# Patient Record
Sex: FEMALE | Race: WHITE | NOT HISPANIC OR LATINO | Employment: OTHER | URBAN - METROPOLITAN AREA
[De-identification: names, ages, dates, MRNs, and addresses within clinical notes are randomized per-mention and may not be internally consistent; named-entity substitution may affect disease eponyms.]

---

## 2017-03-29 ENCOUNTER — TRANSFERRED RECORDS (OUTPATIENT)
Dept: HEALTH INFORMATION MANAGEMENT | Facility: CLINIC | Age: 32
End: 2017-03-29

## 2017-03-29 LAB
HPV ABSTRACT: NORMAL
PAP-ABSTRACT: NORMAL

## 2018-02-21 ENCOUNTER — HOSPITAL ENCOUNTER (OUTPATIENT)
Facility: CLINIC | Age: 33
End: 2018-02-21
Payer: COMMERCIAL

## 2018-06-06 ENCOUNTER — TELEPHONE (OUTPATIENT)
Dept: OBGYN | Facility: OTHER | Age: 33
End: 2018-06-06

## 2018-06-10 ENCOUNTER — HOSPITAL ENCOUNTER (OUTPATIENT)
Facility: CLINIC | Age: 33
Discharge: OTHER INSTITUTION | End: 2018-06-11
Attending: FAMILY MEDICINE | Admitting: FAMILY MEDICINE
Payer: COMMERCIAL

## 2018-06-10 PROBLEM — Z36.89 ENCOUNTER FOR TRIAGE IN PREGNANT PATIENT: Status: ACTIVE | Noted: 2018-06-10

## 2018-06-10 LAB
ALBUMIN UR-MCNC: NEGATIVE MG/DL
AMORPH CRY #/AREA URNS HPF: ABNORMAL /HPF
APPEARANCE UR: ABNORMAL
BACTERIA #/AREA URNS HPF: ABNORMAL /HPF
BILIRUB UR QL STRIP: NEGATIVE
COLOR UR AUTO: YELLOW
GLUCOSE UR STRIP-MCNC: NEGATIVE MG/DL
HGB UR QL STRIP: NEGATIVE
KETONES UR STRIP-MCNC: NEGATIVE MG/DL
LEUKOCYTE ESTERASE UR QL STRIP: NEGATIVE
NITRATE UR QL: NEGATIVE
PH UR STRIP: 7 PH (ref 5–7)
RBC #/AREA URNS AUTO: 0 /HPF (ref 0–2)
SOURCE: ABNORMAL
SP GR UR STRIP: 1 (ref 1–1.03)
SQUAMOUS #/AREA URNS AUTO: 6 /HPF (ref 0–1)
UROBILINOGEN UR STRIP-MCNC: 0 MG/DL (ref 0–2)
WBC #/AREA URNS AUTO: 2 /HPF (ref 0–5)

## 2018-06-10 PROCEDURE — 96376 TX/PRO/DX INJ SAME DRUG ADON: CPT

## 2018-06-10 PROCEDURE — 87210 SMEAR WET MOUNT SALINE/INK: CPT | Performed by: FAMILY MEDICINE

## 2018-06-10 PROCEDURE — 80306 DRUG TEST PRSMV INSTRMNT: CPT | Performed by: FAMILY MEDICINE

## 2018-06-10 PROCEDURE — 81001 URINALYSIS AUTO W/SCOPE: CPT | Performed by: FAMILY MEDICINE

## 2018-06-10 PROCEDURE — 82731 ASSAY OF FETAL FIBRONECTIN: CPT | Performed by: FAMILY MEDICINE

## 2018-06-10 PROCEDURE — 59025 FETAL NON-STRESS TEST: CPT | Mod: 26 | Performed by: FAMILY MEDICINE

## 2018-06-10 RX ORDER — PRENATAL VIT/IRON FUM/FOLIC AC 27MG-0.8MG
1 TABLET ORAL DAILY
COMMUNITY
End: 2019-01-02

## 2018-06-11 ENCOUNTER — APPOINTMENT (OUTPATIENT)
Dept: ULTRASOUND IMAGING | Facility: CLINIC | Age: 33
End: 2018-06-11
Attending: FAMILY MEDICINE
Payer: COMMERCIAL

## 2018-06-11 VITALS
RESPIRATION RATE: 16 BRPM | HEIGHT: 66 IN | BODY MASS INDEX: 38.57 KG/M2 | SYSTOLIC BLOOD PRESSURE: 110 MMHG | DIASTOLIC BLOOD PRESSURE: 68 MMHG | TEMPERATURE: 97.7 F | WEIGHT: 240 LBS | HEART RATE: 95 BPM

## 2018-06-11 LAB
AMPHETAMINES UR QL: NOT DETECTED NG/ML
BARBITURATES UR QL SCN: ABNORMAL NG/ML
BENZODIAZ UR QL SCN: NOT DETECTED NG/ML
BUPRENORPHINE UR QL: NOT DETECTED NG/ML
CANNABINOIDS UR QL: NOT DETECTED NG/ML
COCAINE UR QL SCN: NOT DETECTED NG/ML
D-METHAMPHET UR QL: NOT DETECTED NG/ML
FIBRONECTIN FETAL VAG QL: NEGATIVE
METHADONE UR QL SCN: NOT DETECTED NG/ML
OPIATES UR QL SCN: NOT DETECTED NG/ML
OXYCODONE UR QL SCN: NOT DETECTED NG/ML
PCP UR QL SCN: NOT DETECTED NG/ML
PROPOXYPH UR QL: NOT DETECTED NG/ML
RUPTURE OF FETAL MEMBRANES BY ROM PLUS: NEGATIVE
SPECIMEN SOURCE: NORMAL
TRICYCLICS UR QL SCN: NOT DETECTED NG/ML
WET PREP SPEC: NORMAL

## 2018-06-11 PROCEDURE — 96376 TX/PRO/DX INJ SAME DRUG ADON: CPT

## 2018-06-11 PROCEDURE — 76819 FETAL BIOPHYS PROFIL W/O NST: CPT

## 2018-06-11 PROCEDURE — 25000128 H RX IP 250 OP 636

## 2018-06-11 PROCEDURE — 96374 THER/PROPH/DIAG INJ IV PUSH: CPT | Mod: 59

## 2018-06-11 PROCEDURE — 25000125 ZZHC RX 250

## 2018-06-11 PROCEDURE — 25000132 ZZH RX MED GY IP 250 OP 250 PS 637: Performed by: FAMILY MEDICINE

## 2018-06-11 PROCEDURE — 76815 OB US LIMITED FETUS(S): CPT

## 2018-06-11 PROCEDURE — 25000128 H RX IP 250 OP 636: Performed by: FAMILY MEDICINE

## 2018-06-11 PROCEDURE — 96360 HYDRATION IV INFUSION INIT: CPT

## 2018-06-11 PROCEDURE — G0463 HOSPITAL OUTPT CLINIC VISIT: HCPCS

## 2018-06-11 PROCEDURE — 84112 EVAL AMNIOTIC FLUID PROTEIN: CPT | Performed by: FAMILY MEDICINE

## 2018-06-11 PROCEDURE — 96361 HYDRATE IV INFUSION ADD-ON: CPT

## 2018-06-11 PROCEDURE — 59025 FETAL NON-STRESS TEST: CPT

## 2018-06-11 PROCEDURE — 96372 THER/PROPH/DIAG INJ SC/IM: CPT | Mod: XS

## 2018-06-11 RX ORDER — FENTANYL CITRATE 50 UG/ML
50 INJECTION, SOLUTION INTRAMUSCULAR; INTRAVENOUS ONCE
Status: COMPLETED | OUTPATIENT
Start: 2018-06-11 | End: 2018-06-11

## 2018-06-11 RX ORDER — NIFEDIPINE 10 MG/1
20 CAPSULE ORAL EVERY 30 MIN PRN
Status: COMPLETED | OUTPATIENT
Start: 2018-06-11 | End: 2018-06-11

## 2018-06-11 RX ORDER — FENTANYL CITRATE 50 UG/ML
INJECTION, SOLUTION INTRAMUSCULAR; INTRAVENOUS
Status: COMPLETED
Start: 2018-06-11 | End: 2018-06-11

## 2018-06-11 RX ORDER — HYDROXYZINE HYDROCHLORIDE 50 MG/1
100 TABLET, FILM COATED ORAL ONCE
Status: COMPLETED | OUTPATIENT
Start: 2018-06-11 | End: 2018-06-11

## 2018-06-11 RX ORDER — BETAMETHASONE SODIUM PHOSPHATE AND BETAMETHASONE ACETATE 3; 3 MG/ML; MG/ML
12 INJECTION, SUSPENSION INTRA-ARTICULAR; INTRALESIONAL; INTRAMUSCULAR; SOFT TISSUE ONCE
Status: CANCELLED | OUTPATIENT
Start: 2018-06-12

## 2018-06-11 RX ORDER — FENTANYL CITRATE 50 UG/ML
25 INJECTION, SOLUTION INTRAMUSCULAR; INTRAVENOUS ONCE
Status: COMPLETED | OUTPATIENT
Start: 2018-06-11 | End: 2018-06-11

## 2018-06-11 RX ORDER — ACETAMINOPHEN 325 MG/1
650 TABLET ORAL ONCE
Status: COMPLETED | OUTPATIENT
Start: 2018-06-11 | End: 2018-06-11

## 2018-06-11 RX ORDER — SODIUM CHLORIDE, SODIUM LACTATE, POTASSIUM CHLORIDE, CALCIUM CHLORIDE 600; 310; 30; 20 MG/100ML; MG/100ML; MG/100ML; MG/100ML
INJECTION, SOLUTION INTRAVENOUS CONTINUOUS
Status: DISCONTINUED | OUTPATIENT
Start: 2018-06-11 | End: 2018-06-11 | Stop reason: HOSPADM

## 2018-06-11 RX ORDER — BETAMETHASONE SODIUM PHOSPHATE AND BETAMETHASONE ACETATE 3; 3 MG/ML; MG/ML
12 INJECTION, SUSPENSION INTRA-ARTICULAR; INTRALESIONAL; INTRAMUSCULAR; SOFT TISSUE ONCE
Status: COMPLETED | OUTPATIENT
Start: 2018-06-11 | End: 2018-06-11

## 2018-06-11 RX ORDER — LIDOCAINE HYDROCHLORIDE 10 MG/ML
INJECTION, SOLUTION EPIDURAL; INFILTRATION; INTRACAUDAL; PERINEURAL
Status: COMPLETED
Start: 2018-06-11 | End: 2018-06-11

## 2018-06-11 RX ORDER — NIFEDIPINE 10 MG/1
20 CAPSULE ORAL ONCE
Status: COMPLETED | OUTPATIENT
Start: 2018-06-11 | End: 2018-06-11

## 2018-06-11 RX ADMIN — FENTANYL CITRATE 50 MCG: 50 INJECTION, SOLUTION INTRAMUSCULAR; INTRAVENOUS at 06:49

## 2018-06-11 RX ADMIN — NIFEDIPINE 20 MG: 10 CAPSULE ORAL at 01:18

## 2018-06-11 RX ADMIN — BETAMETHASONE SODIUM PHOSPHATE AND BETAMETHASONE ACETATE 12 MG: 3; 3 INJECTION, SUSPENSION INTRA-ARTICULAR; INTRALESIONAL; INTRAMUSCULAR at 02:27

## 2018-06-11 RX ADMIN — FENTANYL CITRATE 50 MCG: 50 INJECTION, SOLUTION INTRAMUSCULAR; INTRAVENOUS at 08:49

## 2018-06-11 RX ADMIN — NIFEDIPINE 20 MG: 10 CAPSULE ORAL at 01:49

## 2018-06-11 RX ADMIN — LIDOCAINE HYDROCHLORIDE 5 MG: 10 INJECTION, SOLUTION EPIDURAL; INFILTRATION; INTRACAUDAL; PERINEURAL at 06:39

## 2018-06-11 RX ADMIN — SODIUM CHLORIDE, POTASSIUM CHLORIDE, SODIUM LACTATE AND CALCIUM CHLORIDE: 600; 310; 30; 20 INJECTION, SOLUTION INTRAVENOUS at 08:34

## 2018-06-11 RX ADMIN — ACETAMINOPHEN 650 MG: 325 TABLET ORAL at 03:15

## 2018-06-11 RX ADMIN — FENTANYL CITRATE 25 MCG: 50 INJECTION, SOLUTION INTRAMUSCULAR; INTRAVENOUS at 05:10

## 2018-06-11 RX ADMIN — NIFEDIPINE 20 MG: 10 CAPSULE ORAL at 00:44

## 2018-06-11 RX ADMIN — HYDROXYZINE HYDROCHLORIDE 100 MG: 50 TABLET, FILM COATED ORAL at 02:43

## 2018-06-11 NOTE — PROGRESS NOTES
Continues with contractions, 3rd dose of Nifedipine given. Patient reports feeling like fluid leaking, ROM+ specimen sent to lab

## 2018-06-11 NOTE — PROGRESS NOTES
Dr. Hector informed of negative fetal fibronectin and negative wet prep results as well as cervical exam external os 4cm, internal os FT. Orders received for Nifedipine dosing

## 2018-06-11 NOTE — PROGRESS NOTES
"Pt's pain has decreased after IV Fentanyl was given at 0848.  Ctxs are 3\" apart.  Variables seen at times on fetal monitor. MD at bedside reviewing strip aware of tracings.  Awaiting ambulances arrival.  "

## 2018-06-11 NOTE — PROGRESS NOTES
Ambulance here to  pt, crew was given report & transport forms.  Pt stable at the time of transport, ctxs have become less intense & frequent.  Crew has no questions at this time.

## 2018-06-11 NOTE — PROGRESS NOTES
Dr. Hector reviewed case with [facility] and plan now is to continue to monirtor overnight. If contraction intensity or frequency escalate, Dr. Hector will be notified and come in to assess for transfer to higher level of care facility, otherwise Dr. Hector will see her in the morning to determine continued POC at that time.

## 2018-06-11 NOTE — PROGRESS NOTES
S: Triage Arrival  B: Breanna is a 33 y.o.  @ 33w 3d who presents with complaint of contractions since yesterday with increasing intensity since this evening.  A: EFM applied. FHT's130 with moderate variability, accels present, no decels noted on strip. Contractions 4-8 minutes  R: Will notify MD to obtain further orders.

## 2018-06-11 NOTE — PROGRESS NOTES
Ambulance ETA is 45 minutes, MD was updated on ETA.  Pt asking to drink some fluids, order was given to start LR at 125cc/hr.

## 2018-06-11 NOTE — PROGRESS NOTES
"  2018    Breanna Vidal  [id number]        OB Triage Progress Note      Ms. Vidal  is here with contractions.    She has noticed contractions for the past couple days but last evening they seemed to be getting worse.      No LMP recorded. Patient is pregnant.   Her Estimated Date of Delivery: 2018, making her 33w4d  wks.  Actually, in looking back through her chart, she had an early u/s in November at 6 weeks that put her EDC at 18 which would make her 34 1/7 weeks.      Estimated body mass index is 38.74 kg/(m^2) as calculated from the following:    Height as of this encounter: 1.676 m (5' 6\").    Weight as of this encounter: 108.9 kg (240 lb).  Her prenatal course has been complicated by a move with transition of prenatal care.  She received some care at health [facility] but recently moved to [place] and has not yet established care with her new MD.  She has not had regular prenatal care but has had screening labs all completed and states she received rhogam for Rh negative.     She is a 33 year old   Her OB history:   Obstetric History       T3      L3     SAB0   TAB0   Ectopic0   Multiple0   Live Births3       # Outcome Date GA Lbr Jian/2nd Weight Sex Delivery Anes PTL Lv   4 Current            3 Term /    M  EPI  WOODY      Complications: Postpartum hemorrhage   2 Term 10/29/11    F Vag-Spont EPI  LIVE BIRTH   1 Term 04    F Vag-Vacuum EPI  LIVE BIRTH      Complications: Postpartum hemorrhage        She has 3 full term uncomplicated deliveries.  First child lives with her father in [place], [place].  Second child was given up for adoption, open, and lives in [place].  (patient was going through drug treatment at that time).   She has her 18 month old living with her at home with her fiance, who is FOB of the 18 month old and this current pregnancy.     PMH:  Significant for drug abuse history, in  she was using methamphetamines and alcohol, put herself through drug " treatment and has been sober since then.   Also significant for MRSA facial and elbow cellulitis in 2017, treated with debridement and antibiotics.  Resolved. MRSA clearance swabs were negative in August 2017 x 2.        Past Surgical History:   Procedure Laterality Date     INCISION AND DRAINAGE  04/18/2012    chin abscess I&D     MAMMOPLASTY REDUCTION Bilateral 08/04/2017         No current outpatient prescriptions on file.       Allergies: Sulfa drugs and Zantac [ranitidine]      REVIEW OF SYSTEMS:  NEUROLOGIC:  Negative  EYES:  Negative  ENT:  Negative  GI:  Negative  BREAST:  Negative  :  Negative  GYN:  Negative  CV:  Negative  PULMONARY:  Negative  MUSCULOSKELETAL:  Negative  PSYCH:  Negative  OB:  Feeling good fetal movement but ongoing contractions. No bleeding, no leak of fluid.       Social History     Social History     Marital status: Single     Spouse name: N/A     Number of children: 3     Years of education: N/A     Occupational History     Not on file.     Social History Main Topics     Smoking status: Former Smoker     Smokeless tobacco: Former User     Quit date: 3/10/2016     Alcohol use No      Comment: history alcohol abuse 2012, s/p treatment     Drug use: No      Comment: former drug user (EtOH, cocaine, marijuana, meth), last use 2012     Sexual activity: Yes     Partners: Male     Other Topics Concern     Not on file     Social History Narrative    Currently lives in [place] with fijeimy and 18 month old son.  Has a 13 year old dtr that lives with dtrs dad in [place], [place], and a 6  Year old that was adopted out in an open adoption, lives in [place]      History reviewed. No pertinent family history.      Vitals:   FHT 130s with good variability,  With contractions every  2-7 min    Alert Awake in NAD  HEENT grossly normal  Neck: no lymphadenopathy or thryoidomegaly  Lungs CTAB  Back no spinal or CVAT  Heart RRR without murmur  ABD gravid, obese, position not felt on exam.  I cannot  palpate contractions well  Pelvic:  no fluid noted, no blood noted  Cervix is very high, feels closed but soft.  Cannot feel presenting part.    EXT:  no edema or calf tenderness  Neuro:  intact    Assessment:  IUP at 33w4d  With  contractions.   She is having significant pain, feels these go through to her back.  She can't rest/sleep.    She was treated with 3 doses of nifedipine per protocol without significant benefit.    She has been given 1 dose of betamethasone 12 mg.    She has been given 100 mg of Vistaril without benefit.    I am going to give her a dose of fentanyl as well. She does not have IV at this time, will use IM. She tends to not tolerate oral pain meds well.    Will also have u/s to evaluate fetal well being and size.    If her contractions are progressive, will transfer to [facility] for NICU care.    If not, will consider discharge home on bedrest until 36 weeks.     Flower Hector MD  Dept of Family Medicine  2018

## 2018-06-11 NOTE — PROGRESS NOTES
Breanna continues to contract through the night and is complaining that they are getting stronger and more painful.    She has not had any relief with the pain medication.  It helps very briefly but then wears off.   Her u/s was normal, shows EFW 57% assuming 34 1/7 weeks.      IMPRESSION:    1. Single live intrauterine pregnancy of 35 weeks 2 days gestation by  current ultrasound measurement. Fetal growth is 8 days more advanced  than what is expected from the reported previously established due  date.  2. Normal biophysical profile score of 8 out of 8.  3. Otherwise, unremarkable limited obstetric ultrasound.    Vitals:    06/11/18 0118 06/11/18 0149 06/11/18 0330 06/11/18 0751   BP: 126/58 105/63  110/68   Pulse: 102 106  95   Resp: 18 18  16   Temp:   97.5  F (36.4  C) 97.7  F (36.5  C)   TempSrc:   Oral Oral   Weight:       Height:          EFM:  Contractions continue every 2-5 minutes.    FHT:  150 baseline with good variability, +accels, 1 decel.   SVE repeated:    Closed but very soft, baby feels cephalic despite just having had u/s that shows breech.   High, -5 station    We discussed options of:   1 - continuing care here with close monitoring  2- sending her home for bedrest  3 - transfer to hospital with NICU for possible early delivery    She prefers to transfer to [facility] which is her planned delivery site, closer to her family.   She had Betamethasone at 2:30 am.    Will transfer by ground, offer pain meds prior to transfer.  I spoke with Dr. Reji Nguyễn who accepts patient in transfer.   Flower Hector MD

## 2018-06-11 NOTE — PROGRESS NOTES
S: MD update  A:  Dr. Hector informed of patient C/O contractions with increasing intensity,  @ 33w3d gestation, increased mucous discharge, previous deliveries all full term.  FHT's130 with moderate variability, accels present, no decels noted.  Contractions 4-8 minutes, mild lasting 60-90 seconds. UA results reviewed with MD.  R: Orders received for wet prep, Fetal Fibronectin and cervical exam

## 2018-06-11 NOTE — PROGRESS NOTES
Report was called to Mk Pozo (accepting RN) at [facility].  RN was also informed of ETA.  RN has no further questions after report given.

## 2018-06-11 NOTE — PROGRESS NOTES
Dr. Hector informed of continued contractions after 3 doses of Nifedipine as well as negative ROM+ results. Orders received for Betamethasone injection as well as tox screen due to history of drug abuse

## 2018-06-11 NOTE — PROGRESS NOTES
Dr. Hector notified of continued contractions, some that patient says are very strong and some not too bad. Contractions are every 2-9 minutes. FHT's 125, moderate variability with accels present and no decels. Dr. Hector coming in to assess patient bedside

## 2018-06-13 ENCOUNTER — HOSPITAL ENCOUNTER (OUTPATIENT)
Facility: CLINIC | Age: 33
Discharge: NURSING, REHAB OR LONG-TERM CARE | End: 2018-06-13
Attending: FAMILY MEDICINE | Admitting: FAMILY MEDICINE
Payer: COMMERCIAL

## 2018-06-13 VITALS
SYSTOLIC BLOOD PRESSURE: 113 MMHG | HEART RATE: 77 BPM | DIASTOLIC BLOOD PRESSURE: 56 MMHG | RESPIRATION RATE: 16 BRPM | TEMPERATURE: 97.8 F

## 2018-06-13 LAB
AMPHETAMINES UR QL: NOT DETECTED NG/ML
BARBITURATES UR QL SCN: NOT DETECTED NG/ML
BENZODIAZ UR QL SCN: NOT DETECTED NG/ML
BUPRENORPHINE UR QL: NOT DETECTED NG/ML
CANNABINOIDS UR QL: NOT DETECTED NG/ML
COCAINE UR QL SCN: NOT DETECTED NG/ML
D-METHAMPHET UR QL: NOT DETECTED NG/ML
METHADONE UR QL SCN: NOT DETECTED NG/ML
OPIATES UR QL SCN: NOT DETECTED NG/ML
OXYCODONE UR QL SCN: NOT DETECTED NG/ML
PCP UR QL SCN: NOT DETECTED NG/ML
PROPOXYPH UR QL: NOT DETECTED NG/ML
TRICYCLICS UR QL SCN: NOT DETECTED NG/ML

## 2018-06-13 PROCEDURE — 96374 THER/PROPH/DIAG INJ IV PUSH: CPT

## 2018-06-13 PROCEDURE — 80306 DRUG TEST PRSMV INSTRMNT: CPT | Performed by: FAMILY MEDICINE

## 2018-06-13 PROCEDURE — 25000128 H RX IP 250 OP 636: Performed by: FAMILY MEDICINE

## 2018-06-13 PROCEDURE — 99213 OFFICE O/P EST LOW 20 MIN: CPT | Performed by: FAMILY MEDICINE

## 2018-06-13 PROCEDURE — G0463 HOSPITAL OUTPT CLINIC VISIT: HCPCS

## 2018-06-13 PROCEDURE — 25000128 H RX IP 250 OP 636

## 2018-06-13 PROCEDURE — 96360 HYDRATION IV INFUSION INIT: CPT

## 2018-06-13 PROCEDURE — 25000125 ZZHC RX 250: Performed by: FAMILY MEDICINE

## 2018-06-13 PROCEDURE — 25000132 ZZH RX MED GY IP 250 OP 250 PS 637: Performed by: FAMILY MEDICINE

## 2018-06-13 PROCEDURE — 25000125 ZZHC RX 250

## 2018-06-13 PROCEDURE — 96361 HYDRATE IV INFUSION ADD-ON: CPT

## 2018-06-13 RX ORDER — LIDOCAINE HYDROCHLORIDE 10 MG/ML
INJECTION, SOLUTION EPIDURAL; INFILTRATION; INTRACAUDAL; PERINEURAL
Status: COMPLETED
Start: 2018-06-13 | End: 2018-06-13

## 2018-06-13 RX ORDER — FENTANYL CITRATE 50 UG/ML
50 INJECTION, SOLUTION INTRAMUSCULAR; INTRAVENOUS ONCE
Status: COMPLETED | OUTPATIENT
Start: 2018-06-13 | End: 2018-06-13

## 2018-06-13 RX ORDER — ONDANSETRON 4 MG/1
4 TABLET, ORALLY DISINTEGRATING ORAL ONCE
Status: COMPLETED | OUTPATIENT
Start: 2018-06-13 | End: 2018-06-13

## 2018-06-13 RX ORDER — ACETAMINOPHEN 325 MG/1
975 TABLET ORAL ONCE
Status: COMPLETED | OUTPATIENT
Start: 2018-06-13 | End: 2018-06-13

## 2018-06-13 RX ORDER — FENTANYL CITRATE 50 UG/ML
INJECTION, SOLUTION INTRAMUSCULAR; INTRAVENOUS
Status: COMPLETED
Start: 2018-06-13 | End: 2018-06-13

## 2018-06-13 RX ORDER — NIFEDIPINE 10 MG/1
20 CAPSULE ORAL EVERY 30 MIN PRN
Status: COMPLETED | OUTPATIENT
Start: 2018-06-13 | End: 2018-06-13

## 2018-06-13 RX ORDER — HYDROXYZINE HYDROCHLORIDE 50 MG/1
100 TABLET, FILM COATED ORAL ONCE
Status: COMPLETED | OUTPATIENT
Start: 2018-06-13 | End: 2018-06-13

## 2018-06-13 RX ORDER — NIFEDIPINE 10 MG/1
20 CAPSULE ORAL ONCE
Status: COMPLETED | OUTPATIENT
Start: 2018-06-13 | End: 2018-06-13

## 2018-06-13 RX ADMIN — NIFEDIPINE 20 MG: 10 CAPSULE ORAL at 20:30

## 2018-06-13 RX ADMIN — HYDROXYZINE HYDROCHLORIDE 100 MG: 50 TABLET, FILM COATED ORAL at 20:03

## 2018-06-13 RX ADMIN — ONDANSETRON 4 MG: 4 TABLET, ORALLY DISINTEGRATING ORAL at 20:05

## 2018-06-13 RX ADMIN — FENTANYL CITRATE 50 MCG: 50 INJECTION, SOLUTION INTRAMUSCULAR; INTRAVENOUS at 21:51

## 2018-06-13 RX ADMIN — NIFEDIPINE 20 MG: 10 CAPSULE ORAL at 20:01

## 2018-06-13 RX ADMIN — ACETAMINOPHEN 975 MG: 325 TABLET ORAL at 20:05

## 2018-06-13 RX ADMIN — LIDOCAINE HYDROCHLORIDE 20 MG: 10 INJECTION, SOLUTION EPIDURAL; INFILTRATION; INTRACAUDAL; PERINEURAL at 21:02

## 2018-06-13 RX ADMIN — SODIUM CHLORIDE, POTASSIUM CHLORIDE, SODIUM LACTATE AND CALCIUM CHLORIDE 1000 ML: 600; 310; 30; 20 INJECTION, SOLUTION INTRAVENOUS at 21:44

## 2018-06-13 RX ADMIN — NIFEDIPINE 20 MG: 10 CAPSULE ORAL at 21:14

## 2018-06-14 NOTE — PROGRESS NOTES
Patient D/C'd in the care of [facility] crew enroute to [facility] in stable condition at 2230. Report given to Receiving Nurse Luisa at [facility].

## 2018-06-14 NOTE — PROGRESS NOTES
"S: triage arrival  B: 33 year old  at 34w3d gestation with a hx of  contractions with no cervical change.  A:  Pt presents to the birthplace today with two concerns.  1) decreased fetal movement.  Pt states she has not felt the baby move since last night.  2) Contractions that have gotten increasingly more frequent this afternoon.  Fetal heart tones are 150 with moderate variability.  accels present.  No decels. Pt is sin every 1-2 minutes.  Pt rates the pain at its worst an \"8\".  Pt appears very anxious.  At times she is laughing about something and the next minute she is crying.  It is hard to determine by her disposition how the contractions are feeling for her.  Cervical exam was done and is unchanged from previous exam.   was called and orders placed.   R: Will carryout orders.   "

## 2018-06-14 NOTE — PROGRESS NOTES
"3rd dose of procardia given.  Pt continues to contract every 1-3 minutes with irritability.. Rates pain an \"8\" on a 0-10 pain scale. Pt is able to talk on the phone while having these contractions.      "

## 2018-06-14 NOTE — PROGRESS NOTES
"  2018    Breanna Vidal  [id number]            OB Triage      Ms. Vidal  is here with decreased fetal movement initially, but also mentions she has been having more contractions lately as well..    She has noticed decreased fetal movement and contractions    No LMP recorded. Patient is pregnant.   Her Estimated Date of Delivery: 2018  , making her 34w3d  wks.      Estimated body mass index is 38.74 kg/(m^2) as calculated from the following:    Height as of 6/10/18: 1.676 m (5' 6\").    Weight as of 6/10/18: 108.9 kg (240 lb).  Her prenatal course has been complicated by poor prenatal care.    See prenatal for labs.  Done by genital culture 17 was positive GBS, Rubella Immune, RH negative (B-), ab screen neg .    Estimated fetal weight= 6 lbs       She is a 33 year old   Her OB history:   Obstetric History       T3      L3     SAB0   TAB0   Ectopic0   Multiple0   Live Births3       # Outcome Date GA Lbr Jian/2nd Weight Sex Delivery Anes PTL Lv   4 Current            3 Term 16    M  EPI  WOODY      Complications: Postpartum hemorrhage   2 Term 10/29/11    F Vag-Spont EPI  LIVE BIRTH   1 Term 04    F Vag-Vacuum EPI  LIVE BIRTH      Complications: Postpartum hemorrhage               Past Medical History:   Diagnosis Date     Cellulitis     MRSA septic olecranon bursitis and facial cellulitis     Drug abuse     methamphetamine and alcohol, sober since treatment      MRSA cellulitis     cleared with negative nasal swabs 17 and 17, reviewed     Rh negative, antepartum           Past Surgical History:   Procedure Laterality Date     INCISION AND DRAINAGE  2012    chin abscess I&D     MAMMOPLASTY REDUCTION Bilateral 2017         No current outpatient prescriptions on file.       Allergies: Sulfa drugs and Zantac [ranitidine]      REVIEW OF SYSTEMS:  NEUROLOGIC:  Negative  EYES:  Negative  ENT:  Negative  GI:  Negative  BREAST:  " Negative  :  Negative  GYN:  Negative  CV:  Negative  PULMONARY:  Negative  MUSCULOSKELETAL:  Negative  PSYCH:  Negative        Social History     Social History     Marital status: Single     Spouse name: N/A     Number of children: 3     Years of education: N/A     Occupational History     Not on file.     Social History Main Topics     Smoking status: Former Smoker     Smokeless tobacco: Former User     Quit date: 3/10/2016     Alcohol use No      Comment: history alcohol abuse , s/p treatment     Drug use: No      Comment: former drug user (EtOH, cocaine, marijuana, meth), last use      Sexual activity: Yes     Partners: Male     Other Topics Concern     Not on file     Social History Narrative    Currently lives in [place] with fiance and 18 month old son.  Has a 13 year old dtr that lives with dtrs dad in [place], [place], and a 6  Year old that was adopted out in an open adoption, lives in [place]      No family history on file.          Vitals:     With contractions every  1-4min    Alert Awake in NAD  HEENT grossly normal  Neck: no lymphadenopathy or thryoidomegaly  Lungs CTA yoselin  Back normal  Heart RR without murmur  ABD gravid, obese on exam with palpable mild contractions  Pelvic:  no fluid noted, no blood noted  Cervix is closed cm / 70 % effaced at -4 station  EXT:  no edema or calf tenderness      Assessment:  IUP at 34w3d     contractions    Plan:  Procardia, IVF, urine drug screen. Tylenol and zofran for symptoms as needed.  Monitor -- contractins have been on and off for the last few days and are more intense at this time which warrants evalutaion. PAtient checked and started on procardia as she came in and upon 1 hour recheck has had no change. Will finish IVF and get drug screen to complete evalutaion and likely recheck and if unchanged, plan discharge home as contractions have not been causing cervical changes.    Addendum: recheck at 2 hours revealed cervical change to 1.5  cm /70/-3 posterior.  Discussed and planned transfer of patient to [facility].        Gail Lopez MD MD

## 2018-06-18 ENCOUNTER — PRENATAL OFFICE VISIT (OUTPATIENT)
Dept: OBGYN | Facility: OTHER | Age: 33
End: 2018-06-18
Payer: COMMERCIAL

## 2018-06-18 VITALS
DIASTOLIC BLOOD PRESSURE: 80 MMHG | SYSTOLIC BLOOD PRESSURE: 140 MMHG | WEIGHT: 252 LBS | BODY MASS INDEX: 40.67 KG/M2 | HEART RATE: 100 BPM

## 2018-06-18 DIAGNOSIS — R11.0 NAUSEA: ICD-10-CM

## 2018-06-18 DIAGNOSIS — Z34.93 NORMAL PREGNANCY IN THIRD TRIMESTER: Primary | ICD-10-CM

## 2018-06-18 PROCEDURE — 99207 ZZC PRENATAL VISIT: CPT | Performed by: OBSTETRICS & GYNECOLOGY

## 2018-06-18 RX ORDER — ONDANSETRON 4 MG/1
4 TABLET, FILM COATED ORAL EVERY 8 HOURS PRN
Qty: 30 TABLET | Refills: 1 | Status: SHIPPED | OUTPATIENT
Start: 2018-06-18 | End: 2018-07-12

## 2018-06-18 RX ORDER — BUTALBITAL, ACETAMINOPHEN AND CAFFEINE 300; 40; 50 MG/1; MG/1; MG/1
CAPSULE ORAL
COMMUNITY
Start: 2018-05-22 | End: 2018-07-12

## 2018-06-18 RX ORDER — ONDANSETRON 4 MG/1
TABLET, FILM COATED ORAL
COMMUNITY
Start: 2018-04-26 | End: 2018-07-12

## 2018-06-18 NOTE — MR AVS SNAPSHOT
"              After Visit Summary   6/18/2018    Breanna Vidal    MRN: [id number]           Patient Information     Date Of Birth          1985        Visit Information        Provider Department      6/18/2018 2:15 PM Camila Montgomery DO [facility]        Today's Diagnoses     Normal pregnancy in third trimester    -  1    Nausea          Care Instructions    Go to L&D          Follow-ups after your visit        Your next 10 appointments already scheduled     Jun 26, 2018 10:30 AM CDT   ESTABLISHED PRENATAL with Camila Montgomery DO   [facility] ([facility])    [address] [place] [id number] [phone]              Who to contact     If you have questions or need follow up information about today's clinic visit or your schedule please contact [facility] directly at [phone].  Normal or non-critical lab and imaging results will be communicated to you by MyChart, letter or phone within 4 business days after the clinic has received the results. If you do not hear from us within 7 days, please contact the clinic through MyChart or phone. If you have a critical or abnormal lab result, we will notify you by phone as soon as possible.  Submit refill requests through [employer] or call your pharmacy and they will forward the refill request to us. Please allow 3 business days for your refill to be completed.          Additional Information About Your Visit        MyChart Information     [employer] lets you send messages to your doctor, view your test results, renew your prescriptions, schedule appointments and more. To sign up, go to [email or web address] . Click on \"Log in\" on the left side of the screen, which will take you to the Welcome page. Then click on \"Sign up Now\" on the right side of the page.     You will be asked to enter the access code listed below, as well as some personal information. Please follow the " directions to create your username and password.     Your access code is: [email or web address]  Expires: 2018  2:20 PM     Your access code will  in 90 days. If you need help or a new code, please call your [facility] clinic or [phone].        Care EveryWhere ID     This is your Care EveryWhere ID. This could be used by other organizations to access your [facility] medical records  [id number]        Your Vitals Were     Pulse BMI (Body Mass Index)                100 40.67 kg/m2           Blood Pressure from Last 3 Encounters:   18 140/80   18 113/56   18 110/68    Weight from Last 3 Encounters:   18 252 lb (114.3 kg)   06/10/18 240 lb (108.9 kg)              Today, you had the following     No orders found for display         Today's Medication Changes          These changes are accurate as of 18  3:11 PM.  If you have any questions, ask your nurse or doctor.               These medicines have changed or have updated prescriptions.        Dose/Directions    * ZOFRAN 4 MG tablet   This may have changed:  Another medication with the same name was added. Make sure you understand how and when to take each.   Generic drug:  ondansetron   Changed by:  Camila Montgomery DO        Refills:  0       * ondansetron 4 MG tablet   Commonly known as:  ZOFRAN   This may have changed:  You were already taking a medication with the same name, and this prescription was added. Make sure you understand how and when to take each.   Used for:  Nausea   Changed by:  Camila Montgomery DO        Dose:  4 mg   Take 1 tablet (4 mg) by mouth every 8 hours as needed for nausea   Quantity:  30 tablet   Refills:  1       * Notice:  This list has 2 medication(s) that are the same as other medications prescribed for you. Read the directions carefully, and ask your doctor or other care provider to review them with you.         Where to get your medicines      These medications were sent to [facility]  [place] [address]  [address] [facility] [address]     Phone:  [phone]     ondansetron 4 MG tablet                Primary Care Provider Office Phone # Fax #    Ramiro Gaviria [phone]       OB GYN [address]   [place] [facility] [address]        Equal Access to Services     MACRINA BAINS : Hadii aad ku hadasho Soomaali, waaxda luqadaha, qaybta kaalmada adeegyada, waxay idiin hayaan adeeg kharash la'aan ah. So [facility] [phone].    ATENCIÓN: Si habla espvandana, tiene a haider disposición servicios gratuitos de asistencia lingüística. [facility] [phone].    We comply with applicable federal civil rights laws and [place] laws. We do not discriminate on the basis of race, color, national origin, age, disability, sex, sexual orientation, or gender identity.            Thank you!     Thank you for choosing [facility]  for your care. Our goal is always to provide you with excellent care. Hearing back from our patients is one way we can continue to improve our services. Please take a few minutes to complete the written survey that you may receive in the mail after your visit with us. Thank you!             Your Updated Medication List - Protect others around you: Learn how to safely use, store and throw away your medicines at www.disposemymeds.org.          This list is accurate as of 6/18/18  3:11 PM.  Always use your most recent med list.                   Brand Name Dispense Instructions for use Diagnosis    FIORICET -40 MG Caps   Generic drug:  Butalbital-APAP-Caffeine           prenatal multivitamin plus iron 27-0.8 MG Tabs per tablet      Take 1 tablet by mouth daily        * ZOFRAN 4 MG tablet   Generic drug:  ondansetron           * ondansetron 4 MG tablet    ZOFRAN    30 tablet    Take 1 tablet (4 mg) by mouth every 8 hours as needed for nausea    Nausea       * Notice:  This list has 2 medication(s) that are the same as  other medications prescribed for you. Read the directions carefully, and ask your doctor or other care provider to review them with you.

## 2018-06-18 NOTE — PROGRESS NOTES
33 year old  at 35w1d weeks presents to the clinic for a routine prenatal visit.  Patient has been seen multiple times at L&D and admitted 2 times for  labor.  She recently was discharged on .  Her cx at discharge was 1-250/-3.  She has received BMZ and terb.  She is not currently taking anything for  labor.  She has been on pelvic rest.  Patient complains of a headache which started last night and she took Tylenol which seemed to help because she was able to sleep.  Occasional vision changes.  Some nausea.  No vomiting.  No RUQ pain.    No vaginal bleeding, leakage of fluid, or contractions  LX=261/80.  She normally runs 120/70's-confirmed with her recent admission  Fundal height=38cm  XWPt=985's  CX=3-4/80/-2    I recommended patient go to L&D.  I spoke with charge, SHILOH Rodriguez.  Patient verbalizes understanding.        Camila Montgmoery

## 2018-06-19 ENCOUNTER — TELEPHONE (OUTPATIENT)
Dept: OBGYN | Facility: OTHER | Age: 33
End: 2018-06-19

## 2018-06-19 NOTE — TELEPHONE ENCOUNTER
Patient said she needs a hospital follow up on Friday with Dr Montgomery. She said Dr Montgomery is the one that sent her to the hospital and she would know what this is for.   Can you work her in?

## 2018-06-19 NOTE — TELEPHONE ENCOUNTER
Routed to provider for review and response.       Daphne Guerra,PERLA  June 19, 2018  10:41 AM

## 2018-06-19 NOTE — TELEPHONE ENCOUNTER
Patient can be worked into my schedule.  Please let her know she will be worked in and may have to wait.    Camila Montgomery

## 2018-06-21 ENCOUNTER — TELEPHONE (OUTPATIENT)
Dept: OBGYN | Facility: OTHER | Age: 33
End: 2018-06-21

## 2018-06-21 ENCOUNTER — PRENATAL OFFICE VISIT (OUTPATIENT)
Dept: OBGYN | Facility: OTHER | Age: 33
End: 2018-06-21
Payer: COMMERCIAL

## 2018-06-21 ENCOUNTER — NURSE TRIAGE (OUTPATIENT)
Dept: NURSING | Facility: CLINIC | Age: 33
End: 2018-06-21

## 2018-06-21 VITALS
SYSTOLIC BLOOD PRESSURE: 120 MMHG | DIASTOLIC BLOOD PRESSURE: 80 MMHG | WEIGHT: 250.5 LBS | HEART RATE: 80 BPM | BODY MASS INDEX: 40.43 KG/M2

## 2018-06-21 DIAGNOSIS — O14.03 MILD PRE-ECLAMPSIA IN THIRD TRIMESTER: Primary | ICD-10-CM

## 2018-06-21 DIAGNOSIS — O16.3 ELEVATED BLOOD PRESSURE COMPLICATING PREGNANCY IN THIRD TRIMESTER, ANTEPARTUM: Primary | ICD-10-CM

## 2018-06-21 PROBLEM — Z34.93 NORMAL PREGNANCY IN THIRD TRIMESTER: Status: RESOLVED | Noted: 2018-06-18 | Resolved: 2018-06-21

## 2018-06-21 PROBLEM — Z36.89 ENCOUNTER FOR TRIAGE IN PREGNANT PATIENT: Status: RESOLVED | Noted: 2018-06-10 | Resolved: 2018-06-21

## 2018-06-21 PROCEDURE — 99207 ZZC COMPLICATED OB VISIT: CPT | Performed by: OBSTETRICS & GYNECOLOGY

## 2018-06-21 NOTE — TELEPHONE ENCOUNTER
Spoke with pt.  Advised she go to [facility] L[facility] for further evaluation if she is having contractions and a headache.    Pt verbalized understanding and agreed to plan.    Lise Finch RN

## 2018-06-21 NOTE — NURSING NOTE
"Chief Complaint   Patient presents with     Contractions     Headache       Initial /80 (BP Location: Right arm, Patient Position: Chair, Cuff Size: Adult Regular)  Pulse 80  Wt 266 lb (120.7 kg)  BMI 42.93 kg/m2 Estimated body mass index is 42.93 kg/(m^2) as calculated from the following:    Height as of 6/10/18: 5' 6\" (1.676 m).    Weight as of this encounter: 266 lb (120.7 kg).  BP completed using cuff size: regular        Gail Agudelo, [facility]  2018          "

## 2018-06-21 NOTE — MR AVS SNAPSHOT
After Visit Summary   6/21/2018    Breanna Vidal    MRN: [id number]           Patient Information     Date Of Birth          1985        Visit Information        Provider Department      6/21/2018 10:00 AM Diann Stewart MD [facility]        Today's Diagnoses     Mild pre-eclampsia in third trimester    -  1       Follow-ups after your visit        Follow-up notes from your care team     Return in about 1 week (around 6/28/2018) for OB Visit.      Your next 10 appointments already scheduled     Jun 22, 2018 10:45 AM CDT   ESTABLISHED PRENATAL with Camila Montgomery DO   [facility] ([facility]    [address]  [address]  [facility] [id number] [phone]            Jun 26, 2018 10:30 AM CDT   ESTABLISHED PRENATAL with Camila Montgomery DO   [facility] [place] [place] ([facility] [place] [place]    [address] [address] [place] [id number] [phone]              Who to contact     If you have questions or need follow up information about today's clinic visit or your schedule please contact [facility] directly at [phone].  Normal or non-critical lab and imaging results will be communicated to you by MyChart, letter or phone within 4 business days after the clinic has received the results. If you do not hear from us within 7 days, please contact the clinic through MyChart or phone. If you have a critical or abnormal lab result, we will notify you by phone as soon as possible.  Submit refill requests through Joost or call your pharmacy and they will forward the refill request to us. Please allow 3 business days for your refill to be completed.          Additional Information About Your Visit        MyChart Information     Joost lets you send messages to your doctor, view your test results, renew your prescriptions, schedule appointments and more. To sign up, go to [email or web address] . Click on  "\"Log in\" on the left side of the screen, which will take you to the Welcome page. Then click on \"Sign up Now\" on the right side of the page.     You will be asked to enter the access code listed below, as well as some personal information. Please follow the directions to create your username and password.     Your access code is: [email or web address]  Expires: 2018  2:20 PM     Your access code will  in 90 days. If you need help or a new code, please call your [facility] clinic or [phone].        Care EveryWhere ID     This is your Care EveryWhere ID. This could be used by other organizations to access your [facility] medical records  [id number]        Your Vitals Were     Pulse BMI (Body Mass Index)                80 40.43 kg/m2           Blood Pressure from Last 3 Encounters:   18 120/80   18 140/80   18 113/56    Weight from Last 3 Encounters:   18 250 lb 8 oz (113.6 kg)   18 252 lb (114.3 kg)   06/10/18 240 lb (108.9 kg)              Today, you had the following     No orders found for display       Primary Care Provider Office Phone # Fax #    Ramiro Gaviria [phone]       OB GYN [address] BA [address] [place] [address]        Equal Access to Services     : Hadii aad ku hadasho Soomaali, waaxda luqadaha, qaybta kaalmada adeegyada, genevieve matt . So [facility] [phone].    ATENCIÓN: Si habla español, tiene a haider disposición servicios gratuitos de asistencia lingüística. Llame al [phone].    We comply with applicable federal civil rights laws and [place] laws. We do not discriminate on the basis of race, color, national origin, age, disability, sex, sexual orientation, or gender identity.            Thank you!     Thank you for choosing [facility] [place]  for your care. Our goal is always to provide you with excellent care. Hearing back from our patients is one way we can continue to " improve our services. Please take a few minutes to complete the written survey that you may receive in the mail after your visit with us. Thank you!             Your Updated Medication List - Protect others around you: Learn how to safely use, store and throw away your medicines at www.disposemymeds.org.          This list is accurate as of 6/21/18 11:01 AM.  Always use your most recent med list.                   Brand Name Dispense Instructions for use Diagnosis    FIORICET -40 MG Caps   Generic drug:  Butalbital-APAP-Caffeine           prenatal multivitamin plus iron 27-0.8 MG Tabs per tablet      Take 1 tablet by mouth daily        * ZOFRAN 4 MG tablet   Generic drug:  ondansetron           * ondansetron 4 MG tablet    ZOFRAN    30 tablet    Take 1 tablet (4 mg) by mouth every 8 hours as needed for nausea    Nausea       * Notice:  This list has 2 medication(s) that are the same as other medications prescribed for you. Read the directions carefully, and ask your doctor or other care provider to review them with you.

## 2018-06-21 NOTE — PROGRESS NOTES
Presents for evaluation of HA, nausea and contractions.      HA 7/10, causing nausea.  She has tried tylenol and this has not helped. She has contractions every 15 min.  Some bloody show, which is not new for her.  She was in the hospital earlier this week and diagnosed with preeclampsia without severe features.   Note she has not had help with her 2 year old boy, who is quite busy.     No LOF  ROS:   and GI otherwise negative.     Please see Prenatal Vitals and Notes Flowsheet for objective data.  Care Everywhere reviewed.  Normal LFT on .  Plts 197  Pr:Cr 0.38    A/P:  33 year old  at 35w4d       ICD-10-CM    1. Mild pre-eclampsia in third trimester O14.03        BP is normal today, but she has a headache and nausea, so I recommend she go in to L&D for further evaluation.  Recommend she have help with her small child, as she should be resting more.  She voiced understanding.       Diann Stewart MD

## 2018-06-21 NOTE — TELEPHONE ENCOUNTER
Breanna Vidal is a 33 year old female-     NURSING ASSESSMENT:  Description:  Patient calls with contractions, headache, and nausea that has been on going since last night. [facility] and treated for  labor. Medications given in the hospital stopped contractions and she was given two doses of steroids for baby's lung development. Last night contractions started again. She spoke with [facility] triage a couple hours ago and they connected her to the birthplace.  The birthplace is recommending that she come in to be evaluated. Pt is not wanting to be seen as she was just hospitalized.  She has an OB appt for tomorrow and was hoping to move this up to today with Dr. Montgomery. Provider is not in clinic today. DENIES vaginal leaking or bleeding.  Contractions are one or two an hour, but pt is also having some abdominal cramping which is new.   Onset/duration:  Ongoing since last night   Precip. factors:  35 weeks pregnant   Associated symptoms:  Headache and nausea (this is not new)  Improves/worsens symptoms: Contractions stopped and then restarted.   Pain scale (0-10)   4/10  LMP/preg/breast feedin weeks pregnant   Last exam/Treatment:  2018  Allergies:   Allergies   Allergen Reactions     Sulfa Drugs Unknown     Zantac [Ranitidine] Rash     NURSING PLAN: Nursing advice to patient go to the birthplace for evaluation of  labor     RECOMMENDED DISPOSITION:  To Birthplace, another person to drive - Pt is not wanting to be seen in the hospital again. She was just there twice and recently discharged.   Will comply with recommendation: Pt is wanting to wait things out at home. States that if contractions become more steady and or water breaks she will go in.   If further questions/concerns or if symptoms do not improve, worsen or new symptoms develop, call your PCP or [facility] as soon as possible.      Guideline used: 3rd trimester,  labor   Telephone Triage for  Obstetrics and Gynecology, Zuly Carroll and Acacia Santana, RN, BSN

## 2018-06-21 NOTE — TELEPHONE ENCOUNTER
Reason for call:  Patient reporting a symptom    Symptom or request: contractions/headaches/nausea    Duration (how long have symptoms been present): last night     Have you been treated for this before? Yes    Additional comments: pt states is 35 weeks pregnant and been hospitalized twice now for pre term labor. Pt states last night started with contractions and has headaches and nausea    Phone Number patient can be reached at:  Home number on file [phone] (home)    Best Time:  ANY    Can we leave a detailed message on this number:  YES    Call taken on 6/21/2018 at 7:22 AM by Laura Back

## 2018-06-21 NOTE — TELEPHONE ENCOUNTER
RN please triage.  This patient was added on to my schedule at 10 am.      This patient was last seen in the office a few days ago and found to be 3-4 cm dilated with elevated BP.  Hospital note reviewed.  She was diagnosed with preeclampsia with mild features and delivery at 37 weeks was recommended.      She should go back to L&D for further evaluation if she has a headache and contractions.

## 2018-06-21 NOTE — TELEPHONE ENCOUNTER
"Return call from patient. Patient stated she did talk to another RN but \"I absolutely refuse to go to L & D now.\" Patient stated she is sin \"but every 15 mins\" since about 0500. Reported pain as 4-5/10 \"not bad at all.\" Patient reported h/a \"is killing me.\" Reported pain as 7/10 and \"it is nauseating.\" Patient stated she wants to be seen in the clinic \"first and then if they send me to labor & delivery then I will go.\" Explained will update Dr. Stewart and if Dr. Stewart has other suggestions, staff will call back. Placed patient back on Dr. Stewart's schedule at 1000 today.  Called and updated Gail DAVE CMA and she will update Dr. Stewart.   Will route to Dr. Stewart . Debbie Wilcox RN, BAN      "

## 2018-06-21 NOTE — TELEPHONE ENCOUNTER
Left message asking pt to call back to clinic about her appt today. Left clinic call back phone number and RN hours. Debbie Wilcox RN, BAN

## 2018-06-21 NOTE — TELEPHONE ENCOUNTER
Contractions stopped with medications in the past.  Now she's having them again this morning. At 35 weeks. Given steroid shot to help infant's lungs. Lower period cramps and into back, hurts a ton.is the Labor and Delivery in [place]. I connected Breanna with them for a 2nd level triage. Patient was wondering if she should just go in, if she should wait for tomorrow's appointment. I advised she talk with the OB staffs for 2nd level triage but that she should be seen.  Mariah Mace RN-[facility]

## 2018-06-22 ENCOUNTER — TELEPHONE (OUTPATIENT)
Dept: OBGYN | Facility: OTHER | Age: 33
End: 2018-06-22

## 2018-06-22 NOTE — TELEPHONE ENCOUNTER
[facility] from [place] called and requesting 24 hours urine order faxed over to them because patient is dropping off urine sample there. There were no note from Dr. Montgomery, but there was an urine order placed yesterday for pt.     I faxed over the order to [facility] in [facility] [phone].     Kulwant Castillo MA

## 2018-06-24 ENCOUNTER — HEALTH MAINTENANCE LETTER (OUTPATIENT)
Age: 33
End: 2018-06-24

## 2018-06-25 ENCOUNTER — TELEPHONE (OUTPATIENT)
Dept: OBGYN | Facility: OTHER | Age: 33
End: 2018-06-25

## 2018-06-25 NOTE — TELEPHONE ENCOUNTER
I called and spoke to patient.  24 hour urine result of 0.29 given to patient.  Patient is scheduled to see me tomorrow in the clinic.  All questions answered and patient verbalizes understanding.    Camila Montgomery

## 2018-06-25 NOTE — TELEPHONE ENCOUNTER
Reason for call:  Dr. Montgomery ordered a 24 hour urine test and she would like to get the results.  SHe would like to know if what could be causing this is more protein in her urine.  Please call to discuss.

## 2018-06-26 ENCOUNTER — PRENATAL OFFICE VISIT (OUTPATIENT)
Dept: OBGYN | Facility: CLINIC | Age: 33
End: 2018-06-26
Payer: COMMERCIAL

## 2018-06-26 DIAGNOSIS — O13.3 PREGNANCY-INDUCED HYPERTENSION IN THIRD TRIMESTER: ICD-10-CM

## 2018-06-26 DIAGNOSIS — Z36.85 ENCOUNTER FOR ANTENATAL SCREENING FOR STREPTOCOCCUS B: Primary | ICD-10-CM

## 2018-06-26 DIAGNOSIS — O99.713 PRURITUS OF PREGNANCY IN THIRD TRIMESTER: ICD-10-CM

## 2018-06-26 DIAGNOSIS — L29.9 PRURITUS OF PREGNANCY IN THIRD TRIMESTER: ICD-10-CM

## 2018-06-26 PROBLEM — O14.03 MILD PRE-ECLAMPSIA IN THIRD TRIMESTER: Status: RESOLVED | Noted: 2018-06-21 | Resolved: 2018-06-26

## 2018-06-26 LAB
ALBUMIN SERPL-MCNC: 2.7 G/DL (ref 3.4–5)
ALP SERPL-CCNC: 69 U/L (ref 40–150)
ALT SERPL W P-5'-P-CCNC: 13 U/L (ref 0–50)
ANION GAP SERPL CALCULATED.3IONS-SCNC: 10 MMOL/L (ref 3–14)
AST SERPL W P-5'-P-CCNC: 8 U/L (ref 0–45)
BILIRUB SERPL-MCNC: 0.4 MG/DL (ref 0.2–1.3)
BUN SERPL-MCNC: 8 MG/DL (ref 7–30)
CALCIUM SERPL-MCNC: 9 MG/DL (ref 8.5–10.1)
CHLORIDE SERPL-SCNC: 106 MMOL/L (ref 94–109)
CO2 SERPL-SCNC: 21 MMOL/L (ref 20–32)
CREAT SERPL-MCNC: 0.45 MG/DL (ref 0.52–1.04)
ERYTHROCYTE [DISTWIDTH] IN BLOOD BY AUTOMATED COUNT: 14.2 % (ref 10–15)
GFR SERPL CREATININE-BSD FRML MDRD: >90 ML/MIN/1.7M2
GLUCOSE SERPL-MCNC: 91 MG/DL (ref 70–99)
HCT VFR BLD AUTO: 39.2 % (ref 35–47)
HGB BLD-MCNC: 13.1 G/DL (ref 11.7–15.7)
MCH RBC QN AUTO: 29.6 PG (ref 26.5–33)
MCHC RBC AUTO-ENTMCNC: 33.4 G/DL (ref 31.5–36.5)
MCV RBC AUTO: 89 FL (ref 78–100)
PLATELET # BLD AUTO: 183 10E9/L (ref 150–450)
POTASSIUM SERPL-SCNC: 4.4 MMOL/L (ref 3.4–5.3)
PROT SERPL-MCNC: 6.8 G/DL (ref 6.8–8.8)
RBC # BLD AUTO: 4.42 10E12/L (ref 3.8–5.2)
SODIUM SERPL-SCNC: 137 MMOL/L (ref 133–144)
WBC # BLD AUTO: 10.1 10E9/L (ref 4–11)

## 2018-06-26 PROCEDURE — 99207 ZZC PRENATAL VISIT: CPT | Performed by: OBSTETRICS & GYNECOLOGY

## 2018-06-26 PROCEDURE — 82239 BILE ACIDS TOTAL: CPT | Mod: 90 | Performed by: OBSTETRICS & GYNECOLOGY

## 2018-06-26 PROCEDURE — 36415 COLL VENOUS BLD VENIPUNCTURE: CPT | Performed by: OBSTETRICS & GYNECOLOGY

## 2018-06-26 PROCEDURE — 85027 COMPLETE CBC AUTOMATED: CPT | Performed by: OBSTETRICS & GYNECOLOGY

## 2018-06-26 PROCEDURE — 80053 COMPREHEN METABOLIC PANEL: CPT | Performed by: OBSTETRICS & GYNECOLOGY

## 2018-06-26 PROCEDURE — 99000 SPECIMEN HANDLING OFFICE-LAB: CPT | Performed by: OBSTETRICS & GYNECOLOGY

## 2018-06-26 NOTE — PROGRESS NOTES
33 year old  at 36w2d weeks presents to the clinic for a routine prenatal visit.  GHTN=24 hour showed 0.29 however she does have [place] [facility] [place] on   Patient complains of daily headaches still.  No vomiting.  Some emesis.  Will check labs today.  Pre-eclampsia precautions discussed with patient   Patient complains of itching on her abdomen.  Will check labs today.  Some excoriations noted.  Minimal straie  No vaginal bleeding, leakage of fluid, or contractions  Fundal height=40cm  EUQr=024's  CX=2/80/-3  [place] [facility] [place]   Labor precautions discussed  We discussed induction at 37 weeks due to GHTN      Discussed with Breanna the risks, benefits and alternatives to induction.  We discussed cervical ripening for those patients with a burks score of less than 6 (for multiparous) and 8 (for nulliparous).  Discussed the concerns related to uterine hyperstimulation and adverse fetal effects that can occur with a ripening agent or pitocin. Discussed amniotomy and the rationale for it with induction.  I discussed the expected timeline for her based on her presentation, but she understands that this is just an approximate.  She is given the opportunity to ask questions and have them answered.  She does wish to proceed    Induction scheduled for .      Camila Montgomery

## 2018-06-26 NOTE — MR AVS SNAPSHOT
After Visit Summary   2018    Breanna Vidal    MRN: [id number]           Patient Information     Date Of Birth          1985        Visit Information        Provider Department      2018 10:30 AM Camila Montgomery,  [facility]        Today's Diagnoses     Encounter for  screening for Streptococcus B    -  1    Pregnancy-induced hypertension in third trimester        Pruritus of pregnancy in third trimester          Care Instructions    What to watch out for are: regular contractions every 5 min, vaginal bleeding, decreased fetal movement, or leakage of fluid.  Please call the office or go to L&D if you develop any of these signs and symptoms.      I will see you for your follow up appointment.  Please feel free to call if you have any questions or concerns.      Thanks,  Camila Montgomery, DO            Follow-ups after your visit        Follow-up notes from your care team     Return in about 1 week (around 7/3/2018).      Who to contact     If you have questions or need follow up information about today's clinic visit or your schedule please contact [facility] directly at [phone].  Normal or non-critical lab and imaging results will be communicated to you by Wowboardhart, letter or phone within 4 business days after the clinic has received the results. If you do not hear from us within 7 days, please contact the clinic through Wowboardhart or phone. If you have a critical or abnormal lab result, we will notify you by phone as soon as possible.  Submit refill requests through Astonish Results or call your pharmacy and they will forward the refill request to us. Please allow 3 business days for your refill to be completed.          Additional Information About Your Visit        MyChart Information     Astonish Results gives you secure access to your electronic health record. If you see a primary care provider, you can also send messages to your care team and make  appointments. If you have questions, please call your primary care clinic.  If you do not have a primary care provider, please call [phone] and they will assist you.        Care EveryWhere ID     This is your Care EveryWhere ID. This could be used by other organizations to access your [facility] medical records  [id number]        Your Vitals Were     Pulse Pulse Oximetry BMI (Body Mass Index)             91 96% 41 kg/m2          Blood Pressure from Last 3 Encounters:   06/26/18 117/69   06/21/18 120/80   06/18/18 140/80    Weight from Last 3 Encounters:   06/26/18 254 lb (115.2 kg)   06/21/18 250 lb 8 oz (113.6 kg)   06/18/18 252 lb (114.3 kg)              We Performed the Following     Bile acids total     CBC with platelets     Comprehensive metabolic panel        Primary Care Provider Office Phone # Fax #    Ramiro Ayaz Gaviria [phone]       OB GYN [address] [place] [address]        Equal Access to Services     MACRINA BAINS : Hadii aad ku hadasho Soomaali, waaxda luqadaha, qaybta kaalmada adeegyada, waxay idiin hayaan rosa kharash vernell . So [facility] [phone].    ATENCIÓN: Si habla español, tiene a haider disposición servicios gratuitos de asistencia lingüística. Llame al [phone].    We comply with applicable federal civil rights laws and [place] laws. We do not discriminate on the basis of race, color, national origin, age, disability, sex, sexual orientation, or gender identity.            Thank you!     Thank you for choosing [facility]  for your care. Our goal is always to provide you with excellent care. Hearing back from our patients is one way we can continue to improve our services. Please take a few minutes to complete the written survey that you may receive in the mail after your visit with us. Thank you!             Your Updated Medication List - Protect others around you: Learn how to safely use, store and throw away your  medicines at www.disposemymeds.org.          This list is accurate as of 6/26/18 12:46 PM.  Always use your most recent med list.                   Brand Name Dispense Instructions for use Diagnosis    FIORICET -40 MG Caps   Generic drug:  Butalbital-APAP-Caffeine           prenatal multivitamin plus iron 27-0.8 MG Tabs per tablet      Take 1 tablet by mouth daily        * ZOFRAN 4 MG tablet   Generic drug:  ondansetron           * ondansetron 4 MG tablet    ZOFRAN    30 tablet    Take 1 tablet (4 mg) by mouth every 8 hours as needed for nausea    Nausea       * Notice:  This list has 2 medication(s) that are the same as other medications prescribed for you. Read the directions carefully, and ask your doctor or other care provider to review them with you.

## 2018-06-26 NOTE — PATIENT INSTRUCTIONS
What to watch out for are: regular contractions every 5 min, vaginal bleeding, decreased fetal movement, or leakage of fluid.  Please call the office or go to L&D if you develop any of these signs and symptoms.      I will see you for your follow up appointment.  Please feel free to call if you have any questions or concerns.      Thanks,  Camila Montgomery, DO

## 2018-06-27 LAB — BILE AC SERPL-SCNC: 2 UMOL/L (ref 0–10)

## 2018-06-29 ENCOUNTER — TELEPHONE (OUTPATIENT)
Dept: OBGYN | Facility: OTHER | Age: 33
End: 2018-06-29

## 2018-06-29 NOTE — TELEPHONE ENCOUNTER
Reason for call:  Patient reporting a symptom    Symptom or request: symptoms    Duration (how long have symptoms been present): ?    Have you been treated for this before? Yes    Additional comments: patient states she now has a rash on her stomach and she wants to know what she can put on it?    Phone Number patient can be reached at:  Home number on file [phone] (home) or Cell number on file:    Telephone Information:   Mobile [phone]       Best Time:  anytime    Can we leave a detailed message on this number:  YES    Call taken on 6/29/2018 at 9:52 AM by Sherrill Santana

## 2018-06-29 NOTE — TELEPHONE ENCOUNTER
Breanna Vidal is a 33 year old female who calls with rash on stomach, during pregnancy.    NURSING ASSESSMENT:  Description:  Stomach is itching. Tiny little bumps, some are itched open. Using dove soaps, stated has tried multiple lotions. Denies fevers, swelling, severe pain.   Onset/duration:  Since 02/26/2018  Precip. factors:  Unknown   Associated symptoms:  Rash during pregnancy   Improves/worsens symptoms: new   Pain scale (0-10)   No pain, just itchy   LMP/preg/breast feeding:  No LMP recorded. Patient is pregnant.  GA: 36w5d  PORTIA: Estimated Date of Delivery: Jul 22, 2018  Last exam/Treatment:  06/26/2018  Allergies:   Allergies   Allergen Reactions     Codeine      Other reaction(s): Nausea     Sulfa Drugs Unknown     Zantac [Ranitidine] Rash     NURSING PLAN: Nursing advice to patient to continue home care measeures    RECOMMENDED DISPOSITION:  Home care advice - rash  Will comply with recommendation: Yes  If further questions/concerns or if symptoms do not improve, worsen or new symptoms develop, call your PCP or [facility] as soon as possible.    NOTES:  Disposition was determined by the first positive assessment question, therefore all previous assessment questions were negative    Guideline used:  Telephone Triage Protocols for Nurses, Fifth Edition, Adriana Doyle, adult  Nursing Judgment    Dyana Rivera, RN, BSN

## 2018-07-01 VITALS
SYSTOLIC BLOOD PRESSURE: 117 MMHG | HEART RATE: 91 BPM | BODY MASS INDEX: 41 KG/M2 | DIASTOLIC BLOOD PRESSURE: 69 MMHG | OXYGEN SATURATION: 96 % | WEIGHT: 254 LBS

## 2018-07-06 ENCOUNTER — TELEPHONE (OUTPATIENT)
Dept: FAMILY MEDICINE | Facility: OTHER | Age: 33
End: 2018-07-06

## 2018-07-06 NOTE — TELEPHONE ENCOUNTER
Reason for Call:  Other call back    Detailed comments: Patient calling due to question about tubes being tied. Please call [phone] this number is only good for today.    Phone Number Patient can be reached at: Cell number on file:    Telephone Information:   Mobile [phone]       Best Time: any    Can we leave a detailed message on this number? YES    Call taken on 7/6/2018 at 1:58 PM by Dior Lund

## 2018-07-10 NOTE — TELEPHONE ENCOUNTER
Noted patient had  on 18. No future appts.    Left message asking pt to call back about her message from last week. Left clinic call back phone number and RN hours. Debbie Wilcox RN, BAN

## 2018-07-11 ENCOUNTER — TELEPHONE (OUTPATIENT)
Dept: FAMILY MEDICINE | Facility: OTHER | Age: 33
End: 2018-07-11

## 2018-07-11 NOTE — TELEPHONE ENCOUNTER
LM for the patient to return call to the clinic to discuss the below. Will await to hear from patient. Dyana Rivera RN, BSN

## 2018-07-11 NOTE — TELEPHONE ENCOUNTER
Breanna Vidal is a 33 year old female who calls with rash during pregnancy.    NURSING ASSESSMENT:  Description:  Has a painful rash on her sides and stomach. Rash is red, raised and itchy. Feels like stabbing pain. Loosing sleep due to rash. Delivered 07/01/2018. Denies fevers, drainage.   Onset/duration:  2.5 weeks   Precip. factors:  Post-partum  Associated symptoms:  Red, raised and itchy  Improves/worsens symptoms:  Worsening   Pain scale (0-10)   varies  LMP/preg/breast feeding:  Delivered 07/01/2018  Last exam/Treatment:  06/26/2018  Allergies:   Allergies   Allergen Reactions     Codeine      Other reaction(s): Nausea     Sulfa Drugs Unknown     Zantac [Ranitidine] Rash     NURSING PLAN: [facility]    RECOMMENDED DISPOSITION:  See in 24 hours   Will comply with recommendation: Yes  If further questions/concerns or if symptoms do not improve, worsen or new symptoms develop, call your PCP or [facility] as soon as possible.    NOTES:  Disposition was determined by the first positive assessment question, therefore all previous assessment questions were negative    Guideline used:  Telephone Triage Protocols for Nurses, Fifth Edition, Adriana Doyle, adult  Nursing Judgement    Dyana Rivera RN, BSN

## 2018-07-11 NOTE — TELEPHONE ENCOUNTER
Reason for Call:  Same Day Appointment, Requested Provider:      PCP: Ramiro Gaviria    Reason for visit: rash    Duration of symptoms: 3 weeks    Have you been treated for this in the past? No    Additional comments: I did offer patient a appt for tomorrow and she declined. She wants to come in today.     Can we leave a detailed message on this number? NO    Phone number patient can be reached at:     Best Time:     Call taken on 7/11/2018 at 3:20 PM by Lise Carrasco

## 2018-07-12 ENCOUNTER — OFFICE VISIT (OUTPATIENT)
Dept: OBGYN | Facility: OTHER | Age: 33
End: 2018-07-12
Payer: COMMERCIAL

## 2018-07-12 VITALS
BODY MASS INDEX: 38.75 KG/M2 | WEIGHT: 240.1 LBS | DIASTOLIC BLOOD PRESSURE: 68 MMHG | HEART RATE: 64 BPM | SYSTOLIC BLOOD PRESSURE: 114 MMHG | TEMPERATURE: 97.3 F | RESPIRATION RATE: 16 BRPM

## 2018-07-12 DIAGNOSIS — L29.9 ITCHING: Primary | ICD-10-CM

## 2018-07-12 DIAGNOSIS — L90.6 STRETCH MARKS: ICD-10-CM

## 2018-07-12 DIAGNOSIS — R21 RASH: ICD-10-CM

## 2018-07-12 PROCEDURE — 99207 ZZC POST PARTUM EXAM: CPT | Performed by: OBSTETRICS & GYNECOLOGY

## 2018-07-12 RX ORDER — HYDROXYZINE HYDROCHLORIDE 25 MG/1
25-50 TABLET, FILM COATED ORAL EVERY 6 HOURS PRN
Qty: 60 TABLET | Refills: 0 | Status: SHIPPED | OUTPATIENT
Start: 2018-07-12 | End: 2018-08-20

## 2018-07-12 RX ORDER — MOMETASONE FUROATE 1 MG/G
CREAM TOPICAL
Qty: 45 G | Refills: 0 | Status: SHIPPED | OUTPATIENT
Start: 2018-07-12 | End: 2018-08-20

## 2018-07-12 ASSESSMENT — PAIN SCALES - GENERAL: PAINLEVEL: SEVERE PAIN (7)

## 2018-07-12 NOTE — MR AVS SNAPSHOT
After Visit Summary   7/12/2018    Breanna Vidal    MRN: [id number]           Patient Information     Date Of Birth          1985        Visit Information        Provider Department      7/12/2018 11:30 AM Jessie Johnson MD [facility]        Today's Diagnoses     Itching    -  1    Rash        Stretch marks           Follow-ups after your visit        Follow-up notes from your care team     Return if symptoms worsen or fail to improve.      Who to contact     If you have questions or need follow up information about today's clinic visit or your schedule please contact [facility] directly at [phone].  Normal or non-critical lab and imaging results will be communicated to you by MyChart, letter or phone within 4 business days after the clinic has received the results. If you do not hear from us within 7 days, please contact the clinic through Yattoshart or phone. If you have a critical or abnormal lab result, we will notify you by phone as soon as possible.  Submit refill requests through [email or web address] or call your pharmacy and they will forward the refill request to us. Please allow 3 business days for your refill to be completed.          Additional Information About Your Visit        MyChart Information     [email or web address] gives you secure access to your electronic health record. If you see a primary care provider, you can also send messages to your care team and make appointments. If you have questions, please call your primary care clinic.  If you do not have a primary care provider, please call [phone] and they will assist you.        Care EveryWhere ID     This is your Care EveryWhere ID. This could be used by other organizations to access your [facility] medical records  [id number]        Your Vitals Were     Pulse Temperature Respirations BMI (Body Mass Index)          64 97.3  F (36.3  C) (Temporal) 16 38.75 kg/m2         Blood Pressure from Last 3 Encounters:    07/12/18 114/68   06/26/18 117/69   06/21/18 120/80    Weight from Last 3 Encounters:   07/12/18 240 lb 1.6 oz (108.9 kg)   06/26/18 254 lb (115.2 kg)   06/21/18 250 lb 8 oz (113.6 kg)              Today, you had the following     No orders found for display         Today's Medication Changes          These changes are accurate as of 7/12/18 12:16 PM.  If you have any questions, ask your nurse or doctor.               Start taking these medicines.        Dose/Directions    hydrOXYzine 25 MG tablet   Commonly known as:  ATARAX   Used for:  Rash, Itching   Started by:  Jessie Johnson MD        Dose:  25-50 mg   Take 1-2 tablets (25-50 mg) by mouth every 6 hours as needed for itching   Quantity:  60 tablet   Refills:  0       mometasone 0.1 % cream   Commonly known as:  ELOCON   Used for:  Rash   Started by:  Jessie Johnson MD        Apply sparingly to affected area twice daily as needed.  Do not apply to face.   Quantity:  45 g   Refills:  0            Where to get your medicines      These medications were sent to [facility] - [address]   [address] [place] [address]     Phone:  [phone]     hydrOXYzine 25 MG tablet    mometasone 0.1 % cream                Primary Care Provider Office Phone # Fax #    Ramiro Ayaz Gaviria [phone]       OB GYN [address] [place] [address]        Equal Access to Services     MACRINA BAINS AH: Hadii arely newtono Somanny, waaxda luqadaha, qaybta kaalmada rosayastefany, genevieve levine. So [facility] [phone].    ATENCIÓN: Si habla español, tiene a haider disposición servicios gratuitos de asistencia lingüística. Llame al [phone].    We comply with applicable federal civil rights laws and [place] laws. We do not discriminate on the basis of race, color, national origin, age, disability, sex, sexual orientation, or gender identity.            Thank you!      Thank you for choosing [facility]  for your care. Our goal is always to provide you with excellent care. Hearing back from our patients is one way we can continue to improve our services. Please take a few minutes to complete the written survey that you may receive in the mail after your visit with us. Thank you!             Your Updated Medication List - Protect others around you: Learn how to safely use, store and throw away your medicines at www.disposemymeds.org.          This list is accurate as of 7/12/18 12:16 PM.  Always use your most recent med list.                   Brand Name Dispense Instructions for use Diagnosis    hydrOXYzine 25 MG tablet    ATARAX    60 tablet    Take 1-2 tablets (25-50 mg) by mouth every 6 hours as needed for itching    Rash, Itching       mometasone 0.1 % cream    ELOCON    45 g    Apply sparingly to affected area twice daily as needed.  Do not apply to face.    Rash       prenatal multivitamin plus iron 27-0.8 MG Tabs per tablet      Take 1 tablet by mouth daily

## 2018-07-12 NOTE — PROGRESS NOTES
SUBJECTIVE:                                                   Breanna Vidal is a 33 year old female who presents to clinic today for the following health issue(s):  Rash and itching since delivery    HPI:  Patient is a alonso 33 y.o.  who is 12 days s/d  who had itching on her abdomen starting just prior to delivery and which is continuing. It is located on her abdomen and back only.  She states she is scratching so hard she is breaking the skin.  No other complaints.  She states she is not breastfeeding.    No LMP recorded..   Patient is not sexually active, .  Using periodic abstinence for contraception.    reports that she has quit smoking. She quit smokeless tobacco use about 2 years ago.    STD testing offered?  No, not applicable      Problem list and histories reviewed & adjusted, as indicated.  Additional history: as documented.    Patient Active Problem List   Diagnosis     Pregnancy-induced hypertension in third trimester     Past Surgical History:   Procedure Laterality Date     INCISION AND DRAINAGE  2012    chin abscess I&D     MAMMOPLASTY REDUCTION Bilateral 2017      Social History   Substance Use Topics     Smoking status: Former Smoker     Smokeless tobacco: Former User     Quit date: 3/10/2016     Alcohol use No      Comment: history alcohol abuse 2012, s/p treatment           Current Outpatient Prescriptions   Medication Sig     hydrOXYzine (ATARAX) 25 MG tablet Take 1-2 tablets (25-50 mg) by mouth every 6 hours as needed for itching     mometasone (ELOCON) 0.1 % cream Apply sparingly to affected area twice daily as needed.  Do not apply to face.     Prenatal Vit-Fe Fumarate-FA (PRENATAL MULTIVITAMIN PLUS IRON) 27-0.8 MG TABS per tablet Take 1 tablet by mouth daily     No current facility-administered medications for this visit.      Allergies   Allergen Reactions     Codeine      Other reaction(s): Nausea     Sulfa Drugs Unknown     Zantac [Ranitidine] Rash        ROS:  12 point review of systems negative other than symptoms noted below.    OBJECTIVE:     /68 (BP Location: Right arm, Patient Position: Chair, Cuff Size: Adult Large)  Pulse 64  Temp 97.3  F (36.3  C) (Temporal)  Resp 16  Wt 240 lb 1.6 oz (108.9 kg)  BMI 38.75 kg/m2  Body mass index is 38.75 kg/(m^2).    Exam:  Well appearing female in NAD  Skin:General Inspection:  Area of pruritis is localized to abdomen and lower back and is specifically further located only to her extensive stretch marks at these locations. Excoriations present. No evidence of cellulitis or erythema. No rash/lestions present on non-stretch robert areas    In-Clinic Test Results:  No results found for this or any previous visit (from the past 24 hour(s)).    ASSESSMENT/PLAN:                                                        ICD-10-CM    1. Itching L29.9 hydrOXYzine (ATARAX) 25 MG tablet   2. Rash R21 mometasone (ELOCON) 0.1 % cream     hydrOXYzine (ATARAX) 25 MG tablet   3. Stretch marks L90.6      Since patient is not breast feeding, will give rx for atarax which she had to use previously.  Also rx for elocon cream to be applied directly to the areas or pruritis.  rtc if no improvement and prn.     Jessie Johnson MD  [facility]

## 2018-07-13 NOTE — TELEPHONE ENCOUNTER
"Per discharge notes, \"She was hoping to have a PPTL but did not sign the MA consent form prior to delivery. Therefore, she would like to discuss her options at her 6-week postpartum appt.\"  LM for the patient to return call to the clinic to discuss the below. Will await to hear from patient. Dyana Rivera RN, BSN       "

## 2018-08-06 NOTE — PROGRESS NOTES
SUBJECTIVE:    Patient is in for a consultation for a sterilization procedure.  She was referred to me by Dr Stewart   This is a 33 year old old female in today for a consultation for sterilization procedure.  She has 2 children.    She and her partner do not want to have any more children and she has decided to have the sterilization procedure.      Past Surgical History:   Procedure Laterality Date     INCISION AND DRAINAGE  04/18/2012    chin abscess I&D     MAMMOPLASTY REDUCTION Bilateral 08/04/2017       Past Medical History:   Diagnosis Date     Cellulitis 2012    MRSA septic olecranon bursitis and facial cellulitis     Drug abuse 2011    methamphetamine and alcohol, sober since treatment 2011     MRSA cellulitis 2012    cleared with negative nasal swabs 8/16/17 and 8/28/17, reviewed     Rh negative, antepartum        Allergies   Allergen Reactions     Codeine      Other reaction(s): Nausea     Sulfa Drugs Unknown     Zantac [Ranitidine] Rash         Current Outpatient Prescriptions:      Prenatal Vit-Fe Fumarate-FA (PRENATAL MULTIVITAMIN PLUS IRON) 27-0.8 MG TABS per tablet, Take 1 tablet by mouth daily, Disp: , Rfl:      hydrOXYzine (ATARAX) 25 MG tablet, Take 1-2 tablets (25-50 mg) by mouth every 6 hours as needed for itching (Patient not taking: Reported on 8/9/2018), Disp: 60 tablet, Rfl: 0     mometasone (ELOCON) 0.1 % cream, Apply sparingly to affected area twice daily as needed.  Do not apply to face. (Patient not taking: Reported on 8/9/2018), Disp: 45 g, Rfl: 0    Social History     Social History     Marital status: Single     Spouse name: N/A     Number of children: 4     Years of education: N/A     Occupational History     Not on file.     Social History Main Topics     Smoking status: Former Smoker     Smokeless tobacco: Former User     Quit date: 3/10/2016     Alcohol use No      Comment: history alcohol abuse 2012, s/p treatment     Drug use: No      Comment: former drug user (EtOH, cocaine,  marijuana, meth), last use 2012     Sexual activity: Yes     Partners: Male     Other Topics Concern     Not on file     Social History Narrative    Currently lives in [place] with chris and 18 month old son.  Has a 13 year old dtr that lives with dtrs dad in [place], [place], and a 6  Year old that was adopted out in an open adoption, lives in [place]       History reviewed. No pertinent family history.    I reviewed with the patient her desire for sterility and the other non permanent options available to her.  Patient is currently using abstinence.   She is certain that she wants to have a sterilization procedure.    I went through the risks and benefits of a tubal including bleeding, wound infection, damage or injury to bowel, bladder or other internal organs.    She is also aware of the potential for side effects from anesthesia.  We talked about blood loss being less than 5-10 cc and the risk of infection being very minimal.  Postoperatively she is going to have some discomfort.  I did warn her about the potential for diaphragmatic irritation with referred pain to her left chest and shoulder region.    I did show the patient where the incisions would be, by both pictures and by showing her on herself.    OBJECTIVE:     /72  Pulse 74  Temp 98.6  F (37  C) (Temporal)  Resp 16  Wt 233 lb (105.7 kg)  BMI 37.61 kg/m2        Assessment:  Undersired fertility    Plan:  Will schedule the patient for a Laparoscopic Bilateral Salpingectomy  on 8/27/18.  She is aware that she will need a preop PE 1 wk prior to the procedure with her primary provider.    If she has any further questions, she will contact me.  She did sign the consent forms today.  I answered all of her questions to her satisfaction.         Chai Cui MD

## 2018-08-09 ENCOUNTER — OFFICE VISIT (OUTPATIENT)
Dept: FAMILY MEDICINE | Facility: OTHER | Age: 33
End: 2018-08-09
Payer: COMMERCIAL

## 2018-08-09 VITALS
BODY MASS INDEX: 37.61 KG/M2 | HEART RATE: 74 BPM | RESPIRATION RATE: 16 BRPM | DIASTOLIC BLOOD PRESSURE: 72 MMHG | WEIGHT: 233 LBS | SYSTOLIC BLOOD PRESSURE: 116 MMHG | TEMPERATURE: 98.6 F

## 2018-08-09 DIAGNOSIS — Z30.09 ENCOUNTER FOR OTHER GENERAL COUNSELING OR ADVICE ON CONTRACEPTION: Primary | ICD-10-CM

## 2018-08-09 PROBLEM — O13.3 PREGNANCY-INDUCED HYPERTENSION IN THIRD TRIMESTER: Status: RESOLVED | Noted: 2018-06-26 | Resolved: 2018-08-09

## 2018-08-09 PROCEDURE — 99214 OFFICE O/P EST MOD 30 MIN: CPT | Performed by: FAMILY MEDICINE

## 2018-08-09 ASSESSMENT — PAIN SCALES - GENERAL: PAINLEVEL: NO PAIN (0)

## 2018-08-09 NOTE — MR AVS SNAPSHOT
After Visit Summary   8/9/2018    Breanna Vidal    MRN: [id number]           Patient Information     Date Of Birth          1985        Visit Information        Provider Department      8/9/2018 1:00 PM Chai Cui MD [facility]        Today's Diagnoses     Encounter for other general counseling or advice on contraception    -  1       Follow-ups after your visit        Your next 10 appointments already scheduled     Aug 20, 2018  2:40 PM CDT   Pre-Op physical with MADELINE Miramontes CNP   [facility]    [address] [facility] [address] [phone]              Who to contact     If you have questions or need follow up information about today's clinic visit or your schedule please contact [facility] directly at [phone].  Normal or non-critical lab and imaging results will be communicated to you by MyChart, letter or phone within 4 business days after the clinic has received the results. If you do not hear from us within 7 days, please contact the clinic through ProofPilothart or phone. If you have a critical or abnormal lab result, we will notify you by phone as soon as possible.  Submit refill requests through Zuki or call your pharmacy and they will forward the refill request to us. Please allow 3 business days for your refill to be completed.          Additional Information About Your Visit        MyChart Information     Zuki gives you secure access to your electronic health record. If you see a primary care provider, you can also send messages to your care team and make appointments. If you have questions, please call your primary care clinic.  If you do not have a primary care provider, please call [phone] and they will assist you.        Care EveryWhere ID     This is your Care EveryWhere ID. This could be used by other organizations to access your [facility]  records  [id number]        Your Vitals Were     Pulse Temperature Respirations BMI (Body Mass Index)          74 98.6  F (37  C) (Temporal) 16 37.61 kg/m2         Blood Pressure from Last 3 Encounters:   08/09/18 116/72   07/12/18 114/68   06/26/18 117/69    Weight from Last 3 Encounters:   08/09/18 233 lb (105.7 kg)   07/12/18 240 lb 1.6 oz (108.9 kg)   06/26/18 254 lb (115.2 kg)              We Performed the Following     Mimi-Operative Worksheet GYN        Primary Care Provider Office Phone # Fax #    Ramiro Gaviria [phone]       OB GYN [address] [place] [address]        Equal Access to Services     TEOFILO BAINS : Hadii arely cruz hadasho Soomaali, waaxda luqadaha, qaybta kaalmada adeegyada, genevieve matt . So [facility] [phone].    ATENCIÓN: Si habla español, tiene a haider disposición servicios gratuitos de asistencia lingüística. Llame al [phone].    We comply with applicable federal civil rights laws and [place] laws. We do not discriminate on the basis of race, color, national origin, age, disability, sex, sexual orientation, or gender identity.            Thank you!     Thank you for choosing [facility]  for your care. Our goal is always to provide you with excellent care. Hearing back from our patients is one way we can continue to improve our services. Please take a few minutes to complete the written survey that you may receive in the mail after your visit with us. Thank you!             Your Updated Medication List - Protect others around you: Learn how to safely use, store and throw away your medicines at www.disposemymeds.org.          This list is accurate as of 8/9/18  1:38 PM.  Always use your most recent med list.                   Brand Name Dispense Instructions for use Diagnosis    hydrOXYzine 25 MG tablet    ATARAX    60 tablet    Take 1-2 tablets (25-50 mg) by mouth every 6 hours as needed for  itching    Rash, Itching       mometasone 0.1 % cream    ELOCON    45 g    Apply sparingly to affected area twice daily as needed.  Do not apply to face.    Rash       prenatal multivitamin plus iron 27-0.8 MG Tabs per tablet      Take 1 tablet by mouth daily

## 2018-08-14 NOTE — PATIENT INSTRUCTIONS
Before Your Surgery      Call your surgeon if there is any change in your health. This includes signs of a cold or flu (such as a sore throat, runny nose, cough, rash or fever).    Do not smoke, drink alcohol or take over the counter medicine (unless your surgeon or primary care doctor tells you to) for the 24 hours before and after surgery.    If you take prescribed drugs: Follow your doctor s orders about which medicines to take and which to stop until after surgery.    Eating and drinking prior to surgery: follow the instructions from your surgeon    Take a shower or bath the night before surgery. Use the soap your surgeon gave you to gently clean your skin. If you do not have soap from your surgeon, use your regular soap. Do not shave or scrub the surgery site.  Wear clean pajamas and have clean sheets on your bed.     Please hold all Nsaids 7-10 days prior to surgery. You will need a urine pregnancy test day of surgery     Thank you

## 2018-08-14 NOTE — PROGRESS NOTES
[facility]  [address] [facility] [address]  [phone]  Dept: [phone]    PRE-OP EVALUATION:  Today's date: 2018    Breanna Vidal (: 1985) presents for pre-operative evaluation assessment as requested by Dr. Cui.  She requires evaluation and anesthesia risk assessment prior to undergoing surgery/procedure for treatment of desired sterility .    Fax number for surgical facility: [facility]  Primary Physician: Ramiro Gaviria  Type of Anesthesia Anticipated: to be determined    Patient has a Health Care Directive or Living Will:  NO    Preop Questions 2018   Who is doing your surgery? Chai Cui    What are you having done? LAPAROSCOPI SALPINGECTOMY   Date of Surgery/Procedure: 2018   Facility or Hospital where procedure/surgery will be performed: [facility]   1.  Do you have a history of Heart attack, stroke, stent, coronary bypass surgery, or other heart surgery? No   2.  Do you ever have any pain or discomfort in your chest? No   3.  Do you have a history of  Heart Failure? No   4.   Are you troubled by shortness of breath when:  walking on a level surface, or up a slight hill, or at night? No   5.  Do you currently have a cold, bronchitis or other respiratory infection? No   6.  Do you have a cough, shortness of breath, or wheezing? No   7.  Do you sometimes get pains in the calves of your legs when you walk? No   8. Do you or anyone in your family have previous history of blood clots? No   9.  Do you or does anyone in your family have a serious bleeding problem such as prolonged bleeding following surgeries or cuts? No   10. Have you ever had problems with anemia or been told to take iron pills? No   11. Have you had any abnormal blood loss such as black, tarry or bloody stools, or abnormal vaginal bleeding? No   12. Have you ever had a blood transfusion? No   13. Have you or any of your relatives ever  had problems with anesthesia? No   14. Do you have sleep apnea, excessive snoring or daytime drowsiness? No   15. Do you have any prosthetic heart valves? No   16. Do you have prosthetic joints? No   17. Is there any chance that you may be pregnant? No         HPI:     HPI related to upcoming procedure: desire sterilization.       DEPRESSION - Patient has a history of Depression of moderate severity after birth of 1st child she is no longer requiring medication for control with recent symptoms being stable.   Anxiety history also stable.                                                                                                                                                                                     .      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, abuse (H) 10/14/2009     Priority: Medium     Overview:   LW Modifier:  EtOH,crack cocaine,marij- [id number]/7/09  ; Polysubstance Dependence        Past Medical History:   Diagnosis Date     Cellulitis 2012    MRSA septic olecranon bursitis and facial cellulitis     Drug abuse 2011    methamphetamine and alcohol, sober since treatment 2011     MRSA cellulitis 2012    cleared with negative nasal swabs 8/16/17 and 8/28/17, reviewed     Rh negative, antepartum      Past Surgical History:   Procedure Laterality Date     INCISION AND DRAINAGE  04/18/2012    chin abscess I&D      MAMMOPLASTY REDUCTION Bilateral 08/04/2017     Current Outpatient Prescriptions   Medication Sig Dispense Refill     Prenatal Vit-Fe Fumarate-FA (PRENATAL MULTIVITAMIN PLUS IRON) 27-0.8 MG TABS per tablet Take 1 tablet by mouth daily       OTC products: no recent use of OTC ASA, NSAIDS or Steroids and herbals and vitamins - prenatal vitamin    Allergies   Allergen Reactions     Codeine      Other reaction(s): Nausea     Sulfa Drugs Unknown     Zantac [Ranitidine] Rash      Latex Allergy: NO    Social History   Substance Use Topics     Smoking status: Former Smoker     Smokeless tobacco: Former User     Quit date: 3/10/2016     Alcohol use No      Comment: history alcohol abuse 2012, s/p treatment     History   Drug Use No     Comment: former drug user (EtOH, cocaine, marijuana, meth), last use 2012       REVIEW OF SYSTEMS:   Constitutional, neuro, ENT, endocrine, pulmonary, cardiac, gastrointestinal, genitourinary, musculoskeletal, integument and psychiatric systems are negative, except as otherwise noted.    EXAM:   /78  Pulse 88  Temp 97.8  F (36.6  C) (Oral)  Resp 16  Wt 233 lb (105.7 kg)  BMI 37.61 kg/m2    GENERAL APPEARANCE: healthy, alert and no distress     EYES: EOMI, PERRL     HENT: ear canals and TM's normal and nose and mouth without ulcers or lesions     NECK: no adenopathy, no asymmetry, masses, or scars and thyroid normal to palpation     RESP: lungs clear to auscultation - no rales, rhonchi or wheezes     CV: regular rates and rhythm, normal S1 S2, no S3 or S4 and no murmur, click or rub     ABDOMEN:  soft, nontender, no HSM or masses and bowel sounds normal     MS: extremities normal- no gross deformities noted, no evidence of inflammation in joints, FROM in all extremities.     SKIN: no suspicious lesions or rashes     NEURO: Normal strength and tone, sensory exam grossly normal, mentation intact and speech normal     PSYCH: mentation appears normal. and affect normal/bright      LYMPHATICS: No cervical adenopathy    DIAGNOSTICS:   Will have preop labs completed day prior to surgery; type and screen and urine HCG    Recent Labs   Lab Test  06/26/18   1105  06/26/18   1104   HGB   --   13.1   PLT   --   183   NA  137   --    POTASSIUM  4.4   --    CR  0.45*   --         IMPRESSION:   Reason for surgery/procedure: desire for sterilization procedure    The proposed surgical procedure is considered LOW risk.    REVISED CARDIAC RISK INDEX  The patient has the following serious cardiovascular risks for perioperative complications such as (MI, PE, VFib and 3  AV Block):  No serious cardiac risks  INTERPRETATION: 0 risks: Class I (very low risk - 0.4% complication rate)    The patient has the following additional risks for perioperative complications:  No identified additional risks      ICD-10-CM    1. Preop general physical exam Z01.818        RECOMMENDATIONS:         --Patient is to take all scheduled medications on the day of surgery EXCEPT for modifications listed below. Hold all Nsaids, ASA, vitamins    APPROVAL GIVEN to proceed with proposed procedure, without further diagnostic evaluation       Signed Electronically by: MADELINE Santacruz CNP    Copy of this evaluation report is provided to requesting physician.    [facility] Preop Guidelines    Revised Cardiac Risk Index

## 2018-08-20 ENCOUNTER — OFFICE VISIT (OUTPATIENT)
Dept: FAMILY MEDICINE | Facility: OTHER | Age: 33
End: 2018-08-20
Payer: COMMERCIAL

## 2018-08-20 ENCOUNTER — TELEPHONE (OUTPATIENT)
Dept: FAMILY MEDICINE | Facility: CLINIC | Age: 33
End: 2018-08-20

## 2018-08-20 VITALS
RESPIRATION RATE: 16 BRPM | SYSTOLIC BLOOD PRESSURE: 124 MMHG | WEIGHT: 233 LBS | TEMPERATURE: 97.8 F | BODY MASS INDEX: 37.61 KG/M2 | DIASTOLIC BLOOD PRESSURE: 78 MMHG | HEART RATE: 88 BPM

## 2018-08-20 DIAGNOSIS — Z01.818 PREOP GENERAL PHYSICAL EXAM: Primary | ICD-10-CM

## 2018-08-20 DIAGNOSIS — Z01.812 PRE-PROCEDURE LAB EXAM: Primary | ICD-10-CM

## 2018-08-20 PROBLEM — N39.46 MIXED INCONTINENCE: Status: ACTIVE | Noted: 2017-03-03

## 2018-08-20 PROBLEM — F10.21 HISTORY OF ALCOHOL DEPENDENCE (H): Status: RESOLVED | Noted: 2017-04-17 | Resolved: 2018-08-20

## 2018-08-20 PROBLEM — F10.21 HISTORY OF ALCOHOL DEPENDENCE (H): Status: ACTIVE | Noted: 2017-04-17

## 2018-08-20 PROBLEM — F10.11 ALCOHOL ABUSE, IN REMISSION: Status: ACTIVE | Noted: 2018-08-20

## 2018-08-20 PROBLEM — F41.9 ANXIETY: Status: RESOLVED | Noted: 2017-04-17 | Resolved: 2018-08-20

## 2018-08-20 PROBLEM — F15.11 METHAMPHETAMINE ABUSE IN REMISSION (H): Status: ACTIVE | Noted: 2018-08-20

## 2018-08-20 PROBLEM — F41.9 ANXIETY: Status: ACTIVE | Noted: 2017-04-17

## 2018-08-20 PROCEDURE — 99214 OFFICE O/P EST MOD 30 MIN: CPT | Performed by: NURSE PRACTITIONER

## 2018-08-20 NOTE — TELEPHONE ENCOUNTER
Type of surgery: laparoscopic bilateral salpingectomy  Location of surgery: [facility]  Date and time of surgery: 8/27/18@11:30am  Surgeon: Dr. Cui, with Dr. Dickerson Assisting  Pre-Op Appt Date: 8/20/18  Post-Op Appt Date: 8/30/18   Packet sent out: I will Mychart the patient instructions  Pre-cert/Authorization completed:  Not Applicable  Date: 8/20/18  Gilmer Salazar MA

## 2018-08-20 NOTE — MR AVS SNAPSHOT
After Visit Summary   8/20/2018    Breanna Vidal    MRN: [id number]           Patient Information     Date Of Birth          1985        Visit Information        Provider Department      8/20/2018 2:40 PM Nida Mckeon APRN CNP [facility]        Today's Diagnoses     Preop general physical exam    -  1      Care Instructions      Before Your Surgery      Call your surgeon if there is any change in your health. This includes signs of a cold or flu (such as a sore throat, runny nose, cough, rash or fever).    Do not smoke, drink alcohol or take over the counter medicine (unless your surgeon or primary care doctor tells you to) for the 24 hours before and after surgery.    If you take prescribed drugs: Follow your doctor s orders about which medicines to take and which to stop until after surgery.    Eating and drinking prior to surgery: follow the instructions from your surgeon    Take a shower or bath the night before surgery. Use the soap your surgeon gave you to gently clean your skin. If you do not have soap from your surgeon, use your regular soap. Do not shave or scrub the surgery site.  Wear clean pajamas and have clean sheets on your bed.     Please hold all Nsaids 7-10 days prior to surgery. You will need a urine pregnancy test day of surgery     Thank you                Follow-ups after your visit        Your next 10 appointments already scheduled     Aug 26, 2018 11:15 AM CDT   LAB with NL LAB [facility] ([facility])    [address] [place] [id number] [phone]           Please do not eat 10-12 hours before your appointment if you are coming in fasting for labs on lipids, cholesterol, or glucose (sugar). This does not apply to pregnant women. Water, hot tea and black coffee (with nothing added) are okay. Do not drink other fluids, diet soda or chew gum.            Aug 27, 2018   Procedure with Chai FRENCH  MD Basilia   [facility] ([facility])    [address]  [place] [id number] [phone]           From [address]: Exit at [address] on south side of [place]. Turn right on [address]. Turn left at stoplight on [address]. [facility] will be in view two blocks ahead            Aug 30, 2018  9:00 AM CDT   [facility]   [facility]    [address] [place] [id number] [phone]              Future tests that were ordered for you today     Open Future Orders        Priority Expected Expires Ordered    HCG quantitative pregnancy Routine 8/26/2018 8/20/2019 8/20/2018            Who to contact     If you have questions or need follow up information about today's clinic visit or your schedule please contact [facility] directly at [phone].  Normal or non-critical lab and imaging results will be communicated to you by [employer], letter or phone within 4 business days after the clinic has received the results. If you do not hear from us within 7 days, please contact the clinic through [employer] or phone. If you have a critical or abnormal lab result, we will notify you by phone as soon as possible.  Submit refill requests through [employer] or call your pharmacy and they will forward the refill request to us. Please allow 3 business days for your refill to be completed.          Additional Information About Your Visit        [employer] Information     [employer] gives you secure access to your electronic health record. If you see a primary care provider, you can also send messages to your care team and make appointments. If you have questions, please call your primary care clinic.  If you do not have a primary care provider, please call [phone] and they will assist you.        Care EveryWhere ID     This is your Care EveryWhere ID. This could be used by other  organizations to access your [place] medical records  [id number]        Your Vitals Were     Pulse Temperature Respirations BMI (Body Mass Index)          88 97.8  F (36.6  C) (Oral) 16 37.61 kg/m2         Blood Pressure from Last 3 Encounters:   08/20/18 124/78   08/09/18 116/72   07/12/18 114/68    Weight from Last 3 Encounters:   08/20/18 233 lb (105.7 kg)   08/09/18 233 lb (105.7 kg)   07/12/18 240 lb 1.6 oz (108.9 kg)              Today, you had the following     No orders found for display       Primary Care Provider Office Phone # Fax #    Ramiro Gaviria [phone]       OB GYN [address] [place] [address] [place] [id number]        Equal Access to Services     [facility]: Hadii arely cruz hadasho Somanny, waaxda luqadaha, qaybta kaalmada adeegyada, genevieve matt . So [facility] [phone].    ATENCIÓN: Si habla español, tiene a haider disposición servicios gratuitos de asistencia lingüística. [place] Jose al [phone].    We comply with applicable federal civil rights laws and [place] laws. We do not discriminate on the basis of race, color, national origin, age, disability, sex, sexual orientation, or gender identity.            Thank you!     Thank you for choosing [facility]  for your care. Our goal is always to provide you with excellent care. Hearing back from our patients is one way we can continue to improve our services. Please take a few minutes to complete the written survey that you may receive in the mail after your visit with us. Thank you!             Your Updated Medication List - Protect others around you: Learn how to safely use, store and throw away your medicines at www.disposemymeds.org.          This list is accurate as of 8/20/18  3:20 PM.  Always use your most recent med list.                   Brand Name Dispense Instructions for use Diagnosis    prenatal multivitamin plus iron 27-0.8 MG Tabs per tablet      Take 1  tablet by mouth daily

## 2018-08-24 NOTE — H&P (VIEW-ONLY)
[facility]  [address] [address] [place] [address]  [phone]  Dept: [phone]    PRE-OP EVALUATION:  Today's date: 2018    Breanna Vidal (: 1985) presents for pre-operative evaluation assessment as requested by Dr. Cui.  She requires evaluation and anesthesia risk assessment prior to undergoing surgery/procedure for treatment of desired sterility .    Fax number for surgical facility: [facility]  Primary Physician: Ramiro Gaviria  Type of Anesthesia Anticipated: to be determined    Patient has a Health Care Directive or Living Will:  NO    Preop Questions 2018   Who is doing your surgery? Chai Cui    What are you having done? LAPAROSCOPI SALPINGECTOMY   Date of Surgery/Procedure: 2018   Facility or Hospital where procedure/surgery will be performed: [facility]   1.  Do you have a history of Heart attack, stroke, stent, coronary bypass surgery, or other heart surgery? No   2.  Do you ever have any pain or discomfort in your chest? No   3.  Do you have a history of  Heart Failure? No   4.   Are you troubled by shortness of breath when:  walking on a level surface, or up a slight hill, or at night? No   5.  Do you currently have a cold, bronchitis or other respiratory infection? No   6.  Do you have a cough, shortness of breath, or wheezing? No   7.  Do you sometimes get pains in the calves of your legs when you walk? No   8. Do you or anyone in your family have previous history of blood clots? No   9.  Do you or does anyone in your family have a serious bleeding problem such as prolonged bleeding following surgeries or cuts? No   10. Have you ever had problems with anemia or been told to take iron pills? No   11. Have you had any abnormal blood loss such as black, tarry or bloody stools, or abnormal vaginal bleeding? No   12. Have you ever had a blood transfusion? No   13. Have you or any of your relatives ever  had problems with anesthesia? No   14. Do you have sleep apnea, excessive snoring or daytime drowsiness? No   15. Do you have any prosthetic heart valves? No   16. Do you have prosthetic joints? No   17. Is there any chance that you may be pregnant? No         HPI:     HPI related to upcoming procedure: desire sterilization.       DEPRESSION - Patient has a history of Depression of moderate severity after birth of 1st child she is no longer requiring medication for control with recent symptoms being stable.   Anxiety history also stable.                                                                                                                                                                                     .      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, abuse (H) 10/14/2009     Priority: Medium     Overview:   LW Modifier:  EtOH,crack cocaine,marij- [id number]/7/09  ; Polysubstance Dependence        Past Medical History:   Diagnosis Date     Cellulitis 2012    MRSA septic olecranon bursitis and facial cellulitis     Drug abuse 2011    methamphetamine and alcohol, sober since treatment 2011     MRSA cellulitis 2012    cleared with negative nasal swabs 8/16/17 and 8/28/17, reviewed     Rh negative, antepartum      Past Surgical History:   Procedure Laterality Date     INCISION AND DRAINAGE  04/18/2012    chin abscess I&D      MAMMOPLASTY REDUCTION Bilateral 08/04/2017     Current Outpatient Prescriptions   Medication Sig Dispense Refill     Prenatal Vit-Fe Fumarate-FA (PRENATAL MULTIVITAMIN PLUS IRON) 27-0.8 MG TABS per tablet Take 1 tablet by mouth daily       OTC products: no recent use of OTC ASA, NSAIDS or Steroids and herbals and vitamins - prenatal vitamin    Allergies   Allergen Reactions     Codeine      Other reaction(s): Nausea     Sulfa Drugs Unknown     Zantac [Ranitidine] Rash      Latex Allergy: NO    Social History   Substance Use Topics     Smoking status: Former Smoker     Smokeless tobacco: Former User     Quit date: 3/10/2016     Alcohol use No      Comment: history alcohol abuse 2012, s/p treatment     History   Drug Use No     Comment: former drug user (EtOH, cocaine, marijuana, meth), last use 2012       REVIEW OF SYSTEMS:   Constitutional, neuro, ENT, endocrine, pulmonary, cardiac, gastrointestinal, genitourinary, musculoskeletal, integument and psychiatric systems are negative, except as otherwise noted.    EXAM:   /78  Pulse 88  Temp 97.8  F (36.6  C) (Oral)  Resp 16  Wt 233 lb (105.7 kg)  BMI 37.61 kg/m2    GENERAL APPEARANCE: healthy, alert and no distress     EYES: EOMI, PERRL     HENT: ear canals and TM's normal and nose and mouth without ulcers or lesions     NECK: no adenopathy, no asymmetry, masses, or scars and thyroid normal to palpation     RESP: lungs clear to auscultation - no rales, rhonchi or wheezes     CV: regular rates and rhythm, normal S1 S2, no S3 or S4 and no murmur, click or rub     ABDOMEN:  soft, nontender, no HSM or masses and bowel sounds normal     MS: extremities normal- no gross deformities noted, no evidence of inflammation in joints, FROM in all extremities.     SKIN: no suspicious lesions or rashes     NEURO: Normal strength and tone, sensory exam grossly normal, mentation intact and speech normal     PSYCH: mentation appears normal. and affect normal/bright      LYMPHATICS: No cervical adenopathy    DIAGNOSTICS:   Will have preop labs completed day prior to surgery; type and screen and urine HCG    Recent Labs   Lab Test  06/26/18   1105  06/26/18   1104   HGB   --   13.1   PLT   --   183   NA  137   --    POTASSIUM  4.4   --    CR  0.45*   --         IMPRESSION:   Reason for surgery/procedure: desire for sterilization procedure    The proposed surgical procedure is considered LOW risk.    REVISED CARDIAC RISK INDEX  The patient has the following serious cardiovascular risks for perioperative complications such as (MI, PE, VFib and 3  AV Block):  No serious cardiac risks  INTERPRETATION: 0 risks: Class I (very low risk - 0.4% complication rate)    The patient has the following additional risks for perioperative complications:  No identified additional risks      ICD-10-CM    1. Preop general physical exam Z01.818        RECOMMENDATIONS:         --Patient is to take all scheduled medications on the day of surgery EXCEPT for modifications listed below. Hold all Nsaids, ASA, vitamins    APPROVAL GIVEN to proceed with proposed procedure, without further diagnostic evaluation       Signed Electronically by: MADELINE Santacruz CNP    Copy of this evaluation report is provided to requesting physician.    [facility] Preop Guidelines    Revised Cardiac Risk Index

## 2018-08-26 ENCOUNTER — ANESTHESIA EVENT (OUTPATIENT)
Dept: SURGERY | Facility: CLINIC | Age: 33
End: 2018-08-26
Payer: COMMERCIAL

## 2018-08-26 DIAGNOSIS — Z01.812 PRE-PROCEDURE LAB EXAM: ICD-10-CM

## 2018-08-26 LAB — B-HCG SERPL-ACNC: <1 IU/L (ref 0–5)

## 2018-08-26 PROCEDURE — 84702 CHORIONIC GONADOTROPIN TEST: CPT | Performed by: FAMILY MEDICINE

## 2018-08-26 PROCEDURE — 36415 COLL VENOUS BLD VENIPUNCTURE: CPT | Performed by: FAMILY MEDICINE

## 2018-08-26 ASSESSMENT — LIFESTYLE VARIABLES: TOBACCO_USE: 1

## 2018-08-27 ENCOUNTER — ANESTHESIA (OUTPATIENT)
Dept: SURGERY | Facility: CLINIC | Age: 33
End: 2018-08-27
Payer: COMMERCIAL

## 2018-08-27 ENCOUNTER — SURGERY (OUTPATIENT)
Age: 33
End: 2018-08-27
Payer: COMMERCIAL

## 2018-08-27 ENCOUNTER — HOSPITAL ENCOUNTER (OUTPATIENT)
Facility: CLINIC | Age: 33
Discharge: HOME | End: 2018-08-27
Attending: FAMILY MEDICINE | Admitting: FAMILY MEDICINE
Payer: COMMERCIAL

## 2018-08-27 VITALS
TEMPERATURE: 98.7 F | OXYGEN SATURATION: 95 % | DIASTOLIC BLOOD PRESSURE: 82 MMHG | RESPIRATION RATE: 19 BRPM | SYSTOLIC BLOOD PRESSURE: 111 MMHG

## 2018-08-27 DIAGNOSIS — Z90.79 STATUS POST BILATERAL SALPINGECTOMY: Primary | ICD-10-CM

## 2018-08-27 DIAGNOSIS — R11.0 NAUSEA: Primary | ICD-10-CM

## 2018-08-27 PROCEDURE — 25000125 ZZHC RX 250: Performed by: NURSE ANESTHETIST, CERTIFIED REGISTERED

## 2018-08-27 PROCEDURE — C9399 UNCLASSIFIED DRUGS OR BIOLOG: HCPCS | Performed by: NURSE ANESTHETIST, CERTIFIED REGISTERED

## 2018-08-27 PROCEDURE — 25000132 ZZH RX MED GY IP 250 OP 250 PS 637: Performed by: FAMILY MEDICINE

## 2018-08-27 PROCEDURE — 36000056 ZZH SURGERY LEVEL 3 1ST 30 MIN: Performed by: FAMILY MEDICINE

## 2018-08-27 PROCEDURE — 25000128 H RX IP 250 OP 636: Performed by: NURSE ANESTHETIST, CERTIFIED REGISTERED

## 2018-08-27 PROCEDURE — 25000566 ZZH SEVOFLURANE, EA 15 MIN: Performed by: FAMILY MEDICINE

## 2018-08-27 PROCEDURE — 58661 LAPAROSCOPY REMOVE ADNEXA: CPT | Performed by: FAMILY MEDICINE

## 2018-08-27 PROCEDURE — 88302 TISSUE EXAM BY PATHOLOGIST: CPT | Performed by: FAMILY MEDICINE

## 2018-08-27 PROCEDURE — 71000027 ZZH RECOVERY PHASE 2 EACH 15 MINS: Performed by: FAMILY MEDICINE

## 2018-08-27 PROCEDURE — 40000306 ZZH STATISTIC PRE PROC ASSESS II: Performed by: FAMILY MEDICINE

## 2018-08-27 PROCEDURE — 88302 TISSUE EXAM BY PATHOLOGIST: CPT | Mod: 26 | Performed by: FAMILY MEDICINE

## 2018-08-27 PROCEDURE — 36000058 ZZH SURGERY LEVEL 3 EA 15 ADDTL MIN: Performed by: FAMILY MEDICINE

## 2018-08-27 PROCEDURE — 25000125 ZZHC RX 250: Performed by: FAMILY MEDICINE

## 2018-08-27 PROCEDURE — 58661 LAPAROSCOPY REMOVE ADNEXA: CPT | Mod: 80 | Performed by: OBSTETRICS & GYNECOLOGY

## 2018-08-27 PROCEDURE — 71000014 ZZH RECOVERY PHASE 1 LEVEL 2 FIRST HR: Performed by: FAMILY MEDICINE

## 2018-08-27 PROCEDURE — 37000008 ZZH ANESTHESIA TECHNICAL FEE, 1ST 30 MIN: Performed by: FAMILY MEDICINE

## 2018-08-27 PROCEDURE — 37000009 ZZH ANESTHESIA TECHNICAL FEE, EACH ADDTL 15 MIN: Performed by: FAMILY MEDICINE

## 2018-08-27 PROCEDURE — 27210794 ZZH OR GENERAL SUPPLY STERILE: Performed by: FAMILY MEDICINE

## 2018-08-27 RX ORDER — ONDANSETRON 2 MG/ML
INJECTION INTRAMUSCULAR; INTRAVENOUS PRN
Status: DISCONTINUED | OUTPATIENT
Start: 2018-08-27 | End: 2018-08-27

## 2018-08-27 RX ORDER — MEPERIDINE HYDROCHLORIDE 25 MG/ML
12.5 INJECTION INTRAMUSCULAR; INTRAVENOUS; SUBCUTANEOUS
Status: DISCONTINUED | OUTPATIENT
Start: 2018-08-27 | End: 2018-08-27 | Stop reason: HOSPADM

## 2018-08-27 RX ORDER — HYDROCODONE BITARTRATE AND ACETAMINOPHEN 5; 325 MG/1; MG/1
1 TABLET ORAL EVERY 6 HOURS PRN
Qty: 20 TABLET | Refills: 0 | Status: SHIPPED | OUTPATIENT
Start: 2018-08-27 | End: 2018-09-04

## 2018-08-27 RX ORDER — BUPIVACAINE HYDROCHLORIDE AND EPINEPHRINE 5; 5 MG/ML; UG/ML
INJECTION, SOLUTION PERINEURAL PRN
Status: DISCONTINUED | OUTPATIENT
Start: 2018-08-27 | End: 2018-08-27 | Stop reason: HOSPADM

## 2018-08-27 RX ORDER — FENTANYL CITRATE 50 UG/ML
25-50 INJECTION, SOLUTION INTRAMUSCULAR; INTRAVENOUS
Status: DISCONTINUED | OUTPATIENT
Start: 2018-08-27 | End: 2018-08-27 | Stop reason: HOSPADM

## 2018-08-27 RX ORDER — LIDOCAINE HYDROCHLORIDE 20 MG/ML
INJECTION, SOLUTION INFILTRATION; PERINEURAL PRN
Status: DISCONTINUED | OUTPATIENT
Start: 2018-08-27 | End: 2018-08-27

## 2018-08-27 RX ORDER — LIDOCAINE 40 MG/G
CREAM TOPICAL
Status: DISCONTINUED | OUTPATIENT
Start: 2018-08-27 | End: 2018-08-27 | Stop reason: HOSPADM

## 2018-08-27 RX ORDER — SODIUM CHLORIDE, SODIUM LACTATE, POTASSIUM CHLORIDE, CALCIUM CHLORIDE 600; 310; 30; 20 MG/100ML; MG/100ML; MG/100ML; MG/100ML
INJECTION, SOLUTION INTRAVENOUS CONTINUOUS
Status: DISCONTINUED | OUTPATIENT
Start: 2018-08-27 | End: 2018-08-27 | Stop reason: HOSPADM

## 2018-08-27 RX ORDER — NALOXONE HYDROCHLORIDE 0.4 MG/ML
.1-.4 INJECTION, SOLUTION INTRAMUSCULAR; INTRAVENOUS; SUBCUTANEOUS
Status: DISCONTINUED | OUTPATIENT
Start: 2018-08-27 | End: 2018-08-27 | Stop reason: HOSPADM

## 2018-08-27 RX ORDER — DEXAMETHASONE SODIUM PHOSPHATE 10 MG/ML
INJECTION, SOLUTION INTRAMUSCULAR; INTRAVENOUS PRN
Status: DISCONTINUED | OUTPATIENT
Start: 2018-08-27 | End: 2018-08-27

## 2018-08-27 RX ORDER — KETOROLAC TROMETHAMINE 30 MG/ML
INJECTION, SOLUTION INTRAMUSCULAR; INTRAVENOUS PRN
Status: DISCONTINUED | OUTPATIENT
Start: 2018-08-27 | End: 2018-08-27

## 2018-08-27 RX ORDER — FENTANYL CITRATE 50 UG/ML
INJECTION, SOLUTION INTRAMUSCULAR; INTRAVENOUS PRN
Status: DISCONTINUED | OUTPATIENT
Start: 2018-08-27 | End: 2018-08-27

## 2018-08-27 RX ORDER — HYDROCODONE BITARTRATE AND ACETAMINOPHEN 5; 325 MG/1; MG/1
1 TABLET ORAL EVERY 6 HOURS PRN
Status: DISCONTINUED | OUTPATIENT
Start: 2018-08-27 | End: 2018-08-27 | Stop reason: HOSPADM

## 2018-08-27 RX ORDER — ONDANSETRON 2 MG/ML
4 INJECTION INTRAMUSCULAR; INTRAVENOUS EVERY 30 MIN PRN
Status: COMPLETED | OUTPATIENT
Start: 2018-08-27 | End: 2018-08-27

## 2018-08-27 RX ORDER — DIMENHYDRINATE 50 MG/ML
12.5 INJECTION, SOLUTION INTRAMUSCULAR; INTRAVENOUS EVERY 6 HOURS PRN
Status: DISCONTINUED | OUTPATIENT
Start: 2018-08-27 | End: 2018-08-27 | Stop reason: HOSPADM

## 2018-08-27 RX ORDER — HYDROMORPHONE HYDROCHLORIDE 1 MG/ML
.3-.5 INJECTION, SOLUTION INTRAMUSCULAR; INTRAVENOUS; SUBCUTANEOUS EVERY 10 MIN PRN
Status: DISCONTINUED | OUTPATIENT
Start: 2018-08-27 | End: 2018-08-27 | Stop reason: HOSPADM

## 2018-08-27 RX ORDER — ONDANSETRON 4 MG/1
4 TABLET, ORALLY DISINTEGRATING ORAL EVERY 30 MIN PRN
Status: COMPLETED | OUTPATIENT
Start: 2018-08-27 | End: 2018-08-27

## 2018-08-27 RX ORDER — PROPOFOL 10 MG/ML
INJECTION, EMULSION INTRAVENOUS PRN
Status: DISCONTINUED | OUTPATIENT
Start: 2018-08-27 | End: 2018-08-27

## 2018-08-27 RX ADMIN — Medication 8 ML: at 13:21

## 2018-08-27 RX ADMIN — FENTANYL CITRATE 50 MCG: 50 INJECTION INTRAMUSCULAR; INTRAVENOUS at 14:01

## 2018-08-27 RX ADMIN — ONDANSETRON 4 MG: 4 TABLET, ORALLY DISINTEGRATING ORAL at 15:26

## 2018-08-27 RX ADMIN — DEXAMETHASONE SODIUM PHOSPHATE 10 MG: 10 INJECTION, SOLUTION INTRAMUSCULAR; INTRAVENOUS at 12:55

## 2018-08-27 RX ADMIN — SODIUM CHLORIDE, POTASSIUM CHLORIDE, SODIUM LACTATE AND CALCIUM CHLORIDE: 600; 310; 30; 20 INJECTION, SOLUTION INTRAVENOUS at 13:00

## 2018-08-27 RX ADMIN — FENTANYL CITRATE 50 MCG: 50 INJECTION INTRAMUSCULAR; INTRAVENOUS at 13:52

## 2018-08-27 RX ADMIN — SODIUM CHLORIDE, POTASSIUM CHLORIDE, SODIUM LACTATE AND CALCIUM CHLORIDE: 600; 310; 30; 20 INJECTION, SOLUTION INTRAVENOUS at 11:12

## 2018-08-27 RX ADMIN — MIDAZOLAM 1 MG: 1 INJECTION INTRAMUSCULAR; INTRAVENOUS at 12:43

## 2018-08-27 RX ADMIN — Medication 100 MG: at 12:45

## 2018-08-27 RX ADMIN — DIMENHYDRINATE 12.5 MG: 50 INJECTION, SOLUTION INTRAMUSCULAR; INTRAVENOUS at 16:12

## 2018-08-27 RX ADMIN — SUGAMMADEX 180 MG: 100 INJECTION, SOLUTION INTRAVENOUS at 13:15

## 2018-08-27 RX ADMIN — HYDROMORPHONE HYDROCHLORIDE 0.5 MG: 1 INJECTION, SOLUTION INTRAMUSCULAR; INTRAVENOUS; SUBCUTANEOUS at 13:35

## 2018-08-27 RX ADMIN — FENTANYL CITRATE 50 MCG: 50 INJECTION, SOLUTION INTRAMUSCULAR; INTRAVENOUS at 12:45

## 2018-08-27 RX ADMIN — MIDAZOLAM 1 MG: 1 INJECTION INTRAMUSCULAR; INTRAVENOUS at 12:41

## 2018-08-27 RX ADMIN — LIDOCAINE HYDROCHLORIDE 1 ML: 10 INJECTION, SOLUTION EPIDURAL; INFILTRATION; INTRACAUDAL; PERINEURAL at 11:12

## 2018-08-27 RX ADMIN — FENTANYL CITRATE 50 MCG: 50 INJECTION, SOLUTION INTRAMUSCULAR; INTRAVENOUS at 12:43

## 2018-08-27 RX ADMIN — ROCURONIUM BROMIDE 30 MG: 10 INJECTION INTRAVENOUS at 12:55

## 2018-08-27 RX ADMIN — LIDOCAINE HYDROCHLORIDE 40 MG: 20 INJECTION, SOLUTION INFILTRATION; PERINEURAL at 12:45

## 2018-08-27 RX ADMIN — KETOROLAC TROMETHAMINE 30 MG: 30 INJECTION, SOLUTION INTRAMUSCULAR at 13:32

## 2018-08-27 RX ADMIN — ONDANSETRON 4 MG: 2 INJECTION INTRAMUSCULAR; INTRAVENOUS at 14:21

## 2018-08-27 RX ADMIN — HYDROMORPHONE HYDROCHLORIDE 0.5 MG: 1 INJECTION, SOLUTION INTRAMUSCULAR; INTRAVENOUS; SUBCUTANEOUS at 13:32

## 2018-08-27 RX ADMIN — ONDANSETRON 4 MG: 2 INJECTION INTRAMUSCULAR; INTRAVENOUS at 12:56

## 2018-08-27 RX ADMIN — PROPOFOL 200 MG: 10 INJECTION, EMULSION INTRAVENOUS at 12:45

## 2018-08-27 NOTE — IP AVS SNAPSHOT
MRN:[id number]                      After Visit Summary   8/27/2018    Breanna Vidal    MRN: [id number]           Thank you!     Thank you for choosing [facility] for your care. Our goal is always to provide you with excellent care. Hearing back from our patients is one way we can continue to improve our services. Please take a few minutes to complete the written survey that you may receive in the mail after you visit with us. Thank you!        Patient Information     Date Of Birth          1985        About your hospital stay     You were admitted on:  August 27, 2018 You last received care in the:  [facility]    You were discharged on:  August 27, 2018       Who to Call     For medical emergencies, please call 911.  For non-urgent questions about your medical care, please call your primary care provider or clinic, [phone]  For questions related to your surgery, please call your surgery clinic        Attending Provider     Provider Specialty    Chia Cui MD Family Practice       Primary Care Provider Office Phone # Fax #    Ramiro Ayaz Gaviria [phone]      Your next 10 appointments already scheduled     Aug 30, 2018  9:00 AM CDT   [facility]ui [facility]   [facility]    [address] [place] [id number] [phone]              Further instructions from your care team              Special Precautions for 24 Hours After Surgery      It is not unusual to feel light-headed or faint up to 24 hours after surgery or while taking pain medication.  If you have these symptoms, sit for a few minutes before standing and have someone assist you whn you get up to walk or use the restroom    You should rest and relax for the next 24 hours and you must make arrangements to have someone stay with you for at least 24 hours after your discharge.  Avoid hazardous and strenuous  activities.    DO NOT DRIVE any vehicle or operate mechanical equipment for 24 hours following the end of your surgery.  Even though you feel normal, your reactions may be affected by the medication you have received.    Do not drink alcoholic beverages for 24 hours following your surgery    Drink clear liquids (apple juice, broth, 7-Up, etc) and progress to your regular diet as you feel able    Call your physician for any questions of a medical nature    Do not make important decisions for 24 hours    Allergies   Allergen Reactions     Nkda [No Known Drug Allergies]        ACTIVITY:    Resume normal daily activities tomorrow    May return to work/school tomorrow    May drive a car tomorrow  INCISIONAL CARE:    Report any severe pain, bleeding, or swelling to your physician.  Keep all dressings dry    May remove dressing in 3 days.  Redress the incisions with band-aids    Stitches will NOT be removed     May bathe in 1 week.  May shower anytime.    Keep wound clean and dry    Be alert for signs of possible infection:  redness, swelling, heat, red streaks, elevated temperature  Contact your doctor if any of the above occur.    Dr. Cui  [phone]   Dr. Dickerson:  [phone]        Dr. Stone  [phone]  After Hours Phone:  [phone]  POST-OPERATIVE APPOINTMENT:  Call [phone] to schedule your             post-op appointment in 1-2 weeks    Over the counter medication suggested:    Other medication instructions:    Take home prescriptions:     Ibuprofen 600mg 1 tablet every 6 hours as necessary for milder pain.  Vicodin  as 1-2 tabs every 4-6 hours as needed for pain     ADDITIONAL DISCHARGE INSTRUCTIONS:        Printed instruction sheet given to the patient  Specific to Patient:      GYN SAME DAY SURGERY DISCHARGE INSTRUCTIONS        Additional Information     If you use hormonal birth control (such as the pill, patch, ring or implants): You'll need a second form of birth control for 7 days  (condoms, a diaphragm or contraceptive foam). While in the hospital, you received a medicine called Bridion. Your normal birth control will not work as well for a week after taking this medicine.          Pending Results     Date and Time Order Name Status Description    8/27/2018 1309 Surgical pathology exam In process             Statement of Approval     Ordered          08/27/18 0322  I have reviewed and agree with all the recommendations and orders detailed in this document.  EFFECTIVE NOW     Approved and electronically signed by:  Chai Cui MD             Admission Information     Date & Time Provider Department Dept. Phone    8/27/2018 Chai Cui MD [facility] [phone]      Your Vitals Were     Blood Pressure Temperature Respirations Pulse Oximetry          116/81 98.7  F (37.1  C) (Oral) 19 93%        MyChart Information     [employer] gives you secure access to your electronic health record. If you see a primary care provider, you can also send messages to your care team and make appointments. If you have questions, please call your primary care clinic.  If you do not have a primary care provider, please call [phone] and they will assist you.        Care EveryWhere ID     This is your Care EveryWhere ID. This could be used by other organizations to access your [facility] medical records  [id number]        Equal Access to Services     TEOFILO BAINS : Ester Jay, wakarolinada lupaigeadaha, qaybta kaalmada adejennifer, genevieve levine. So [facility] [place] [phone].    ATENCIÓN: Si habla español, tiene a haider disposición servicios gratuitos de asistencia lingüística. Llame al [phone].    We comply with applicable federal civil rights laws and [place] laws. We do not discriminate on the basis of race, color, national origin, age, disability, sex, sexual orientation, or gender identity.               Review of your medicines       START taking        Dose / Directions    HYDROcodone-acetaminophen 5-325 MG per tablet   Commonly known as:  NORCO   Used for:  Status post bilateral salpingectomy        Dose:  1 tablet   Take 1 tablet by mouth every 6 hours as needed for severe pain   Quantity:  20 tablet   Refills:  0         CONTINUE these medicines which have NOT CHANGED        Dose / Directions    IBUPROFEN PO   Indication:  Mild to Moderate Pain        Dose:  600 mg   Take 600 mg by mouth as needed for moderate pain   Refills:  0       prenatal multivitamin plus iron 27-0.8 MG Tabs per tablet   Indication:  Pregnancy        Dose:  1 tablet   Take 1 tablet by mouth daily   Refills:  0         STOP taking     TYLENOL PO                Where to get your medicines      Some of these will need a paper prescription and others can be bought over the counter. Ask your nurse if you have questions.     Bring a paper prescription for each of these medications     HYDROcodone-acetaminophen 5-325 MG per tablet                Protect others around you: Learn how to safely use, store and throw away your medicines at www.disposemymeds.org.        Information about OPIOIDS     PRESCRIPTION OPIOIDS: WHAT YOU NEED TO KNOW   We gave you an opioid (narcotic) pain medicine. It is important to manage your pain, but opioids are not always the best choice. You should first try all the other options your care team gave you. Take this medicine for as short a time (and as few doses) as possible.    Some activities can increase your pain, such as bandage changes or therapy sessions. It may help to take your pain medicine 30 to 60 minutes before these activities. Reduce your stress by getting enough sleep, working on hobbies you enjoy and practicing relaxation or meditation. Talk to your care team about ways to manage your pain beyond prescription opioids.    These medicines have risks:    DO NOT drive when on new or higher doses of pain medicine. These medicines can  affect your alertness and reaction times, and you could be arrested for driving under the influence (DUI). If you need to use opioids long-term, talk to your care team about driving.    DO NOT operate heavy machinery    DO NOT do any other dangerous activities while taking these medicines.    DO NOT drink any alcohol while taking these medicines.     If the opioid prescribed includes acetaminophen, DO NOT take with any other medicines that contain acetaminophen. Read all labels carefully. Look for the word  acetaminophen  or  Tylenol.  Ask your pharmacist if you have questions or are unsure.    You can get addicted to pain medicines, especially if you have a history of addiction (chemical, alcohol or substance dependence). Talk to your care team about ways to reduce this risk.    All opioids tend to cause constipation. Drink plenty of water and eat foods that have a lot of fiber, such as fruits, vegetables, prune juice, apple juice and high-fiber cereal. Take a laxative (Miralax, milk of magnesia, Colace, Senna) if you don t move your bowels at least every other day. Other side effects include upset stomach, sleepiness, dizziness, throwing up, tolerance (needing more of the medicine to have the same effect), physical dependence and slowed breathing.    Store your pills in a secure place, locked if possible. We will not replace any lost or stolen medicine. If you don t finish your medicine, please throw away (dispose) as directed by your pharmacist. The [place] [employer]ution [employer] has more information about safe disposal: https://www.pca.[facility].[place].us/living-green/managing-unwanted-medications             Medication List: This is a list of all your medications and when to take them. Check marks below indicate your daily home schedule. Keep this list as a reference.      Medications           Morning Afternoon Evening Bedtime As Needed    HYDROcodone-acetaminophen 5-325 MG per tablet   Commonly known as:  NORCO    Take 1 tablet by mouth every 6 hours as needed for severe pain                                IBUPROFEN PO   Take 600 mg by mouth as needed for moderate pain                                prenatal multivitamin plus iron 27-0.8 MG Tabs per tablet   Take 1 tablet by mouth daily

## 2018-08-27 NOTE — TELEPHONE ENCOUNTER
Luisa from upstairs called and was wondering if pt can have zofran prescribed to her to take home.    Per RF ok to do this.

## 2018-08-27 NOTE — OP NOTE
Procedure Date: 08/27/2018      PREOPERATIVE DIAGNOSIS:  Undesired fertility.      POSTOPERATIVE DIAGNOSIS:  Undesired fertility.      PROCEDURE:  Bilateral salpingectomy.      ANESTHESIA:  General.      PROCEDURE:  The patient was taken to the operating room and given a general anesthetic.  The patient was then prepped and draped in the usual sterile fashion.  An infraumbilical skin incision was made with an 11 blade scalpel and the Veress needle was introduced into the perineal space.  Its position was confirmed with normal saline.  A pneumoperitoneum was then created with CO2 gas.  The scope trocar was placed without difficulty.  The laparoscope was placed and the anatomy was identified.  The accessory trocar was placed in the patient's left lower abdomen under direct visualization.  The position was confirmed.  The second  trocar was placed in the right lower abdomen under direct visualization.  The patient's left fallopian tube was grasped with a grasper and the tube was transected with the ligature device in the usual fashion.  Approximately 2/3 of the fallopian tube was then retrieved through the trocar sheath and sent to pathology.  The procedure was repeated on the patient's right.  There was a small hydrosalpinx present.  Once the tube was transected free, the hydrosalpinx was stabbed with a laparoscopic needle and drained.  The distal 2/3 of the fallopian tube was then removed through the trocar sheath in the usual fashion.  There was no evidence of bleeding at either operative site.  The CO2 gas was expressed through the trocar sheaths prior to their removal.  The skin was reapproximated with 5-0 undyed Vicryl subcuticular suture x3.  Sterile dressings were applied.  Sponge, needle and instrument count were correct at the end of the case.  Estimated blood loss was minimal.  There were no complications.  The patient tolerated the procedure well and left the operating room in good condition.    Tuan was asked to assess secondary to his special skill set with laparoscopic instruments and salpingectomy, which allowed us to significantly shorten the operative time which has been shown to decrease postoperative infection rates and morbidity.         NARESH GLASS MD             D: 2018   T: 2018   MT: LIZZETTE      Name:     LAURA CHEEMA   MRN:      [id number]        Account:        [id number]   :      1985           Procedure Date: 2018      Document: [id number]

## 2018-08-27 NOTE — ANESTHESIA PREPROCEDURE EVALUATION
Anesthesia Evaluation     . Pt has had prior anesthetic. Type: General and Regional           ROS/MED HX    ENT/Pulmonary:     (+)tobacco use, Past use , . .    Neurologic:  - neg neurologic ROS     Cardiovascular:  - neg cardiovascular ROS   (+) ----. : . . . :. . No previous cardiac testing       METS/Exercise Tolerance:     Hematologic:  - neg hematologic  ROS       Musculoskeletal:  - neg musculoskeletal ROS       GI/Hepatic:  - neg GI/hepatic ROS       Renal/Genitourinary: Comment: Mixed incontinence         Endo:  - neg endo ROS       Psychiatric: Comment: History of depression and anxiety, post partum, currently controlled.         Infectious Disease:  - neg infectious disease ROS       Malignancy:      - no malignancy   Other: Comment: History of ETOH abuse in remission  History of methamphetamine abuse in remission   (+) No chance of pregnancy C-spine cleared: N/A, no H/O Chronic Pain,                   Physical Exam  Normal systems: cardiovascular, pulmonary and dental    Airway   Mallampati: II  TM distance: >3 FB  Neck ROM: full    Dental     Cardiovascular   Rhythm and rate: regular and normal      Pulmonary    breath sounds clear to auscultation                    Anesthesia Plan      History & Physical Review  History and physical reviewed and following examination; no interval change.    ASA Status:  2 .    NPO Status:  > 8 hours    Plan for General and ETT with Intravenous and Propofol induction. Maintenance will be Balanced.    PONV prophylaxis:  Ondansetron (or other 5HT-3) and Dexamethasone or Solumedrol       Postoperative Care  Postoperative pain management:  IV analgesics and Oral pain medications.      Consents  Anesthetic plan, risks, benefits and alternatives discussed with:  Patient..                          .

## 2018-08-27 NOTE — DISCHARGE INSTRUCTIONS
Special Precautions for 24 Hours After Surgery      It is not unusual to feel light-headed or faint up to 24 hours after surgery or while taking pain medication.  If you have these symptoms, sit for a few minutes before standing and have someone assist you whn you get up to walk or use the restroom    You should rest and relax for the next 24 hours and you must make arrangements to have someone stay with you for at least 24 hours after your discharge.  Avoid hazardous and strenuous activities.    DO NOT DRIVE any vehicle or operate mechanical equipment for 24 hours following the end of your surgery.  Even though you feel normal, your reactions may be affected by the medication you have received.    Do not drink alcoholic beverages for 24 hours following your surgery    Drink clear liquids (apple juice, broth, 7-Up, etc) and progress to your regular diet as you feel able    Call your physician for any questions of a medical nature    Do not make important decisions for 24 hours    Allergies   Allergen Reactions     Nkda [No Known Drug Allergies]        ACTIVITY:    Resume normal daily activities tomorrow    May return to work/school tomorrow    May drive a car tomorrow  INCISIONAL CARE:    Report any severe pain, bleeding, or swelling to your physician.  Keep all dressings dry    May remove dressing in 3 days.  Redress the incisions with band-aids    Stitches will NOT be removed     May bathe in 1 week.  May shower anytime.    Keep wound clean and dry    Be alert for signs of possible infection:  redness, swelling, heat, red streaks, elevated temperature  Contact your doctor if any of the above occur.    Dr. Cui  [phone]   Dr. Dickerson:  [phone]        Dr. Stone  [phone]  After Hours Phone:  [phone]  POST-OPERATIVE APPOINTMENT:  Call [phone] to schedule your             post-op appointment in 1-2 weeks    Over the counter medication suggested:    Other medication instructions:     Take home prescriptions:     Ibuprofen 600mg 1 tablet every 6 hours as necessary for milder pain.  Vicodin  as 1-2 tabs every 4-6 hours as needed for pain     ADDITIONAL DISCHARGE INSTRUCTIONS:        Printed instruction sheet given to the patient  Specific to Patient:      GYN SAME DAY SURGERY DISCHARGE INSTRUCTIONS

## 2018-08-27 NOTE — ANESTHESIA POSTPROCEDURE EVALUATION
Patient: Breanna Vidal    Procedure(s):  Laparoscopic Bilateral Salpingectomy - Wound Class: II-Clean Contaminated    Diagnosis:undesired fertility  Diagnosis Additional Information: No value filed.    Anesthesia Type:  General, ETT    Note:  Anesthesia Post Evaluation    Patient location during evaluation: Phase 2  Patient participation: Able to fully participate in evaluation  Level of consciousness: awake and alert  Pain management: adequate  Cardiovascular status: blood pressure returned to baseline  Respiratory status: spontaneous ventilation and room air  Hydration status: acceptable  PONV: none     Anesthetic complications: None    Comments: Patient was pleased with her anesthetic today.  She is currently resting and her pain in now being managed well. No anesthesia concerns.         Last vitals:  Vitals:    08/27/18 1400 08/27/18 1405 08/27/18 1410   BP: 118/78 124/74 116/81   Resp: 23 19 19   Temp:      SpO2: 96% 98% 93%         Electronically Signed By: MADELINE Block CRNA  August 27, 2018  2:46 PM

## 2018-08-27 NOTE — IP AVS SNAPSHOT
[facility]    [address] [place] [address]    Phone:  [phone]                                       After Visit Summary   8/27/2018    Breanna Vidal    MRN: [id number]           After Visit Summary Signature Page     I have received my discharge instructions, and my questions have been answered. I have discussed any challenges I see with this plan with the nurse or doctor.    ..........................................................................................................................................  Patient/Patient Representative Signature      ..........................................................................................................................................  Patient Representative Print Name and Relationship to Patient    ..................................................               ................................................  Date                                            Time    ..........................................................................................................................................  Reviewed by Signature/Title    ...................................................              ..............................................  Date                                                            Time          22EPIC Rev 08/18

## 2018-08-28 RX ORDER — ONDANSETRON 4 MG/1
4 TABLET, FILM COATED ORAL EVERY 6 HOURS PRN
Qty: 18 TABLET | Refills: 1 | Status: SHIPPED | OUTPATIENT
Start: 2018-08-28 | End: 2018-09-04

## 2018-08-28 NOTE — OR NURSING
"[facility]  Discharge Call Back  Breanna Vidal  1985  MRN: [id number]  Home: [phone] (home)   PCP: Ramiro Gaviria    We are calling to see how you're doing since your surgery/procedure with us?   Comments: worried about some spotting that she noticed when she went to the bathroom after surgery. Pt. Reassured this is normal but should not develop into any active bleeding, rather should taper off to no bleeding. Also wondering about bandages and what and when they could be removed. Pt. Told to leave tape strips on but outer brown bandage could be removed with shower today. Reinforced no bath x 1 week  Clinical Questions  1. Have you had time to look at your discharge instructions? Do you have any questions in regards to the instructions?   Comment: yes, see above  2. Do you feel your pain is being controlled with the regimen the surgeon sent you home on? (ie: prescription medications, over the counter pain medications, ice packs)   Comments: yes, taking mostly ibuprofen but did take a vicodin this am as feeling a little more sore than tolerable. This has helped. Pt. Told to call if pain increases and is not managed with prescribed meds and rest  3. Have you noticed any drainage on your dressing? Do you know what to do if you have bleeding as a result of your procedure?   Comments: no drainage  4. Have you had any nausea/vomiting? Do you know how to treat this?   Comment: none  5. Have you had any signs/symptoms of infection? (ie: fever, swelling, heat, drainage or redness) Do you know what to do if you have?   Comment: none, did review symptoms of infection with pt.  6. Do you have a follow up appointment made with your surgeon? Do you have a number to contact them at if you need it?   Comment: yes,  Recognition  Is there anyone from your care team that you would like to recognize?   Comments: everyone was very nice,\" I especially appreciated that you (shoshana) took the time to answer " "all of my questions, I had a lot of them\"  Improvement  Our goal is to be the best. Do you have any suggestions for things that we could improve on?   Comments: everything was good  You may be randomly selected to fill out a [facility] survey. We would appreciate you taking the time to fill this out. It is important to us if you would answer all of the questions on the survey.            "

## 2018-08-28 NOTE — OR NURSING
"[facility]  Discharge Call Back  Breanna Vidal  1985  MRN: [id number]  Home: [phone] (home)   PCP: Ramiro Gaviria    We are calling to see how you're doing since your surgery/procedure with us?   Comments: worried about some spotting that she noticed when she went to the bathroom after surgery. Pt. Reassured this is normal but should not develop into any active bleeding, rather should taper off to no bleeding. Also wondering about bandages and what and when they could be removed. Pt. Told to leave tape strips on but outer brown bandage could be removed with shower today. Reinforced no bath x 1 week  Clinical Questions  1. Have you had time to look at your discharge instructions? Do you have any questions in regards to the instructions?   Comment: yes, see above  2. Do you feel your pain is being controlled with the regimen the surgeon sent you home on? (ie: prescription medications, over the counter pain medications, ice packs)   Comments: yes, taking mostly ibuprofen but did take a vicodin this am as feeling a little more sore than tolerable. This has helped. Pt. Told to call if pain increases and is not managed with prescribed meds and rest  3. Have you noticed any drainage on your dressing? Do you know what to do if you have bleeding as a result of your procedure?   Comments: no drainage  4. Have you had any nausea/vomiting? Do you know how to treat this?   Comment: none  5. Have you had any signs/symptoms of infection? (ie: fever, swelling, heat, drainage or redness) Do you know what to do if you have?   Comment: none, did review symptoms of infection with pt.  6. Do you have a follow up appointment made with your surgeon? Do you have a number to contact them at if you need it?   Comment: yes,  Recognition  Is there anyone from your care team that you would like to recognize?   Comments: everyone was very nice,\" I especially appreciated that you (shoshana) took the time to answer " "all of my questions, I had a lot of them\"  Improvement  Our goal is to be the best. Do you have any suggestions for things that we could improve on?   Comments: everything was good  You may be randomly selected to fill out a [facility] survey. We would appreciate you taking the time to fill this out. It is important to us if you would answer all of the questions on the survey.            "

## 2018-08-29 LAB — COPATH REPORT: NORMAL

## 2018-08-30 NOTE — PROGRESS NOTES
[facility]  [address] [place] [id number]  [phone]  Dept: [phone]    PRE-OP EVALUATION:  Today's date: 2018    Breanna Vidal (: 1985) presents for pre-operative evaluation assessment as requested by Dr. Nichole Yen.  She requires evaluation and anesthesia risk assessment prior to undergoing surgery/procedure for treatment of breast scar revision .    Fax number for surgical facility: [phone]   Primary Physician: Karen Campos  Type of Anesthesia Anticipated: General    Patient has a Health Care Directive or Living Will:  NO    Preop Questions 2018   Who is doing your surgery? Nichole Yne MD   What are you having done? Revision of previous breast reduction   Date of Surgery/Procedure: 10\2\2018   Facility or Hospital where procedure/surgery will be performed: [facility]   1.  Do you have a history of Heart attack, stroke, stent, coronary bypass surgery, or other heart surgery? No   2.  Do you ever have any pain or discomfort in your chest? No   3.  Do you have a history of  Heart Failure? No   4.   Are you troubled by shortness of breath when:  walking on a level surface, or up a slight hill, or at night? No   5.  Do you currently have a cold, bronchitis or other respiratory infection? No   6.  Do you have a cough, shortness of breath, or wheezing? No   7.  Do you sometimes get pains in the calves of your legs when you walk? No   8. Do you or anyone in your family have previous history of blood clots? No   9.  Do you or does anyone in your family have a serious bleeding problem such as prolonged bleeding following surgeries or cuts? No   10. Have you ever had problems with anemia or been told to take iron pills? No   11. Have you had any abnormal blood loss such as black, tarry or bloody stools, or abnormal vaginal bleeding? No   12. Have you ever had a blood transfusion? No   13. Have you or any of your relatives  ever had problems with anesthesia? No   14. Do you have sleep apnea, excessive snoring or daytime drowsiness? No   15. Do you have any prosthetic heart valves? No   16. Do you have prosthetic joints? No   17. Is there any chance that you may be pregnant? No     Answers for HPI/ROS submitted by the patient on 9/4/2018   PHQ-2 Score: 0      HPI:     HPI related to upcoming procedure: patient had breast reduction surgery and needs revision to correct nipple alignment and concerns with scar.      See problem list for active medical problems.  Problems all longstanding and stable, except as noted/documented.  See ROS for pertinent symptoms related to these conditions.                                                                                                                                                          .    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, abuse (H) 10/14/2009     Priority: Medium     Overview:   LW Modifier:  EtOH,crack cocaine,marij- xwkujug32/7/09  ; Polysubstance Dependence        Past Medical History:   Diagnosis Date     Cellulitis 2012    MRSA septic olecranon bursitis and facial cellulitis     Drug abuse 2011    methamphetamine and alcohol, sober since treatment 2011     MRSA cellulitis 2012    cleared with negative nasal swabs 8/16/17 and 8/28/17, reviewed     Rh negative, antepartum      Past Surgical History:   Procedure Laterality Date     INCISION AND DRAINAGE  04/18/2012    chin abscess I&D     LAPAROSCOPIC SALPINGECTOMY Bilateral 8/27/2018    Procedure: LAPAROSCOPIC  "SALPINGECTOMY;  Laparoscopic Bilateral Salpingectomy;  Surgeon: Chai Cui MD;  Location: [facility] OR     MAMMOPLASTY REDUCTION Bilateral 08/04/2017     Current Outpatient Prescriptions   Medication Sig Dispense Refill     IBUPROFEN PO Take 600 mg by mouth as needed for moderate pain       Prenatal Vit-Fe Fumarate-FA (PRENATAL MULTIVITAMIN PLUS IRON) 27-0.8 MG TABS per tablet Take 1 tablet by mouth daily       OTC products: NSAIDS - none for 2 days    Allergies   Allergen Reactions     Codeine      Other reaction(s): Nausea     Sulfa Drugs Unknown     Zantac [Ranitidine] Rash      Latex Allergy: NO    Social History   Substance Use Topics     Smoking status: Former Smoker     Smokeless tobacco: Former User     Quit date: 3/10/2016     Alcohol use No      Comment: history alcohol abuse 2012, s/p treatment     History   Drug Use No     Comment: former drug user (EtOH, cocaine, marijuana, meth), last use 2012       REVIEW OF SYSTEMS:   CONSTITUTIONAL: NEGATIVE for fever, chills, change in weight  INTEGUMENTARY/SKIN: NEGATIVE for worrisome rashes, moles or lesions  EYES: NEGATIVE for vision changes or irritation  ENT/MOUTH: NEGATIVE for ear, mouth and throat problems  RESP: NEGATIVE for significant cough or SOB  CV: NEGATIVE for chest pain, palpitations or peripheral edema  GI: NEGATIVE for nausea, abdominal pain, heartburn, or change in bowel habits  : NEGATIVE for frequency, dysuria, or hematuria  MUSCULOSKELETAL: NEGATIVE for significant arthralgias or myalgia  NEURO: NEGATIVE for weakness, dizziness or paresthesias  ENDOCRINE: NEGATIVE for temperature intolerance, skin/hair changes  HEME: NEGATIVE for bleeding problems  PSYCHIATRIC: NEGATIVE for changes in mood or affect    EXAM:   /74  Pulse 72  Temp 97  F (36.1  C) (Temporal)  Resp 16  Ht 5' 6\" (1.676 m)  Wt 229 lb 4.8 oz (104 kg)  BMI 37.01 kg/m2    GENERAL APPEARANCE: healthy, alert and no distress     EYES: EOMI, PERRL     HENT: ear canals and " TM's normal and nose and mouth without ulcers or lesions     NECK: no adenopathy, no asymmetry, masses, or scars and thyroid normal to palpation     RESP: lungs clear to auscultation - no rales, rhonchi or wheezes     CV: regular rates and rhythm, normal S1 S2, no S3 or S4 and no murmur, click or rub     ABDOMEN: healing surgical scars near umbilicus and left lower quadrant, soft, nontender, no HSM or masses and bowel sounds normal     MS: extremities normal- no gross deformities noted, no evidence of inflammation in joints, FROM in all extremities.     SKIN: no suspicious lesions or rashes     NEURO: Normal strength and tone, sensory exam grossly normal, mentation intact and speech normal     PSYCH: mentation appears normal. and affect normal/bright     LYMPHATICS: No cervical adenopathy    DIAGNOSTICS:   EKG: Not indicated due to non-vascular surgery and low risk of event (age <65 and without cardiac risk factors)    Recent Labs   Lab Test  06/26/18   1105  06/26/18   1104   HGB   --   13.1   PLT   --   183   NA  137   --    POTASSIUM  4.4   --    CR  0.45*   --         IMPRESSION:   Reason for surgery/procedure: breast reduction revision of prior surgery  Diagnosis/reason for consult: preop    The proposed surgical procedure is considered INTERMEDIATE risk.    REVISED CARDIAC RISK INDEX  The patient has the following serious cardiovascular risks for perioperative complications such as (MI, PE, VFib and 3  AV Block):  No serious cardiac risks  INTERPRETATION: 0 risks: Class I (very low risk - 0.4% complication rate)    The patient has the following additional risks for perioperative complications:  No identified additional risks      ICD-10-CM    1. Preop general physical exam Z01.818    2. Scar of breast revision L90.5    3. H/O bilateral breast reduction surgery Z98.890        RECOMMENDATIONS:     --Consult hospital rounder / IM to assist post-op medical management    --Patient is on no chronic  medications    APPROVAL GIVEN to proceed with proposed procedure, without further diagnostic evaluation       Signed Electronically by: MADELINE Hernandez CNP    Copy of this evaluation report is provided to requesting physician.    [facility] Preop Guidelines    Revised Cardiac Risk Index

## 2018-09-04 ENCOUNTER — OFFICE VISIT (OUTPATIENT)
Dept: FAMILY MEDICINE | Facility: OTHER | Age: 33
End: 2018-09-04
Payer: COMMERCIAL

## 2018-09-04 ENCOUNTER — TELEPHONE (OUTPATIENT)
Dept: FAMILY MEDICINE | Facility: OTHER | Age: 33
End: 2018-09-04

## 2018-09-04 VITALS
BODY MASS INDEX: 36.85 KG/M2 | RESPIRATION RATE: 16 BRPM | HEIGHT: 66 IN | SYSTOLIC BLOOD PRESSURE: 116 MMHG | TEMPERATURE: 97 F | DIASTOLIC BLOOD PRESSURE: 74 MMHG | HEART RATE: 72 BPM | WEIGHT: 229.3 LBS

## 2018-09-04 DIAGNOSIS — Z98.890 H/O BILATERAL BREAST REDUCTION SURGERY: ICD-10-CM

## 2018-09-04 DIAGNOSIS — L90.5 SCAR OF BREAST: ICD-10-CM

## 2018-09-04 DIAGNOSIS — Z01.818 PREOP GENERAL PHYSICAL EXAM: Primary | ICD-10-CM

## 2018-09-04 PROCEDURE — 99214 OFFICE O/P EST MOD 30 MIN: CPT | Performed by: STUDENT IN AN ORGANIZED HEALTH CARE EDUCATION/TRAINING PROGRAM

## 2018-09-04 NOTE — MR AVS SNAPSHOT
After Visit Summary   9/4/2018    Breanna Vidal    MRN: [id number]           Patient Information     Date Of Birth          1985        Visit Information        Provider Department      9/4/2018 12:00 PM Karen Campos APRN CNP [facility]        Today's Diagnoses     Preop general physical exam    -  1    Scar of breast revision        H/O bilateral breast reduction surgery          Care Instructions      Before Your Surgery      Call your surgeon if there is any change in your health. This includes signs of a cold or flu (such as a sore throat, runny nose, cough, rash or fever).    Do not smoke, drink alcohol or take over the counter medicine (unless your surgeon or primary care doctor tells you to) for the 24 hours before and after surgery.    If you take prescribed drugs: Follow your doctor s orders about which medicines to take and which to stop until after surgery.    Eating and drinking prior to surgery: follow the instructions from your surgeon    Take a shower or bath the night before surgery. Use the soap your surgeon gave you to gently clean your skin. If you do not have soap from your surgeon, use your regular soap. Do not shave or scrub the surgery site.  Wear clean pajamas and have clean sheets on your bed.           Follow-ups after your visit        Who to contact     If you have questions or need follow up information about today's clinic visit or your schedule please contact [facility] directly at [phone].  Normal or non-critical lab and imaging results will be communicated to you by MyChart, letter or phone within 4 business days after the clinic has received the results. If you do not hear from us within 7 days, please contact the clinic through MyChart or phone. If you have a critical or abnormal lab result, we will notify you by phone as soon as possible.  Submit refill requests through [employer] or call your pharmacy and they will  "forward the refill request to us. Please allow 3 business days for your refill to be completed.          Additional Information About Your Visit        Im[employer]ihart Information     [employer] gives you secure access to your electronic health record. If you see a primary care provider, you can also send messages to your care team and make appointments. If you have questions, please call your primary care clinic.  If you do not have a primary care provider, please call [phone] and they will assist you.        Care EveryWhere ID     This is your Care EveryWhere ID. This could be used by other organizations to access your [facility] medical records  [id number]        Your Vitals Were     Pulse Temperature Respirations Height BMI (Body Mass Index)       72 97  F (36.1  C) (Temporal) 16 5' 6\" (1.676 m) 37.01 kg/m2        Blood Pressure from Last 3 Encounters:   09/04/18 116/74   08/27/18 111/82   08/20/18 124/78    Weight from Last 3 Encounters:   09/04/18 229 lb 4.8 oz (104 kg)   08/20/18 233 lb (105.7 kg)   08/09/18 233 lb (105.7 kg)              Today, you had the following     No orders found for display       Primary Care Provider Office Phone # Fax #    Karen MADELINE Yousif [facility] [phone]       [address] [address] [facility] [address]        Equal Access to Services     TEOFILO BAINS : Hadii aad ku hadasho Soomaali, waaxda luqadaha, qaybta kaalmada adeegyada, genevieve matt . So [facility] [phone].    ATENCIÓN: Si habla español, tiene a haider disposición servicios gratuitos de asistencia lingüística. Juan Jose al [phone].    We comply with applicable federal civil rights laws and [place] laws. We do not discriminate on the basis of race, color, national origin, age, disability, sex, sexual orientation, or gender identity.            Thank you!     Thank you for choosing [facility]  for your care. Our goal is always to provide you with excellent care. Hearing " back from our patients is one way we can continue to improve our services. Please take a few minutes to complete the written survey that you may receive in the mail after your visit with us. Thank you!             Your Updated Medication List - Protect others around you: Learn how to safely use, store and throw away your medicines at www.disposemymeds.org.          This list is accurate as of 9/4/18 12:38 PM.  Always use your most recent med list.                   Brand Name Dispense Instructions for use Diagnosis    IBUPROFEN PO      Take 600 mg by mouth as needed for moderate pain        prenatal multivitamin plus iron 27-0.8 MG Tabs per tablet      Take 1 tablet by mouth daily

## 2018-09-12 ENCOUNTER — TELEPHONE (OUTPATIENT)
Dept: FAMILY MEDICINE | Facility: OTHER | Age: 33
End: 2018-09-12

## 2018-09-12 NOTE — TELEPHONE ENCOUNTER
Please abstract the following data from this visit with this patient into the appropriate field in Epic:    Pap smear done on this date: 03/24/2017 (approximately), by this group: [facility], results were in care everywhere.     Courtney Lemus      Summary:    Patient is due/failing the following:   PAP    Action needed:   Patient needs office visit for PAP.    Type of outreach:    none per care everywhere UTD    Questions for provider review:    None                                                                                                                                    Courtney Lemus       Chart routed to Care Team .        Panel Management Review      Patient has the following on her problem list: NoneNone      Composite cancer screening  Chart review shows that this patient is due/due soon for the following Pap Smear

## 2018-10-14 ENCOUNTER — HOSPITAL ENCOUNTER (EMERGENCY)
Facility: CLINIC | Age: 33
Discharge: HOME | End: 2018-10-15
Attending: NURSE PRACTITIONER | Admitting: NURSE PRACTITIONER
Payer: COMMERCIAL

## 2018-10-14 ENCOUNTER — APPOINTMENT (OUTPATIENT)
Dept: CT IMAGING | Facility: CLINIC | Age: 33
End: 2018-10-14
Attending: NURSE PRACTITIONER
Payer: COMMERCIAL

## 2018-10-14 DIAGNOSIS — N61.1 LEFT BREAST ABSCESS: ICD-10-CM

## 2018-10-14 DIAGNOSIS — Z98.890 POSTOPERATIVE STATE: ICD-10-CM

## 2018-10-14 LAB
ALBUMIN SERPL-MCNC: 3.5 G/DL (ref 3.4–5)
ALP SERPL-CCNC: 146 U/L (ref 40–150)
ALT SERPL W P-5'-P-CCNC: 64 U/L (ref 0–50)
ANION GAP SERPL CALCULATED.3IONS-SCNC: 8 MMOL/L (ref 3–14)
AST SERPL W P-5'-P-CCNC: 29 U/L (ref 0–45)
BASOPHILS # BLD AUTO: 0 10E9/L (ref 0–0.2)
BASOPHILS NFR BLD AUTO: 0.3 %
BILIRUB SERPL-MCNC: 0.4 MG/DL (ref 0.2–1.3)
BUN SERPL-MCNC: 9 MG/DL (ref 7–30)
CALCIUM SERPL-MCNC: 8.7 MG/DL (ref 8.5–10.1)
CHLORIDE SERPL-SCNC: 107 MMOL/L (ref 94–109)
CO2 SERPL-SCNC: 25 MMOL/L (ref 20–32)
CREAT SERPL-MCNC: 0.5 MG/DL (ref 0.52–1.04)
CRP SERPL-MCNC: 57.5 MG/L (ref 0–8)
DIFFERENTIAL METHOD BLD: ABNORMAL
EOSINOPHIL NFR BLD AUTO: 0.9 %
ERYTHROCYTE [DISTWIDTH] IN BLOOD BY AUTOMATED COUNT: 13.2 % (ref 10–15)
GFR SERPL CREATININE-BSD FRML MDRD: >90 ML/MIN/1.7M2
GLUCOSE SERPL-MCNC: 116 MG/DL (ref 70–99)
HCT VFR BLD AUTO: 34.4 % (ref 35–47)
HGB BLD-MCNC: 11.6 G/DL (ref 11.7–15.7)
IMM GRANULOCYTES # BLD: 0 10E9/L (ref 0–0.4)
IMM GRANULOCYTES NFR BLD: 0.4 %
LACTATE BLD-SCNC: 1.2 MMOL/L (ref 0.7–2)
LYMPHOCYTES # BLD AUTO: 2 10E9/L (ref 0.8–5.3)
LYMPHOCYTES NFR BLD AUTO: 21 %
MCH RBC QN AUTO: 29.4 PG (ref 26.5–33)
MCHC RBC AUTO-ENTMCNC: 33.7 G/DL (ref 31.5–36.5)
MCV RBC AUTO: 87 FL (ref 78–100)
MONOCYTES # BLD AUTO: 0.4 10E9/L (ref 0–1.3)
MONOCYTES NFR BLD AUTO: 4.4 %
NEUTROPHILS # BLD AUTO: 6.8 10E9/L (ref 1.6–8.3)
NEUTROPHILS NFR BLD AUTO: 73 %
NRBC # BLD AUTO: 0 10*3/UL
NRBC BLD AUTO-RTO: 0 /100
PLATELET # BLD AUTO: 339 10E9/L (ref 150–450)
POTASSIUM SERPL-SCNC: 3.7 MMOL/L (ref 3.4–5.3)
PROT SERPL-MCNC: 7.6 G/DL (ref 6.8–8.8)
RBC # BLD AUTO: 3.95 10E12/L (ref 3.8–5.2)
SODIUM SERPL-SCNC: 140 MMOL/L (ref 133–144)
WBC # BLD AUTO: 9.4 10E9/L (ref 4–11)

## 2018-10-14 PROCEDURE — 76942 ECHO GUIDE FOR BIOPSY: CPT | Mod: XU | Performed by: NURSE PRACTITIONER

## 2018-10-14 PROCEDURE — 86140 C-REACTIVE PROTEIN: CPT | Performed by: NURSE PRACTITIONER

## 2018-10-14 PROCEDURE — 83605 ASSAY OF LACTIC ACID: CPT | Performed by: NURSE PRACTITIONER

## 2018-10-14 PROCEDURE — 87040 BLOOD CULTURE FOR BACTERIA: CPT | Mod: 91 | Performed by: NURSE PRACTITIONER

## 2018-10-14 PROCEDURE — 25000128 H RX IP 250 OP 636: Performed by: NURSE PRACTITIONER

## 2018-10-14 PROCEDURE — 99285 EMERGENCY DEPT VISIT HI MDM: CPT | Mod: 25 | Performed by: NURSE PRACTITIONER

## 2018-10-14 PROCEDURE — 96376 TX/PRO/DX INJ SAME DRUG ADON: CPT | Mod: 91 | Performed by: NURSE PRACTITIONER

## 2018-10-14 PROCEDURE — 96361 HYDRATE IV INFUSION ADD-ON: CPT | Mod: 91 | Performed by: NURSE PRACTITIONER

## 2018-10-14 PROCEDURE — 10160 PNXR ASPIR ABSC HMTMA BULLA: CPT | Performed by: NURSE PRACTITIONER

## 2018-10-14 PROCEDURE — 85025 COMPLETE CBC W/AUTO DIFF WBC: CPT | Performed by: NURSE PRACTITIONER

## 2018-10-14 PROCEDURE — 36415 COLL VENOUS BLD VENIPUNCTURE: CPT | Performed by: NURSE PRACTITIONER

## 2018-10-14 PROCEDURE — 80053 COMPREHEN METABOLIC PANEL: CPT | Performed by: NURSE PRACTITIONER

## 2018-10-14 PROCEDURE — 25000125 ZZHC RX 250: Performed by: NURSE PRACTITIONER

## 2018-10-14 PROCEDURE — 71260 CT THORAX DX C+: CPT

## 2018-10-14 PROCEDURE — 96375 TX/PRO/DX INJ NEW DRUG ADDON: CPT | Mod: 59 | Performed by: NURSE PRACTITIONER

## 2018-10-14 PROCEDURE — 10160 PNXR ASPIR ABSC HMTMA BULLA: CPT | Mod: Z6 | Performed by: NURSE PRACTITIONER

## 2018-10-14 RX ORDER — HYDROMORPHONE HYDROCHLORIDE 1 MG/ML
0.5 INJECTION, SOLUTION INTRAMUSCULAR; INTRAVENOUS; SUBCUTANEOUS
Status: COMPLETED | OUTPATIENT
Start: 2018-10-14 | End: 2018-10-15

## 2018-10-14 RX ORDER — IOPAMIDOL 755 MG/ML
500 INJECTION, SOLUTION INTRAVASCULAR ONCE
Status: COMPLETED | OUTPATIENT
Start: 2018-10-14 | End: 2018-10-14

## 2018-10-14 RX ORDER — LORAZEPAM 2 MG/ML
1 INJECTION INTRAMUSCULAR ONCE
Status: COMPLETED | OUTPATIENT
Start: 2018-10-14 | End: 2018-10-14

## 2018-10-14 RX ORDER — ONDANSETRON 2 MG/ML
4 INJECTION INTRAMUSCULAR; INTRAVENOUS EVERY 30 MIN PRN
Status: DISCONTINUED | OUTPATIENT
Start: 2018-10-14 | End: 2018-10-15 | Stop reason: HOSPADM

## 2018-10-14 RX ADMIN — ONDANSETRON HYDROCHLORIDE 4 MG: 2 SOLUTION INTRAMUSCULAR; INTRAVENOUS at 21:07

## 2018-10-14 RX ADMIN — Medication 0.5 MG: at 21:09

## 2018-10-14 RX ADMIN — SODIUM CHLORIDE 61 ML: 9 INJECTION, SOLUTION INTRAVENOUS at 22:12

## 2018-10-14 RX ADMIN — LORAZEPAM 1 MG: 2 INJECTION INTRAMUSCULAR; INTRAVENOUS at 23:35

## 2018-10-14 RX ADMIN — IOPAMIDOL 80 ML: 755 INJECTION, SOLUTION INTRAVENOUS at 22:12

## 2018-10-14 RX ADMIN — SODIUM CHLORIDE 1000 ML: 9 INJECTION, SOLUTION INTRAVENOUS at 21:01

## 2018-10-14 RX ADMIN — Medication 0.5 MG: at 22:33

## 2018-10-14 NOTE — ED AVS SNAPSHOT
[facility] Emergency Department    [address] [place] [address]    Phone:  [phone]    Fax:  [phone]                                       Braenna Vidal   MRN: [id number]    Department:  [facility] Emergency Department   Date of Visit:  10/14/2018           After Visit Summary Signature Page     I have received my discharge instructions, and my questions have been answered. I have discussed any challenges I see with this plan with the nurse or doctor.    ..........................................................................................................................................  Patient/Patient Representative Signature      ..........................................................................................................................................  Patient Representative Print Name and Relationship to Patient    ..................................................               ................................................  Date                                   Time    ..........................................................................................................................................  Reviewed by Signature/Title    ...................................................              ..............................................  Date                                               Time          22EPIC Rev 08/18

## 2018-10-14 NOTE — ED AVS SNAPSHOT
[facility] Emergency Department    [address] [place] [address]    Phone:  [phone]    Fax:  [phone]                                       Breanna Vidal   MRN: [id number]    Department:  [facility] Emergency Department   Date of Visit:  10/14/2018           Patient Information     Date Of Birth          1985        Your diagnoses for this visit were:     Postoperative state     Left breast abscess More consistent with seroma       You were seen by Kristi Garcia, MADELINE CNP.      Follow-up Information     Follow up with Nichole Yen MD.    Specialty:  Plastic Surgery    Contact information:     PLASTIC SURGERY  [address] [place] [address] [address]  [place] [id number] [phone]          Discharge Instructions       Call Dr[facility] at 8 AM in the morning.     Continue with Clindamycin as prescribed unless Dr. Yen recommend's otherwise.    It was very nice meeting you.  I wish you the best of luck with your recovery.    Hang in there!!    24 Hour Appointment Hotline       To make an appointment at any [facility] clinic, call [phone] ([phone]). If you don't have a family doctor or clinic, we will help you find one. [facility] clinics are conveniently located to serve the needs of you and your family.             Review of your medicines      Our records show that you are taking the medicines listed below. If these are incorrect, please call your family doctor or clinic.        Dose / Directions Last dose taken    CLINDAMYCIN HCL PO   Dose:  300 mg        Take 300 mg by mouth 3 times daily   Refills:  0        IBUPROFEN PO   Dose:  600 mg   Indication:  Mild to Moderate Pain        Take 600 mg by mouth as needed for moderate pain   Refills:  0        prenatal multivitamin plus iron 27-0.8 MG Tabs per tablet   Dose:  1 tablet   Indication:  Pregnancy        Take 1 tablet by mouth daily   Refills:  0                Procedures and tests performed during your visit      Procedure/Test Number of Times Performed    Blood culture 2    CBC with platelets differential 1    CRP inflammation 1    CT CHEST W CONTRAST 1    Comprehensive metabolic panel 1    Gram stain 1    Incision and drainage 1    Lactic acid whole blood 1    Peripheral IV catheter 1    Wound Culture Aerobic Bacterial 1      Orders Needing Specimen Collection     None      Pending Results     Date and Time Order Name Status Description    10/14/2018 2330 Wound Culture Aerobic Bacterial In process     10/14/2018 2103 Blood culture In process     10/14/2018 2050 Blood culture In process             Pending Culture Results     Date and Time Order Name Status Description    10/14/2018 2330 Wound Culture Aerobic Bacterial In process     10/14/2018 2103 Blood culture In process     10/14/2018 2050 Blood culture In process             Pending Results Instructions     If you had any lab results that were not finalized at the time of your Discharge, you can call the ED Lab Result RN at [phone]. You will be contacted by this team for any positive Lab results or changes in treatment. The nurses are available 7 days a week from 10A to 6:30P.  You can leave a message 24 hours per day and they will return your call.        Thank you for choosing [facility]       Thank you for choosing [facility] for your care. Our goal is always to provide you with excellent care. Hearing back from our patients is one way we can continue to improve our services. Please take a few minutes to complete the written survey that you may receive in the mail after you visit with us. Thank you!        [employer] Information     [employer] gives you secure access to your electronic health record. If you see a primary care provider, you can also send messages to your care team and make appointments. If you have questions, please call your primary care clinic.  If you do not have a primary care provider, please call [phone] and they will assist you.        Care  EveryWhere ID     This is your Care EveryWhere ID. This could be used by other organizations to access your [facility] medical records  [id number]        Equal Access to Services     TEOFILO BAINS : Ester Jay, geraldo mae, saeed chavarria, genevieve levine. So [facility] [phone].    ATENCIÓN: Si habla español, tiene a haider disposición servicios gratuitos de asistencia lingüística. Llame al [phone].    We comply with applicable federal civil rights laws and [place] laws. We do not discriminate on the basis of race, color, national origin, age, disability, sex, sexual orientation, or gender identity.            After Visit Summary       This is your record. Keep this with you and show to your community pharmacist(s) and doctor(s) at your next visit.

## 2018-10-15 VITALS
RESPIRATION RATE: 18 BRPM | OXYGEN SATURATION: 96 % | DIASTOLIC BLOOD PRESSURE: 84 MMHG | WEIGHT: 240 LBS | BODY MASS INDEX: 38.74 KG/M2 | SYSTOLIC BLOOD PRESSURE: 121 MMHG | TEMPERATURE: 97.7 F

## 2018-10-15 LAB
GRAM STN SPEC: NORMAL
SPECIMEN SOURCE: NORMAL

## 2018-10-15 PROCEDURE — 87077 CULTURE AEROBIC IDENTIFY: CPT | Performed by: NURSE PRACTITIONER

## 2018-10-15 PROCEDURE — 25000132 ZZH RX MED GY IP 250 OP 250 PS 637: Performed by: FAMILY MEDICINE

## 2018-10-15 PROCEDURE — 87186 SC STD MICRODIL/AGAR DIL: CPT | Performed by: NURSE PRACTITIONER

## 2018-10-15 PROCEDURE — 25000128 H RX IP 250 OP 636: Performed by: NURSE PRACTITIONER

## 2018-10-15 PROCEDURE — 25000125 ZZHC RX 250: Performed by: NURSE PRACTITIONER

## 2018-10-15 PROCEDURE — 87205 SMEAR GRAM STAIN: CPT | Performed by: NURSE PRACTITIONER

## 2018-10-15 PROCEDURE — 87070 CULTURE OTHR SPECIMN AEROBIC: CPT | Performed by: NURSE PRACTITIONER

## 2018-10-15 PROCEDURE — 25000132 ZZH RX MED GY IP 250 OP 250 PS 637: Performed by: NURSE PRACTITIONER

## 2018-10-15 PROCEDURE — 96376 TX/PRO/DX INJ SAME DRUG ADON: CPT | Performed by: NURSE PRACTITIONER

## 2018-10-15 PROCEDURE — 96365 THER/PROPH/DIAG IV INF INIT: CPT | Performed by: NURSE PRACTITIONER

## 2018-10-15 RX ORDER — CLINDAMYCIN PHOSPHATE 150 MG/ML
600 INJECTION, SOLUTION INTRAVENOUS ONCE
Status: DISCONTINUED | OUTPATIENT
Start: 2018-10-15 | End: 2018-10-15

## 2018-10-15 RX ORDER — HYDROMORPHONE HYDROCHLORIDE 1 MG/ML
0.5 INJECTION, SOLUTION INTRAMUSCULAR; INTRAVENOUS; SUBCUTANEOUS
Status: COMPLETED | OUTPATIENT
Start: 2018-10-15 | End: 2018-10-15

## 2018-10-15 RX ORDER — CLINDAMYCIN PHOSPHATE 600 MG/50ML
600 INJECTION, SOLUTION INTRAVENOUS ONCE
Status: COMPLETED | OUTPATIENT
Start: 2018-10-15 | End: 2018-10-15

## 2018-10-15 RX ORDER — OXYCODONE HYDROCHLORIDE 5 MG/1
10 TABLET ORAL ONCE
Status: COMPLETED | OUTPATIENT
Start: 2018-10-15 | End: 2018-10-15

## 2018-10-15 RX ORDER — SODIUM CHLORIDE 9 MG/ML
INJECTION, SOLUTION INTRAVENOUS CONTINUOUS
Status: DISCONTINUED | OUTPATIENT
Start: 2018-10-15 | End: 2018-10-15 | Stop reason: HOSPADM

## 2018-10-15 RX ORDER — ACETAMINOPHEN 325 MG/1
975 TABLET ORAL ONCE
Status: COMPLETED | OUTPATIENT
Start: 2018-10-15 | End: 2018-10-15

## 2018-10-15 RX ADMIN — ACETAMINOPHEN 975 MG: 325 TABLET ORAL at 00:36

## 2018-10-15 RX ADMIN — OXYCODONE HYDROCHLORIDE 10 MG: 5 TABLET ORAL at 05:37

## 2018-10-15 RX ADMIN — HYDROMORPHONE HYDROCHLORIDE 0.5 MG: 1 INJECTION, SOLUTION INTRAMUSCULAR; INTRAVENOUS; SUBCUTANEOUS at 02:48

## 2018-10-15 RX ADMIN — Medication 0.5 MG: at 00:18

## 2018-10-15 RX ADMIN — ONDANSETRON HYDROCHLORIDE 4 MG: 2 SOLUTION INTRAMUSCULAR; INTRAVENOUS at 01:20

## 2018-10-15 RX ADMIN — CLINDAMYCIN PHOSPHATE 600 MG: 12 INJECTION, SOLUTION INTRAVENOUS at 00:37

## 2018-10-15 NOTE — ED NOTES
Pt called out requesting additional pain medication.  Administered last dose of ordered pain medication.  Pt stated that her  will not be here until [id number].  Provider had spoken with pt and arranged for pt to stay until the morning.  Pt remains on pulse oxymetry.  No additional questions or concerns at this time.

## 2018-10-15 NOTE — ED PROVIDER NOTES
History     Chief Complaint   Patient presents with     Wound Check     The history is provided by the patient.     Breanna Vidal is a 33 year old female who presents to the emergency department with complaints of left breast pain. The patient recently had a mammoplasty with revision done at [facility] on 10/3/18. She developed an infection in her left breast a week ago and called her surgeon who then put her on Clindamycin. She has been on that for 3 days, but feels like the infection is getting worse. The patient reports that her left breast is 3x the size of her right. It is hard and hot to touch. There was some drainage out of her right breast, but that has subsided. She generally does not feel well. She does not have a fever in the ED, but felt like she did at home. The patient was originally taking Codeine after her surgery, but it made her very nauseous so she stopped taking it. She tried taking 600 mg of Ibuprofen at home today. She denies any shortness of breath. She has had a bit of a cough today, but thinks it's because of the dry whether. She does not have any change in bowel or bladder. She denies any blood in her stool. The patient notes that last year she had fluid build up in her right breast and needed to have it drained. She is worried that this is going to happen again. The patient has a few small incisions on her abdomen from lipo. She also had her tubes removed.      PROCEDURES PERFORMED:  1. Right breast crescent lift with intermediate-layered closure measuring 5 cm.  2. Bilateral breast wedge excision with advancement of internal breast tissue with complex layered-closure measuring 17 cm on the right and 18 cm on the left.  3. Fat grafting from the abdomen, flanks and axilla to the bilateral breast for severe contour abnormalities after breast reduction complicated by wound healing issues and seroma.    INDICATIONS: The patient is a 33-year-old female who had a bilateral reduction mammoplasty  performed in [place], [place]. She was severely unhappy since the time of that surgery because her breast has had a very awkward shape and she had a lot of wound healing problems requiring hospitalization and seroma, and infections. She has since had enough adequate time to heal and she continues to have no upper pole breast tissue at all, very high uneven nipples, large amount of excess skin hanging inferiorly as well. This is not an acceptable aesthetic result nor functionally can she wear a bra due to this. She does almost appear to have had a partial mastectomy. I went over options for her. I recommended scar revisions including removal of a lot of the excess skin in her inframammary fold as a Mccoy pattern incision had not previously been done. This was done as a vertical reduction. Her right nipple is also a lot lower than the left side by approximately 1.25 cm so we would just lift a few, doing crescent excision of skin. Her upper poles are extremely deficient and there are large empty spaces likely due to the previous seromas in the inferior poles of her breast. Therefore, I recommended fat grafting to target these areas to fill them up. She did not want to be larger per se so she was not a good candidate for a breast implant. She is aware of the risks and benefits of the surgery including bleeding, infection, asymmetry, the need for further procedures, possible need for a Adrian-Luna drain and wound healing issues, and she agrees to proceed.    DESCRIPTION OF OPERATION: She was marked and consented in preop. IV antibiotics were given prior to incision and SCDs were used for DVT prophylaxis.  She was brought to the operating room and placed supine on the table. After the induction of general anesthesia, her chest and abdomen were prepped and draped sterilely and a timeout was done. A total of 1 L of a standard tumescent solution was infused into her armpits and to her abdomen and flanks and permitted to  act well. The breast wedge excision and the right crescent lift were done. With the crescent lift, a crescent had been pre-marked and this was incised with a #15 blade and skin layer only was removed. This was closed with deep dermal 3-0 Monocryl and 4-0 running subcuticular Monocryl to raise it to the same level as the left nipple.    Next, bilateral breast wedge excisions were performed using a #10 blade to cut through the skin. Bovie cautery was used to remove a full-thickness layer of skin and subcutaneous tissue and it was noted that there were large but empty seroma capsules in the inferior poles of her breast because there was basically no tissue here after her reduction. The seroma capsules were scored extensively to promote tissue growth and a #15 round drain was placed into each side, and sutured to the skin with a 2-0 silk suture. There was the hope that this would obliterate the dead space and prevent seroma reformation. The skin was not sent for Pathology.    Next, the horizontal portions of the incisions were closed with a deep and superficial layer of a running 2-0 PDS STRATAFIX being careful to advance the soft tissue layer from the superior flap and may meet this up with the inferior soft tissue layer to obliterate as much dead space as possible and prevent seroma reformation. Skin glue and DERMABOND PRINEO were placed on top of this.    A REVOLVE device was used along with 4-mm cannula to take a total of 600 cc of lipoaspirate. The REVOLVE device fat was then rinsed with 1 L of warm Lactated Ringer's and loaded into 10-mL syringes yielding a total of 340 mL of fat. A 170-mL of fat was injected into each breast via small stab incision in the medial breast. This was targeted to the upper poles and the inferior portion under the vertical portion of the incision which was highly deficient in tissue. The holes were closed with an interrupted buried 4-0 Monocryl suture. Tegaderm was placed on top of  this. Outer dressings consisted of a loose ACE wrap over a trauma pad as well as an abdominal binder. She tolerated the procedure well. She was awakened from general anesthesia and brought to PACU in stable condition.       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, abuse (H) 10/14/2009     Priority: Medium     Overview:   LW Modifier:  EtOH,crack cocaine,marij- [id number]/7/09  ; Polysubstance Dependence          Past Medical History:    Past Medical History:   Diagnosis Date     Cellulitis 2012     Drug abuse (H) 2011     MRSA cellulitis 2012     Rh negative, antepartum        Past Surgical History:    Past Surgical History:   Procedure Laterality Date     INCISION AND DRAINAGE  04/18/2012    chin abscess I&D     LAPAROSCOPIC SALPINGECTOMY Bilateral 8/27/2018    Procedure: LAPAROSCOPIC SALPINGECTOMY;  Laparoscopic Bilateral Salpingectomy;  Surgeon: Chai Cui MD;  Location: [facility] OR     MAMMOPLASTY REDUCTION Bilateral 08/04/2017       Family History:    No family history on file.    Social History:  Marital Status:  Single [1]  Social History   Substance Use Topics     Smoking status: Former Smoker     Smokeless tobacco: Former User     Quit date: 3/10/2016     Alcohol use No      Comment: history alcohol abuse 2012, s/p treatment        Medications:      CLINDAMYCIN HCL PO   IBUPROFEN PO   Prenatal Vit-Fe Fumarate-FA (PRENATAL MULTIVITAMIN PLUS IRON) 27-0.8 MG TABS per tablet         Review of Systems   All other systems reviewed and are negative.      Physical Exam   BP: (!) 144/105  Heart Rate: 103  Temp:  97.7  F (36.5  C)  Resp: 20  Weight: 108.9 kg (240 lb)  SpO2: 100 %      Physical Exam   Constitutional: She is oriented to person, place, and time. She appears well-developed and well-nourished.   Appears ill. She is flushed and hot.   HENT:   Head: Normocephalic and atraumatic.   Right Ear: External ear normal.   Left Ear: External ear normal.   Nose: Nose normal.   Eyes: No scleral icterus.   Neck: Normal range of motion. Neck supple.   Cardiovascular: Normal rate, normal heart sounds and intact distal pulses.  Exam reveals no gallop and no friction rub.    No murmur heard.  Pulmonary/Chest: Effort normal and breath sounds normal. No respiratory distress. She has no wheezes. She has no rales. She exhibits no tenderness.   Abdominal: Soft. She exhibits no distension and no mass. There is no tenderness. There is no rebound and no guarding.   Musculoskeletal:   Central erythematous and warmth to right breast. Right breast is 3x the size of the left. She has a 6 cm area of induration. Left axillary lymphadenopathy. No drainage noted.    Neurological: She is alert and oriented to person, place, and time. She has normal reflexes.   Skin:   She has significant bruising to abdomen. The incisions on her abdomen are healing.    Psychiatric: She has a normal mood and affect. Her behavior is normal. Judgment and thought content normal.   Nursing note and vitals reviewed.      ED Course     ED Course     Incision + drainage  Date/Time: 10/15/2018 12:11 AM  Performed by: KWAKU BENZ  Authorized by: KWAKU BENZ     Consent:     Consent obtained:  Verbal    Consent given by:  Patient    Risks discussed:  Bleeding, incomplete drainage and infection    Alternatives discussed:  Delayed treatment  Location:     Type:  Fluid collection    Location: left breast.  Pre-procedure details:     Skin preparation:  Betadine  Anesthesia (see MAR for exact dosages):     Anesthesia method:  Local infiltration    Local  anesthetic:  Lidocaine 1% w/o epi  Procedure type:     Complexity:  Simple  Procedure details:     Needle aspiration: yes      Needle size:  18 G    Drainage:  Serous    Drainage amount:  Moderate  Post-procedure details:     Patient tolerance of procedure:  Tolerated well, no immediate complications  Comments:      Needle aspiration done with US guidance, 45 ml's of serous fluid pulled from left breast, sample sent for culture and gram stain.  Patient tolerated very well.           Results for orders placed or performed during the hospital encounter of 10/14/18 (from the past 24 hour(s))   CBC with platelets differential   Result Value Ref Range    WBC 9.4 4.0 - 11.0 10e9/L    RBC Count 3.95 3.8 - 5.2 10e12/L    Hemoglobin 11.6 (L) 11.7 - 15.7 g/dL    Hematocrit 34.4 (L) 35.0 - 47.0 %    MCV 87 78 - 100 fl    MCH 29.4 26.5 - 33.0 pg    MCHC 33.7 31.5 - 36.5 g/dL    RDW 13.2 10.0 - 15.0 %    Platelet Count 339 150 - 450 10e9/L    Diff Method Automated Method     % Neutrophils 73.0 %    % Lymphocytes 21.0 %    % Monocytes 4.4 %    % Eosinophils 0.9 %    % Basophils 0.3 %    % Immature Granulocytes 0.4 %    Nucleated RBCs 0 0 /100    Absolute Neutrophil 6.8 1.6 - 8.3 10e9/L    Absolute Lymphocytes 2.0 0.8 - 5.3 10e9/L    Absolute Monocytes 0.4 0.0 - 1.3 10e9/L    Absolute Basophils 0.0 0.0 - 0.2 10e9/L    Abs Immature Granulocytes 0.0 0 - 0.4 10e9/L    Absolute Nucleated RBC 0.0    Comprehensive metabolic panel   Result Value Ref Range    Sodium 140 133 - 144 mmol/L    Potassium 3.7 3.4 - 5.3 mmol/L    Chloride 107 94 - 109 mmol/L    Carbon Dioxide 25 20 - 32 mmol/L    Anion Gap 8 3 - 14 mmol/L    Glucose 116 (H) 70 - 99 mg/dL    Urea Nitrogen 9 7 - 30 mg/dL    Creatinine 0.50 (L) 0.52 - 1.04 mg/dL    GFR Estimate >90 >60 mL/min/1.7m2    GFR Estimate If Black >90 >60 mL/min/1.7m2    Calcium 8.7 8.5 - 10.1 mg/dL    Bilirubin Total 0.4 0.2 - 1.3 mg/dL    Albumin 3.5 3.4 - 5.0 g/dL    Protein Total 7.6 6.8 - 8.8 g/dL     Alkaline Phosphatase 146 40 - 150 U/L    ALT 64 (H) 0 - 50 U/L    AST 29 0 - 45 U/L   Lactic acid whole blood   Result Value Ref Range    Lactic Acid 1.2 0.7 - 2.0 mmol/L   CRP inflammation   Result Value Ref Range    CRP Inflammation 57.5 (H) 0.0 - 8.0 mg/L   CT CHEST W CONTRAST    Narrative    CT CHEST WITH CONTRAST  10/14/2018 10:23 PM    HISTORY:  Recent breast revision surgery with left breast pain,  swelling, redness and heat, question abscess.      TECHNIQUE: Scans obtained from the apices through the diaphragm with  IV contrast. Isovue 370 80mL injected. Radiation dose for this scan  was reduced using automated exposure control, adjustment of the mA  and/or kV according to patient size, or iterative reconstruction  technique.    COMPARISON:  None.    FINDINGS:  Vascular: No acute thoracic aortic abnormality. No central pulmonary  embolism. This exam does not assess for middle and small branch vessel  pulmonary emboli.     Lungs: No acute airspace disease. Likely small focal atelectasis at  the lingula.    Lymph nodes: No enlarged mediastinal lymph node. Minimally prominent  left axillary lymph node that is 1.4 cm series 2 image 12.    Additional findings: There is a large fluid collection containing  internal gas and some peripheral enhancement at the deep left breast  abutting the chest wall. It is 10.6 x 3.7 x 6.8 cm series 2 image 31.  Upper abdomen images are negative for any acute abnormality.      Impression    IMPRESSION: Prominent fluid collection within the deep left breast  abutting the chest wall and containing bubbles of internal gas  worrisome for abscess. Maximal size is 10.6 cm. Borderline prominent  left axillary lymph node.     SHRUTHI GRAYSON MD       Medications   ondansetron (ZOFRAN) injection 4 mg (4 mg Intravenous Given 10/14/18 2107)   lidocaine 1 % 5 mL (not administered)   sodium chloride 0.9% infusion (not administered)   acetaminophen (TYLENOL) tablet 975 mg (not administered)    clindamycin (CLEOCIN) infusion 600 mg (not administered)   0.9% sodium chloride BOLUS (0 mLs Intravenous Stopped 10/14/18 2200)   HYDROmorphone (PF) (DILAUDID) injection 0.5 mg (0.5 mg Intravenous Given 10/15/18 0018)   iopamidol (ISOVUE-370) solution 500 mL (80 mLs Intravenous Given 10/14/18 2212)   sodium chloride 0.9 % bag 500mL for CT scan flush use (61 mLs Intravenous Given 10/14/18 2212)   sodium chloride (PF) 0.9% PF flush 3 mL (3 mLs Intravenous Given 10/14/18 2212)   LORazepam (ATIVAN) injection 1 mg (1 mg Intravenous Given 10/14/18 2335)       Assessments & Plan (with Medical Decision Making)  Breanna is a 33-year-old female, history of recent breast reduction revision surgery at [facility].  Please refer to HPI and focused exam as well as detailed surgical note as noted and read above.  Patient's exam on arrival is concerning for sepsis as she really does not feel well, reports subjective fever and chills as well as nausea.  Patient's vital signs on arrival reveal mild tachycardia cardia, patient is afebrile here although did take Tylenol and ibuprofen prior to arrival.  Patient's breast exam is consistent with cellulitis to medial aspect of breast, inferior incision seems to be healing well as well as around area left.  There is no evidence of drainage.  Patient does have left axillary lymphadenopathy.  Peripheral IV was established, patient was given a liter of normal saline, Zofran for nausea and Dilaudid for pain with improvement in her symptoms although she still does not feel very well.  Blood work today is reassuring with a white count of 9.4, hemoglobin is stable at 11.6 with a platelet count of 339.  CMP returns with a mildly elevated ALT of 64 and is otherwise within normal limits.  Lactic acid is normal at 1.2 with a CRP of 57.5.  CT scan was obtained of chest with contrast to rule out breast abscess and does show a prominent fluid collection within the deep left breast  abutting the chest wall and containing bubbles of internal gas worrisome for abscess with maximal size at 10.6 cm with borderline prominent left axillary lymph node consistent with exam.  I discussed patient's case with her plastic surgeon, Dr. Yen, we discussed reassuring blood test results as well as CT scan findings, she has requested a needle aspiration and wound culture.  For now with patient's MRSA history we will leave her on clindamycin.  Patient likely will not benefit from a change in antibiotics at this time as her abscess is likely walled off and just needs to be drained.  She will plan on seeing patient at her clinic tomorrow for more permanent drainage intervention as needed.  I discussed plan of care with patient she was agreeable, we discussed I&D procedure with ultrasound guidance including risks/benefits, patient did give verbal consent.  Please refer to procedure note as noted above.  Patient was prepped very well with Betadine, she was anesthetized with 1% lidocaine and needle aspiration was performed with ultrasound guidance with approximately 45 mL's of serous fluid out of left breast.  Sample of fluid was sent for culture and Gram stain which is pending.  Fluid is more consistent with seroma formation vs. Abscess.   Patient was given a dose of Ativan prior to procedure secondary to her anxiety and did very well with the procedure.  Patient had no complications with clear lung sounds postprocedure and vast improvement in her symptoms of pain and pressure in the left breast.  Patient did ask if she could stay here overnight as her  is at home with the kids and she still is not feeling like she is ready to be discharged home.  I feel this is appropriate.  At this time we will keep patient in the emergency department, continue her IV fluids, I will give her clindamycin IV at this time as she has not yet had her 10:00 PM dose.  We can continue to control patient's pain as needed and will  plan on discharging her when her  is available to pick her up later this morning.  At this time I do not anticipate any need for admissionIn light of the fact I would like her to get to her plastic surgeon's clinic later today.  Patient at this time, 0030, is tolerating oral fluids.  I am going to hand her care off to Dr. Martins for the remainder of tonight in anticipation to discharge home later this morning.  Patient is in stable condition.     I have reviewed the nursing notes.    I have reviewed the findings, diagnosis, plan and need for follow up with the patient.    New Prescriptions    No medications on file       Final diagnoses:   Postoperative state   Left breast abscess - More consistent with seroma     This document serves as a record of services personally performed by Kristi Garcia AP*. It was created on their behalf by Mariya Donovan, a trained medical scribe. The creation of this record is based on the provider's personal observations and the statements of the patient. This document has been checked and approved by the attending provider.  Note: Chart documentation done in part with Dragon Voice Recognition software. Although reviewed after completion, some word and grammatical errors may remain.    10/14/2018   [facility] EMERGENCY DEPARTMENT     Kristi Garcia, MADELINE CNP  10/15/18 0044

## 2018-10-15 NOTE — ED TRIAGE NOTES
Pt had left breast surgery on Oct 2, about a week ago started having increased redness and pain. Called her surgeon and was started on an antibiotic but states symptoms continue to get worse.

## 2018-10-15 NOTE — ED NOTES
IV removed.  Reviewed discharge instructions with pt.  No additional questions or concerns. Pt's  on the way to  pt.  Pt waiting in the lobby until he drives up as he can not come in to the hospital because of young children in the car.  Pt requested pain medication for home prior to discharge, spoke with provider.  None to go home with pt, pt given two Oxycodone prior to discharge and instructed to follow up with her surgeon today for additional pain medication if needed.

## 2018-10-15 NOTE — DISCHARGE INSTRUCTIONS
Call Dr[facility] at 8 AM in the morning.     Continue with Clindamycin as prescribed unless Dr. Yen recommend's otherwise.    It was very nice meeting you.  I wish you the best of luck with your recovery.    Hang in there!!

## 2018-10-17 LAB
BACTERIA SPEC CULT: ABNORMAL
BACTERIA SPEC CULT: ABNORMAL
SPECIMEN SOURCE: ABNORMAL

## 2018-10-18 ENCOUNTER — TELEPHONE (OUTPATIENT)
Dept: EMERGENCY MEDICINE | Facility: CLINIC | Age: 33
End: 2018-10-18

## 2018-10-18 NOTE — TELEPHONE ENCOUNTER
[facility] Emergency Department Lab result notification     Patient/parent Name  Breanna MATAMOROS Assessment (Patient s current Symptoms), include time called.  [Insert Left message here if message left]  3:43pm The area is getting more red and painful to touch. She has a call in to the surgeon, Dr Yen, who she wants to have her continue to treat.      RN Recommendations/Instructions per [facility] ED lab result protocol  I faxed the final wound culture to Dr Yen at [phone] per patient request.      Please Contact your PCP clinic or return to the Emergency department if your:    Symptoms return.    Symptoms do not improve after 3 days on antibiotic.    Symptoms do not resolve after completing antibiotic.    Symptoms worsen or other concerning symptom's.    PCP follow-up Questions asked: YES       Nina Saha RN  [facility] RN  Lung Nodule and ED Lab Result F/u RN  Epic pool (ED late result f/u RN): [id number]  # [phone]

## 2018-10-18 NOTE — TELEPHONE ENCOUNTER
[facility] Emergency Department Lab result notification:    Reason for call  Lab result  Lab Result  Final Wound culture (L breast) report on 10/18/18  Emergency Dept discharge antibiotic prescribed: None;  Continue Clindamycin  #1. Bacteria, Light growth Pseudomonas aeruginosa, which is [NOT TESTED] to antibiotic   Incision and Drainage performed in [place] ED [Yes / No]: Yes  Patient to be notified of result, symptoms assessed and advised per [place] ED lab result protocol.  ED visit Date: 10/14/18  Symptoms reported at ED visit Assessments & Plan (with Medical Decision Making)  Breanna is a 33-year-old female, history of recent breast reduction revision surgery at [facility].  Please refer to HPI and focused exam as well as detailed surgical note as noted and read above.  Patient's exam on arrival is concerning for sepsis as she really does not feel well, reports subjective fever and chills as well as nausea.  Patient's vital signs on arrival reveal mild tachycardia cardia, patient is afebrile here although did take Tylenol and ibuprofen prior to arrival.  Patient's breast exam is consistent with cellulitis to medial aspect of breast, inferior incision seems to be healing well as well as around area left.  There is no evidence of drainage.  Patient does have left axillary lymphadenopathy.  Peripheral IV was established, patient was given a liter of normal saline, Zofran for nausea and Dilaudid for pain with improvement in her symptoms although she still does not feel very well.  Blood work today is reassuring with a white count of 9.4, hemoglobin is stable at 11.6 with a platelet count of 339.  CMP returns with a mildly elevated ALT of 64 and is otherwise within normal limits.  Lactic acid is normal at 1.2 with a CRP of 57.5.  CT scan was obtained of chest with contrast to rule out breast abscess and does show a prominent fluid collection within the deep left breast abutting the chest  wall and containing bubbles of internal gas worrisome for abscess with maximal size at 10.6 cm with borderline prominent left axillary lymph node consistent with exam.  I discussed patient's case with her plastic surgeon, Dr. Yen, we discussed reassuring blood test results as well as CT scan findings, she has requested a needle aspiration and wound culture.  For now with patient's MRSA history we will leave her on clindamycin.  Patient likely will not benefit from a change in antibiotics at this time as her abscess is likely walled off and just needs to be drained.  She will plan on seeing patient at her clinic tomorrow for more permanent drainage intervention as needed.  I discussed plan of care with patient she was agreeable, we discussed I&D procedure with ultrasound guidance including risks/benefits, patient did give verbal consent.  Please refer to procedure note as noted above.  Patient was prepped very well with Betadine, she was anesthetized with 1% lidocaine and needle aspiration was performed with ultrasound guidance with approximately 45 mL's of serous fluid out of left breast.  Sample of fluid was sent for culture and Gram stain which is pending.  Fluid is more consistent with seroma formation vs. Abscess.   Patient was given a dose of Ativan prior to procedure secondary to her anxiety and did very well with the procedure.  Patient had no complications with clear lung sounds postprocedure and vast improvement in her symptoms of pain and pressure in the left breast.  Patient did ask if she could stay here overnight as her  is at home with the kids and she still is not feeling like she is ready to be discharged home.  I feel this is appropriate.  At this time we will keep patient in the emergency department, continue her IV fluids, I will give her clindamycin IV at this time as she has not yet had her 10:00 PM dose.  We can continue to control patient's pain as needed and will plan on discharging  her when her  is available to pick her up later this morning.  At this time I do not anticipate any need for admissionIn light of the fact I would like her to get to her plastic surgeon's clinic later today.  Patient at this time, 0030, is tolerating oral fluids.  I am going to hand her care off to Dr. Martins for the remainder of tonight in anticipation to discharge home later this morning.  Patient is in stable condition.       Final diagnoses:   Postoperative state   Left breast abscess - More consistent with seroma      Miscellaneous information She saw her surgeon Dr Yen for procedure to wound area, 10/16/18.     Current symptoms  1:02 pm Message left to call us back at [phone], between 10 am and 6 pm, seven days a week. May leave a message 24/7, if no one available.     Nina Saha RN  [facility] RN  Lung Nodule and ED Lab Result F/u RN  Epic pool (ED late result f/u RN): P [id number]  # [phone]    Copy of Lab result   Specimen Description Left Breast     Culture Micro Light growth  Pseudomonas aeruginosa  (A)     Culture Micro Plus  Light growth  Normal skin bobby      Resulting Agency [facility]       Culture & Susceptibility          PSEUDOMONAS AERUGINOSA        Antibiotic Interpretation Sensitivity Unit Method Status       AMIKACIN Sensitive <=2 ug/mL SIMON Final       CEFEPIME Sensitive <=1 ug/mL SIMON Final       CEFTAZIDIME Sensitive <=1 ug/mL SIMON Final       CIPROFLOXACIN Sensitive <=0.25 ug/mL SIMON Final       GENTAMICIN Sensitive <=1 ug/mL SIMON Final       LEVOFLOXACIN Sensitive 0.25 ug/mL SIMON Final       MEROPENEM Sensitive <=0.25 ug/mL SIMON Final       Piperacillin/Tazo Sensitive <=4 ug/mL SIMON Final       TOBRAMYCIN Sensitive <=1 ug/mL SIMON Final

## 2018-10-19 ENCOUNTER — HOSPITAL ENCOUNTER (EMERGENCY)
Facility: CLINIC | Age: 33
Discharge: HOME | End: 2018-10-19
Attending: FAMILY MEDICINE | Admitting: FAMILY MEDICINE
Payer: COMMERCIAL

## 2018-10-19 VITALS
RESPIRATION RATE: 18 BRPM | BODY MASS INDEX: 36.32 KG/M2 | OXYGEN SATURATION: 94 % | HEART RATE: 71 BPM | DIASTOLIC BLOOD PRESSURE: 100 MMHG | WEIGHT: 225 LBS | SYSTOLIC BLOOD PRESSURE: 139 MMHG | TEMPERATURE: 98.4 F

## 2018-10-19 DIAGNOSIS — T81.49XA POSTOPERATIVE WOUND INFECTION: ICD-10-CM

## 2018-10-19 LAB
ANION GAP SERPL CALCULATED.3IONS-SCNC: 9 MMOL/L (ref 3–14)
BASOPHILS # BLD AUTO: 0 10E9/L (ref 0–0.2)
BASOPHILS NFR BLD AUTO: 0.3 %
BUN SERPL-MCNC: 8 MG/DL (ref 7–30)
CALCIUM SERPL-MCNC: 9.1 MG/DL (ref 8.5–10.1)
CHLORIDE SERPL-SCNC: 105 MMOL/L (ref 94–109)
CO2 SERPL-SCNC: 23 MMOL/L (ref 20–32)
CREAT SERPL-MCNC: 0.5 MG/DL (ref 0.52–1.04)
DIFFERENTIAL METHOD BLD: ABNORMAL
EOSINOPHIL NFR BLD AUTO: 0.9 %
ERYTHROCYTE [DISTWIDTH] IN BLOOD BY AUTOMATED COUNT: 14.2 % (ref 10–15)
GFR SERPL CREATININE-BSD FRML MDRD: >90 ML/MIN/1.7M2
GLUCOSE SERPL-MCNC: 91 MG/DL (ref 70–99)
HCT VFR BLD AUTO: 56.3 % (ref 35–47)
HGB BLD-MCNC: 19.5 G/DL (ref 11.7–15.7)
IMM GRANULOCYTES # BLD: 0 10E9/L (ref 0–0.4)
IMM GRANULOCYTES NFR BLD: 0.3 %
LYMPHOCYTES # BLD AUTO: 0.6 10E9/L (ref 0.8–5.3)
LYMPHOCYTES NFR BLD AUTO: 20.1 %
MCH RBC QN AUTO: 29.2 PG (ref 26.5–33)
MCHC RBC AUTO-ENTMCNC: 34.6 G/DL (ref 31.5–36.5)
MCV RBC AUTO: 84 FL (ref 78–100)
MONOCYTES # BLD AUTO: 0.1 10E9/L (ref 0–1.3)
MONOCYTES NFR BLD AUTO: 3.5 %
NEUTROPHILS # BLD AUTO: 2.4 10E9/L (ref 1.6–8.3)
NEUTROPHILS NFR BLD AUTO: 74.9 %
NRBC # BLD AUTO: 0 10*3/UL
NRBC BLD AUTO-RTO: 0 /100
PLATELET # BLD AUTO: 127 10E9/L (ref 150–450)
POTASSIUM SERPL-SCNC: 4.6 MMOL/L (ref 3.4–5.3)
RBC # BLD AUTO: 6.68 10E12/L (ref 3.8–5.2)
SODIUM SERPL-SCNC: 137 MMOL/L (ref 133–144)
WBC # BLD AUTO: 3.2 10E9/L (ref 4–11)

## 2018-10-19 PROCEDURE — 96375 TX/PRO/DX INJ NEW DRUG ADDON: CPT | Performed by: FAMILY MEDICINE

## 2018-10-19 PROCEDURE — 96361 HYDRATE IV INFUSION ADD-ON: CPT | Performed by: FAMILY MEDICINE

## 2018-10-19 PROCEDURE — 85025 COMPLETE CBC W/AUTO DIFF WBC: CPT | Performed by: FAMILY MEDICINE

## 2018-10-19 PROCEDURE — 99285 EMERGENCY DEPT VISIT HI MDM: CPT | Mod: 25 | Performed by: FAMILY MEDICINE

## 2018-10-19 PROCEDURE — 96365 THER/PROPH/DIAG IV INF INIT: CPT | Performed by: FAMILY MEDICINE

## 2018-10-19 PROCEDURE — 25000128 H RX IP 250 OP 636: Performed by: FAMILY MEDICINE

## 2018-10-19 PROCEDURE — 96376 TX/PRO/DX INJ SAME DRUG ADON: CPT | Performed by: FAMILY MEDICINE

## 2018-10-19 PROCEDURE — 99285 EMERGENCY DEPT VISIT HI MDM: CPT | Mod: Z6 | Performed by: FAMILY MEDICINE

## 2018-10-19 PROCEDURE — 87040 BLOOD CULTURE FOR BACTERIA: CPT | Performed by: FAMILY MEDICINE

## 2018-10-19 PROCEDURE — 80048 BASIC METABOLIC PNL TOTAL CA: CPT | Performed by: FAMILY MEDICINE

## 2018-10-19 RX ORDER — HYDROMORPHONE HYDROCHLORIDE 1 MG/ML
0.5 INJECTION, SOLUTION INTRAMUSCULAR; INTRAVENOUS; SUBCUTANEOUS
Status: DISCONTINUED | OUTPATIENT
Start: 2018-10-19 | End: 2018-10-19 | Stop reason: HOSPADM

## 2018-10-19 RX ORDER — OXYCODONE HYDROCHLORIDE 5 MG/1
TABLET ORAL
COMMUNITY
End: 2019-01-02

## 2018-10-19 RX ORDER — CIPROFLOXACIN 2 MG/ML
400 INJECTION, SOLUTION INTRAVENOUS EVERY 12 HOURS
Status: DISCONTINUED | OUTPATIENT
Start: 2018-10-19 | End: 2018-10-19 | Stop reason: HOSPADM

## 2018-10-19 RX ORDER — HYDROMORPHONE HYDROCHLORIDE 1 MG/ML
0.5 INJECTION, SOLUTION INTRAMUSCULAR; INTRAVENOUS; SUBCUTANEOUS
Status: COMPLETED | OUTPATIENT
Start: 2018-10-19 | End: 2018-10-19

## 2018-10-19 RX ORDER — SODIUM CHLORIDE 9 MG/ML
1000 INJECTION, SOLUTION INTRAVENOUS CONTINUOUS
Status: DISCONTINUED | OUTPATIENT
Start: 2018-10-19 | End: 2018-10-19 | Stop reason: HOSPADM

## 2018-10-19 RX ORDER — ONDANSETRON 2 MG/ML
4 INJECTION INTRAMUSCULAR; INTRAVENOUS EVERY 30 MIN PRN
Status: DISCONTINUED | OUTPATIENT
Start: 2018-10-19 | End: 2018-10-19 | Stop reason: HOSPADM

## 2018-10-19 RX ADMIN — ONDANSETRON HYDROCHLORIDE 4 MG: 2 INJECTION, SOLUTION INTRAMUSCULAR; INTRAVENOUS at 10:58

## 2018-10-19 RX ADMIN — HYDROMORPHONE HYDROCHLORIDE 0.5 MG: 1 INJECTION, SOLUTION INTRAMUSCULAR; INTRAVENOUS; SUBCUTANEOUS at 10:58

## 2018-10-19 RX ADMIN — CIPROFLOXACIN 400 MG: 2 INJECTION, SOLUTION INTRAVENOUS at 12:22

## 2018-10-19 RX ADMIN — HYDROMORPHONE HYDROCHLORIDE 0.5 MG: 1 INJECTION, SOLUTION INTRAMUSCULAR; INTRAVENOUS; SUBCUTANEOUS at 11:24

## 2018-10-19 RX ADMIN — SODIUM CHLORIDE 1000 ML: 9 INJECTION, SOLUTION INTRAVENOUS at 11:22

## 2018-10-19 RX ADMIN — HYDROMORPHONE HYDROCHLORIDE 0.5 MG: 1 INJECTION, SOLUTION INTRAMUSCULAR; INTRAVENOUS; SUBCUTANEOUS at 11:50

## 2018-10-19 RX ADMIN — HYDROMORPHONE HYDROCHLORIDE 0.5 MG: 1 INJECTION, SOLUTION INTRAMUSCULAR; INTRAVENOUS; SUBCUTANEOUS at 13:16

## 2018-10-19 ASSESSMENT — ENCOUNTER SYMPTOMS
CHILLS: 1
FEVER: 1
FATIGUE: 1

## 2018-10-19 NOTE — ED AVS SNAPSHOT
[facility] Emergency Department    [address] [place] [address]    Phone:  [phone]    Fax:  [phone]                                       Breanna Vidal   MRN: [id number]    Department:  [facility] Emergency Department   Date of Visit:  10/19/2018           After Visit Summary Signature Page     I have received my discharge instructions, and my questions have been answered. I have discussed any challenges I see with this plan with the nurse or doctor.    ..........................................................................................................................................  Patient/Patient Representative Signature      ..........................................................................................................................................  Patient Representative Print Name and Relationship to Patient    ..................................................               ................................................  Date                                   Time    ..........................................................................................................................................  Reviewed by Signature/Title    ...................................................              ..............................................  Date                                               Time          22EPIC Rev 08/18

## 2018-10-19 NOTE — ED AVS SNAPSHOT
[facility] Emergency Department    [address] [place] [address]    Phone:  [phone]    Fax:  [phone]                                       Breanna Vidal   MRN: [id number]    Department:  [facility] Emergency Department   Date of Visit:  10/19/2018           Patient Information     Date Of Birth          1985        Your diagnoses for this visit were:     Postoperative wound infection        You were seen by Ellis, Marcelo DAVENPORT MD.      Follow-up Information     Follow up with Nichole Yen MD In 3 days.    Specialty:  Plastic Surgery    Contact information:     PLASTIC SURGERY  [address] [place] [id number] [phone]          Follow up with [facility] Emergency Department.    Specialty:  EMERGENCY MEDICINE    Why:  If symptoms worsen    Contact information:    [address] [place] [address] [phone]    Additional information:    From [address]: Exit at [address] on [place] side of [place]. Turn right on [address]. Turn left at stoplight on [address]. [facility] will be in view two blocks ahead      Discharge References/Attachments     POST OP WOUND CHECK, INFECTION (ENGLISH)      24 Hour Appointment Hotline       To make an appointment at any [facility] clinic, call [phone] ([phone]). If you don't have a family doctor or clinic, we will help you find one. [facility] clinics are conveniently located to serve the needs of you and your family.             Review of your medicines      Our records show that you are taking the medicines listed below. If these are incorrect, please call your family doctor or clinic.        Dose / Directions Last dose taken    CLINDAMYCIN HCL PO   Dose:  300 mg        Take 300 mg by mouth 3 times daily   Refills:  0        IBUPROFEN PO   Dose:  600 mg   Indication:  Mild to Moderate Pain        Take 600 mg by mouth as needed for moderate pain   Refills:  0         oxyCODONE IR 5 MG tablet   Commonly known as:  ROXICODONE        Refills:  0        prenatal multivitamin plus iron 27-0.8 MG Tabs per tablet   Dose:  1 tablet   Indication:  Pregnancy        Take 1 tablet by mouth daily   Refills:  0                Information about OPIOIDS     PRESCRIPTION OPIOIDS: WHAT YOU NEED TO KNOW   We gave you an opioid (narcotic) pain medicine. It is important to manage your pain, but opioids are not always the best choice. You should first try all the other options your care team gave you. Take this medicine for as short a time (and as few doses) as possible.    Some activities can increase your pain, such as bandage changes or therapy sessions. It may help to take your pain medicine 30 to 60 minutes before these activities. Reduce your stress by getting enough sleep, working on hobbies you enjoy and practicing relaxation or meditation. Talk to your care team about ways to manage your pain beyond prescription opioids.    These medicines have risks:    DO NOT drive when on new or higher doses of pain medicine. These medicines can affect your alertness and reaction times, and you could be arrested for driving under the influence (DUI). If you need to use opioids long-term, talk to your care team about driving.    DO NOT operate heavy machinery    DO NOT do any other dangerous activities while taking these medicines.    DO NOT drink any alcohol while taking these medicines.     If the opioid prescribed includes acetaminophen, DO NOT take with any other medicines that contain acetaminophen. Read all labels carefully. Look for the word  acetaminophen  or  Tylenol.  Ask your pharmacist if you have questions or are unsure.    You can get addicted to pain medicines, especially if you have a history of addiction (chemical, alcohol or substance dependence). Talk to your care team about ways to reduce this risk.    All opioids tend to cause constipation. Drink plenty of water and eat foods that have  a lot of fiber, such as fruits, vegetables, prune juice, apple juice and high-fiber cereal. Take a laxative (Miralax, milk of magnesia, Colace, Senna) if you don t move your bowels at least every other day. Other side effects include upset stomach, sleepiness, dizziness, throwing up, tolerance (needing more of the medicine to have the same effect), physical dependence and slowed breathing.    Store your pills in a secure place, locked if possible. We will not replace any lost or stolen medicine. If you don t finish your medicine, please throw away (dispose) as directed by your pharmacist. The [place] [employer] has more information about safe disposal: [email or web address]://[email or web address].[email or web address]-medications        Procedures and tests performed during your visit     Basic metabolic panel    Blood culture ONE site    CBC with platelets differential    Peripheral IV catheter      Orders Needing Specimen Collection     None      Pending Results     Date and Time Order Name Status Description    10/19/2018 1037 Blood culture ONE site In process             Pending Culture Results     Date and Time Order Name Status Description    10/19/2018 1037 Blood culture ONE site In process             Pending Results Instructions     If you had any lab results that were not finalized at the time of your Discharge, you can call the ED Lab Result RN at [phone]. You will be contacted by this team for any positive Lab results or changes in treatment. The nurses are available 7 days a week from 10A to 6:30P.  You can leave a message 24 hours per day and they will return your call.        Thank you for choosing [facility]       Thank you for choosing [facility] for your care. Our goal is always to provide you with excellent care. Hearing back from our patients is one way we can continue to improve our services. Please take a few minutes to complete the written survey that you may receive in the mail after  you visit with us. Thank you!        [employer] Information     [facility] gives you secure access to your electronic health record. If you see a primary care provider, you can also send messages to your care team and make appointments. If you have questions, please call your primary care clinic.  If you do not have a primary care provider, please call [phone] and they will assist you.        Care EveryWhere ID     This is your Care EveryWhere ID. This could be used by other organizations to access your [facility] medical records  [id number]        Equal Access to Services     TEOFILO BAINS : Ester Jay, wakarel mae, saeed marshallallucila chavarria, genevieve levine. So [facility] [phone].    ATENCIÓN: Si habla español, tiene a haider disposición servicios gratuitos de asistencia lingüística. Llame al [phone].    We comply with applicable federal civil rights laws and [place] laws. We do not discriminate on the basis of race, color, national origin, age, disability, sex, sexual orientation, or gender identity.            After Visit Summary       This is your record. Keep this with you and show to your community pharmacist(s) and doctor(s) at your next visit.

## 2018-10-19 NOTE — ED PROVIDER NOTES
History     Chief Complaint   Patient presents with     Post-Op infection     HPI  Breanna Vidal is a 33 year old female who presents to emergency department with left breast redness and swelling pain and green drainage from her Adrian-Luna drain. The patient had had previous breast reduction surgery and several notochordal. The patient had a revision surgery done by Nichole Yen MD at [facility] on 10/3/18. The patient had signs of infection on 10/10 and was started on clindamycin. The patient was seen in the emergency department here in 10/14. She had blood work IV fluids and drainage of a large 10 x 4 x 7 cm abscess. Approximately 40 5L Mills were drained at that time. Culture has since grown out Pseudomonas aeruginosa. The patient was seen by her surgeon on 10/16 and further incision and drainage were done and a Adrian-Luna drain was left in. The patient has been on clindamycin throughout the course of this. Patient notes increased redness swelling and pain now to her left breast.    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, abuse (H) 10/14/2009     Priority: Medium     Overview:   LW Modifier:  EtOH,crack cocaine,marij- [id number]/7/09  ; Polysubstance Dependence          Past Medical History:    Past Medical History:   Diagnosis Date     Cellulitis 2012     Drug abuse (H) 2011     MRSA cellulitis 2012     Rh negative, antepartum        Past Surgical History:    Past Surgical History:   Procedure Laterality Date     INCISION AND DRAINAGE  04/18/2012    chin abscess I&D     LAPAROSCOPIC  SALPINGECTOMY Bilateral 8/27/2018    Procedure: LAPAROSCOPIC SALPINGECTOMY;  Laparoscopic Bilateral Salpingectomy;  Surgeon: Chai Cui MD;  Location: [facility]     MAMMOPLASTY REDUCTION Bilateral 08/04/2017       Family History:    No family history on file.    Social History:  Marital Status:  Single [1]  Social History   Substance Use Topics     Smoking status: Former Smoker     Smokeless tobacco: Former User     Quit date: 3/10/2016     Alcohol use No      Comment: history alcohol abuse 2012, s/p treatment        Medications:      CLINDAMYCIN HCL PO   IBUPROFEN PO   oxyCODONE IR (ROXICODONE) 5 MG tablet   Prenatal Vit-Fe Fumarate-FA (PRENATAL MULTIVITAMIN PLUS IRON) 27-0.8 MG TABS per tablet         Review of Systems   Constitutional: Positive for chills, fatigue and fever.   All other systems reviewed and are negative.      Physical Exam   BP: (!) 118/91  Pulse: 71  Temp: 98.4  F (36.9  C)  Resp: 18  Weight: 102.1 kg (225 lb)  SpO2: 96 %      Physical Exam   Constitutional: She is oriented to person, place, and time.   Alert tearful 33-year-old who appears to be in moderate to severe pain.BP (!) 118/91  Pulse 71  Temp 98.4  F (36.9  C) (Oral)  Resp 18  Wt 102.1 kg (225 lb)  LMP 10/01/2018  SpO2 92%  BMI 36.32 kg/m2     HENT:   Head: Normocephalic and atraumatic.   Right Ear: External ear normal.   Left Ear: External ear normal.   Mouth/Throat: Oropharynx is clear and moist.   Eyes: Conjunctivae are normal.   Neck: Normal range of motion. Neck supple.   Cardiovascular: Normal rate, regular rhythm and normal heart sounds.    Pulmonary/Chest: Effort normal and breath sounds normal.       Left breast is somewhat larger than the right. There is a Adrian-Luna drain which is draining blood-tinged serous fluid. Patient also states she has seen green drainage in this but I see none at this time. There is approximately 2 cm of surrounding erythema at the Adrian-Luna site.   Abdominal: Soft. Bowel sounds are  normal.   Neurological: She is alert and oriented to person, place, and time.   Skin: Skin is warm and dry. There is erythema.   Psychiatric: She has a normal mood and affect. Her behavior is normal.   Nursing note and vitals reviewed.      ED Course     Results for orders placed or performed during the hospital encounter of 10/19/18 (from the past 48 hour(s))   CBC with platelets differential   Result Value Ref Range    WBC 3.2 (L) 4.0 - 11.0 10e9/L    RBC Count 6.68 (H) 3.8 - 5.2 10e12/L    Hemoglobin 19.5 (H) 11.7 - 15.7 g/dL    Hematocrit 56.3 (H) 35.0 - 47.0 %    MCV 84 78 - 100 fl    MCH 29.2 26.5 - 33.0 pg    MCHC 34.6 31.5 - 36.5 g/dL    RDW 14.2 10.0 - 15.0 %    Platelet Count 127 (L) 150 - 450 10e9/L    Diff Method Automated Method     % Neutrophils 74.9 %    % Lymphocytes 20.1 %    % Monocytes 3.5 %    % Eosinophils 0.9 %    % Basophils 0.3 %    % Immature Granulocytes 0.3 %    Nucleated RBCs 0 0 /100    Absolute Neutrophil 2.4 1.6 - 8.3 10e9/L    Absolute Lymphocytes 0.6 (L) 0.8 - 5.3 10e9/L    Absolute Monocytes 0.1 0.0 - 1.3 10e9/L    Absolute Basophils 0.0 0.0 - 0.2 10e9/L    Abs Immature Granulocytes 0.0 0 - 0.4 10e9/L    Absolute Nucleated RBC 0.0    Basic metabolic panel   Result Value Ref Range    Sodium 137 133 - 144 mmol/L    Potassium 4.6 3.4 - 5.3 mmol/L    Chloride 105 94 - 109 mmol/L    Carbon Dioxide 23 20 - 32 mmol/L    Anion Gap 9 3 - 14 mmol/L    Glucose 91 70 - 99 mg/dL    Urea Nitrogen 8 7 - 30 mg/dL    Creatinine 0.50 (L) 0.52 - 1.04 mg/dL    GFR Estimate >90 >60 mL/min/1.7m2    GFR Estimate If Black >90 >60 mL/min/1.7m2    Calcium 9.1 8.5 - 10.1 mg/dL       ED Course     Results for orders placed or performed during the hospital encounter of 10/19/18 (from the past 48 hour(s))   CBC with platelets differential   Result Value Ref Range    WBC 3.2 (L) 4.0 - 11.0 10e9/L    RBC Count 6.68 (H) 3.8 - 5.2 10e12/L    Hemoglobin 19.5 (H) 11.7 - 15.7 g/dL    Hematocrit 56.3 (H) 35.0 - 47.0 %     MCV 84 78 - 100 fl    MCH 29.2 26.5 - 33.0 pg    MCHC 34.6 31.5 - 36.5 g/dL    RDW 14.2 10.0 - 15.0 %    Platelet Count 127 (L) 150 - 450 10e9/L    Diff Method Automated Method     % Neutrophils 74.9 %    % Lymphocytes 20.1 %    % Monocytes 3.5 %    % Eosinophils 0.9 %    % Basophils 0.3 %    % Immature Granulocytes 0.3 %    Nucleated RBCs 0 0 /100    Absolute Neutrophil 2.4 1.6 - 8.3 10e9/L    Absolute Lymphocytes 0.6 (L) 0.8 - 5.3 10e9/L    Absolute Monocytes 0.1 0.0 - 1.3 10e9/L    Absolute Basophils 0.0 0.0 - 0.2 10e9/L    Abs Immature Granulocytes 0.0 0 - 0.4 10e9/L    Absolute Nucleated RBC 0.0    Basic metabolic panel   Result Value Ref Range    Sodium 137 133 - 144 mmol/L    Potassium 4.6 3.4 - 5.3 mmol/L    Chloride 105 94 - 109 mmol/L    Carbon Dioxide 23 20 - 32 mmol/L    Anion Gap 9 3 - 14 mmol/L    Glucose 91 70 - 99 mg/dL    Urea Nitrogen 8 7 - 30 mg/dL    Creatinine 0.50 (L) 0.52 - 1.04 mg/dL    GFR Estimate >90 >60 mL/min/1.7m2    GFR Estimate If Black >90 >60 mL/min/1.7m2    Calcium 9.1 8.5 - 10.1 mg/dL         Procedures               Critical Care time:  none               Results for orders placed or performed during the hospital encounter of 10/19/18 (from the past 24 hour(s))   CBC with platelets differential   Result Value Ref Range    WBC 3.2 (L) 4.0 - 11.0 10e9/L    RBC Count 6.68 (H) 3.8 - 5.2 10e12/L    Hemoglobin 19.5 (H) 11.7 - 15.7 g/dL    Hematocrit 56.3 (H) 35.0 - 47.0 %    MCV 84 78 - 100 fl    MCH 29.2 26.5 - 33.0 pg    MCHC 34.6 31.5 - 36.5 g/dL    RDW 14.2 10.0 - 15.0 %    Platelet Count 127 (L) 150 - 450 10e9/L    Diff Method Automated Method     % Neutrophils 74.9 %    % Lymphocytes 20.1 %    % Monocytes 3.5 %    % Eosinophils 0.9 %    % Basophils 0.3 %    % Immature Granulocytes 0.3 %    Nucleated RBCs 0 0 /100    Absolute Neutrophil 2.4 1.6 - 8.3 10e9/L    Absolute Lymphocytes 0.6 (L) 0.8 - 5.3 10e9/L    Absolute Monocytes 0.1 0.0 - 1.3 10e9/L    Absolute Basophils 0.0 0.0  - 0.2 10e9/L    Abs Immature Granulocytes 0.0 0 - 0.4 10e9/L    Absolute Nucleated RBC 0.0    Basic metabolic panel   Result Value Ref Range    Sodium 137 133 - 144 mmol/L    Potassium 4.6 3.4 - 5.3 mmol/L    Chloride 105 94 - 109 mmol/L    Carbon Dioxide 23 20 - 32 mmol/L    Anion Gap 9 3 - 14 mmol/L    Glucose 91 70 - 99 mg/dL    Urea Nitrogen 8 7 - 30 mg/dL    Creatinine 0.50 (L) 0.52 - 1.04 mg/dL    GFR Estimate >90 >60 mL/min/1.7m2    GFR Estimate If Black >90 >60 mL/min/1.7m2    Calcium 9.1 8.5 - 10.1 mg/dL       Medications   0.9% sodium chloride BOLUS (1,000 mLs Intravenous New Bag 10/19/18 1122)     Followed by   sodium chloride 0.9% infusion (not administered)   ondansetron (ZOFRAN) injection 4 mg (4 mg Intravenous Given 10/19/18 1058)   0.9% sodium chloride BOLUS (not administered)   ciprofloxacin (CIPRO) infusion 400 mg (0 mg Intravenous Stopped 10/19/18 1339)   HYDROmorphone (PF) (DILAUDID) injection 0.5 mg (0.5 mg Intravenous Given 10/19/18 1316)   HYDROmorphone (PF) (DILAUDID) injection 0.5 mg (0.5 mg Intravenous Given 10/19/18 1150)   12:21 PM--discussed the case with the patient's surgeon Dr. Nichole Yen. She was aware that the patient had grown out Pseudomonas and had called in a new prescription for the patient for Cipro 500 b.i.d. The patient had not picked it up yet. She feels this will be adequate coverage and that the drain should help clear up this abscess infection. She will follow up with the patient next week. I discussed this with the patient and she confirms that she has not started the Cipro and actually stopped the clindamycin yesterday.  1:53 PM--patient is feeling much better and requests discharge home. I reviewed the treatment here and the planned ongoing outpatient treatment of her infection. She verbalizes understanding and is discharged in good condition.    Assessments & Plan (with Medical Decision Making)   MDM-- 33-year-old female who had a breast reduction revision surgery  on 10/3/18) [facility] by Dr. Nichole Yen. An infection was noted on 10/10 and she was started on clindamycin. She was seen here 10/14 and a large 10 x 4 x 7 cm abscess was drained. The next day the incision and drainage was done and a Adrian-Luna drain left in on 10/16. Cultures have grown out pseudomonas aeruginosa sensitive to everything. The surgeon recommends an IV dose of Cipro here and continuing oral Cipro and follow up with her in the clinic. The patient is not toxic, afebrile, normal white count. She was dehydrated so I gave her some IV fluids and some parenteral pain control. Patient is discharged in improved condition. I had a long discussion with her in regards to infections-cellulitis and abscesses. She is aware that further incision and drainage may be necessary. She was advised to return to the ED this weekend if she becomes ill with a high fever nausea vomiting. Otherwise she will be following up next week in the clinic with her surgeon.      I have reviewed the nursing notes.    I have reviewed the findings, diagnosis, plan and need for follow up with the patient.          New Prescriptions    No medications on file       Final diagnoses:   Postoperative wound infection       10/19/2018   [facility] EMERGENCY DEPARTMENT     Ellis, Marcelo DAVENPORT MD  10/19/18 9951

## 2018-10-20 LAB
BACTERIA SPEC CULT: NORMAL
BACTERIA SPEC CULT: NORMAL
Lab: NORMAL
Lab: NORMAL
SPECIMEN SOURCE: NORMAL
SPECIMEN SOURCE: NORMAL

## 2018-10-25 LAB
BACTERIA SPEC CULT: NORMAL
Lab: NORMAL
SPECIMEN SOURCE: NORMAL

## 2018-11-15 ENCOUNTER — TELEPHONE (OUTPATIENT)
Dept: FAMILY MEDICINE | Facility: OTHER | Age: 33
End: 2018-11-15

## 2018-11-15 NOTE — TELEPHONE ENCOUNTER
Huddled with providers. After reviewing triage notes with them they stated patient needs to go to the emergency department.   Called patient and informed her that the providers advised that she need to be seen in the ED. Patient agreed and will head there right now.     Blas Nicole,

## 2018-11-15 NOTE — TELEPHONE ENCOUNTER
Breanna Vidal is a 33 year old female who calls with passing large blood clots and heavy vaginal bleeding.    NURSING ASSESSMENT:  Description:  Having palm sized blood clots on her period that are painful. Started -. Wearing pads, changing them often, and going threw more than 1 pad per hour. Tubes removed 10/14/2018.  Onset/duration:  - started   Precip. factors:  Tubes removed 10/14/2018  Associated symptoms:  Heavy vaginal bleeding, passing palm size clots, painful   Improves/worsens symptoms:  NEW   Pain scale (0-10)   Unable to rate   LMP/preg/breast feedin2018-2018  Last exam/Treatment:  10/19/2018  Allergies:   Allergies   Allergen Reactions     Codeine Nausea     Other reaction(s): Nausea     Sulfa Drugs Unknown     Zantac [Ranitidine] Rash     NURSING PLAN: Nursing advice to patient to go to ED for heavy vaginal bleeding with large palm size clots    RECOMMENDED DISPOSITION:  To ED, another person to drive   Will comply with recommendation: Yes  If further questions/concerns or if symptoms do not improve, worsen or new symptoms develop, call your PCP or [facility] as soon as possible.    NOTES:  Disposition was determined by the first positive assessment question, therefore all previous assessment questions were negative  Guideline used:  Telephone Triage Protocols for Nurses, Fifth Edition, Adriana Melgar  Vaginal bleeding  Nursing Judgment     Dyana Rivera RN, BSN

## 2018-11-15 NOTE — TELEPHONE ENCOUNTER
Patient calling back.  She would like an appointment for today.  She states she does not want to bring her kids to the ER with her.  She states she will see any provider in [place] today.  Please advise.   Thank you

## 2018-11-30 ENCOUNTER — ALLIED HEALTH/NURSE VISIT (OUTPATIENT)
Dept: FAMILY MEDICINE | Facility: OTHER | Age: 33
End: 2018-11-30
Payer: COMMERCIAL

## 2018-11-30 DIAGNOSIS — Z23 NEED FOR PROPHYLACTIC VACCINATION AND INOCULATION AGAINST INFLUENZA: Primary | ICD-10-CM

## 2018-11-30 PROCEDURE — 90686 IIV4 VACC NO PRSV 0.5 ML IM: CPT

## 2018-11-30 PROCEDURE — 99207 ZZC NO CHARGE NURSE ONLY: CPT

## 2018-11-30 PROCEDURE — 90471 IMMUNIZATION ADMIN: CPT

## 2018-11-30 NOTE — NURSING NOTE
Injectable Influenza Immunization Documentation      1.  Is the person to be vaccinated sick today?  No    2. Does the person to be vaccinated have an allergy to eggs or to a component of the vaccine?   No      3. Has the person to be vaccinated today ever had a serious reaction to influenza vaccine in the past?  No      4. Has the person to be vaccinated ever had Guillain-[place] syndrome?  No    Prior to injection verified patient identity using patient's name and date of birth.    Patient instructed to wait 20 minutes and report any reactions such as shortness of breath, swelling, itching to medical staff.     Form completed by Yuniel Guo MA

## 2018-11-30 NOTE — MR AVS SNAPSHOT
After Visit Summary   11/30/2018    Breanna Vidal    MRN: [id number]           Patient Information     Date Of Birth          1985        Visit Information        Provider Department      11/30/2018 3:30 PM NL FLU SHOT [facility]        Today's Diagnoses     Need for prophylactic vaccination and inoculation against influenza    -  1       Follow-ups after your visit        Who to contact     If you have questions or need follow up information about today's clinic visit or your schedule please contact [facility] directly at [phone].  Normal or non-critical lab and imaging results will be communicated to you by Umthunzihart, letter or phone within 4 business days after the clinic has received the results. If you do not hear from us within 7 days, please contact the clinic through Enevatet or phone. If you have a critical or abnormal lab result, we will notify you by phone as soon as possible.  Submit refill requests through Purplu or call your pharmacy and they will forward the refill request to us. Please allow 3 business days for your refill to be completed.          Additional Information About Your Visit        MyChart Information     Purplu gives you secure access to your electronic health record. If you see a primary care provider, you can also send messages to your care team and make appointments. If you have questions, please call your primary care clinic.  If you do not have a primary care provider, please call [phone] and they will assist you.        Care EveryWhere ID     This is your Care EveryWhere ID. This could be used by other organizations to access your [place] medical records  [id number]         Blood Pressure from Last 3 Encounters:   10/19/18 (!) 139/100   10/15/18 121/84   09/04/18 116/74    Weight from Last 3 Encounters:   10/19/18 225 lb (102.1 kg)   10/14/18 240 lb (108.9 kg)   09/04/18 229 lb 4.8 oz (104 kg)              We  Performed the Following     FLU VACCINE, SPLIT VIRUS, IM (QUADRIVALENT) [46971]- >3 YRS     Vaccine Administration, Initial [31511]        Primary Care Provider Office Phone # Fax #    Karen Farrelladelfo APRADELFO [phone] [address] [place] [address]        Equal Access to Services     : Hadii aad ku hadasho Soomaali, waaxda luqadaha, qaybta kaalmada adeegyada, waxay idiin hayaan adeeg kharash lasuly . So [facility] [phone].    ATENCIÓN: Si habla español, tiene a haider disposición servicios gratuitos de asistencia lingüística. Chingame al [phone].    We comply with applicable federal civil rights laws and [place] laws. We do not discriminate on the basis of race, color, national origin, age, disability, sex, sexual orientation, or gender identity.            Thank you!     Thank you for choosing [facility]  for your care. Our goal is always to provide you with excellent care. Hearing back from our patients is one way we can continue to improve our services. Please take a few minutes to complete the written survey that you may receive in the mail after your visit with us. Thank you!             Your Updated Medication List - Protect others around you: Learn how to safely use, store and throw away your medicines at www.disposemymeds.org.          This list is accurate as of 11/30/18  5:00 PM.  Always use your most recent med list.                   Brand Name Dispense Instructions for use Diagnosis    CLINDAMYCIN HCL PO      Take 300 mg by mouth 3 times daily        IBUPROFEN PO      Take 600 mg by mouth as needed for moderate pain        oxyCODONE 5 MG tablet    ROXICODONE          prenatal multivitamin w/iron 27-0.8 MG tablet      Take 1 tablet by mouth daily

## 2018-12-28 NOTE — PATIENT INSTRUCTIONS
Appetite suppressant - Start topiramate 25 mg twice daily for 2 weeks then increase to 50 mg twice daily for 2 weeks.      See me back in one month for follow up on topiramate.    ENA Florian    Before Your Surgery      Call your surgeon if there is any change in your health. This includes signs of a cold or flu (such as a sore throat, runny nose, cough, rash or fever).    Do not smoke, drink alcohol or take over the counter medicine (unless your surgeon or primary care doctor tells you to) for the 24 hours before and after surgery.    If you take prescribed drugs: Follow your doctor s orders about which medicines to take and which to stop until after surgery.    Eating and drinking prior to surgery: follow the instructions from your surgeon    Take a shower or bath the night before surgery. Use the soap your surgeon gave you to gently clean your skin. If you do not have soap from your surgeon, use your regular soap. Do not shave or scrub the surgery site.  Wear clean pajamas and have clean sheets on your bed.

## 2018-12-28 NOTE — PROGRESS NOTES
[facility]  [address] [facility] [address] [phone]  Dept: [phone]    PRE-OP EVALUATION:  Today's date: 2019    Breanna Vidal (: 1985) presents for pre-operative evaluation assessment as requested by Dr. Yen.  She requires evaluation and anesthesia risk assessment prior to undergoing surgery/procedure for treatment of breast reconstruction.    Fax number for surgical facility: [phone]  Primary Physician: Karen aCmpos  Type of Anesthesia Anticipated: General    Patient has a Health Care Directive or Living Will:  NO    Preop Questions 2019   Who is doing your surgery? Nichole Yen   What are you having done? breast reconstruction   Date of Surgery/Procedure:    Facility or Hospital where procedure/surgery will be performed: [facility] [address] [place] [address]   1.  Do you have a history of Heart attack, stroke, stent, coronary bypass surgery, or other heart surgery? No   2.  Do you ever have any pain or discomfort in your chest? No   3.  Do you have a history of  Heart Failure? No   4.   Are you troubled by shortness of breath when:  walking on a level surface, or up a slight hill, or at night? No   5.  Do you currently have a cold, bronchitis or other respiratory infection? No   6.  Do you have a cough, shortness of breath, or wheezing? No   7.  Do you sometimes get pains in the calves of your legs when you walk? No   8. Do you or anyone in your family have previous history of blood clots? No   9.  Do you or does anyone in your family have a serious bleeding problem such as prolonged bleeding following surgeries or cuts? No   10. Have you ever had problems with anemia or been told to take iron pills? No   11. Have you had any abnormal blood loss such as black, tarry or bloody stools, or abnormal vaginal bleeding? No   12. Have you ever had a blood transfusion? No   13. Have you or any of your  relatives ever had problems with anesthesia? No   14. Do you have sleep apnea, excessive snoring or daytime drowsiness? No   15. Do you have any prosthetic heart valves? No   16. Do you have prosthetic joints? No   17. Is there any chance that you may be pregnant? No         HPI:     HPI related to upcoming procedure: previous breast reduction that resulted in malalignment of breasts and asymmetry. Surgery to reconstruct breasts.    In addition to preop she would like to discuss her weight. She has been eating healthier, tracking her calorie intake and exercising for the past 3 weeks. She has lost 2 lbs since her last visit in October. Her goal weight is 170 lbs. She is hoping to lose 1 lb per week. She is wondering about medication for appetite suppression. She bought some biotin as she read this helps with fat metabolism. She is wondering if she should continue this. She does have a history of methamphetamine abuse that is in remission.    She would also like to get an evaluation for ADHD. She has had difficulty focusing since she was a child and was never evaluated. She does not have hyperactivity.     See problem list for active medical problems.  Problems all longstanding and stable, except as noted/documented.  See ROS for pertinent symptoms related to these conditions.                                                                                                                                                          .    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, abuse (H) 10/14/2009     Priority:  Medium     Overview:   LW Modifier:  EtOH,crack cocaine,marij- qyreaaj24/7/09  ; Polysubstance Dependence        Past Medical History:   Diagnosis Date     Cellulitis 2012    MRSA septic olecranon bursitis and facial cellulitis     Drug abuse (H) 2011    methamphetamine and alcohol, sober since treatment 2011     MRSA cellulitis 2012    cleared with negative nasal swabs 8/16/17 and 8/28/17, reviewed     Rh negative, antepartum      Past Surgical History:   Procedure Laterality Date     INCISION AND DRAINAGE  04/18/2012    chin abscess I&D     LAPAROSCOPIC SALPINGECTOMY Bilateral 8/27/2018    Procedure: LAPAROSCOPIC SALPINGECTOMY;  Laparoscopic Bilateral Salpingectomy;  Surgeon: Chai Cui MD;  Location: [facility] OR     MAMMOPLASTY REDUCTION Bilateral 08/04/2017     Current Outpatient Medications   Medication Sig Dispense Refill     multivitamin w/minerals (MULTI-VITAMIN) tablet Take 1 tablet by mouth daily       topiramate (TOPAMAX) 25 MG tablet Take 1 tablet (25 mg) by mouth 2 times daily for 14 days, THEN 2 tablets (50 mg) 2 times daily for 14 days. 84 tablet 0     OTC products: None, except as noted above    Allergies   Allergen Reactions     Codeine Nausea     Other reaction(s): Nausea     Sulfa Drugs Unknown     Zantac [Ranitidine] Rash      Latex Allergy: NO    Social History     Tobacco Use     Smoking status: Former Smoker     Smokeless tobacco: Former User     Quit date: 3/10/2016   Substance Use Topics     Alcohol use: No     Comment: history alcohol abuse 2012, s/p treatment     History   Drug Use No     Comment: former drug user (EtOH, cocaine, marijuana, meth), last use 2012       REVIEW OF SYSTEMS:   CONSTITUTIONAL: NEGATIVE for fever, chills, change in weight  INTEGUMENTARY/SKIN: NEGATIVE for worrisome rashes, moles or lesions  EYES: NEGATIVE for vision changes or irritation  ENT/MOUTH: NEGATIVE for ear, mouth and throat problems  RESP: NEGATIVE for significant cough or SOB  BREAST: POSITIVE for  "asymmetry NEGATIVE for masses, tenderness or discharge  CV: NEGATIVE for chest pain, palpitations or peripheral edema  GI: NEGATIVE for nausea, abdominal pain, heartburn, or change in bowel habits  : NEGATIVE for frequency, dysuria, or hematuria  MUSCULOSKELETAL: NEGATIVE for significant arthralgias or myalgia  NEURO: NEGATIVE for weakness, dizziness or paresthesias  ENDOCRINE: NEGATIVE for temperature intolerance, skin/hair changes  HEME: NEGATIVE for bleeding problems  PSYCHIATRIC: POSITIVE for difficulty with focusing on tasks NEGATIVE for changes in mood or affect    EXAM:   /60   Pulse 84   Temp 96.9  F (36.1  C) (Temporal)   Resp 16   Ht 1.67 m (5' 5.75\")   Wt 101.2 kg (223 lb 1.6 oz)   LMP 12/14/2018 (Approximate)   SpO2 97%   BMI 36.29 kg/m      GENERAL APPEARANCE: healthy, alert and no distress     EYES: EOMI, PERRL     HENT: ear canals and TM's normal and nose and mouth without ulcers or lesions     NECK: no adenopathy, no asymmetry, masses, or scars and thyroid normal to palpation     RESP: lungs clear to auscultation - no rales, rhonchi or wheezes     CV: regular rates and rhythm, normal S1 S2, no S3 or S4 and no murmur, click or rub     ABDOMEN:  soft, nontender, no HSM or masses and bowel sounds normal     MS: extremities normal- no gross deformities noted, no evidence of inflammation in joints, FROM in all extremities.     SKIN: no suspicious lesions or rashes     NEURO: Normal strength and tone, sensory exam grossly normal, mentation intact and speech normal     PSYCH: mentation appears normal. and affect normal/bright     LYMPHATICS: No cervical adenopathy    DIAGNOSTICS:     EKG: Not indicated due to non-vascular surgery and low risk of event (age <65 and without cardiac risk factors)  Labs Resulted Today:   Results for orders placed or performed in visit on 01/02/19   CBC with platelets   Result Value Ref Range    WBC 9.5 4.0 - 11.0 10e9/L    RBC Count 4.74 3.8 - 5.2 10e12/L    " Hemoglobin 13.8 11.7 - 15.7 g/dL    Hematocrit 40.7 35.0 - 47.0 %    MCV 86 78 - 100 fl    MCH 29.1 26.5 - 33.0 pg    MCHC 33.9 31.5 - 36.5 g/dL    RDW 13.9 10.0 - 15.0 %    Platelet Count 251 150 - 450 10e9/L       Recent Labs   Lab Test 10/19/18  1059 10/14/18  2102   HGB 19.5* 11.6*   * 339    140   POTASSIUM 4.6 3.7   CR 0.50* 0.50*        IMPRESSION:   Reason for surgery/procedure: breast reconstruction  Diagnosis/reason for consult: preop    The proposed surgical procedure is considered LOW risk.    REVISED CARDIAC RISK INDEX  The patient has the following serious cardiovascular risks for perioperative complications such as (MI, PE, VFib and 3  AV Block):  No serious cardiac risks  INTERPRETATION: 0 risks: Class I (very low risk - 0.4% complication rate)    The patient has the following additional risks for perioperative complications:  No identified additional risks      ICD-10-CM    1. Preop general physical exam Z01.818    2. Breast asymmetry N64.89    3. Migraine without aura and without status migrainosus, not intractable G43.009 topiramate (TOPAMAX) 25 MG tablet   4. Class 2 obesity due to excess calories without serious comorbidity with body mass index (BMI) of 36.0 to 36.9 in adult E66.09 topiramate (TOPAMAX) 25 MG tablet    Z68.36    5. Attention disturbance R41.840 NEUROPSYCHOLOGY REFERRAL   6. Elevated hemoglobin (H) D58.2 CBC with platelets       RECOMMENDATIONS:     --Consult hospital rounder / IM to assist post-op medical management    --Patient is to hold all scheduled medications on the day of surgery.    APPROVAL GIVEN to proceed with proposed procedure, without further diagnostic evaluation       Signed Electronically by: MADELINE Hernandez CNP    Copy of this evaluation report is provided to requesting physician.    [facility] Preop Guidelines    Revised Cardiac Risk Index

## 2019-01-02 ENCOUNTER — OFFICE VISIT (OUTPATIENT)
Dept: FAMILY MEDICINE | Facility: OTHER | Age: 34
End: 2019-01-02
Payer: COMMERCIAL

## 2019-01-02 ENCOUNTER — TELEPHONE (OUTPATIENT)
Dept: FAMILY MEDICINE | Facility: OTHER | Age: 34
End: 2019-01-02

## 2019-01-02 VITALS
SYSTOLIC BLOOD PRESSURE: 112 MMHG | RESPIRATION RATE: 16 BRPM | HEART RATE: 84 BPM | WEIGHT: 223.1 LBS | BODY MASS INDEX: 35.86 KG/M2 | DIASTOLIC BLOOD PRESSURE: 60 MMHG | OXYGEN SATURATION: 97 % | HEIGHT: 66 IN | TEMPERATURE: 96.9 F

## 2019-01-02 DIAGNOSIS — E66.812 CLASS 2 OBESITY DUE TO EXCESS CALORIES WITHOUT SERIOUS COMORBIDITY WITH BODY MASS INDEX (BMI) OF 36.0 TO 36.9 IN ADULT: ICD-10-CM

## 2019-01-02 DIAGNOSIS — G43.009 MIGRAINE WITHOUT AURA AND WITHOUT STATUS MIGRAINOSUS, NOT INTRACTABLE: ICD-10-CM

## 2019-01-02 DIAGNOSIS — Z01.818 PREOP GENERAL PHYSICAL EXAM: Primary | ICD-10-CM

## 2019-01-02 DIAGNOSIS — N64.89 BREAST ASYMMETRY: ICD-10-CM

## 2019-01-02 DIAGNOSIS — E66.09 CLASS 2 OBESITY DUE TO EXCESS CALORIES WITHOUT SERIOUS COMORBIDITY WITH BODY MASS INDEX (BMI) OF 36.0 TO 36.9 IN ADULT: ICD-10-CM

## 2019-01-02 DIAGNOSIS — D58.2 ELEVATED HEMOGLOBIN (H): ICD-10-CM

## 2019-01-02 DIAGNOSIS — R41.840 ATTENTION DISTURBANCE: ICD-10-CM

## 2019-01-02 LAB
ERYTHROCYTE [DISTWIDTH] IN BLOOD BY AUTOMATED COUNT: 13.9 % (ref 10–15)
HCT VFR BLD AUTO: 40.7 % (ref 35–47)
HGB BLD-MCNC: 13.8 G/DL (ref 11.7–15.7)
MCH RBC QN AUTO: 29.1 PG (ref 26.5–33)
MCHC RBC AUTO-ENTMCNC: 33.9 G/DL (ref 31.5–36.5)
MCV RBC AUTO: 86 FL (ref 78–100)
PLATELET # BLD AUTO: 251 10E9/L (ref 150–450)
RBC # BLD AUTO: 4.74 10E12/L (ref 3.8–5.2)
WBC # BLD AUTO: 9.5 10E9/L (ref 4–11)

## 2019-01-02 PROCEDURE — 36415 COLL VENOUS BLD VENIPUNCTURE: CPT | Performed by: STUDENT IN AN ORGANIZED HEALTH CARE EDUCATION/TRAINING PROGRAM

## 2019-01-02 PROCEDURE — 99214 OFFICE O/P EST MOD 30 MIN: CPT | Performed by: STUDENT IN AN ORGANIZED HEALTH CARE EDUCATION/TRAINING PROGRAM

## 2019-01-02 PROCEDURE — 85027 COMPLETE CBC AUTOMATED: CPT | Performed by: STUDENT IN AN ORGANIZED HEALTH CARE EDUCATION/TRAINING PROGRAM

## 2019-01-02 RX ORDER — TOPIRAMATE 25 MG/1
TABLET, FILM COATED ORAL
Qty: 84 TABLET | Refills: 0 | Status: SHIPPED | OUTPATIENT
Start: 2019-01-02 | End: 2019-02-20

## 2019-01-02 RX ORDER — MULTIPLE VITAMINS W/ MINERALS TAB 9MG-400MCG
1 TAB ORAL DAILY
COMMUNITY
End: 2020-01-28

## 2019-01-02 ASSESSMENT — MIFFLIN-ST. JEOR: SCORE: 1729.72

## 2019-01-03 ENCOUNTER — TELEPHONE (OUTPATIENT)
Dept: FAMILY MEDICINE | Facility: OTHER | Age: 34
End: 2019-01-03

## 2019-01-03 NOTE — TELEPHONE ENCOUNTER
Reason for Call:  Other Medication Question     Detailed comments: Patient calling wondering if she can take her Topiramate 25 MG up until her surgery or if she needs to stop taking it a specific amount of time prior to surgery. Please advise, surgery is on 01/25/2019    Phone Number Patient can be reached at: Cell number on file:    Telephone Information:   Mobile [phone]       Best Time: Anytime     Can we leave a detailed message on this number? YES    Call taken on 1/3/2019 at 12:21 PM by Marilyn Fisher

## 2019-01-04 NOTE — TELEPHONE ENCOUNTER
Please call. She can take topiramate up until surgery and then hold it the morning of the surgery.  JONATHAN FlorianC

## 2019-01-07 ENCOUNTER — OFFICE VISIT (OUTPATIENT)
Dept: FAMILY MEDICINE | Facility: OTHER | Age: 34
End: 2019-01-07
Payer: COMMERCIAL

## 2019-01-07 VITALS
RESPIRATION RATE: 16 BRPM | BODY MASS INDEX: 35.62 KG/M2 | SYSTOLIC BLOOD PRESSURE: 100 MMHG | DIASTOLIC BLOOD PRESSURE: 76 MMHG | HEART RATE: 100 BPM | WEIGHT: 219 LBS | TEMPERATURE: 97.4 F

## 2019-01-07 DIAGNOSIS — G43.009 MIGRAINE WITHOUT AURA AND WITHOUT STATUS MIGRAINOSUS, NOT INTRACTABLE: Primary | ICD-10-CM

## 2019-01-07 PROCEDURE — 99214 OFFICE O/P EST MOD 30 MIN: CPT | Mod: 25 | Performed by: PHYSICIAN ASSISTANT

## 2019-01-07 PROCEDURE — 96372 THER/PROPH/DIAG INJ SC/IM: CPT | Performed by: PHYSICIAN ASSISTANT

## 2019-01-07 RX ORDER — KETOROLAC TROMETHAMINE 30 MG/ML
60 INJECTION, SOLUTION INTRAMUSCULAR; INTRAVENOUS ONCE
Status: CANCELLED | OUTPATIENT
Start: 2019-01-07 | End: 2019-01-07

## 2019-01-07 RX ORDER — KETOROLAC TROMETHAMINE 30 MG/ML
30 INJECTION, SOLUTION INTRAMUSCULAR; INTRAVENOUS ONCE
Status: COMPLETED | OUTPATIENT
Start: 2019-01-07 | End: 2019-01-07

## 2019-01-07 RX ORDER — KETOROLAC TROMETHAMINE 30 MG/ML
30 INJECTION, SOLUTION INTRAMUSCULAR; INTRAVENOUS ONCE
Status: CANCELLED | OUTPATIENT
Start: 2019-01-07 | End: 2019-01-07

## 2019-01-07 RX ORDER — PROPRANOLOL HYDROCHLORIDE 40 MG/1
40 TABLET ORAL 2 TIMES DAILY
Qty: 60 TABLET | Refills: 0 | Status: SHIPPED | OUTPATIENT
Start: 2019-01-07 | End: 2019-02-20

## 2019-01-07 RX ORDER — KETOROLAC TROMETHAMINE 30 MG/ML
30 INJECTION, SOLUTION INTRAMUSCULAR; INTRAVENOUS EVERY 6 HOURS PRN
Qty: 1 ML | Refills: 0 | Status: SHIPPED | OUTPATIENT
Start: 2019-01-07 | End: 2019-01-07 | Stop reason: ALTCHOICE

## 2019-01-07 RX ADMIN — KETOROLAC TROMETHAMINE 30 MG: 30 INJECTION, SOLUTION INTRAMUSCULAR; INTRAVENOUS at 18:24

## 2019-01-07 ASSESSMENT — PAIN SCALES - GENERAL: PAINLEVEL: WORST PAIN (10)

## 2019-01-07 NOTE — PROGRESS NOTES
SUBJECTIVE:   Breanna Vidal is a 33 year old female who presents to clinic today for the following health issues:      HPI  Migraine Follow-Up    Headaches symptoms:  Worsened light and sound    Frequency: Daily     Duration of headaches: 4 hours today    Able to do normal daily activities/work with migraines: No - too much pain    Rescue/Relief medication:ibuprofen (Advil, Motrin)              Effectiveness: minor relief    Preventative medication: Topamax    Neurologic complications: No new stroke-like symptoms, loss of vision or speech, numbness or weakness    In the past 4 weeks, how often have you gone to Urgent Care or the emergency room because of your headaches?  0    Problem list and histories reviewed & adjusted, as indicated.  Additional history: We discussed medication approach and would appear that she has been started on Topamax.  This is not helped her headaches.  She is struggling to consider taking this out of her regimen since she thinks that it should help her lose weight.  At this point in time I do not think that her Topamax is causing her migraine headaches.  Theoretically it should be helping them.  Advised that she continue to take her recommended daily dose and add propranolol into the mix as directed.  We will be giving her some Toradol to see if we can help her rest this migraine headache.  We note that there is been a history of lactation but she is no longer breast-feeding.  She is bottlefeeding formula only.    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)     Past Surgical History:   Procedure Laterality Date     INCISION AND DRAINAGE  04/18/2012    chin abscess I&D     LAPAROSCOPIC SALPINGECTOMY Bilateral 8/27/2018    Procedure: LAPAROSCOPIC SALPINGECTOMY;   Laparoscopic Bilateral Salpingectomy;  Surgeon: Chai Cui MD;  Location: [facility] OR     MAMMOPLASTY REDUCTION Bilateral 08/04/2017       Social History     Tobacco Use     Smoking status: Former Smoker     Smokeless tobacco: Former User     Quit date: 3/10/2016   Substance Use Topics     Alcohol use: No     Comment: history alcohol abuse 2012, s/p treatment     Family History   Problem Relation Age of Onset     Cancer Father      Pancreatic Cancer Father      Lung Cancer Father          Current Outpatient Medications   Medication Sig Dispense Refill     ketorolac (TORADOL) 30 MG/ML injection Inject 1 mL (30 mg) into the vein every 6 hours as needed for moderate pain 1 mL 0     multivitamin w/minerals (MULTI-VITAMIN) tablet Take 1 tablet by mouth daily       propranolol (INDERAL) 40 MG tablet Take 1 tablet (40 mg) by mouth 2 times daily 60 tablet 0     topiramate (TOPAMAX) 25 MG tablet Take 1 tablet (25 mg) by mouth 2 times daily for 14 days, THEN 2 tablets (50 mg) 2 times daily for 14 days. 84 tablet 0     Allergies   Allergen Reactions     Codeine Nausea     Other reaction(s): Nausea     Sulfa Drugs Unknown     Zantac [Ranitidine] Rash     BP Readings from Last 3 Encounters:   01/07/19 100/76   01/02/19 112/60   10/19/18 (!) 139/100    Wt Readings from Last 3 Encounters:   01/07/19 99.3 kg (219 lb)   01/02/19 101.2 kg (223 lb 1.6 oz)   10/19/18 102.1 kg (225 lb)                    ROS:  Constitutional, HEENT, cardiovascular, pulmonary, GI, , musculoskeletal, neuro, skin, endocrine and psych systems are negative, except as otherwise noted.    OBJECTIVE:     /76 (Cuff Size: Adult Large)   Pulse 100   Temp 97.4  F (36.3  C) (Temporal)   Resp 16   Wt 99.3 kg (219 lb)   LMP 12/14/2018 (Approximate)   BMI 35.62 kg/m    Body mass index is 35.62 kg/m .  GENERAL: healthy, alert and no distress  RESP: lungs clear to auscultation - no rales, rhonchi or wheezes  CV: regular rate and rhythm, normal S1 S2, no S3  or S4, no murmur, click or rub, no peripheral edema and peripheral pulses strong  MS: no gross musculoskeletal defects noted, no edema  NEURO: Normal strength and tone, sensory exam grossly normal and mentation intact  PSYCH: concentration poor, tangential, affect flat, fatigued and has obvious light sensitivity today on exam.  She will not allowing eye exam.    Diagnostic Test Results:  No results found for this or any previous visit (from the past 24 hour(s)).    ASSESSMENT/PLAN:     1. Migraine without aura and without status migrainosus, not intractable  Treat as directed have her follow-up with neurology and her primary care provider to discuss Topamax.  Consider imaging if this does not improve shortly.  - propranolol (INDERAL) 40 MG tablet; Take 1 tablet (40 mg) by mouth 2 times daily  Dispense: 60 tablet; Refill: 0  - NEUROLOGY ADULT REFERRAL  - ketorolac (TORADOL) 30 MG/ML injection; Inject 1 mL (30 mg) into the vein every 6 hours as needed for moderate pain  Dispense: 1 mL; Refill: 0    Bruno Santos PA-C  [facility]

## 2019-01-08 NOTE — PATIENT INSTRUCTIONS
Migraine Headache  What is a migraine headache?   A migraine headache is a specific kind of headache that can last for hours to days. It can cause intense pain as well as other symptoms. For example, you may feel sick to your stomach or have changes in your vision.   How does it occur?   The exact cause of migraines is not clear. Most experts think migraine attacks begin with abnormal activity in the brain. They may be related to a problem with the blood flow in your brain, or they may happen with changes in brain chemicals. Migraine headaches often are triggered by specific things. Common migraine triggers include:   stress   tiredness   changes in the weather   certain foods, such as wine, cheese, or chocolate   MSG or food preservatives, such as nitrates   red wine   some medicines   bright lights.   Migraines tend to run in families. They affect women 3 times more often than men. They often happen right before or during a woman's menstrual period. Or they may happen when a woman is taking birth control pills or hormone replacement pills.   What are the symptoms?   Before a migraine starts, there is often a warning period when you don't feel well. Some people have vision changes before their head starts hurting. They lose part of their vision or see bright spots or zigzag patterns in front of their eyes. These warning symptoms are called migraine aura. The vision changes of the aura usually go away as the headache begins.   Migraine symptoms may include:   throbbing or pounding headache   pain that gets worse with physical activity   extreme sensitivity to light, smells, and sounds   nausea   vomiting   The pain is usually more severe on one side of the head, but it can affect the whole head.   Sometimes a migraine can cause symptoms such as numbness or even weakness. However, these can also be symptoms of a stroke. If you have these other symptoms along with problems with your vision, do not  assume a migraine is the cause. Call your healthcare provider right away.   How is it diagnosed?   Your healthcare provider will ask about your symptoms and medical history and examine you. There are no lab tests or X-rays for diagnosing migraine headaches.   A careful history of your headaches is very helpful. Your healthcare provider may ask you to keep a headache diary in which you record the following:   date and time of each attack   how long the headache lasted   type of pain (for example, dull, sharp, throbbing, or a feeling of pressure)   location of pain   any symptoms before the headache began   foods and drinks you had before the headache began (This should include checking the ingredients in the product ingredient list of packaged foods you have eaten. Saving the labels of the foods or drinks might be a good way to record this information.)   use of cigarettes, caffeine, alcohol, or carbonated drinks before the headache began   time you went to bed and time you got up before the headache began   if you are a woman, the dates of your menstrual periods and use of birth control pills or other female hormones.   Depending on your headache symptoms and physical exam, your provider may recommend tests to check for other, more serious causes of your symptoms. For example, you may have a brain scan or magnetic resonance imaging (MRI).   How is it treated?   You may be able to stop mild migraine headaches by taking nonprescription pain-relief medicine when you start to have symptoms. Aspirin, acetaminophen, caffeine, ibuprofen, and naproxen have all been shown to be effective. You may find that any one of these medicines alone will treat your headache. Even just a caffeinated drink may help. However, some studies have shown combinations to be more effective and to work faster. Excedrin Migraine is an example of such a combination. It includes acetaminophen, aspirin, and caffeine. Other combination drugs, such as  Midrin, are available for mild to moderate headaches with a prescription from your healthcare provider.   Check with your healthcare provider before you give any medicine that contains aspirin or salicylates to a child or teen. This includes medicines like baby aspirin, some cold medicines, and Pepto Bismol. Children and teens who take aspirin are at risk for a serious illness called Reye's syndrome. Ibuprofen and naproxen are nonsteroidal anti-inflammatory medicines (NSAIDs). NSAIDs may cause stomach bleeding and other problems. These risks increase with age. Read the label and take as directed. Unless recommended by your healthcare provider, do not take NSAIDs for more than 10 days for any reason.   Other medicines your healthcare provider may prescribe to help keep headaches from getting severe once they start are:   A group of drugs called triptans, which are available as tablets (including some that may be taken without water), a shot, and a nasal spray. Examples of triptans are naratriptan, rizatriptan, sumatriptan, and zolmitriptan.   Ergot medicines such as dihydroergotamine (DHE) or ergotamine. These medicines are available in various forms, including pills you swallow or put under your tongue, nasal spray, rectal suppositories and shots.   It's best to take these medicines as soon as possible after a headache begins. This means you need to recognize the warning symptoms.   If you have frequent migraines (3 or more a month), you may need to take other medicine every day to prevent severe and frequent headaches. Examples of drugs your provider may prescribe for this purpose are:   antiseizure medicines (divalproex sodium/valproate, gabapentin, or topiramate)   antidepressants (tricyclics, such as amitriptyline, nortriptyline, or doxepin)   some beta blockers (such as atenolol, metoprolol, nadolol, nebivolol, propranolol, or timolol)   some calcium channel blockers (such as verapamil).   Women who have  migraines triggered by their menstrual cycle may take preventive medicines for a few days around their period. Medicines that may be recommended are NSAIDs, triptans, and ergots. If these medicines do not work, hormone (estrogen) therapy may be helpful. Hormone therapy may also be helpful for women who have migraines during or after menopause. However, there is an increased risk of stroke for women with migraines who use birth control products (contraceptives) that contain estrogen.   You may not know if a medicine works for you until you have tried it for several weeks.   If you are planning to get pregnant, be sure to talk to your healthcare provider about whether the medicines you have been prescribed are safe during pregnancy. If they are not known to be safe, you will need a different treatment plan while you are pregnant and breastfeeding.   How long will the effects last?   The headache may last from a few hours to a few days. You may tend to get migraines for the rest of your life. However, many people find that they have migraines less often as they get older.   How can I take care of myself?   When you have a migraine:   As soon as possible after the symptoms start, take the medicine recommended or prescribed by your healthcare provider.   If you can, rest in a quiet room until the symptoms are gone. The pain may go away with sleep.   Put a cool, moist washcloth on the painful side of your head. You might also try a heating pad set on the lowest setting.   Don't drive a car while you have the headache.   You can make your migraines easier to take care of. Learn to become aware of your early warning signs of headache. You will need to pay close attention to your body to be aware of these signs. When the warning signs appear, try going to a quieter place and doing relaxation exercises. This early care can make a big difference in how easily you can get over the migraine.   If your symptoms don't get better  "when you take medicine, make another appointment with your healthcare provider. It may take several visits to find the best way to control your headaches. Also, if you are having headaches more often, make a follow-up appointment with your provider to see if you need more testing or preventive medicine.   Call your healthcare provider right away if:   Changes in your vision do not go away.   You have symptoms that are not usually part of your migraines, such as:   trouble talking or slurred speech   arm or leg weakness   You have other symptoms such as:   fever   stiff neck   repeated vomiting for several hours   numbness or tingling in your face, arms, or legs   You are pregnant and your headache is particularly bad or it seems different from your usual migraines, particularly in the last half of pregnancy. This is especially important if you have problems with your vision such as flashing lights, difficulty focusing or blurriness, any nausea or vomiting, or weakness in any part of your body. These may be signs of a developing pregnancy problem that needs immediate attention.   How can I help prevent migraine headaches?   Prevention is an important part of treatment. To help prevent migraine headaches:   You may need to take medicine prescribed by your healthcare provider.   You may need to avoid certain foods or activities suggested by your headache diary as possible triggers of headaches. Common food triggers are:   citrus fruit   chocolate   aged cheese and other preserved or aged foods containing tyramine, including leftovers held for more than 1 or 2 days at refrigerator temperature   sodium nitrate (found, for example, in food coloring, preservatives, processed meats and fish, hot dogs, and luncheon meats)   monosodium glutamate \"MSG\" (found, for example, in Chinese food, pepperoni, and many processed foods)   red wine and beer   the artificial sweetener aspartame   Ask your provider about avoiding medicines " "that may trigger headaches.   If you are taking birth control pills or other female hormones, ask your provider if you should stop taking them.   If you smoke, stop. If someone else in your household smokes, ask them to smoke outside. Cigarette smoke can make your symptoms worse.   Eat healthy meals at about the same time each day. Don't skip meals, especially breakfast.   Get regular rest and exercise.   Try to reduce stress. Relaxation exercises and biofeedback may help you manage stress.   Limit alcohol to no more than 1 drink a day for women and two drinks a day for men.   For more information, call or write:   [employer] ([employer])   Phone: 632-003-[employer] ([phone])   Web site: http://www.achenet.orgPRIVATE \"TYPE=PICT;ALT=    "

## 2019-01-08 NOTE — NURSING NOTE
Prior to injection, verified patient identity using patient's name and date of birth.  Due to injection administration, patient instructed to remain in clinic for 15 minutes  afterwards, and to report any adverse reaction to me immediately.    Toradol    Drug Amount Wasted:  None.  Vial/Syringe: Single dose vial  Expiration Date:  05/20  Patient States she is not breastfeeding.   Amada Aguilar CMA ([facility])    The following medication was given:     MEDICATION: Ketorolac Tromethamine 60MG/2ML (30 mg/mL) (Toradol)  ROUTE: IM  SITE: Ventrogluteal - Left  DOSE: 30mg  LOT #: [id number]  :  [employer]  EXPIRATION DATE:  05/20  NDC#: 25574-301-68  Amada Aguilar CMA ([facility])

## 2019-01-17 ENCOUNTER — HOSPITAL ENCOUNTER (EMERGENCY)
Facility: CLINIC | Age: 34
Discharge: HOME | End: 2019-01-17
Attending: PHYSICIAN ASSISTANT | Admitting: PHYSICIAN ASSISTANT
Payer: COMMERCIAL

## 2019-01-17 VITALS
RESPIRATION RATE: 16 BRPM | SYSTOLIC BLOOD PRESSURE: 129 MMHG | WEIGHT: 218 LBS | DIASTOLIC BLOOD PRESSURE: 81 MMHG | OXYGEN SATURATION: 100 % | TEMPERATURE: 97.9 F | BODY MASS INDEX: 35.46 KG/M2

## 2019-01-17 DIAGNOSIS — H57.12 ACUTE LEFT EYE PAIN: ICD-10-CM

## 2019-01-17 PROCEDURE — 25000125 ZZHC RX 250: Performed by: PHYSICIAN ASSISTANT

## 2019-01-17 PROCEDURE — 99283 EMERGENCY DEPT VISIT LOW MDM: CPT | Performed by: PHYSICIAN ASSISTANT

## 2019-01-17 PROCEDURE — 99284 EMERGENCY DEPT VISIT MOD MDM: CPT | Mod: Z6 | Performed by: PHYSICIAN ASSISTANT

## 2019-01-17 RX ORDER — TETRACAINE HYDROCHLORIDE 5 MG/ML
2 SOLUTION OPHTHALMIC EVERY 5 MIN PRN
Status: DISCONTINUED | OUTPATIENT
Start: 2019-01-17 | End: 2019-01-17 | Stop reason: HOSPADM

## 2019-01-17 RX ORDER — ERYTHROMYCIN 5 MG/G
0.5 OINTMENT OPHTHALMIC 4 TIMES DAILY
Qty: 1 TUBE | Refills: 0 | Status: SHIPPED | OUTPATIENT
Start: 2019-01-17 | End: 2019-02-08

## 2019-01-17 RX ORDER — ONDANSETRON 4 MG/1
4 TABLET, ORALLY DISINTEGRATING ORAL EVERY 8 HOURS PRN
Qty: 10 TABLET | Refills: 0 | Status: SHIPPED | OUTPATIENT
Start: 2019-01-17 | End: 2019-04-15

## 2019-01-17 RX ORDER — HYDROCODONE BITARTRATE AND ACETAMINOPHEN 5; 325 MG/1; MG/1
1 TABLET ORAL EVERY 6 HOURS PRN
Qty: 4 TABLET | Refills: 0 | Status: SHIPPED | OUTPATIENT
Start: 2019-01-17 | End: 2019-02-08

## 2019-01-17 RX ADMIN — TETRACAINE HYDROCHLORIDE 2 DROP: 5 SOLUTION OPHTHALMIC at 18:57

## 2019-01-17 NOTE — ED AVS SNAPSHOT
[facility] Emergency Department  [address] [place] [address]  Phone:  [phone]  Fax:  [phone]                                    Breanna Vidal   MRN: [id number]    Department:  [facility] Emergency Department   Date of Visit:  1/17/2019           After Visit Summary Signature Page    I have received my discharge instructions, and my questions have been answered. I have discussed any challenges I see with this plan with the nurse or doctor.    ..........................................................................................................................................  Patient/Patient Representative Signature      ..........................................................................................................................................  Patient Representative Print Name and Relationship to Patient    ..................................................               ................................................  Date                                   Time    ..........................................................................................................................................  Reviewed by Signature/Title    ...................................................              ..............................................  Date                                               Time          22EPIC Rev 08/18

## 2019-01-18 NOTE — DISCHARGE INSTRUCTIONS
It was a pleasure working with you today!  I hope your condition improves rapidly!     Please continue to take ibuprofen 600 mg every 6 hours as needed for irritation and discomfort.  If your discomfort is keeping away, it is okay to use the hydrocodone for breakthrough pain.  Use the erythromycin ointment as directed to provide lubrication and soothing effect on the eye.    A cool compress on the eye can help as well.    Please call to see an eye doctor tomorrow.  You can see if [facility] has any openings.  They do come to the [facility] a couple times per week.  They are usually really good about working in [facility].

## 2019-01-18 NOTE — ED PROVIDER NOTES
History     Chief Complaint   Patient presents with     Eye Pain     HPI  Breanna Vidal is a 33 year old female who presents for evaluation of left eye irritation since yesterday afternoon.  She feels like there is something in her eye, but denies any time that she potentially would have gotten anything in her eye.  She has not been working under anything.  No metal exposure.  No dust exposure.  She does not wear contact lenses or glasses.  Denies any visual acuity changes.  She states that it is burning sensation that is worse when she opens and closes her eye.  Had trouble sleeping last night due to the discomfort.  Has taken ibuprofen.  Denies any skin rashes around the eye or on the face.  No prior eye issues.  Denies any fevers or chills.  Has clear lacrimation, but no purulent drainage.  No URI symptoms.  Denies any other trauma to the eye.1         Allergies:  Allergies   Allergen Reactions     Codeine Nausea     Other reaction(s): Nausea     Sulfa Drugs Unknown     Zantac [Ranitidine] Rash       Problem List:    Patient Active Problem List    Diagnosis Date Noted     Alcohol abuse, in remission 08/20/2018     Priority: Medium     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) 08/20/2018     Priority: Medium     Mixed incontinence 03/03/2017     Priority: Medium     Vaginal delivery 10/31/2011     Priority: Medium     Rh negative status during pregnancy 09/23/2011     Priority: Medium     Maternal drug dependence, antepartum (H) 09/23/2011     Priority: Medium     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, abuse (H) 10/14/2009     Priority: Medium     Overview:   LW Modifier:  EtOH,crack cocaine,marij- [id number]/7/09  ; Polysubstance Dependence          Past Medical History:    Past Medical History:   Diagnosis Date     Cellulitis 2012     Drug abuse (H) 2011     MRSA cellulitis 2012     Rh negative, antepartum        Past Surgical History:    Past Surgical History:   Procedure Laterality Date      INCISION AND DRAINAGE  04/18/2012    chin abscess I&D     LAPAROSCOPIC SALPINGECTOMY Bilateral 8/27/2018    Procedure: LAPAROSCOPIC SALPINGECTOMY;  Laparoscopic Bilateral Salpingectomy;  Surgeon: Chai Cui MD;  Location: [facility] OR     MAMMOPLASTY REDUCTION Bilateral 08/04/2017       Family History:    Family History   Problem Relation Age of Onset     Cancer Father      Pancreatic Cancer Father      Lung Cancer Father        Social History:  Marital Status:  Single [1]  Social History     Tobacco Use     Smoking status: Former Smoker     Smokeless tobacco: Former User     Quit date: 3/10/2016   Substance Use Topics     Alcohol use: No     Comment: history alcohol abuse 2012, s/p treatment     Drug use: No     Comment: former drug user (EtOH, cocaine, marijuana, meth), last use 2012        Medications:      erythromycin (ROMYCIN) 5 MG/GM ophthalmic ointment   HYDROcodone-acetaminophen (NORCO) 5-325 MG tablet   ondansetron (ZOFRAN ODT) 4 MG ODT tab   multivitamin w/minerals (MULTI-VITAMIN) tablet   propranolol (INDERAL) 40 MG tablet   topiramate (TOPAMAX) 25 MG tablet         Review of Systems   All other systems reviewed and are negative.      Physical Exam   BP: 129/81  Heart Rate: 67  Temp: 97.9  F (36.6  C)  Resp: 16  Weight: 98.9 kg (218 lb)  SpO2: 100 %      Physical Exam   Constitutional: She is oriented to person, place, and time. No distress.   HENT:   Head: Normocephalic and atraumatic.   Right Ear: External ear normal.   Left Ear: External ear normal.   Nose: Nose normal.   Mouth/Throat: Oropharynx is clear and moist. No oropharyngeal exudate.   I normal to inspection with magnified exam.  No foreign body.  Tetracaine was applied, and she does receive relief from this.  Eyelids were swept, and this did cause significant discomfort, but no foreign body identified.  I did rinse the eyes with normal saline as well.  Fluorosceine dye applied, but no evidence for corneal abrasion.  Funduscopic exam benign.   Posterior chamber normal.  Retina and optic discs normal.  Normal blood vessels.  Slit lamp exam completely negative as well looking at the eyelids and eye.  Hugo-Pen readings of 24-28 with 95% certainty.  Patient does not have any tunnel vision on gross exam.   Eyes: Conjunctivae and EOM are normal. Pupils are equal, round, and reactive to light. Right eye exhibits no discharge. Left eye exhibits no discharge. No scleral icterus.   Neck: Normal range of motion. Neck supple. No thyromegaly present.   Cardiovascular: Normal rate, regular rhythm and normal heart sounds.   No murmur heard.  Pulmonary/Chest: Effort normal and breath sounds normal. No respiratory distress. She has no wheezes. She has no rales. She exhibits no tenderness.   Abdominal: Soft. Bowel sounds are normal. She exhibits no distension and no mass. There is no tenderness. There is no rebound and no guarding.   Musculoskeletal: Normal range of motion. She exhibits no edema, tenderness or deformity.   Lymphadenopathy:     She has no cervical adenopathy.   Neurological: She is alert and oriented to person, place, and time. No cranial nerve deficit.   Skin: Skin is warm and dry. Capillary refill takes less than 2 seconds. No rash noted. She is not diaphoretic. No erythema.   Psychiatric: She has a normal mood and affect. Her behavior is normal. Thought content normal.   Nursing note and vitals reviewed.      ED Course        Procedures               Critical Care time:  none               No results found for this or any previous visit (from the past 24 hour(s)).    Medications   tetracaine (PONTOCAINE) 0.5 % ophthalmic solution 2 drop (2 drops Left Eye Given 1/17/19 0422)       Assessments & Plan (with Medical Decision Making)  Acute left eye pain     33 year old female presents for evaluation of medial eye discomfort, foreign body sensation, and clear lacrimation since yesterday without a visual change.  No eye trauma.  No URI symptoms or surrounding  skin rashes.  No facial pain.  On exam blood pressure 129/81, pulse 67, temperature 97.9.  Visual acuity normal.  She did receive relief with tetracaine drops here in the ED, but no abnormality was identified unfortunately.  We performed funduscopic exam, gross inspection of the eye, saline rinse of the eye, fluorosceine staining, Hugo-Pen testing, and slit lamp examination.  I could not find an etiology for her pain.  I discussed her case with Dr. Parish and Dr. Beasley as well.  We decided that she was safe to return home is no emergent cause for eye pain was identified.  She will need optometry/ophthalmology follow-up tomorrow.  Referral placed.  It was too late to schedule appointment this evening.  We will send her home with erythromycin ointment for a lubricant.  Cool compresses discussed.  Ibuprofen 600 mg every 6 hours.  In case she is having trouble sleeping due to the discomfort, I did give her hydrocodone, # 4 tablets.  Do not drive will taking this medication.  Patient agreed to see an ophthalmologist/optometrist tomorrow for further evaluation.  Indications to return to the ED prior to then discussed with her.  She was in agreement with this plan and was suitable for discharge.  She was in improved condition with treatment with tetracaine drops, which would suggest more of a superficial cause for her discomfort.     I have reviewed the nursing notes.    I have reviewed the findings, diagnosis, plan and need for follow up with the patient.          Medication List      Started    erythromycin 5 MG/GM ophthalmic ointment  Commonly known as:  ROMYCIN  0.5 inches, Left Eye, 4 TIMES DAILY     HYDROcodone-acetaminophen 5-325 MG tablet  Commonly known as:  NORCO  1 tablet, Oral, EVERY 6 HOURS PRN     ondansetron 4 MG ODT tab  Commonly known as:  ZOFRAN ODT  4 mg, Oral, EVERY 8 HOURS PRN            Final diagnoses:   Acute left eye pain       1/17/2019   Lionel Fung PA-C   [facility] EMERGENCY  DEPARTMENT     Lionel Fung PA-C  01/17/19 9608

## 2019-02-08 ENCOUNTER — HOSPITAL ENCOUNTER (EMERGENCY)
Facility: CLINIC | Age: 34
Discharge: HOME | End: 2019-02-08
Attending: EMERGENCY MEDICINE | Admitting: EMERGENCY MEDICINE
Payer: COMMERCIAL

## 2019-02-08 ENCOUNTER — APPOINTMENT (OUTPATIENT)
Dept: GENERAL RADIOLOGY | Facility: CLINIC | Age: 34
End: 2019-02-08
Attending: EMERGENCY MEDICINE
Payer: COMMERCIAL

## 2019-02-08 VITALS
DIASTOLIC BLOOD PRESSURE: 101 MMHG | OXYGEN SATURATION: 100 % | HEART RATE: 99 BPM | SYSTOLIC BLOOD PRESSURE: 127 MMHG | RESPIRATION RATE: 16 BRPM | TEMPERATURE: 97.5 F

## 2019-02-08 DIAGNOSIS — S93.602A FOOT SPRAIN, LEFT, INITIAL ENCOUNTER: ICD-10-CM

## 2019-02-08 PROCEDURE — 73630 X-RAY EXAM OF FOOT: CPT | Mod: TC,LT

## 2019-02-08 PROCEDURE — 99284 EMERGENCY DEPT VISIT MOD MDM: CPT | Performed by: EMERGENCY MEDICINE

## 2019-02-08 PROCEDURE — 73610 X-RAY EXAM OF ANKLE: CPT | Mod: TC,LT

## 2019-02-08 PROCEDURE — 99283 EMERGENCY DEPT VISIT LOW MDM: CPT | Mod: Z6 | Performed by: EMERGENCY MEDICINE

## 2019-02-09 NOTE — ED PROVIDER NOTES
History     Chief Complaint   Patient presents with     Ankle Pain     HPI  Breanna Vidal is a 33 year old female who presents with left heel pain.  She reports rolling forward on her foot yesterday and experiencing pain in the heel area.  She has been unable to bear weight on this.  Pain is sharp and intense with any weightbearing attempt.    Allergies:  Allergies   Allergen Reactions     Codeine Nausea     Other reaction(s): Nausea     Sulfa Drugs Unknown     Zantac [Ranitidine] Rash       Problem List:    Patient Active Problem List    Diagnosis Date Noted     Alcohol abuse, in remission 08/20/2018     Priority: Medium     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) 08/20/2018     Priority: Medium     Mixed incontinence 03/03/2017     Priority: Medium     Vaginal delivery 10/31/2011     Priority: Medium     Rh negative status during pregnancy 09/23/2011     Priority: Medium     Maternal drug dependence, antepartum (H) 09/23/2011     Priority: Medium     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, abuse (H) 10/14/2009     Priority: Medium     Overview:   LW Modifier:  EtOH,crack cocaine,marij- ntexspf79/7/09  ; Polysubstance Dependence          Past Medical History:    Past Medical History:   Diagnosis Date     Cellulitis 2012     Drug abuse (H) 2011     MRSA cellulitis 2012     Rh negative, antepartum        Past Surgical History:    Past Surgical History:   Procedure Laterality Date     INCISION AND DRAINAGE  04/18/2012    chin abscess I&D     LAPAROSCOPIC SALPINGECTOMY Bilateral 8/27/2018    Procedure: LAPAROSCOPIC SALPINGECTOMY;  Laparoscopic Bilateral Salpingectomy;  Surgeon: Chai Cui MD;  Location: [facility]     MAMMOPLASTY REDUCTION Bilateral 08/04/2017       Family History:    Family History   Problem Relation Age of Onset     Cancer Father      Pancreatic Cancer Father      Lung Cancer Father        Social History:  Marital Status:  Single [1]  Social History     Tobacco Use     Smoking  status: Former Smoker     Smokeless tobacco: Former User     Quit date: 3/10/2016   Substance Use Topics     Alcohol use: No     Comment: history alcohol abuse 2012, s/p treatment     Drug use: No     Comment: former drug user (EtOH, cocaine, marijuana, meth), last use 2012        Medications:      multivitamin w/minerals (MULTI-VITAMIN) tablet   ondansetron (ZOFRAN ODT) 4 MG ODT tab   propranolol (INDERAL) 40 MG tablet   topiramate (TOPAMAX) 25 MG tablet         Review of Systems   All other systems reviewed and are negative.      Physical Exam   BP: (!) 127/101  Pulse: 99  Temp: 97.5  F (36.4  C)  Resp: 16  SpO2: 100 %      Physical Exam   Constitutional: She appears well-developed and well-nourished. No distress.   HENT:   Head: Normocephalic and atraumatic.   Mouth/Throat: Oropharynx is clear and moist.   Eyes: Conjunctivae are normal. Pupils are equal, round, and reactive to light. Right eye exhibits no discharge. Left eye exhibits no discharge. No scleral icterus.   Neck: Normal range of motion. Neck supple.   Cardiovascular: Normal rate, regular rhythm, normal heart sounds and intact distal pulses.   No murmur heard.  Pulmonary/Chest: Effort normal. No stridor. No respiratory distress. She has no wheezes. She has no rales.   Abdominal: Soft. There is no tenderness.   Musculoskeletal: Normal range of motion. She exhibits no edema or tenderness.   Tenderness in the left calcaneus region.  There is no swelling or ecchymosis here.  No deformity.  Achilles tendon feels intact.  No bony deformity to the foot.  Distal CMS to the foot intact.   Neurological: She is alert.   Normal speech and mentation   Skin: Skin is warm and dry. No rash noted. She is not diaphoretic. No erythema. No pallor.   Psychiatric: She has a normal mood and affect. Judgment normal.   Nursing note and vitals reviewed.      ED Course        Procedures               Critical Care time:  none               Results for orders placed or performed  during the hospital encounter of 02/08/19 (from the past 24 hour(s))   XR Ankle Left 3 Views    Narrative    LEFT ANKLE THREE VIEWS   2/8/2019 7:31 PM     HISTORY:  Trauma.    COMPARISON: None.      Impression    IMPRESSION: No evidence for acute fracture or dislocation in the left  ankle. The mortise is intact.   XR Foot Left 3 Views    Narrative    LEFT FOOT THREE VIEWS  2/8/2019 8:28 PM     HISTORY: Trauma.    COMPARISON: None.      Impression    IMPRESSION: Normal.    ANDREW HUANG MD       Medications - No data to display    Assessments & Plan (with Medical Decision Making)  Foot sprain.  Crutches given.  Ice, rest and elevation as needed.  Tylenol and ibuprofen as needed for pain.  Referred to podiatry if not improving over the next week.     I have reviewed the nursing notes.    I have reviewed the findings, diagnosis, plan and need for follow up with the patient.          Medication List      ASK your doctor about these medications    erythromycin 5 MG/GM ophthalmic ointment  Commonly known as:  ROMYCIN  0.5 inches, Left Eye, 4 TIMES DAILY  Ask about: Should I take this medication?     HYDROcodone-acetaminophen 5-325 MG tablet  Commonly known as:  NORCO  1 tablet, Oral, EVERY 6 HOURS PRN  Ask about: Should I take this medication?            Final diagnoses:   Foot sprain, left, initial encounter       2/8/2019   [facility] EMERGENCY DEPARTMENT     Otis Garcia MD  02/08/19 [id number]

## 2019-02-20 ENCOUNTER — OFFICE VISIT (OUTPATIENT)
Dept: FAMILY MEDICINE | Facility: OTHER | Age: 34
End: 2019-02-20
Payer: COMMERCIAL

## 2019-02-20 ENCOUNTER — TELEPHONE (OUTPATIENT)
Dept: FAMILY MEDICINE | Facility: OTHER | Age: 34
End: 2019-02-20

## 2019-02-20 VITALS
RESPIRATION RATE: 16 BRPM | WEIGHT: 210.5 LBS | HEART RATE: 72 BPM | BODY MASS INDEX: 33.83 KG/M2 | SYSTOLIC BLOOD PRESSURE: 102 MMHG | HEIGHT: 66 IN | TEMPERATURE: 97.3 F | DIASTOLIC BLOOD PRESSURE: 56 MMHG

## 2019-02-20 DIAGNOSIS — G43.009 MIGRAINE WITHOUT AURA AND WITHOUT STATUS MIGRAINOSUS, NOT INTRACTABLE: ICD-10-CM

## 2019-02-20 DIAGNOSIS — E66.811 CLASS 1 OBESITY DUE TO EXCESS CALORIES WITHOUT SERIOUS COMORBIDITY WITH BODY MASS INDEX (BMI) OF 34.0 TO 34.9 IN ADULT: ICD-10-CM

## 2019-02-20 DIAGNOSIS — M72.2 PLANTAR FASCIITIS: Primary | ICD-10-CM

## 2019-02-20 DIAGNOSIS — L98.7 EXCESS SKIN OF ABDOMEN: ICD-10-CM

## 2019-02-20 DIAGNOSIS — E66.09 CLASS 1 OBESITY DUE TO EXCESS CALORIES WITHOUT SERIOUS COMORBIDITY WITH BODY MASS INDEX (BMI) OF 34.0 TO 34.9 IN ADULT: ICD-10-CM

## 2019-02-20 PROCEDURE — 99214 OFFICE O/P EST MOD 30 MIN: CPT | Performed by: STUDENT IN AN ORGANIZED HEALTH CARE EDUCATION/TRAINING PROGRAM

## 2019-02-20 ASSESSMENT — MIFFLIN-ST. JEOR: SCORE: 1670.7

## 2019-02-20 NOTE — PATIENT INSTRUCTIONS
1. Continue to work on weight loss. I will refer you to plastic surgery once you are at your goal weight for treatment of excess skin.     2. Plantar fasciitis of left foot. Take ibuprofen 600 mg every 8 hours for 10 days to help with inflammation. Massage bottom of foot with ice. Consider getting a night splint to wear at night during sleep for the next several weeks to month. Avoid going barefoot or wearing shoes that do not have a good arch support.    Patient Education     Plantar Fasciitis  Plantar fasciitis is a painful swelling of the plantar fascia. The plantar fascia is a thick, fibrous layer of tissue that covers the bones on the bottom of your foot. It supports the foot bones in an arched position.  Plantar fasciitis can happen gradually or suddenly. It usually affects one foot at a time. Heel pain can be sharp, like a knife sticking into the bottom of your foot. You may feel pain after exercising, long-distance jogging, stair climbing, long periods of standing, or after standing up.  Risk factors include: non-active lifestyle, arthritis, diabetes, obesity or recent weight gain, flat foot, high arch. Wearing high heels, loose shoes, or shoes with poor arch support for long periods of time adds to the risk. This problem is commonly found in runners and dancers. It also found in people who stand on hard surfaces for long periods of time.  Foot pain from this condition is usually worse in the morning. But it often improves with walking. By the end of the day there may be a dull aching. Treatment requires short-term rest and controlling swelling. It may take up to 9 months before all symptoms go away. Rarely, a steroid injection into the foot, or surgery, may be needed.  Home care    If you are overweight, lose weight to help healing.    Choose supportive shoes with good arch support and shock absorbency. Replace athletic shoes when they become worn out. Don t walk or run barefoot.    Premade or custom-fitted  shoe inserts may be helpful. Inserts made of silicone seem to be the most effective. Custom-made inserts can be provided by foot specialist, physical therapist, or orthopedist.    Premade or custom-made night splints keep the heel stretched out while you sleep. They may prevent morning pain.    Limit activities that stress the feet: jogging, prolonged standing or walking, contact sports, etc.    First thing in the morning and before sports, stretch the bottom of your feet. Gently flex your ankle so the toes move toward your knee.    Icing may help control heel pain. Apply an ice pack to the heel for 10 to 20 minutes as a preventive. Or ice your heel after a severe flare-up of symptoms. You may repeat this every 1 to 2 hours as needed.    You may use over-the-counter pain medicine to control pain, unless another medicine was prescribed. Anti-inflammatory pain medicines, such as ibuprofen or naproxen, may work better than acetaminophen. If you have chronic liver or kidney disease or ever had a stomach ulcer or gastrointestinal bleeding, talk with your healthcare provider before using these medicines.  Follow-up care  Follow up with your healthcare provider, or as advised.  Call for an appointment if pain worsens or there is no relief after a few weeks of home treatment. Shoe inserts, a night splint, or a special boot may be required.  If X-rays were taken, you will be told of any new findings that may affect your care.  When to seek medical advice  Call your healthcare provider right away if any of these occur:    Foot swelling    Redness or warmth with increasing pain   Date Last Reviewed: 5/1/2018 2000-2018 [employer]. [address] [place] [address], [place] [address]. All rights reserved. This information is not intended as a substitute for professional medical care. Always follow your healthcare professional's instructions.

## 2019-02-20 NOTE — TELEPHONE ENCOUNTER
You placed a referral to [facility] on 1/7/19.    It is unclear if the patient has scheduled yet, not finding a report showing they were seen.     Please forward to your team if further follow up is needed to see if they have made this appointment.      Thank you!   Allie/Referral Representative for [facility]

## 2019-02-20 NOTE — PROGRESS NOTES
SUBJECTIVE:   Breanna Vidal is a 33 year old female who presents to clinic today for the following health issues:      History of Present Illness     Migraines:     Headache Symptoms are:  Stable    Migraine frequency:: Haven't had one since last office visit.    Migraine Duration:: N/A.    Ability to perform ADL's::  Yes    Migraine Rescue/Relief Medications::  Ibuprofen (Advil, Motrin)    Effectiveness of rescue/relief medications::  Moderate relief (sometimes)    Migraine Preventative Medications::  Inderal (Have not taken medication)    Neurological symptoms::  None    ER or UC Visits::  None    Diet:  Other  Frequency of exercise:  2-3 days/week  Duration of exercise:  30-45 minutes  Taking medications regularly:  Not Applicable  Medication side effects:  Other  Additional concerns today:  Yes    She is no longer taking Topamax.  She was seen in January for migraines and prescribed propranolol but she did not start this medication.  Her migraines seem to have spontaneously resolved and she is not currently treating with daily medication.  She does take ibuprofen if needed at the onset of headache.    Patient would like to discuss getting tummy tuck when she loses more weight.  She has been working on weight loss through eating healthier, tracking carbohydrates on her phone, doing cardio exercise every day for almost 50 minutes.  She is lost 13 pounds in the past month and a half.  Her goal weight is 150 pounds.  She may change the goal to less than 150 pounds once she reaches it but that is her initial goal.  She is concerned about the excess skin on her abdomen that is not resolving with exercise and diet.  The skin becomes itchy, occasionally has rashes and irritation between the folds, and has a bad odor if she does not constantly wash and dry between the folds.    Left foot Pain    Onset: 2 weeks    Description:   Location: Left foot  Character: Stabbing, unable to put pressure on foot    Intensity: severe  in the morning, gets better throughout the day    Progression of Symptoms: Hard to put weight on foot in the morning, but as the day goes on, pain goes away and foot feels better.    Accompanying Signs & Symptoms:  Other symptoms: none    History:   Previous similar pain: no       Precipitating factors:   Trauma or overuse: no     Alleviating factors:  Improved by: walking    Therapies Tried and outcome: Walking, staying on feet  She does walk barefoot in her home.  She had a fall recently and was evaluated for this with x-rays and no fracture.  She was thinking that the pain is possibly due to repercussions from the fall.    Problem list and histories reviewed & adjusted, as indicated.  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)     Maternal drug dependence, antepartum (H)     Past Surgical History:   Procedure Laterality Date     INCISION AND DRAINAGE  04/18/2012    chin abscess I&D     LAPAROSCOPIC SALPINGECTOMY Bilateral 8/27/2018    Procedure: LAPAROSCOPIC SALPINGECTOMY;  Laparoscopic Bilateral Salpingectomy;  Surgeon: Chai Cui MD;  Location: [facility]     MAMMOPLASTY REDUCTION Bilateral 08/04/2017       Social History     Tobacco Use     Smoking status: Former Smoker     Smokeless tobacco: Former User     Quit date: 3/10/2016   Substance Use Topics     Alcohol use: No     Comment: history alcohol abuse 2012, s/p treatment     Family History   Problem Relation Age of Onset     Cancer Father      Pancreatic Cancer Father      Lung Cancer Father          Current Outpatient Medications   Medication Sig Dispense Refill     multivitamin w/minerals (MULTI-VITAMIN) tablet Take 1 tablet by mouth daily       ondansetron (ZOFRAN ODT) 4 MG ODT tab Take 1  "tablet (4 mg) by mouth every 8 hours as needed for nausea 10 tablet 0     Allergies   Allergen Reactions     Codeine Nausea     Other reaction(s): Nausea     Sulfa Drugs Unknown     Zantac [Ranitidine] Rash     BP Readings from Last 3 Encounters:   02/20/19 102/56   02/08/19 (!) 127/101   01/17/19 129/81    Wt Readings from Last 3 Encounters:   02/20/19 95.5 kg (210 lb 8 oz)   01/17/19 98.9 kg (218 lb)   01/07/19 99.3 kg (219 lb)                  Labs reviewed in EPIC    ROS:  Constitutional, HEENT, cardiovascular, pulmonary, gi and gu systems are negative, except as otherwise noted.    OBJECTIVE:     /56   Pulse 72   Temp 97.3  F (36.3  C) (Temporal)   Resp 16   Ht 1.667 m (5' 5.63\")   Wt 95.5 kg (210 lb 8 oz)   BMI 34.36 kg/m    Body mass index is 34.36 kg/m .  GENERAL: alert, no distress and over weight  EYES: Eyes grossly normal to inspection and conjunctivae and sclerae normal  NECK: no adenopathy, no asymmetry, masses, or scars  RESP: lungs clear to auscultation - no rales, rhonchi or wheezes  CV: regular rate and rhythm, normal S1 S2, no S3 or S4, no murmur, click or rub  ABDOMEN: soft, nontender, no hepatosplenomegaly, no masses and bowel sounds normal, excess skin and tissue that is hanging at front of abdomen  MS: tenderness to palpation along left heel and proximal arch, no gross musculoskeletal defects noted, no edema  SKIN: mild irritation between skin folds on abdomen, mild malodor to skin between folds on abdomen  NEURO: Normal strength and tone, mentation intact and speech normal  PSYCH: mentation appears normal, affect normal/bright    Diagnostic Test Results:  none     ASSESSMENT/PLAN:     1. Plantar fasciitis - left foot  Take ibuprofen 600 mg every 8 hours for 10 days to help with inflammation. Massage bottom of foot with ice. Consider getting a night splint to wear at night during sleep for the next several weeks to month. Avoid going barefoot or wearing shoes that do not have a good " arch support.    2. Class 1 obesity due to excess calories without serious comorbidity with body mass index (BMI) of 34.0 to 34.9 in adult  Continue to work on weight loss.  Patient has lost 13 pounds in the past month and a half.  Congratulated on her success and encouraged her to continue her efforts at eating healthier and exercising daily.  Goal weight for patient is 150 pounds    3. Excess skin of abdomen  She has a significant amount of excess skin on her abdomen that would require surgical excision by plastic surgery. Discussed plastic surgery referral after patient reaches goal weight of 150 pounds.      4. Migraine without aura and without status migrainosus, not intractable  Improved and currently not taking medication for migraines other than as needed ibuprofen at onset of headache.  Follow-up if symptoms worsen.    MADELINE Hernandez [facility]

## 2019-03-06 ENCOUNTER — TELEPHONE (OUTPATIENT)
Dept: FAMILY MEDICINE | Facility: OTHER | Age: 34
End: 2019-03-06

## 2019-03-06 NOTE — TELEPHONE ENCOUNTER
Reason for Call:  Other call back    Detailed comments: pt no showed her 7:40 appt, is now scheduled for 10:40 but wondering if she can head over right now and be seen. Please advise.     Phone Number Patient can be reached at: Home number on file [phone] (home)    Best Time: any     Can we leave a detailed message on this number? YES    Call taken on 3/6/2019 at 8:21 AM by Connie Ventura

## 2019-03-06 NOTE — TELEPHONE ENCOUNTER
Spoke to patient, advised patient that provider may not be able to see patient right away as her schedu  patient rescheduled for 120 and will call to reschedule if she is unable to come to her visit today.

## 2019-03-06 NOTE — TELEPHONE ENCOUNTER
Spoke to patient, advised patient that provider may not be able to see patient right away as her schedule is full this morning. Patient rescheduled for 120 and will call back to reschedule if she is unable to come to her visit today.

## 2019-04-10 ENCOUNTER — TELEPHONE (OUTPATIENT)
Dept: FAMILY MEDICINE | Facility: OTHER | Age: 34
End: 2019-04-10

## 2019-04-10 ENCOUNTER — OFFICE VISIT (OUTPATIENT)
Dept: FAMILY MEDICINE | Facility: OTHER | Age: 34
End: 2019-04-10
Payer: COMMERCIAL

## 2019-04-10 VITALS
BODY MASS INDEX: 32.81 KG/M2 | DIASTOLIC BLOOD PRESSURE: 60 MMHG | HEART RATE: 65 BPM | WEIGHT: 201 LBS | SYSTOLIC BLOOD PRESSURE: 108 MMHG | TEMPERATURE: 97.8 F | OXYGEN SATURATION: 96 % | RESPIRATION RATE: 16 BRPM

## 2019-04-10 DIAGNOSIS — Z41.1 ENCOUNTER FOR BREAST AUGMENTATION: ICD-10-CM

## 2019-04-10 DIAGNOSIS — Z01.818 PREOP GENERAL PHYSICAL EXAM: Primary | ICD-10-CM

## 2019-04-10 LAB
ANION GAP SERPL CALCULATED.3IONS-SCNC: 4 MMOL/L (ref 3–14)
BASOPHILS # BLD AUTO: 0 10E9/L (ref 0–0.2)
BASOPHILS NFR BLD AUTO: 0.4 %
BUN SERPL-MCNC: 13 MG/DL (ref 7–30)
CALCIUM SERPL-MCNC: 9.5 MG/DL (ref 8.5–10.1)
CHLORIDE SERPL-SCNC: 106 MMOL/L (ref 94–109)
CO2 SERPL-SCNC: 27 MMOL/L (ref 20–32)
CREAT SERPL-MCNC: 0.65 MG/DL (ref 0.52–1.04)
DIFFERENTIAL METHOD BLD: NORMAL
EOSINOPHIL # BLD AUTO: 0.1 10E9/L (ref 0–0.7)
EOSINOPHIL NFR BLD AUTO: 1.1 %
ERYTHROCYTE [DISTWIDTH] IN BLOOD BY AUTOMATED COUNT: 13.4 % (ref 10–15)
GFR SERPL CREATININE-BSD FRML MDRD: >90 ML/MIN/{1.73_M2}
GLUCOSE SERPL-MCNC: 91 MG/DL (ref 70–99)
HCT VFR BLD AUTO: 40 % (ref 35–47)
HGB BLD-MCNC: 13.8 G/DL (ref 11.7–15.7)
LYMPHOCYTES # BLD AUTO: 2 10E9/L (ref 0.8–5.3)
LYMPHOCYTES NFR BLD AUTO: 25 %
MCH RBC QN AUTO: 29.7 PG (ref 26.5–33)
MCHC RBC AUTO-ENTMCNC: 34.5 G/DL (ref 31.5–36.5)
MCV RBC AUTO: 86 FL (ref 78–100)
MONOCYTES # BLD AUTO: 0.4 10E9/L (ref 0–1.3)
MONOCYTES NFR BLD AUTO: 5.5 %
NEUTROPHILS # BLD AUTO: 5.4 10E9/L (ref 1.6–8.3)
NEUTROPHILS NFR BLD AUTO: 68 %
PLATELET # BLD AUTO: 273 10E9/L (ref 150–450)
POTASSIUM SERPL-SCNC: 4.1 MMOL/L (ref 3.4–5.3)
RBC # BLD AUTO: 4.65 10E12/L (ref 3.8–5.2)
SODIUM SERPL-SCNC: 137 MMOL/L (ref 133–144)
WBC # BLD AUTO: 8 10E9/L (ref 4–11)

## 2019-04-10 PROCEDURE — 85025 COMPLETE CBC W/AUTO DIFF WBC: CPT | Performed by: NURSE PRACTITIONER

## 2019-04-10 PROCEDURE — 80048 BASIC METABOLIC PNL TOTAL CA: CPT | Performed by: NURSE PRACTITIONER

## 2019-04-10 PROCEDURE — 36415 COLL VENOUS BLD VENIPUNCTURE: CPT | Performed by: NURSE PRACTITIONER

## 2019-04-10 PROCEDURE — 99214 OFFICE O/P EST MOD 30 MIN: CPT | Performed by: NURSE PRACTITIONER

## 2019-04-10 PROCEDURE — 93000 ELECTROCARDIOGRAM COMPLETE: CPT | Performed by: NURSE PRACTITIONER

## 2019-04-10 RX ORDER — PHENTERMINE HYDROCHLORIDE 30 MG/1
30 CAPSULE ORAL EVERY MORNING
COMMUNITY
End: 2019-04-24

## 2019-04-10 ASSESSMENT — PAIN SCALES - GENERAL: PAINLEVEL: NO PAIN (0)

## 2019-04-10 NOTE — PATIENT INSTRUCTIONS
Before Your Surgery      Call your surgeon if there is any change in your health. This includes signs of a cold or flu (such as a sore throat, runny nose, cough, rash or fever).    Do not smoke, drink alcohol or take over the counter medicine (unless your surgeon or primary care doctor tells you to) for the 24 hours before and after surgery.    If you take prescribed drugs: Follow your doctor s orders about which medicines to take and which to stop until after surgery.    Eating and drinking prior to surgery: follow the instructions from your surgeon    Take a shower or bath the night before surgery. Use the soap your surgeon gave you to gently clean your skin. If you do not have soap from your surgeon, use your regular soap. Do not shave or scrub the surgery site.  Wear clean pajamas and have clean sheets on your bed.     NO Asprin or NSAIDs 7 days prior to surgery    Tylenol for pain    Ask your surgeon about the phentermine.     Labs today will call you on your results.

## 2019-04-10 NOTE — PROGRESS NOTES
[address]  [facility] [address]  [phone]  Dept: [phone]    PRE-OP EVALUATION:  Today's date: 4/10/2019    Breanna Vidal (: 1985) presents for pre-operative evaluation assessment as requested by Dr. Nichole Yen.  She requires evaluation and anesthesia risk assessment prior to undergoing surgery/procedure for treatment of reconstruction of a previous breast reduction     Fax number for surgical facility: [phone]  Primary Physician: Karen Campos  Type of Anesthesia Anticipated: General    Patient has a Health Care Directive or Living Will:  NO    Preop Questions 4/10/2019   Who is doing your surgery?    What are you having done? Breast Reconstruction Surgery    Date of Surgery/Procedure: 19   Facility or Hospital where procedure/surgery will be performed: [facility]    1.  Do you have a history of Heart attack, stroke, stent, coronary bypass surgery, or other heart surgery? No   2.  Do you ever have any pain or discomfort in your chest? No   3.  Do you have a history of  Heart Failure? No   4.   Are you troubled by shortness of breath when:  walking on a level surface, or up a slight hill, or at night? No   5.  Do you currently have a cold, bronchitis or other respiratory infection? No   6.  Do you have a cough, shortness of breath, or wheezing? No   7.  Do you sometimes get pains in the calves of your legs when you walk? No   8. Do you or anyone in your family have previous history of blood clots? No   9.  Do you or does anyone in your family have a serious bleeding problem such as prolonged bleeding following surgeries or cuts? No   10. Have you ever had problems with anemia or been told to take iron pills? No   11. Have you had any abnormal blood loss such as black, tarry or bloody stools, or abnormal vaginal bleeding? No   12. Have you ever had a blood transfusion? No   13. Have you or any of your relatives  ever had problems with anesthesia? No   14. Do you have sleep apnea, excessive snoring or daytime drowsiness? No   15. Do you have any prosthetic heart valves? No   16. Do you have prosthetic joints? No   17. Is there any chance that you may be pregnant? No         HPI:     HPI related to upcoming procedure:   Third breast reduction surgery       See problem list for active medical problems.  Problems all longstanding and stable, except as noted/documented.  See ROS for pertinent symptoms related to these conditions.                                                                                                                                                          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) 08/20/2018     Priority: Medium     Mixed incontinence 03/03/2017     Priority: Medium     Vaginal delivery 10/31/2011     Priority: Medium     Rh negative status during pregnancy 09/23/2011     Priority: Medium     Maternal drug dependence, antepartum (H) 09/23/2011     Priority: Medium     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, abuse (H) 10/14/2009     Priority: Medium     Overview:   LW Modifier:  EtOH,crack cocaine,marij- [id number]/7/09  ; Polysubstance Dependence        Past Medical History:   Diagnosis Date     Cellulitis 2012    MRSA septic olecranon bursitis and facial cellulitis     Drug abuse (H) 2011    methamphetamine and alcohol, sober since treatment 2011     MRSA cellulitis 2012    cleared with negative nasal swabs 8/16/17 and 8/28/17, reviewed     Rh negative, antepartum      Past Surgical History:   Procedure Laterality Date     INCISION AND DRAINAGE  04/18/2012    chin abscess I&D     LAPAROSCOPIC SALPINGECTOMY Bilateral 8/27/2018    Procedure: LAPAROSCOPIC SALPINGECTOMY;  Laparoscopic Bilateral Salpingectomy;  Surgeon: Chai Cui,  MD;  Location: [facility]     MAMMOPLASTY REDUCTION Bilateral 08/04/2017     Current Outpatient Medications   Medication Sig Dispense Refill     multivitamin w/minerals (MULTI-VITAMIN) tablet Take 1 tablet by mouth daily       ondansetron (ZOFRAN ODT) 4 MG ODT tab Take 1 tablet (4 mg) by mouth every 8 hours as needed for nausea 10 tablet 0     phentermine (ADIPEX-P) 30 MG capsule Take 30 mg by mouth every morning       OTC products: no recent use of OTC ASA, NSAIDS or Steroids    Allergies   Allergen Reactions     Codeine Nausea     Other reaction(s): Nausea     Ranitidine Rash and Itching     recvd IV zantac 75 and reaction was immediate,  was given Benadryl to counter act reaction in 2000     Sulfa Drugs Unknown and Rash      Latex Allergy: NO    Social History     Tobacco Use     Smoking status: Former Smoker     Smokeless tobacco: Former User     Quit date: 3/10/2016   Substance Use Topics     Alcohol use: No     Comment: history alcohol abuse 2012, s/p treatment     History   Drug Use No     Comment: former drug user (EtOH, cocaine, marijuana, meth), last use 2012       REVIEW OF SYSTEMS:   CONSTITUTIONAL: NEGATIVE for fever, chills, change in weight  INTEGUMENTARY/SKIN: NEGATIVE for worrisome rashes, moles or lesions  EYES: NEGATIVE for vision changes or irritation  ENT/MOUTH: NEGATIVE for ear, mouth and throat problems  RESP: NEGATIVE for significant cough or SOB  BREAST: NEGATIVE for masses, tenderness or discharge  CV: NEGATIVE for chest pain, palpitations or peripheral edema  GI: NEGATIVE for nausea, abdominal pain, heartburn, or change in bowel habits  : NEGATIVE for frequency, dysuria, or hematuria  MUSCULOSKELETAL: NEGATIVE for significant arthralgias or myalgia  NEURO: NEGATIVE for weakness, dizziness or paresthesias  ENDOCRINE: NEGATIVE for temperature intolerance, skin/hair changes  HEME: NEGATIVE for bleeding problems  PSYCHIATRIC: NEGATIVE for changes in mood or affect    EXAM:   /60   Pulse 65    Temp 97.8  F (36.6  C)   Resp 16   Wt 91.2 kg (201 lb)   SpO2 96%   BMI 32.81 kg/m      GENERAL APPEARANCE: healthy, alert and no distress     EYES: EOMI, PERRL     HENT: ear canals and TM's normal and nose and mouth without ulcers or lesions     NECK: no adenopathy, no asymmetry, masses, or scars and thyroid normal to palpation     RESP: lungs clear to auscultation - no rales, rhonchi or wheezes     CV: regular rates and rhythm, normal S1 S2, no S3 or S4 and no murmur, click or rub     ABDOMEN:  soft, nontender, no HSM or masses and bowel sounds normal     MS: extremities normal- no gross deformities noted, no evidence of inflammation in joints, FROM in all extremities.     SKIN: two skin tags along the right inner thigh, 1 mole - along the inner thigh with atypical borders and dark brown color. Slightly raised.      NEURO: Normal strength and tone, sensory exam grossly normal, mentation intact and speech normal     PSYCH: mentation appears normal. and affect normal/bright     LYMPHATICS: No cervical adenopathy    DIAGNOSTICS:     EKG: appears normal, NSR, normal axis, normal intervals, no acute ST/T changes c/w ischemia, no LVH by voltage criteria, unchanged from previous tracings  Labs Resulted Today:   Results for orders placed or performed in visit on 04/10/19   CBC with platelets and differential   Result Value Ref Range    WBC 8.0 4.0 - 11.0 10e9/L    RBC Count 4.65 3.8 - 5.2 10e12/L    Hemoglobin 13.8 11.7 - 15.7 g/dL    Hematocrit 40.0 35.0 - 47.0 %    MCV 86 78 - 100 fl    MCH 29.7 26.5 - 33.0 pg    MCHC 34.5 31.5 - 36.5 g/dL    RDW 13.4 10.0 - 15.0 %    Platelet Count 273 150 - 450 10e9/L    % Neutrophils 68.0 %    % Lymphocytes 25.0 %    % Monocytes 5.5 %    % Eosinophils 1.1 %    % Basophils 0.4 %    Absolute Neutrophil 5.4 1.6 - 8.3 10e9/L    Absolute Lymphocytes 2.0 0.8 - 5.3 10e9/L    Absolute Monocytes 0.4 0.0 - 1.3 10e9/L    Absolute Eosinophils 0.1 0.0 - 0.7 10e9/L    Absolute Basophils 0.0  0.0 - 0.2 10e9/L    Diff Method Automated Method        IMPRESSION:   Reason for surgery/procedure: Reconstruction of previous breast surgery   Diagnosis/reason for consult: Pre-operative clearance     The proposed surgical procedure is considered INTERMEDIATE risk.    REVISED CARDIAC RISK INDEX  The patient has the following serious cardiovascular risks for perioperative complications such as (MI, PE, VFib and 3  AV Block):  No serious cardiac risks  INTERPRETATION: 0 risks: Class I (very low risk - 0.4% complication rate)    The patient has the following additional risks for perioperative complications:  No identified additional risks      ICD-10-CM    1. Preop general physical exam Z01.818 EKG 12-lead complete w/read - Clinics   2. Encounter for breast augmentation Z41.1 CBC with platelets and differential     EKG 12-lead complete w/read - Clinics     Basic metabolic panel  (Ca, Cl, CO2, Creat, Gluc, K, Na, BUN)     Basic metabolic panel  (Ca, Cl, CO2, Creat, Gluc, K, Na, BUN)     CBC with platelets and differential       RECOMMENDATIONS:     --Patient is to take all scheduled medications on the day of surgery EXCEPT for modifications listed below.    Anticoagulant or Antiplatelet Medication Use  NSAIDS: Ibuprofen (Motrin):         Stop 5-7 days prior to surgery         APPROVAL GIVEN to proceed with proposed procedure, without further diagnostic evaluation     Recommend follow up with PCP for mole removal.     Signed Electronically by: MADELINE Turner CNP    Copy of this evaluation report is provided to requesting physician.    [facility] Preop Guidelines    Revised Cardiac Risk Index

## 2019-04-15 ENCOUNTER — OFFICE VISIT (OUTPATIENT)
Dept: FAMILY MEDICINE | Facility: OTHER | Age: 34
End: 2019-04-15
Payer: COMMERCIAL

## 2019-04-15 VITALS
RESPIRATION RATE: 16 BRPM | TEMPERATURE: 97.6 F | OXYGEN SATURATION: 97 % | SYSTOLIC BLOOD PRESSURE: 118 MMHG | HEART RATE: 66 BPM | WEIGHT: 200.9 LBS | BODY MASS INDEX: 32.79 KG/M2 | DIASTOLIC BLOOD PRESSURE: 70 MMHG

## 2019-04-15 DIAGNOSIS — J32.9 BACTERIAL SINUSITIS: Primary | ICD-10-CM

## 2019-04-15 DIAGNOSIS — B96.89 BACTERIAL SINUSITIS: Primary | ICD-10-CM

## 2019-04-15 DIAGNOSIS — R07.0 THROAT PAIN: ICD-10-CM

## 2019-04-15 LAB
DEPRECATED S PYO AG THROAT QL EIA: NORMAL
SPECIMEN SOURCE: NORMAL

## 2019-04-15 PROCEDURE — 87081 CULTURE SCREEN ONLY: CPT | Performed by: NURSE PRACTITIONER

## 2019-04-15 PROCEDURE — 87880 STREP A ASSAY W/OPTIC: CPT | Performed by: NURSE PRACTITIONER

## 2019-04-15 PROCEDURE — 99213 OFFICE O/P EST LOW 20 MIN: CPT | Performed by: NURSE PRACTITIONER

## 2019-04-15 RX ORDER — AMOXICILLIN AND CLAVULANATE POTASSIUM 500; 125 MG/1; MG/1
1 TABLET, FILM COATED ORAL 2 TIMES DAILY
Qty: 20 TABLET | Refills: 0 | Status: SHIPPED | OUTPATIENT
Start: 2019-04-15 | End: 2019-05-10

## 2019-04-15 RX ORDER — EPINEPHRINE 0.3 MG/.3ML
INJECTION SUBCUTANEOUS
Refills: 2 | COMMUNITY
Start: 2018-05-07 | End: 2022-01-20

## 2019-04-15 NOTE — PATIENT INSTRUCTIONS
I have prescribed an antibiotic for you today. You will take 2 tablets daily with food for 10 days. Please take a probiotic or yogurt while on this medication as this will help protect the good bacteria in your colon. Drink plenty of fluids. Use Flonase in your sinuses as directed to help with the nasal congestion.I also recommend a nightly humidifier if you have one available.    If symptoms are not improving with treatment plan please return to clinic for further evaluation.    Thank you    Nida Mckeon CNP

## 2019-04-15 NOTE — PROGRESS NOTES
SUBJECTIVE:   Breanna Vidal is a 34 year old female who presents to clinic today for the following health issues:      HPI     Acute Illness   Acute illness concerns: congestion, sore throat   Onset: about a week     Fever: no     Chills/Sweats: no     Headache (location?): YES    Sinus Pressure:YES    Conjunctivitis:  no    Ear Pain: YES- feels like fluid    Rhinorrhea: YES    Congestion: YES    Sore Throat: YES     Cough: YES - a little productive green     Wheeze: no    Decreased Appetite: YES    Nausea: no     Vomiting: no     Diarrhea:  YES    Dysuria/Freq.: no     Fatigue/Achiness: YES- achiness    Sick/Strep Exposure: YES- daughter      Therapies Tried and outcome: tylenol , dayquil     Symptoms of headache, sinus congestion, laryngitis, upper respiratory congestion  Additional history: as documented    Reviewed and updated as needed this visit by clinical staff         Reviewed and updated as needed this visit by Provider       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)     Maternal drug dependence, antepartum (H)     Past Surgical History:   Procedure Laterality Date     INCISION AND DRAINAGE  04/18/2012    chin abscess I&D     LAPAROSCOPIC SALPINGECTOMY Bilateral 8/27/2018    Procedure: LAPAROSCOPIC SALPINGECTOMY;  Laparoscopic Bilateral Salpingectomy;  Surgeon: Chai Cui MD;  Location: [facility]     MAMMOPLASTY REDUCTION Bilateral 08/04/2017       Social History     Tobacco Use     Smoking status: Former Smoker     Smokeless tobacco: Former User     Quit date: 3/10/2016   Substance Use Topics     Alcohol use: No     Comment: history alcohol abuse 2012, s/p treatment     Family History   Problem Relation Age of Onset     Cancer Father      Pancreatic Cancer Father      Lung  Cancer Father          Current Outpatient Medications   Medication Sig Dispense Refill     amoxicillin-clavulanate (AUGMENTIN) 500-125 MG tablet Take 1 tablet by mouth 2 times daily 20 tablet 0     EPINEPHrine (EPIPEN/ADRENACLICK/OR ANY BX GENERIC EQUIV) 0.3 MG/0.3ML injection 2-pack INJECT  0.3 ML IM 1 TIME PRF ANAPHYLAXIS  2     multivitamin w/minerals (MULTI-VITAMIN) tablet Take 1 tablet by mouth daily       phentermine (ADIPEX-P) 30 MG capsule Take 30 mg by mouth every morning       Allergies   Allergen Reactions     Codeine Nausea     Other reaction(s): Nausea     Ranitidine Rash and Itching     recvd IV zantac 75 and reaction was immediate,  was given Benadryl to counter act reaction in 2000     Sulfa Drugs Unknown and Rash     BP Readings from Last 3 Encounters:   04/15/19 118/70   04/10/19 108/60   02/20/19 102/56    Wt Readings from Last 3 Encounters:   04/15/19 91.1 kg (200 lb 14.4 oz)   04/10/19 91.2 kg (201 lb)   02/20/19 95.5 kg (210 lb 8 oz)                  Labs reviewed in EPIC    ROS:  Constitutional, HEENT, cardiovascular, pulmonary, gi and gu systems are negative, except as otherwise noted.    OBJECTIVE:     /70   Pulse 66   Temp 97.6  F (36.4  C) (Temporal)   Resp 16   Wt 91.1 kg (200 lb 14.4 oz)   SpO2 97%   BMI 32.79 kg/m    Body mass index is 32.79 kg/m .  GENERAL: alert and no distress  EYES: Eyes grossly normal to inspection, PERRL and conjunctivae and sclerae normal  HENT: normal cephalic/atraumatic, ear canals and TM's normal, nose and mouth without ulcers or lesions, nasal mucosa edematous , rhinorrhea yellow and green, oropharynx clear, oral mucous membranes moist, tonsillar erythema and sinuses: maxillary, frontal tenderness on bilateral  NECK: cervical adenopathy right, no asymmetry, masses, or scars and thyroid normal to palpation  RESP: lungs clear to auscultation - no rales, rhonchi or wheezes  CV: regular rate and rhythm, normal S1 S2, no S3 or S4, no murmur, click or  rub, no peripheral edema and peripheral pulses strong  MS: no gross musculoskeletal defects noted, no edema  SKIN: no suspicious lesions or rashes  PSYCH: mentation appears normal, affect normal/bright    Diagnostic Test Results:  Results for orders placed or performed in visit on 04/15/19 (from the past 24 hour(s))   Strep, Rapid Screen   Result Value Ref Range    Specimen Description Throat     Rapid Strep A Screen       NEGATIVE: No Group A streptococcal antigen detected by immunoassay, await culture report.       ASSESSMENT/PLAN:     1. Bacterial sinusitis  Home care instructions were reviewed with the patient. The risks, benefits and treatment options of prescribed medications or other treatments have been discussed with the patient. The patient verbalized their understanding and should call or follow up if no improvement or if they develop further problems.    - amoxicillin-clavulanate (AUGMENTIN) 500-125 MG tablet; Take 1 tablet by mouth 2 times daily  Dispense: 20 tablet; Refill: 0    2. Throat pain  Pending culture.   - Strep, Rapid Screen  - Beta strep group A culture    Patient Instructions   I have prescribed an antibiotic for you today. You will take 2 tablets daily with food for 10 days. Please take a probiotic or yogurt while on this medication as this will help protect the good bacteria in your colon. Drink plenty of fluids. Use Flonase in your sinuses as directed to help with the nasal congestion.I also recommend a nightly humidifier if you have one available.    If symptoms are not improving with treatment plan please return to clinic for further evaluation.    Thank you    Nida Mckeon, MADELINE [facility]

## 2019-04-16 DIAGNOSIS — G43.009 MIGRAINE WITHOUT AURA AND WITHOUT STATUS MIGRAINOSUS, NOT INTRACTABLE: Primary | ICD-10-CM

## 2019-04-16 DIAGNOSIS — J01.00 ACUTE NON-RECURRENT MAXILLARY SINUSITIS: Primary | ICD-10-CM

## 2019-04-16 LAB
BACTERIA SPEC CULT: NORMAL
SPECIMEN SOURCE: NORMAL

## 2019-04-16 RX ORDER — FLUTICASONE PROPIONATE 50 MCG
1 SPRAY, SUSPENSION (ML) NASAL DAILY
Qty: 1 ML | Refills: 1 | Status: SHIPPED | OUTPATIENT
Start: 2019-04-16 | End: 2020-02-12

## 2019-04-16 RX ORDER — SUMATRIPTAN 50 MG/1
50 TABLET, FILM COATED ORAL
Qty: 9 TABLET | Refills: 1 | Status: SHIPPED | OUTPATIENT
Start: 2019-04-16 | End: 2019-04-24 | Stop reason: SINTOL

## 2019-04-16 NOTE — PROGRESS NOTES
Patient was just seen for sinusitis and having nasal congestion. Requested Flonase as she is not tolerating the sinus flushes well, they make her feel sick. Prescription sent to pharmacy.  Karen Campos NP-C

## 2019-04-19 ENCOUNTER — OFFICE VISIT (OUTPATIENT)
Dept: FAMILY MEDICINE | Facility: OTHER | Age: 34
End: 2019-04-19
Payer: COMMERCIAL

## 2019-04-19 ENCOUNTER — HOSPITAL ENCOUNTER (EMERGENCY)
Facility: CLINIC | Age: 34
Discharge: HOME | End: 2019-04-19
Attending: EMERGENCY MEDICINE | Admitting: EMERGENCY MEDICINE
Payer: COMMERCIAL

## 2019-04-19 VITALS
HEIGHT: 66 IN | OXYGEN SATURATION: 99 % | TEMPERATURE: 97.9 F | BODY MASS INDEX: 31.99 KG/M2 | SYSTOLIC BLOOD PRESSURE: 108 MMHG | WEIGHT: 199.06 LBS | HEART RATE: 55 BPM | DIASTOLIC BLOOD PRESSURE: 71 MMHG | RESPIRATION RATE: 15 BRPM

## 2019-04-19 VITALS — DIASTOLIC BLOOD PRESSURE: 91 MMHG | SYSTOLIC BLOOD PRESSURE: 126 MMHG | HEART RATE: 73 BPM

## 2019-04-19 DIAGNOSIS — F15.11 METHAMPHETAMINE ABUSE IN REMISSION (H): ICD-10-CM

## 2019-04-19 DIAGNOSIS — R00.2 HEART PALPITATIONS: ICD-10-CM

## 2019-04-19 DIAGNOSIS — R51.9 WORST HEADACHE OF LIFE: Primary | ICD-10-CM

## 2019-04-19 DIAGNOSIS — O13.3 PREGNANCY INDUCED HYPERTENSION, THIRD TRIMESTER: ICD-10-CM

## 2019-04-19 DIAGNOSIS — R51.9 HEADACHE DISORDER: ICD-10-CM

## 2019-04-19 PROCEDURE — 93000 ELECTROCARDIOGRAM COMPLETE: CPT | Performed by: NURSE PRACTITIONER

## 2019-04-19 PROCEDURE — 99207 ZZC OFFICE-HOSPITAL ADMIT: CPT | Performed by: NURSE PRACTITIONER

## 2019-04-19 PROCEDURE — 99284 EMERGENCY DEPT VISIT MOD MDM: CPT | Mod: 25 | Performed by: EMERGENCY MEDICINE

## 2019-04-19 PROCEDURE — 96361 HYDRATE IV INFUSION ADD-ON: CPT | Performed by: EMERGENCY MEDICINE

## 2019-04-19 PROCEDURE — 99284 EMERGENCY DEPT VISIT MOD MDM: CPT | Mod: Z6 | Performed by: EMERGENCY MEDICINE

## 2019-04-19 PROCEDURE — 25000128 H RX IP 250 OP 636: Performed by: EMERGENCY MEDICINE

## 2019-04-19 PROCEDURE — 96375 TX/PRO/DX INJ NEW DRUG ADDON: CPT | Performed by: EMERGENCY MEDICINE

## 2019-04-19 PROCEDURE — 96374 THER/PROPH/DIAG INJ IV PUSH: CPT | Performed by: EMERGENCY MEDICINE

## 2019-04-19 RX ORDER — DIPHENHYDRAMINE HYDROCHLORIDE 50 MG/ML
25 INJECTION INTRAMUSCULAR; INTRAVENOUS ONCE
Status: COMPLETED | OUTPATIENT
Start: 2019-04-19 | End: 2019-04-19

## 2019-04-19 RX ORDER — KETOROLAC TROMETHAMINE 15 MG/ML
15 INJECTION, SOLUTION INTRAMUSCULAR; INTRAVENOUS ONCE
Status: COMPLETED | OUTPATIENT
Start: 2019-04-19 | End: 2019-04-19

## 2019-04-19 RX ORDER — SODIUM CHLORIDE 9 MG/ML
1000 INJECTION, SOLUTION INTRAVENOUS CONTINUOUS
Status: DISCONTINUED | OUTPATIENT
Start: 2019-04-19 | End: 2019-04-19 | Stop reason: HOSPADM

## 2019-04-19 RX ADMIN — SODIUM CHLORIDE 1000 ML: 9 INJECTION, SOLUTION INTRAVENOUS at 12:25

## 2019-04-19 RX ADMIN — KETOROLAC TROMETHAMINE 15 MG: 15 INJECTION, SOLUTION INTRAMUSCULAR; INTRAVENOUS at 12:26

## 2019-04-19 RX ADMIN — PROCHLORPERAZINE EDISYLATE 10 MG: 5 INJECTION INTRAMUSCULAR; INTRAVENOUS at 12:28

## 2019-04-19 RX ADMIN — DIPHENHYDRAMINE HYDROCHLORIDE 25 MG: 50 INJECTION INTRAMUSCULAR; INTRAVENOUS at 12:25

## 2019-04-19 ASSESSMENT — MIFFLIN-ST. JEOR: SCORE: 1619.69

## 2019-04-19 ASSESSMENT — PAIN SCALES - GENERAL: PAINLEVEL: WORST PAIN (10)

## 2019-04-19 NOTE — ED AVS SNAPSHOT
[facility] Emergency Department  [address] [place] [address]  Phone:  [phone]  Fax:  [phone]                                    Breanna Vidal   MRN: [id number]    Department:  [facility] Emergency Department   Date of Visit:  4/19/2019           After Visit Summary Signature Page    I have received my discharge instructions, and my questions have been answered. I have discussed any challenges I see with this plan with the nurse or doctor.    ..........................................................................................................................................  Patient/Patient Representative Signature      ..........................................................................................................................................  Patient Representative Print Name and Relationship to Patient    ..................................................               ................................................  Date                                   Time    ..........................................................................................................................................  Reviewed by Signature/Title    ...................................................              ..............................................  Date                                               Time          22EPIC Rev 08/18

## 2019-04-19 NOTE — PROGRESS NOTES
"      SUBJECTIVE:   Breanna Vidal is a 34 year old female who presents to clinic today for the following   health issues:          34 year old female that walked into clinic with \"worst headache of her life\".  She was met at  by myself and Dee.  She was immediately brought back to the procedure room.  Patient was neurologically intact.  Did complain of some double vision that did not resolve.  Also complained of tingling to lips and tips of fingers.  Headache started this morning- she normally takes immetrex and this resolves her headaches- she threw up in the car on the way here.  She continued to have nausea.  Headache was 10/10 and frontal she also had pain in the back of her head and her neck was painful.  If we lifted her legs up or bent her knees this increased her headache and neck pain.  Her range of motion of her neck was limited and bringing chin to chest worsened neck and headache.  She kept her eyes closed most of the visit.  She had HTN problems during her pregnancy and was induced early due to this.  Her baby is 9 months old.      Father did come to  the baby and was able to see patient.      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)     Maternal drug dependence, antepartum (H)     Past Surgical History:   Procedure Laterality Date     INCISION AND DRAINAGE  04/18/2012    chin abscess I&D     LAPAROSCOPIC SALPINGECTOMY Bilateral 8/27/2018    Procedure: LAPAROSCOPIC SALPINGECTOMY;  Laparoscopic Bilateral Salpingectomy;  Surgeon: Chai Cui MD;  Location: [facility] OR     MAMMOPLASTY REDUCTION Bilateral 08/04/2017       Social History     Tobacco Use     Smoking status: Former Smoker     Smokeless tobacco: Former User     Quit date: 3/10/2016   Substance " Use Topics     Alcohol use: No     Comment: history alcohol abuse 2012, s/p treatment     Family History   Problem Relation Age of Onset     Cancer Father      Pancreatic Cancer Father      Lung Cancer Father          Current Outpatient Medications   Medication Sig Dispense Refill     amoxicillin-clavulanate (AUGMENTIN) 500-125 MG tablet Take 1 tablet by mouth 2 times daily 20 tablet 0     EPINEPHrine (EPIPEN/ADRENACLICK/OR ANY BX GENERIC EQUIV) 0.3 MG/0.3ML injection 2-pack INJECT  0.3 ML IM 1 TIME PRF ANAPHYLAXIS  2     fluticasone (FLONASE) 50 MCG/ACT nasal spray Spray 1 spray into both nostrils daily 1 mL 1     multivitamin w/minerals (MULTI-VITAMIN) tablet Take 1 tablet by mouth daily       phentermine (ADIPEX-P) 30 MG capsule Take 30 mg by mouth every morning       SUMAtriptan (IMITREX) 50 MG tablet Take 1 tablet (50 mg) by mouth at onset of headache for migraine May repeat dose in 2 hours if needed. Do not exceed 2 tablets in 24 hours. 9 tablet 1     Allergies   Allergen Reactions     Codeine Nausea     Other reaction(s): Nausea     Ranitidine Rash and Itching     recvd IV zantac 75 and reaction was immediate,  was given Benadryl to counter act reaction in 2000     Sulfa Drugs Unknown and Rash       ROS:  Constitutional, HEENT, cardiovascular, pulmonary, gi and gu systems are negative, except as otherwise noted.    OBJECTIVE:     BP (!) 126/91   Pulse 73   There is no height or weight on file to calculate BMI.  GENERAL: alert and tearful, anxious, worried  EYES: Eyes grossly normal to inspection, PERRL and conjunctivae and sclerae normal  HENT: ear canals and TM's normal, nose and mouth without ulcers or lesions  NECK: no adenopathy, no asymmetry, masses, or scars and thyroid normal to palpation  RESP: lungs clear to auscultation - no rales, rhonchi or wheezes  CV: regular rate and rhythm, normal S1 S2, no S3 or S4, no murmur, click or rub, no peripheral edema and peripheral pulses strong  ABDOMEN: soft,  nontender, no hepatosplenomegaly, no masses and bowel sounds normal  MS: no gross musculoskeletal defects noted, no edema  NEURO: Normal strength and tone, sensory exam grossly normal, light touch and pinprick normal, proprioception normal, mentation intact, oriented times four, speech normal, cranial nerves 2-12 intact and DTR's normal and symmetric     Diagnostic Test Results:  EKG - unremarkable    ASSESSMENT/PLAN:       1. Worst headache of life  Patient with 10/10 worst headache of life.  Is 9 months postpartum and did have HTN during her pregnancy causing her to get induced.  She is dizzy, feels her lips are not moving right- though normal on exam.  I am concerned for hemorrhagic stroke, SAH, meningitis.  911 was called immediately upon patient arrival.  EKG was normal.  Vitals stable.  Pain 10/10 with blurred vision.  Did call ED and let them know of her arrival by ambulance.  Patient's  did arrive prior to her departure and her 9 month old infant was left in the fathers care.    - EKG 12-lead complete w/read - Clinics    2. Heart palpitations  As above  - EKG 12-lead complete w/read - Clinics    3. Pregnancy induced hypertension, third trimester  As above    4. 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        Dixie Hoang, KENISHA  [facility]

## 2019-04-19 NOTE — ED PROVIDER NOTES
History     Chief Complaint   Patient presents with     Headache     HPI  Breanna Vidal is a 34 year old female who resents to the emergency department complaining of a headache.  She has a history of migraine headaches and went to the clinic this morning complaining of 10 out of 10 headache and she sent here for further evaluation by ambulance.  She says this was the worst headache of her life.  Is associated with nausea, vomiting, photophobia, anxiety, tingling sensation from the fingertips to the elbows bilaterally but no focal neurologic weaknesses.  She tried her Imitrex at home which did not help.  It seemed to rapidly progress over the course of the last couple of hours.  She came over by ambulance from the clinic in in route she was given fentanyl and Zofran causing her 10 out of 10 headache to become 2 out of 10.  Is located in the front of her head and behind her eyes.  It is throbbing in nature.    Allergies:  Allergies   Allergen Reactions     Codeine Nausea     Other reaction(s): Nausea     Ranitidine Rash and Itching     recvd IV zantac 75 and reaction was immediate,  was given Benadryl to counter act reaction in 2000     Sulfa Drugs Unknown and Rash       Problem List:    Patient Active Problem List    Diagnosis Date Noted     Alcohol abuse, in remission 08/20/2018     Priority: Medium     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) 08/20/2018     Priority: Medium     Mixed incontinence 03/03/2017     Priority: Medium     Vaginal delivery 10/31/2011     Priority: Medium     Rh negative status during pregnancy 09/23/2011     Priority: Medium     Maternal drug dependence, antepartum (H) 09/23/2011     Priority: Medium     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, abuse (H) 10/14/2009     Priority: Medium     Overview:   LW Modifier:  EtOH,crack cocaine,marij- eazvanj59/7/09  ; Polysubstance Dependence          Past Medical History:    Past Medical History:   Diagnosis Date     Cellulitis  "2012     Drug abuse (H) 2011     MRSA cellulitis 2012     Rh negative, antepartum        Past Surgical History:    Past Surgical History:   Procedure Laterality Date     INCISION AND DRAINAGE  04/18/2012    chin abscess I&D     LAPAROSCOPIC SALPINGECTOMY Bilateral 8/27/2018    Procedure: LAPAROSCOPIC SALPINGECTOMY;  Laparoscopic Bilateral Salpingectomy;  Surgeon: Chai Cui MD;  Location: [facility]     MAMMOPLASTY REDUCTION Bilateral 08/04/2017       Family History:    Family History   Problem Relation Age of Onset     Cancer Father      Pancreatic Cancer Father      Lung Cancer Father        Social History:  Marital Status:  Single [1]  Social History     Tobacco Use     Smoking status: Former Smoker     Smokeless tobacco: Former User     Quit date: 3/10/2016   Substance Use Topics     Alcohol use: No     Comment: history alcohol abuse 2012, s/p treatment     Drug use: No     Comment: former drug user (EtOH, cocaine, marijuana, meth), last use 2012        Medications:      amoxicillin-clavulanate (AUGMENTIN) 500-125 MG tablet   EPINEPHrine (EPIPEN/ADRENACLICK/OR ANY BX GENERIC EQUIV) 0.3 MG/0.3ML injection 2-pack   fluticasone (FLONASE) 50 MCG/ACT nasal spray   multivitamin w/minerals (MULTI-VITAMIN) tablet   phentermine (ADIPEX-P) 30 MG capsule   SUMAtriptan (IMITREX) 50 MG tablet         Review of Systems   All other systems reviewed and are negative.      Physical Exam   BP: 117/78  Pulse: 58  Temp: 97.9  F (36.6  C)  Resp: 15  Height: 167.6 cm (5' 6\")  Weight: 90.3 kg (199 lb 1 oz)  SpO2: 97 %      Physical Exam   Constitutional: She is oriented to person, place, and time. She appears well-developed and well-nourished. She appears distressed.   Crying   HENT:   Head: Normocephalic and atraumatic.   Nose: Nose normal.   Eyes: Pupils are equal, round, and reactive to light. EOM are normal. No scleral icterus.   Photophobic   Neck: Normal range of motion. Neck supple.   Cardiovascular: Normal rate and regular " rhythm.   Pulmonary/Chest: Effort normal and breath sounds normal.   Abdominal: Soft. There is no tenderness.   Musculoskeletal: Normal range of motion. She exhibits no edema.   Neurological: She is alert and oriented to person, place, and time.   Skin: Skin is warm and dry. No rash noted. She is not diaphoretic. No erythema. No pallor.   Psychiatric:   Tearful, anxious       ED Course        Procedures                   No results found for this or any previous visit (from the past 24 hour(s)).     Medications   0.9% sodium chloride BOLUS (1,000 mLs Intravenous New Bag 4/19/19 1225)     Followed by   sodium chloride 0.9% infusion (has no administration in time range)   prochlorperazine (COMPAZINE) injection 10 mg (10 mg Intravenous Given 4/19/19 1228)   ketorolac (TORADOL) injection 15 mg (15 mg Intravenous Given 4/19/19 1226)   diphenhydrAMINE (BENADRYL) injection 25 mg (25 mg Intravenous Given 4/19/19 1225)       Assessments & Plan (with Medical Decision Making)  Migraine headache treated with IV fluids, Toradol, Compazine, Benadryl with resolution of her symptoms.  No evidence for intracranial pathology otherwise such as a bleed given her classic migraine symptoms and resolution with treatment. The differential diagnosis, treatment options, risks and follow up discussed with a competent patient and/or competent family member who agrees with the plan.       I have reviewed the nursing notes.    I have reviewed the findings, diagnosis, plan and need for follow up with the patient.         Medication List      There are no discharge medications for this visit.         Final diagnoses:   Headache disorder       4/19/2019   [facility] EMERGENCY DEPARTMENT     Sean Tate MD  04/19/19 6204

## 2019-04-19 NOTE — ED TRIAGE NOTES
Was at [facility] with the worse headache ever.  Does have history of migraines and tried her imitrex with no relief.  Did have emesis at the clinic.  Was given 100mcg fentanyl, 4 zofran and 1mg dilaudid in route.  Pain 2/10 at this time.  Has been having a lot of stress lately.

## 2019-04-19 NOTE — DISCHARGE INSTRUCTIONS
Your headache was most likely a migraine headache.  Hopefully this helped it resolve.  Please return to the emergency department if you develop new or worsening symptoms.  Otherwise follow-up with your doctor.
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
## 2019-04-24 ENCOUNTER — OFFICE VISIT (OUTPATIENT)
Dept: FAMILY MEDICINE | Facility: OTHER | Age: 34
End: 2019-04-24
Payer: COMMERCIAL

## 2019-04-24 ENCOUNTER — OFFICE VISIT (OUTPATIENT)
Dept: NEUROLOGY | Facility: CLINIC | Age: 34
End: 2019-04-24
Attending: STUDENT IN AN ORGANIZED HEALTH CARE EDUCATION/TRAINING PROGRAM
Payer: COMMERCIAL

## 2019-04-24 VITALS
RESPIRATION RATE: 20 BRPM | WEIGHT: 199.7 LBS | SYSTOLIC BLOOD PRESSURE: 115 MMHG | BODY MASS INDEX: 32.25 KG/M2 | HEART RATE: 65 BPM | DIASTOLIC BLOOD PRESSURE: 71 MMHG | OXYGEN SATURATION: 92 %

## 2019-04-24 VITALS
SYSTOLIC BLOOD PRESSURE: 110 MMHG | HEIGHT: 66 IN | DIASTOLIC BLOOD PRESSURE: 62 MMHG | TEMPERATURE: 97 F | RESPIRATION RATE: 16 BRPM | HEART RATE: 80 BPM | BODY MASS INDEX: 31.79 KG/M2 | WEIGHT: 197.8 LBS

## 2019-04-24 DIAGNOSIS — G43.119 INTRACTABLE MIGRAINE WITH AURA WITHOUT STATUS MIGRAINOSUS: Primary | ICD-10-CM

## 2019-04-24 DIAGNOSIS — G43.109 MIGRAINE WITH AURA AND WITHOUT STATUS MIGRAINOSUS, NOT INTRACTABLE: Primary | ICD-10-CM

## 2019-04-24 PROCEDURE — 99214 OFFICE O/P EST MOD 30 MIN: CPT | Performed by: STUDENT IN AN ORGANIZED HEALTH CARE EDUCATION/TRAINING PROGRAM

## 2019-04-24 PROCEDURE — 99204 OFFICE O/P NEW MOD 45 MIN: CPT | Performed by: PSYCHIATRY & NEUROLOGY

## 2019-04-24 RX ORDER — RIZATRIPTAN BENZOATE 5 MG/1
5 TABLET, ORALLY DISINTEGRATING ORAL
Qty: 6 TABLET | Refills: 0 | Status: SHIPPED | OUTPATIENT
Start: 2019-04-24 | End: 2019-06-04

## 2019-04-24 RX ORDER — NORTRIPTYLINE HCL 10 MG
10 CAPSULE ORAL SEE ADMIN INSTRUCTIONS
Qty: 60 CAPSULE | Refills: 1 | Status: SHIPPED | OUTPATIENT
Start: 2019-04-24 | End: 2019-06-04

## 2019-04-24 ASSESSMENT — MIFFLIN-ST. JEOR: SCORE: 1613.71

## 2019-04-24 ASSESSMENT — PAIN SCALES - GENERAL: PAINLEVEL: MILD PAIN (2)

## 2019-04-24 NOTE — NURSING NOTE
Breanna Vidal's goals for this visit include:   Chief Complaint   Patient presents with     Consult     Migraine with aura and without status migrainosus, not intractable [G43.109] -        She requests these members of her care team be copied on today's visit information: Yes    PCP: Karen Campos    Referring Provider:  Karen Campos, MADELINE CNP  [address] [place] [address]    Resp 20   Wt 90.6 kg (199 lb 11.2 oz)   BMI 32.25 kg/m      Do you need any medication refills at today's visit? No    Brooks Ramesh CMA (AAMA)
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
## 2019-04-25 NOTE — PROGRESS NOTES
Visit Date:   04/24/2019      NEUROLOGY CONSULTATION      PROVIDER:  Patient of Karen Campos CNP in [facility].       HISTORY OF PRESENT ILLNESS:  This patient is a 34-year-old right-handed woman seen at the request of Karen Campos CNP for neurologic consultation with chief complaint of headache.  The patient has a complicated history.  She had headaches as a child; however, things have gotten much worse in the last year or so.  She has a near daily headache, and some of these turn into a migraine headaches.  The migraine headaches occur about once a week.  They are different from the daily headache in that they are debilitating and are associated with visual symptoms, photophobia, phonophobia and nausea.  The headaches typically are frontal and retro-orbital.  Last week she had a headache event.  She took Imitrex; this is the second time she used Imitrex.  The first time she used Imitrex she lay down and when she woke up later she did not have a headache.  However, on this more recent occasion she took Imitrex.  She became extremely nauseated.  She could not keep her eyes open.  Her hands tingled.  There were spots in her vision.  She felt miserable.  She ended up in the Emergency Department with one of the worst headaches she had ever experienced.  She was given a cocktail treatment in the Emergency Department and had benefit from that.  The cocktail included fluids, prochlorperazine, ketorolac and diphenhydramine.  She reports that even with a non-migraine headache she often has to lie down and rest for a while.  She has lost a fair amount of weight since last year, going from 250 pounds to 199.  She had been on phentermine, but not in the last 3 weeks.  She does report that there is a lot of stress in her life.  She has 3 children.  She has a 14-year-old with her who has psychological problems.  She then has a 2-year-old and an infant.  She also has a sensation of feeling lightheaded.  This is  present on a nearly daily basis and she has had feeling in the office today.  She has a vague headache today, but nothing like the migraine headache she has experienced in the past.  She takes Tylenol and/or ibuprofen on a daily basis.  She has no problem with hearing, speech, swallowing.  She has no problem with bowel or bladder control and no focal symptoms in her arms or legs such as numbness or tingling.      She has been on topiramate, which made her headaches worse.  She has never been on propranolol, nortriptyline or gabapentin.  The only triptan medication she has been on is the sumatriptan.      PAST MEDICAL HISTORY:  Significant for high blood pressure during pregnancy.  She does not have diabetes, thyroid or asthma.  She has not had pertinent surgery to the head or neck, but did suffer a neck injury with a fracture at age 15 and was in a neck brace.  She is not pregnant.  She does not drink or smoke.      SOCIAL HISTORY:  She lives with her fiance.  She is a homemaker.      FAMILY HISTORY:  Not known as the patient is adopted.      PHYSICAL EXAMINATION:   GENERAL:  The patient is cooperative and in no distress.   VITAL SIGNS:  Her blood pressure is 125/81.  Heart rate is 69.   NECK:  There are no carotid bruits.   HEART:  Auscultation of the heart shows S1 and S2.     NEUROLOGIC EXAM:  The patient is alert, oriented and lucid.  Cranial nerve testing shows full visual fields to confrontation.  Funduscopic exam shows sharp discs with venous pulsations.  Visual acuity 20/20 bilaterally.  Eye movements are complete and conjugate without nystagmus.  Pupils react to light.  Facial sensation is normal.  Face moves symmetrically.  Palate elevates in the midline.  Tongue protrudes in the midline.  Motor evaluation shows no pronator drift, normal finger tapping, finger-nose-finger and heel-knee-shin.  Strength is preserved in the arms and legs.  Muscle stretch reflexes are low amplitude and symmetric.  Toes are  downgoing.  Sensory exam shows preserved vibration and temperature.  Romberg sign is absent.  She can walk on her heels, toes and tandem.      IMAGING:  She did have an MRI scan of the brain performed in 2018 along with an MR angiogram and MR venogram of the brain.  These studies were obtained for evaluation of headache, visual loss and double vision.  The studies are completely normal.      ASSESSMENT:  Mixed headache disorder, including migraine and tension headache, with possible contribution of medication overuse.      DISCUSSION:  The patient is seen for evaluation of headache.  This sounds like migraine type headache with tension headache as well.  She is having a high number of headaches.  Her exam is normal.  I do not see any need to repeat imaging, given that it was done a year ago for similar types of symptoms.  She is in agreement on that point.  For treatment I am going to put her on preventive therapy with nortriptyline, building up to 20 mg at night.  Side effects were reviewed including heart palpitations.  If she has problems with the medicine, she knows to stop it and contact me promptly.  I am also going to try her on Maxalt ODT 5 mg to see if that can have a beneficial effect on arresting the headache that has started.  I will see her in followup in a month for reexamination.  She knows to call if she finds these interventions ineffective.         GUI SANTIAGO MD             D: 2019   T: 2019   MT: OPAL      Name:     LAURA CHEEMA   MRN:      [id number]        Account:      [id number]   :      1985           Visit Date:   2019      Document: [id number]

## 2019-05-10 ENCOUNTER — TELEPHONE (OUTPATIENT)
Dept: FAMILY MEDICINE | Facility: OTHER | Age: 34
End: 2019-05-10

## 2019-05-10 ENCOUNTER — OFFICE VISIT (OUTPATIENT)
Dept: FAMILY MEDICINE | Facility: OTHER | Age: 34
End: 2019-05-10
Payer: COMMERCIAL

## 2019-05-10 VITALS
SYSTOLIC BLOOD PRESSURE: 116 MMHG | WEIGHT: 197 LBS | DIASTOLIC BLOOD PRESSURE: 62 MMHG | RESPIRATION RATE: 16 BRPM | TEMPERATURE: 97.1 F | HEART RATE: 66 BPM | BODY MASS INDEX: 31.81 KG/M2

## 2019-05-10 DIAGNOSIS — R07.0 THROAT PAIN: ICD-10-CM

## 2019-05-10 DIAGNOSIS — J02.9 VIRAL PHARYNGITIS: Primary | ICD-10-CM

## 2019-05-10 LAB
DEPRECATED S PYO AG THROAT QL EIA: NORMAL
SPECIMEN SOURCE: NORMAL

## 2019-05-10 PROCEDURE — 87880 STREP A ASSAY W/OPTIC: CPT | Performed by: STUDENT IN AN ORGANIZED HEALTH CARE EDUCATION/TRAINING PROGRAM

## 2019-05-10 PROCEDURE — 99213 OFFICE O/P EST LOW 20 MIN: CPT | Performed by: STUDENT IN AN ORGANIZED HEALTH CARE EDUCATION/TRAINING PROGRAM

## 2019-05-10 PROCEDURE — 87081 CULTURE SCREEN ONLY: CPT | Performed by: STUDENT IN AN ORGANIZED HEALTH CARE EDUCATION/TRAINING PROGRAM

## 2019-05-10 ASSESSMENT — PAIN SCALES - GENERAL: PAINLEVEL: SEVERE PAIN (7)

## 2019-05-10 NOTE — PROGRESS NOTES
SUBJECTIVE:   Breanna Vidal is a 34 year old female who presents to clinic today for the following health issues:      HPI  Acute Illness   Acute illness concerns: throat pain and hard to swallow  Onset: 3 days     Fever: no    Chills/Sweats: no    Headache (location?): no    Sinus Pressure:no    Conjunctivitis:  no    Ear Pain: YES right only but feels like fluid is in her ear     Rhinorrhea: no    Congestion: YES    Sore Throat: YES     Cough: no    Wheeze: no    Decreased Appetite: YES    Nausea: no    Vomiting: no    Diarrhea:  no    Dysuria/Freq.: no    Fatigue/Achiness: YES- both     Sick/Strep Exposure: YES- possibly at [facility]      Therapies Tried and outcome: mucinex, tylenol and ibuprofen    Additional history: as documented    Reviewed and updated as needed this visit by clinical staff  Tobacco  Allergies  Meds         Reviewed and updated as needed this visit by Provider         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)     Maternal drug dependence, antepartum (H)     Past Surgical History:   Procedure Laterality Date     INCISION AND DRAINAGE  04/18/2012    chin abscess I&D     LAPAROSCOPIC SALPINGECTOMY Bilateral 8/27/2018    Procedure: LAPAROSCOPIC SALPINGECTOMY;  Laparoscopic Bilateral Salpingectomy;  Surgeon: Chai Cui MD;  Location: [facility] OR     MAMMOPLASTY REDUCTION Bilateral 08/04/2017       Social History     Tobacco Use     Smoking status: Former Smoker     Smokeless tobacco: Former User     Quit date: 3/10/2016   Substance Use Topics     Alcohol use: No     Comment: history alcohol abuse 2012, s/p treatment     Family History   Problem Relation Age of Onset     Cancer Father      Pancreatic Cancer Father      Lung Cancer Father          Current  Outpatient Medications   Medication Sig Dispense Refill     EPINEPHrine (EPIPEN/ADRENACLICK/OR ANY BX GENERIC EQUIV) 0.3 MG/0.3ML injection 2-pack INJECT  0.3 ML IM 1 TIME PRF ANAPHYLAXIS  2     fluticasone (FLONASE) 50 MCG/ACT nasal spray Spray 1 spray into both nostrils daily 1 mL 1     multivitamin w/minerals (MULTI-VITAMIN) tablet Take 1 tablet by mouth daily       nortriptyline (PAMELOR) 10 MG capsule Take 1 capsule (10 mg) by mouth See Admin Instructions One po q hs for one week, then two po q hs. 60 capsule 1     rizatriptan (MAXALT-MLT) 5 MG ODT Take 1 tablet (5 mg) by mouth at onset of headache for migraine Dose may be repeated in two hours if headache persists. 6 tablet 0     Allergies   Allergen Reactions     Codeine Nausea     Other reaction(s): Nausea     Imitrex [Sumatriptan]      Worsening of migraine, paresthesias     Ranitidine Rash and Itching     recvd IV zantac 75 and reaction was immediate,  was given Benadryl to counter act reaction in 2000     Sulfa Drugs Unknown and Rash     BP Readings from Last 3 Encounters:   05/10/19 116/62   04/24/19 115/71   04/24/19 110/62    Wt Readings from Last 3 Encounters:   05/10/19 89.4 kg (197 lb)   04/24/19 90.6 kg (199 lb 11.2 oz)   04/24/19 89.7 kg (197 lb 12.8 oz)                  Labs reviewed in EPIC    ROS:  Constitutional, HEENT, cardiovascular, pulmonary, gi and gu systems are negative, except as otherwise noted.    OBJECTIVE:     /62   Pulse 66   Temp 97.1  F (36.2  C) (Temporal)   Resp 16   Wt 89.4 kg (197 lb)   LMP  (LMP Unknown)   BMI 31.81 kg/m    Body mass index is 31.81 kg/m .  GENERAL: alert and no acute distress  EYES: Eyes grossly normal to inspection, PERRL and conjunctivae and sclerae normal  HENT: ear canals normal, left TM dull, right TM with clear effusion, nose and mouth without ulcers or lesions  NECK: no adenopathy, no asymmetry, masses, or scars and thyroid normal to palpation  RESP: lungs clear to auscultation - no  rales, rhonchi or wheezes  CV: regular rate and rhythm, normal S1 S2, no S3 or S4, no murmur, click or rub  SKIN: no suspicious lesions or rashes  NEURO: Normal strength and tone, mentation intact and speech normal    Diagnostic Test Results:  Results for orders placed or performed in visit on 05/10/19   Strep, Rapid Screen   Result Value Ref Range    Specimen Description Throat     Rapid Strep A Screen       NEGATIVE: No Group A streptococcal antigen detected by immunoassay, await culture report.   Beta strep group A culture   Result Value Ref Range    Specimen Description Throat     Culture Micro No beta hemolytic Streptococcus Group A isolated        ASSESSMENT/PLAN:     1. Viral pharyngitis  2. Throat pain  Strep test negative. Suspect viral pharyngitis vs postnasal drip causing throat irritation and pain. Recommend using Chloraseptic throat drops to numb the pain, ibuprofen or Tylenol, rest and time. Recommend return to clinic if symptoms persist or worsen.   - Strep, Rapid Screen  - Beta strep group A culture    MADELINE Hernandez [facility]

## 2019-05-10 NOTE — TELEPHONE ENCOUNTER
Spoke with patient and advised her of message below. Patient expressed understanding and will be in shortly.

## 2019-05-10 NOTE — TELEPHONE ENCOUNTER
Reason for Call:  Other appointment    Detailed comments: pt calling, requesting to be seen by Deanne today around 9 for a sore throat. Offered the 8 AM and afternoon times today but she has other appts at 10 and 1 and was looking for around 9 in [place]. Please advise.     Phone Number Patient can be reached at: Home number on file [phone] (home)    Best Time: any     Can we leave a detailed message on this number? YES    Call taken on 5/10/2019 at 7:31 AM by Connie Ventura

## 2019-05-12 LAB
BACTERIA SPEC CULT: NORMAL
SPECIMEN SOURCE: NORMAL

## 2019-05-24 ENCOUNTER — TELEPHONE (OUTPATIENT)
Dept: FAMILY MEDICINE | Facility: OTHER | Age: 34
End: 2019-05-24

## 2019-05-24 NOTE — TELEPHONE ENCOUNTER
Reason for Call:  Form, our goal is to have forms completed with 72 hours, however, some forms may require a visit or additional information.    Type of letter, form or note:  medical    Who is the form from?: [facility] (if other please explain)    Where did the form come from: form was faxed in    What clinic location was the form placed at?: [facility] - [phone]    Where the form was placed: box Box/Folder    What number is listed as a contact on the form?: fax [phone]       Additional comments: please complete and fax    Call taken on 5/24/2019 at 9:46 AM by Joceline Aguilar

## 2019-05-28 ENCOUNTER — TRANSFERRED RECORDS (OUTPATIENT)
Dept: HEALTH INFORMATION MANAGEMENT | Facility: CLINIC | Age: 34
End: 2019-05-28

## 2019-05-29 NOTE — PROGRESS NOTES
Subjective     Breanna Vidal is a 34 year old female who presents to clinic today for the following health issues:    HPI   Neck    Onset:   Accident  19 years ago -  Flare-up    Description:   Location: below neck in back  Character: Sharp, Dull ache, Stabbing and Burning    Intensity: moderate, severe    Progression of Symptoms: worse    Accompanying Signs & Symptoms:  Other symptoms: radiation of pain to toes, numbness, tingling and swelling  --  Left side and extends into right shoulder    History:   Previous similar pain: YES      Precipitating factors:   Trauma or overuse: YES    Alleviating factors:  Improved by: heat, ice, NSAID - Naproxen  and physical therapy    Therapies Tried and outcome: see above  Turn to the right, feels between shoulder blades    She was seen in urgent care in [place] and referred to physical therapy at the location close to their clinic.  She went to one visit with physical therapy and was told to do exercises and when she put a ball between her knees and squeeze the ball 20 times.  She was told that these were pelvic floor exercises and she was confused because she went in for neck pain.      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)     Past Surgical History:   Procedure Laterality Date     INCISION AND DRAINAGE  04/18/2012    chin abscess I&D     LAPAROSCOPIC SALPINGECTOMY Bilateral 8/27/2018    Procedure: LAPAROSCOPIC SALPINGECTOMY;  Laparoscopic Bilateral Salpingectomy;  Surgeon: Chai Cui MD;  Location: [facility]     MAMMOPLASTY REDUCTION Bilateral 08/04/2017       Social History     Tobacco Use     Smoking status: Former Smoker     Smokeless tobacco: Former User     Quit date: 3/10/2016   Substance Use Topics     Alcohol use: No     Comment: history alcohol abuse 2012, s/p treatment     Family History   Problem  Relation Age of Onset     Cancer Father      Pancreatic Cancer Father      Lung Cancer Father          Current Outpatient Medications   Medication Sig Dispense Refill     fluticasone (FLONASE) 50 MCG/ACT nasal spray Spray 1 spray into both nostrils daily 1 mL 1     multivitamin w/minerals (MULTI-VITAMIN) tablet Take 1 tablet by mouth daily       naproxen (NAPROSYN) 500 MG tablet        nortriptyline (PAMELOR) 10 MG capsule Take 1 capsule (10 mg) by mouth See Admin Instructions One po q hs for one week, then two po q hs. 60 capsule 1     rizatriptan (MAXALT-MLT) 5 MG ODT Take 1 tablet (5 mg) by mouth at onset of headache for migraine Dose may be repeated in two hours if headache persists. 6 tablet 0     EPINEPHrine (EPIPEN/ADRENACLICK/OR ANY BX GENERIC EQUIV) 0.3 MG/0.3ML injection 2-pack INJECT  0.3 ML IM 1 TIME PRF ANAPHYLAXIS  2     Allergies   Allergen Reactions     Codeine Nausea     Other reaction(s): Nausea     Imitrex [Sumatriptan]      Worsening of migraine, paresthesias     Ranitidine Rash and Itching     recvd IV zantac 75 and reaction was immediate,  was given Benadryl to counter act reaction in 2000     Sulfa Drugs Unknown and Rash     BP Readings from Last 3 Encounters:   05/31/19 102/70   05/10/19 116/62   04/24/19 115/71    Wt Readings from Last 3 Encounters:   05/31/19 89.4 kg (197 lb)   05/10/19 89.4 kg (197 lb)   04/24/19 90.6 kg (199 lb 11.2 oz)                    Reviewed and updated as needed this visit by Provider         Review of Systems   ROS COMP: Constitutional, HEENT, cardiovascular, pulmonary, gi and gu systems are negative, except as otherwise noted.      Objective    LMP  (LMP Unknown)   There is no height or weight on file to calculate BMI.  Physical Exam   Constitutional: She appears well-nourished.   HENT:   Head:       Musculoskeletal:        Right shoulder: She exhibits tenderness and pain. She exhibits normal range of motion.        Arms:  Palpable knot at the base of the right  skull that is tender to palpation, palpable tightening of muscles on medial border of right upper scapula, right lateral rotation of head to 30 degrees, passive range of motion to 45 degrees, left lateral rotation of head to 45 degrees with active range of motion   Skin: Skin is warm, dry and intact.      Diagnostic Test Results:  Labs reviewed in Epic        Assessment & Plan     1. Cervicalgia  Patient would prefer to do physical therapy closer to home in [place].  She elected to go to [place] physical therapy and referral was placed.  Recommend continuing NSAIDs, ice and heat alternating, massage of the knot in her muscles, muscle relaxer.  Follow-up if after completing physical therapy her symptoms are not improving or worsening.  - PHYSICAL THERAPY REFERRAL; Future    2. Strain of right trapezius muscle, initial encounter  - PHYSICAL THERAPY REFERRAL; Future     No follow-ups on file.    MADELINE Hernandez [facility]

## 2019-05-31 ENCOUNTER — TRANSFERRED RECORDS (OUTPATIENT)
Dept: HEALTH INFORMATION MANAGEMENT | Facility: CLINIC | Age: 34
End: 2019-05-31

## 2019-05-31 ENCOUNTER — OFFICE VISIT (OUTPATIENT)
Dept: FAMILY MEDICINE | Facility: OTHER | Age: 34
End: 2019-05-31
Payer: COMMERCIAL

## 2019-05-31 VITALS
HEART RATE: 72 BPM | SYSTOLIC BLOOD PRESSURE: 102 MMHG | OXYGEN SATURATION: 97 % | DIASTOLIC BLOOD PRESSURE: 70 MMHG | TEMPERATURE: 97.4 F | BODY MASS INDEX: 31.81 KG/M2 | WEIGHT: 197 LBS

## 2019-05-31 DIAGNOSIS — M54.2 CERVICALGIA: Primary | ICD-10-CM

## 2019-05-31 DIAGNOSIS — S46.811A STRAIN OF RIGHT TRAPEZIUS MUSCLE, INITIAL ENCOUNTER: ICD-10-CM

## 2019-05-31 PROCEDURE — 99213 OFFICE O/P EST LOW 20 MIN: CPT | Performed by: STUDENT IN AN ORGANIZED HEALTH CARE EDUCATION/TRAINING PROGRAM

## 2019-05-31 RX ORDER — NAPROXEN 500 MG/1
TABLET ORAL
COMMUNITY
Start: 2019-05-10 | End: 2020-01-28

## 2019-05-31 ASSESSMENT — ENCOUNTER SYMPTOMS: HEADACHES: 1

## 2019-05-31 ASSESSMENT — PAIN SCALES - GENERAL: PAINLEVEL: EXTREME PAIN (8)

## 2019-06-03 ENCOUNTER — TELEPHONE (OUTPATIENT)
Dept: FAMILY MEDICINE | Facility: OTHER | Age: 34
End: 2019-06-03

## 2019-06-03 NOTE — TELEPHONE ENCOUNTER
Reason for Call:  Form, our goal is to have forms completed with 72 hours, however, some forms may require a visit or additional information.    Type of letter, form or note:  medical    Who is the form from?: [facility] (if other please explain)    Where did the form come from: form was faxed in    What clinic location was the form placed at?: [facility] - [phone]    Where the form was placed: Karen Campos Box/Folder    What number is listed as a contact on the form?: [phone]       Additional comments: Please sign and fax to [phone]    Call taken on 6/3/2019 at 5:36 PM by Nichole Candelario

## 2019-06-04 ENCOUNTER — OFFICE VISIT (OUTPATIENT)
Dept: NEUROLOGY | Facility: CLINIC | Age: 34
End: 2019-06-04
Payer: COMMERCIAL

## 2019-06-04 ENCOUNTER — TELEPHONE (OUTPATIENT)
Dept: NEUROLOGY | Facility: CLINIC | Age: 34
End: 2019-06-04

## 2019-06-04 VITALS
BODY MASS INDEX: 31.87 KG/M2 | OXYGEN SATURATION: 97 % | HEIGHT: 66 IN | DIASTOLIC BLOOD PRESSURE: 63 MMHG | SYSTOLIC BLOOD PRESSURE: 101 MMHG | WEIGHT: 198.3 LBS | HEART RATE: 86 BPM

## 2019-06-04 DIAGNOSIS — G43.019 INTRACTABLE MIGRAINE WITHOUT AURA AND WITHOUT STATUS MIGRAINOSUS: Primary | ICD-10-CM

## 2019-06-04 PROCEDURE — 99214 OFFICE O/P EST MOD 30 MIN: CPT | Performed by: PSYCHIATRY & NEUROLOGY

## 2019-06-04 RX ORDER — PROPRANOLOL HYDROCHLORIDE 40 MG/1
40 TABLET ORAL SEE ADMIN INSTRUCTIONS
Qty: 90 TABLET | Refills: 1 | Status: SHIPPED | OUTPATIENT
Start: 2019-06-04 | End: 2019-07-09

## 2019-06-04 ASSESSMENT — MIFFLIN-ST. JEOR: SCORE: 1616.23

## 2019-06-04 ASSESSMENT — PAIN SCALES - GENERAL: PAINLEVEL: SEVERE PAIN (7)

## 2019-06-04 NOTE — TELEPHONE ENCOUNTER
Pt called. She has been on propranolol in past without benefit.  Also unable to get Midrin.  Will try gabapentin for preventive and naratriptan for interventional management. [facility] Call Center    Phone Message    May a detailed message be left on voicemail: yes    Reason for Call: Medication Question or concern regarding medication   Prescription Clarification  Name of Medication: isometheptene-dichloralphenazone-acetaminophen (MIDRIN) -325 MG capsule [57032] (Order [id number])   Prescribing Provider: Dr. Shine   [facility]   What on the order needs clarification? Pt states this medication is off the market so she is unable to get this medication. Pharmacist recommends either Fiorinal or Fioricet as options. Please call pt back to discuss.    Action Taken: Message routed to:  Adult Clinics: Neurology p [id number]

## 2019-06-04 NOTE — PROGRESS NOTES
Visit Date:   06/04/2019      The patient is seen in followup with a headache.  I last saw her in late April.  She has a mixed headache disorder including migraine, tension headache and possible medication overuse headache.  She is here today with her 2 infant children.  Things have not been going well.  At the time of her last visit, I started her on nortriptyline and rizatriptan.  She found the nortriptyline completely ineffective and stopped it about 10 days ago.  She had been on topiramate before that, and topiramate actually made her symptoms worse.  She does report that she had a lot of stress before the April appointment.  She had a panic attack before that appointment.  She has a 14-year-old daughter with mental health issues.  Associated with the panic attack was a bad headache associated with nausea and vomiting.  She has also just started seeing a physical therapist for the cervical component of her headache.  She takes naproxen every day or every other day along with Extra Strength Excedrin.  She used to get tension headaches, which were more at the base of her neck radiating into her head, but now it is more of a frontal-type headache.  She also has pain in and around her eyes.  She is pretty much having a headache every day.      PHYSICAL EXAMINATION:   GENERAL:  The patient is cooperative and in no distress.   VITAL SIGNS:  Her blood pressure 101/63.   NEUROLOGIC:  Visual acuity 20/20 bilaterally.  Funduscopic exam shows sharp discs bilaterally.  Eye movements are complete and conjugate without nystagmus.  Pupils react to light.  Finger-nose-finger is normal.  Her gait is unremarkable.      ASSESSMENT:   1.  Mixed headache disorder, including migraine, tension headache, and probable medication overuse.      DISCUSSION:  The patient is seen with the above issue.  Both topiramate and nortriptyline have been ineffective as preventatives.  I am going to try her on a prescription for propranolol, building  up to 120 mg daily.  Two other triptans have been ineffective, namely sumatriptan and rizatriptan.  I am going to try her on Midrin to see if that might be more beneficial.  I am going to refer her to Dr. Suarez for a second opinion concerning ongoing headache management.  The patient may be a candidate for one of the newer agents or Botox.  I will see her in followup on an as-needed basis, depending on the results of her consultation with Dr. Suarez.      This was a 25-minute appointment, more than half of which was spent in counseling and coordination of care.         GUI SANTIAGO MD             D: 2019   T: 2019   MT: MAGED      Name:     LAURA CHEEMA   MRN:      [id number]        Account:      [id number]   :      1985           Visit Date:   2019      Document: [id number]

## 2019-06-04 NOTE — TELEPHONE ENCOUNTER
[facility] Call Center    Phone Message    May a detailed message be left on voicemail: yes    Reason for Call: Medication Question or concern regarding medication   Prescription Clarification  Name of Medication: isometheptene-dichloralphenazone-acetaminophen (MIDRIN) -325 MG capsule    Prescribing Provider: Dr. Shine   [facility]: [facility]    What on the order needs clarification? Pharmacy states they did not receive order for this medication. Please Advise.           Action Taken: Message routed to:  Adult Clinics: Neurology p [id number]

## 2019-06-04 NOTE — NURSING NOTE
"Breannadane Vidal's goals for this visit include: return  She requests these members of her care team be copied on today's visit information: none    PCP: Karen Campos    Referring Provider:  No referring provider defined for this encounter.    /63 (BP Location: Left arm, Patient Position: Sitting, Cuff Size: Adult Large)   Pulse 86   Ht 1.676 m (5' 6\")   Wt 89.9 kg (198 lb 4.8 oz)   LMP  (LMP Unknown)   SpO2 97%   BMI 32.01 kg/m      Do you need any medication refills at today's visit? Yes, [place]    "

## 2019-06-04 NOTE — LETTER
6/4/2019         RE: Breanna Vidal  [address]  [place] [address]        Dear Colleague,    Thank you for referring your patient, Breanna Vidal, to the [facility]. Please see a copy of my visit note below.    Visit Date:   06/04/2019      The patient is seen in followup with a headache.  I last saw her in late April.  She has a mixed headache disorder including migraine, tension headache and possible medication overuse headache.  She is here today with her 2 infant children.  Things have not been going well.  At the time of her last visit, I started her on nortriptyline and rizatriptan.  She found the nortriptyline completely ineffective and stopped it about 10 days ago.  She had been on topiramate before that, and topiramate actually made her symptoms worse.  She does report that she had a lot of stress before the April appointment.  She had a panic attack before that appointment.  She has a 14-year-old daughter with mental health issues.  Associated with the panic attack was a bad headache associated with nausea and vomiting.  She has also just started seeing a physical therapist for the cervical component of her headache.  She takes naproxen every day or every other day along with Extra Strength Excedrin.  She used to get tension headaches, which were more at the base of her neck radiating into her head, but now it is more of a frontal-type headache.  She also has pain in and around her eyes.  She is pretty much having a headache every day.      PHYSICAL EXAMINATION:   GENERAL:  The patient is cooperative and in no distress.   VITAL SIGNS:  Her blood pressure 101/63.   NEUROLOGIC:  Visual acuity 20/20 bilaterally.  Funduscopic exam shows sharp discs bilaterally.  Eye movements are complete and conjugate without nystagmus.  Pupils react to light.  Finger-nose-finger is normal.  Her gait is unremarkable.      ASSESSMENT:   1.  Mixed headache disorder, including migraine, tension headache, and  probable medication overuse.      DISCUSSION:  The patient is seen with the above issue.  Both topiramate and nortriptyline have been ineffective as preventatives.  I am going to try her on a prescription for propranolol, building up to 120 mg daily.  Two other triptans have been ineffective, namely sumatriptan and rizatriptan.  I am going to try her on Midrin to see if that might be more beneficial.  I am going to refer her to Dr. Suarez for a second opinion concerning ongoing headache management.  The patient may be a candidate for one of the newer agents or Botox.  I will see her in followup on an as-needed basis, depending on the results of her consultation with Dr. Suarez.      This was a 25-minute appointment, more than half of which was spent in counseling and coordination of care.         GUI SANTIAGO MD             D: 2019   T: 2019   MT: MAGED      Name:     LAURA CHEEMA   MRN:      [id number]        Account:      [id number]   :      1985           Visit Date:   2019      Document: [id number]       Again, thank you for allowing me to participate in the care of your patient.        Sincerely,        Gui Santiago MD

## 2019-06-05 RX ORDER — GABAPENTIN 300 MG/1
300 CAPSULE ORAL SEE ADMIN INSTRUCTIONS
Qty: 90 CAPSULE | Refills: 1 | Status: SHIPPED | OUTPATIENT
Start: 2019-06-05 | End: 2019-07-09

## 2019-06-05 RX ORDER — NARATRIPTAN 2.5 MG/1
2.5 TABLET ORAL
Qty: 6 TABLET | Refills: 1 | Status: SHIPPED | OUTPATIENT
Start: 2019-06-05 | End: 2020-02-12

## 2019-06-10 NOTE — PROGRESS NOTES
"Subjective     Breanna Vidal is a 34 year old female who presents to clinic today for the following health issues:    HPI   Patient is here to have moles and skin tags evaluated. She has one on her groin area where underwear lays, on back waistline, and on mid back.    She started working with physical therapy here in [place] and so far it is been helpful for her neck pain.  She has been doing chin tucks and flossing the nerve exercises which seem to be helpful.  She is also been exercising.  She and her family got bikes and have been doing some bike riding.  She states that she was told \"motion is lotion\" and she is dedicated to staying active to help improve her muscle pain.  Her migraines have persisted but she is seeing neurology for this.  She was pleased as she used of the new abortive medication Midrin for migraine and it worked. She started taking gabapentin at bedtime. It makes her feel like she is walking sideways so she takes it right before she goes to bed. She is supposed to gradually increase the dose to 2 and then 3 times daily but she if she can do that with how it makes her feel in terms of side effects.     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)     Past Surgical History:   Procedure Laterality Date     INCISION AND DRAINAGE  04/18/2012    chin abscess I&D     LAPAROSCOPIC SALPINGECTOMY Bilateral 8/27/2018    Procedure: LAPAROSCOPIC SALPINGECTOMY;  Laparoscopic Bilateral Salpingectomy;  Surgeon: Chai Cui MD;  Location: [facility]     MAMMOPLASTY REDUCTION Bilateral 08/04/2017       Social History     Tobacco Use     Smoking status: Former Smoker     Smokeless tobacco: Former User     Quit date: 3/10/2016   Substance Use Topics     Alcohol use: No     Comment: history alcohol abuse 2012, s/p treatment     Family History   Problem Relation " Age of Onset     Cancer Father      Pancreatic Cancer Father      Lung Cancer Father          Current Outpatient Medications   Medication Sig Dispense Refill     EPINEPHrine (EPIPEN/ADRENACLICK/OR ANY BX GENERIC EQUIV) 0.3 MG/0.3ML injection 2-pack INJECT  0.3 ML IM 1 TIME PRF ANAPHYLAXIS  2     fluticasone (FLONASE) 50 MCG/ACT nasal spray Spray 1 spray into both nostrils daily 1 mL 1     gabapentin (NEURONTIN) 300 MG capsule Take 1 capsule (300 mg) by mouth See Admin Instructions One po q day for 3 days, then one po bid for 3 days, then one po tid. 90 capsule 1     multivitamin w/minerals (MULTI-VITAMIN) tablet Take 1 tablet by mouth daily       naproxen (NAPROSYN) 500 MG tablet        naratriptan (AMERGE) 2.5 MG tablet Take 1 tablet (2.5 mg) by mouth at onset of headache for migraine 6 tablet 1     isometheptene-dichloralphenazone-acetaminophen (MIDRIN) -325 MG capsule Take 1 capsule by mouth See Admin Instructions 1-2 po at onset of headache.  May repeat one po q hour. No more than 5 per day, 10 per week. (Patient not taking: Reported on 6/11/2019) 20 capsule 1     propranolol (INDERAL) 40 MG tablet Take 1 tablet (40 mg) by mouth See Admin Instructions One po q day for 5 days, then one po bid for 5 days, then one po tid. (Patient not taking: Reported on 6/11/2019) 90 tablet 1     Allergies   Allergen Reactions     Codeine Nausea     Other reaction(s): Nausea     Imitrex [Sumatriptan]      Worsening of migraine, paresthesias     Ranitidine Rash and Itching     recvd IV zantac 75 and reaction was immediate,  was given Benadryl to counter act reaction in 2000     Sulfa Drugs Unknown and Rash     BP Readings from Last 3 Encounters:   06/11/19 112/68   06/04/19 101/63   05/31/19 102/70    Wt Readings from Last 3 Encounters:   06/11/19 89.4 kg (197 lb 3.2 oz)   06/04/19 89.9 kg (198 lb 4.8 oz)   05/31/19 89.4 kg (197 lb)                  Right groin - 2 skin tags, mole on   Two skin tags left bra line and left  "lower back    Reviewed and updated as needed this visit by Provider         Review of Systems   ROS COMP: Constitutional, HEENT, cardiovascular, pulmonary, gi and gu systems are negative, except as otherwise noted.      Objective    /68   Pulse 76   Temp 98.1  F (36.7  C) (Oral)   Resp 16   Ht 1.676 m (5' 5.98\")   Wt 89.4 kg (197 lb 3.2 oz)   BMI 31.84 kg/m  1  Body mass index is 31.84 kg/m .  Physical Exam   GENERAL: healthy, alert and no distress  RESP: lungs clear to auscultation - no rales, rhonchi or wheezes  CV: regular rate and rhythm, normal S1 S2, no S3 or S4, no murmur, click or rub  MS: no gross musculoskeletal defects noted, no edema  SKIN: 2 skin tags on right groin, mid-back and left lower back. Macular round brown lesion on mons pubis  NEURO: Normal strength and tone, mentation intact and speech normal  PSYCH: mentation appears normal, affect normal/bright    Diagnostic Test Results:  Labs reviewed in Epic        Assessment & Plan     1. Multiple acquired skin tags  - Discussed risks and benefits of procedure including bleeding, infection, and scarring   - Recommend procedure as follows:          1) shave excision of macular lesion on mons pubis; excision of skin tags with sutures. Removal of sutures in 10-14 days                Supplies: 1% lidocaine with epi, 1 alcohol wipe, betadine swabs, biopsy blade #15, lac pack, Sutures: 4-0 Ethilon, sterile 4x4 pack, 1 - sterile 2x2, 1 - small tegaderm      2. Migraine without aura and without status migrainosus, not intractable  Working with neurology. She started gabapentin at bedtime. It makes her feel a little off so she takes it right before lying down in bed. She has an upcoming appointment for follow up with her neurologist.     3. Neck pain  Working with physical therapy which has been helpful.     No follow-ups on file.    MADELINE Hernandez [facility]    "

## 2019-06-11 ENCOUNTER — OFFICE VISIT (OUTPATIENT)
Dept: FAMILY MEDICINE | Facility: OTHER | Age: 34
End: 2019-06-11
Payer: COMMERCIAL

## 2019-06-11 VITALS
SYSTOLIC BLOOD PRESSURE: 112 MMHG | HEART RATE: 76 BPM | RESPIRATION RATE: 16 BRPM | TEMPERATURE: 98.1 F | DIASTOLIC BLOOD PRESSURE: 68 MMHG | BODY MASS INDEX: 31.69 KG/M2 | WEIGHT: 197.2 LBS | HEIGHT: 66 IN

## 2019-06-11 DIAGNOSIS — G43.009 MIGRAINE WITHOUT AURA AND WITHOUT STATUS MIGRAINOSUS, NOT INTRACTABLE: ICD-10-CM

## 2019-06-11 DIAGNOSIS — M54.2 NECK PAIN: ICD-10-CM

## 2019-06-11 DIAGNOSIS — L91.8 MULTIPLE ACQUIRED SKIN TAGS: Primary | ICD-10-CM

## 2019-06-11 PROCEDURE — 99213 OFFICE O/P EST LOW 20 MIN: CPT | Performed by: STUDENT IN AN ORGANIZED HEALTH CARE EDUCATION/TRAINING PROGRAM

## 2019-06-11 ASSESSMENT — MIFFLIN-ST. JEOR: SCORE: 1610.99

## 2019-06-11 NOTE — PROGRESS NOTES
"Subjective     Breanna Vidal is a 34 year old female who presents to clinic today for the following health issues:    HPI   Patient is here to have skin lesions removed. She checked with her insurance and they will not cover removal of skin tags unless medically necessary. She notes that her son pointed out a lesion on the left bottom of her foot under her pinky toe. He said \"mommy you have a spider on your foot\".       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)     Past Surgical History:   Procedure Laterality Date     INCISION AND DRAINAGE  04/18/2012    chin abscess I&D     LAPAROSCOPIC SALPINGECTOMY Bilateral 8/27/2018    Procedure: LAPAROSCOPIC SALPINGECTOMY;  Laparoscopic Bilateral Salpingectomy;  Surgeon: Chai Cui MD;  Location: [facility] OR     MAMMOPLASTY REDUCTION Bilateral 08/04/2017       Social History     Tobacco Use     Smoking status: Former Smoker     Smokeless tobacco: Former User     Quit date: 3/10/2016   Substance Use Topics     Alcohol use: No     Comment: history alcohol abuse 2012, s/p treatment     Family History   Problem Relation Age of Onset     Cancer Father      Pancreatic Cancer Father      Lung Cancer Father          Current Outpatient Medications   Medication Sig Dispense Refill     EPINEPHrine (EPIPEN/ADRENACLICK/OR ANY BX GENERIC EQUIV) 0.3 MG/0.3ML injection 2-pack INJECT  0.3 ML IM 1 TIME PRF ANAPHYLAXIS  2     fluticasone (FLONASE) 50 MCG/ACT nasal spray Spray 1 spray into both nostrils daily 1 mL 1     gabapentin (NEURONTIN) 300 MG capsule Take 1 capsule (300 mg) by mouth See Admin Instructions One po q day for 3 days, then one po bid for 3 days, then one po tid. 90 capsule 1     multivitamin w/minerals (MULTI-VITAMIN) tablet Take 1 tablet by mouth daily       naproxen (NAPROSYN) 500 MG tablet        naratriptan (AMERGE) 2.5 " "MG tablet Take 1 tablet (2.5 mg) by mouth at onset of headache for migraine 6 tablet 1     isometheptene-dichloralphenazone-acetaminophen (MIDRIN) -325 MG capsule Take 1 capsule by mouth See Admin Instructions 1-2 po at onset of headache.  May repeat one po q hour. No more than 5 per day, 10 per week. (Patient not taking: Reported on 6/11/2019) 20 capsule 1     propranolol (INDERAL) 40 MG tablet Take 1 tablet (40 mg) by mouth See Admin Instructions One po q day for 5 days, then one po bid for 5 days, then one po tid. (Patient not taking: Reported on 6/11/2019) 90 tablet 1     Allergies   Allergen Reactions     Codeine Nausea     Other reaction(s): Nausea     Imitrex [Sumatriptan]      Worsening of migraine, paresthesias     Ranitidine Rash and Itching     recvd IV zantac 75 and reaction was immediate,  was given Benadryl to counter act reaction in 2000     Sulfa Drugs Unknown and Rash     Recent Labs   Lab Test 04/10/19  1506 10/19/18  1059 10/14/18  2102 06/26/18  1105   ALT  --   --  64* 13   CR 0.65 0.50* 0.50* 0.45*   GFRESTIMATED >90 >90 >90 >90   GFRESTBLACK >90 >90 >90 >90   POTASSIUM 4.1 4.6 3.7 4.4        Reviewed and updated as needed this visit by Provider         Review of Systems   ROS COMP: Constitutional, HEENT, cardiovascular, pulmonary, gi and gu systems are negative, except as otherwise noted.      Objective    /69   Pulse 71   Temp 97.7  F (36.5  C) (Oral)   Resp 16   Ht 1.676 m (5' 5.98\")   Wt 88.9 kg (196 lb)   BMI 31.65 kg/m    Body mass index is 31.65 kg/m .  Physical Exam   Musculoskeletal:        Feet:    Skin:           GENERAL: alert and no distress  SKIN: 8 mm macular round brown lesion on mons pubis; grayish-tan-brown half-moon shaped lesion with enlargement at the top and tapering proximally between left 4th and 5th toe on plantar aspect; incidentally noticed also a similar tiny tan-brown lesion between 2nd and 3rd toe      PSYCH: mentation appears normal, affect " "normal/bright, anxious, judgement and insight intact and appearance well groomed    Diagnostic Test Results:  Labs reviewed in Epic  Results for orders placed or performed in visit on 06/12/19   Surgical pathology exam   Result Value Ref Range    Copath Report       Patient Name: LAURA CHEEMA  MR#: [id number]  Specimen #: [id number]  Collected: 6/12/2019  Received: 6/13/2019  Reported: 6/16/2019 13:47  Ordering Phy(s): GLENDA HESS    For improved result formatting, select 'View Enhanced Report Format' under   Linked Documents section.    SPECIMEN(S):  A: Skin, foot  B: Skin, mons pubis    FINAL DIAGNOSIS:  A.  Skin lesion, foot, shave biopsy:  - Intradermal nevus with rare focus of junctional activity and without   atypia.    B.  Skin lesion, mons pubis, shave biopsy:  - Compound nevus with mild atypia.    Electronically signed out by:    Giovana Roberts M.D.    CLINICAL HISTORY:  34 year old female.  Multiple skin tags and a macular round brown lesion   on mons pubis.   Foot skin lesion not  mentioned in electronic medical record on 6/12/2019 when skin lesions were   removed.    GROSS:  A. The specimen is received in formalin, labeled with the patient's name   and date of birth, and designated  \"skin, foot\". It consists of an unoriente d, 0.7 x 0.5 cm lightly pigmented   skin shave.  The cutaneous surface  is tan-white and smooth.  The resection margin is inked black.  The   specimen is bisected and submitted  entirely in one cassette.    B. The specimen is received in formalin, labeled with the patient's name   and date of birth, and designated  \"skin, mons pubis\". It consists of an unoriented, 0.8 x 0.5 cm lightly   pigmented skin shave.  The cutaneous  surface is tan and nodular.  The resection margin is inked black.  The   specimen is bisected and submitted  entirely in one cassette. (Dictated by: MIKY Mclain([facility]) 6/13/2019   11:15 AM)    MICROSCOPIC:  Microscopic examination is " performed.    The technical component of this testing was completed at the [facility], with the professional component performed   at the [facility], [address] [place] [id number] ([phone])    CP T Codes:  A: 16077-XG7  B: 78521-UK7    COLLECTION SITE:  Client: [facility]  Location: [facility] (P)               Assessment & Plan     1. Enlarged skin mole  Progress Note:  Subjective: Breanna Vidal a 34 year old year old fe/male who presents today for lesion removal. The lesion(s) is/are located on the genitalia, number 1 and measures 0.8 cm; The patient reports the lesion is growing and bothersome as it was not there several months ago and denies other significant symptoms on ROS.    Pause for the cause has been completed prior to the prceedure.   1. Patient was identified by both name and date of birth   2. The correct site was identified   3. Site was marked by provider    4. Written informed consent correct and signed or verbal authorization  to proceed was obtained   5. Verifed necessary supplies, equipment, and diagnostics are available    6. Time out was performed immediately prior to procedure    Objective: The lesion(s) is/are of the above mentioned size and location and is/are typical. The area was prepped using alcohol swabs and appropriately anesthetized using 7 cc of 1% lidocaine with epi. Using the sterile technique, shave excision was performed. Total excision diameter was 1 cm. Hemostasis was obtained using silver nitrate. Bacitracin, folded sterile 1x1 gauze and Tegaderm dressing were applied. The procedure was well tolerated and without complications.    - Surgical pathology exam  - EXC BENIGN SKIN LESION TRUNK/ARM/LEG 0.6-1.0 CM    2. Abnormal skin growth  Progress Note:  Subjective: Breanna Vidal a 34 year old year old fe/male who presents today for lesion  "removal. The lesion(s) is/are located on the foot left,  number 2 on left bottom of foot and measures 1 x 0.5 cm. The patient reports the lesion is new and growing and denies other significant symptoms on ROS.    Pause for the cause has been completed prior to the prceedure.   7. Patient was identified by both name and date of birth   8. The correct site was identified   9. Site was marked by provider    10. Written informed consent correct and signed or verbal authorization  to proceed was obtained   11. Verifed necessary supplies, equipment, and diagnostics are available    12. Time out was performed immediately prior to procedure    Objective: The lesion(s) is/are of the above mentioned size and location and is/are typical. The area was prepped using alcohol swabs and I attempted to inject lidocaine without epi due to location but patient could not tolerate pain from needle so shared decision making to do a shave excision without local anesthesia. Using the sterile technique, shave biopsy was performed. Total excision diameter was 2 x 2 mm. Hemostasis was obtained using silver nitrate. Bacitracin, folded sterile 1x1 gauze and Tegaderm dressing were applied. The procedure was difficult for patient due to pain so this is why only a tiny portion of the foot lesion was biopsied.     - Surgical pathology exam   - BIOPSY SKIN/SUBQ/MUC MEM, SINGLE LESION      BMI:   Estimated body mass index is 31.65 kg/m  as calculated from the following:    Height as of this encounter: 1.676 m (5' 5.98\").    Weight as of this encounter: 88.9 kg (196 lb).   Weight management plan: Discussed healthy diet and exercise guidelines        No follow-ups on file.    MADELINE Hernandez [facility]    "

## 2019-06-12 ENCOUNTER — TELEPHONE (OUTPATIENT)
Dept: FAMILY MEDICINE | Facility: OTHER | Age: 34
End: 2019-06-12

## 2019-06-12 ENCOUNTER — TRANSFERRED RECORDS (OUTPATIENT)
Dept: HEALTH INFORMATION MANAGEMENT | Facility: CLINIC | Age: 34
End: 2019-06-12

## 2019-06-12 ENCOUNTER — OFFICE VISIT (OUTPATIENT)
Dept: FAMILY MEDICINE | Facility: OTHER | Age: 34
End: 2019-06-12
Payer: COMMERCIAL

## 2019-06-12 VITALS
SYSTOLIC BLOOD PRESSURE: 103 MMHG | TEMPERATURE: 97.7 F | DIASTOLIC BLOOD PRESSURE: 69 MMHG | BODY MASS INDEX: 31.5 KG/M2 | HEIGHT: 66 IN | HEART RATE: 71 BPM | RESPIRATION RATE: 16 BRPM | WEIGHT: 196 LBS

## 2019-06-12 DIAGNOSIS — D22.9 ENLARGED SKIN MOLE: Primary | ICD-10-CM

## 2019-06-12 DIAGNOSIS — D49.2 ABNORMAL SKIN GROWTH: ICD-10-CM

## 2019-06-12 PROCEDURE — 88305 TISSUE EXAM BY PATHOLOGIST: CPT | Performed by: STUDENT IN AN ORGANIZED HEALTH CARE EDUCATION/TRAINING PROGRAM

## 2019-06-12 PROCEDURE — 11102 TANGNTL BX SKIN SINGLE LES: CPT | Performed by: STUDENT IN AN ORGANIZED HEALTH CARE EDUCATION/TRAINING PROGRAM

## 2019-06-12 PROCEDURE — 99207 ZZC DROP WITH A PROCEDURE: CPT | Performed by: STUDENT IN AN ORGANIZED HEALTH CARE EDUCATION/TRAINING PROGRAM

## 2019-06-12 PROCEDURE — 11103 TANGNTL BX SKIN EA SEP/ADDL: CPT | Mod: 59 | Performed by: STUDENT IN AN ORGANIZED HEALTH CARE EDUCATION/TRAINING PROGRAM

## 2019-06-12 ASSESSMENT — MIFFLIN-ST. JEOR: SCORE: 1605.55

## 2019-06-12 NOTE — PATIENT INSTRUCTIONS
Remove the plastic bandage after 24 hours.  You may shower during the first 24 hours with the plastic bandage over the sutures.  After you remove the plastic bandage, you can shower as usual.  Do not wash sutures with soap, just rinse the area with water while showering.     For pain: Take Tylenol 1,000 mg as needed three times daily or Ibuprofen 800 mg as needed three times daily.    Monitor for infection:  Signs of infection would be redness, swelling, increased pain, warmth at the site.    Call me if you have any questions.    Karen Campos, NP-C  [phone]

## 2019-06-12 NOTE — TELEPHONE ENCOUNTER
Spoke with patient. States that she saw EM this am for skin growth removal. Took a little bit off.  It is supper painful.    Went to PT and thinks that she got it dirty.  Has taken naproxen.      Huddle with EM.     Needs to soak and clean.  Ice and elevate.  Can rotate tylenol and ibuprofen.  Return call to clinic with any questions or concerns.    Lalo Leonard, RN, BSN

## 2019-06-13 ENCOUNTER — ALLIED HEALTH/NURSE VISIT (OUTPATIENT)
Dept: FAMILY MEDICINE | Facility: OTHER | Age: 34
End: 2019-06-13
Payer: COMMERCIAL

## 2019-06-13 ENCOUNTER — TELEPHONE (OUTPATIENT)
Dept: FAMILY MEDICINE | Facility: OTHER | Age: 34
End: 2019-06-13

## 2019-06-13 DIAGNOSIS — Z51.89 VISIT FOR WOUND CHECK: Primary | ICD-10-CM

## 2019-06-13 PROCEDURE — 99207 ZZC NO CHARGE NURSE ONLY: CPT

## 2019-06-13 NOTE — TELEPHONE ENCOUNTER
Spoke with patient, she is concerned that her moles are not healing properly and she was also wondering about the results, she would like a call when results are back.   Patient states she is going to swing into clinic to have mole quickly checked  Closing encounter  Sahara Campbell RT (R)

## 2019-06-13 NOTE — PROGRESS NOTES
Patient came into clinic concerned of wound infection ( under foot and pelvic area).  She had two spots removed by EM on 06/12/2019.  No redness, no fever.  Wounds seem to be healing well.  Advised to keep clean and dry. Cover one under foot as she outs pressure on it with walking.  Encouraged to call with clinic any questions or concerns.     Lalo Leonard, RN, BSN

## 2019-06-13 NOTE — TELEPHONE ENCOUNTER
Reason for Call:  Request for results:    Name of test or procedure: mole removal    Date of test of procedure: yesterday    Location of the test or procedure: [facility] to leave the result message on voice mail or with a family member? NO    Phone number Patient can be reached at:  Cell number on file:    Telephone Information:   Mobile [phone]       Additional comments: '    Call taken on 6/13/2019 at 1:33 PM by Lise Carrasco

## 2019-06-14 ENCOUNTER — TELEPHONE (OUTPATIENT)
Dept: FAMILY MEDICINE | Facility: OTHER | Age: 34
End: 2019-06-14

## 2019-06-14 NOTE — TELEPHONE ENCOUNTER
Reason for Call:  Form, our goal is to have forms completed with 72 hours, however, some forms may require a visit or additional information.    Type of letter, form or note:  medical    Who is the form from?: [facility] [place] (if other please explain)    Where did the form come from: form was faxed in    What clinic location was the form placed at?: [facility] - [phone]    Where the form was placed: box Box/Folder    What number is listed as a contact on the form?: [phone]       Additional comments: n/a    Call taken on 6/14/2019 at 7:56 AM by Sherrill Santana

## 2019-06-16 LAB — COPATH REPORT: NORMAL

## 2019-06-17 DIAGNOSIS — L98.9 SKIN LESION OF FOOT: Primary | ICD-10-CM

## 2019-06-24 ENCOUNTER — OFFICE VISIT (OUTPATIENT)
Dept: PODIATRY | Facility: CLINIC | Age: 34
End: 2019-06-24
Payer: COMMERCIAL

## 2019-06-24 VITALS
TEMPERATURE: 97 F | BODY MASS INDEX: 31.5 KG/M2 | DIASTOLIC BLOOD PRESSURE: 78 MMHG | HEIGHT: 66 IN | WEIGHT: 196 LBS | SYSTOLIC BLOOD PRESSURE: 120 MMHG

## 2019-06-24 DIAGNOSIS — F15.11 METHAMPHETAMINE ABUSE IN REMISSION (H): ICD-10-CM

## 2019-06-24 DIAGNOSIS — D22.72 NEVUS OF LEFT FOOT: Primary | ICD-10-CM

## 2019-06-24 PROCEDURE — 99203 OFFICE O/P NEW LOW 30 MIN: CPT | Performed by: PODIATRIST

## 2019-06-24 ASSESSMENT — MIFFLIN-ST. JEOR: SCORE: 1605.55

## 2019-06-24 ASSESSMENT — PAIN SCALES - GENERAL: PAINLEVEL: NO PAIN (0)

## 2019-06-24 NOTE — LETTER
6/24/2019         RE: Breanna Vidal  [address]  [facility] [address]        Dear Colleague,    Thank you for referring your patient, Breanna Vidal, to the [facility]. Please see a copy of my visit note below.    HPI:  Breanna Vidal is a 34 year old female who is seen in consultation at the request of MADELINE Hernandez CNP.    Pt presents for eval of:   (Onset, Location, L/R, Character, Treatments, Injury if yes)    XR Left foot and ankle 2/8/2019    Lab results from 6/12/2019 6/12/2019 biopsy plantar Left foot from MADELINE Florian, CNP    Homemaker.    Weight management plan: Patient was referred to their PCP to discuss a diet and exercise plan.     Patient to follow up with Primary Care provider regarding elevated blood pressure.    ROS:  10 point ROS neg other than the symptoms noted above in the HPI.    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)       PAST MEDICAL HISTORY:   Past Medical History:   Diagnosis Date     Cellulitis 2012    MRSA septic olecranon bursitis and facial cellulitis     Drug abuse (H) 2011    methamphetamine and alcohol, sober since treatment 2011     MRSA cellulitis 2012    cleared with negative nasal swabs 8/16/17 and 8/28/17, reviewed     Rh negative, antepartum         PAST SURGICAL HISTORY:   Past Surgical History:   Procedure Laterality Date     INCISION AND DRAINAGE  04/18/2012    chin abscess I&D     LAPAROSCOPIC SALPINGECTOMY Bilateral 8/27/2018    Procedure: LAPAROSCOPIC SALPINGECTOMY;  Laparoscopic Bilateral Salpingectomy;  Surgeon: Chai Cui MD;  Location: [facility]     MAMMOPLASTY REDUCTION Bilateral 08/04/2017        MEDICATIONS:   Current Outpatient Medications:      fluticasone (FLONASE) 50 MCG/ACT nasal spray, Spray 1 spray into both nostrils daily, Disp: 1 mL, Rfl: 1     gabapentin  (NEURONTIN) 300 MG capsule, Take 1 capsule (300 mg) by mouth See Admin Instructions One po q day for 3 days, then one po bid for 3 days, then one po tid., Disp: 90 capsule, Rfl: 1     isometheptene-dichloralphenazone-acetaminophen (MIDRIN) -325 MG capsule, Take 1 capsule by mouth See Admin Instructions 1-2 po at onset of headache.  May repeat one po q hour. No more than 5 per day, 10 per week., Disp: 20 capsule, Rfl: 1     multivitamin w/minerals (MULTI-VITAMIN) tablet, Take 1 tablet by mouth daily, Disp: , Rfl:      naproxen (NAPROSYN) 500 MG tablet, , Disp: , Rfl:      naratriptan (AMERGE) 2.5 MG tablet, Take 1 tablet (2.5 mg) by mouth at onset of headache for migraine, Disp: 6 tablet, Rfl: 1     propranolol (INDERAL) 40 MG tablet, Take 1 tablet (40 mg) by mouth See Admin Instructions One po q day for 5 days, then one po bid for 5 days, then one po tid., Disp: 90 tablet, Rfl: 1     EPINEPHrine (EPIPEN/ADRENACLICK/OR ANY BX GENERIC EQUIV) 0.3 MG/0.3ML injection 2-pack, INJECT  0.3 ML IM 1 TIME PRF ANAPHYLAXIS, Disp: , Rfl: 2     ALLERGIES:    Allergies   Allergen Reactions     Codeine Nausea     Other reaction(s): Nausea     Imitrex [Sumatriptan]      Worsening of migraine, paresthesias     Ranitidine Rash and Itching     recvd IV zantac 75 and reaction was immediate,  was given Benadryl to counter act reaction in 2000     Sulfa Drugs Unknown and Rash        SOCIAL HISTORY:   Social History     Socioeconomic History     Marital status: Single     Spouse name: Not on file     Number of children: 4     Years of education: Not on file     Highest education level: Not on file   Occupational History     Not on file   Social Needs     Financial resource strain: Not on file     Food insecurity:     Worry: Not on file     Inability: Not on file     Transportation needs:     Medical: Not on file     Non-medical: Not on file   Tobacco Use     Smoking status: Former Smoker     Smokeless tobacco: Former User     Quit  "date: 3/10/2016   Substance and Sexual Activity     Alcohol use: No     Comment: history alcohol abuse 2012, s/p treatment     Drug use: No     Comment: former drug user (EtOH, cocaine, marijuana, meth), last use 2012     Sexual activity: Yes     Partners: Male   Lifestyle     Physical activity:     Days per week: Not on file     Minutes per session: Not on file     Stress: Not on file   Relationships     Social connections:     Talks on phone: Not on file     Gets together: Not on file     Attends Latter-day service: Not on file     Active member of club or organization: Not on file     Attends meetings of clubs or organizations: Not on file     Relationship status: Not on file     Intimate partner violence:     Fear of current or ex partner: Not on file     Emotionally abused: Not on file     Physically abused: Not on file     Forced sexual activity: Not on file   Other Topics Concern     Not on file   Social History Narrative    Currently lives in [place] with fiance and 18 month old son.  Has a 13 year old dtr that lives with dtrs dad in [place], [place], and a 6  Year old that was adopted out in an open adoption, lives in [place]        FAMILY HISTORY:   Family History   Problem Relation Age of Onset     Cancer Father      Pancreatic Cancer Father      Lung Cancer Father         EXAM:Vitals: /78 (BP Location: Left arm, Cuff Size: Adult Regular)   Temp 97  F (36.1  C) (Temporal)   Ht 1.676 m (5' 5.98\")   Wt 88.9 kg (196 lb)   BMI 31.65 kg/m     BMI= Body mass index is 31.65 kg/m .    General appearance: Patient is alert and fully cooperative with history & exam.  No sign of distress is noted during the visit.     Psychiatric: Affect is pleasant & appropriate.  Patient appears motivated to improve health.     Respiratory: Breathing is regular & unlabored while sitting.     HEENT: Hearing is intact to spoken word.  Speech is clear.  No gross evidence of visual impairment that would impact ambulation.   "   Vascular: DP & PT pulses are intact & regular bilaterally.  No significant edema or varicosities noted.  CFT and skin temperature is normal to both lower extremities.     Neurologic: Lower extremity sensation is intact to light touch.  No evidence of weakness or contracture in the lower extremities.  No evidence of neuropathy.    Dermatologic: Skin is intact to both lower extremities with adequate texture, turgor and tone about the integument.  No paronychia or evidence of soft tissue infection is noted.  A pigmented lesion measuring 15 mm by 10 mm is noted at the junctional skin plantar 4-5th toes left foot.  Erythema from recent skin shaving so borders not well defined and skin lines are disrupted throughout with fibrotic eschar.  Also 1-2 mm nevus at junctional skin at left 2nd and 3rd interspace.  2-3 mm nevus also noted about the plantar central arch of the left foot.    Musculoskeletal: Patient is ambulatory without assistive device or brace.  No gross ankle deformity noted.  No foot or ankle joint effusion is noted.      Pathology report:   6/12/2019  FINAL DIAGNOSIS:   A.  Skin lesion, foot, shave biopsy:   - Intradermal nevus with rare focus of junctional activity and without   atypia.      ASSESSMENT:       ICD-10-CM    1. Nevus of left foot D22.72    2. 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) F15.11         PLAN:  Reviewed patient's chart in [facility].      6/24/2019   Patient has atypical cells on a shave biopsy about mons pubis and recent shave lesion about a nevus at junctional skin about the plantar left foot which looks quite large but irregular at the moment just a few days after this shave biopsy.  She also has 2 lesions between the third and fourth toes at junctional skin and another nevus about this plantar central arch.  She has an appointment with a dermatologist in the next month or so as she has several other lesions throughout her body that she wonders if should be removed.  Therefore due to her  history and current planning it seems most prudent for her to follow-up with a dermatologist to address each of these lesions and determine a treatment plan.    The patient would like to have all most dangerous lesions removed.  These lesions do not appear to have an irregular border, not raised, not bleeding however they are a junctional skin about the interdigital lesions as well as the large lesion that was just biopsied.  Therefore it seems reasonable to excise these.  Plantar foot is difficult to obtain local anesthesia and the lesion that was shaved appears to be large enough where a lobed skin flap would be indicated for a complete excision.    Discussed options with her.  At this time she would like to follow-up with the dermatologist for further guidance and treatment as necessary for multiple lesions throughout her body.  I am happy to follow through with surgical excision of the 3 junctional lesions noted on her left foot at any point in time.  The boundaries of the lesion that was shaved biopsied about the left fourth and fifth toes are not well demarcated at this time.     She also has questions regarding plantar fasciitis.  This was briefly discussed and written instructions dispensed and she was instructed to follow-up with us in regards to this if this remains symptomatic.  A second appointment would be necessary due to time constraints.    All questions were answered.      Rinku Lopez DPM      Again, thank you for allowing me to participate in the care of your patient.        Sincerely,        Rinku Lopez DPM

## 2019-06-24 NOTE — PROGRESS NOTES
HPI:  Breanna Vidal is a 34 year old female who is seen in consultation at the request of MADELINE Hernandez CNP.    Pt presents for eval of:   (Onset, Location, L/R, Character, Treatments, Injury if yes)    XR Left foot and ankle 2/8/2019    Lab results from 6/12/2019 6/12/2019 biopsy plantar Left foot from MADELINE Florian, CNP    Homemaker.    Weight management plan: Patient was referred to their PCP to discuss a diet and exercise plan.     Patient to follow up with Primary Care provider regarding elevated blood pressure.    ROS:  10 point ROS neg other than the symptoms noted above in the HPI.    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)       PAST MEDICAL HISTORY:   Past Medical History:   Diagnosis Date     Cellulitis 2012    MRSA septic olecranon bursitis and facial cellulitis     Drug abuse (H) 2011    methamphetamine and alcohol, sober since treatment 2011     MRSA cellulitis 2012    cleared with negative nasal swabs 8/16/17 and 8/28/17, reviewed     Rh negative, antepartum         PAST SURGICAL HISTORY:   Past Surgical History:   Procedure Laterality Date     INCISION AND DRAINAGE  04/18/2012    chin abscess I&D     LAPAROSCOPIC SALPINGECTOMY Bilateral 8/27/2018    Procedure: LAPAROSCOPIC SALPINGECTOMY;  Laparoscopic Bilateral Salpingectomy;  Surgeon: Chai Cui MD;  Location: [facility]     MAMMOPLASTY REDUCTION Bilateral 08/04/2017        MEDICATIONS:   Current Outpatient Medications:      fluticasone (FLONASE) 50 MCG/ACT nasal spray, Spray 1 spray into both nostrils daily, Disp: 1 mL, Rfl: 1     gabapentin (NEURONTIN) 300 MG capsule, Take 1 capsule (300 mg) by mouth See Admin Instructions One po q day for 3 days, then one po bid for 3 days, then one po tid., Disp: 90 capsule, Rfl: 1     isometheptene-dichloralphenazone-acetaminophen  (MIDRIN) -325 MG capsule, Take 1 capsule by mouth See Admin Instructions 1-2 po at onset of headache.  May repeat one po q hour. No more than 5 per day, 10 per week., Disp: 20 capsule, Rfl: 1     multivitamin w/minerals (MULTI-VITAMIN) tablet, Take 1 tablet by mouth daily, Disp: , Rfl:      naproxen (NAPROSYN) 500 MG tablet, , Disp: , Rfl:      naratriptan (AMERGE) 2.5 MG tablet, Take 1 tablet (2.5 mg) by mouth at onset of headache for migraine, Disp: 6 tablet, Rfl: 1     propranolol (INDERAL) 40 MG tablet, Take 1 tablet (40 mg) by mouth See Admin Instructions One po q day for 5 days, then one po bid for 5 days, then one po tid., Disp: 90 tablet, Rfl: 1     EPINEPHrine (EPIPEN/ADRENACLICK/OR ANY BX GENERIC EQUIV) 0.3 MG/0.3ML injection 2-pack, INJECT  0.3 ML IM 1 TIME PRF ANAPHYLAXIS, Disp: , Rfl: 2     ALLERGIES:    Allergies   Allergen Reactions     Codeine Nausea     Other reaction(s): Nausea     Imitrex [Sumatriptan]      Worsening of migraine, paresthesias     Ranitidine Rash and Itching     recvd IV zantac 75 and reaction was immediate,  was given Benadryl to counter act reaction in 2000     Sulfa Drugs Unknown and Rash        SOCIAL HISTORY:   Social History     Socioeconomic History     Marital status: Single     Spouse name: Not on file     Number of children: 4     Years of education: Not on file     Highest education level: Not on file   Occupational History     Not on file   Social Needs     Financial resource strain: Not on file     Food insecurity:     Worry: Not on file     Inability: Not on file     Transportation needs:     Medical: Not on file     Non-medical: Not on file   Tobacco Use     Smoking status: Former Smoker     Smokeless tobacco: Former User     Quit date: 3/10/2016   Substance and Sexual Activity     Alcohol use: No     Comment: history alcohol abuse 2012, s/p treatment     Drug use: No     Comment: former drug user (EtOH, cocaine, marijuana, meth), last use 2012     Sexual  "activity: Yes     Partners: Male   Lifestyle     Physical activity:     Days per week: Not on file     Minutes per session: Not on file     Stress: Not on file   Relationships     Social connections:     Talks on phone: Not on file     Gets together: Not on file     Attends Alevism service: Not on file     Active member of club or organization: Not on file     Attends meetings of clubs or organizations: Not on file     Relationship status: Not on file     Intimate partner violence:     Fear of current or ex partner: Not on file     Emotionally abused: Not on file     Physically abused: Not on file     Forced sexual activity: Not on file   Other Topics Concern     Not on file   Social History Narrative    Currently lives in [place] with fiance and 18 month old son.  Has a 13 year old dtr that lives with dtrs dad in [place], [place], and a 6  Year old that was adopted out in an open adoption, lives in [place]        FAMILY HISTORY:   Family History   Problem Relation Age of Onset     Cancer Father      Pancreatic Cancer Father      Lung Cancer Father         EXAM:Vitals: /78 (BP Location: Left arm, Cuff Size: Adult Regular)   Temp 97  F (36.1  C) (Temporal)   Ht 1.676 m (5' 5.98\")   Wt 88.9 kg (196 lb)   BMI 31.65 kg/m    BMI= Body mass index is 31.65 kg/m .    General appearance: Patient is alert and fully cooperative with history & exam.  No sign of distress is noted during the visit.     Psychiatric: Affect is pleasant & appropriate.  Patient appears motivated to improve health.     Respiratory: Breathing is regular & unlabored while sitting.     HEENT: Hearing is intact to spoken word.  Speech is clear.  No gross evidence of visual impairment that would impact ambulation.     Vascular: DP & PT pulses are intact & regular bilaterally.  No significant edema or varicosities noted.  CFT and skin temperature is normal to both lower extremities.     Neurologic: Lower extremity sensation is intact to light " touch.  No evidence of weakness or contracture in the lower extremities.  No evidence of neuropathy.    Dermatologic: Skin is intact to both lower extremities with adequate texture, turgor and tone about the integument.  No paronychia or evidence of soft tissue infection is noted.  A pigmented lesion measuring 15 mm by 10 mm is noted at the junctional skin plantar 4-5th toes left foot.  Erythema from recent skin shaving so borders not well defined and skin lines are disrupted throughout with fibrotic eschar.  Also 1-2 mm nevus at junctional skin at left 2nd and 3rd interspace.  2-3 mm nevus also noted about the plantar central arch of the left foot.    Musculoskeletal: Patient is ambulatory without assistive device or brace.  No gross ankle deformity noted.  No foot or ankle joint effusion is noted.      Pathology report:   6/12/2019  FINAL DIAGNOSIS:   A.  Skin lesion, foot, shave biopsy:   - Intradermal nevus with rare focus of junctional activity and without   atypia.      ASSESSMENT:       ICD-10-CM    1. Nevus of left foot D22.72    2. 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) F15.11         PLAN:  Reviewed patient's chart in [facility].      6/24/2019   Patient has atypical cells on a shave biopsy about mons pubis and recent shave lesion about a nevus at junctional skin about the plantar left foot which looks quite large but irregular at the moment just a few days after this shave biopsy.  She also has 2 lesions between the third and fourth toes at junctional skin and another nevus about this plantar central arch.  She has an appointment with a dermatologist in the next month or so as she has several other lesions throughout her body that she wonders if should be removed.  Therefore due to her history and current planning it seems most prudent for her to follow-up with a dermatologist to address each of these lesions and determine a treatment plan.    The patient would like to have all most dangerous lesions removed.   These lesions do not appear to have an irregular border, not raised, not bleeding however they are a junctional skin about the interdigital lesions as well as the large lesion that was just biopsied.  Therefore it seems reasonable to excise these.  Plantar foot is difficult to obtain local anesthesia and the lesion that was shaved appears to be large enough where a lobed skin flap would be indicated for a complete excision.    Discussed options with her.  At this time she would like to follow-up with the dermatologist for further guidance and treatment as necessary for multiple lesions throughout her body.  I am happy to follow through with surgical excision of the 3 junctional lesions noted on her left foot at any point in time.  The boundaries of the lesion that was shaved biopsied about the left fourth and fifth toes are not well demarcated at this time.     She also has questions regarding plantar fasciitis.  This was briefly discussed and written instructions dispensed and she was instructed to follow-up with us in regards to this if this remains symptomatic.  A second appointment would be necessary due to time constraints.    All questions were answered.      Rinku Lopez DPM

## 2019-06-24 NOTE — PATIENT INSTRUCTIONS
PLANTAR FASCIITIS  The  plantar fascia  is a thick fibrous layer of tissue that covers the bones on the bottom of your foot. It supports the foot bones in an arched position.  Plantar fasciitis  is a painful inflammation of the plantar fascia due to overuse. This can develop gradually or suddenly. It usually affects one foot at a time but can affect both feet. Heel pain can be sharp and feel like a knife sticking in the bottom of your foot. Pain may occur after exercising, long distance jogging, stair climbing, long periods of standing, or after getting up from a seated position.  Risk factors include arthritis, diabetes, obesity or recent weight gain, flat-foot, high arch, wearing high heels or loose shoes or shoes with poor arch support.  Sudden changes in activity or shoe gear may contribute to symptoms.  Foot pain from this condition is usually worse in the morning and improves with walking. By the end of the day there may be a dull aching. Treatment requires improved support of feet, short-term rest and controlling inflammation. It may take up to nine months before all symptoms go away with the measures described below.  A steroid injection into the foot, or surgery may be needed if this is becomes long standing or severe.  HOME CARE  1. If you are overweight, lose weight to promote healing.  2. Choose supportive shoes (stiff through the shank) with good arch support and shock absorbency. Replace athletic shoes when they become worn out. Don t walk or run barefoot.  3. Shoe inserts are an important part of treatment. You can buy off-the-shelf shoe inserts inexpensively such as [employer].  The best ones are custom molded to your foot with a prescription.  4. Night splints keep the plantar fascia gently stretched while you sleep and will eliminate morning pain. Wear it ALL NIGHT EVERY NIGHT, or any time you sit for a long time.  5. Reduce by 10% or more the activities that stress the feet: jogging, prolonged  standing or walking, high impact sports, etc.  6. Stretch your feet. Gently flex your ankle by leaning into a wall or counter or drop your heel from a step.  Stretch two minutes of every hour you are awake.  7. Icing or massage may help heel pain. Apply an ice pack or frozen water or Coke bottle to the heel for 10-20 minutes as a preventive or after an acute flare of symptoms. You may repeat this as needed.   Follow up with your Doctor in 3 weeks as instructed.    Reliable shoe stores: To maximize your experience and provide the best possible fit.  Be sure to show them your foot concerns and tell them Dr. Lopez sent you.      Stores listed in bold have only athletic shoes, and stores that are not bold are mostly casual or variety of shoes    [employer]  [address] [place] [address]  [phone]     [facility]  [address] [place] [address]  [phone]     [employer] [address] [address]  [address] [place] [address]  [phone]    [employer]  [address] [facility] [address]  [phone]    [employer]  [address] [place], [place] [address] [phone]    [employer]  [address] [place] [address]  [phone]                         [employer] Locations:     [address] [place] [address]   [phone]     [address] [place] [address]   [phone]     [address] [place] [address]   [phone]     [address] [place] [address]   [phone]     [address] [place] [address]   [phone]     [address] [place] [address]   [phone]     [address] [facility] [address]   [place] [address]   [phone]     [address]   [place], [place] [id number] [phone]             How to find reasonable shoes          The correct width    Correct Fitting    Correct Length      Foot Distortion    Posture Distortion                           Torsional Rigidity      Grasp behind the heel and underneath the foot and twist      Bad    Excessive torsion/twist in midfoot     Less torsion/twist in midfoot is better                   Heel Counter Rigidity      Grasp just above   midsole and squeeze      Bad    Soft heel counter      Good    Rigid Heel Counter      Flexion Rigidity      Grasp shoe and bend from forefoot to rearfoot

## 2019-06-27 ENCOUNTER — TRANSFERRED RECORDS (OUTPATIENT)
Dept: HEALTH INFORMATION MANAGEMENT | Facility: CLINIC | Age: 34
End: 2019-06-27

## 2019-07-01 ENCOUNTER — TELEPHONE (OUTPATIENT)
Dept: PODIATRY | Facility: CLINIC | Age: 34
End: 2019-07-01

## 2019-07-01 NOTE — TELEPHONE ENCOUNTER
Reason for Call:  Other appointment    Detailed comments: Patient is wondering how to go about getting the mole removed on her foot, if this can be done in the offfice or if it's a surgical procedure? She says that this was discussed at last office visit. She did make an appt on 07/3 in clinic in case this could be done in clinic.    Phone Number Patient can be reached at: Home number on file [phone] (home)    Best Time: any    Can we leave a detailed message on this number? YES     Call taken on 7/1/2019 at 11:11 AM by Gail Perez

## 2019-07-01 NOTE — TELEPHONE ENCOUNTER
Spoke with patient. She states that she saw dermatology and they had said she could just wait and watch the moles on her foot but the patient and her primary care provider do not feel comfortable with this plan and would rather have them removed. I exlpained that this procedure would likely need to be done in the OR and that it would require her to have strict restrictions post-operatively. She understands and says she has help now over the summer.  She made an appt for Wednesday to discuss this all with Dr. Lopez to make sure they are both on the same page. She is going to reach out to her dermatologist to get records sent to us.     Lenka BALBUENA RN. . .  7/1/2019, 4:29 PM

## 2019-07-01 NOTE — TELEPHONE ENCOUNTER
See note from last OV on 6/24/19    PLAN:  Reviewed patient's chart in [facility].       6/24/2019   Patient has atypical cells on a shave biopsy about mons pubis and recent shave lesion about a nevus at junctional skin about the plantar left foot which looks quite large but irregular at the moment just a few days after this shave biopsy.  She also has 2 lesions between the third and fourth toes at junctional skin and another nevus about this plantar central arch.  She has an appointment with a dermatologist in the next month or so as she has several other lesions throughout her body that she wonders if should be removed.  Therefore due to her history and current planning it seems most prudent for her to follow-up with a dermatologist to address each of these lesions and determine a treatment plan.     The patient would like to have all most dangerous lesions removed.  These lesions do not appear to have an irregular border, not raised, not bleeding however they are a junctional skin about the interdigital lesions as well as the large lesion that was just biopsied.  Therefore it seems reasonable to excise these.  Plantar foot is difficult to obtain local anesthesia and the lesion that was shaved appears to be large enough where a lobed skin flap would be indicated for a complete excision.     Discussed options with her.  At this time she would like to follow-up with the dermatologist for further guidance and treatment as necessary for multiple lesions throughout her body.  I am happy to follow through with surgical excision of the 3 junctional lesions noted on her left foot at any point in time.  The boundaries of the lesion that was shaved biopsied about the left fourth and fifth toes are not well demarcated at this time.      She also has questions regarding plantar fasciitis.  This was briefly discussed and written instructions dispensed and she was instructed to follow-up with us in regards to this if this remains  symptomatic.  A second appointment would be necessary due to time constraints.     All questions were answered.        Rinku Lopez DPM

## 2019-07-02 NOTE — PROGRESS NOTES
Chief Complaint   Patient presents with     Consult     discuss surgery, nevus Left foot; LOV 6/24/2019     Breanna to follow up with Primary Care provider regarding elevated blood pressure.    HPI:  Breanna Vidal is a 34 year old female who is seen in consultation at the request of MADELINE Hernandez CNP.    Biopsy from lesion on plantar left foot 6/12/2019 per PCP demonstrates intradermal nevus with rare focus of junctional activity without atypia.     The patient states she has seen a dermatologist for multiple pigmented lesions and she would like to have 3 lesions removed from the plantar left foot.  She has had a biopsy of 1 of these lesions and she is very concerned as the left plantar lesion has changed become raised is quite tender prevents her from wearing certain types of shoes is easily irritated.  She would rather have it completely excised at this time.    Homemaker.    Weight management plan: Patient was referred to their PCP to discuss a diet and exercise plan.     Patient to follow up with Primary Care provider regarding elevated blood pressure.    ROS:  10 point ROS neg other than the symptoms noted above in the HPI.    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)       PAST MEDICAL HISTORY:   Past Medical History:   Diagnosis Date     Cellulitis 2012    MRSA septic olecranon bursitis and facial cellulitis     Drug abuse (H) 2011    methamphetamine and alcohol, sober since treatment 2011     MRSA cellulitis 2012    cleared with negative nasal swabs 8/16/17 and 8/28/17, reviewed     Rh negative, antepartum         PAST SURGICAL HISTORY:   Past Surgical History:   Procedure Laterality Date     INCISION AND DRAINAGE  04/18/2012    chin abscess I&D     LAPAROSCOPIC SALPINGECTOMY Bilateral 8/27/2018    Procedure: LAPAROSCOPIC SALPINGECTOMY;   Laparoscopic Bilateral Salpingectomy;  Surgeon: Chai Cui MD;  Location: [facility]     MAMMOPLASTY REDUCTION Bilateral 08/04/2017        MEDICATIONS:   Current Outpatient Medications:      fluticasone (FLONASE) 50 MCG/ACT nasal spray, Spray 1 spray into both nostrils daily, Disp: 1 mL, Rfl: 1     gabapentin (NEURONTIN) 300 MG capsule, Take 1 capsule (300 mg) by mouth See Admin Instructions One po q day for 3 days, then one po bid for 3 days, then one po tid., Disp: 90 capsule, Rfl: 1     isometheptene-dichloralphenazone-acetaminophen (MIDRIN) -325 MG capsule, Take 1 capsule by mouth See Admin Instructions 1-2 po at onset of headache.  May repeat one po q hour. No more than 5 per day, 10 per week., Disp: 20 capsule, Rfl: 1     multivitamin w/minerals (MULTI-VITAMIN) tablet, Take 1 tablet by mouth daily, Disp: , Rfl:      naproxen (NAPROSYN) 500 MG tablet, , Disp: , Rfl:      naratriptan (AMERGE) 2.5 MG tablet, Take 1 tablet (2.5 mg) by mouth at onset of headache for migraine, Disp: 6 tablet, Rfl: 1     propranolol (INDERAL) 40 MG tablet, Take 1 tablet (40 mg) by mouth See Admin Instructions One po q day for 5 days, then one po bid for 5 days, then one po tid., Disp: 90 tablet, Rfl: 1     EPINEPHrine (EPIPEN/ADRENACLICK/OR ANY BX GENERIC EQUIV) 0.3 MG/0.3ML injection 2-pack, INJECT  0.3 ML IM 1 TIME PRF ANAPHYLAXIS, Disp: , Rfl: 2     ALLERGIES:    Allergies   Allergen Reactions     Codeine Nausea     Other reaction(s): Nausea     Imitrex [Sumatriptan]      Worsening of migraine, paresthesias     Ranitidine Rash and Itching     recvd IV zantac 75 and reaction was immediate,  was given Benadryl to counter act reaction in 2000     Sulfa Drugs Unknown and Rash        SOCIAL HISTORY:   Social History     Socioeconomic History     Marital status: Single     Spouse name: Not on file     Number of children: 4     Years of education: Not on file     Highest education level: Not on file   Occupational History     Not  "on file   Social Needs     Financial resource strain: Not on file     Food insecurity:     Worry: Not on file     Inability: Not on file     Transportation needs:     Medical: Not on file     Non-medical: Not on file   Tobacco Use     Smoking status: Former Smoker     Smokeless tobacco: Former User     Quit date: 3/10/2016   Substance and Sexual Activity     Alcohol use: No     Comment: history alcohol abuse 2012, s/p treatment     Drug use: No     Comment: former drug user (EtOH, cocaine, marijuana, meth), last use 2012     Sexual activity: Yes     Partners: Male   Lifestyle     Physical activity:     Days per week: Not on file     Minutes per session: Not on file     Stress: Not on file   Relationships     Social connections:     Talks on phone: Not on file     Gets together: Not on file     Attends Jehovah's witness service: Not on file     Active member of club or organization: Not on file     Attends meetings of clubs or organizations: Not on file     Relationship status: Not on file     Intimate partner violence:     Fear of current or ex partner: Not on file     Emotionally abused: Not on file     Physically abused: Not on file     Forced sexual activity: Not on file   Other Topics Concern     Not on file   Social History Narrative    Currently lives in [place] with fiance and 18 month old son.  Has a 13 year old dtr that lives with dtrs dad in [place], [place], and a 6  Year old that was adopted out in an open adoption, lives in [place]        FAMILY HISTORY:   Family History   Problem Relation Age of Onset     Cancer Father      Pancreatic Cancer Father      Lung Cancer Father         EXAM:Vitals: /68   Temp 98.4  F (36.9  C) (Temporal)   Ht 1.676 m (5' 5.98\")   Wt 88.9 kg (196 lb)   BMI 31.65 kg/m    BMI= Body mass index is 31.65 kg/m .    General appearance: Patient is alert and fully cooperative with history & exam.  No sign of distress is noted during the visit.     Psychiatric: Affect is pleasant & " appropriate.  Patient appears motivated to improve health.     Respiratory: Breathing is regular & unlabored while sitting.     HEENT: Hearing is intact to spoken word.  Speech is clear.  No gross evidence of visual impairment that would impact ambulation.     Vascular: DP & PT pulses are intact & regular bilaterally.  No significant edema or varicosities noted.  CFT and skin temperature is normal to both lower extremities.     Neurologic: Lower extremity sensation is intact to light touch.  No evidence of weakness or contracture in the lower extremities.  No evidence of neuropathy.    Dermatologic: Skin is intact to both lower extremities with adequate texture, turgor and tone about the integument.  No paronychia or evidence of soft tissue infection is noted.  A pigmented lesion measuring 12 mm by 10 mm is noted at the junctional skin plantar 4-5th toes left foot.  Abnormal epidermis is noted over this from previous recent skin shaving biopsy.  Also 1-2 mm nevus at junctional skin at left 2nd and 3rd interspace.  Multiple other small nevus is noted on the dorsal toes foot and leg and one throughout the plantar central arch.  Each of these lesions are small.    Musculoskeletal: Patient is ambulatory without assistive device or brace.  No gross ankle deformity noted.  No foot or ankle joint effusion is noted.      Pathology report:   6/12/2019  FINAL DIAGNOSIS:   A.  Skin lesion, foot, shave biopsy:   - Intradermal nevus with rare focus of junctional activity and without   atypia.      ASSESSMENT:       ICD-10-CM    1. Junctional nevus of foot, left D22.72 Mimi-Operative Worksheet   2. Nevus of toe, left D22.72 Mimi-Operative Worksheet        PLAN:  Reviewed patient's chart in [facility].      7/3/2019  Patient has questions today regarding options.  She has followed up with dermatology.  The patient wishes to have these 3 lesions removed.  She understands they are most likely benign at this time however the plantar lesion  has become quite painful bothersome limiting her shoes over the last several years.  She feels it has changed become raised and changed color.  It is quite tender and easily irritated.  It prevents her from wearing sandals and flip-flops.  We discussed potential risks and complications with excision.  This would require 1 month of nonweightbearing with crutches.  She understands this may require a rotational flap for closure.  All questions and alternative treatment options were discussed and she wishes to proceed with excision of 3 lesions about the plantar left foot.    We will move forward with scheduling this.      Rinku Lopez DPM    Please schedule for surgery, pre op H&P, and post ops.    Surgery:  Patient Name:  Breanna Vidal ([id number])  Procedure:   Excision of nevus second interdigital space left foot  Excision of nevus third interdigital space left foot  Excision of plantar junctional nevus left foot  Diagnosis:    Pigmented lesion second interdigital space left foot  Pigmented lesion third interdigital space left foot  Pigmented junctional nevus plantar left foot  Assistant: None  Surgeon:  Rinku Lopez DPM  Anesthesia:  MAC  PT type:  Same Day Surgery  Time needed: 60 minutes  Patient position:  lying supine  Mini fluoro:  No  C-arm:  no  Equipment:  3 and 4 mm punch for skin biopsy, 15 blades and sutures  Anticoagulation:  No  Vendor:  no  Surgeon Notes: History of methamphetamine use    Post op appts:    5-7 days po  12-15 days po  approx 4 weeks    Expected work restrictions:  no weight bearing up to 4 weeks    [facility] Discussed:  Not Applicable

## 2019-07-03 ENCOUNTER — OFFICE VISIT (OUTPATIENT)
Dept: PODIATRY | Facility: CLINIC | Age: 34
End: 2019-07-03
Payer: COMMERCIAL

## 2019-07-03 VITALS
DIASTOLIC BLOOD PRESSURE: 68 MMHG | SYSTOLIC BLOOD PRESSURE: 104 MMHG | BODY MASS INDEX: 31.5 KG/M2 | WEIGHT: 196 LBS | HEIGHT: 66 IN | TEMPERATURE: 98.4 F

## 2019-07-03 DIAGNOSIS — D22.72: Primary | ICD-10-CM

## 2019-07-03 DIAGNOSIS — D22.72: ICD-10-CM

## 2019-07-03 PROCEDURE — 99213 OFFICE O/P EST LOW 20 MIN: CPT | Performed by: PODIATRIST

## 2019-07-03 ASSESSMENT — PAIN SCALES - GENERAL: PAINLEVEL: NO PAIN (0)

## 2019-07-03 ASSESSMENT — MIFFLIN-ST. JEOR: SCORE: 1605.48

## 2019-07-03 NOTE — LETTER
7/3/2019         RE: Breanna Vidal  [address]  [facility] [address]        Dear Colleague,    Thank you for referring your patient, Breanna Vidal, to the [facility]. Please see a copy of my visit note below.    Chief Complaint   Patient presents with     Consult     discuss surgery, nevus Left foot; LOV 6/24/2019     Breanna to follow up with Primary Care provider regarding elevated blood pressure.    HPI:  Breanna Vidal is a 34 year old female who is seen in consultation at the request of Karen Campos, APRN CNP.    Biopsy from lesion on plantar left foot 6/12/2019 per PCP demonstrates intradermal nevus with rare focus of junctional activity without atypia.     The patient states she has seen a dermatologist for multiple pigmented lesions and she would like to have 3 lesions removed from the plantar left foot.  She has had a biopsy of 1 of these lesions and she is very concerned as the left plantar lesion has changed become raised is quite tender prevents her from wearing certain types of shoes is easily irritated.  She would rather have it completely excised at this time.    Homemaker.    Weight management plan: Patient was referred to their PCP to discuss a diet and exercise plan.     Patient to follow up with Primary Care provider regarding elevated blood pressure.    ROS:  10 point ROS neg other than the symptoms noted above in the HPI.    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)       PAST MEDICAL HISTORY:   Past Medical History:   Diagnosis Date     Cellulitis 2012    MRSA septic olecranon bursitis and facial cellulitis     Drug abuse (H) 2011    methamphetamine and alcohol, sober since treatment 2011     MRSA cellulitis 2012    cleared with negative nasal swabs 8/16/17 and 8/28/17, reviewed     Rh negative, antepartum         PAST  SURGICAL HISTORY:   Past Surgical History:   Procedure Laterality Date     INCISION AND DRAINAGE  04/18/2012    chin abscess I&D     LAPAROSCOPIC SALPINGECTOMY Bilateral 8/27/2018    Procedure: LAPAROSCOPIC SALPINGECTOMY;  Laparoscopic Bilateral Salpingectomy;  Surgeon: Chai Cui MD;  Location: [facility]     MAMMOPLASTY REDUCTION Bilateral 08/04/2017        MEDICATIONS:   Current Outpatient Medications:      fluticasone (FLONASE) 50 MCG/ACT nasal spray, Spray 1 spray into both nostrils daily, Disp: 1 mL, Rfl: 1     gabapentin (NEURONTIN) 300 MG capsule, Take 1 capsule (300 mg) by mouth See Admin Instructions One po q day for 3 days, then one po bid for 3 days, then one po tid., Disp: 90 capsule, Rfl: 1     isometheptene-dichloralphenazone-acetaminophen (MIDRIN) -325 MG capsule, Take 1 capsule by mouth See Admin Instructions 1-2 po at onset of headache.  May repeat one po q hour. No more than 5 per day, 10 per week., Disp: 20 capsule, Rfl: 1     multivitamin w/minerals (MULTI-VITAMIN) tablet, Take 1 tablet by mouth daily, Disp: , Rfl:      naproxen (NAPROSYN) 500 MG tablet, , Disp: , Rfl:      naratriptan (AMERGE) 2.5 MG tablet, Take 1 tablet (2.5 mg) by mouth at onset of headache for migraine, Disp: 6 tablet, Rfl: 1     propranolol (INDERAL) 40 MG tablet, Take 1 tablet (40 mg) by mouth See Admin Instructions One po q day for 5 days, then one po bid for 5 days, then one po tid., Disp: 90 tablet, Rfl: 1     EPINEPHrine (EPIPEN/ADRENACLICK/OR ANY BX GENERIC EQUIV) 0.3 MG/0.3ML injection 2-pack, INJECT  0.3 ML IM 1 TIME PRF ANAPHYLAXIS, Disp: , Rfl: 2     ALLERGIES:    Allergies   Allergen Reactions     Codeine Nausea     Other reaction(s): Nausea     Imitrex [Sumatriptan]      Worsening of migraine, paresthesias     Ranitidine Rash and Itching     recvd IV zantac 75 and reaction was immediate,  was given Benadryl to counter act reaction in 2000     Sulfa Drugs Unknown and Rash        SOCIAL HISTORY:   Social  "History     Socioeconomic History     Marital status: Single     Spouse name: Not on file     Number of children: 4     Years of education: Not on file     Highest education level: Not on file   Occupational History     Not on file   Social Needs     Financial resource strain: Not on file     Food insecurity:     Worry: Not on file     Inability: Not on file     Transportation needs:     Medical: Not on file     Non-medical: Not on file   Tobacco Use     Smoking status: Former Smoker     Smokeless tobacco: Former User     Quit date: 3/10/2016   Substance and Sexual Activity     Alcohol use: No     Comment: history alcohol abuse 2012, s/p treatment     Drug use: No     Comment: former drug user (EtOH, cocaine, marijuana, meth), last use 2012     Sexual activity: Yes     Partners: Male   Lifestyle     Physical activity:     Days per week: Not on file     Minutes per session: Not on file     Stress: Not on file   Relationships     Social connections:     Talks on phone: Not on file     Gets together: Not on file     Attends Restorationist service: Not on file     Active member of club or organization: Not on file     Attends meetings of clubs or organizations: Not on file     Relationship status: Not on file     Intimate partner violence:     Fear of current or ex partner: Not on file     Emotionally abused: Not on file     Physically abused: Not on file     Forced sexual activity: Not on file   Other Topics Concern     Not on file   Social History Narrative    Currently lives in [place] with fiance and 18 month old son.  Has a 13 year old dtr that lives with dtrs dad in [place], [place], and a 6  Year old that was adopted out in an open adoption, lives in [place]        FAMILY HISTORY:   Family History   Problem Relation Age of Onset     Cancer Father      Pancreatic Cancer Father      Lung Cancer Father         EXAM:Vitals: /68   Temp 98.4  F (36.9  C) (Temporal)   Ht 1.676 m (5' 5.98\")   Wt 88.9 kg (196 lb)   BMI " 31.65 kg/m     BMI= Body mass index is 31.65 kg/m .    General appearance: Patient is alert and fully cooperative with history & exam.  No sign of distress is noted during the visit.     Psychiatric: Affect is pleasant & appropriate.  Patient appears motivated to improve health.     Respiratory: Breathing is regular & unlabored while sitting.     HEENT: Hearing is intact to spoken word.  Speech is clear.  No gross evidence of visual impairment that would impact ambulation.     Vascular: DP & PT pulses are intact & regular bilaterally.  No significant edema or varicosities noted.  CFT and skin temperature is normal to both lower extremities.     Neurologic: Lower extremity sensation is intact to light touch.  No evidence of weakness or contracture in the lower extremities.  No evidence of neuropathy.    Dermatologic: Skin is intact to both lower extremities with adequate texture, turgor and tone about the integument.  No paronychia or evidence of soft tissue infection is noted.  A pigmented lesion measuring 12 mm by 10 mm is noted at the junctional skin plantar 4-5th toes left foot.  Abnormal epidermis is noted over this from previous recent skin shaving biopsy.  Also 1-2 mm nevus at junctional skin at left 2nd and 3rd interspace.  Multiple other small nevus is noted on the dorsal toes foot and leg and one throughout the plantar central arch.  Each of these lesions are small.    Musculoskeletal: Patient is ambulatory without assistive device or brace.  No gross ankle deformity noted.  No foot or ankle joint effusion is noted.      Pathology report:   6/12/2019  FINAL DIAGNOSIS:   A.  Skin lesion, foot, shave biopsy:   - Intradermal nevus with rare focus of junctional activity and without   atypia.      ASSESSMENT:       ICD-10-CM    1. Junctional nevus of foot, left D22.72 Mimi-Operative Worksheet   2. Nevus of toe, left D22.72 Mimi-Operative Worksheet        PLAN:  Reviewed patient's chart in [facility].       7/3/2019  Patient has questions today regarding options.  She has followed up with dermatology.  The patient wishes to have these 3 lesions removed.  She understands they are most likely benign at this time however the plantar lesion has become quite painful bothersome limiting her shoes over the last several years.  She feels it has changed become raised and changed color.  It is quite tender and easily irritated.  It prevents her from wearing sandals and flip-flops.  We discussed potential risks and complications with excision.  This would require 1 month of nonweightbearing with crutches.  She understands this may require a rotational flap for closure.  All questions and alternative treatment options were discussed and she wishes to proceed with excision of 3 lesions about the plantar left foot.    We will move forward with scheduling this.      Rinku Lopez DPM    Please schedule for surgery, pre op H&P, and post ops.    Surgery:  Patient Name:  Breanna Vidal ([id number])  Procedure:   Excision of nevus second interdigital space left foot  Excision of nevus third interdigital space left foot  Excision of plantar junctional nevus left foot  Diagnosis:    Pigmented lesion second interdigital space left foot  Pigmented lesion third interdigital space left foot  Pigmented junctional nevus plantar left foot  Assistant: None  Surgeon:  Rinku Lopez DPM  Anesthesia:  MAC  PT type:  Same Day Surgery  Time needed: 60 minutes  Patient position:  lying supine  Mini fluoro:  No  C-arm:  no  Equipment:  3 and 4 mm punch for skin biopsy, 15 blades and sutures  Anticoagulation:  No  Vendor:  no  Surgeon Notes: History of methamphetamine use    Post op appts:    5-7 days po  12-15 days po  approx 4 weeks    Expected work restrictions:  no weight bearing up to 4 weeks    [facility] Discussed:  Not Applicable        Again, thank you for allowing me to participate in the care of your patient.         Sincerely,        Rinku Lopez DPM

## 2019-07-05 ENCOUNTER — TELEPHONE (OUTPATIENT)
Dept: PODIATRY | Facility: CLINIC | Age: 34
End: 2019-07-05

## 2019-07-05 NOTE — PROGRESS NOTES
[facility]  [id number] [place] [id number]  [phone]  Dept: [phone]    PRE-OP EVALUATION:  Today's date: 2019    Breanna Vidal (: 1985) presents for pre-operative evaluation assessment as requested by Dr. Lopez.  She requires evaluation and anesthesia risk assessment prior to undergoing surgery/procedure for treatment of foot and hernia repair with Dr. Odom.    Fax number for surgical facility:   Primary Physician: Karen Campos  Type of Anesthesia Anticipated: MAC    Patient has a Health Care Directive or Living Will:  NO    Preop Questions 2019   Who is doing your surgery? ; Addendum - Dr. Odom for hernia repair   What are you having done? removing a mole   Date of Surgery/Procedure: 19   Facility or Hospital where procedure[facility]surgery [facility] be performed[facility]   1.  Do you have a history of Heart attack, stroke, stent, coronary bypass surgery, or other heart surgery? No   2.  Do you ever have any pain or discomfort in your chest? No   3.  Do you have a history of  Heart Failure? No   4.   Are you troubled by shortness of breath when:  walking on a level surface, or up a slight hill, or at night? No   5.  Do you currently have a cold, bronchitis or other respiratory infection? No   6.  Do you have a cough, shortness of breath, or wheezing? No   7.  Do you sometimes get pains in the calves of your legs when you walk? No   8. Do you or anyone in your family have previous history of blood clots? No   9.  Do you or does anyone in your family have a serious bleeding problem such as prolonged bleeding following surgeries or cuts? No   10. Have you ever had problems with anemia or been told to take iron pills? No   11. Have you had any abnormal blood loss such as black, tarry or bloody stools, or abnormal vaginal bleeding? No   12. Have you ever had a blood transfusion? No   13. Have you or any of your relatives ever had problems with  anesthesia? No   14. Do you have sleep apnea, excessive snoring or daytime drowsiness? No   15. Do you have any prosthetic heart valves? No   16. Do you have prosthetic joints? No   17. Is there any chance that you may be pregnant? No         HPI:     HPI related to upcoming procedure: nevus on left plantar aspect of foot with plan for excision      See problem list for active medical problems.  Problems all longstanding and stable, except as noted/documented.  See ROS for pertinent symptoms related to these conditions.      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) 08/20/2018     Priority: Medium     Mixed incontinence 03/03/2017     Priority: Medium     Vaginal delivery 10/31/2011     Priority: Medium     Rh negative status during pregnancy 09/23/2011     Priority: Medium      Past Medical History:   Diagnosis Date     Cellulitis 2012    MRSA septic olecranon bursitis and facial cellulitis     Drug abuse (H) 2011    methamphetamine and alcohol, sober since treatment 2011     MRSA cellulitis 2012    cleared with negative nasal swabs 8/16/17 and 8/28/17, reviewed     Rh negative, antepartum      Past Surgical History:   Procedure Laterality Date     INCISION AND DRAINAGE  04/18/2012    chin abscess I&D     LAPAROSCOPIC SALPINGECTOMY Bilateral 8/27/2018    Procedure: LAPAROSCOPIC SALPINGECTOMY;  Laparoscopic Bilateral Salpingectomy;  Surgeon: Chai Cui MD;  Location: [facility]     MAMMOPLASTY REDUCTION Bilateral 08/04/2017     Current Outpatient Medications   Medication Sig Dispense Refill     EPINEPHrine (EPIPEN/ADRENACLICK/OR ANY BX GENERIC EQUIV) 0.3 MG/0.3ML injection 2-pack INJECT  0.3 ML IM 1 TIME PRF ANAPHYLAXIS  2     fluticasone (FLONASE) 50 MCG/ACT nasal spray Spray 1 spray into both nostrils daily 1 mL 1     gabapentin (NEURONTIN) 300 MG capsule Take 1 capsule (300  mg) by mouth At Bedtime       isometheptene-dichloralphenazone-acetaminophen (MIDRIN) -325 MG capsule Take 1 capsule by mouth See Admin Instructions 1-2 po at onset of headache.  May repeat one po q hour. No more than 5 per day, 10 per week. 20 capsule 1     multivitamin w/minerals (MULTI-VITAMIN) tablet Take 1 tablet by mouth daily       naproxen (NAPROSYN) 500 MG tablet        naratriptan (AMERGE) 2.5 MG tablet Take 1 tablet (2.5 mg) by mouth at onset of headache for migraine 6 tablet 1     OTC products: none    Allergies   Allergen Reactions     Codeine Nausea     Other reaction(s): Nausea     Imitrex [Sumatriptan]      Worsening of migraine, paresthesias     Ranitidine Rash and Itching     recvd IV zantac 75 and reaction was immediate,  was given Benadryl to counter act reaction in 2000     Sulfa Drugs Unknown and Rash      Latex Allergy: NO    Social History     Tobacco Use     Smoking status: Former Smoker     Smokeless tobacco: Former User     Quit date: 3/10/2016   Substance Use Topics     Alcohol use: No     Comment: history alcohol abuse 2012, s/p treatment     History   Drug Use No     Comment: former drug user (EtOH, cocaine, marijuana, meth), last use 2012       REVIEW OF SYSTEMS:   CONSTITUTIONAL: NEGATIVE for fever, chills, change in weight  INTEGUMENTARY/SKIN: POSITIVE for lesion on bottom of left foot NEGATIVE for worrisome rashes  EYES: NEGATIVE for vision changes or irritation  ENT/MOUTH: NEGATIVE for ear, mouth and throat problems  RESP: NEGATIVE for significant cough or SOB  CV: NEGATIVE for chest pain, palpitations or peripheral edema  GI: POSITIVE for protuberance on stomach NEGATIVE for nausea, abdominal pain, heartburn, or change in bowel habits  : NEGATIVE for frequency, dysuria, or hematuria  MUSCULOSKELETAL: NEGATIVE for significant arthralgias or myalgia  NEURO: NEGATIVE for weakness, dizziness or paresthesias  ENDOCRINE: NEGATIVE for temperature intolerance, skin/hair  "changes  HEME: NEGATIVE for bleeding problems  PSYCHIATRIC: NEGATIVE for changes in mood or affect    EXAM:   /54   Pulse 72   Temp 97.1  F (36.2  C) (Temporal)   Resp 16   Ht 1.676 m (5' 5.98\")   Wt 87.2 kg (192 lb 3.2 oz)   BMI 31.04 kg/m      GENERAL APPEARANCE: healthy, alert and no distress     EYES: EOMI, PERRL     HENT: ear canals and TM's normal and nose and mouth without ulcers or lesions     NECK: no adenopathy, no asymmetry, masses, or scars and thyroid normal to palpation     RESP: lungs clear to auscultation - no rales, rhonchi or wheezes     CV: regular rates and rhythm, normal S1 S2, no S3 or S4 and no murmur, click or rub     ABDOMEN: ventral hernia above navel that is reducible; abdomen otherwise soft, nontender, no HSM or masses and bowel sounds normal     MS: extremities normal- no gross deformities noted, no evidence of inflammation in joints, FROM in all extremities.     SKIN: no suspicious lesions or rashes     NEURO: Normal strength and tone, sensory exam grossly normal, mentation intact and speech normal     PSYCH: mentation appears normal. and affect normal/bright     LYMPHATICS: No cervical adenopathy    DIAGNOSTICS:   EKG: Not indicated due to non-vascular surgery and low risk of event (age <65 and without cardiac risk factors)    Recent Labs   Lab Test 04/10/19  1506 01/02/19  1259 10/19/18  1059   HGB 13.8 13.8 19.5*    251 127*     --  137   POTASSIUM 4.1  --  4.6   CR 0.65  --  0.50*        IMPRESSION:   Reason for surgery/procedure: removal nevus left foot  Diagnosis/reason for consult: preop    The proposed surgical procedure is considered LOW risk.    REVISED CARDIAC RISK INDEX  The patient has the following serious cardiovascular risks for perioperative complications such as (MI, PE, VFib and 3  AV Block):  No serious cardiac risks  INTERPRETATION: 0 risks: Class I (very low risk - 0.4% complication rate)    The patient has the following additional risks " for perioperative complications:  No identified additional risks      ICD-10-CM    1. Preop general physical exam Z01.818    2. Nevus of left foot D22.72    3. Ventral hernia without obstruction or gangrene K43.9 GENERAL SURG ADULT REFERRAL   4. Intractable migraine without aura and without status migrainosus G43.019 gabapentin (NEURONTIN) 300 MG capsule     DISCONTINUED: gabapentin (NEURONTIN) 300 MG capsule       RECOMMENDATIONS:     Hold NSAIDs 10 days prior to surgery.   Hold morning medications day of surgery unless otherwise indicated by surgeon.    APPROVAL GIVEN to proceed with proposed procedure, without further diagnostic evaluation     Addendum: Preop to serve as approval also for hernia repair by Dr. Odom.    Signed Electronically by: MADELINE Hernandez CNP    Copy of this evaluation report is provided to requesting physician.    [facility] Preop Guidelines    Revised Cardiac Risk Index

## 2019-07-05 NOTE — TELEPHONE ENCOUNTER
Type of surgery: excision of nevus second interdigital space left foot, excision of nevus third interdigital space left foot, excision of plantar junctional nevus left foot  Location of surgery: [facility]   Date of surgery: 7/19/19  Surgeon: Dr. Lopez  Pre-Op Appt Date: 7/9/19  Post-Op Appt Date: 7/25/19, 8/1, 8/22  Packet sent out: Surgery packet mailed to patient's home address.   Pre-cert/Authorization completed: NA  Date: 7/5/2019    Dixie Dorado  Surgery Scheduler

## 2019-07-08 ENCOUNTER — HOSPITAL ENCOUNTER (OUTPATIENT)
Facility: CLINIC | Age: 34
End: 2019-07-08
Attending: PODIATRIST | Admitting: PODIATRIST
Payer: COMMERCIAL

## 2019-07-09 ENCOUNTER — OFFICE VISIT (OUTPATIENT)
Dept: FAMILY MEDICINE | Facility: OTHER | Age: 34
End: 2019-07-09
Payer: COMMERCIAL

## 2019-07-09 ENCOUNTER — TELEPHONE (OUTPATIENT)
Dept: SURGERY | Facility: CLINIC | Age: 34
End: 2019-07-09

## 2019-07-09 ENCOUNTER — OFFICE VISIT (OUTPATIENT)
Dept: SURGERY | Facility: CLINIC | Age: 34
End: 2019-07-09
Attending: STUDENT IN AN ORGANIZED HEALTH CARE EDUCATION/TRAINING PROGRAM
Payer: COMMERCIAL

## 2019-07-09 VITALS
TEMPERATURE: 97.2 F | HEIGHT: 66 IN | SYSTOLIC BLOOD PRESSURE: 116 MMHG | BODY MASS INDEX: 31.27 KG/M2 | WEIGHT: 194.6 LBS | DIASTOLIC BLOOD PRESSURE: 68 MMHG

## 2019-07-09 VITALS
HEIGHT: 66 IN | BODY MASS INDEX: 30.89 KG/M2 | SYSTOLIC BLOOD PRESSURE: 108 MMHG | HEART RATE: 72 BPM | WEIGHT: 192.2 LBS | RESPIRATION RATE: 16 BRPM | DIASTOLIC BLOOD PRESSURE: 54 MMHG | TEMPERATURE: 97.1 F

## 2019-07-09 DIAGNOSIS — E66.811 OBESITY (BMI 30.0-34.9): ICD-10-CM

## 2019-07-09 DIAGNOSIS — K43.9 VENTRAL HERNIA WITHOUT OBSTRUCTION OR GANGRENE: ICD-10-CM

## 2019-07-09 DIAGNOSIS — G43.019 INTRACTABLE MIGRAINE WITHOUT AURA AND WITHOUT STATUS MIGRAINOSUS: ICD-10-CM

## 2019-07-09 DIAGNOSIS — Z01.818 PREOP GENERAL PHYSICAL EXAM: Primary | ICD-10-CM

## 2019-07-09 DIAGNOSIS — K43.2 INCISIONAL HERNIA, WITHOUT OBSTRUCTION OR GANGRENE: Primary | ICD-10-CM

## 2019-07-09 DIAGNOSIS — D22.72 NEVUS OF LEFT FOOT: ICD-10-CM

## 2019-07-09 PROCEDURE — 99214 OFFICE O/P EST MOD 30 MIN: CPT | Performed by: STUDENT IN AN ORGANIZED HEALTH CARE EDUCATION/TRAINING PROGRAM

## 2019-07-09 PROCEDURE — 99204 OFFICE O/P NEW MOD 45 MIN: CPT | Performed by: SURGERY

## 2019-07-09 RX ORDER — GABAPENTIN 300 MG/1
300 CAPSULE ORAL AT BEDTIME
Start: 2019-07-09 | End: 2020-02-12

## 2019-07-09 RX ORDER — GABAPENTIN 300 MG/1
300 CAPSULE ORAL AT BEDTIME
Start: 2019-07-09 | End: 2019-07-09 | Stop reason: DRUGHIGH

## 2019-07-09 ASSESSMENT — MIFFLIN-ST. JEOR
SCORE: 1588.31
SCORE: 1599.13

## 2019-07-09 NOTE — PROGRESS NOTES
General Surgery Consultation    Breanna Vidal MRN# [id number]   Age: 34 year old YOB: 1985     Reason for consult: Incisional hernia                        Assessment and Plan:   I was asked to see this patient at the request of Cyn Campos CNP for evaluation of incisional hernia .  Breanna Vidal is a 34 year old female who presented with history, exam, laboratory and imaging most consistent with:        ICD-10-CM    1. Incisional hernia, without obstruction or gangrene K43.2 Mimi-Operative Worksheet   2. Obesity (BMI 30.0-34.9) E66.9      The patient was thoroughly counseled regarding Incisional hernia, without obstruction or gangrene [K43.2].     The patient was informed that the proposed procedure or medical intervention involves reduction and repair with or without mesh and does offer a very good likelihood of symptom relief. We also discussed the options of repairing the hernia laparoscopically, robotic,  versus open. Patient opted for laparoscopic repair.      The patient was made aware of the risks of the procedure, including but not limited to:  nerve entrapment or injury, persistence of pain (10%), injury to the bowel/bladder,hematoma, mesh migration, mesh infection, cardiac or pulmonary complication and anesthesia related complications also that difficulties may be encountered during recovery to include: wound infection, recurrence (5-10%), seroma, hematoma and chronic pain.     In the course of the evaluation we did discuss other therapeutic options with the patient, including continued watchful waiting. The risks and benefits of these options were also discussed which include but are not limited to: incarceration and/or strangulation..     Also discussed were possible problems or difficulties the patient may encounter if treatment was not pursued at this time.     The patient was informed that StewartGen Odom MD will be primarily responsible for the procedure. Assistance during the  procedure and during hospitalization may also be provided by other physicians, nurses and technicians.     The patient was also informed that if exposure to the patient s blood or body fluids occurs during the procedure, HIV testing of the patient will occur unless they refuse at this time. Risk of blood transfusion is minimal.     The patient will be provided additional education resources by the support staff. If there are ever any questions regarding their diagnosis or the procedure, the patient is encouraged to ask.     All of the patient s or their legal representative s questions have been answered to their satisfaction and they have indicated a clear understanding of this discussion.   Breanna expressed understanding of risks, benefits and alternatives and wished to proceed.     All findings, test results, and diagnosis were discussed with the patient. Breanna  participated in the decision making process and agreed with the plan of care. Questions were sought and answered.       I thank Karen Rivera CNP for the opportunity to participate in the patient's care.           Chief Complaint:   Incisional hernia     History is obtained from the patient         History of Present Illness:   This patient is a 34 year old  female without a significant past medical history who presents with incisional hernia.  She stated this hernia was first noted about a year ago.  Patient did not know at the time that it was a hernia however she noted a bulge around her umbilicus.  Ever since this bulge has been getting bigger in size.  It is firm upon standing or sitting up.  It is painful when she tries to push the bulge back in.  Denies any abdominal pain after eating.  Denies any abdominal distention.  Having normal bowel movements.  Melena no hematochezia.    Patient stated she has had 4 children; she had a lap appendectomy in the early 2000's.  Had a laparoscopic bilateral salpingectomy back in 2018.  Patient also  have had bilateral mammoplasty reduction.  Denies any history of MI.  Denies history of CVA.  Patient stated she gained a lot of weight and have had much lifestyle changes and have a loss a lot of the weight.  She did not quit smoking a year ago.  Patient currently not using any drugs.          Past Medical History:    has a past medical history of Cellulitis (2012), Drug abuse (H) (2011), MRSA cellulitis (2012), and Rh negative, antepartum.          Past Surgical History:     Past Surgical History:   Procedure Laterality Date     INCISION AND DRAINAGE  04/18/2012    chin abscess I&D     LAPAROSCOPIC SALPINGECTOMY Bilateral 8/27/2018    Procedure: LAPAROSCOPIC SALPINGECTOMY;  Laparoscopic Bilateral Salpingectomy;  Surgeon: Chai Cui MD;  Location: [facility]     MAMMOPLASTY REDUCTION Bilateral 08/04/2017           Medications:     Current Outpatient Medications on File Prior to Visit:  EPINEPHrine (EPIPEN/ADRENACLICK/OR ANY BX GENERIC EQUIV) 0.3 MG/0.3ML injection 2-pack INJECT  0.3 ML IM 1 TIME PRF ANAPHYLAXIS   fluticasone (FLONASE) 50 MCG/ACT nasal spray Spray 1 spray into both nostrils daily   gabapentin (NEURONTIN) 300 MG capsule Take 1 capsule (300 mg) by mouth At Bedtime   isometheptene-dichloralphenazone-acetaminophen (MIDRIN) -325 MG capsule Take 1 capsule by mouth See Admin Instructions 1-2 po at onset of headache.  May repeat one po q hour. No more than 5 per day, 10 per week.   multivitamin w/minerals (MULTI-VITAMIN) tablet Take 1 tablet by mouth daily   naproxen (NAPROSYN) 500 MG tablet    naratriptan (AMERGE) 2.5 MG tablet Take 1 tablet (2.5 mg) by mouth at onset of headache for migraine     No current facility-administered medications on file prior to visit.       Allergies:      Allergies   Allergen Reactions     Codeine Nausea     Other reaction(s): Nausea     Imitrex [Sumatriptan]      Worsening of migraine, paresthesias     Ranitidine Rash and Itching     recvd IV zantac 75 and reaction  was immediate,  was given Benadryl to counter act reaction in 2000     Sulfa Drugs Unknown and Rash            Social History:   Breanna Vidal  reports that she has quit smoking. She quit smokeless tobacco use about 3 years ago. She reports that she does not drink alcohol or use drugs.          Family History:   The patient has no family history of any bleeding, clotting or anesthesia problems.          Review of Systems:     Constitutional: Denies fever or chills   Eyes: Denies change in visual acuity   HENT: Denies nasal congestion or sore throat   Respiratory: Denies cough or shortness of breath   Cardiovascular: Denies chest pain or edema   GI: Denies abdominal pain, nausea, vomiting, bloody stools or diarrhea   : Denies dysuria   Musculoskeletal: Denies back pain or joint pain   Integument: Denies rash   Neurologic: Denies headache, focal weakness or sensory changes   Endocrine: Denies polyuria or polydipsia   Lymphatic: Denies swollen glands   Psychiatric: Denies depression or anxiety          Physical Exam:     Constitutional: Awake, alert, no acute distress.  Eyes:  No scleral icterus.  Conjunctiva are without injection.  ENMT: Mucous membranes moist, dentition and gums are intact.   Neck: Soft, supple, trachea midline.    Endocrine: The thyroid is without masses and mobile with swallow.   Lymphatic: There is no cervical, submandibular, or inguinal adenopathy.  Respiratory: Lungs are clear to auscultation and percussion bilaterally.   Cardiovascular: Regular rate and rhythm. No murmurs, rubs, or gallops.    Abdomen: Non-distended, non-tender, normoactive bowel sounds present, No masses, incisonal hernia with multiple defect around the umbilicus.  No hepatosplenomegaly.   Musculoskeletal: No spinal or CVA tenderness. Full range of motion in the upper and lower extremities.    Skin: No skin rashes or lesions to inspection.  No petechia.    Neurologic: Cranial nerves II through XII are grossly intact and  "symmetric.  Psychiatric: The patient is alert and oriented times 3.  The patient's affect is not blunted and mood is appropriate.          Data:   Vital Signs:  /68   Temp 97.2  F (36.2  C) (Temporal)   Ht 1.676 m (5' 5.98\")   Wt 88.3 kg (194 lb 9.6 oz)   BMI 31.43 kg/m       WBC -   WBC   Date Value Ref Range Status   04/10/2019 8.0 4.0 - 11.0 10e9/L Final   ], HgB -   Hemoglobin   Date Value Ref Range Status   04/10/2019 13.8 11.7 - 15.7 g/dL Final   ]   Liver Function Studies -   Recent Labs   Lab Test 10/14/18  2102   PROTTOTAL 7.6   ALBUMIN 3.5   BILITOTAL 0.4   ALKPHOS 146   AST 29   ALT 64*     No results found for this or any previous visit (from the past 744 hour(s)).     Stewart-Hazel Odom DO 7/9/2019 3:00 PM           "

## 2019-07-09 NOTE — TELEPHONE ENCOUNTER
Patient is going to have a hernia surgery first so she is going to cancel this surgery and will let us know when she is ready to schedule. appts and surgery have been cancelled.

## 2019-07-09 NOTE — TELEPHONE ENCOUNTER
Type of surgery: laparoscopic incisional hernia repair with mesh  Location of surgery: [facility]   Date of surgery: 7/17/19  Surgeon: Dr. Odom  Pre-Op Appt Date: 7/9/19  Post-Op Appt Date: 7/31/19   Packet sent out: Surgery packet mailed to patient's home address.   Pre-cert/Authorization completed: NA  Date: 7/9/2019    Dixie Dorado  Surgery Scheduler

## 2019-07-17 ENCOUNTER — NURSE TRIAGE (OUTPATIENT)
Dept: NURSING | Facility: CLINIC | Age: 34
End: 2019-07-17

## 2019-07-17 ENCOUNTER — ANESTHESIA (OUTPATIENT)
Dept: SURGERY | Facility: CLINIC | Age: 34
End: 2019-07-17
Payer: COMMERCIAL

## 2019-07-17 ENCOUNTER — ANESTHESIA EVENT (OUTPATIENT)
Dept: SURGERY | Facility: CLINIC | Age: 34
End: 2019-07-17
Payer: COMMERCIAL

## 2019-07-17 ENCOUNTER — TELEPHONE (OUTPATIENT)
Dept: FAMILY MEDICINE | Facility: OTHER | Age: 34
End: 2019-07-17

## 2019-07-17 ENCOUNTER — HOSPITAL ENCOUNTER (OUTPATIENT)
Facility: CLINIC | Age: 34
Discharge: HOME | End: 2019-07-17
Attending: SURGERY | Admitting: SURGERY
Payer: COMMERCIAL

## 2019-07-17 VITALS
HEART RATE: 83 BPM | SYSTOLIC BLOOD PRESSURE: 108 MMHG | DIASTOLIC BLOOD PRESSURE: 74 MMHG | RESPIRATION RATE: 14 BRPM | OXYGEN SATURATION: 94 % | TEMPERATURE: 98 F

## 2019-07-17 DIAGNOSIS — Z98.890 S/P LAPAROSCOPIC HERNIA REPAIR: Primary | ICD-10-CM

## 2019-07-17 DIAGNOSIS — Z87.19 S/P LAPAROSCOPIC HERNIA REPAIR: Primary | ICD-10-CM

## 2019-07-17 PROBLEM — G43.019 INTRACTABLE MIGRAINE WITHOUT AURA AND WITHOUT STATUS MIGRAINOSUS: Status: ACTIVE | Noted: 2019-07-17

## 2019-07-17 LAB — HCG UR QL: NEGATIVE

## 2019-07-17 PROCEDURE — 37000008 ZZH ANESTHESIA TECHNICAL FEE, 1ST 30 MIN: Performed by: SURGERY

## 2019-07-17 PROCEDURE — 81025 URINE PREGNANCY TEST: CPT | Performed by: NURSE ANESTHETIST, CERTIFIED REGISTERED

## 2019-07-17 PROCEDURE — 25000132 ZZH RX MED GY IP 250 OP 250 PS 637: Performed by: SURGERY

## 2019-07-17 PROCEDURE — 27211024 ZZHC OR SUPPLY OTHER OPNP: Performed by: SURGERY

## 2019-07-17 PROCEDURE — 25000128 H RX IP 250 OP 636: Performed by: NURSE ANESTHETIST, CERTIFIED REGISTERED

## 2019-07-17 PROCEDURE — 37000009 ZZH ANESTHESIA TECHNICAL FEE, EACH ADDTL 15 MIN: Performed by: SURGERY

## 2019-07-17 PROCEDURE — 25800030 ZZH RX IP 258 OP 636: Performed by: NURSE ANESTHETIST, CERTIFIED REGISTERED

## 2019-07-17 PROCEDURE — 25000125 ZZHC RX 250: Performed by: NURSE ANESTHETIST, CERTIFIED REGISTERED

## 2019-07-17 PROCEDURE — 36000056 ZZH SURGERY LEVEL 3 1ST 30 MIN: Performed by: SURGERY

## 2019-07-17 PROCEDURE — 36000058 ZZH SURGERY LEVEL 3 EA 15 ADDTL MIN: Performed by: SURGERY

## 2019-07-17 PROCEDURE — 40000306 ZZH STATISTIC PRE PROC ASSESS II: Performed by: SURGERY

## 2019-07-17 PROCEDURE — 71000014 ZZH RECOVERY PHASE 1 LEVEL 2 FIRST HR: Performed by: SURGERY

## 2019-07-17 PROCEDURE — 49654 ZZHC LAP INCISIONAL HERNIA REPAIR: CPT | Performed by: SURGERY

## 2019-07-17 PROCEDURE — 27210794 ZZH OR GENERAL SUPPLY STERILE: Performed by: SURGERY

## 2019-07-17 PROCEDURE — 71000027 ZZH RECOVERY PHASE 2 EACH 15 MINS: Performed by: SURGERY

## 2019-07-17 PROCEDURE — 25000128 H RX IP 250 OP 636: Performed by: SURGERY

## 2019-07-17 PROCEDURE — C1781 MESH (IMPLANTABLE): HCPCS | Performed by: SURGERY

## 2019-07-17 PROCEDURE — 71000015 ZZH RECOVERY PHASE 1 LEVEL 2 EA ADDTL HR: Performed by: SURGERY

## 2019-07-17 DEVICE — IMPLANTABLE DEVICE: Type: IMPLANTABLE DEVICE | Site: ABDOMEN | Status: FUNCTIONAL

## 2019-07-17 RX ORDER — PROPOFOL 10 MG/ML
INJECTION, EMULSION INTRAVENOUS PRN
Status: DISCONTINUED | OUTPATIENT
Start: 2019-07-17 | End: 2019-07-17

## 2019-07-17 RX ORDER — LIDOCAINE 40 MG/G
CREAM TOPICAL
Status: DISCONTINUED | OUTPATIENT
Start: 2019-07-17 | End: 2019-07-17 | Stop reason: HOSPADM

## 2019-07-17 RX ORDER — OXYCODONE HYDROCHLORIDE 5 MG/1
5 TABLET ORAL
Status: DISCONTINUED | OUTPATIENT
Start: 2019-07-17 | End: 2019-07-17 | Stop reason: HOSPADM

## 2019-07-17 RX ORDER — LIDOCAINE HYDROCHLORIDE 20 MG/ML
INJECTION, SOLUTION INFILTRATION; PERINEURAL PRN
Status: DISCONTINUED | OUTPATIENT
Start: 2019-07-17 | End: 2019-07-17

## 2019-07-17 RX ORDER — CEFAZOLIN SODIUM 1 G/3ML
1 INJECTION, POWDER, FOR SOLUTION INTRAMUSCULAR; INTRAVENOUS SEE ADMIN INSTRUCTIONS
Status: DISCONTINUED | OUTPATIENT
Start: 2019-07-17 | End: 2019-07-17 | Stop reason: HOSPADM

## 2019-07-17 RX ORDER — ALBUTEROL SULFATE 0.83 MG/ML
2.5 SOLUTION RESPIRATORY (INHALATION) EVERY 4 HOURS PRN
Status: DISCONTINUED | OUTPATIENT
Start: 2019-07-17 | End: 2019-07-17 | Stop reason: HOSPADM

## 2019-07-17 RX ORDER — CEFAZOLIN SODIUM 2 G/100ML
2 INJECTION, SOLUTION INTRAVENOUS
Status: COMPLETED | OUTPATIENT
Start: 2019-07-17 | End: 2019-07-17

## 2019-07-17 RX ORDER — DEXAMETHASONE SODIUM PHOSPHATE 10 MG/ML
INJECTION, SOLUTION INTRAMUSCULAR; INTRAVENOUS PRN
Status: DISCONTINUED | OUTPATIENT
Start: 2019-07-17 | End: 2019-07-17

## 2019-07-17 RX ORDER — METOCLOPRAMIDE HYDROCHLORIDE 5 MG/ML
INJECTION INTRAMUSCULAR; INTRAVENOUS PRN
Status: DISCONTINUED | OUTPATIENT
Start: 2019-07-17 | End: 2019-07-17

## 2019-07-17 RX ORDER — PROPOFOL 10 MG/ML
INJECTION, EMULSION INTRAVENOUS CONTINUOUS PRN
Status: DISCONTINUED | OUTPATIENT
Start: 2019-07-17 | End: 2019-07-17

## 2019-07-17 RX ORDER — HYDROMORPHONE HYDROCHLORIDE 1 MG/ML
.3-.5 INJECTION, SOLUTION INTRAMUSCULAR; INTRAVENOUS; SUBCUTANEOUS EVERY 10 MIN PRN
Status: DISCONTINUED | OUTPATIENT
Start: 2019-07-17 | End: 2019-07-17 | Stop reason: HOSPADM

## 2019-07-17 RX ORDER — ONDANSETRON 4 MG/1
4 TABLET, ORALLY DISINTEGRATING ORAL EVERY 30 MIN PRN
Status: DISCONTINUED | OUTPATIENT
Start: 2019-07-17 | End: 2019-07-17 | Stop reason: HOSPADM

## 2019-07-17 RX ORDER — ONDANSETRON 2 MG/ML
INJECTION INTRAMUSCULAR; INTRAVENOUS PRN
Status: DISCONTINUED | OUTPATIENT
Start: 2019-07-17 | End: 2019-07-17

## 2019-07-17 RX ORDER — SCOLOPAMINE TRANSDERMAL SYSTEM 1 MG/1
1 PATCH, EXTENDED RELEASE TRANSDERMAL
Status: DISCONTINUED | OUTPATIENT
Start: 2019-07-17 | End: 2019-07-17 | Stop reason: HOSPADM

## 2019-07-17 RX ORDER — FENTANYL CITRATE 50 UG/ML
INJECTION, SOLUTION INTRAMUSCULAR; INTRAVENOUS PRN
Status: DISCONTINUED | OUTPATIENT
Start: 2019-07-17 | End: 2019-07-17

## 2019-07-17 RX ORDER — FENTANYL CITRATE 50 UG/ML
25-50 INJECTION, SOLUTION INTRAMUSCULAR; INTRAVENOUS
Status: DISCONTINUED | OUTPATIENT
Start: 2019-07-17 | End: 2019-07-17 | Stop reason: HOSPADM

## 2019-07-17 RX ORDER — DIMENHYDRINATE 50 MG/ML
INJECTION, SOLUTION INTRAMUSCULAR; INTRAVENOUS PRN
Status: DISCONTINUED | OUTPATIENT
Start: 2019-07-17 | End: 2019-07-17

## 2019-07-17 RX ORDER — ONDANSETRON 2 MG/ML
4 INJECTION INTRAMUSCULAR; INTRAVENOUS EVERY 30 MIN PRN
Status: DISCONTINUED | OUTPATIENT
Start: 2019-07-17 | End: 2019-07-17 | Stop reason: HOSPADM

## 2019-07-17 RX ORDER — HYDRALAZINE HYDROCHLORIDE 20 MG/ML
2.5-5 INJECTION INTRAMUSCULAR; INTRAVENOUS EVERY 10 MIN PRN
Status: DISCONTINUED | OUTPATIENT
Start: 2019-07-17 | End: 2019-07-17 | Stop reason: HOSPADM

## 2019-07-17 RX ORDER — MEPERIDINE HYDROCHLORIDE 25 MG/ML
12.5 INJECTION INTRAMUSCULAR; INTRAVENOUS; SUBCUTANEOUS
Status: DISCONTINUED | OUTPATIENT
Start: 2019-07-17 | End: 2019-07-17 | Stop reason: HOSPADM

## 2019-07-17 RX ORDER — SODIUM CHLORIDE, SODIUM LACTATE, POTASSIUM CHLORIDE, CALCIUM CHLORIDE 600; 310; 30; 20 MG/100ML; MG/100ML; MG/100ML; MG/100ML
INJECTION, SOLUTION INTRAVENOUS CONTINUOUS
Status: DISCONTINUED | OUTPATIENT
Start: 2019-07-17 | End: 2019-07-17 | Stop reason: HOSPADM

## 2019-07-17 RX ORDER — OXYCODONE AND ACETAMINOPHEN 5; 325 MG/1; MG/1
1 TABLET ORAL EVERY 6 HOURS PRN
Qty: 12 TABLET | Refills: 0 | Status: SHIPPED | OUTPATIENT
Start: 2019-07-17 | End: 2019-07-22

## 2019-07-17 RX ORDER — KETOROLAC TROMETHAMINE 30 MG/ML
INJECTION, SOLUTION INTRAMUSCULAR; INTRAVENOUS PRN
Status: DISCONTINUED | OUTPATIENT
Start: 2019-07-17 | End: 2019-07-17

## 2019-07-17 RX ORDER — BUPIVACAINE HYDROCHLORIDE AND EPINEPHRINE 5; 5 MG/ML; UG/ML
INJECTION, SOLUTION PERINEURAL PRN
Status: DISCONTINUED | OUTPATIENT
Start: 2019-07-17 | End: 2019-07-17

## 2019-07-17 RX ORDER — HEPARIN SODIUM 5000 [USP'U]/.5ML
5000 INJECTION, SOLUTION INTRAVENOUS; SUBCUTANEOUS
Status: COMPLETED | OUTPATIENT
Start: 2019-07-17 | End: 2019-07-17

## 2019-07-17 RX ORDER — NALOXONE HYDROCHLORIDE 0.4 MG/ML
.1-.4 INJECTION, SOLUTION INTRAMUSCULAR; INTRAVENOUS; SUBCUTANEOUS
Status: DISCONTINUED | OUTPATIENT
Start: 2019-07-17 | End: 2019-07-17 | Stop reason: HOSPADM

## 2019-07-17 RX ORDER — OXYCODONE AND ACETAMINOPHEN 5; 325 MG/1; MG/1
1 TABLET ORAL ONCE
Status: COMPLETED | OUTPATIENT
Start: 2019-07-17 | End: 2019-07-17

## 2019-07-17 RX ORDER — DIMENHYDRINATE 50 MG/ML
25 INJECTION, SOLUTION INTRAMUSCULAR; INTRAVENOUS
Status: DISCONTINUED | OUTPATIENT
Start: 2019-07-17 | End: 2019-07-17 | Stop reason: HOSPADM

## 2019-07-17 RX ADMIN — PROPOFOL 250 MCG/KG/MIN: 10 INJECTION, EMULSION INTRAVENOUS at 10:53

## 2019-07-17 RX ADMIN — HEPARIN SODIUM 5000 UNITS: 10000 INJECTION, SOLUTION INTRAVENOUS; SUBCUTANEOUS at 10:55

## 2019-07-17 RX ADMIN — Medication 20 ML: at 11:54

## 2019-07-17 RX ADMIN — DEXAMETHASONE SODIUM PHOSPHATE 5 MG: 10 INJECTION, SOLUTION INTRAMUSCULAR; INTRAVENOUS at 11:54

## 2019-07-17 RX ADMIN — ONDANSETRON 4 MG: 2 INJECTION INTRAMUSCULAR; INTRAVENOUS at 11:24

## 2019-07-17 RX ADMIN — FENTANYL CITRATE 50 MCG: 50 INJECTION INTRAMUSCULAR; INTRAVENOUS at 13:15

## 2019-07-17 RX ADMIN — Medication 20 ML: at 11:48

## 2019-07-17 RX ADMIN — MIDAZOLAM 2 MG: 1 INJECTION INTRAMUSCULAR; INTRAVENOUS at 10:47

## 2019-07-17 RX ADMIN — HYDROMORPHONE HYDROCHLORIDE 0.5 MG: 1 INJECTION, SOLUTION INTRAMUSCULAR; INTRAVENOUS; SUBCUTANEOUS at 12:33

## 2019-07-17 RX ADMIN — DEXAMETHASONE SODIUM PHOSPHATE 5 MG: 10 INJECTION, SOLUTION INTRAMUSCULAR; INTRAVENOUS at 11:24

## 2019-07-17 RX ADMIN — FENTANYL CITRATE 100 MCG: 50 INJECTION, SOLUTION INTRAMUSCULAR; INTRAVENOUS at 10:51

## 2019-07-17 RX ADMIN — HYDROMORPHONE HYDROCHLORIDE 0.5 MG: 1 INJECTION, SOLUTION INTRAMUSCULAR; INTRAVENOUS; SUBCUTANEOUS at 11:21

## 2019-07-17 RX ADMIN — HYDROMORPHONE HYDROCHLORIDE 0.5 MG: 1 INJECTION, SOLUTION INTRAMUSCULAR; INTRAVENOUS; SUBCUTANEOUS at 12:44

## 2019-07-17 RX ADMIN — LIDOCAINE HYDROCHLORIDE 100 MG: 20 INJECTION, SOLUTION INFILTRATION; PERINEURAL at 10:53

## 2019-07-17 RX ADMIN — SCOPALAMINE 1 PATCH: 1 PATCH, EXTENDED RELEASE TRANSDERMAL at 10:16

## 2019-07-17 RX ADMIN — SUGAMMADEX 200 MG: 100 INJECTION, SOLUTION INTRAVENOUS at 11:54

## 2019-07-17 RX ADMIN — DIMENHYDRINATE 25 MG: 50 INJECTION, SOLUTION INTRAMUSCULAR; INTRAVENOUS at 11:24

## 2019-07-17 RX ADMIN — CEFAZOLIN SODIUM 2 G: 2 INJECTION, SOLUTION INTRAVENOUS at 11:01

## 2019-07-17 RX ADMIN — PROPOFOL 200 MG: 10 INJECTION, EMULSION INTRAVENOUS at 10:53

## 2019-07-17 RX ADMIN — METOCLOPRAMIDE HYDROCHLORIDE 5 MG: 5 INJECTION INTRAMUSCULAR; INTRAVENOUS at 11:24

## 2019-07-17 RX ADMIN — LIDOCAINE HYDROCHLORIDE 1 ML: 10 INJECTION, SOLUTION EPIDURAL; INFILTRATION; INTRACAUDAL; PERINEURAL at 10:42

## 2019-07-17 RX ADMIN — KETOROLAC TROMETHAMINE 30 MG: 30 INJECTION, SOLUTION INTRAMUSCULAR at 11:54

## 2019-07-17 RX ADMIN — SODIUM CHLORIDE, POTASSIUM CHLORIDE, SODIUM LACTATE AND CALCIUM CHLORIDE: 600; 310; 30; 20 INJECTION, SOLUTION INTRAVENOUS at 11:15

## 2019-07-17 RX ADMIN — DEXAMETHASONE SODIUM PHOSPHATE 5 MG: 10 INJECTION, SOLUTION INTRAMUSCULAR; INTRAVENOUS at 11:47

## 2019-07-17 RX ADMIN — SODIUM CHLORIDE, POTASSIUM CHLORIDE, SODIUM LACTATE AND CALCIUM CHLORIDE: 600; 310; 30; 20 INJECTION, SOLUTION INTRAVENOUS at 10:42

## 2019-07-17 RX ADMIN — ROCURONIUM BROMIDE 50 MG: 10 INJECTION INTRAVENOUS at 10:53

## 2019-07-17 RX ADMIN — OXYCODONE HYDROCHLORIDE AND ACETAMINOPHEN 1 TABLET: 5; 325 TABLET ORAL at 12:55

## 2019-07-17 ASSESSMENT — LIFESTYLE VARIABLES: TOBACCO_USE: 1

## 2019-07-17 NOTE — ANESTHESIA POSTPROCEDURE EVALUATION
Patient: Breanna Vidal    Procedure(s):  laparoscopic incisional hernia repair with mesh    Diagnosis:incisional hernia  Diagnosis Additional Information: No value filed.    Anesthesia Type:  General, ETT, Periph. Nerve Block for postop pain    Note:  Anesthesia Post Evaluation    Patient location during evaluation: Phase 2  Patient participation: Able to fully participate in evaluation  Level of consciousness: awake  Pain management: adequate  Airway patency: patent  Cardiovascular status: acceptable and hemodynamically stable  Respiratory status: acceptable, room air and nonlabored ventilation  Hydration status: stable  PONV: none     Anesthetic complications: None    Comments: Patient was happy with the anesthesia care received and no anesthesia related complications were noted.  I will follow up with the patient again if it is needed.        Last vitals:  Vitals:    07/17/19 1315 07/17/19 1320 07/17/19 1323   BP: 117/77 106/69 106/69   Pulse:   60   Resp: 16 14 14   Temp:      SpO2: 94% 100% 100%         Electronically Signed By: MADELINE Fishman CRNA  July 17, 2019  1:43 PM

## 2019-07-17 NOTE — ANESTHESIA PREPROCEDURE EVALUATION
Anesthesia Evaluation     . Pt has had prior anesthetic. Type: General and Regional           ROS/MED HX    ENT/Pulmonary:     (+)tobacco use, Past use , . .    Neurologic:  - neg neurologic ROS     Cardiovascular:  - neg cardiovascular ROS   (+) ----. : . . . :. . No previous cardiac testing       METS/Exercise Tolerance:     Hematologic:  - neg hematologic  ROS       Musculoskeletal:  - neg musculoskeletal ROS       GI/Hepatic: Comment: Hx of ETOH abuse        Renal/Genitourinary: Comment: Mixed incontinence         Endo:  - neg endo ROS       Psychiatric: Comment: History of depression and anxiety, post partum, currently controlled. Methamphetamine abuse        Infectious Disease:  - neg infectious disease ROS       Malignancy:      - no malignancy   Other: Comment: History of ETOH abuse in remission  History of methamphetamine abuse in remission   (+) No chance of pregnancy C-spine cleared: N/A, no H/O Chronic Pain,                   Physical Exam  Normal systems: cardiovascular, pulmonary and dental    Airway   Mallampati: II  TM distance: >3 FB  Neck ROM: full    Dental     Cardiovascular   Rhythm and rate: regular and normal      Pulmonary    breath sounds clear to auscultation                        Anesthesia Plan      History & Physical Review  History and physical reviewed and following examination; no interval change.    ASA Status:  2 .    NPO Status:  > 8 hours    Plan for General, ETT and Periph. Nerve Block for postop pain with Intravenous and Propofol induction. Maintenance will be Balanced.    PONV prophylaxis:  Ondansetron (or other 5HT-3) and Dexamethasone or Solumedrol       Postoperative Care  Postoperative pain management:  IV analgesics, Oral pain medications and Peripheral nerve block (Single Shot).      Consents  Anesthetic plan, risks, benefits and alternatives discussed with:  Patient..                          .

## 2019-07-17 NOTE — OP NOTE
OPERATIVE NOTE  [facility] SURGERY    DATE:  7/17/2019    SURGEON:  Jovi Odom DO    PREOPERATIVE DIAGNOSIS:  Periumbilical incisional  Hernia.  History of laparoscopic salpingectomy    POSTOPERATIVE DIAGNOSIS:  Periumbilical incisional  Hernia.  History of laparoscopic salpingectomy    OPERATION:  Laparoscopic incisional hernia repair with mesh    ANESTHESIA:  General Anesthesia    Indication for surgery:  This is a 33 yo F with a symptomatic incisional hernia.  I discussed repairing this with general under anesthesia.  Risks and benefits were explained, including but not limited to bleeding, infection, and anesthesia. She seems to understand and consented to proceed with the procedure.     Under general anesthesia, the patient's abdomen was prepped and draped in the usual manner.  Local anesthetic was injected into the subcutaneous and deep tissues overlying an area of 3 cm caudal to the left costal margin. Under direct visualization utilizing a 5mm, 0 degrees Optiview trocar, the abdomen was then entered. After visually confirming intraabdominal placement of the trocar, the camera was then withdrawn and the obturator removed and the camera was then placed back within the abdomen; pneumoperitoneum was created.    The abdominal wall was inspected and an incisional hernia around the periumbilical was seen.  Another 5 mm port was placed under direct visualization at the LLQ.  The sac at the 1cm defect at the umbilicus was reduced with a ligasure.  The sac was ligated and placed at the LLQ  A mesh (ventrallight ST echo 2 - 11cm round)  was chosen to over lap the defect by 4cm circumferentially.   I made a 10mm incision within the umbilicus and placed a 10mm trocar through the defect; the sac was removed with an endopouch.  I then placed the mesh through the trocar; noted that it will have good overlap.  I then used a cone and PMI to closed the defect primarily with a 0 vicryl.  The mesh was then pulled  against the abdominal wall.  An absorbable tacker was used to tack the mesh to the anterior abdominal wall circumferentially, and also around the defect.    The 5 mm ports were removed under direct vision and all the skin sites were closed with 4-0 monocryl buried simple interrupted.  Dermabond was applied.  The patient tolerated the procedure well and went to PACU in stable condition.     [facility], [facility]

## 2019-07-17 NOTE — ANESTHESIA PROCEDURE NOTES
Peripheral nerve/Neuraxial procedure note : TAP  Pre-Procedure    Location: post-op      Pre-Anesthestic Checklist: patient identified, IV checked, site marked, risks and benefits discussed, informed consent, monitors and equipment checked, pre-op evaluation, at physician/surgeon's request and post-op pain management    Timeout  Correct Patient: Yes   Correct Procedure: Yes   Correct Site: Yes   Correct Laterality: N/A   Correct Position: Yes   Site Marked: Yes   .   Procedure Documentation    .    Procedure:  bilateral  TAP.  Local skin infiltrated with mL of 1% lidocaine.     Ultrasound used to identify targeted nerve, plexus, or vascular marker and placed a needle adjacent to it., Ultrasound was used to visualize the spread of the anesthetic in close proximity to the above stated nerve.   Patient Prep;mask, sterile gloves, chlorhexidine gluconate and isopropyl alcohol.  Nerve Stim: Initial Level 0.05 mA. Lowest motor response mA..  Needle: insulated Needle Gauge: 22.  Insertion Method: Single Shot.       Assessment/Narrative  Paresthesias: No.  Injection made incrementally with aspirations every 5 mL..  The placement was negative for: blood aspirated, painful injection and site bleeding.  Bolus given via..   Secured via.   Complications: none. Test dose of mL at. Test dose negative for signs of intravascular, subdural or intrathecal injection. Comments:  Pt tolerated the procedure well.    Will follow if needed.

## 2019-07-17 NOTE — ANESTHESIA CARE TRANSFER NOTE
Patient: Breanna Vidal    Procedure(s):  laparoscopic incisional hernia repair with mesh    Diagnosis: incisional hernia  Diagnosis Additional Information: No value filed.    Anesthesia Type:   General, ETT, Periph. Nerve Block for postop pain     Note:  Airway :Face Mask and Oral Airway  Patient transferred to:PACU  Handoff Report: Identifed the Patient, Identified the Reponsible Provider, Reviewed the pertinent medical history, Discussed the surgical course, Reviewed Intra-OP anesthesia mangement and issues during anesthesia, Set expectations for post-procedure period and Allowed opportunity for questions and acknowledgement of understanding      Vitals: (Last set prior to Anesthesia Care Transfer)    CRNA VITALS  7/17/2019 1141 - 7/17/2019 1214      7/17/2019             Pulse:  79    SpO2:  100 %    Resp Rate (observed):  6  (Abnormal)                 Electronically Signed By: MADELINE Fishman CRNA  July 17, 2019  12:14 PM

## 2019-07-17 NOTE — TELEPHONE ENCOUNTER
Panel Management Review      Patient has the following on her problem list: None      Composite cancer screening  Chart review shows that this patient is due/due soon for the following None  Summary:    Patient is due/failing the following:   None    Action needed:   Routed to provider for review.    Type of outreach:    None, routed to provider for review.    Questions for provider review:    Patient does not have Migraine on problem list. Provider, please review and add Migraine to problem list if appropriate.                                                                                                                                    Radha Joy Chan [facility]     Chart routed to Provider .

## 2019-07-18 ENCOUNTER — TELEPHONE (OUTPATIENT)
Dept: SURGERY | Facility: CLINIC | Age: 34
End: 2019-07-18

## 2019-07-18 ENCOUNTER — APPOINTMENT (OUTPATIENT)
Dept: CT IMAGING | Facility: CLINIC | Age: 34
End: 2019-07-18
Attending: EMERGENCY MEDICINE
Payer: COMMERCIAL

## 2019-07-18 ENCOUNTER — HOSPITAL ENCOUNTER (EMERGENCY)
Facility: CLINIC | Age: 34
Discharge: HOME | End: 2019-07-18
Attending: EMERGENCY MEDICINE | Admitting: EMERGENCY MEDICINE
Payer: COMMERCIAL

## 2019-07-18 VITALS
SYSTOLIC BLOOD PRESSURE: 100 MMHG | HEART RATE: 57 BPM | RESPIRATION RATE: 16 BRPM | OXYGEN SATURATION: 95 % | TEMPERATURE: 98.3 F | DIASTOLIC BLOOD PRESSURE: 66 MMHG

## 2019-07-18 DIAGNOSIS — G89.18 ACUTE POST-OPERATIVE PAIN: ICD-10-CM

## 2019-07-18 DIAGNOSIS — T79.7XXA: ICD-10-CM

## 2019-07-18 LAB
ALBUMIN SERPL-MCNC: 3.8 G/DL (ref 3.4–5)
ALP SERPL-CCNC: 47 U/L (ref 40–150)
ALT SERPL W P-5'-P-CCNC: 24 U/L (ref 0–50)
ANION GAP SERPL CALCULATED.3IONS-SCNC: 5 MMOL/L (ref 3–14)
AST SERPL W P-5'-P-CCNC: 18 U/L (ref 0–45)
BASOPHILS # BLD AUTO: 0 10E9/L (ref 0–0.2)
BASOPHILS NFR BLD AUTO: 0.1 %
BILIRUB SERPL-MCNC: 0.3 MG/DL (ref 0.2–1.3)
BUN SERPL-MCNC: 9 MG/DL (ref 7–30)
CALCIUM SERPL-MCNC: 8.5 MG/DL (ref 8.5–10.1)
CHLORIDE SERPL-SCNC: 108 MMOL/L (ref 94–109)
CO2 SERPL-SCNC: 29 MMOL/L (ref 20–32)
CREAT SERPL-MCNC: 0.69 MG/DL (ref 0.52–1.04)
DIFFERENTIAL METHOD BLD: ABNORMAL
EOSINOPHIL NFR BLD AUTO: 0.3 %
ERYTHROCYTE [DISTWIDTH] IN BLOOD BY AUTOMATED COUNT: 12.9 % (ref 10–15)
GFR SERPL CREATININE-BSD FRML MDRD: >90 ML/MIN/{1.73_M2}
GLUCOSE SERPL-MCNC: 93 MG/DL (ref 70–99)
HCT VFR BLD AUTO: 35.4 % (ref 35–47)
HGB BLD-MCNC: 12.3 G/DL (ref 11.7–15.7)
IMM GRANULOCYTES # BLD: 0 10E9/L (ref 0–0.4)
IMM GRANULOCYTES NFR BLD: 0.4 %
LYMPHOCYTES # BLD AUTO: 2.2 10E9/L (ref 0.8–5.3)
LYMPHOCYTES NFR BLD AUTO: 19.8 %
MCH RBC QN AUTO: 30.6 PG (ref 26.5–33)
MCHC RBC AUTO-ENTMCNC: 34.7 G/DL (ref 31.5–36.5)
MCV RBC AUTO: 88 FL (ref 78–100)
MONOCYTES # BLD AUTO: 0.6 10E9/L (ref 0–1.3)
MONOCYTES NFR BLD AUTO: 5.5 %
NEUTROPHILS # BLD AUTO: 8.2 10E9/L (ref 1.6–8.3)
NEUTROPHILS NFR BLD AUTO: 73.9 %
NRBC # BLD AUTO: 0 10*3/UL
NRBC BLD AUTO-RTO: 0 /100
PLATELET # BLD AUTO: 238 10E9/L (ref 150–450)
POTASSIUM SERPL-SCNC: 3.9 MMOL/L (ref 3.4–5.3)
PROT SERPL-MCNC: 6.8 G/DL (ref 6.8–8.8)
RBC # BLD AUTO: 4.02 10E12/L (ref 3.8–5.2)
SODIUM SERPL-SCNC: 142 MMOL/L (ref 133–144)
WBC # BLD AUTO: 11.1 10E9/L (ref 4–11)

## 2019-07-18 PROCEDURE — 25000128 H RX IP 250 OP 636: Performed by: EMERGENCY MEDICINE

## 2019-07-18 PROCEDURE — 99285 EMERGENCY DEPT VISIT HI MDM: CPT | Mod: Z6 | Performed by: EMERGENCY MEDICINE

## 2019-07-18 PROCEDURE — 85025 COMPLETE CBC W/AUTO DIFF WBC: CPT | Performed by: EMERGENCY MEDICINE

## 2019-07-18 PROCEDURE — 96376 TX/PRO/DX INJ SAME DRUG ADON: CPT | Performed by: EMERGENCY MEDICINE

## 2019-07-18 PROCEDURE — 74177 CT ABD & PELVIS W/CONTRAST: CPT

## 2019-07-18 PROCEDURE — 96375 TX/PRO/DX INJ NEW DRUG ADDON: CPT | Performed by: EMERGENCY MEDICINE

## 2019-07-18 PROCEDURE — 80053 COMPREHEN METABOLIC PANEL: CPT | Performed by: EMERGENCY MEDICINE

## 2019-07-18 PROCEDURE — 99285 EMERGENCY DEPT VISIT HI MDM: CPT | Mod: 25 | Performed by: EMERGENCY MEDICINE

## 2019-07-18 PROCEDURE — 96374 THER/PROPH/DIAG INJ IV PUSH: CPT | Mod: 59 | Performed by: EMERGENCY MEDICINE

## 2019-07-18 RX ORDER — IBUPROFEN 200 MG
600 TABLET ORAL EVERY 4 HOURS PRN
COMMUNITY
End: 2022-05-17

## 2019-07-18 RX ORDER — KETOROLAC TROMETHAMINE 30 MG/ML
30 INJECTION, SOLUTION INTRAMUSCULAR; INTRAVENOUS ONCE
Status: COMPLETED | OUTPATIENT
Start: 2019-07-18 | End: 2019-07-18

## 2019-07-18 RX ORDER — ONDANSETRON 2 MG/ML
4 INJECTION INTRAMUSCULAR; INTRAVENOUS EVERY 30 MIN PRN
Status: DISCONTINUED | OUTPATIENT
Start: 2019-07-18 | End: 2019-07-18

## 2019-07-18 RX ORDER — ONDANSETRON 2 MG/ML
4 INJECTION INTRAMUSCULAR; INTRAVENOUS EVERY 30 MIN PRN
Status: DISCONTINUED | OUTPATIENT
Start: 2019-07-18 | End: 2019-07-18 | Stop reason: HOSPADM

## 2019-07-18 RX ORDER — OXYCODONE HYDROCHLORIDE 5 MG/1
5 TABLET ORAL EVERY 6 HOURS PRN
Qty: 12 TABLET | Refills: 0 | Status: SHIPPED | OUTPATIENT
Start: 2019-07-18 | End: 2019-08-09

## 2019-07-18 RX ORDER — HYDROMORPHONE HYDROCHLORIDE 1 MG/ML
0.5 INJECTION, SOLUTION INTRAMUSCULAR; INTRAVENOUS; SUBCUTANEOUS
Status: COMPLETED | OUTPATIENT
Start: 2019-07-18 | End: 2019-07-18

## 2019-07-18 RX ORDER — IOPAMIDOL 755 MG/ML
100 INJECTION, SOLUTION INTRAVASCULAR ONCE
Status: COMPLETED | OUTPATIENT
Start: 2019-07-18 | End: 2019-07-18

## 2019-07-18 RX ADMIN — IOPAMIDOL 95 ML: 755 INJECTION, SOLUTION INTRAVENOUS at 17:05

## 2019-07-18 RX ADMIN — HYDROMORPHONE HYDROCHLORIDE 0.5 MG: 1 INJECTION, SOLUTION INTRAMUSCULAR; INTRAVENOUS; SUBCUTANEOUS at 18:42

## 2019-07-18 RX ADMIN — HYDROMORPHONE HYDROCHLORIDE 0.5 MG: 1 INJECTION, SOLUTION INTRAMUSCULAR; INTRAVENOUS; SUBCUTANEOUS at 17:26

## 2019-07-18 RX ADMIN — HYDROMORPHONE HYDROCHLORIDE 1 MG: 1 INJECTION, SOLUTION INTRAMUSCULAR; INTRAVENOUS; SUBCUTANEOUS at 16:19

## 2019-07-18 RX ADMIN — HYDROMORPHONE HYDROCHLORIDE 0.5 MG: 1 INJECTION, SOLUTION INTRAMUSCULAR; INTRAVENOUS; SUBCUTANEOUS at 16:49

## 2019-07-18 RX ADMIN — KETOROLAC TROMETHAMINE 30 MG: 30 INJECTION, SOLUTION INTRAMUSCULAR at 18:30

## 2019-07-18 RX ADMIN — ONDANSETRON 4 MG: 2 INJECTION INTRAMUSCULAR; INTRAVENOUS at 16:14

## 2019-07-18 ASSESSMENT — ENCOUNTER SYMPTOMS
ABDOMINAL PAIN: 0
DYSURIA: 1
VOMITING: 0
DIFFICULTY URINATING: 0
HEADACHES: 0
CONSTIPATION: 1
ARTHRALGIAS: 0
NECK STIFFNESS: 0
COLOR CHANGE: 0
CONFUSION: 0
DIARRHEA: 0
SHORTNESS OF BREATH: 0
EYE REDNESS: 0
CHILLS: 1
FEVER: 0
NAUSEA: 1

## 2019-07-18 NOTE — OR NURSING
[facility]  Discharge Call Back  Breanna Vidal  1985  MRN: [id number]  Home: [phone] (home)   PCP: Karen Campos    We are calling to see how you're doing since your surgery/procedure with us?   Comments: N/A  Clinical Questions  1. Have you had time to look at your discharge instructions? Do you have any questions in regards to the instructions?   Comment: No  2. Do you feel your pain is being controlled with the regimen the surgeon sent you home on? (ie: prescription medications, over the counter pain medications, ice packs)   Comments: General pain around incision is controlled.  Patient is still having left side pain and is concerned about LATISHA block.  Transferred to Jodi in anesthesia for further information.  3. Have you noticed any drainage on your dressing? Do you know what to do if you have bleeding as a result of your procedure?   Comments: No/Yes  4. Have you had any nausea/vomiting? Do you know how to treat this?   Comment: No/Yes  5. Have you had any signs/symptoms of infection? (ie: fever, swelling, heat, drainage or redness) Do you know what to do if you have?   Comment: No/Yes  6. Do you have a follow up appointment made with your surgeon? Do you have a number to contact them at if you need it?   Comment: Yes/Yes        You may be randomly selected to fill out a [facility] survey. We would appreciate you taking the time to fill this out. It is important to us if you would answer all of the questions on the survey.

## 2019-07-18 NOTE — ED PROVIDER NOTES
"  History     Chief Complaint   Patient presents with     Abdominal Pain     The history is provided by the patient.     Breanna Vidal is a 34 year old female who presents to the ED complaining of left-sided abdominal pain and abd bloating, s/p laparoscopic umbilical hernia repair that was performed yesterday. Patient describes the pain as \"stabbing\" and only located on her left side of abdomen.  She said it hurts to move around and to take a deep breath.  She has a rib belt on to support her abdomen. Patient has been doing the post op exercises as directed by moving her shoulders/ arms and walking around as much as tolerated, with no relief. Patient has also been using her pain medications (Percocet) every 2 hours along with an Ibuprofen and that has not been effective. She is not passing gas, hasn't had a BM and has only urinated once this morning and it wasn't very much.  Patient stated she did not take her temp but has felt cold, no vomiting but has had nausea which she used the patch behind her ear (scopolamine ) and Zofran with some relief. Denies a history of kidney stones.       Allergies:  Allergies   Allergen Reactions     Codeine Nausea     Other reaction(s): Nausea     Imitrex [Sumatriptan]      Worsening of migraine, paresthesias     Ranitidine Rash and Itching     recvd IV zantac 75 and reaction was immediate,  was given Benadryl to counter act reaction in 2000     Sulfa Drugs Unknown and Rash       Problem List:    Patient Active Problem List    Diagnosis Date Noted     Intractable migraine without aura and without status migrainosus 07/17/2019     Priority: Medium     Alcohol abuse, in remission 08/20/2018     Priority: Medium     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) 08/20/2018     Priority: Medium     Mixed incontinence 03/03/2017     Priority: Medium     Vaginal delivery 10/31/2011     Priority: Medium     Rh negative status during pregnancy 09/23/2011     Priority: Medium        Past Medical " History:    Past Medical History:   Diagnosis Date     Cellulitis 2012     Drug abuse (H) 2011     MRSA cellulitis 2012     Rh negative, antepartum        Past Surgical History:    Past Surgical History:   Procedure Laterality Date     INCISION AND DRAINAGE  04/18/2012    chin abscess I&D     LAPAROSCOPIC HERNIORRHAPHY INCISIONAL N/A 7/17/2019    Procedure: laparoscopic incisional hernia repair with mesh;  Surgeon: Jovi Odom MD;  Location: [facility] OR     LAPAROSCOPIC SALPINGECTOMY Bilateral 8/27/2018    Procedure: LAPAROSCOPIC SALPINGECTOMY;  Laparoscopic Bilateral Salpingectomy;  Surgeon: Chai Cui MD;  Location: [facility] OR     MAMMOPLASTY REDUCTION Bilateral 08/04/2017       Family History:    Family History   Problem Relation Age of Onset     Cancer Father      Pancreatic Cancer Father      Lung Cancer Father        Social History:  Marital Status:  Single [1]  Social History     Tobacco Use     Smoking status: Former Smoker     Smokeless tobacco: Former User     Quit date: 3/10/2016   Substance Use Topics     Alcohol use: No     Comment: history alcohol abuse 2012, s/p treatment     Drug use: No     Comment: former drug user (EtOH, cocaine, marijuana, meth), last use 2012        Medications:      ibuprofen (ADVIL/MOTRIN) 200 MG tablet   oxyCODONE (ROXICODONE) 5 MG tablet   oxyCODONE-acetaminophen (PERCOCET) 5-325 MG tablet   EPINEPHrine (EPIPEN/ADRENACLICK/OR ANY BX GENERIC EQUIV) 0.3 MG/0.3ML injection 2-pack   fluticasone (FLONASE) 50 MCG/ACT nasal spray   gabapentin (NEURONTIN) 300 MG capsule   isometheptene-dichloralphenazone-acetaminophen (MIDRIN) -325 MG capsule   multivitamin w/minerals (MULTI-VITAMIN) tablet   naproxen (NAPROSYN) 500 MG tablet   naratriptan (AMERGE) 2.5 MG tablet         Review of Systems   Constitutional: Positive for chills. Negative for fever.   HENT: Negative for congestion.    Eyes: Negative for redness.   Respiratory: Negative for shortness of breath.    Cardiovascular:  Negative for chest pain.   Gastrointestinal: Positive for constipation and nausea. Negative for abdominal pain (left upper quadrant pain), diarrhea and vomiting.   Genitourinary: Positive for dysuria. Negative for difficulty urinating.   Musculoskeletal: Negative for arthralgias and neck stiffness.   Skin: Negative for color change.   Neurological: Negative for headaches.   Psychiatric/Behavioral: Negative for confusion.   All other systems reviewed and are negative.    All other ROS reviewed and are negative or non-contributory except as stated in HPI.      Physical Exam   BP: 105/84  Pulse: 67  Heart Rate: 76  Temp: 98.3  F (36.8  C)  Resp: 16  SpO2: 97 %      Physical Exam   Constitutional: She is oriented to person, place, and time. She appears well-developed and well-nourished. She appears distressed.   Young female sitting upright in the bed, crying   HENT:   Head: Normocephalic and atraumatic.   Mouth/Throat: Oropharynx is clear and moist.   Eyes: EOM are normal. No scleral icterus.   Neck: Normal range of motion. Neck supple.   Cardiovascular: Normal rate, regular rhythm and normal heart sounds.   Pulmonary/Chest: Effort normal and breath sounds normal.   Abdominal:   Patient's incision sites are clean/dry/intact.  She has decreased bowel sounds.  She has exquisite tenderness in the left abdomen and seems somewhat bloated.  No rebound.  Less tenderness in the right abdomen.  Some left chest wall tenderness.   Neurological: She is alert and oriented to person, place, and time.   Skin: Skin is warm and dry. No rash noted. She is not diaphoretic. No erythema. No pallor.   Psychiatric:   Anxious   Nursing note and vitals reviewed.      ED Course (with Medical Decision Making)    Pt seen and examined by me.  RN and EPIC notes reviewed.      Patient with postoperative pain primarily on the left side of her abdomen.  This could be due to constipation, unknown kidney stone, bowel perforation, other surgical  complication, musculoskeletal pain.  It seems unusual that this would be intra-abdominal gas since it is only on the left side and she does not have shoulder pain.  I am going to give her pain medications, check labs, CT her abdomen.    Patient's labs are unremarkable.  She had improved pain relief with Dilaudid.    CT scan shows postoperative changes with gas that has dissected throughout the extraperitoneal and abdominal wall area up into her chest.    I did speak with Dr. Gerber who is on-call for surgery.  The surgery was done by Dr. Odom.  He noted that patient would likely have resolution of the air over the next 3 days.      I discussed this with the patient.  I am going to increase her pain medications in the short-term so that she can take 2 tablets of oxycodone every 6 hours.  She can also use ibuprofen.  I did offer her an observation stay for pain control if she needed which she would like to try to manage at home.  If she has any uncontrolled pain, fevers or other concerns she should return at any time.  I also recommended increased stool softeners.        Procedures      Results for orders placed or performed during the hospital encounter of 07/18/19 (from the past 24 hour(s))   CBC with platelets differential   Result Value Ref Range    WBC 11.1 (H) 4.0 - 11.0 10e9/L    RBC Count 4.02 3.8 - 5.2 10e12/L    Hemoglobin 12.3 11.7 - 15.7 g/dL    Hematocrit 35.4 35.0 - 47.0 %    MCV 88 78 - 100 fl    MCH 30.6 26.5 - 33.0 pg    MCHC 34.7 31.5 - 36.5 g/dL    RDW 12.9 10.0 - 15.0 %    Platelet Count 238 150 - 450 10e9/L    Diff Method Automated Method     % Neutrophils 73.9 %    % Lymphocytes 19.8 %    % Monocytes 5.5 %    % Eosinophils 0.3 %    % Basophils 0.1 %    % Immature Granulocytes 0.4 %    Nucleated RBCs 0 0 /100    Absolute Neutrophil 8.2 1.6 - 8.3 10e9/L    Absolute Lymphocytes 2.2 0.8 - 5.3 10e9/L    Absolute Monocytes 0.6 0.0 - 1.3 10e9/L    Absolute Basophils 0.0 0.0 - 0.2 10e9/L    Abs Immature  Granulocytes 0.0 0 - 0.4 10e9/L    Absolute Nucleated RBC 0.0    Comprehensive metabolic panel   Result Value Ref Range    Sodium 142 133 - 144 mmol/L    Potassium 3.9 3.4 - 5.3 mmol/L    Chloride 108 94 - 109 mmol/L    Carbon Dioxide 29 20 - 32 mmol/L    Anion Gap 5 3 - 14 mmol/L    Glucose 93 70 - 99 mg/dL    Urea Nitrogen 9 7 - 30 mg/dL    Creatinine 0.69 0.52 - 1.04 mg/dL    GFR Estimate >90 >60 mL/min/[1.73_m2]    GFR Estimate If Black >90 >60 mL/min/[1.73_m2]    Calcium 8.5 8.5 - 10.1 mg/dL    Bilirubin Total 0.3 0.2 - 1.3 mg/dL    Albumin 3.8 3.4 - 5.0 g/dL    Protein Total 6.8 6.8 - 8.8 g/dL    Alkaline Phosphatase 47 40 - 150 U/L    ALT 24 0 - 50 U/L    AST 18 0 - 45 U/L   CT Abdomen Pelvis w Contrast    Narrative    CT ABDOMEN AND PELVIS WITH CONTRAST   7/18/2019 5:16 PM     HISTORY: Severe left abdominal pain postoperative day #1 status post  umbilical hernia repair.    TECHNIQUE: CT abdomen and pelvis with Isovue 370, 95mL IV. Radiation  dose for this scan was reduced using automated exposure control,  adjustment of the mA and/or kV according to patient size, or iterative  reconstruction technique.    COMPARISON: None.    FINDINGS:  Abdomen: There is dependent atelectasis at the lung bases. Trace right  pleural effusion. The heart size is normal. The liver, spleen,  gallbladder, pancreas, adrenal glands and kidneys are normal in  appearance. There is no abdominal or pelvic lymph node enlargement.    Pelvis: There are postoperative changes of umbilical hernia repair.  There is gas throughout the extraperitoneal space and abdominal wall.  The anterior abdominal wall is not completely included on this image.  There is no evidence of residual hernia. There is soft tissue  stranding in the subcutaneous fat. There is no bowel obstruction or  inflammation. The uterus and adnexa are within normal limits. There is  a dominant left ovarian follicle measuring 2.1 cm. There is a small  amount of free fluid in the  pelvis.      Impression    IMPRESSION:  1. Postoperative changes of umbilical hernia repair with gas  throughout the extraperitoneal space and abdominal wall.  2. No intra-abdominal abnormality.  3. Trace right pleural effusion.    PHYLICIA MONTIEL MD       Medications   HYDROmorphone (DILAUDID) injection 1 mg (1 mg Intravenous Given 7/18/19 1619)   HYDROmorphone (PF) (DILAUDID) injection 0.5 mg (0.5 mg Intravenous Given 7/18/19 1842)   sodium chloride (PF) 0.9% PF flush 100 mL (60 mLs Intracatheter Given 7/18/19 1706)   sodium chloride (PF) 0.9% PF flush 3 mL (10 mLs Intravenous Given 7/18/19 1705)   iopamidol (ISOVUE-370) solution 100 mL (95 mLs Intravenous Given 7/18/19 1705)   ketorolac (TORADOL) injection 30 mg (30 mg Intravenous Given 7/18/19 1830)       Assessments & Plan     I have reviewed the findings, diagnosis, plan and need for follow up with the patient.        Medication List      Started    oxyCODONE 5 MG tablet  Commonly known as:  ROXICODONE  5 mg, Oral, EVERY 6 HOURS PRN            Final diagnoses:   Acute post-operative pain   Subcutaneous air, initial encounter (H) - abd wall/chest wall   Disposition: Patient discharged home in stable condition.  Plan as above.  Return for concerns.     This document serves as a record of services personally performed by  Lorena Acevedo MD.  It was created on their behalf by Sayra Santos, a trained medical scribe. The creation of this record is based on the provider's personal observations and the statements of the patient. This document has been checked and approved by the attending provider.    Disclaimer : This note consists of symbols derived from keyboarding, dictation and/or voice recognition software. As a result, there may be errors in the script that have gone undetected. Please consider this when interpreting information found in this chart.      7/18/2019   [facility] EMERGENCY DEPARTMENT     Lorena Acevedo MD  07/18/19  [id number]

## 2019-07-18 NOTE — TELEPHONE ENCOUNTER
Reviewed the patient's post op instructions and advised her to review her AVS.  She understands and will do as follows. Educated about signs of infection etc.      Lenka BALBUENA RN. . .  7/18/2019, 11:45 AM

## 2019-07-18 NOTE — TELEPHONE ENCOUNTER
"\"I had surgery today see epic, I was told I would be really bloated and I am . I also feel a little shortness of breath, they said that would be normal  And showed me some arm exercises to do. I had surgery on the left and I feel pain on the right side.\" Patient denies chest pain, swelling, fever or other sx. She has voided, has not taken any pain meds yet. Advised to take pain medication, if sx worsen or as needed to call back.  Marbella Pickens RN [facility]        Reason for Disposition    [1] MILD-MODERATE post-op pain (e.g., pain scale 1-7) AND [2] not controlled with pain medications    Additional Information    Negative: Sounds like a life-threatening emergency to the triager    Negative: Chest pain    Negative: Difficulty breathing    Negative: Surgical incision symptoms and questions    Negative: [1] Discomfort (pain, burning or stinging) when passing urine AND [2] male    Negative: [1] Discomfort (pain, burning or stinging) when passing urine AND [2] female    Negative: Constipation    Negative: New or worsening leg (calf, thigh) pain    Negative: New or worsening leg swelling    Negative: Dizziness is severe, or persists > 24 hours after surgery    Negative: Pain, redness, swelling, or pus at IV Site    Negative: Symptoms arising from use of a urinary catheter ([place] or [place])    Negative: Cast problems or questions    Negative: Medication question    Negative: [1] Widespread rash AND [2] bright red, sunburn-like    Negative: [1] SEVERE headache AND [2] after spinal (epidural) anesthesia    Negative: [1] Vomiting AND [2] persists > 4 hours    Negative: [1] Vomiting AND [2] abdomen looks much more swollen than usual    Negative: [1] Drinking very little AND [2] dehydration suspected (e.g., no urine > 12 hours, very dry mouth, very lightheaded)    Negative: Patient sounds very sick or weak to the triager    Negative: Sounds like a serious complication to the triager    Negative: Fever > 100.5 " F (38.1 C)    Negative: [1] SEVERE post-op pain (e.g., excruciating, pain scale 8-10) AND [2] not controlled with pain medications    Negative: [1] Caller has URGENT question AND [2] triager unable to answer question    Negative: [1] Headache AND [2] after spinal (epidural) anesthesia AND [3] not severe    Negative: Fever present > 3 days (72 hours)    Protocols used: POST-OP SYMPTOMS AND [id number]

## 2019-07-18 NOTE — ED AVS SNAPSHOT
[facility] Emergency Department  [address] [place] [address]  Phone:  [phone]  Fax:  [phone]                                    Breanna Vidal   MRN: [id number]    Department:  [facility] Emergency Department   Date of Visit:  7/18/2019           After Visit Summary Signature Page    I have received my discharge instructions, and my questions have been answered. I have discussed any challenges I see with this plan with the nurse or doctor.    ..........................................................................................................................................  Patient/Patient Representative Signature      ..........................................................................................................................................  Patient Representative Print Name and Relationship to Patient    ..................................................               ................................................  Date                                   Time    ..........................................................................................................................................  Reviewed by Signature/Title    ...................................................              ..............................................  Date                                               Time          22EPIC Rev 08/18

## 2019-07-18 NOTE — TELEPHONE ENCOUNTER
Reason for Call:  Other appointment    Detailed comments: Patient had surgery yesterday with Dr. Odom and she's  wondering about her incision site and what she needs to do with her bandaging/packing of the site. Please call her.     Phone Number Patient can be reached at: Home number on file [phone] (home)    Best Time: any    Can we leave a detailed message on this number? YES    Call taken on 7/18/2019 at 10:12 AM by Gail Perez

## 2019-07-18 NOTE — TELEPHONE ENCOUNTER
Patient calling today very distraught on the phone. She is having increased severe pain post operatively.  She had a laparascopic hernia repair yesterday with Dr. Odom.  She states she has been having to use her narcotic every 2 hours and is using ibuprofen in between as well.  She reports she is having increased abdominal distention and states it feels very hard and looks like she is 9 months pregnant.  She notes that this has increased quite a bit since yesterday. She denies fever or any other ill symptoms.  Dr. Odom is not in clinic today.  Based on patient's symptoms and pain, advised patient to present to ER.  Called ER to give report and patient will get a ride and be there within the hour.     Lenka BALBUENA RN. . .  7/18/2019, 2:21 PM

## 2019-07-18 NOTE — DISCHARGE INSTRUCTIONS
Okay to continue ice packs over the painful areas to decrease inflammation.    Also continue ibuprofen for pain and inflammation.    Use your Percocet as needed for severe pain.  Percocet contains oxycodone with Tylenol.  You can add a second oxycodone if needed for severe pain.    Please start stool softeners for any constipation.  This can be associated with narcotics.    Return for significant worsening, changes, concerns especially fevers or uncontrolled pain.    I hope that you start to improve quickly!!

## 2019-07-19 NOTE — TELEPHONE ENCOUNTER
Patient has followed up in the ED. Will close this encounter.  Gail Rowe RN on 7/19/2019 at 2:09 PM

## 2019-07-22 ENCOUNTER — TELEPHONE (OUTPATIENT)
Dept: SURGERY | Facility: CLINIC | Age: 34
End: 2019-07-22

## 2019-07-22 ENCOUNTER — APPOINTMENT (OUTPATIENT)
Dept: ULTRASOUND IMAGING | Facility: CLINIC | Age: 34
End: 2019-07-22
Attending: NURSE PRACTITIONER
Payer: COMMERCIAL

## 2019-07-22 ENCOUNTER — HOSPITAL ENCOUNTER (EMERGENCY)
Facility: CLINIC | Age: 34
Discharge: HOME | End: 2019-07-22
Attending: NURSE PRACTITIONER | Admitting: NURSE PRACTITIONER
Payer: COMMERCIAL

## 2019-07-22 ENCOUNTER — APPOINTMENT (OUTPATIENT)
Dept: GENERAL RADIOLOGY | Facility: CLINIC | Age: 34
End: 2019-07-22
Attending: NURSE PRACTITIONER
Payer: COMMERCIAL

## 2019-07-22 VITALS
RESPIRATION RATE: 16 BRPM | TEMPERATURE: 98.2 F | OXYGEN SATURATION: 100 % | SYSTOLIC BLOOD PRESSURE: 117 MMHG | HEART RATE: 56 BPM | HEIGHT: 66 IN | DIASTOLIC BLOOD PRESSURE: 80 MMHG | BODY MASS INDEX: 32.14 KG/M2 | WEIGHT: 200 LBS

## 2019-07-22 DIAGNOSIS — Z87.19 S/P LAPAROSCOPIC HERNIA REPAIR: ICD-10-CM

## 2019-07-22 DIAGNOSIS — S30.1XXA ABDOMINAL WALL SEROMA, INITIAL ENCOUNTER: ICD-10-CM

## 2019-07-22 DIAGNOSIS — R10.33 ACUTE PERIUMBILICAL PAIN: ICD-10-CM

## 2019-07-22 DIAGNOSIS — Z98.890 S/P LAPAROSCOPIC HERNIA REPAIR: ICD-10-CM

## 2019-07-22 LAB
ALBUMIN SERPL-MCNC: 3.7 G/DL (ref 3.4–5)
ALBUMIN UR-MCNC: NEGATIVE MG/DL
ALP SERPL-CCNC: 54 U/L (ref 40–150)
ALT SERPL W P-5'-P-CCNC: 28 U/L (ref 0–50)
ANION GAP SERPL CALCULATED.3IONS-SCNC: 3 MMOL/L (ref 3–14)
APPEARANCE UR: CLEAR
AST SERPL W P-5'-P-CCNC: 17 U/L (ref 0–45)
BASOPHILS # BLD AUTO: 0 10E9/L (ref 0–0.2)
BASOPHILS NFR BLD AUTO: 0.3 %
BILIRUB SERPL-MCNC: 0.3 MG/DL (ref 0.2–1.3)
BILIRUB UR QL STRIP: NEGATIVE
BUN SERPL-MCNC: 14 MG/DL (ref 7–30)
CALCIUM SERPL-MCNC: 9.2 MG/DL (ref 8.5–10.1)
CHLORIDE SERPL-SCNC: 105 MMOL/L (ref 94–109)
CO2 SERPL-SCNC: 31 MMOL/L (ref 20–32)
COLOR UR AUTO: ABNORMAL
CREAT SERPL-MCNC: 0.87 MG/DL (ref 0.52–1.04)
DIFFERENTIAL METHOD BLD: NORMAL
EOSINOPHIL NFR BLD AUTO: 1.8 %
ERYTHROCYTE [DISTWIDTH] IN BLOOD BY AUTOMATED COUNT: 12.3 % (ref 10–15)
GFR SERPL CREATININE-BSD FRML MDRD: 86 ML/MIN/{1.73_M2}
GLUCOSE SERPL-MCNC: 87 MG/DL (ref 70–99)
GLUCOSE UR STRIP-MCNC: NEGATIVE MG/DL
HCT VFR BLD AUTO: 38.8 % (ref 35–47)
HGB BLD-MCNC: 13.3 G/DL (ref 11.7–15.7)
HGB UR QL STRIP: NEGATIVE
IMM GRANULOCYTES # BLD: 0 10E9/L (ref 0–0.4)
IMM GRANULOCYTES NFR BLD: 0.1 %
KETONES UR STRIP-MCNC: NEGATIVE MG/DL
LACTATE BLD-SCNC: <0.4 MMOL/L (ref 0.7–2)
LEUKOCYTE ESTERASE UR QL STRIP: NEGATIVE
LIPASE SERPL-CCNC: 143 U/L (ref 73–393)
LYMPHOCYTES # BLD AUTO: 1.7 10E9/L (ref 0.8–5.3)
LYMPHOCYTES NFR BLD AUTO: 24.1 %
MCH RBC QN AUTO: 30.2 PG (ref 26.5–33)
MCHC RBC AUTO-ENTMCNC: 34.3 G/DL (ref 31.5–36.5)
MCV RBC AUTO: 88 FL (ref 78–100)
MONOCYTES # BLD AUTO: 0.4 10E9/L (ref 0–1.3)
MONOCYTES NFR BLD AUTO: 5.4 %
NEUTROPHILS # BLD AUTO: 4.9 10E9/L (ref 1.6–8.3)
NEUTROPHILS NFR BLD AUTO: 68.3 %
NITRATE UR QL: NEGATIVE
NRBC # BLD AUTO: 0 10*3/UL
NRBC BLD AUTO-RTO: 0 /100
PH UR STRIP: 8 PH (ref 5–7)
PLATELET # BLD AUTO: 247 10E9/L (ref 150–450)
POTASSIUM SERPL-SCNC: 4.3 MMOL/L (ref 3.4–5.3)
PROT SERPL-MCNC: 7.1 G/DL (ref 6.8–8.8)
RBC # BLD AUTO: 4.41 10E12/L (ref 3.8–5.2)
SODIUM SERPL-SCNC: 139 MMOL/L (ref 133–144)
SOURCE: ABNORMAL
SP GR UR STRIP: 1.01 (ref 1–1.03)
UROBILINOGEN UR STRIP-MCNC: 0 MG/DL (ref 0–2)
WBC # BLD AUTO: 7.2 10E9/L (ref 4–11)

## 2019-07-22 PROCEDURE — 76705 ECHO EXAM OF ABDOMEN: CPT

## 2019-07-22 PROCEDURE — 83605 ASSAY OF LACTIC ACID: CPT | Performed by: NURSE PRACTITIONER

## 2019-07-22 PROCEDURE — 96374 THER/PROPH/DIAG INJ IV PUSH: CPT | Performed by: NURSE PRACTITIONER

## 2019-07-22 PROCEDURE — 99285 EMERGENCY DEPT VISIT HI MDM: CPT | Mod: 25 | Performed by: NURSE PRACTITIONER

## 2019-07-22 PROCEDURE — 71046 X-RAY EXAM CHEST 2 VIEWS: CPT | Mod: TC

## 2019-07-22 PROCEDURE — 80053 COMPREHEN METABOLIC PANEL: CPT | Performed by: NURSE PRACTITIONER

## 2019-07-22 PROCEDURE — 96361 HYDRATE IV INFUSION ADD-ON: CPT | Performed by: NURSE PRACTITIONER

## 2019-07-22 PROCEDURE — 96375 TX/PRO/DX INJ NEW DRUG ADDON: CPT | Performed by: NURSE PRACTITIONER

## 2019-07-22 PROCEDURE — 99285 EMERGENCY DEPT VISIT HI MDM: CPT | Mod: Z6 | Performed by: NURSE PRACTITIONER

## 2019-07-22 PROCEDURE — 81003 URINALYSIS AUTO W/O SCOPE: CPT | Performed by: NURSE PRACTITIONER

## 2019-07-22 PROCEDURE — 74019 RADEX ABDOMEN 2 VIEWS: CPT | Mod: TC

## 2019-07-22 PROCEDURE — 25000128 H RX IP 250 OP 636: Performed by: NURSE PRACTITIONER

## 2019-07-22 PROCEDURE — 83690 ASSAY OF LIPASE: CPT | Performed by: NURSE PRACTITIONER

## 2019-07-22 PROCEDURE — 85025 COMPLETE CBC W/AUTO DIFF WBC: CPT | Performed by: NURSE PRACTITIONER

## 2019-07-22 RX ORDER — METOCLOPRAMIDE HYDROCHLORIDE 5 MG/ML
10 INJECTION INTRAMUSCULAR; INTRAVENOUS ONCE
Status: COMPLETED | OUTPATIENT
Start: 2019-07-22 | End: 2019-07-22

## 2019-07-22 RX ORDER — AMOXICILLIN 250 MG
2 CAPSULE ORAL DAILY
Qty: 20 TABLET | Refills: 0 | Status: SHIPPED | OUTPATIENT
Start: 2019-07-22 | End: 2019-08-09

## 2019-07-22 RX ORDER — HYDROMORPHONE HYDROCHLORIDE 1 MG/ML
0.5 INJECTION, SOLUTION INTRAMUSCULAR; INTRAVENOUS; SUBCUTANEOUS ONCE
Status: COMPLETED | OUTPATIENT
Start: 2019-07-22 | End: 2019-07-22

## 2019-07-22 RX ORDER — OXYCODONE AND ACETAMINOPHEN 5; 325 MG/1; MG/1
1 TABLET ORAL EVERY 6 HOURS PRN
Qty: 12 TABLET | Refills: 0 | Status: SHIPPED | OUTPATIENT
Start: 2019-07-22 | End: 2019-08-09

## 2019-07-22 RX ORDER — ACETAMINOPHEN 500 MG
1000 TABLET ORAL EVERY 6 HOURS PRN
COMMUNITY
End: 2024-04-01

## 2019-07-22 RX ORDER — CEPHALEXIN 500 MG/1
500 CAPSULE ORAL 4 TIMES DAILY
Qty: 28 CAPSULE | Refills: 0 | Status: SHIPPED | OUTPATIENT
Start: 2019-07-22 | End: 2019-08-09

## 2019-07-22 RX ORDER — DIPHENHYDRAMINE HYDROCHLORIDE 50 MG/ML
25 INJECTION INTRAMUSCULAR; INTRAVENOUS ONCE
Status: COMPLETED | OUTPATIENT
Start: 2019-07-22 | End: 2019-07-22

## 2019-07-22 RX ADMIN — SODIUM CHLORIDE 1000 ML: 9 INJECTION, SOLUTION INTRAVENOUS at 13:34

## 2019-07-22 RX ADMIN — HYDROMORPHONE HYDROCHLORIDE 0.5 MG: 1 INJECTION, SOLUTION INTRAMUSCULAR; INTRAVENOUS; SUBCUTANEOUS at 13:48

## 2019-07-22 RX ADMIN — DIPHENHYDRAMINE HYDROCHLORIDE 25 MG: 50 INJECTION, SOLUTION INTRAMUSCULAR; INTRAVENOUS at 13:48

## 2019-07-22 RX ADMIN — METOCLOPRAMIDE 10 MG: 5 INJECTION, SOLUTION INTRAMUSCULAR; INTRAVENOUS at 13:50

## 2019-07-22 ASSESSMENT — ENCOUNTER SYMPTOMS
CHILLS: 1
BLOOD IN STOOL: 0
ABDOMINAL DISTENTION: 0
NEUROLOGICAL NEGATIVE: 1
VOMITING: 0
NAUSEA: 1
CONSTIPATION: 0
DIARRHEA: 0
WOUND: 1
APPETITE CHANGE: 1
HEMATOLOGIC/LYMPHATIC NEGATIVE: 1
ABDOMINAL PAIN: 1
CARDIOVASCULAR NEGATIVE: 1
COUGH: 1
PSYCHIATRIC NEGATIVE: 1
MYALGIAS: 1

## 2019-07-22 ASSESSMENT — MIFFLIN-ST. JEOR: SCORE: 1623.94

## 2019-07-22 NOTE — ED TRIAGE NOTES
Pt has leakage from repaired umbilical hernia site. Tried to get in to clinic and cannot. Pt was told to come through ED. Pt is having chills, been taking tylenol and ibuprofen on and off. Pt's pain is around her belly button and she states it is severe.

## 2019-07-22 NOTE — TELEPHONE ENCOUNTER
Nursing Note    D:  Umbilical incision painful and draining. Weeping brown, thick, goopy, drainage. Drainage spots on clothing -started yesterday. Smells fowl -started yesterday. Chills started days ago. Incision is sore and red. Pain to the point of not being able to bend over.    A:  Patient needs to be seen today. Since we don't having any opening in clinic today, patient should present to the ED for evaluation.    R:  Patient verbalizes understanding and will call back if needed.    Gail Rowe RN  [facility]  [phone]  7/22/2019 11:51 AM

## 2019-07-22 NOTE — ED PROVIDER NOTES
1239  ED Triage Notes  Pt has leakage from repaired umbilical hernia site. Tried to get in to clinic and cannot. Pt was told to come through ED. Pt is having chills, been taking tylenol and ibuprofen on and off. Pt's pain is around her belly button and she states it is severe.        History     Chief Complaint   Patient presents with     Abdominal Pain     HPI  Breanna Vidal is a 34 year old female who s/p laparoscopic umbilical hernia repair with mesh that was performed 7/17/2019 by Dr. Soto presenting with complaint of severe periumbilical pain and reports drainage from umbilicus. She was recently seen 7/18/2019 here in the ER and had CT [performed which showed postoperative change to umbilicus area and free extraperitoneal air, no seroma, no abscess. She reports she has had chills and feeling feverish with 10/10 pain with movement and abdominal palpation.  She reports she called Dr. soto office and was sent here to the ER due to all surgeons home for the day.    She reports she has no further pain medications and she has been doing well post Meth rehab in 2012.  PCP: Karen Campos     Allergies:  Allergies   Allergen Reactions     Codeine Nausea     Other reaction(s): Nausea     Imitrex [Sumatriptan]      Worsening of migraine, paresthesias     Ranitidine Rash and Itching     recvd IV zantac 75 and reaction was immediate,  was given Benadryl to counter act reaction in 2000     Sulfa Drugs Unknown and Rash       Problem List:    Patient Active Problem List    Diagnosis Date Noted     Intractable migraine without aura and without status migrainosus 07/17/2019     Priority: Medium     Alcohol abuse, in remission 08/20/2018     Priority: Medium     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) 08/20/2018     Priority: Medium     Mixed incontinence 03/03/2017     Priority: Medium     Vaginal delivery 10/31/2011     Priority: Medium     Rh negative status during pregnancy 09/23/2011     Priority: Medium        Past  Medical History:    Past Medical History:   Diagnosis Date     Cellulitis 2012     Drug abuse (H) 2011     MRSA cellulitis 2012     Rh negative, antepartum        Past Surgical History:    Past Surgical History:   Procedure Laterality Date     INCISION AND DRAINAGE  04/18/2012    chin abscess I&D     LAPAROSCOPIC HERNIORRHAPHY INCISIONAL N/A 7/17/2019    Procedure: laparoscopic incisional hernia repair with mesh;  Surgeon: Jovi Odom MD;  Location: [facility] OR     LAPAROSCOPIC SALPINGECTOMY Bilateral 8/27/2018    Procedure: LAPAROSCOPIC SALPINGECTOMY;  Laparoscopic Bilateral Salpingectomy;  Surgeon: Chai Cui MD;  Location: [facility] OR     MAMMOPLASTY REDUCTION Bilateral 08/04/2017       Family History:    Family History   Problem Relation Age of Onset     Cancer Father      Pancreatic Cancer Father      Lung Cancer Father        Social History:  Marital Status:  Single [1]  Social History     Tobacco Use     Smoking status: Former Smoker     Smokeless tobacco: Former User     Quit date: 3/10/2016   Substance Use Topics     Alcohol use: No     Comment: history alcohol abuse 2012, s/p treatment     Drug use: No     Comment: former drug user (EtOH, cocaine, marijuana, meth), last use 2012        Medications:      acetaminophen (TYLENOL) 500 MG tablet   cephALEXin (KEFLEX) 500 MG capsule   ibuprofen (ADVIL/MOTRIN) 200 MG tablet   oxyCODONE-acetaminophen (PERCOCET) 5-325 MG tablet   senna-docusate (SENOKOT-S/PERICOLACE) 8.6-50 MG tablet   EPINEPHrine (EPIPEN/ADRENACLICK/OR ANY BX GENERIC EQUIV) 0.3 MG/0.3ML injection 2-pack   fluticasone (FLONASE) 50 MCG/ACT nasal spray   gabapentin (NEURONTIN) 300 MG capsule   isometheptene-dichloralphenazone-acetaminophen (MIDRIN) -325 MG capsule   multivitamin w/minerals (MULTI-VITAMIN) tablet   naproxen (NAPROSYN) 500 MG tablet   naratriptan (AMERGE) 2.5 MG tablet   oxyCODONE (ROXICODONE) 5 MG tablet         Review of Systems   Constitutional: Positive for appetite change and  "chills.   HENT: Negative.    Respiratory: Positive for cough.    Cardiovascular: Negative.    Gastrointestinal: Positive for abdominal pain and nausea. Negative for abdominal distention, blood in stool, constipation, diarrhea and vomiting.   Genitourinary: Negative.    Musculoskeletal: Positive for myalgias.   Skin: Positive for wound.   Allergic/Immunologic: Negative for immunocompromised state.   Neurological: Negative.    Hematological: Negative.    Psychiatric/Behavioral: Negative.        Physical Exam   BP: 121/83  Heart Rate: 73  Temp: 98.2  F (36.8  C)  Resp: 16  Height: 167.6 cm (5' 6\")  Weight: 90.7 kg (200 lb)  SpO2: 99 %      Physical Exam   Constitutional: She is oriented to person, place, and time. She appears well-developed and well-nourished.  Non-toxic appearance. She does not appear ill.   Appears anxious   HENT:   Head: Normocephalic and atraumatic.   Eyes: EOM are normal.   Cardiovascular: Normal rate and regular rhythm.   Pulmonary/Chest: Effort normal and breath sounds normal.   Abdominal: Soft. Bowel sounds are normal. She exhibits no distension, no fluid wave and no mass. There is no hepatosplenomegaly. There is tenderness in the right lower quadrant, periumbilical area, suprapubic area and left lower quadrant.       Neurological: She is alert and oriented to person, place, and time.   Skin: Skin is warm and dry.   Nursing note and vitals reviewed.      ED Course  Discussed with patient seroma at umbilicus. WBC and lactic normal. Will place on Keflex short term prevent infection. Discussed pain control. Mimi-colace for stool load noted on xray. She is to hold pillow at abdomen when coughing for abdominal pain. Follow up with surgeon in 2-3 days         Procedures               Critical Care time:  none               Results for orders placed or performed during the hospital encounter of 07/22/19 (from the past 24 hour(s))   CBC with platelets differential   Result Value Ref Range    WBC 7.2 " 4.0 - 11.0 10e9/L    RBC Count 4.41 3.8 - 5.2 10e12/L    Hemoglobin 13.3 11.7 - 15.7 g/dL    Hematocrit 38.8 35.0 - 47.0 %    MCV 88 78 - 100 fl    MCH 30.2 26.5 - 33.0 pg    MCHC 34.3 31.5 - 36.5 g/dL    RDW 12.3 10.0 - 15.0 %    Platelet Count 247 150 - 450 10e9/L    Diff Method Automated Method     % Neutrophils 68.3 %    % Lymphocytes 24.1 %    % Monocytes 5.4 %    % Eosinophils 1.8 %    % Basophils 0.3 %    % Immature Granulocytes 0.1 %    Nucleated RBCs 0 0 /100    Absolute Neutrophil 4.9 1.6 - 8.3 10e9/L    Absolute Lymphocytes 1.7 0.8 - 5.3 10e9/L    Absolute Monocytes 0.4 0.0 - 1.3 10e9/L    Absolute Basophils 0.0 0.0 - 0.2 10e9/L    Abs Immature Granulocytes 0.0 0 - 0.4 10e9/L    Absolute Nucleated RBC 0.0    Comprehensive metabolic panel   Result Value Ref Range    Sodium 139 133 - 144 mmol/L    Potassium 4.3 3.4 - 5.3 mmol/L    Chloride 105 94 - 109 mmol/L    Carbon Dioxide 31 20 - 32 mmol/L    Anion Gap 3 3 - 14 mmol/L    Glucose 87 70 - 99 mg/dL    Urea Nitrogen 14 7 - 30 mg/dL    Creatinine 0.87 0.52 - 1.04 mg/dL    GFR Estimate 86 >60 mL/min/[1.73_m2]    GFR Estimate If Black >90 >60 mL/min/[1.73_m2]    Calcium 9.2 8.5 - 10.1 mg/dL    Bilirubin Total 0.3 0.2 - 1.3 mg/dL    Albumin 3.7 3.4 - 5.0 g/dL    Protein Total 7.1 6.8 - 8.8 g/dL    Alkaline Phosphatase 54 40 - 150 U/L    ALT 28 0 - 50 U/L    AST 17 0 - 45 U/L   Lipase   Result Value Ref Range    Lipase 143 73 - 393 U/L   Lactic acid whole blood   Result Value Ref Range    Lactic Acid <0.4 (L) 0.7 - 2.0 mmol/L   UA reflex to Microscopic and Culture   Result Value Ref Range    Color Urine Straw     Appearance Urine Clear     Glucose Urine Negative NEG^Negative mg/dL    Bilirubin Urine Negative NEG^Negative    Ketones Urine Negative NEG^Negative mg/dL    Specific Gravity Urine 1.009 1.003 - 1.035    Blood Urine Negative NEG^Negative    pH Urine 8.0 (H) 5.0 - 7.0 pH    Protein Albumin Urine Negative NEG^Negative mg/dL    Urobilinogen mg/dL 0.0 0.0  - 2.0 mg/dL    Nitrite Urine Negative NEG^Negative    Leukocyte Esterase Urine Negative NEG^Negative    Source Midstream Urine    XR Abdomen 2 Views    Narrative    ABDOMEN TWO TO THREE VIEWS  7/22/2019 2:07 PM     HISTORY: Postop worsening abdominal pain, chills; evaluate.    COMPARISON: None.    FINDINGS: Moderate to large amount of stool. No free air. There are no  air filled distended loops of small bowel. The colon is not distended.  The lung bases are unremarkable.      Impression    IMPRESSION: Nonobstructed bowel gas pattern.    GUI TESFAYE MD   XR Chest 2 Views    Narrative    CHEST TWO VIEWS 7/22/2019 2:08 PM     HISTORY: Fever, chills.     COMPARISON: 10/14/2018.    FINDINGS: Heart size and pulmonary vascularity are within normal  limits. The lungs are clear. No pneumothorax or pleural effusion.       Impression    IMPRESSION: No radiographic evidence of acute chest abnormality.     MELE GRIFFITH MD   US Abdomen Limited    Narrative    ULTRASOUND ABDOMEN LIMITED  7/22/2019 2:29 PM     HISTORY: Umbilical pain postoperative hernia repair.    COMPARISON: None      Impression    IMPRESSION: Transabdominal imaging demonstrates a nonspecific fluid  collection measuring 1.6 x 1.2 x 0.5 cm deep to the umbilicus,  differential includes hematoma or tiny abscess.     GUI TESFAYE MD       Medications   0.9% sodium chloride BOLUS (1,000 mLs Intravenous New Bag 7/22/19 1334)   diphenhydrAMINE (BENADRYL) injection 25 mg (25 mg Intravenous Given 7/22/19 1348)   metoclopramide (REGLAN) injection 10 mg (10 mg Intravenous Given 7/22/19 1350)   HYDROmorphone (PF) (DILAUDID) injection 0.5 mg (0.5 mg Intravenous Given 7/22/19 1348)       Assessments & Plan (with Medical Decision Making)     I have reviewed the nursing notes.    I have reviewed the findings, diagnosis, plan and need for follow up with the patient.          Medication List      Started    cephALEXin 500 MG capsule  Commonly known as:  KEFLEX  500 mg,  Oral, 4 TIMES DAILY     senna-docusate 8.6-50 MG tablet  Commonly known as:  SENOKOT-S/PERICOLACE  2 tablets, Oral, DAILY            Final diagnoses:   Acute periumbilical pain   Abdominal wall seroma, initial encounter       7/22/2019   [facility] EMERGENCY DEPARTMENT     Larissa Miles, MADELINE CNP  07/22/19 1447

## 2019-07-22 NOTE — TELEPHONE ENCOUNTER
Reason for Call:  Other call back    Detailed comments: patient has questions about her incision.  States it is weeping. DOS 7/17/19  Patient wants to speak with RN.      Phone Number Patient can be reached at: Home number on file [phone] (home)    Best Time: any    Can we leave a detailed message on this number? YES    Call taken on 7/22/2019 at 11:03 AM by Roz Levin

## 2019-07-22 NOTE — ED AVS SNAPSHOT
[facility] Emergency Department  [address] [place] [address]  Phone:  [phone]  Fax:  [phone]                                    Breanna Vidal   MRN: [id number]    Department:  [facility] Emergency Department   Date of Visit:  7/22/2019           After Visit Summary Signature Page    I have received my discharge instructions, and my questions have been answered. I have discussed any challenges I see with this plan with the nurse or doctor.    ..........................................................................................................................................  Patient/Patient Representative Signature      ..........................................................................................................................................  Patient Representative Print Name and Relationship to Patient    ..................................................               ................................................  Date                                   Time    ..........................................................................................................................................  Reviewed by Signature/Title    ...................................................              ..............................................  Date                                               Time          22EPIC Rev 08/18

## 2019-07-22 NOTE — DISCHARGE INSTRUCTIONS
WBC within normal limits.  US showed very small umbilical seroma. Will cover with Keflex for infection prevention

## 2019-07-23 ENCOUNTER — PATIENT OUTREACH (OUTPATIENT)
Dept: CARE COORDINATION | Facility: CLINIC | Age: 34
End: 2019-07-23

## 2019-07-23 ASSESSMENT — ACTIVITIES OF DAILY LIVING (ADL): DEPENDENT_IADLS:: INDEPENDENT

## 2019-07-23 NOTE — LETTER
[facility]  Complex Care Plan  About Me:    Patient Name:  Breanna Vidal    YOB: 1985  Age:         34 year old   [facility] MRN:    [id number] Telephone Information:  Home Phone [phone]   Mobile [phone]       Address:  [address] [place] [address] [place] [address] Email address:  [email or web address]      Emergency Contact(s)    Name Relationship Lgl Grd Work Phone Home Phone Mobile Phone   1. MIA SHERWOOD Significant ot*  [phone]   2. DEVORA JEFFERS Father   [phone]           Primary language:  English     needed? No   [facility] [employer]:  [phone] op. 1  Other communication barriers: None  Preferred Method of Communication:     Current living arrangement: I live in a private home with family  Mobility Status/ Medical Equipment: Independent    Health Maintenance  Health Maintenance Reviewed: Due/Overdue   Health Maintenance Due   Topic Date Due     PREVENTIVE CARE VISIT  1985     HIV SCREENING  02/23/2000         My Access Plan  Medical Emergency 911   Primary Clinic Line [facility] [phone]   24 Hour Appointment Line [phone] or  [phone]) (toll-free)   24 Hour Nurse Line [phone] (toll-free)   Preferred Urgent Care [facility] [phone]   Preferred Hospital [facility]  [phone]   Preferred Pharmacy [facility] [address] [place] [address] [id number] Behavioral Health Crisis Line The National Suicide Prevention Lifeline at 1-201.848.4286 or 911             My Care Team Members  Patient Care Team       Relationship Specialty Notifications Start End    Karen Campos APRN CNP PCP - General Nurse Practitioner - Family  8/30/18     Phone: [phone] Fax: [phone] [address] [place] [address]    Karen Campos APRN CNP Assigned PCP   9/16/18      Phone: [phone] Fax: [phone]         [address] [place] [address]    Tiffany Coreas, RN Clinic Care Coordinator Primary Care - CC  7/23/19     Phone: [phone] Fax: [phone]                My Care Plans  Self Management and Treatment Plan  Goals and (Comments)  Goals        General    Other (pt-stated)     Notes - Note edited  7/23/2019 10:41 AM by Tiffany Coreas RN    Goal Statement: I want to fill out my advanced care directive with my fiance so that our wishes are made clear in the event we cannot express our wishes.  Measure of Success: receive, fill out and bring back to clinic the completed forms  Supportive Steps to Achieve: CC RN will mail advanced care planning guide, blank ACP form along with the [facility] class schedule to patient.    Barriers: none  Strengths: enrolled in care coordination  Date to Achieve By: 1 month  Patient expressed understanding of goal: yes                 Action Plans on File:                       Advance Care Plans/Directives Type:        My Medical and Care Information  Problem List   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)     Intractable migraine without aura and without status migrainosus      Current Medications and Allergies:  See printed Medication Report.    Care Coordination Start Date: No linked episodes   Frequency of Care Coordination: monthly   Form Last Updated: 07/23/2019

## 2019-07-23 NOTE — LETTER
[facility] CARE COORDINATION  [address] [place] [address]    July 23, 2019    Breanna Vidal  [address]  [place] [address]      Dear Breanna,    I am a clinic care coordinator who works with MADELINE [place] CNP at [facility].  I wanted to introduce myself and provide you with my contact information so that you can call me with questions or concerns about your health care. Below is a description of clinic care coordination and how I can further assist you.     The clinic care coordinator is a registered nurse and/or  who understand the health care system. The goal of clinic care coordination is to help you manage your health and improve access to the [facility] system in the most efficient manner. The registered nurse can assist you in meeting your health care goals by providing education, coordinating services, and strengthening the communication among your providers. The  can assist you with financial, behavioral, psychosocial, chemical dependency, counseling, and/or psychiatric resources.    Please feel free to contact me at [phone], with any questions or concerns. We at [facility] are focused on providing you with the highest-quality healthcare experience possible and that all starts with you.     Sincerely,   NGUYEN LandinN RN Clinic Care Coordinator   [place], [place], [place]  Phone: [phone]

## 2019-07-23 NOTE — PROGRESS NOTES
Clinic Care Coordination Contact  [facility]/Voicemail       Clinical Data: Care Coordinator Outreach  Outreach attempted x 1.  Left message on voicemail with call back information and requested return call.  Plan: Care Coordinator will mail out care coordination introduction letter with care coordinator contact information and explanation of care coordination services. Care Coordinator will try to reach patient again in 3-5 business days.    SHAWANDA Landin RN Clinic Care Coordinator   [place], [place], [place]  Phone: [phone]

## 2019-07-23 NOTE — PROGRESS NOTES
"Clinic Care Coordination Contact  Clinic Care Coordination Contact  OUTREACH  Referral Information:  Referral Source: ED Follow-Up  Primary Diagnosis: Other (include Comment box)( s/p laparoscopic umbilical hernia repair with mesh that was performed 7/17/2019 by Dr. Soto. Dxed with Seroma via US 7/22/19)    Chief Complaint   Patient presents with     Clinic Care Coordination - Initial     ER to home   Clinic Utilization  Difficulty keeping appointments:: No  Compliance Concerns: No  No-Show Concerns: No(11% no show rate)  No PCP office visit in Past Year: No  Utilization    Last refreshed: 7/23/2019  9:39 AM:  Hospital Admissions 0           Last refreshed: 7/23/2019  9:39 AM:  ED Visits 7           Last refreshed: 7/23/2019  9:39 AM:  No Show Count (past year) 3              Current as of: 7/23/2019  9:39 AM          Clinical Concerns:  Current Medical Concerns: patient returned call, introduced self and role. Patient is s/p laparoscopic abdominal hernia repair 7/17/19. Presented to ER 2x for post op pain.   Patient stated she is taking it easy, supporting her abdominal when coughing and with movement and , ice,   Taking percocet, tylenol and ibuprofen PRN. She feels she is \"ahead of\" her pain. Overall, she is feeling better. Rating her pain 3/10 on pain scale now. Family came out to help her with her children. This has been helpful. CC RN will mail out the advanced care directive for her and her fiance. She did  the antibiotic and is taking. She reported the symptoms of infection were discussed upon her discharge. She is to monitor site and report symptoms of infection to surgeons office.   Current Behavioral Concerns: none, patient engaged in conversation    Education Provided to patient: RN CC educated about Care Coordination Services, discharge instructions, medications reviewed and follow up    Pain  Pain (GOAL):: Yes  Type: Acute (<3mo)  Location of chronic pain:: abd laparoscopic hernia repair " site.  Radiating: No  Progression: Unchanged  Chronic pain severity:: 3  Limitation of routine activities due to chronic pain:: Yes  Description: Able to do moderate activities  Alleviating Factors: Rest, Ice, Pain Medication  Aggravating Factors: Coughing, Positioning, Activity  Health Maintenance Reviewed: Due/Overdue   Health Maintenance Due   Topic Date Due     PREVENTIVE CARE VISIT  1985     HIV SCREENING  02/23/2000      Clinical Pathway: None    Medication Management:  Patient independent in medication management and verbalizes adherence and understanding of medication regimen.      Functional Status:  Dependent ADLs:: Independent  Dependent IADLs:: Independent  Bed or wheelchair confined:: No  Mobility Status: Independent  Fallen 2 or more times in the past year?: No  Any fall with injury in the past year?: No    Living Situation:  Current living arrangement:: I live in a private home with family  Type of residence:: Private home - stairs    Diet/Exercise/Sleep:  Diet:: Regular  Inadequate nutrition (GOAL):: No  Food Insecurity: No  Tube Feeding: No  Exercise:: Yes  Days per week of moderate to strenuous exercise (like a brisk walk): 6  On average, minutes per day of exercise at this level: 30  How intense was your typical exercise? : Light (like stretching or slow walking)  Exercise Minutes per Week: 180  Inadequate activity/exercise (GOAL):: No  Significant changes in sleep pattern (GOAL): No    Transportation:  Transportation concerns (GOAL):: No  Transportation means:: Regular car     Psychosocial:  Hinduism or spiritual beliefs that impact treatment:: No  Mental health DX:: No  Mental health management concern (GOAL):: No  Informal Support system:: Family, Neighbors     Financial/Insurance:   Financial/Insurance concerns (GOAL):: No     Resources and Interventions:  Current Resources: discharge summary plans, pcp, care team, CC RN, surgeons office.   Community Resources: [facility]  Supplies  used at home:: None  Equipment Currently Used at Home: none    Advance Care Plan/Directive  Advanced Care Plans/Directives on file:: No  Advanced Care Plan/Directive Status: In Process    Referrals Placed: None     Goals:   Goals        General    Other (pt-stated)     Notes - Note edited  7/23/2019 10:41 AM by Tiffany Coreas RN    Goal Statement: I want to fill out my advanced care directive with my fiance so that our wishes are made clear in the event we cannot express our wishes.  Measure of Success: receive, fill out and bring back to clinic the completed forms  Supportive Steps to Achieve: CC RN will mail advanced care planning guide, blank ACP form along with the [facility] class schedule to patient.    Barriers: none  Strengths: enrolled in care coordination  Date to Achieve By: 1 month  Patient expressed understanding of goal: yes              Patient/Caregiver understanding: yes, she has her discharge summary on hand for reference.     Outreach Frequency: monthly  Future Appointments              In 1 week Jovi Odom MD [facility]    In 2 weeks Noemi Quigley APRN [facility] Neurology, [facility]          Plan:   1. Patient will follow discharge summary.   2. Patient will follow up sooner should symptoms worsen, change or patient feels there is a need further care. Patient will attend her 7/30/19 post op follow up appointment.   3. Care Coordination needs identified at this time. Patient enrolled in Care Coordination.   4. Complex care plan and letter sent.   5. CC RN will reach out to patient in 1 month, if additional needs arise patient encouraged to contact Care Coordinator sooner.     SHAWANDA Landin RN Clinic Care Coordinator   [place], [place], [place]  Phone: [phone]

## 2019-07-24 NOTE — TELEPHONE ENCOUNTER
Spoke with patient.  Very minimal drainage. Incision is small and not deep. She will keep area clean and dry. Use bandage for drainage. She will watch for s/s of infection. She is on abx currently. Will call with any increased bleeding or enlarging of the opening.     Lenka BALBUENA RN. . .  7/24/2019, 2:54 PM

## 2019-07-24 NOTE — TELEPHONE ENCOUNTER
Patient called stating today her belly button is bleeding.  She wants to make sure this is normal.

## 2019-07-30 ENCOUNTER — OFFICE VISIT (OUTPATIENT)
Dept: SURGERY | Facility: CLINIC | Age: 34
End: 2019-07-30
Payer: COMMERCIAL

## 2019-07-30 VITALS
BODY MASS INDEX: 31.05 KG/M2 | TEMPERATURE: 97.6 F | WEIGHT: 193.2 LBS | SYSTOLIC BLOOD PRESSURE: 114 MMHG | DIASTOLIC BLOOD PRESSURE: 70 MMHG | HEIGHT: 66 IN

## 2019-07-30 DIAGNOSIS — Z09 S/P UMBILICAL HERNIA REPAIR, FOLLOW-UP EXAM: Primary | ICD-10-CM

## 2019-07-30 PROCEDURE — 99024 POSTOP FOLLOW-UP VISIT: CPT | Performed by: SURGERY

## 2019-07-30 ASSESSMENT — MIFFLIN-ST. JEOR: SCORE: 1593.1

## 2019-07-30 NOTE — LETTER
"    7/30/2019         RE: Breanna Vidal  [address]  [facility] [address]        Dear Colleague,    Thank you for referring your patient, Breanna Vidal, to the [facility]. Please see a copy of my visit note below.    [facility] FOLLOW-UP NOTE  GENERAL SURGERY    PCP: Karen Campos         Assessment and Plan:   Assessment:   Breanna Vidal is a 34 year old female who presented post operatively from lap umbilical hernia 7/17/2019 and is doing very well.       ICD-10-CM    1. S/P umbilical hernia repair, follow-up exam Z09          Plan:  Had initial acute pain that required 2 ER visits.  No issues since.  Patient stated she has no pain as of this week.  Patient understand her restrictions of no lifting more than 15 pounds for a good 6 weeks from the date of surgery.  All of her questions were answered to her satisfaction.           Subjective:     Breanna Vidal is a 34 year old female who presents post operatively from lap umbilical hernia 7/17/2019. Pain has been controled. Currently is not using pain medications. Eating a Regular and having regular bladder/bowel function. Overall doing very well.           Objective:       /70   Temp 97.6  F (36.4  C) (Temporal)   Ht 1.676 m (5' 6\")   Wt 87.6 kg (193 lb 3.2 oz)   BMI 31.18 kg/m      Constitutional: Awake, alert, in no acute distress.  Respiratory: Non-labored.   Cardiovascular: Regular rate and rhythm.   Abdomen: soft; no drainage; no seroma; ND, NT. Incision is Healing well.    Jovi Odom DO  [facility]                Again, thank you for allowing me to participate in the care of your patient.        Sincerely,        Jovi Odom MD    "

## 2019-07-30 NOTE — PROGRESS NOTES
"[facility] FOLLOW-UP NOTE  GENERAL SURGERY    PCP: Karen Campos         Assessment and Plan:   Assessment:   Breanna Vidal is a 34 year old female who presented post operatively from lap umbilical hernia 7/17/2019 and is doing very well.       ICD-10-CM    1. S/P umbilical hernia repair, follow-up exam Z09          Plan:  Had initial acute pain that required 2 ER visits.  No issues since.  Patient stated she has no pain as of this week.  Patient understand her restrictions of no lifting more than 15 pounds for a good 6 weeks from the date of surgery.  All of her questions were answered to her satisfaction.           Subjective:     Breanna Vidal is a 34 year old female who presents post operatively from lap umbilical hernia 7/17/2019. Pain has been controled. Currently is not using pain medications. Eating a Regular and having regular bladder/bowel function. Overall doing very well.           Objective:       /70   Temp 97.6  F (36.4  C) (Temporal)   Ht 1.676 m (5' 6\")   Wt 87.6 kg (193 lb 3.2 oz)   BMI 31.18 kg/m     Constitutional: Awake, alert, in no acute distress.  Respiratory: Non-labored.   Cardiovascular: Regular rate and rhythm.   Abdomen: soft; no drainage; no seroma; ND, NT. Incision is Healing well.    [facility], DO  [facility]              "

## 2019-08-05 NOTE — PATIENT INSTRUCTIONS
1. Do not take ibuprofen, naproxen or aspirin for 10 days prior to surgery.    2. Hold all medications the morning of the surgery.    ENA Florian        Before Your Surgery      Call your surgeon if there is any change in your health. This includes signs of a cold or flu (such as a sore throat, runny nose, cough, rash or fever).    Do not smoke, drink alcohol or take over the counter medicine (unless your surgeon or primary care doctor tells you to) for the 24 hours before and after surgery.    If you take prescribed drugs: Follow your doctor s orders about which medicines to take and which to stop until after surgery.    Eating and drinking prior to surgery: follow the instructions from your surgeon    Take a shower or bath the night before surgery. Use the soap your surgeon gave you to gently clean your skin. If you do not have soap from your surgeon, use your regular soap. Do not shave or scrub the surgery site.  Wear clean pajamas and have clean sheets on your bed.

## 2019-08-05 NOTE — PROGRESS NOTES
[facility]  [id number] [place] [id number] [phone]  Dept: [phone]    PRE-OP EVALUATION:  Today's date: 2019    Breanna Vidal (: 1985) presents for pre-operative evaluation assessment as requested by Dr. Lopez.  She requires evaluation and anesthesia risk assessment prior to undergoing surgery/procedure for treatment of mole removal .    Fax number for surgical facility:   Primary Physician: Karen Campos  Type of Anesthesia Anticipated: MAC    Patient has a Health Care Directive or Living Will:  NO    Preop Questions 2019   Who is doing your surgery?    What are you having done? Mole on foot   Date of Surgery/Procedure: 19   Facility or Hospital where procedure/surgery will be performed: [facility]   1.  Do you have a history of Heart attack, stroke, stent, coronary bypass surgery, or other heart surgery? No   2.  Do you ever have any pain or discomfort in your chest? No   3.  Do you have a history of  Heart Failure? No   4.   Are you troubled by shortness of breath when:  walking on a level surface, or up a slight hill, or at night? No   5.  Do you currently have a cold, bronchitis or other respiratory infection? No   6.  Do you have a cough, shortness of breath, or wheezing? No   7.  Do you sometimes get pains in the calves of your legs when you walk? No   8. Do you or anyone in your family have previous history of blood clots? No   9.  Do you or does anyone in your family have a serious bleeding problem such as prolonged bleeding following surgeries or cuts? No   10. Have you ever had problems with anemia or been told to take iron pills? No   11. Have you had any abnormal blood loss such as black, tarry or bloody stools, or abnormal vaginal bleeding? No   12. Have you ever had a blood transfusion? No   13. Have you or any of your relatives ever had problems with anesthesia? No   14. Do you have sleep apnea, excessive snoring  or daytime drowsiness? No   15. Do you have any prosthetic heart valves? No   16. Do you have prosthetic joints? No   17. Is there any chance that you may be pregnant? No         HPI:     HPI related to upcoming procedure: nevus on left plantar aspect of foot with plan for excision      See problem list for active medical problems.  Problems all longstanding and stable, except as noted/documented.  See ROS for pertinent symptoms related to these conditions.      MEDICAL HISTORY:     Patient Active Problem List    Diagnosis Date Noted     Intractable migraine without aura and without status migrainosus 07/17/2019     Priority: Medium     Alcohol abuse, in remission 08/20/2018     Priority: Medium     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) 08/20/2018     Priority: Medium     Mixed incontinence 03/03/2017     Priority: Medium     Vaginal delivery 10/31/2011     Priority: Medium     Rh negative status during pregnancy 09/23/2011     Priority: Medium      Past Medical History:   Diagnosis Date     Cellulitis 2012    MRSA septic olecranon bursitis and facial cellulitis     Drug abuse (H) 2011    methamphetamine and alcohol, sober since treatment 2011     MRSA cellulitis 2012    cleared with negative nasal swabs 8/16/17 and 8/28/17, reviewed     Rh negative, antepartum      Past Surgical History:   Procedure Laterality Date     INCISION AND DRAINAGE  04/18/2012    chin abscess I&D     LAPAROSCOPIC HERNIORRHAPHY INCISIONAL N/A 7/17/2019    Procedure: laparoscopic incisional hernia repair with mesh;  Surgeon: Jovi Odom MD;  Location: [facility]     LAPAROSCOPIC SALPINGECTOMY Bilateral 8/27/2018    Procedure: LAPAROSCOPIC SALPINGECTOMY;  Laparoscopic Bilateral Salpingectomy;  Surgeon: Chai Cui MD;  Location: [facility] OR     MAMMOPLASTY REDUCTION Bilateral 08/04/2017     Current Outpatient Medications   Medication Sig Dispense Refill     acetaminophen (TYLENOL) 500 MG tablet Take 1,000 mg by mouth every 6 hours as needed  for mild pain       EPINEPHrine (EPIPEN/ADRENACLICK/OR ANY BX GENERIC EQUIV) 0.3 MG/0.3ML injection 2-pack INJECT  0.3 ML IM 1 TIME PRF ANAPHYLAXIS  2     fluticasone (FLONASE) 50 MCG/ACT nasal spray Spray 1 spray into both nostrils daily 1 mL 1     ibuprofen (ADVIL/MOTRIN) 200 MG tablet Take 600 mg by mouth every 4 hours as needed for mild pain       multivitamin w/minerals (MULTI-VITAMIN) tablet Take 1 tablet by mouth daily       naproxen (NAPROSYN) 500 MG tablet        naratriptan (AMERGE) 2.5 MG tablet Take 1 tablet (2.5 mg) by mouth at onset of headache for migraine 6 tablet 1     gabapentin (NEURONTIN) 300 MG capsule Take 1 capsule (300 mg) by mouth At Bedtime       OTC products: None, except as noted above    Allergies   Allergen Reactions     Codeine Nausea     Other reaction(s): Nausea     Imitrex [Sumatriptan]      Worsening of migraine, paresthesias     Ranitidine Rash and Itching     recvd IV zantac 75 and reaction was immediate,  was given Benadryl to counter act reaction in 2000     Sulfa Drugs Unknown and Rash      Latex Allergy: NO    Social History     Tobacco Use     Smoking status: Former Smoker     Smokeless tobacco: Former User     Quit date: 3/10/2016   Substance Use Topics     Alcohol use: No     Comment: history alcohol abuse 2012, s/p treatment     History   Drug Use No     Comment: former drug user (EtOH, cocaine, marijuana, meth), last use 2012       REVIEW OF SYSTEMS:   CONSTITUTIONAL: NEGATIVE for fever, chills, change in weight  INTEGUMENTARY/SKIN: POSITIVE for abnormal mole on right foot   EYES: NEGATIVE for vision changes or irritation  ENT/MOUTH: NEGATIVE for ear, mouth and throat problems  RESP: NEGATIVE for significant cough or SOB  BREAST: NEGATIVE for masses, tenderness or discharge  CV: NEGATIVE for chest pain, palpitations or peripheral edema  GI: NEGATIVE for nausea, abdominal pain, heartburn, or change in bowel habits  : NEGATIVE for frequency, dysuria, or  "hematuria  MUSCULOSKELETAL: NEGATIVE for significant arthralgias or myalgia  NEURO: NEGATIVE for weakness, dizziness or paresthesias  ENDOCRINE: NEGATIVE for temperature intolerance, skin/hair changes  HEME: NEGATIVE for bleeding problems  PSYCHIATRIC: NEGATIVE for changes in mood or affect    EXAM:   /70   Pulse 80   Temp 97.2  F (36.2  C) (Temporal)   Resp 16   Ht 1.676 m (5' 5.98\")   Wt 85.9 kg (189 lb 4.8 oz)   BMI 30.57 kg/m      GENERAL APPEARANCE: healthy, alert and no distress     EYES: EOMI, PERRL     HENT: ear canals and TM's normal and nose and mouth without ulcers or lesions     NECK: no adenopathy, no asymmetry, masses, or scars and thyroid normal to palpation     RESP: lungs clear to auscultation - no rales, rhonchi or wheezes     CV: regular rates and rhythm, normal S1 S2, no S3 or S4 and no murmur, click or rub     ABDOMEN:  soft, nontender, no HSM or masses and bowel sounds normal     MS: extremities normal- no gross deformities noted, no evidence of inflammation in joints, FROM in all extremities.     SKIN: atypical appearing mole on right plantar aspect between 4th-5th toes and between 2nd and 3rd toes, tiny brown lesion by cuticle on left great toe, lateral distal left toe     NEURO: Normal strength and tone, sensory exam grossly normal, mentation intact and speech normal     PSYCH: mentation appears normal. and affect normal/bright     LYMPHATICS: No cervical adenopathy    DIAGNOSTICS:   EKG: appears normal, NSR, normal axis, normal intervals, no acute ST/T changes c/w ischemia, no LVH by voltage criteria, unchanged from previous tracings    Recent Labs   Lab Test 07/22/19  1335 07/18/19  1621   HGB 13.3 12.3    238    142   POTASSIUM 4.3 3.9   CR 0.87 0.69        IMPRESSION:   Reason for surgery/procedure: removal of atypical lesion on right foot  Diagnosis/reason for consult: preop    The proposed surgical procedure is considered LOW risk.    REVISED CARDIAC RISK " INDEX  The patient has the following serious cardiovascular risks for perioperative complications such as (MI, PE, VFib and 3  AV Block):  No serious cardiac risks  INTERPRETATION: 0 risks: Class I (very low risk - 0.4% complication rate)    The patient has the following additional risks for perioperative complications:  No identified additional risks      ICD-10-CM    1. Preop general physical exam Z01.818    2. Atypical mole D22.9        RECOMMENDATIONS:     --Patient is to hold all scheduled medications on the day of surgery EXCEPT for modifications listed below.  --Advised to hold NSAIDs 10 days prior to surgery.    APPROVAL GIVEN to proceed with proposed procedure, without further diagnostic evaluation       Signed Electronically by: MADELINE Hernandez CNP    Copy of this evaluation report is provided to requesting physician.    [facility] Preop Guidelines    Revised Cardiac Risk Index

## 2019-08-09 ENCOUNTER — HOSPITAL ENCOUNTER (EMERGENCY)
Facility: CLINIC | Age: 34
Discharge: HOME | End: 2019-08-10
Attending: FAMILY MEDICINE | Admitting: FAMILY MEDICINE
Payer: COMMERCIAL

## 2019-08-09 ENCOUNTER — APPOINTMENT (OUTPATIENT)
Dept: CT IMAGING | Facility: CLINIC | Age: 34
End: 2019-08-09
Attending: FAMILY MEDICINE
Payer: COMMERCIAL

## 2019-08-09 ENCOUNTER — OFFICE VISIT (OUTPATIENT)
Dept: FAMILY MEDICINE | Facility: OTHER | Age: 34
End: 2019-08-09
Payer: COMMERCIAL

## 2019-08-09 VITALS
TEMPERATURE: 97.2 F | HEIGHT: 66 IN | HEART RATE: 80 BPM | RESPIRATION RATE: 16 BRPM | WEIGHT: 189.3 LBS | DIASTOLIC BLOOD PRESSURE: 70 MMHG | SYSTOLIC BLOOD PRESSURE: 110 MMHG | BODY MASS INDEX: 30.42 KG/M2

## 2019-08-09 DIAGNOSIS — Z01.818 PREOP GENERAL PHYSICAL EXAM: Primary | ICD-10-CM

## 2019-08-09 DIAGNOSIS — D22.9 ATYPICAL MOLE: ICD-10-CM

## 2019-08-09 DIAGNOSIS — R10.9 ABDOMINAL WALL PAIN: ICD-10-CM

## 2019-08-09 PROCEDURE — 25000128 H RX IP 250 OP 636: Performed by: FAMILY MEDICINE

## 2019-08-09 PROCEDURE — 99215 OFFICE O/P EST HI 40 MIN: CPT | Performed by: STUDENT IN AN ORGANIZED HEALTH CARE EDUCATION/TRAINING PROGRAM

## 2019-08-09 PROCEDURE — 74177 CT ABD & PELVIS W/CONTRAST: CPT

## 2019-08-09 PROCEDURE — 99284 EMERGENCY DEPT VISIT MOD MDM: CPT | Mod: Z6 | Performed by: FAMILY MEDICINE

## 2019-08-09 PROCEDURE — 99284 EMERGENCY DEPT VISIT MOD MDM: CPT | Mod: 25

## 2019-08-09 ASSESSMENT — MIFFLIN-ST. JEOR: SCORE: 1575.16

## 2019-08-10 VITALS
RESPIRATION RATE: 20 BRPM | HEART RATE: 74 BPM | OXYGEN SATURATION: 98 % | BODY MASS INDEX: 30.68 KG/M2 | DIASTOLIC BLOOD PRESSURE: 91 MMHG | SYSTOLIC BLOOD PRESSURE: 113 MMHG | TEMPERATURE: 97.7 F | WEIGHT: 190 LBS

## 2019-08-10 PROCEDURE — 25000128 H RX IP 250 OP 636: Performed by: FAMILY MEDICINE

## 2019-08-10 PROCEDURE — 25000132 ZZH RX MED GY IP 250 OP 250 PS 637: Performed by: FAMILY MEDICINE

## 2019-08-10 PROCEDURE — 25000125 ZZHC RX 250: Performed by: FAMILY MEDICINE

## 2019-08-10 RX ORDER — LIDOCAINE 4 G/G
1 PATCH TOPICAL ONCE
Status: COMPLETED | OUTPATIENT
Start: 2019-08-10 | End: 2019-08-10

## 2019-08-10 RX ORDER — IOPAMIDOL 755 MG/ML
100 INJECTION, SOLUTION INTRAVASCULAR ONCE
Status: COMPLETED | OUTPATIENT
Start: 2019-08-10 | End: 2019-08-10

## 2019-08-10 RX ORDER — OXYCODONE HYDROCHLORIDE 5 MG/1
5 TABLET ORAL ONCE
Status: COMPLETED | OUTPATIENT
Start: 2019-08-10 | End: 2019-08-10

## 2019-08-10 RX ORDER — LIDOCAINE 4 G/G
1 PATCH TOPICAL EVERY 12 HOURS PRN
COMMUNITY
Start: 2019-08-10 | End: 2019-09-02

## 2019-08-10 RX ADMIN — OXYCODONE HYDROCHLORIDE 5 MG: 5 TABLET ORAL at 01:20

## 2019-08-10 RX ADMIN — LIDOCAINE 1 PATCH: 560 PATCH PERCUTANEOUS; TOPICAL; TRANSDERMAL at 01:32

## 2019-08-10 RX ADMIN — SODIUM CHLORIDE 60 ML: 9 INJECTION, SOLUTION INTRAVENOUS at 00:59

## 2019-08-10 RX ADMIN — IOPAMIDOL 93 ML: 755 INJECTION, SOLUTION INTRAVENOUS at 00:58

## 2019-08-10 ASSESSMENT — ENCOUNTER SYMPTOMS
APPETITE CHANGE: 0
ABDOMINAL DISTENTION: 0
RESPIRATORY NEGATIVE: 1
NERVOUS/ANXIOUS: 1
VOMITING: 0
SHORTNESS OF BREATH: 0
ALLERGIC/IMMUNOLOGIC NEGATIVE: 1
NEUROLOGICAL NEGATIVE: 1
HEMATOLOGIC/LYMPHATIC NEGATIVE: 1
DYSURIA: 0
ABDOMINAL PAIN: 1
CHILLS: 0
BLOOD IN STOOL: 0
EYES NEGATIVE: 1
CARDIOVASCULAR NEGATIVE: 1
HEMATURIA: 0
NAUSEA: 0
FEVER: 0

## 2019-08-10 NOTE — DISCHARGE INSTRUCTIONS
Please read and follow the handout(s) instructions. Return, if needed, for increased or worsening symptoms and as directed by the handout(s).    You may apply ice or cold packs to the area for 10-15 minutes every 1-2 hours as needed to relieve pain and/or swelling if not using the Lidoderm patch.

## 2019-08-10 NOTE — ED PROVIDER NOTES
History     Chief Complaint   Patient presents with     Abdominal Pain     HPI  Breanna Vidal is a 34 year old female who presents emergency room secondary concerns of right-sided abdominal pain.  She states that she underwent a laparoscopic umbilical hernia repair 3 weeks ago.  She states that since that time she has had some right anterior abdominal pain but in the last week the pain is gotten more intense.  She states that she was seen in her clinic by her primary care doctor earlier today who told her to come to the emergency room if it got more severe.  Patient states that it just continues to be an issue.  She states any movement increases the pain.  She states even just taking deep breaths increases the pain.  She is frustrated as it hurts her to lift up her young child.  She states that the incisions from the surgery have healed up well.  She stated that she that with a small seroma medially after the surgery but that is since resolved.  She denies any fever, chills, nausea, vomiting, or bloody diarrhea.  She denies any skin rash or fall as a reason for her abdominal pain symptoms.    Allergies:  Allergies   Allergen Reactions     Codeine Nausea     Other reaction(s): Nausea     Imitrex [Sumatriptan]      Worsening of migraine, paresthesias     Ranitidine Rash and Itching     recvd IV zantac 75 and reaction was immediate,  was given Benadryl to counter act reaction in 2000     Sulfa Drugs Unknown and Rash       Problem List:    Patient Active Problem List    Diagnosis Date Noted     Intractable migraine without aura and without status migrainosus 07/17/2019     Priority: Medium     Alcohol abuse, in remission 08/20/2018     Priority: Medium     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) 08/20/2018     Priority: Medium     Mixed incontinence 03/03/2017     Priority: Medium     Vaginal delivery 10/31/2011     Priority: Medium     Rh negative status during pregnancy 09/23/2011     Priority: Medium        Past  Medical History:    Past Medical History:   Diagnosis Date     Cellulitis 2012     Drug abuse (H) 2011     MRSA cellulitis 2012     Rh negative, antepartum        Past Surgical History:    Past Surgical History:   Procedure Laterality Date     INCISION AND DRAINAGE  04/18/2012    chin abscess I&D     LAPAROSCOPIC HERNIORRHAPHY INCISIONAL N/A 7/17/2019    Procedure: laparoscopic incisional hernia repair with mesh;  Surgeon: Jovi Odom MD;  [facility]     LAPAROSCOPIC SALPINGECTOMY Bilateral 8/27/2018    Procedure: LAPAROSCOPIC SALPINGECTOMY;  Laparoscopic Bilateral Salpingectomy;  Surgeon: Chai Cui MD;  Location: [facility]     MAMMOPLASTY REDUCTION Bilateral 08/04/2017       Family History:    Family History   Problem Relation Age of Onset     Cancer Father      Pancreatic Cancer Father      Lung Cancer Father        Social History:  Marital Status:  Single [1]  Social History     Tobacco Use     Smoking status: Former Smoker     Smokeless tobacco: Former User     Quit date: 3/10/2016   Substance Use Topics     Alcohol use: No     Comment: history alcohol abuse 2012, s/p treatment     Drug use: No     Comment: former drug user (EtOH, cocaine, marijuana, meth), last use 2012        Medications:      Lidocaine (LIDOCARE) 4 % Patch   acetaminophen (TYLENOL) 500 MG tablet   EPINEPHrine (EPIPEN/ADRENACLICK/OR ANY BX GENERIC EQUIV) 0.3 MG/0.3ML injection 2-pack   fluticasone (FLONASE) 50 MCG/ACT nasal spray   gabapentin (NEURONTIN) 300 MG capsule   ibuprofen (ADVIL/MOTRIN) 200 MG tablet   multivitamin w/minerals (MULTI-VITAMIN) tablet   naproxen (NAPROSYN) 500 MG tablet   naratriptan (AMERGE) 2.5 MG tablet         Review of Systems   Constitutional: Negative for appetite change, chills and fever.   HENT: Negative.    Eyes: Negative.    Respiratory: Negative.  Negative for shortness of breath.         Motion or deep breathing can make the anterior abdominal pain more intense.   Cardiovascular: Negative.     Gastrointestinal: Positive for abdominal pain (right anterior mid abdominal wall area pain.). Negative for abdominal distention, blood in stool, nausea and vomiting.   Genitourinary: Negative for dysuria, hematuria, vaginal bleeding and vaginal pain.   Skin: Negative for rash.   Allergic/Immunologic: Negative.    Neurological: Negative.    Hematological: Negative.    Psychiatric/Behavioral: The patient is nervous/anxious.    All other systems reviewed and are negative.      Physical Exam   BP: (!) 118/91  Pulse: 76  Heart Rate: 77  Temp: 98.6  F (37  C)  Resp: 18  Weight: 86.2 kg (190 lb)  SpO2: 98 %      Physical Exam   Constitutional: She is oriented to person, place, and time. She appears well-developed and well-nourished.  Non-toxic appearance. She does not appear ill. No distress.   HENT:   Head: Atraumatic.   Eyes: Pupils are equal, round, and reactive to light. EOM are normal.   Cardiovascular: Normal rate and intact distal pulses.   Pulmonary/Chest: Effort normal. No respiratory distress.   Abdominal: Soft. Bowel sounds are normal. There is tenderness. No hernia.       Neurological: She is alert and oriented to person, place, and time.   Skin: Capillary refill takes less than 2 seconds. No rash noted. No erythema.   Psychiatric: She has a normal mood and affect. Her behavior is normal.   Nursing note and vitals reviewed.      ED Course        Procedures               Critical Care time:  none               Results for orders placed or performed during the hospital encounter of 08/09/19 (from the past 24 hour(s))   CT Abdomen Pelvis w Contrast    Narrative    CT ABDOMEN AND PELVIS WITH CONTRAST   8/10/2019 1:07 AM     HISTORY: Three weeks status post laparoscopic umbilical hernia repair.  Right anterior abdominal pain.    COMPARISON: 7/18/2019.    TECHNIQUE: Following the uneventful administration of 93mls Isovue 370  intravenous contrast, helical sections were acquired from the top of  the diaphragm  through the pubic symphysis. Coronal reconstructions  were generated. Radiation dose for this scan was reduced using  automated exposure control, adjustment of the mA and/or kV according  to the patient's size, or iterative reconstruction technique.    FINDINGS:     Abdomen: The liver, spleen, pancreas, adrenal glands and kidneys are  unremarkable. The gallbladder is present. No enlarged lymph nodes or  free fluid in the upper abdomen.    Scan through the lower chest is unremarkable.    Pelvis: The small and large bowel are normal in caliber. The appendix  is not visualized. No bowel wall thickening, pneumatosis or free  intraperitoneal gas. The uterus is present. No enlarged lymph nodes or  free fluid in the pelvis. Evidence of recent paraumbilical hernia  repair. A pancake-shaped fluid attenuation structure measuring up to  1.6 cm in diameter without rim enhancement is in the anterior  abdominal wall and just deep to the peritoneum and abdominal wall  musculature (series 2, image 49). This is within normal limits given  the recent surgery. No recurrent hernia. No abscess is visualized in  the pelvis.      Impression    IMPRESSION: No convincing cause of acute pain identified in the  abdomen or pelvis.       Medications   iopamidol (ISOVUE-370) solution 100 mL (93 mLs Intravenous Given 8/10/19 0058)   Sodium Chloride 0.9 % bag 100mL for CT scan (60 mLs Intravenous Given 8/10/19 0059)   sodium chloride (PF) 0.9% PF flush 3 mL (3 mLs Intracatheter Given 8/10/19 0058)   Lidocaine (LIDOCARE) 4 % Patch 1 patch (1 patch Transdermal Given 8/10/19 0132)   oxyCODONE (ROXICODONE) tablet 5 mg (5 mg Oral Given 8/10/19 0120)       Assessments & Plan (with Medical Decision Making)  Small area of right mid anterior abdominal wall tenderness with mild discoloration consistent with small hematoma in the abdominal wall musculature.  No signs suggestive of infectious condition.  The recent surgical incisions made appear to be  well-healed and without sign of infection or drainage.  Scan showed evidence for a pancake shaped fluid attenuation measuring up to 1.6 cm in diameter to the anterior abdominal wall just deep to the peritoneum and abdominal wall musculature assistant with the area of tenderness described by the patient.  Consider possible hematoma versus seroma.  Patient was reassured that this would likely resolve on its own over the next couple weeks.  She was given a Lidoderm patch to apply over the area of tenderness.  Additional instructions were given to her in written form on signs and symptoms of concern and when to return to the ER or follow-up with her surgeon as needed.  She felt comfortable with discharge and the plan of care.     I have reviewed the nursing notes.    I have reviewed the findings, diagnosis, plan and need for follow up with the patient.       New Prescriptions    LIDOCAINE (LIDOCARE) 4 % PATCH    Place 1 patch onto the skin every 12 hours as needed Salon Pas is the brand name of the patch and can be purchased without a prescription.            I verbally discussed the findings of the evaluation today in the ER. I have verbally discussed with Breanna the suggested treatment(s) as described in the discharge instructions and handouts. I have prescribed the above listed medications and instructed her on appropriate use of these medications.      I have verbally suggested she follow-up in her clinic or return to the ER for increased symptoms. See the follow-up recommendations documented  in the after visit summary in this visit's EPIC chart.      Final diagnoses:   Abdominal wall pain - right mid 1.6cm hematoma vs seroma       8/9/2019   [facility] EMERGENCY DEPARTMENT     Radhames Julio,   08/10/19 0157

## 2019-08-10 NOTE — ED TRIAGE NOTES
Pt presents with concerns of right sided abdominal pain.  Pt had hernia repair surgery and has had complications since. Pain has worsened today, difficulty bending over and sitting up.

## 2019-08-12 ENCOUNTER — PATIENT OUTREACH (OUTPATIENT)
Dept: CARE COORDINATION | Facility: CLINIC | Age: 34
End: 2019-08-12

## 2019-08-16 ENCOUNTER — ANESTHESIA (OUTPATIENT)
Dept: SURGERY | Facility: CLINIC | Age: 34
End: 2019-08-16
Payer: COMMERCIAL

## 2019-08-16 ENCOUNTER — HOSPITAL ENCOUNTER (OUTPATIENT)
Facility: CLINIC | Age: 34
Discharge: HOME | End: 2019-08-16
Attending: PODIATRIST | Admitting: PODIATRIST
Payer: COMMERCIAL

## 2019-08-16 ENCOUNTER — ANESTHESIA EVENT (OUTPATIENT)
Dept: SURGERY | Facility: CLINIC | Age: 34
End: 2019-08-16
Payer: COMMERCIAL

## 2019-08-16 VITALS
DIASTOLIC BLOOD PRESSURE: 62 MMHG | HEART RATE: 70 BPM | TEMPERATURE: 98 F | OXYGEN SATURATION: 97 % | SYSTOLIC BLOOD PRESSURE: 114 MMHG | RESPIRATION RATE: 16 BRPM

## 2019-08-16 DIAGNOSIS — D22.9 NEVUS: Primary | ICD-10-CM

## 2019-08-16 LAB — HCG UR QL: NEGATIVE

## 2019-08-16 PROCEDURE — 25000125 ZZHC RX 250: Performed by: PODIATRIST

## 2019-08-16 PROCEDURE — 40000306 ZZH STATISTIC PRE PROC ASSESS II: Performed by: PODIATRIST

## 2019-08-16 PROCEDURE — 25000128 H RX IP 250 OP 636: Performed by: NURSE ANESTHETIST, CERTIFIED REGISTERED

## 2019-08-16 PROCEDURE — 25000128 H RX IP 250 OP 636: Performed by: PODIATRIST

## 2019-08-16 PROCEDURE — 36000050 ZZH SURGERY LEVEL 2 1ST 30 MIN: Performed by: PODIATRIST

## 2019-08-16 PROCEDURE — 88305 TISSUE EXAM BY PATHOLOGIST: CPT | Mod: 26 | Performed by: PODIATRIST

## 2019-08-16 PROCEDURE — 81025 URINE PREGNANCY TEST: CPT | Performed by: NURSE ANESTHETIST, CERTIFIED REGISTERED

## 2019-08-16 PROCEDURE — 11423 EXC H-F-NK-SP B9+MARG 2.1-3: CPT | Performed by: PODIATRIST

## 2019-08-16 PROCEDURE — 25000125 ZZHC RX 250: Performed by: NURSE ANESTHETIST, CERTIFIED REGISTERED

## 2019-08-16 PROCEDURE — 25000131 ZZH RX MED GY IP 250 OP 636 PS 637: Performed by: NURSE ANESTHETIST, CERTIFIED REGISTERED

## 2019-08-16 PROCEDURE — 25000132 ZZH RX MED GY IP 250 OP 250 PS 637: Performed by: PODIATRIST

## 2019-08-16 PROCEDURE — 27210794 ZZH OR GENERAL SUPPLY STERILE: Performed by: PODIATRIST

## 2019-08-16 PROCEDURE — 36000052 ZZH SURGERY LEVEL 2 EA 15 ADDTL MIN: Performed by: PODIATRIST

## 2019-08-16 PROCEDURE — 25800030 ZZH RX IP 258 OP 636: Performed by: NURSE ANESTHETIST, CERTIFIED REGISTERED

## 2019-08-16 PROCEDURE — 37000008 ZZH ANESTHESIA TECHNICAL FEE, 1ST 30 MIN: Performed by: PODIATRIST

## 2019-08-16 PROCEDURE — 11420 EXC H-F-NK-SP B9+MARG 0.5/<: CPT | Mod: 51 | Performed by: PODIATRIST

## 2019-08-16 PROCEDURE — 88305 TISSUE EXAM BY PATHOLOGIST: CPT | Performed by: PODIATRIST

## 2019-08-16 PROCEDURE — 37000009 ZZH ANESTHESIA TECHNICAL FEE, EACH ADDTL 15 MIN: Performed by: PODIATRIST

## 2019-08-16 PROCEDURE — 71000027 ZZH RECOVERY PHASE 2 EACH 15 MINS: Performed by: PODIATRIST

## 2019-08-16 RX ORDER — ONDANSETRON 2 MG/ML
4 INJECTION INTRAMUSCULAR; INTRAVENOUS EVERY 30 MIN PRN
Status: DISCONTINUED | OUTPATIENT
Start: 2019-08-16 | End: 2019-08-16 | Stop reason: HOSPADM

## 2019-08-16 RX ORDER — HYDROXYZINE HYDROCHLORIDE 25 MG/1
25 TABLET, FILM COATED ORAL
Status: DISCONTINUED | OUTPATIENT
Start: 2019-08-16 | End: 2019-08-16 | Stop reason: HOSPADM

## 2019-08-16 RX ORDER — MEPERIDINE HYDROCHLORIDE 25 MG/ML
12.5 INJECTION INTRAMUSCULAR; INTRAVENOUS; SUBCUTANEOUS
Status: DISCONTINUED | OUTPATIENT
Start: 2019-08-16 | End: 2019-08-16 | Stop reason: HOSPADM

## 2019-08-16 RX ORDER — ONDANSETRON 2 MG/ML
INJECTION INTRAMUSCULAR; INTRAVENOUS PRN
Status: DISCONTINUED | OUTPATIENT
Start: 2019-08-16 | End: 2019-08-16

## 2019-08-16 RX ORDER — ONDANSETRON 4 MG/1
4 TABLET, ORALLY DISINTEGRATING ORAL EVERY 30 MIN PRN
Status: DISCONTINUED | OUTPATIENT
Start: 2019-08-16 | End: 2019-08-16 | Stop reason: HOSPADM

## 2019-08-16 RX ORDER — PROPOFOL 10 MG/ML
INJECTION, EMULSION INTRAVENOUS PRN
Status: DISCONTINUED | OUTPATIENT
Start: 2019-08-16 | End: 2019-08-16

## 2019-08-16 RX ORDER — PROPOFOL 10 MG/ML
INJECTION, EMULSION INTRAVENOUS CONTINUOUS PRN
Status: DISCONTINUED | OUTPATIENT
Start: 2019-08-16 | End: 2019-08-16

## 2019-08-16 RX ORDER — LIDOCAINE HYDROCHLORIDE AND EPINEPHRINE 10; 10 MG/ML; UG/ML
INJECTION, SOLUTION INFILTRATION; PERINEURAL PRN
Status: DISCONTINUED | OUTPATIENT
Start: 2019-08-16 | End: 2019-08-16 | Stop reason: HOSPADM

## 2019-08-16 RX ORDER — OXYCODONE AND ACETAMINOPHEN 5; 325 MG/1; MG/1
1-2 TABLET ORAL EVERY 4 HOURS PRN
Qty: 30 TABLET | Refills: 0 | Status: SHIPPED | OUTPATIENT
Start: 2019-08-16 | End: 2019-08-22

## 2019-08-16 RX ORDER — LIDOCAINE HYDROCHLORIDE 20 MG/ML
INJECTION, SOLUTION INFILTRATION; PERINEURAL PRN
Status: DISCONTINUED | OUTPATIENT
Start: 2019-08-16 | End: 2019-08-16

## 2019-08-16 RX ORDER — SODIUM CHLORIDE, SODIUM LACTATE, POTASSIUM CHLORIDE, CALCIUM CHLORIDE 600; 310; 30; 20 MG/100ML; MG/100ML; MG/100ML; MG/100ML
INJECTION, SOLUTION INTRAVENOUS CONTINUOUS
Status: DISCONTINUED | OUTPATIENT
Start: 2019-08-16 | End: 2019-08-16 | Stop reason: HOSPADM

## 2019-08-16 RX ORDER — LIDOCAINE 40 MG/G
CREAM TOPICAL
Status: DISCONTINUED | OUTPATIENT
Start: 2019-08-16 | End: 2019-08-16 | Stop reason: HOSPADM

## 2019-08-16 RX ORDER — FENTANYL CITRATE 50 UG/ML
25-50 INJECTION, SOLUTION INTRAMUSCULAR; INTRAVENOUS
Status: DISCONTINUED | OUTPATIENT
Start: 2019-08-16 | End: 2019-08-16 | Stop reason: HOSPADM

## 2019-08-16 RX ORDER — SCOLOPAMINE TRANSDERMAL SYSTEM 1 MG/1
1 PATCH, EXTENDED RELEASE TRANSDERMAL
Status: DISCONTINUED | OUTPATIENT
Start: 2019-08-16 | End: 2019-08-16 | Stop reason: HOSPADM

## 2019-08-16 RX ORDER — NALOXONE HYDROCHLORIDE 0.4 MG/ML
.1-.4 INJECTION, SOLUTION INTRAMUSCULAR; INTRAVENOUS; SUBCUTANEOUS
Status: DISCONTINUED | OUTPATIENT
Start: 2019-08-16 | End: 2019-08-16 | Stop reason: HOSPADM

## 2019-08-16 RX ORDER — CEFAZOLIN SODIUM 1 G/3ML
1 INJECTION, POWDER, FOR SOLUTION INTRAMUSCULAR; INTRAVENOUS SEE ADMIN INSTRUCTIONS
Status: DISCONTINUED | OUTPATIENT
Start: 2019-08-16 | End: 2019-08-16 | Stop reason: HOSPADM

## 2019-08-16 RX ORDER — GLYCOPYRROLATE 0.2 MG/ML
INJECTION, SOLUTION INTRAMUSCULAR; INTRAVENOUS PRN
Status: DISCONTINUED | OUTPATIENT
Start: 2019-08-16 | End: 2019-08-16

## 2019-08-16 RX ORDER — BUPIVACAINE HYDROCHLORIDE 5 MG/ML
INJECTION, SOLUTION PERINEURAL PRN
Status: DISCONTINUED | OUTPATIENT
Start: 2019-08-16 | End: 2019-08-16 | Stop reason: HOSPADM

## 2019-08-16 RX ORDER — CEFAZOLIN SODIUM 2 G/100ML
2 INJECTION, SOLUTION INTRAVENOUS
Status: COMPLETED | OUTPATIENT
Start: 2019-08-16 | End: 2019-08-16

## 2019-08-16 RX ORDER — DEXAMETHASONE SODIUM PHOSPHATE 10 MG/ML
INJECTION, SOLUTION INTRAMUSCULAR; INTRAVENOUS PRN
Status: DISCONTINUED | OUTPATIENT
Start: 2019-08-16 | End: 2019-08-16

## 2019-08-16 RX ORDER — FENTANYL CITRATE 50 UG/ML
INJECTION, SOLUTION INTRAMUSCULAR; INTRAVENOUS PRN
Status: DISCONTINUED | OUTPATIENT
Start: 2019-08-16 | End: 2019-08-16

## 2019-08-16 RX ORDER — OXYCODONE HYDROCHLORIDE 5 MG/1
10 TABLET ORAL
Status: COMPLETED | OUTPATIENT
Start: 2019-08-16 | End: 2019-08-16

## 2019-08-16 RX ORDER — KETOROLAC TROMETHAMINE 30 MG/ML
INJECTION, SOLUTION INTRAMUSCULAR; INTRAVENOUS PRN
Status: DISCONTINUED | OUTPATIENT
Start: 2019-08-16 | End: 2019-08-16

## 2019-08-16 RX ADMIN — SCOPOLAMINE 1 PATCH: 1 PATCH TRANSDERMAL at 09:25

## 2019-08-16 RX ADMIN — OXYCODONE HYDROCHLORIDE 10 MG: 5 TABLET ORAL at 12:23

## 2019-08-16 RX ADMIN — FENTANYL CITRATE 50 MCG: 50 INJECTION, SOLUTION INTRAMUSCULAR; INTRAVENOUS at 10:35

## 2019-08-16 RX ADMIN — PROPOFOL 150 MCG/KG/MIN: 10 INJECTION, EMULSION INTRAVENOUS at 10:35

## 2019-08-16 RX ADMIN — KETOROLAC TROMETHAMINE 30 MG: 30 INJECTION, SOLUTION INTRAMUSCULAR at 11:18

## 2019-08-16 RX ADMIN — SODIUM CHLORIDE, POTASSIUM CHLORIDE, SODIUM LACTATE AND CALCIUM CHLORIDE: 600; 310; 30; 20 INJECTION, SOLUTION INTRAVENOUS at 09:06

## 2019-08-16 RX ADMIN — PROPOFOL 50 MG: 10 INJECTION, EMULSION INTRAVENOUS at 10:38

## 2019-08-16 RX ADMIN — CEFAZOLIN SODIUM 2 G: 2 INJECTION, SOLUTION INTRAVENOUS at 10:33

## 2019-08-16 RX ADMIN — FENTANYL CITRATE 50 MCG: 50 INJECTION INTRAMUSCULAR; INTRAVENOUS at 12:11

## 2019-08-16 RX ADMIN — LIDOCAINE HYDROCHLORIDE 0.1 ML: 10 INJECTION, SOLUTION EPIDURAL; INFILTRATION; INTRACAUDAL; PERINEURAL at 09:06

## 2019-08-16 RX ADMIN — PROPOFOL 50 MG: 10 INJECTION, EMULSION INTRAVENOUS at 10:35

## 2019-08-16 RX ADMIN — LIDOCAINE HYDROCHLORIDE 40 MG: 20 INJECTION, SOLUTION INFILTRATION; PERINEURAL at 10:35

## 2019-08-16 RX ADMIN — ONDANSETRON 4 MG: 4 TABLET, ORALLY DISINTEGRATING ORAL at 12:25

## 2019-08-16 RX ADMIN — MIDAZOLAM 1 MG: 1 INJECTION INTRAMUSCULAR; INTRAVENOUS at 10:35

## 2019-08-16 RX ADMIN — MIDAZOLAM 1 MG: 1 INJECTION INTRAMUSCULAR; INTRAVENOUS at 10:28

## 2019-08-16 RX ADMIN — FENTANYL CITRATE 50 MCG: 50 INJECTION INTRAMUSCULAR; INTRAVENOUS at 12:02

## 2019-08-16 RX ADMIN — DEXAMETHASONE SODIUM PHOSPHATE 4 MG: 10 INJECTION, SOLUTION INTRAMUSCULAR; INTRAVENOUS at 10:50

## 2019-08-16 RX ADMIN — GLYCOPYRROLATE 0.2 MG: 0.2 INJECTION, SOLUTION INTRAMUSCULAR; INTRAVENOUS at 11:12

## 2019-08-16 RX ADMIN — ONDANSETRON 4 MG: 2 INJECTION INTRAMUSCULAR; INTRAVENOUS at 10:58

## 2019-08-16 ASSESSMENT — LIFESTYLE VARIABLES: TOBACCO_USE: 1

## 2019-08-16 NOTE — ANESTHESIA POSTPROCEDURE EVALUATION
Patient: Breanna Vidal    Procedure(s):  Excision of nevus second interdigital space left foot, Excision of nevus third interdigital space left foot, excision of plantar junctional nevus left foot    Diagnosis:Pigmented lesion second interdigital space left foot, pigmented lesion third interdigital space left foot, pigmented junctional nevus plantar left foot  Diagnosis Additional Information: No value filed.    Anesthesia Type:  MAC    Note:  Anesthesia Post Evaluation    Patient location during evaluation: Phase 2  Patient participation: Able to fully participate in evaluation  Level of consciousness: awake and alert  Pain management: adequate  Airway patency: patent  Cardiovascular status: acceptable and stable  Respiratory status: acceptable and room air  Hydration status: acceptable  PONV: none     Anesthetic complications: None    Comments:  Patient was happy with the anesthesia care received and no anesthesia related complications were noted.  I will follow up with the patient again if it is needed.        Last vitals:  Vitals:    08/16/19 1207 08/16/19 1215 08/16/19 1230   BP:  110/59    Pulse:  70    Resp:  16    Temp:      SpO2: 97% 97% 96%         Electronically Signed By: MADELINE Arroyo CRNA  August 16, 2019  12:40 PM

## 2019-08-16 NOTE — ANESTHESIA CARE TRANSFER NOTE
Patient: Breanna Vidal    Procedure(s):  Excision of nevus second interdigital space left foot, Excision of nevus third interdigital space left foot, excision of plantar junctional nevus left foot    Diagnosis: Pigmented lesion second interdigital space left foot, pigmented lesion third interdigital space left foot, pigmented junctional nevus plantar left foot  Diagnosis Additional Information: No value filed.    Anesthesia Type:   MAC     Note:  Airway :Room Air  Patient transferred to:Phase II  Handoff Report: Identifed the Patient, Identified the Reponsible Provider, Reviewed the pertinent medical history, Discussed the surgical course, Reviewed Intra-OP anesthesia mangement and issues during anesthesia, Set expectations for post-procedure period and Allowed opportunity for questions and acknowledgement of understanding      Vitals: (Last set prior to Anesthesia Care Transfer)    CRNA VITALS  8/16/2019 1110 - 8/16/2019 1149      8/16/2019             Pulse:  75    SpO2:  100 %    Resp Rate (observed):  20                Electronically Signed By: MADELINE Arroyo CRNA  August 16, 2019  11:49 AM

## 2019-08-16 NOTE — ANESTHESIA PREPROCEDURE EVALUATION
Anesthesia Pre-Procedure Evaluation    Patient: Breanna Vidal   MRN: [id number] : 1985          Preoperative Diagnosis: Pigmented lesion second interdigital space left foot, pigmented lesion third interdigital space left foot, pigmented junctional nevus plantar left foot    Procedure(s):  Excision of nevus second interdigital space left foot, Excision of nevus third interdigital space left foot, excision of plantar junctional nevus left foot    Past Medical History:   Diagnosis Date     Cellulitis     MRSA septic olecranon bursitis and facial cellulitis     Drug abuse (H)     methamphetamine and alcohol, sober since treatment      MRSA cellulitis     cleared with negative nasal swabs 17 and 17, reviewed     Rh negative, antepartum      Past Surgical History:   Procedure Laterality Date     INCISION AND DRAINAGE  2012    chin abscess I&D     LAPAROSCOPIC HERNIORRHAPHY INCISIONAL N/A 2019    Procedure: laparoscopic incisional hernia repair with mesh;  Surgeon: Jovi Odom MD;  Location: [facility] OR     LAPAROSCOPIC SALPINGECTOMY Bilateral 2018    Procedure: LAPAROSCOPIC SALPINGECTOMY;  Laparoscopic Bilateral Salpingectomy;  Surgeon: Chai Cui MD;  Location: [facility] OR     MAMMOPLASTY REDUCTION Bilateral 2017       Anesthesia Evaluation     . Pt has had prior anesthetic. Type: General and Regional    No history of anesthetic complications          ROS/MED HX    ENT/Pulmonary:     (+)tobacco use, Past use , . .    Neurologic:  - neg neurologic ROS     Cardiovascular:  - neg cardiovascular ROS   (+) ----. : . . . :. . Previous cardiac testing date:results:date: results:ECG reviewed date:4/10/19 results:SR date: results:          METS/Exercise Tolerance:  >4 METS   Hematologic:  - neg hematologic  ROS       Musculoskeletal:  - neg musculoskeletal ROS       GI/Hepatic: Comment: Hx of ETOH abuse        Renal/Genitourinary: Comment: Mixed incontinence         Endo:  - neg  "endo ROS       Psychiatric: Comment: History of depression and anxiety, post partum, currently controlled. Methamphetamine abuse        Infectious Disease:  - neg infectious disease ROS       Malignancy:      - no malignancy   Other: Comment: History of ETOH abuse in remission  History of methamphetamine abuse in remission   (+) No chance of pregnancy C-spine cleared: N/A, no H/O Chronic Pain,                        Physical Exam  Normal systems: cardiovascular, pulmonary and dental    Airway   Mallampati: II  TM distance: >3 FB  Neck ROM: full    Dental     Cardiovascular   Rhythm and rate: regular and normal      Pulmonary    breath sounds clear to auscultation            Lab Results   Component Value Date    WBC 7.2 07/22/2019    HGB 13.3 07/22/2019    HCT 38.8 07/22/2019     07/22/2019    CRP 57.5 (H) 10/14/2018     07/22/2019    POTASSIUM 4.3 07/22/2019    CHLORIDE 105 07/22/2019    CO2 31 07/22/2019    BUN 14 07/22/2019    CR 0.87 07/22/2019    GLC 87 07/22/2019    ELICIA 9.2 07/22/2019    ALBUMIN 3.7 07/22/2019    PROTTOTAL 7.1 07/22/2019    ALT 28 07/22/2019    AST 17 07/22/2019    ALKPHOS 54 07/22/2019    BILITOTAL 0.3 07/22/2019    LIPASE 143 07/22/2019    HCG Negative 07/17/2019       Preop Vitals  BP Readings from Last 3 Encounters:   08/10/19 (!) 113/91   08/09/19 110/70   07/30/19 114/70    Pulse Readings from Last 3 Encounters:   08/10/19 74   08/09/19 80   07/22/19 56      Resp Readings from Last 3 Encounters:   08/10/19 20   08/09/19 16   07/22/19 16    SpO2 Readings from Last 3 Encounters:   08/10/19 98%   07/22/19 100%   07/18/19 95%      Temp Readings from Last 1 Encounters:   08/10/19 97.7  F (36.5  C) (Oral)    Ht Readings from Last 1 Encounters:   08/09/19 1.676 m (5' 5.98\")      Wt Readings from Last 1 Encounters:   08/09/19 86.2 kg (190 lb)    Estimated body mass index is 30.68 kg/m  as calculated from the following:    Height as of 8/9/19: 1.676 m (5' 5.98\").    Weight as of " 8/9/19: 86.2 kg (190 lb).       Anesthesia Plan      History & Physical Review  History and physical reviewed and following examination; no interval change.    ASA Status:  2 .    NPO Status:  > 8 hours    Plan for MAC with Intravenous and Propofol induction. Maintenance will be Balanced.  Reason for MAC:  Deep or markedly invasive procedure (G8)  PONV prophylaxis:  Ondansetron (or other 5HT-3) and Dexamethasone or Solumedrol       Postoperative Care  Postoperative pain management:  IV analgesics, Oral pain medications and Multi-modal analgesia.      Consents  Anesthetic plan, risks, benefits and alternatives discussed with:  Patient..                 MADELINE Arroyo CRNA

## 2019-08-16 NOTE — DISCHARGE INSTRUCTIONS
You may take Ibuprofen at 5:00 PM (600 mg by mouth every 6 hours as needed)  [facility]   Adult Discharge Orders & Instructions     For 24 hours after surgery    1. Get plenty of rest.  A responsible adult must stay with you for at least 24 hours after you leave the hospital.   2. Do not drive or use heavy equipment.  If you have weakness or tingling, don't drive or use heavy equipment until this feeling goes away.  3. Do not drink alcohol.  4. Avoid strenuous or risky activities.  Ask for help when climbing stairs.   5. You may feel lightheaded.  If so, sit for a few minutes before standing.  Have someone help you get up.   6. You may have a slight fever. Call the doctor if your fever is over 100 F (37.7 C) (taken under the tongue) or lasts longer than 24 hours.  7. You may have a dry mouth, a sore throat, muscle aches or trouble sleeping.  These should go away after 24 hours.  8. Do not make important or legal decisions.  We don t expect you to have any problems from the surgery or treatment you had today. Just in case, here s what to do if you have pain, upset stomach (nausea), bleeding or infection:  Pain:  Take medicines your doctor has prescribed or over-the-counter medicine they have suggested. Resting and using ice packs can help, too. For surgery on an arm or leg, raise it on a pillow to ease swelling. Call your doctor if these methods don t work.  Copyright [employer], Licensed under [employer]  Upset stomach (nausea):  Take anti-nausea medicine approved by your doctor. Drink clear liquids like apple juice, ginger ale, broth or 7-Up. Be sure to drink enough fluids. Rest can help, too. Move to normal foods when you re ready. Bleeding:  In the first 24 hours, you may see a little blood on your dressing, about the size of a quarter. You don t need to worry about this much blood, but if the blood spot keeps getting bigger:    Put pressure on the wound if you can,  AND    Call your doctor.  Copyright [employer], Licensed under [employer]  Fever/Infection: Please call your doctor if you have any of these signs:    Redness    Swelling    Wound feels warm    Pain gets worse    Bad-smelling fluid leaks from wound    Fever or chills  Call your doctor for any of the followin.  It has been over 8 to 10 hours since surgery and you are still not able to urinate (pass water).    2.  Headache for over 24 hours.       [facility] Nurse advice line: [phone] (24 hour line)

## 2019-08-16 NOTE — OR NURSING
Insured pt's crutches fit properly to her. Crutch walking then demonstrated to pt. Pt able to do return demo and walk safely with crutches. Pt able to sit down in chair appropriately. Pt plans to go up or down stairs at home on her seat. No further questions. Pt ready for discharge.

## 2019-08-16 NOTE — OP NOTE
Procedure Date: 08/16/2019      SURGEON:  Rinku Lopez DPM      ASSISTANT:  None.      PREOPERATIVE DIAGNOSES:   1.  Junctional nevus about the left second interdigital space.   2.  Junctional nevus about the left third intermetatarsal space.   3.  Junctional nevus about the plantar fourth and fifth metatarsals, left foot.      POSTOPERATIVE DIAGNOSES:    1.  Junctional nevus about the left second interdigital space.   2.  Junctional nevus about the left third intermetatarsal space.   3.  Junctional nevus about the plantar fourth and fifth metatarsals, left foot.      PLANNED PROCEDURES:   1.  Excision of nevus, second interdigital space, left foot.   2.  Excision of nevus third interdigital space, left foot.   3.  Excision of nevus plantar fourth and fifth metatarsals, left foot.      DESCRIPTION OF PROCEDURE:  In the preoperative holding area, informed consent was obtained.  We discussed potential risks and complications and alternative treatment options.  No guarantees were given or implied.  We reviewed postoperative requirements, postoperative cares.  She agrees to be nonweightbearing.  No contraindications were noted to surgery.  She wishes to proceed with surgery.        She was brought into the operating room, placed on the operating table in a supine position.  Monitored anesthetic care was managed by the anesthetist.  The foot was prepped and draped in the usual sterile fashion.  A left ankle tourniquet was placed.  A semi-elliptical incision in a 3:1 size, width-to-length, was placed over the second interdigital space to excise the nevus, over the third interdigital space, and over the plantar fourth and fifth metatarsals being wide so that it would be widely excising all discolored or unusual skin margins.  The skin was then undermined and flushed with sterile saline and closed with 3-0 Prolene.  The plantar fourth and fifth metatarsals were also closed with 3-0 Prolene due to its size being nearly 3 cm  in length.  Previous lesions on the second and third were measured at about 4 mm in length, making for a 12 mm incision in length.  Adaptic and a bulky sterile compression dressing was applied.  Preoperatively, approximately 10 mL of 0.5% Marcaine plain was injected for local anesthesia and approximately 5 mL of 1% lidocaine with epinephrine was injected about the plantar fourth metatarsal.  Epinephrine was not utilized about the second or third interdigital space.  Tourniquet was released after approximately 20 minutes followed by immediate hyperemia.  The patient tolerated the procedure and anesthesia well.  She was placed in a posterior splint, brought back to recovery room, vital signs stable and vascular status intact.         ELISEO ROSALES DPM             D: 2019   T: 2019   MT: NICANOR      Name:     LAURA CHEEMA   MRN:      [id number]        Account:        [id number]   :      1985           Procedure Date: 2019      Document: [id number]

## 2019-08-21 LAB — COPATH REPORT: NORMAL

## 2019-08-22 ENCOUNTER — ALLIED HEALTH/NURSE VISIT (OUTPATIENT)
Dept: FAMILY MEDICINE | Facility: CLINIC | Age: 34
End: 2019-08-22
Payer: COMMERCIAL

## 2019-08-22 ENCOUNTER — TELEPHONE (OUTPATIENT)
Dept: PODIATRY | Facility: CLINIC | Age: 34
End: 2019-08-22

## 2019-08-22 VITALS
TEMPERATURE: 98.1 F | SYSTOLIC BLOOD PRESSURE: 116 MMHG | HEART RATE: 72 BPM | BODY MASS INDEX: 30.84 KG/M2 | DIASTOLIC BLOOD PRESSURE: 70 MMHG | WEIGHT: 191 LBS

## 2019-08-22 DIAGNOSIS — Z48.01 ENCOUNTER FOR SURGICAL WOUND DRESSING CHANGE: Primary | ICD-10-CM

## 2019-08-22 DIAGNOSIS — D22.9 NEVUS: ICD-10-CM

## 2019-08-22 PROCEDURE — 99207 ZZC NO CHARGE NURSE ONLY: CPT

## 2019-08-22 RX ORDER — OXYCODONE AND ACETAMINOPHEN 5; 325 MG/1; MG/1
1 TABLET ORAL EVERY 6 HOURS PRN
Qty: 12 TABLET | Refills: 0 | Status: SHIPPED | OUTPATIENT
Start: 2019-08-22 | End: 2019-09-02

## 2019-08-22 RX ORDER — ONDANSETRON 4 MG/1
4 TABLET, FILM COATED ORAL EVERY 8 HOURS PRN
Qty: 12 TABLET | Refills: 0 | Status: SHIPPED | OUTPATIENT
Start: 2019-08-22 | End: 2020-02-12

## 2019-08-22 ASSESSMENT — PAIN SCALES - GENERAL: PAINLEVEL: SEVERE PAIN (6)

## 2019-08-22 NOTE — TELEPHONE ENCOUNTER
Prior Authorization Retail Medication Request    Medication/Dose: Percocet 5-325 mg  ICD code (if different than what is on RX):    Previously Tried and Failed:    Rationale: RESTR: DS_LIMIT <= 14 IN 60 DAYS    Insurance Name:  [facility]  Insurance ID:  [id number]      Rayna Estrella Technician   [facility]  On behalf of  [id number]) [id number] [place] [address]  Phone: [phone]  Fax: [phone]

## 2019-08-22 NOTE — TELEPHONE ENCOUNTER
Central Prior Authorization Team   Phone: [phone]    PA Initiation    Medication: Percocet 5-325 mg  Insurance Company: [employer] - Phone [phone] Fax [phone]  Pharmacy Filling the Rx: [address]  Filling Pharmacy Phone: [phone]  Filling Pharmacy Fax:    Start Date: 8/22/2019    Breanna Vidal Muhammad: [id number] - PA Case ID: [id number]

## 2019-08-22 NOTE — NURSING NOTE
Patient here today for a post op #1 dressing change of her surgical foot wound per Dr. Lopez.  The original post-operative dressing was dry and intact.  This was removed and her foot/leg was cleansed with soap and water. Microklenz was used to clean near the incisions.  Dr. Lopez came in to observe the surgical site which he felt looked okay.  Continue the RICE interventions at home.  New dressing was put in place (adaptic over incisions, 2 inch guaze folded and placed between 1st and 2nd digits, guaze, kerlix, webrol, splint and aces.  She will follow up as planned next week. She requested a refill of pain medications and something for nausea.  She states she is currently using 3 percocet per day.  This was refilled by Dr. Lopez for #12 Q6H PRN - Zofran also given. Patient was educated on the s/s that would warrant seeking emergent care.     Lenka BALBUENA RN. . .  8/22/2019, 2:27 PM

## 2019-08-22 NOTE — TELEPHONE ENCOUNTER
Informed patient that the PA was submitted. She expressed understanding and had no other questions or concerns. Re-educated her about the importance of staying off her foot, elevating, compression, etc.     Lenka BALBUENA RN. . .  8/22/2019, 2:42 PM

## 2019-08-23 ENCOUNTER — TELEPHONE (OUTPATIENT)
Dept: FAMILY MEDICINE | Facility: OTHER | Age: 34
End: 2019-08-23

## 2019-08-23 NOTE — TELEPHONE ENCOUNTER
Attempted to call patient but just got a busy signal. Will try again later. We are still waiting for the provider to result these. We will call when provider results the biopsy.     Natacha Box MA

## 2019-08-23 NOTE — TELEPHONE ENCOUNTER
Reason for Call:  Request for results:    Name of test or procedure: Biopsy results.     Date of test of procedure: Last Friday    Location of the test or procedure: Dr. John Tidwell    OK to leave the result message on voice mail or with a family member? YES    Phone number Patient can be reached at:  Home number on file [phone] (home)    Additional comments: any    Call taken on 8/23/2019 at 9:16 AM by Pauline Hills

## 2019-08-23 NOTE — TELEPHONE ENCOUNTER
Central Prior Authorization Team   Phone: [phone]    Prior Authorization Approval    Authorization Effective Date: 8/23/2019  Authorization Expiration Date: 9/23/2019  Medication: Percocet 5-325 mg  Approved Dose/Quantity: up to 8 tablets per day for 30 days  Reference #: [id number]    Insurance Company: [employer] - Phone [phone] Fax [phone]  Expected CoPay:       CoPay Card Available:      Foundation Assistance Needed:    Which Pharmacy is filling the prescription (Not needed for infusion/clinic administered): [facility] [address]   Pharmacy Notified: Yes  Patient Notified: Yes, sent patient a "[employer] message regarding approval.

## 2019-08-27 ENCOUNTER — NURSE TRIAGE (OUTPATIENT)
Dept: NURSING | Facility: CLINIC | Age: 34
End: 2019-08-27

## 2019-08-27 ENCOUNTER — TELEPHONE (OUTPATIENT)
Dept: FAMILY MEDICINE | Facility: OTHER | Age: 34
End: 2019-08-27

## 2019-08-27 ENCOUNTER — TELEPHONE (OUTPATIENT)
Dept: INTERNAL MEDICINE | Facility: CLINIC | Age: 34
End: 2019-08-27

## 2019-08-27 NOTE — TELEPHONE ENCOUNTER
Reason for Call:  Other call back and prescription    Detailed comments: Patient called clinic asking if Beth can prescribe an anti-nausea medication. She recently had a punch biopsy and excision and is feeling nausea. She is also wanting to speak with Beth about her biopsy results.     Phone Number Patient can be reached at: Home number on file [phone] (home)    Best Time: any    Can we leave a detailed message on this number? YES    Call taken on 8/27/2019 at 12:25 PM by Colin Jo

## 2019-08-27 NOTE — TELEPHONE ENCOUNTER
Spoke to patient and let her know results from Dr. Lopez and that they are also on MyChart. Verified knowledge of f/u appointment on Thursday, 8/29. Renuka Mendez CMA, August 27, 2019

## 2019-08-27 NOTE — TELEPHONE ENCOUNTER
Spoke to patient, she states this is from the procedure she had with Dr. Lopez. Will forward to Dr. Lopez's team.

## 2019-08-27 NOTE — TELEPHONE ENCOUNTER
Clinic Action Needed:please call her  Reason for Call:Seeking results of the tissue samples removed from her surgical foot on 8/23.  Aryan Wilder RN -[phone]    Patient Recommendations/Teaching:  Routed to:Juan

## 2019-08-27 NOTE — TELEPHONE ENCOUNTER
Seeking results of the tissue samples removed from her surgical foot on 8/23.  Aryan Wilder RN -[phone]

## 2019-08-27 NOTE — TELEPHONE ENCOUNTER
These were small lesions that were simply excised.  If you are having pain requiring even OTC medication I would recommend nonweightbearing immediately and sustained until follow-up.  Do not take pain medications that would create nausea.  You may utilize over-the-counter medications for nausea as well or consider Benadryl.

## 2019-08-28 ENCOUNTER — PATIENT OUTREACH (OUTPATIENT)
Dept: CARE COORDINATION | Facility: CLINIC | Age: 34
End: 2019-08-28

## 2019-08-28 NOTE — PROGRESS NOTES
Clinic Care Coordination Contact    Follow Up Progress Note      Assessment: Patient did receive the advanced care planning forms in the mail. Discussed pending appointments. Patient has no new concerns or questions on care.   Has a neurologist at the [facility] who prescribes gabapentin for migraines.Tried Imitrex, lips felt funny, lightheaded, she will not take again.  Taking gabapentin 300 mg before bed, having 1 migraine per month. Patient was getting 3 migraines per week before gabapentin. Patient going to see headache specialist in hopes to eliminate all migraines.   Goals addressed this encounter:   Goals Addressed                 This Visit's Progress       General      COMPLETED: Other (pt-stated)   On track     Goal Statement: I want to fill out my advanced care directive with my fiance so that our wishes are made clear in the event we cannot express our wishes.  Measure of Success: receive, fill out and bring back to clinic the completed forms  Supportive Steps to Achieve: CC RN will mail advanced care planning guide, blank ACP form along with the [place] class schedule to patient.    Barriers: none  Strengths: enrolled in care coordination  Date to Achieve By: 1 month  Patient expressed understanding of goal: yes              Intervention/Education provided during outreach: patient reach out to clinic or CC RN with any new questions or concerns. Patient feels confident to advocate for herself.  Declined further proactive outreach from CC RN     Plan:   1.  No further Care Coordination needs identified at this time. Patient may be referred to Care Coordination in the future if additional needs arise.  Pt encouraged to contact Care Coordinator through the clinic if situation changes and assistance is needed. No follow-up planned.    SHAWANDA Landin RN Clinic Care Coordinator   [place], [place], [place]  Phone: [phone]

## 2019-08-29 ENCOUNTER — OFFICE VISIT (OUTPATIENT)
Dept: PODIATRY | Facility: CLINIC | Age: 34
End: 2019-08-29
Payer: COMMERCIAL

## 2019-08-29 VITALS
TEMPERATURE: 97.4 F | HEIGHT: 66 IN | WEIGHT: 189 LBS | DIASTOLIC BLOOD PRESSURE: 64 MMHG | BODY MASS INDEX: 30.37 KG/M2 | SYSTOLIC BLOOD PRESSURE: 112 MMHG

## 2019-08-29 DIAGNOSIS — D22.72: Primary | ICD-10-CM

## 2019-08-29 DIAGNOSIS — D22.72 NEVUS OF LEFT FOOT: ICD-10-CM

## 2019-08-29 DIAGNOSIS — D22.72: ICD-10-CM

## 2019-08-29 PROCEDURE — 99212 OFFICE O/P EST SF 10 MIN: CPT | Performed by: PODIATRIST

## 2019-08-29 ASSESSMENT — MIFFLIN-ST. JEOR: SCORE: 1573.73

## 2019-08-29 ASSESSMENT — PAIN SCALES - GENERAL: PAINLEVEL: NO PAIN (0)

## 2019-08-29 NOTE — PATIENT INSTRUCTIONS
Remain minimal weightbearing and only in the fracture boot.  No soaking or submersion follow-up in 1 week.

## 2019-08-29 NOTE — NURSING NOTE
Dispensed 1 Pneumatic Walking Brace, Size Medium, with [facility] agreement signed by patient. Renuka Mendez CMA, August 29, 2019

## 2019-08-29 NOTE — PROGRESS NOTES
"Chief Complaint   Patient presents with     Surgical Followup     (1w6d) NWB w/splint, itching, no pain; DOS 8/16/2019 - Excision of nevus second interdigital space left foot, Excision of nevus third interdigital space left foot, excision of plantar junctional nevus left foot     Weight management plan: Patient was referred to their PCP to discuss a diet and exercise plan.    Subjective: Patient reports for followup of DOS 8/16/2019 - Excision of nevus second interdigital space left foot, Excision of nevus third interdigital space left foot, excision of plantar junctional nevus left foot procedure.  Patient has returned to regular diet and is mostly nonweightbearing.    EXAM:  No apparent distress, patient is relaxed and comfortable.   Vitals: /64 (BP Location: Left arm, Cuff Size: Adult Regular)   Temp 97.4  F (36.3  C) (Temporal)   Ht 1.676 m (5' 5.98\")   Wt 85.7 kg (189 lb)   LMP 08/01/2019   BMI 30.52 kg/m    Vasc:  DP and PT pulses palpable bilateral, CFT immediate to all digits and surrounding the surgical site.  Neuro:  Light touch sensation intact about the digits. There is minimal to no appreciable numbness around the surgical incision with examination.  Derm: The incision is well approximated and dry. Sutures are intact. Mild edema as expected. There is no surrounding erythema, heat, drainage or other signs of infection or hematoma.  Musc: Adequate reduction of deformity identified. No complications.    Pathology:  Pathology report reviewed with the patient.     Assessment:      ICD-10-CM    1. Junctional nevus of foot, left D22.72    2. Nevus of toe, left D22.72    3. Nevus of left foot D22.72        Plan:    8/29/2019  The dressing was removed and surgical site inspected.   All the interdigital sutures were removed with the plantar weightbearing sutures were left in place  A compression dressing was applied and dispensed a fracture boot  She can begin weightbearing but minimal weightbearing " activities.  Stay in the fracture boot for all weightbearing activities.  Follow-up in 1 week to remove the stitches.  She can begin bathing but no soaking or submersion.  Continue compression until follow-up in 1 week.  All questions were answered.    Rinku Lopez DPM

## 2019-09-02 ENCOUNTER — HOSPITAL ENCOUNTER (EMERGENCY)
Facility: CLINIC | Age: 34
Discharge: HOME | End: 2019-09-02
Attending: PHYSICIAN ASSISTANT | Admitting: PHYSICIAN ASSISTANT
Payer: COMMERCIAL

## 2019-09-02 VITALS
BODY MASS INDEX: 30.69 KG/M2 | SYSTOLIC BLOOD PRESSURE: 120 MMHG | TEMPERATURE: 97.8 F | RESPIRATION RATE: 16 BRPM | DIASTOLIC BLOOD PRESSURE: 79 MMHG | OXYGEN SATURATION: 99 % | WEIGHT: 190 LBS | HEART RATE: 64 BPM

## 2019-09-02 DIAGNOSIS — Z48.89 ENCOUNTER FOR POSTOPERATIVE WOUND CHECK: ICD-10-CM

## 2019-09-02 PROCEDURE — 99284 EMERGENCY DEPT VISIT MOD MDM: CPT | Mod: Z6 | Performed by: PHYSICIAN ASSISTANT

## 2019-09-02 PROCEDURE — 99283 EMERGENCY DEPT VISIT LOW MDM: CPT | Performed by: PHYSICIAN ASSISTANT

## 2019-09-02 RX ORDER — HYDROCODONE BITARTRATE AND ACETAMINOPHEN 5; 325 MG/1; MG/1
1 TABLET ORAL
Qty: 4 TABLET | Refills: 0 | Status: SHIPPED | OUTPATIENT
Start: 2019-09-02 | End: 2019-09-02

## 2019-09-02 NOTE — DISCHARGE INSTRUCTIONS
Continue with wound cares as discussed.  Try to avoid bearing weight on the foot.  Use Tylenol or naproxen for pain control during the day.  Reserve the use of Norco for bedtime only.  Be aware this can make you constipated and is habit-forming.  Do not drive when taking this medication.  Follow-up as scheduled with podiatry.  Return to the emergency department for any worsening symptoms.    Thank you for choosing [facility]. It was a pleasure taking care of you today. If you have any questions, please call [phone].    Bell Chavis PA-C

## 2019-09-02 NOTE — ED PROVIDER NOTES
History     Chief Complaint   Patient presents with     Wound Check     HPI  Breanna Vidal is a 34 year old female who presents to the emergency department for a wound check. The patient reports she had surgery to remove a mole on the bottom of her left foot 2 weeks ago.  She had some of the sutures removed on 8/29.  She reports over the last couple days she has had increased pain to the foot and is concerned that it may be infected.  She has not noticed significant drainage or pus.  She has not had any fevers.  She has been taking Tylenol, aspirin, and naproxen to manage pain.  This afternoon she accidentally put weight on the foot when not wearing her boot and it caused a significant amount of pain so she decided to come get it checked out.  She denies any other concerns at this time.    Allergies:  Allergies   Allergen Reactions     Bees Anaphylaxis     Has Epi pen      Codeine Nausea     Other reaction(s): Nausea     Imitrex [Sumatriptan]      Worsening of migraine, paresthesias     Ranitidine Rash and Itching     recvd IV zantac 75 and reaction was immediate,  was given Benadryl to counter act reaction in 2000     Sulfa Drugs Unknown and Rash       Problem List:    Patient Active Problem List    Diagnosis Date Noted     Intractable migraine without aura and without status migrainosus 07/17/2019     Priority: Medium     Alcohol abuse, in remission 08/20/2018     Priority: Medium     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) 08/20/2018     Priority: Medium     Mixed incontinence 03/03/2017     Priority: Medium     Vaginal delivery 10/31/2011     Priority: Medium     Rh negative status during pregnancy 09/23/2011     Priority: Medium        Past Medical History:    Past Medical History:   Diagnosis Date     Cellulitis 2012     Drug abuse (H) 2011     MRSA cellulitis 2012     Rh negative, antepartum        Past Surgical History:    Past Surgical History:   Procedure Laterality Date     EXCISE LESION FOOT Left 8/16/2019     Procedure: Excision of nevus second interdigital space left foot, Excision of nevus third interdigital space left foot, excision of plantar junctional nevus left foot;  Surgeon: Rinku Lopez DPM;  Location: [facility] OR     INCISION AND DRAINAGE  04/18/2012    chin abscess I&D     LAPAROSCOPIC HERNIORRHAPHY INCISIONAL N/A 7/17/2019    Procedure: laparoscopic incisional hernia repair with mesh;  Surgeon: Jovi Odom MD;  Location: [facility] OR     LAPAROSCOPIC SALPINGECTOMY Bilateral 8/27/2018    Procedure: LAPAROSCOPIC SALPINGECTOMY;  Laparoscopic Bilateral Salpingectomy;  Surgeon: Chai Cui MD;  Location: [facility] OR     MAMMOPLASTY REDUCTION Bilateral 08/04/2017       Family History:    Family History   Problem Relation Age of Onset     Cancer Father      Pancreatic Cancer Father      Lung Cancer Father        Social History:  Marital Status:  Single [1]  Social History     Tobacco Use     Smoking status: Former Smoker     Smokeless tobacco: Former User     Quit date: 3/10/2016   Substance Use Topics     Alcohol use: No     Comment: history alcohol abuse 2012, s/p treatment     Drug use: No     Comment: former drug user (EtOH, cocaine, marijuana, meth), last use 2012        Medications:      acetaminophen (TYLENOL) 500 MG tablet   gabapentin (NEURONTIN) 300 MG capsule   HYDROcodone-acetaminophen (NORCO) 5-325 MG tablet   ibuprofen (ADVIL/MOTRIN) 200 MG tablet   multivitamin w/minerals (MULTI-VITAMIN) tablet   naproxen (NAPROSYN) 500 MG tablet   naratriptan (AMERGE) 2.5 MG tablet   ondansetron (ZOFRAN) 4 MG tablet   aspirin-acetaminophen-caffeine (EXCEDRIN MIGRAINE) 250-250-65 MG tablet   EPINEPHrine (EPIPEN/ADRENACLICK/OR ANY BX GENERIC EQUIV) 0.3 MG/0.3ML injection 2-pack   fluticasone (FLONASE) 50 MCG/ACT nasal spray         Review of Systems   All other systems reviewed and are negative.      Physical Exam   BP: 120/79  Pulse: 64  Temp: 97.8  F (36.6  C)  Resp: 16  Weight: 86.2 kg (190 lb)(with boot)  SpO2:  99 %      Physical Exam   Constitutional: She is oriented to person, place, and time. She appears well-developed and well-nourished. No distress.   HENT:   Head: Normocephalic and atraumatic.   Eyes: Conjunctivae and EOM are normal.   Neck: Neck supple.   Cardiovascular: Intact distal pulses.   Pulmonary/Chest: Effort normal. No respiratory distress.   Musculoskeletal:   Left foot: Incision on the plantar aspect near the anterior web of the fourth and fifth digits.  There is no surrounding warmth or erythema, surrounding bruising noted however.  There is no drainage or underlying fluctuance.  There are 5 blue sutures in place.  Wound appears well approximated, no significant dehiscence noted though on surface there is a slight opening I suspect is from the callused skin not coming together well. No swelling in foot, normal DP pulse.   Neurological: She is alert and oriented to person, place, and time.   Skin: Skin is warm and dry. She is not diaphoretic.   Psychiatric: She has a normal mood and affect.   Nursing note and vitals reviewed.               ED Course        Procedures      No results found for this or any previous visit (from the past 24 hour(s)).    Medications - No data to display    Assessments & Plan (with Medical Decision Making)  Breanna Vidal is a 34 year old male who presented to the ED for wound check of a surgical incision on her left foot.  Surgery was 2 weeks ago.  Reports increased pain today and is concerned for infection.  Boot and bandage were removed.  Wound appeared clean without surrounding erythema or warmth, no drainage, no underlying fluctuant masses.  Wound did not appear to be significantly dehiscent.  Provided reassurance to the patient that it appears things are healing nicely.  I suspect that as she has started to bear weight again it has caused increased pain.  I discussed typical duration of healing for these types of wound with the patient.  Patient asked if she could apply  antibiotic ointment to the wound since there is a slight area where the callus skin has not healed together.  I provided her a tube of bacitracin.  Wound dressing reapplied and foot placed back in boot.  I encouraged her to continue with NSAIDs and Tylenol for pain control. I was initially going to give her 4 tablets of norco for pain relief at bed time, but her insurance flagged her for having too many narcotic prescriptions in the last 60 days, so this was declined. She has appointment later in the week with podiatry for follow-up.  I went over indications of when to return to the ED.  All questions were answered and patient was discharged home in suitable condition.     I have reviewed the nursing notes.    I have reviewed the findings, diagnosis, plan and need for follow up with the patient.    New Prescriptions    HYDROCODONE-ACETAMINOPHEN (NORCO) 5-325 MG TABLET    Take 1 tablet by mouth nightly as needed for severe pain       Final diagnoses:   Encounter for postoperative wound check     Note: Chart documentation done in part with Dragon Voice Recognition software. Although reviewed after completion, some word and grammatical errors may remain.    9/2/2019   [facility] EMERGENCY DEPARTMENT     Bell Chavis PA-C  09/02/19 [id number]       Bell Chavis PA-C  09/02/19 [id number]

## 2019-09-02 NOTE — ED AVS SNAPSHOT
[facility] Emergency Department  [address] [place] [address]  Phone:  [phone]  Fax:  [phone]                                    Breanna Vidal   MRN: [id number]    Department:  [facility] Emergency Department   Date of Visit:  9/2/2019           After Visit Summary Signature Page    I have received my discharge instructions, and my questions have been answered. I have discussed any challenges I see with this plan with the nurse or doctor.    ..........................................................................................................................................  Patient/Patient Representative Signature      ..........................................................................................................................................  Patient Representative Print Name and Relationship to Patient    ..................................................               ................................................  Date                                   Time    ..........................................................................................................................................  Reviewed by Signature/Title    ...................................................              ..............................................  Date                                               Time          22EPIC Rev 08/18

## 2019-09-02 NOTE — ED TRIAGE NOTES
Patient presents with concerns for possible infection to her L) foot. She reports having a procedure to remove moles about 2 weeks ago. Nehal Bloom RN

## 2019-09-03 ENCOUNTER — PATIENT OUTREACH (OUTPATIENT)
Dept: CARE COORDINATION | Facility: CLINIC | Age: 34
End: 2019-09-03

## 2019-09-03 ASSESSMENT — ACTIVITIES OF DAILY LIVING (ADL): DEPENDENT_IADLS:: INDEPENDENT

## 2019-09-03 NOTE — PROGRESS NOTES
"Clinic Care Coordination Contact  Clinic Care Coordination Contact  OUTREACH  Referral Information:  Referral Source: ED Follow-Up  Primary Diagnosis: Other (include Comment box)( wound check left foot)  Chief Complaint   Patient presents with     Clinic Care Coordination - Follow-up     ER to home   Clinic Utilization  Difficulty keeping appointments:: No  Compliance Concerns: No  No-Show Concerns: No(11% no show rate)  No PCP office visit in Past Year: No  Utilization    Last refreshed: 9/3/2019  9:42 AM:  Hospital Admissions 0           Last refreshed: 9/3/2019  9:42 AM:  ED Visits 9           Last refreshed: 9/3/2019  9:42 AM:  No Show Count (past year) 2              Current as of: 9/3/2019  9:42 AM          Clinical Concerns:  Current Medical Concerns:  DOS 8/16/2019 - Excision of nevus second interdigital space left foot, Excision of nevus third interdigital space left foot, excision of plantar junctional nevus left foot procedure.   Patient seen in ER 9/2/19 for wound check. Has follow up podiatry appointment 9/5/19. Patient is wearing the boot, she is monitoring for symptoms of infection. \"Any time the area is touched or bumped, its extremely painful.\" using ice with gel pack, and naproxen PRN. At night, pain increases. Patient was told by the pharmacy that she cannot fill the #4 tabs norco. She is planning to call pharmacy back to understand why she cannot fill. She didn't get a letter in the mail about being on the [place] restricted recipient program. If she has further questions she will call CC RN.   Current Behavioral Concerns: none    Education Provided to patient: RN CC educated about Care Coordination Services, discharge instructions, medications reviewed and follow up   Pain  Pain (GOAL):: Yes  Type: Acute (<3mo)  Location of chronic pain:: abd laparoscopic hernia repair site.  Radiating: No  Progression: Unchanged  Description: Able to do most things most days with some rest  Alleviating Factors: " Rest, Ice, Pain Medication  Aggravating Factors: Coughing, Positioning, Activity  Health Maintenance Reviewed: Not assessed  Clinical Pathway: None  Medication Management:  Patient independent in medication management and verbalizes adherence and understanding of medication regimen.    Functional Status:  Dependent ADLs:: Independent  Dependent IADLs:: Independent  Bed or wheelchair confined:: No  Mobility Status: Independent  Fallen 2 or more times in the past year?: No  Any fall with injury in the past year?: No    Living Situation:  Current living arrangement:: I live in a private home with family  Type of residence:: Private home - stairs    Diet/Exercise/Sleep:  Diet:: Regular  Inadequate nutrition (GOAL):: No  Food Insecurity: No  Tube Feeding: No  Exercise:: Yes  Inadequate activity/exercise (GOAL):: No  Significant changes in sleep pattern (GOAL): No    Transportation:  Transportation concerns (GOAL):: No  Transportation means:: Regular car     Psychosocial:  Adventist or spiritual beliefs that impact treatment:: No  Mental health DX:: No  Mental health management concern (GOAL):: No  Informal Support system:: Family, Neighbors     Financial/Insurance:   Financial/Insurance concerns (GOAL):: No     Resources and Interventions:  Current Resources: discharge summary, pending appointment, CC RN, clinic  Community Resources: [facility]  Supplies used at home:: Wound Care Supplies  Equipment Currently Used at Home: none  Advance Care Plan/Directive  Advanced Care Plans/Directives on file:: No  Advanced Care Plan/Directive Status: In Process  Referrals Placed: None   Goals: na  Patient/Caregiver understanding: yes   Future Appointments              In 2 days Runde, Rinku Edward, DPM [facility]    In 1 week Runde, Rinku Edward, DPM [facility]    In 3 weeks Noemi Quigley APRN [facility] Neurology, [facility]        Plan:   1. Patient  will follow discharge summary. Will attend follow up appointment 9/5/19.   2. Patient will follow up sooner should symptoms worsen, change or patient feels there is a need further care.  3. No further Care Coordination needs identified at this time. Patient may be referred to Care Coordination in the future if additional needs arise.  Pt encouraged to contact Care Coordinator through the clinic if situation changes and assistance is needed. No follow-up planned.    SHAWANDA Landin RN Clinic Care Coordinator   [place], [place], [place]  Phone: [phone]

## 2019-09-05 ENCOUNTER — OFFICE VISIT (OUTPATIENT)
Dept: PODIATRY | Facility: CLINIC | Age: 34
End: 2019-09-05
Payer: COMMERCIAL

## 2019-09-05 ENCOUNTER — TELEPHONE (OUTPATIENT)
Dept: PODIATRY | Facility: CLINIC | Age: 34
End: 2019-09-05

## 2019-09-05 ENCOUNTER — TELEPHONE (OUTPATIENT)
Dept: FAMILY MEDICINE | Facility: CLINIC | Age: 34
End: 2019-09-05

## 2019-09-05 VITALS
DIASTOLIC BLOOD PRESSURE: 60 MMHG | TEMPERATURE: 97.2 F | HEIGHT: 66 IN | BODY MASS INDEX: 30.37 KG/M2 | SYSTOLIC BLOOD PRESSURE: 98 MMHG | WEIGHT: 189 LBS

## 2019-09-05 DIAGNOSIS — D22.72: Primary | ICD-10-CM

## 2019-09-05 PROCEDURE — 99213 OFFICE O/P EST LOW 20 MIN: CPT | Performed by: PODIATRIST

## 2019-09-05 RX ORDER — HYDROCODONE BITARTRATE AND ACETAMINOPHEN 5; 325 MG/1; MG/1
1 TABLET ORAL EVERY 6 HOURS PRN
Qty: 7 TABLET | Refills: 0 | Status: SHIPPED | OUTPATIENT
Start: 2019-09-05 | End: 2020-02-12

## 2019-09-05 ASSESSMENT — MIFFLIN-ST. JEOR: SCORE: 1573.73

## 2019-09-05 ASSESSMENT — PAIN SCALES - GENERAL: PAINLEVEL: NO PAIN (0)

## 2019-09-05 NOTE — PROGRESS NOTES
"Chief Complaint   Patient presents with     Surgical Followup     (1w6d) NWB w/tall gray fx boot, not wearing fx boot 24/7 ED 9/2/19 plantar incision opened, no pain; DOS 8/16/2019 - Excision of nevus second interdigital space left foot, Excision of nevus third interdigital space left foot, excision of plantar junctional nevus left foot; LOV 8/29/2019       Weight management plan: Patient was referred to their PCP to discuss a diet and exercise plan.     Subjective: Patient reports for followup of DOS 8/16/2019 - Excision of nevus second interdigital space left foot, Excision of nevus third interdigital space left foot, excision of plantar junctional nevus left foot procedure.  Patient followed up in ED 9/2/19 because  She was concerned with the wound opening.     She is not using the boot at night.      EXAM:  No apparent distress, patient is relaxed and comfortable.   Vitals: BP 98/60 (BP Location: Left arm, Cuff Size: Adult Regular)   Temp 97.2  F (36.2  C) (Temporal)   Ht 1.676 m (5' 5.98\")   Wt 85.7 kg (189 lb)   BMI 30.52 kg/m    Vasc:  DP and PT pulses palpable bilateral, CFT immediate to all digits and surrounding the surgical site.  Neuro:  Light touch sensation intact about the digits. There is minimal to no appreciable numbness around the surgical incision with examination.  Derm: The incision is well approximated and dry. Sutures are intact. Mild edema as expected. There is no surrounding erythema, heat, drainage or other signs of infection or hematoma.  Musc: Adequate reduction of deformity identified. No complications.    Pathology:  Pathology report reviewed with the patient.     Assessment:      ICD-10-CM    1. Junctional nevus of foot, left D22.72 HYDROcodone-acetaminophen (NORCO) 5-325 MG tablet       Plan:    8/29/2019  The dressing was removed and surgical site inspected.   All the interdigital sutures were removed with the plantar weightbearing sutures were left in place  A compression " dressing was applied and dispensed a fracture boot  She can begin weightbearing but minimal weightbearing activities.  Stay in the fracture boot for all weightbearing activities.  Follow-up in 1 week to remove the stitches.  She can begin bathing but no soaking or submersion.  Continue compression until follow-up in 1 week.  All questions were answered.    9/5/2019  Intra-clinic pictures while she was in the emergency department are reviewed.  This is consistent with weightbearing and way too much weightbearing activity.  She was instructed to remain strictly nonweightbearing left foot.  It is most appropriate to remove stitches 21 days with plantar incision.  All sutures were removed today  Recommend return to bathing but no soaking or submersion  Remain in the fracture boot and nonweightbearing until no pain and no request for narcotic pain medication x1 week then can begin full weightbearing in the fracture boot.  After 3 days of weightbearing in the fracture boot without pain or request for narcotic pain medication then discontinue the fracture boot and return to regular shoe gear.  Avoid soaking or submerging or moisturizing aggressively the incisions until all eschar has remodeled and is gone, and the wound is healed  Order for 7 tablets of vicodin for sleep at night at the patient's request therefore 7 more days of nonweightbearing after she has completed her last tablet of Vicodin.  She was instructed there would be no more narcotic medications prescribed for this condition.  Remain nonweightbearing until the pain is resolved.  All questions were answered and she agrees with this treatment plan.  Follow-up as needed.      Rinku Lopez DPM

## 2019-09-05 NOTE — TELEPHONE ENCOUNTER
Will route to PA team.      Attempted to contact patient to let her know, however there was no answer and unable to leave a message.     Lenka BALBUENA RN. . .  9/5/2019, 1:48 PM

## 2019-09-05 NOTE — TELEPHONE ENCOUNTER
Central Prior Authorization Team   Phone: [phone]      PA Initiation    Medication: Norco 5/325 - INITIATED  Insurance Company: [employer] - Phone [phone] Fax [phone]  Pharmacy Filling the Rx: [address]   Filling Pharmacy Phone: [phone]  Filling Pharmacy Fax: [phone]  Start Date: 9/5/2019

## 2019-09-05 NOTE — TELEPHONE ENCOUNTER
Reason for call:  Other   Patient called regarding (reason for call): prescription  Additional comments: Patient was calling and she tried to  her prescription that Dr. Lopez wrote and she is saying that she needs a prior Auth and was wondering if Dr. Lopez was working on that for her. Please call patient back      Phone number to reach patient:  Cell number on file:    Telephone Information:   Mobile [phone]       Best Time:  ANY    Can we leave a detailed message on this number?  YES

## 2019-09-05 NOTE — TELEPHONE ENCOUNTER
CENTRAL PRIOR AUTHORIZATION  [phone]    PA Initiation    Medication:   Insurance Company: [employer] - Phone [phone] Fax [phone]  Pharmacy Filling the Rx: [address]   Filling Pharmacy Phone: [phone]  Filling Pharmacy Fax:    Start Date: 9/5/2019    SENT CHART NOTES ALONG WITH REQUEST.

## 2019-09-05 NOTE — PATIENT INSTRUCTIONS
Remain nonweightbearing until 1 week pain-free then begin weightbearing in the fracture boot.  After 3 more days of no pain no narcotics then may discontinue the fracture boot.

## 2019-09-05 NOTE — TELEPHONE ENCOUNTER
Prior Authorization Retail Medication Request  -URGENT-    Medication/Dose: Norco 5/325  ICD code (if different than what is on RX):    Previously Tried and Failed:    Rationale:  Plan limitations exceeded. RESTR: DS_LIMIT <= 14 IN 60 DAYS    Insurance Name:  [facility]  Insurance ID:  [id number]      Pharmacy Information (if different than what is on RX)  Name:  [facility]  Phone:  [phone]

## 2019-09-06 NOTE — TELEPHONE ENCOUNTER
Informed patient that per routing comment PA was approved. She states that pharmacy had called her and she will go pick this up. She asked whether she needed to keep the appt with John next week per his last OV she only needs to be seen PRN so appt was cancelled.     Lenka BALBUENA RN. . .  9/6/2019, 3:14 PM

## 2019-09-06 NOTE — TELEPHONE ENCOUNTER
Central Prior Authorization Team   Phone: [phone]      Prior Authorization Approval    Authorization Effective Date: 8/6/2019  Authorization Expiration Date: 10/5/2019  Medication: Norco 5/325 - APPROVED  Approved Dose/Quantity: 7 FOR 1  Reference #:     Insurance Company: [employer] - Phone [phone] Fax [phone]  Expected CoPay:       CoPay Card Available:      Foundation Assistance Needed:    Which Pharmacy is filling the prescription (Not needed for infusion/clinic administered): [facility] - [id number]   Pharmacy Notified: Yes  Patient Notified: Yes (**Instructed pharmacy to notify patient when script is ready to /ship.**)

## 2019-09-06 NOTE — TELEPHONE ENCOUNTER
Prior Authorization Not Needed per Insurance    Medication: duplicate encoutner for Hydrocodone-APAP  Insurance Company: [employer] - Phone [phone] Fax [phone]  Expected CoPay:      Pharmacy Filling the Rx: [facility] [address]   Pharmacy Notified: No  Patient Notified: No    This was a duplicate encounter for the Hydrocodone-APAP 5-325. It was approved.

## 2019-09-13 ENCOUNTER — TRANSFERRED RECORDS (OUTPATIENT)
Dept: HEALTH INFORMATION MANAGEMENT | Facility: CLINIC | Age: 34
End: 2019-09-13

## 2019-09-13 ENCOUNTER — TELEPHONE (OUTPATIENT)
Dept: FAMILY MEDICINE | Facility: OTHER | Age: 34
End: 2019-09-13

## 2019-09-13 NOTE — TELEPHONE ENCOUNTER
Reason for Call:  Form, our goal is to have forms completed with 72 hours, however, some forms may require a visit or additional information.    Type of letter, form or note:  medical    Who is the form from?: [facility] [place] (if other please explain)    Where did the form come from: form was faxed in    What clinic location was the form placed at?: [facility] - [phone]    Where the form was placed: EM's  Box/Folder    What number is listed as a contact on the form?: fx: [phone]       Additional comments: please sign and fax back.    Call taken on 9/13/2019 at 3:38 PM by Guadalupe Nicole

## 2019-09-16 ENCOUNTER — TRANSFERRED RECORDS (OUTPATIENT)
Dept: HEALTH INFORMATION MANAGEMENT | Facility: CLINIC | Age: 34
End: 2019-09-16

## 2019-09-29 ASSESSMENT — ENCOUNTER SYMPTOMS
DIZZINESS: 0
BOWEL INCONTINENCE: 0
SMELL DISTURBANCE: 0
SORE THROAT: 0
TASTE DISTURBANCE: 0
TREMORS: 0
FATIGUE: 1
HOT FLASHES: 0
DOUBLE VISION: 0
EYE WATERING: 0
JOINT SWELLING: 1
NUMBNESS: 1
ARTHRALGIAS: 1
HEADACHES: 1
WEIGHT LOSS: 0
LOSS OF CONSCIOUSNESS: 0
SPEECH CHANGE: 0
RECTAL PAIN: 0
VOMITING: 1
POLYDIPSIA: 0
CHILLS: 0
NECK MASS: 1
MYALGIAS: 1
POLYPHAGIA: 0
DISTURBANCES IN COORDINATION: 0
NAUSEA: 1
DECREASED APPETITE: 0
POOR WOUND HEALING: 0
WEIGHT GAIN: 0
INCREASED ENERGY: 1
HOARSE VOICE: 0
CONSTIPATION: 0
SINUS CONGESTION: 0
MEMORY LOSS: 0
MUSCLE WEAKNESS: 1
STIFFNESS: 0
EYE PAIN: 1
EYE IRRITATION: 0
FEVER: 0
WEAKNESS: 1
ABDOMINAL PAIN: 0
TINGLING: 1
JAUNDICE: 0
SINUS PAIN: 0
HALLUCINATIONS: 0
NIGHT SWEATS: 0
DIARRHEA: 1
SKIN CHANGES: 0
BLOATING: 0
HEARTBURN: 0
SEIZURES: 0
TROUBLE SWALLOWING: 0
MUSCLE CRAMPS: 1
BLOOD IN STOOL: 0
BACK PAIN: 0
PARALYSIS: 0
ALTERED TEMPERATURE REGULATION: 1
NECK PAIN: 1
EYE REDNESS: 0
DECREASED LIBIDO: 1
NAIL CHANGES: 0

## 2019-09-29 ASSESSMENT — HEADACHE IMPACT TEST (HIT 6)
HOW OFTEN HAVE YOU FELT FED UP OR IRRITATED BECAUSE OF YOUR HEADACHES: ALWAYS
HIT6 TOTAL SCORE: 74
WHEN YOU HAVE HEADACHES HOW OFTEN IS THE PAIN SEVERE: ALWAYS
HOW OFTEN HAVE YOU FELT TOO TIRED TO WORK BECAUSE OF YOUR HEADACHES: VERY OFTEN
WHEN YOU HAVE A HEADACHE HOW OFTEN DO YOU WISH YOU COULD LIE DOWN: ALWAYS
HOW OFTEN DID HEADACHS LIMIT CONCENTRATION ON WORK OR DAILY ACTIVITY: ALWAYS
HOW OFTEN DO HEADACHES LIMIT YOUR DAILY ACTIVITIES: VERY OFTEN

## 2019-09-30 ENCOUNTER — OFFICE VISIT (OUTPATIENT)
Dept: NEUROLOGY | Facility: CLINIC | Age: 34
End: 2019-09-30
Payer: COMMERCIAL

## 2019-09-30 ENCOUNTER — TELEPHONE (OUTPATIENT)
Dept: NEUROLOGY | Facility: CLINIC | Age: 34
End: 2019-09-30

## 2019-09-30 VITALS
HEART RATE: 71 BPM | DIASTOLIC BLOOD PRESSURE: 74 MMHG | OXYGEN SATURATION: 97 % | BODY MASS INDEX: 30.53 KG/M2 | RESPIRATION RATE: 18 BRPM | WEIGHT: 190 LBS | SYSTOLIC BLOOD PRESSURE: 110 MMHG | HEIGHT: 66 IN

## 2019-09-30 DIAGNOSIS — G43.019 INTRACTABLE MIGRAINE WITHOUT AURA AND WITHOUT STATUS MIGRAINOSUS: Primary | ICD-10-CM

## 2019-09-30 RX ORDER — PROCHLORPERAZINE MALEATE 5 MG
2.5-5 TABLET ORAL EVERY 6 HOURS PRN
Qty: 20 TABLET | Refills: 3 | Status: SHIPPED | OUTPATIENT
Start: 2019-09-30 | End: 2020-01-31

## 2019-09-30 RX ORDER — RIZATRIPTAN BENZOATE 5 MG/1
5-10 TABLET, ORALLY DISINTEGRATING ORAL
Qty: 18 TABLET | Refills: 9 | Status: SHIPPED | OUTPATIENT
Start: 2019-09-30 | End: 2020-01-31

## 2019-09-30 RX ORDER — NAPROXEN 500 MG/1
500 TABLET ORAL EVERY 12 HOURS PRN
Qty: 30 TABLET | Refills: 3 | Status: SHIPPED | OUTPATIENT
Start: 2019-09-30 | End: 2021-02-18

## 2019-09-30 ASSESSMENT — MIFFLIN-ST. JEOR: SCORE: 1578.58

## 2019-09-30 ASSESSMENT — PAIN SCALES - GENERAL: PAINLEVEL: SEVERE PAIN (6)

## 2019-09-30 NOTE — PATIENT INSTRUCTIONS
Plan:  Stay hydrated  Headache log for frequency and severity and response to treatment   Vitamin B2 (riboflavin) 400 mg daily OTC  Acupuncture locally if available  Headache prevention-a trial of Emgality as instructed for headache prevention  Acute headache treatment -may try prochlorperazine as needed for migraine and nausea as needed may take it with rizatriptan as needed. Limit use of rizatriptan to no more than 8 days per month. Do not take rizatriptan with sumatriptan or naratriptan or midrin  Naproxen 500 mg every 12 hours as needed for acute migraine headache and may take it with rizatriptan. Limit use of all of your NSAIDs to no more than 10 days per month  Follow up in 3-4 months after starting Emgality    Patient Education     Galcanezumab injection  Brand Name: Emgality  What is this medicine?  GALCANEZUMAB (gal ka KRAIG ue mab) is used to prevent migraine headaches.  How should I use this medicine?  This medicine is for injection under the skin. You will be taught how to prepare and give this medicine. Use exactly as directed. Take your medicine at regular intervals. Do not take your medicine more often than directed.  It is important that you put your used needles and syringes in a special sharps container. Do not put them in a trash can. If you do not have a sharps container, call your pharmacist or healthcare provider to get one.  Talk to your pediatrician regarding the use of this medicine in children. Special care may be needed.  What side effects may I notice from receiving this medicine?  Side effects that you should report to your doctor or health care professional as soon as possible:    allergic reactions like skin rash, itching or hives, swelling of the face, lips, or tongue  Side effects that usually do not require medical attention (report these to your doctor or health care professional if they continue or are bothersome):    pain, redness, or irritation at site where injected  What may  interact with this medicine?  Interactions are not expected.  What if I miss a dose?  If you miss a dose, take it as soon as you can. If it is almost time for your next dose, take only that dose. Do not take double or extra doses.  Where should I keep my medicine?  Keep out of the reach of children.    You will be instructed on how to store this medicine. Throw away any unused medicine after the expiration date on the label.  What should I tell my health care provider before I take this medicine?  They need to know if you have any of these conditions:    an unusual or allergic reaction to galcanezumab, other medicines, foods, dyes, or preservatives    pregnant or trying to get pregnant    breast-feeding  What should I watch for while using this medicine?  Tell your doctor or healthcare professional if your symptoms do not start to get better or if they get worse.  NOTE:This sheet is a summary. It may not cover all possible information. If you have questions about this medicine, talk to your doctor, pharmacist, or health care provider. Copyright  2019 [employer]           Patient Education     Rizatriptan disintegrating tablets  Brand Name: Maxalt-MLT  What is this medicine?  RIZATRIPTAN (rye za TRIP tan) is used to treat migraines with or without aura. An aura is a strange feeling or visual disturbance that warns you of an attack. It is not used to prevent migraines.  How should I use this medicine?  Take this medicine by mouth. Follow the directions on the prescription label. This medicine is taken at the first symptoms of a migraine. It is not for everyday use. Leave the tablet in the foil package until you are ready to take it. Do not push the tablet through the blister pack. Peel open the blister pack with dry hands and place the tablet on your tongue. The tablet will dissolve rapidly and be swallowed in your saliva. It is not necessary to drink any water to take this medicine. If your migraine headache returns  after one dose, you can take another dose as directed. You must leave at least 2 hours between doses, and do not take more than 30 mg total in 24 hours. If there is no improvement at all after the first dose, do not take a second dose without talking to your doctor or health care professional. Do not take your medicine more often than directed.  Talk to your pediatrician regarding the use of this medicine in children. While this drug may be prescribed for children as young as 6 years for selected conditions, precautions do apply.  What side effects may I notice from receiving this medicine?  Side effects that you should report to your doctor or health care professional as soon as possible:    allergic reactions like skin rash, itching or hives, swelling of the face, lips, or tongue    fast, slow, or irregular heart beat    increased or decreased blood pressure    seizures    severe stomach pain and cramping, bloody diarrhea    signs and symptoms of a blood clot such as breathing problems; changes in vision; chest pain; severe, sudden headache; pain, swelling, warmth in the leg; trouble speaking; sudden numbness or weakness of the face, arm or leg    tingling, pain, or numbness in the face, hands, or feet  Side effects that usually do not require medical attention (report to your doctor or health care professional if they continue or are bothersome):    drowsiness    dry mouth    feeling warm, flushing, or redness of the face    headache    muscle cramps, pain    nausea, vomiting    unusually weak or tired  What may interact with this medicine?  Do not take this medicine with any of the following medicines:    amphetamine, dextroamphetamine or cocaine    dihydroergotamine, ergotamine, ergoloid mesylates, methysergide, or ergot-type medication - do not take within 24 hours of taking rizatriptan    feverfew    MAOIs like Carbex, Eldepryl, Marplan, Nardil, and Parnate - do not take rizatriptan within 2 weeks of stopping  MAOI therapy.    other migraine medicines like almotriptan, eletriptan, naratriptan, sumatriptan, zolmitriptan - do not take within 24 hours of taking rizatriptan    tryptophan  This medicine may also interact with the following medications:    medicines for mental depression, anxiety or mood problems    propranolol  What if I miss a dose?  This does not apply; this medicine is not for regular use.  Where should I keep my medicine?  Keep out of the reach of children.  Store at room temperature between 15 and 30 degrees C (59 and 86 degrees F). Protect from light and moisture. Throw away any unused medicine after the expiration date.  What should I tell my health care provider before I take this medicine?  They need to know if you have any of these conditions:    bowel disease or colitis    diabetes    family history of heart disease    fast or irregular heart beat    heart or blood vessel disease, angina (chest pain), or previous heart attack    high blood pressure    high cholesterol    history of stroke, transient ischemic attacks (TIAs or mini-strokes), or intracranial bleeding    kidney or liver disease    overweight    poor circulation    postmenopausal or surgical removal of uterus and ovaries    an unusual or allergic reaction to rizatriptan, other medicines, foods, dyes, or preservatives    pregnant or trying to get pregnant    breast-feeding  What should I watch for while using this medicine?  Only take this medicine for a migraine headache. Take it if you get warning symptoms or at the start of a migraine attack. It is not for regular use to prevent migraine attacks.  You may get drowsy or dizzy. Do not drive, use machinery, or do anything that needs mental alertness until you know how this medicine affects you. To reduce dizzy or fainting spells, do not sit or stand up quickly, especially if you are an older patient. Alcohol can increase drowsiness, dizziness and flushing. Avoid alcoholic drinks.  Smoking  cigarettes may increase the risk of heart-related side effects from using this medicine.  If you take migraine medicines for 10 or more days a month, your migraines may get worse. Keep a diary of headache days and medicine use. Contact your healthcare professional if your migraine attacks occur more frequently.  NOTE:This sheet is a summary. It may not cover all possible information. If you have questions about this medicine, talk to your doctor, pharmacist, or health care provider. Copyright  2019 [employer]           Patient Education     Prochlorperazine tablets  Brand Name: Compazine  What is this medicine?  PROCHLORPERAZINE (proe klor PER a zeen) helps to control severe nausea and vomiting. This medicine is also used to treat schizophrenia. It can also help patients who experience anxiety that is not due to psychological illness.  How should I use this medicine?  Take this medicine by mouth with a glass of water. Follow the directions on the prescription label. Take your doses at regular intervals. Do not take your medicine more often than directed. Do not stop taking this medicine suddenly. This can cause nausea, vomiting, and dizziness. Ask your doctor or health care professional for advice.  Talk to your pediatrician regarding the use of this medicine in children. Special care may be needed. While this drug may be prescribed for children as young as 2 years for selected conditions, precautions do apply.  What side effects may I notice from receiving this medicine?  Side effects that you should report to your doctor or health care professional as soon as possible:    blurred vision    breast enlargement in men or women    breast milk in women who are not breast-feeding    chest pain, fast or irregular heartbeat    confusion, restlessness    dark yellow or brown urine    difficulty breathing or swallowing    dizziness or fainting spells    drooling, shaking, movement difficulty (shuffling walk) or  rigidity    fever, chills, sore throat    involuntary or uncontrollable movements of the eyes, mouth, head, arms, and legs    seizures    stomach area pain    unusually weak or tired    unusual bleeding or bruising    yellowing of skin or eyes  Side effects that usually do not require medical attention (report to your doctor or health care professional if they continue or are bothersome):    difficulty passing urine    difficulty sleeping    headache    sexual dysfunction    skin rash, or itching  What may interact with this medicine?  Do not take this medicine with any of the following medications:    amoxapine    antidepressants like citalopram, escitalopram, fluoxetine, paroxetine, and sertraline    deferoxamine    dofetilide    maprotiline    tricyclic antidepressants like amitriptyline, clomipramine, imipramine, nortiptyline and others  This medicine may also interact with the following medications:    lithium    medicines for pain    phenytoin    propranolol    warfarin  What if I miss a dose?  If you miss a dose, take it as soon as you can. If it is almost time for your next dose, take only that dose. Do not take double or extra doses.  Where should I keep my medicine?  Keep out of the reach of children.  Store at room temperature between 15 and 30 degrees C (59 and 86 degrees F). Protect from light. Throw away any unused medicine after the expiration date.  What should I tell my health care provider before I take this medicine?  They need to know if you have any of these conditions:    blood disorders or disease    dementia    liver disease or jaundice    Parkinson's disease    uncontrollable movement disorder    an unusual or allergic reaction to prochlorperazine, other medicines, foods, dyes, or preservatives    pregnant or trying to get pregnant    breast-feeding  What should I watch for while using this medicine?  Visit your doctor or health care professional for regular checks on your progress.  You may  get drowsy or dizzy. Do not drive, use machinery, or do anything that needs mental alertness until you know how this medicine affects you. Do not stand or sit up quickly, especially if you are an older patient. This reduces the risk of dizzy or fainting spells. Alcohol may interfere with the effect of this medicine. Avoid alcoholic drinks.  This medicine can reduce the response of your body to heat or cold. Dress warm in cold weather and stay hydrated in hot weather. If possible, avoid extreme temperatures like saunas, hot tubs, very hot or cold showers, or activities that can cause dehydration such as vigorous exercise.  This medicine can make you more sensitive to the sun. Keep out of the sun. If you cannot avoid being in the sun, wear protective clothing and use sunscreen. Do not use sun lamps or tanning beds/booths.  Your mouth may get dry. Chewing sugarless gum or sucking hard candy, and drinking plenty of water may help. Contact your doctor if the problem does not go away or is severe.  NOTE:This sheet is a summary. It may not cover all possible information. If you have questions about this medicine, talk to your doctor, pharmacist, or health care provider. Copyright  2019 [employer]           Patient Education     Patient Education    Naproxen Gastro-resistant tablet    Naproxen Oral suspension    Naproxen Oral tablet    Naproxen Oral tablet, biphasic release    Naproxen Oral tablet, extended-release, Naproxen Oral tablet, extended-release    Naproxen Sodium Oral capsule, liquid filled    Naproxen Sodium Oral tablet    Naproxen Sodium Oral tablet, extended-release  Naproxen Oral tablet  What is this medicine?  NAPROXEN (na PROX en) is a non-steroidal anti-inflammatory drug (NSAID). It is used to reduce swelling and to treat pain. This medicine may be used for dental pain, headache, or painful monthly periods. It is also used for painful joint and muscular problems such as arthritis, tendinitis, bursitis, and  gout.  This medicine may be used for other purposes; ask your health care provider or pharmacist if you have questions.  What should I tell my health care provider before I take this medicine?  They need to know if you have any of these conditions:    asthma    cigarette smoker    drink more than 3 alcohol containing drinks a day    heart disease or circulation problems such as heart failure or leg edema (fluid retention)    high blood pressure    kidney disease    liver disease    stomach bleeding or ulcers    an unusual or allergic reaction to naproxen, aspirin, other NSAIDs, other medicines, foods, dyes, or preservatives    pregnant or trying to get pregnant    breast-feeding  How should I use this medicine?  Take this medicine by mouth with a glass of water. Follow the directions on the prescription label. Take it with food if your stomach gets upset. Try to not lie down for at least 10 minutes after you take it. Take your medicine at regular intervals. Do not take your medicine more often than directed. Long-term, continuous use may increase the risk of heart attack or stroke.  A special MedGuide will be given to you by the pharmacist with each prescription and refill. Be sure to read this information carefully each time.  Talk to your pediatrician regarding the use of this medicine in children. Special care may be needed.  Overdosage: If you think you have taken too much of this medicine contact a poison control center or emergency room at once.  NOTE: This medicine is only for you. Do not share this medicine with others.  What if I miss a dose?  If you miss a dose, take it as soon as you can. If it is almost time for your next dose, take only that dose. Do not take double or extra doses.  What may interact with this medicine?    alcohol    aspirin    cidofovir    diuretics    lithium    methotrexate    other drugs for inflammation like ketorolac or prednisone    pemetrexed    probenecid    warfarin  This list  may not describe all possible interactions. Give your health care provider a list of all the medicines, herbs, non-prescription drugs, or dietary supplements you use. Also tell them if you smoke, drink alcohol, or use illegal drugs. Some items may interact with your medicine.  What should I watch for while using this medicine?  Tell your doctor or health care professional if your pain does not get better. Talk to your doctor before taking another medicine for pain. Do not treat yourself.  This medicine does not prevent heart attack or stroke. In fact, this medicine may increase the chance of a heart attack or stroke. The chance may increase with longer use of this medicine and in people who have heart disease. If you take aspirin to prevent heart attack or stroke, talk with your doctor or health care professional.  Do not take other medicines that contain aspirin, ibuprofen, or naproxen with this medicine. Side effects such as stomach upset, nausea, or ulcers may be more likely to occur. Many medicines available without a prescription should not be taken with this medicine.  This medicine can cause ulcers and bleeding in the stomach and intestines at any time during treatment. Do not smoke cigarettes or drink alcohol. These increase irritation to your stomach and can make it more susceptible to damage from this medicine. Ulcers and bleeding can happen without warning symptoms and can cause death.  You may get drowsy or dizzy. Do not drive, use machinery, or do anything that needs mental alertness until you know how this medicine affects you. Do not stand or sit up quickly, especially if you are an older patient. This reduces the risk of dizzy or fainting spells.  This medicine can cause you to bleed more easily. Try to avoid damage to your teeth and gums when you brush or floss your teeth.  What side effects may I notice from receiving this medicine?  Side effects that you should report to your doctor or health care  professional as soon as possible:    black or bloody stools, blood in the urine or vomit    blurred vision    chest pain    difficulty breathing or wheezing    nausea or vomiting    severe stomach pain    skin rash, skin redness, blistering or peeling skin, hives, or itching    slurred speech or weakness on one side of the body    swelling of eyelids, throat, lips    unexplained weight gain or swelling    unusually weak or tired    yellowing of eyes or skin  Side effects that usually do not require medical attention (report to your doctor or health care professional if they continue or are bothersome):    constipation    headache    heartburn  This list may not describe all possible side effects. Call your doctor for medical advice about side effects. You may report side effects to FDA at 3-770-FDA-3742.  Where should I keep my medicine?  Keep out of the reach of children.  Store at room temperature between 15 and 30 degrees C (59 and 86 degrees F). Keep container tightly closed. Throw away any unused medicine after the expiration date.  NOTE:This sheet is a summary. It may not cover all possible information. If you have questions about this medicine, talk to your doctor, pharmacist, or health care provider. Copyright  2016 [employer]

## 2019-09-30 NOTE — TELEPHONE ENCOUNTER
Prior Authorization Retail Medication Request    Medication/Dose: Emgality Inject 240 mg subcutaneous initially then 120 mg subcutaneous every 28 days     Previously Tried and Failed:  Physical therapy   Nortriptyline-did not work  topiramate-did not work   Gabapentin does not work  Rizatriptan-does not even work   Midrin-does not work  Naratriptan -does not work  Sumatriptan -significant worsening of headache   Ibuprofen and acetaminophen taking daily but it does not help  Naproxen -reports that its go to and helps to certain degree      Rationale:  Intractable migraine without aura and without status migrainosus    Insurance Name:  [employer]  Insurance ID:  [id number]
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
## 2019-10-09 ENCOUNTER — ALLIED HEALTH/NURSE VISIT (OUTPATIENT)
Dept: FAMILY MEDICINE | Facility: OTHER | Age: 34
End: 2019-10-09
Payer: COMMERCIAL

## 2019-10-09 DIAGNOSIS — Z23 NEED FOR PROPHYLACTIC VACCINATION AND INOCULATION AGAINST INFLUENZA: Primary | ICD-10-CM

## 2019-10-09 PROCEDURE — 90471 IMMUNIZATION ADMIN: CPT

## 2019-10-09 PROCEDURE — 90686 IIV4 VACC NO PRSV 0.5 ML IM: CPT

## 2019-10-09 PROCEDURE — 99207 ZZC NO CHARGE NURSE ONLY: CPT

## 2019-10-14 ENCOUNTER — NURSE TRIAGE (OUTPATIENT)
Dept: FAMILY MEDICINE | Facility: OTHER | Age: 34
End: 2019-10-14

## 2019-10-14 NOTE — TELEPHONE ENCOUNTER
Reason for Call:  Other call back    Detailed comments: Patient called clinic. She tried to make an appointment today for a very bad migraine but the clinics are booked. She has maxed out on her Ritzatriptan for the day, and Naproxen. She cannot afford the preventative injections (Emgality) it would cost her $1,000 a refill. She is asking [facility] for help she doesn't know what she can do as her migraine is debilitating her today. Please follow up with patient.     Phone Number Patient can be reached at: Home number on file [phone] (home)    Best Time: any    Can we leave a detailed message on this number? YES    Call taken on 10/14/2019 at 1:25 PM by Colin Jo

## 2019-10-16 ENCOUNTER — TELEPHONE (OUTPATIENT)
Dept: NEUROLOGY | Facility: CLINIC | Age: 34
End: 2019-10-16

## 2019-10-16 NOTE — TELEPHONE ENCOUNTER
I called pt back in regards to her medication question. Pt emgality prescription is going to cost 1000 dollars out of pocket every month. I let her know about the emgality savings card and will mail one to her today.     Melanie Cortés RN

## 2019-10-16 NOTE — TELEPHONE ENCOUNTER
Health Call Center    Phone Message    May a detailed message be left on voicemail: yes    Reason for Call: Medication Question or concern regarding medication   Prescription Clarification  Name of Medication: galcanezumab-gnlm (EMGALITY) 120 MG/ML injection  Prescribing Provider: Noemi Quigley   Pharmacy: [facility] [place] - [address]    What on the order needs clarification? Pt is wondering if there is an alternative option for migraine medication, as the out of pocket cost for this medication is $1000. Please give her a call back.           Action Taken: Message routed to:  [facility] Neurology

## 2019-11-06 ENCOUNTER — HEALTH MAINTENANCE LETTER (OUTPATIENT)
Age: 34
End: 2019-11-06

## 2019-11-25 ENCOUNTER — APPOINTMENT (OUTPATIENT)
Dept: GENERAL RADIOLOGY | Facility: CLINIC | Age: 34
End: 2019-11-25
Attending: PHYSICIAN ASSISTANT
Payer: COMMERCIAL

## 2019-11-25 ENCOUNTER — HOSPITAL ENCOUNTER (EMERGENCY)
Facility: CLINIC | Age: 34
Discharge: HOME | End: 2019-11-25
Attending: PHYSICIAN ASSISTANT | Admitting: PHYSICIAN ASSISTANT
Payer: COMMERCIAL

## 2019-11-25 ENCOUNTER — NURSE TRIAGE (OUTPATIENT)
Dept: NURSING | Facility: CLINIC | Age: 34
End: 2019-11-25

## 2019-11-25 ENCOUNTER — TELEPHONE (OUTPATIENT)
Dept: FAMILY MEDICINE | Facility: OTHER | Age: 34
End: 2019-11-25

## 2019-11-25 ENCOUNTER — TELEPHONE (OUTPATIENT)
Dept: NEUROLOGY | Facility: CLINIC | Age: 34
End: 2019-11-25

## 2019-11-25 VITALS
HEART RATE: 95 BPM | SYSTOLIC BLOOD PRESSURE: 102 MMHG | OXYGEN SATURATION: 96 % | RESPIRATION RATE: 13 BRPM | WEIGHT: 201.1 LBS | TEMPERATURE: 97.9 F | BODY MASS INDEX: 32.46 KG/M2 | DIASTOLIC BLOOD PRESSURE: 70 MMHG

## 2019-11-25 DIAGNOSIS — F41.0 PANIC ATTACK: ICD-10-CM

## 2019-11-25 DIAGNOSIS — G43.909 MIGRAINE: ICD-10-CM

## 2019-11-25 DIAGNOSIS — M54.2 NECK PAIN: ICD-10-CM

## 2019-11-25 DIAGNOSIS — R07.89 ATYPICAL CHEST PAIN: ICD-10-CM

## 2019-11-25 LAB
ANION GAP SERPL CALCULATED.3IONS-SCNC: 5 MMOL/L (ref 3–14)
BASOPHILS # BLD AUTO: 0 10E9/L (ref 0–0.2)
BASOPHILS NFR BLD AUTO: 0.4 %
BUN SERPL-MCNC: 27 MG/DL (ref 7–30)
CALCIUM SERPL-MCNC: 9.3 MG/DL (ref 8.5–10.1)
CHLORIDE SERPL-SCNC: 106 MMOL/L (ref 94–109)
CO2 SERPL-SCNC: 28 MMOL/L (ref 20–32)
CREAT SERPL-MCNC: 0.64 MG/DL (ref 0.52–1.04)
D DIMER PPP FEU-MCNC: 0.3 UG/ML FEU (ref 0–0.5)
DIFFERENTIAL METHOD BLD: NORMAL
EOSINOPHIL NFR BLD AUTO: 1.5 %
ERYTHROCYTE [DISTWIDTH] IN BLOOD BY AUTOMATED COUNT: 12.6 % (ref 10–15)
GFR SERPL CREATININE-BSD FRML MDRD: >90 ML/MIN/{1.73_M2}
GLUCOSE SERPL-MCNC: 90 MG/DL (ref 70–99)
HCT VFR BLD AUTO: 41.9 % (ref 35–47)
HGB BLD-MCNC: 14.4 G/DL (ref 11.7–15.7)
IMM GRANULOCYTES # BLD: 0 10E9/L (ref 0–0.4)
IMM GRANULOCYTES NFR BLD: 0.2 %
LIPASE SERPL-CCNC: 190 U/L (ref 73–393)
LYMPHOCYTES # BLD AUTO: 2.1 10E9/L (ref 0.8–5.3)
LYMPHOCYTES NFR BLD AUTO: 22.5 %
MCH RBC QN AUTO: 30.3 PG (ref 26.5–33)
MCHC RBC AUTO-ENTMCNC: 34.4 G/DL (ref 31.5–36.5)
MCV RBC AUTO: 88 FL (ref 78–100)
MONOCYTES # BLD AUTO: 0.4 10E9/L (ref 0–1.3)
MONOCYTES NFR BLD AUTO: 4.6 %
NEUTROPHILS # BLD AUTO: 6.8 10E9/L (ref 1.6–8.3)
NEUTROPHILS NFR BLD AUTO: 70.8 %
NRBC # BLD AUTO: 0 10*3/UL
NRBC BLD AUTO-RTO: 0 /100
PLATELET # BLD AUTO: 239 10E9/L (ref 150–450)
POTASSIUM SERPL-SCNC: 3.8 MMOL/L (ref 3.4–5.3)
RBC # BLD AUTO: 4.76 10E12/L (ref 3.8–5.2)
SODIUM SERPL-SCNC: 139 MMOL/L (ref 133–144)
TROPONIN I SERPL-MCNC: <0.015 UG/L (ref 0–0.04)
WBC # BLD AUTO: 9.5 10E9/L (ref 4–11)

## 2019-11-25 PROCEDURE — 93005 ELECTROCARDIOGRAM TRACING: CPT | Performed by: PHYSICIAN ASSISTANT

## 2019-11-25 PROCEDURE — 85025 COMPLETE CBC W/AUTO DIFF WBC: CPT | Performed by: PHYSICIAN ASSISTANT

## 2019-11-25 PROCEDURE — 99285 EMERGENCY DEPT VISIT HI MDM: CPT | Mod: 25 | Performed by: PHYSICIAN ASSISTANT

## 2019-11-25 PROCEDURE — 72040 X-RAY EXAM NECK SPINE 2-3 VW: CPT | Mod: TC

## 2019-11-25 PROCEDURE — 93010 ELECTROCARDIOGRAM REPORT: CPT | Mod: Z6 | Performed by: PHYSICIAN ASSISTANT

## 2019-11-25 PROCEDURE — 96361 HYDRATE IV INFUSION ADD-ON: CPT | Performed by: PHYSICIAN ASSISTANT

## 2019-11-25 PROCEDURE — 71046 X-RAY EXAM CHEST 2 VIEWS: CPT | Mod: TC

## 2019-11-25 PROCEDURE — 25800030 ZZH RX IP 258 OP 636: Performed by: PHYSICIAN ASSISTANT

## 2019-11-25 PROCEDURE — 83690 ASSAY OF LIPASE: CPT | Performed by: PHYSICIAN ASSISTANT

## 2019-11-25 PROCEDURE — 25000128 H RX IP 250 OP 636: Performed by: PHYSICIAN ASSISTANT

## 2019-11-25 PROCEDURE — 25000132 ZZH RX MED GY IP 250 OP 250 PS 637: Performed by: PHYSICIAN ASSISTANT

## 2019-11-25 PROCEDURE — 85379 FIBRIN DEGRADATION QUANT: CPT | Performed by: PHYSICIAN ASSISTANT

## 2019-11-25 PROCEDURE — 84484 ASSAY OF TROPONIN QUANT: CPT | Performed by: PHYSICIAN ASSISTANT

## 2019-11-25 PROCEDURE — 96365 THER/PROPH/DIAG IV INF INIT: CPT | Performed by: PHYSICIAN ASSISTANT

## 2019-11-25 PROCEDURE — 96375 TX/PRO/DX INJ NEW DRUG ADDON: CPT | Performed by: PHYSICIAN ASSISTANT

## 2019-11-25 PROCEDURE — 80048 BASIC METABOLIC PNL TOTAL CA: CPT | Performed by: PHYSICIAN ASSISTANT

## 2019-11-25 RX ORDER — METOCLOPRAMIDE HYDROCHLORIDE 5 MG/ML
10 INJECTION INTRAMUSCULAR; INTRAVENOUS ONCE
Status: COMPLETED | OUTPATIENT
Start: 2019-11-25 | End: 2019-11-25

## 2019-11-25 RX ORDER — SODIUM CHLORIDE 9 MG/ML
1000 INJECTION, SOLUTION INTRAVENOUS CONTINUOUS
Status: DISCONTINUED | OUTPATIENT
Start: 2019-11-25 | End: 2019-11-25 | Stop reason: HOSPADM

## 2019-11-25 RX ORDER — LORAZEPAM 2 MG/ML
1 INJECTION INTRAMUSCULAR ONCE
Status: COMPLETED | OUTPATIENT
Start: 2019-11-25 | End: 2019-11-25

## 2019-11-25 RX ORDER — MAGNESIUM SULFATE 1 G/100ML
1 INJECTION INTRAVENOUS ONCE
Status: COMPLETED | OUTPATIENT
Start: 2019-11-25 | End: 2019-11-25

## 2019-11-25 RX ORDER — METHYLPREDNISOLONE 4 MG
TABLET, DOSE PACK ORAL
Qty: 21 TABLET | Refills: 0 | Status: SHIPPED | OUTPATIENT
Start: 2019-11-25 | End: 2020-01-28

## 2019-11-25 RX ORDER — CYCLOBENZAPRINE HCL 10 MG
5-10 TABLET ORAL 3 TIMES DAILY PRN
Qty: 12 TABLET | Refills: 0 | Status: SHIPPED | OUTPATIENT
Start: 2019-11-25 | End: 2020-02-12

## 2019-11-25 RX ORDER — DIPHENHYDRAMINE HYDROCHLORIDE 50 MG/ML
25 INJECTION INTRAMUSCULAR; INTRAVENOUS ONCE
Status: COMPLETED | OUTPATIENT
Start: 2019-11-25 | End: 2019-11-25

## 2019-11-25 RX ORDER — LIDOCAINE 4 G/G
1 PATCH TOPICAL ONCE
Status: DISCONTINUED | OUTPATIENT
Start: 2019-11-25 | End: 2019-11-25 | Stop reason: HOSPADM

## 2019-11-25 RX ADMIN — LIDOCAINE 1 PATCH: 560 PATCH PERCUTANEOUS; TOPICAL; TRANSDERMAL at 19:36

## 2019-11-25 RX ADMIN — LORAZEPAM 1 MG: 2 INJECTION, SOLUTION INTRAMUSCULAR; INTRAVENOUS at 17:34

## 2019-11-25 RX ADMIN — SODIUM CHLORIDE 1000 ML: 9 INJECTION, SOLUTION INTRAVENOUS at 17:32

## 2019-11-25 RX ADMIN — MAGNESIUM SULFATE HEPTAHYDRATE 1 G: 1 INJECTION, SOLUTION INTRAVENOUS at 17:39

## 2019-11-25 RX ADMIN — DIPHENHYDRAMINE HYDROCHLORIDE 25 MG: 50 INJECTION, SOLUTION INTRAMUSCULAR; INTRAVENOUS at 17:32

## 2019-11-25 RX ADMIN — METOCLOPRAMIDE 10 MG: 5 INJECTION, SOLUTION INTRAMUSCULAR; INTRAVENOUS at 18:39

## 2019-11-25 NOTE — TELEPHONE ENCOUNTER
Just woke up with a bad headache, hasn't been able to eat all day. Having chest pain , a sharp stabbing feeling directly in the middle of her chest. She called her neurologist, and they haven't got back to her yet. She says her headache is the worst ever, but the chest pain is scaring her also. She has called her  and he is on his way home. She has been resting. She wants to see if her Dr can see her first, even though the care advice is to go to the ER to be seen. I told her we can try, but with both the headache and the pain in her chest that is making her nervous, she needs to be seen today, and she agrees to that. Transferring to scheduling to see about and appointment. Patient hung up, but scheduling will call and try to schedule and appointment.    Alem Sherman RN/ [facility]        Additional Information    Negative: Weakness of the face, arm or leg on one side of the body and new onset    Negative: Difficult to awaken or acting confused (e.g., disoriented, slurred speech)    Negative: Numbness of the face, arm or leg on one side of the body and new onset    Negative: Loss of speech or garbled speech and new onset    Negative: Passed out (i.e., fainted, collapsed and was not responding)    Negative: Sounds like a life-threatening emergency to the triager    Negative: Unable to walk without falling    Negative: Stiff neck (can't touch chin to chest)    Negative: Possibility of carbon monoxide exposure    SEVERE headache, states 'worst headache' of life    Protocols used: HEADACHE-A-[place]

## 2019-11-25 NOTE — TELEPHONE ENCOUNTER
[facility] Call Center    Phone Message    May a detailed message be left on voicemail: yes    Reason for Call: Other: Breanna calling hoping to discuss with someone about her prescribed galcanezumab-gnlm (EMGALITY) 120 MG/ML injection. Breanna is not able to get the prescription filled, because her insurance doesn't cover it. However, the year free trial Breanna was offered also doesn't work, due to the type of insurance coverage she has. Breanna states she is on state assistance for insurance. Breanna is wondering if there are any other options to get this medication, or if there is a different medication or treatment for her to try. Please call Breanna back at your earliest convenience.     Action Taken: Message routed to:  [facility] Neuro
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
## 2019-11-25 NOTE — TELEPHONE ENCOUNTER
Patient called today.    Patient was triaged to see a provider today, or go to the E/R.    Patient wants to let Karen Campos CNP at [facility] know that she is going to the E/R for symptoms of severe headache.    Patient is crying.    Thank you.    Central Scheduling  Jessy BALBUENA

## 2019-11-25 NOTE — ED AVS SNAPSHOT
[facility] Emergency Department  [address] [place] [address]  Phone:  [phone]  Fax:  [phone]                                    Breanna Vidal   MRN: [id number]    Department:  [facility] Emergency Department   Date of Visit:  11/25/2019           After Visit Summary Signature Page    I have received my discharge instructions, and my questions have been answered. I have discussed any challenges I see with this plan with the nurse or doctor.    ..........................................................................................................................................  Patient/Patient Representative Signature      ..........................................................................................................................................  Patient Representative Print Name and Relationship to Patient    ..................................................               ................................................  Date                                   Time    ..........................................................................................................................................  Reviewed by Signature/Title    ...................................................              ..............................................  Date                                               Time          22EPIC Rev 08/18

## 2019-11-26 NOTE — DISCHARGE INSTRUCTIONS
It was a pleasure working with you today!  I hope your condition improves rapidly!     I am concerned that you potentially could have a bulging disc in your neck given your ongoing symptoms that sometimes do cause numbness and tingling into your hand.  Methylprednisolone can oftentimes be helpful to treat that inflammation.  I sent this to your pharmacy.  You can keep the Lidoderm patch on for up to 18 hours.  If it works well for you, these can be purchased over-the-counter.  Cyclobenzaprine can be used as needed for muscle spasm as well.  Please return to performing your physical therapy exercises to help decompress your spine.    Thankfully, all of your heart and lung testing came out okay.  Nothing severe.  I think your discomfort was likely related to the panic attack that you are experiencing earlier today.  I am thankful that you are feeling much better after the migraine protocol treatment.

## 2019-12-11 ENCOUNTER — TELEPHONE (OUTPATIENT)
Dept: NEUROLOGY | Facility: CLINIC | Age: 34
End: 2019-12-11

## 2019-12-11 DIAGNOSIS — G43.719 INTRACTABLE CHRONIC MIGRAINE WITHOUT AURA AND WITHOUT STATUS MIGRAINOSUS: ICD-10-CM

## 2019-12-11 DIAGNOSIS — G43.019 INTRACTABLE MIGRAINE WITHOUT AURA AND WITHOUT STATUS MIGRAINOSUS: Primary | ICD-10-CM

## 2019-12-11 NOTE — TELEPHONE ENCOUNTER
----- Message from MADELINE Caraballo CNP sent at 12/11/2019  8:08 AM CST -----  Sorry, I forgot to reply to this. Yes we can try Botox and I will order it. Please let patient know. Looks like she went to ED recently.   It shows [employer] insurance?  If Botox denied she should return to discuss or re-try treatments.   Thank you  ----- Message -----  From: Jyoti Haile RN  Sent: 11/25/2019  11:53 AM CST  To: MADELINE Caraballo CNP    Breanna is not able to get Emgality because her insurance doesn't cover it. She is on state assistance [place] and the savings card we mailed her also doesn't work due to her insurance.  Past experience with this type of insurance is very difficult and they just do not cover.  I'm wondering about possible botox.  Please let me know your thoughts.    Thank you  Jyoti

## 2019-12-11 NOTE — TELEPHONE ENCOUNTER
Called and left a voice message that Noemi has ordered Botox since her emgality was denied by her insurance.  I asked her to call me back if she has further questions or would like to discuss this further.

## 2019-12-13 NOTE — TELEPHONE ENCOUNTER
[facility] Call Center    Phone Message    May a detailed message be left on voicemail: yes    Reason for Call: Other: pt returning call, no reply on skype. please call pt back     Action Taken: Message routed to:  [facility] ([facility]): alex neuro

## 2020-01-24 NOTE — PROGRESS NOTES
Subjective     Breanna Vidal is a 34 year old female who presents to clinic today for the following health issues:    HPI   ABDOMINAL   PAIN     Onset: 6 days    Description:   Character: Sharp, comes in waves. It started 6 days ago after they went out to eat for Mexican food. She took an antacid because she thought that would help but it didn't. The pain has progressively worsened over the past 6 days. She has pain in the right shoulder blade, nausea and decreased appetite. She has not vomited. She feels hot when the pain worsens but does not think she has had fevers. She has no history of gallbladder disease. No recent injuries or falls. Bowel movements have been regular and daily.   Aunt has history of gallstones  She is unaware of dad's history as she recently found out her paternal father is not who she thought he was.   Mom  when she was younger from lupus  Location: right upper quadrant  Radiation:  Back    Intensity: moderate, severe    Progression of Symptoms:  worsening    Accompanying Signs & Symptoms:  Fever/Chills?: YES  Gas/Bloating: no   Nausea: YES  Vomitting: no   Diarrhea?: no   Constipation:no   Dysuria or Hematuria: no    History:   Trauma: no   Previous similar pain: no    Previous tests done: none    Precipitating factors:   Does the pain change with:     Food: no      BM: no     Urination: no     Alleviating factors:  Tylenol    Therapies Tried and outcome: Naproxen - felt like it upset stomach    LMP:  2020         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)     Intractable migraine without aura and without status migrainosus     Past Surgical History:   Procedure Laterality Date     EXCISE LESION FOOT Left 2019    Procedure: Excision of nevus second interdigital space left foot, Excision of nevus third interdigital space left  foot, excision of plantar junctional nevus left foot;  Surgeon: Rinku Lopez DPM;  Location: [facility]     INCISION AND DRAINAGE  04/18/2012    chin abscess I&D     LAPAROSCOPIC HERNIORRHAPHY INCISIONAL N/A 7/17/2019    Procedure: laparoscopic incisional hernia repair with mesh;  Surgeon: Jovi Odom MD;  Location: [facility]     LAPAROSCOPIC SALPINGECTOMY Bilateral 8/27/2018    Procedure: LAPAROSCOPIC SALPINGECTOMY;  Laparoscopic Bilateral Salpingectomy;  Surgeon: Chai Cui MD;  Location: [facility]     MAMMOPLASTY REDUCTION Bilateral 08/04/2017       Social History     Tobacco Use     Smoking status: Former Smoker     Smokeless tobacco: Former User     Quit date: 3/10/2016   Substance Use Topics     Alcohol use: No     Comment: history alcohol abuse 2012, s/p treatment     Family History   Problem Relation Age of Onset     Cancer Father      Pancreatic Cancer Father      Lung Cancer Father          Current Outpatient Medications   Medication Sig Dispense Refill     acetaminophen (TYLENOL) 500 MG tablet Take 1,000 mg by mouth every 6 hours as needed for mild pain       aspirin-acetaminophen-caffeine (EXCEDRIN MIGRAINE) 250-250-65 MG tablet Take 1 tablet by mouth every 6 hours as needed for headaches       EPINEPHrine (EPIPEN/ADRENACLICK/OR ANY BX GENERIC EQUIV) 0.3 MG/0.3ML injection 2-pack INJECT  0.3 ML IM 1 TIME PRF ANAPHYLAXIS  2     ibuprofen (ADVIL/MOTRIN) 200 MG tablet Take 600 mg by mouth every 4 hours as needed for mild pain       naproxen (NAPROSYN) 500 MG tablet Take 1 tablet (500 mg) by mouth every 12 hours as needed for headaches 30 tablet 3     Botulinum Toxin Type A (BOTOX) 200 units injection Inject 155 Units into the muscle every 3 months (Patient not taking: Reported on 1/28/2020) 155 Units 4     cyclobenzaprine (FLEXERIL) 10 MG tablet Take 0.5-1 tablets (5-10 mg) by mouth 3 times daily as needed for muscle spasms (Patient not taking: Reported on 1/28/2020) 12 tablet 0     fluticasone  (FLONASE) 50 MCG/ACT nasal spray Spray 1 spray into both nostrils daily (Patient not taking: Reported on 1/28/2020) 1 mL 1     gabapentin (NEURONTIN) 300 MG capsule Take 1 capsule (300 mg) by mouth At Bedtime (Patient not taking: Reported on 1/28/2020)       HYDROcodone-acetaminophen (NORCO) 5-325 MG tablet Take 1 tablet by mouth every 6 hours as needed for severe pain (Patient not taking: Reported on 9/30/2019) 7 tablet 0     naratriptan (AMERGE) 2.5 MG tablet Take 1 tablet (2.5 mg) by mouth at onset of headache for migraine (Patient not taking: Reported on 1/28/2020) 6 tablet 1     ondansetron (ZOFRAN) 4 MG tablet Take 1 tablet (4 mg) by mouth every 8 hours as needed for nausea (Patient not taking: Reported on 9/30/2019) 12 tablet 0     prochlorperazine (COMPAZINE) 5 MG tablet Take 0.5-1 tablets (2.5-5 mg) by mouth every 6 hours as needed for nausea or vomiting (Patient not taking: Reported on 1/28/2020) 20 tablet 3     rizatriptan (MAXALT-MLT) 5 MG ODT Take 1-2 tablets (5-10 mg) by mouth at onset of headache for migraine (may repeat 5-10 mg in 2 hours as needed. Max 30 mg in 24 hours) (Patient not taking: Reported on 1/28/2020) 18 tablet 9     Allergies   Allergen Reactions     Bees Anaphylaxis     Has Epi pen      Codeine Nausea     Other reaction(s): Nausea     Imitrex [Sumatriptan]      Worsening of migraine, paresthesias     Ranitidine Rash and Itching     recvd IV zantac 75 and reaction was immediate,  was given Benadryl to counter act reaction in 2000     Sulfa Drugs Unknown and Rash     BP Readings from Last 3 Encounters:   01/28/20 128/70   11/25/19 102/70   09/30/19 110/74    Wt Readings from Last 3 Encounters:   01/28/20 97.6 kg (215 lb 1.6 oz)   11/25/19 91.2 kg (201 lb 1.6 oz)   09/30/19 86.2 kg (190 lb)                    Reviewed and updated as needed this visit by Provider         Review of Systems   ROS COMP: Constitutional, HEENT, cardiovascular, pulmonary, GI, , musculoskeletal, neuro, skin,  "endocrine and psych systems are negative, except as otherwise noted.      Objective    /70   Pulse 82   Temp 97.2  F (36.2  C) (Temporal)   Resp 16   Ht 1.676 m (5' 5.98\")   Wt 97.6 kg (215 lb 1.6 oz)   LMP 01/01/2020   SpO2 98%   BMI 34.73 kg/m    Body mass index is 34.73 kg/m .  Physical Exam   GENERAL: alert and appears uncomfortable  NECK: no adenopathy, no asymmetry, masses, or scars and thyroid normal to palpation  RESP: lungs clear to auscultation - no rales, rhonchi or wheezes  CV: regular rate and rhythm, normal S1 S2, no S3 or S4, no murmur, click or rub  ABDOMEN: tenderness RUQ and bowel sounds normal  MS: no gross musculoskeletal defects noted, no edema  SKIN: no suspicious lesions or rashes  NEURO: Normal strength and tone, mentation intact and speech normal  PSYCH: mentation appears normal, anxious, judgement and insight intact and appearance well groomed    Diagnostic Test Results:  Labs reviewed in Epic  Results for orders placed or performed in visit on 01/28/20 (from the past 24 hour(s))   Comprehensive metabolic panel (BMP + Alb, Alk Phos, ALT, AST, Total. Bili, TP)   Result Value Ref Range    Sodium 140 133 - 144 mmol/L    Potassium 3.9 3.4 - 5.3 mmol/L    Chloride 108 94 - 109 mmol/L    Carbon Dioxide 24 20 - 32 mmol/L    Anion Gap 8 3 - 14 mmol/L    Glucose 87 70 - 99 mg/dL    Urea Nitrogen 13 7 - 30 mg/dL    Creatinine 0.61 0.52 - 1.04 mg/dL    GFR Estimate >90 >60 mL/min/[1.73_m2]    GFR Estimate If Black >90 >60 mL/min/[1.73_m2]    Calcium 9.3 8.5 - 10.1 mg/dL    Bilirubin Total 0.5 0.2 - 1.3 mg/dL    Albumin 4.2 3.4 - 5.0 g/dL    Protein Total 7.6 6.8 - 8.8 g/dL    Alkaline Phosphatase 50 40 - 150 U/L    ALT 33 0 - 50 U/L    AST 13 0 - 45 U/L   Amylase   Result Value Ref Range    Amylase 52 30 - 110 U/L   Lipase   Result Value Ref Range    Lipase 171 73 - 393 U/L   CBC with platelets   Result Value Ref Range    WBC 8.0 4.0 - 11.0 10e9/L    RBC Count 4.60 3.8 - 5.2 10e12/L    " Hemoglobin 13.7 11.7 - 15.7 g/dL    Hematocrit 40.8 35.0 - 47.0 %    MCV 89 78 - 100 fl    MCH 29.8 26.5 - 33.0 pg    MCHC 33.6 31.5 - 36.5 g/dL    RDW 13.5 10.0 - 15.0 %    Platelet Count 251 150 - 450 10e9/L           Assessment & Plan     1. RUQ abdominal pain  Labs are all normal. Preliminary of RUQ ultrasound shows no evidence of cholecystitis or gallstones. I called to talk to patient about the results and she is now having chest discomfort. I recommended that she go to the ED for further evaluation given she is now having chest pain. She is quite anxious which could be causing her chest to feel tight but think it is best to have her checked out. She is agreeable to this plan.   - Comprehensive metabolic panel (BMP + Alb, Alk Phos, ALT, AST, Total. Bili, TP)  - Amylase  - Lipase  - CBC with platelets  - US Abdomen Limited; Future     Greater than 50% of 25 minute visit were spent on counseling or coordination of care regarding RUQ pain.        No follow-ups on file.    [facility]

## 2020-01-28 ENCOUNTER — APPOINTMENT (OUTPATIENT)
Dept: CT IMAGING | Facility: CLINIC | Age: 35
End: 2020-01-28
Attending: FAMILY MEDICINE
Payer: COMMERCIAL

## 2020-01-28 ENCOUNTER — ANCILLARY PROCEDURE (OUTPATIENT)
Dept: ULTRASOUND IMAGING | Facility: OTHER | Age: 35
End: 2020-01-28
Attending: STUDENT IN AN ORGANIZED HEALTH CARE EDUCATION/TRAINING PROGRAM
Payer: COMMERCIAL

## 2020-01-28 ENCOUNTER — HOSPITAL ENCOUNTER (EMERGENCY)
Facility: CLINIC | Age: 35
Discharge: HOME | End: 2020-01-28
Attending: FAMILY MEDICINE | Admitting: FAMILY MEDICINE
Payer: COMMERCIAL

## 2020-01-28 ENCOUNTER — OFFICE VISIT (OUTPATIENT)
Dept: FAMILY MEDICINE | Facility: OTHER | Age: 35
End: 2020-01-28
Payer: COMMERCIAL

## 2020-01-28 ENCOUNTER — TELEPHONE (OUTPATIENT)
Dept: FAMILY MEDICINE | Facility: OTHER | Age: 35
End: 2020-01-28

## 2020-01-28 VITALS
RESPIRATION RATE: 16 BRPM | SYSTOLIC BLOOD PRESSURE: 128 MMHG | HEIGHT: 66 IN | DIASTOLIC BLOOD PRESSURE: 70 MMHG | OXYGEN SATURATION: 98 % | WEIGHT: 215.1 LBS | TEMPERATURE: 97.2 F | BODY MASS INDEX: 34.57 KG/M2 | HEART RATE: 82 BPM

## 2020-01-28 VITALS
TEMPERATURE: 98.5 F | OXYGEN SATURATION: 99 % | SYSTOLIC BLOOD PRESSURE: 115 MMHG | RESPIRATION RATE: 16 BRPM | HEART RATE: 72 BPM | DIASTOLIC BLOOD PRESSURE: 83 MMHG

## 2020-01-28 DIAGNOSIS — R10.11 RUQ ABDOMINAL PAIN: ICD-10-CM

## 2020-01-28 DIAGNOSIS — R10.11 RUQ ABDOMINAL PAIN: Primary | ICD-10-CM

## 2020-01-28 DIAGNOSIS — R10.11 ABDOMINAL PAIN, RIGHT UPPER QUADRANT: ICD-10-CM

## 2020-01-28 LAB
ALBUMIN SERPL-MCNC: 4.2 G/DL (ref 3.4–5)
ALP SERPL-CCNC: 50 U/L (ref 40–150)
ALT SERPL W P-5'-P-CCNC: 33 U/L (ref 0–50)
AMYLASE SERPL-CCNC: 52 U/L (ref 30–110)
ANION GAP SERPL CALCULATED.3IONS-SCNC: 8 MMOL/L (ref 3–14)
AST SERPL W P-5'-P-CCNC: 13 U/L (ref 0–45)
BILIRUB SERPL-MCNC: 0.5 MG/DL (ref 0.2–1.3)
BUN SERPL-MCNC: 13 MG/DL (ref 7–30)
CALCIUM SERPL-MCNC: 9.3 MG/DL (ref 8.5–10.1)
CHLORIDE SERPL-SCNC: 108 MMOL/L (ref 94–109)
CO2 SERPL-SCNC: 24 MMOL/L (ref 20–32)
CREAT SERPL-MCNC: 0.61 MG/DL (ref 0.52–1.04)
ERYTHROCYTE [DISTWIDTH] IN BLOOD BY AUTOMATED COUNT: 13.5 % (ref 10–15)
GFR SERPL CREATININE-BSD FRML MDRD: >90 ML/MIN/{1.73_M2}
GLUCOSE SERPL-MCNC: 87 MG/DL (ref 70–99)
HCT VFR BLD AUTO: 40.8 % (ref 35–47)
HGB BLD-MCNC: 13.7 G/DL (ref 11.7–15.7)
LIPASE SERPL-CCNC: 171 U/L (ref 73–393)
MCH RBC QN AUTO: 29.8 PG (ref 26.5–33)
MCHC RBC AUTO-ENTMCNC: 33.6 G/DL (ref 31.5–36.5)
MCV RBC AUTO: 89 FL (ref 78–100)
PLATELET # BLD AUTO: 251 10E9/L (ref 150–450)
POTASSIUM SERPL-SCNC: 3.9 MMOL/L (ref 3.4–5.3)
PROT SERPL-MCNC: 7.6 G/DL (ref 6.8–8.8)
RBC # BLD AUTO: 4.6 10E12/L (ref 3.8–5.2)
SODIUM SERPL-SCNC: 140 MMOL/L (ref 133–144)
WBC # BLD AUTO: 8 10E9/L (ref 4–11)

## 2020-01-28 PROCEDURE — 83690 ASSAY OF LIPASE: CPT | Performed by: STUDENT IN AN ORGANIZED HEALTH CARE EDUCATION/TRAINING PROGRAM

## 2020-01-28 PROCEDURE — 74177 CT ABD & PELVIS W/CONTRAST: CPT

## 2020-01-28 PROCEDURE — 80053 COMPREHEN METABOLIC PANEL: CPT | Performed by: STUDENT IN AN ORGANIZED HEALTH CARE EDUCATION/TRAINING PROGRAM

## 2020-01-28 PROCEDURE — 25800030 ZZH RX IP 258 OP 636: Performed by: FAMILY MEDICINE

## 2020-01-28 PROCEDURE — 25000128 H RX IP 250 OP 636: Performed by: FAMILY MEDICINE

## 2020-01-28 PROCEDURE — 85027 COMPLETE CBC AUTOMATED: CPT | Performed by: STUDENT IN AN ORGANIZED HEALTH CARE EDUCATION/TRAINING PROGRAM

## 2020-01-28 PROCEDURE — 96361 HYDRATE IV INFUSION ADD-ON: CPT | Performed by: FAMILY MEDICINE

## 2020-01-28 PROCEDURE — 96375 TX/PRO/DX INJ NEW DRUG ADDON: CPT | Performed by: FAMILY MEDICINE

## 2020-01-28 PROCEDURE — 99285 EMERGENCY DEPT VISIT HI MDM: CPT | Mod: Z6 | Performed by: FAMILY MEDICINE

## 2020-01-28 PROCEDURE — 82150 ASSAY OF AMYLASE: CPT | Performed by: STUDENT IN AN ORGANIZED HEALTH CARE EDUCATION/TRAINING PROGRAM

## 2020-01-28 PROCEDURE — 99214 OFFICE O/P EST MOD 30 MIN: CPT | Performed by: STUDENT IN AN ORGANIZED HEALTH CARE EDUCATION/TRAINING PROGRAM

## 2020-01-28 PROCEDURE — 36415 COLL VENOUS BLD VENIPUNCTURE: CPT | Performed by: STUDENT IN AN ORGANIZED HEALTH CARE EDUCATION/TRAINING PROGRAM

## 2020-01-28 PROCEDURE — 76705 ECHO EXAM OF ABDOMEN: CPT

## 2020-01-28 PROCEDURE — 96376 TX/PRO/DX INJ SAME DRUG ADON: CPT | Performed by: FAMILY MEDICINE

## 2020-01-28 PROCEDURE — 99285 EMERGENCY DEPT VISIT HI MDM: CPT | Mod: 25 | Performed by: FAMILY MEDICINE

## 2020-01-28 PROCEDURE — 96374 THER/PROPH/DIAG INJ IV PUSH: CPT | Mod: 59 | Performed by: FAMILY MEDICINE

## 2020-01-28 PROCEDURE — 93005 ELECTROCARDIOGRAM TRACING: CPT | Performed by: FAMILY MEDICINE

## 2020-01-28 RX ORDER — LORAZEPAM 2 MG/ML
1 INJECTION INTRAMUSCULAR ONCE
Status: COMPLETED | OUTPATIENT
Start: 2020-01-28 | End: 2020-01-28

## 2020-01-28 RX ORDER — ONDANSETRON 2 MG/ML
4 INJECTION INTRAMUSCULAR; INTRAVENOUS EVERY 30 MIN PRN
Status: DISCONTINUED | OUTPATIENT
Start: 2020-01-28 | End: 2020-01-28 | Stop reason: HOSPADM

## 2020-01-28 RX ORDER — LORAZEPAM 1 MG/1
1 TABLET ORAL ONCE
Status: DISCONTINUED | OUTPATIENT
Start: 2020-01-28 | End: 2020-01-28 | Stop reason: HOSPADM

## 2020-01-28 RX ORDER — ONDANSETRON 4 MG/1
4 TABLET, ORALLY DISINTEGRATING ORAL EVERY 6 HOURS PRN
Qty: 10 TABLET | Refills: 0 | Status: SHIPPED | OUTPATIENT
Start: 2020-01-28 | End: 2020-02-12

## 2020-01-28 RX ORDER — OXYCODONE AND ACETAMINOPHEN 5; 325 MG/1; MG/1
1 TABLET ORAL EVERY 4 HOURS PRN
Qty: 6 TABLET | Refills: 0 | Status: SHIPPED | OUTPATIENT
Start: 2020-01-28 | End: 2020-01-31

## 2020-01-28 RX ORDER — IOPAMIDOL 755 MG/ML
500 INJECTION, SOLUTION INTRAVASCULAR ONCE
Status: COMPLETED | OUTPATIENT
Start: 2020-01-28 | End: 2020-01-28

## 2020-01-28 RX ORDER — HYDROMORPHONE HYDROCHLORIDE 1 MG/ML
0.5 INJECTION, SOLUTION INTRAMUSCULAR; INTRAVENOUS; SUBCUTANEOUS
Status: COMPLETED | OUTPATIENT
Start: 2020-01-28 | End: 2020-01-28

## 2020-01-28 RX ADMIN — HYDROMORPHONE HYDROCHLORIDE 0.5 MG: 1 INJECTION, SOLUTION INTRAMUSCULAR; INTRAVENOUS; SUBCUTANEOUS at 17:23

## 2020-01-28 RX ADMIN — HYDROMORPHONE HYDROCHLORIDE 0.5 MG: 1 INJECTION, SOLUTION INTRAMUSCULAR; INTRAVENOUS; SUBCUTANEOUS at 17:50

## 2020-01-28 RX ADMIN — IOPAMIDOL 100 ML: 755 INJECTION, SOLUTION INTRAVENOUS at 18:15

## 2020-01-28 RX ADMIN — ONDANSETRON 4 MG: 2 INJECTION INTRAMUSCULAR; INTRAVENOUS at 18:31

## 2020-01-28 RX ADMIN — SODIUM CHLORIDE 1000 ML: 9 INJECTION, SOLUTION INTRAVENOUS at 17:23

## 2020-01-28 RX ADMIN — HYDROMORPHONE HYDROCHLORIDE 0.5 MG: 1 INJECTION, SOLUTION INTRAMUSCULAR; INTRAVENOUS; SUBCUTANEOUS at 18:41

## 2020-01-28 RX ADMIN — LORAZEPAM 1 MG: 2 INJECTION INTRAMUSCULAR; INTRAVENOUS at 18:51

## 2020-01-28 RX ADMIN — ONDANSETRON 4 MG: 2 INJECTION INTRAMUSCULAR; INTRAVENOUS at 17:22

## 2020-01-28 ASSESSMENT — ENCOUNTER SYMPTOMS
ABDOMINAL PAIN: 1
DYSURIA: 0
VOMITING: 0
PALPITATIONS: 0
SORE THROAT: 0
FEVER: 0
CONSTIPATION: 0
NAUSEA: 1
EYE PAIN: 0
CHEST TIGHTNESS: 0
BACK PAIN: 0
SHORTNESS OF BREATH: 0
DIARRHEA: 0
NERVOUS/ANXIOUS: 1
EYE REDNESS: 0
POLYDIPSIA: 0
POLYPHAGIA: 0
CHILLS: 1
DIZZINESS: 0
COUGH: 0
FREQUENCY: 0
WEAKNESS: 0

## 2020-01-28 ASSESSMENT — MIFFLIN-ST. JEOR: SCORE: 1692.19

## 2020-01-28 ASSESSMENT — PAIN SCALES - GENERAL: PAINLEVEL: SEVERE PAIN (7)

## 2020-01-28 NOTE — TELEPHONE ENCOUNTER
Patient would like to talk to you-She stated that she had her ultra sound and they did see any gallstones and she is wondering where do we go from here.  Please call

## 2020-01-28 NOTE — ED PROVIDER NOTES
History     Chief Complaint   Patient presents with     Abdominal Pain     HPI  Breanna Vidal is a 34 year old female who is directed to the ED by the clinic.  Patient was recently seen for a 6-day history of right upper quadrant abdominal pain.  Pain started after a large greasy Mexican dinner.  She has had right upper quadrant abdominal pain ever since.  She has had anorexia and little intake.  She has had nausea but no vomiting.  The pain sometimes radiates up into her right shoulder right scapular area.  It hurts to take a deep breath.  Labs are drawn in the clinic and were all normal.  An ultrasound was done today and was otherwise unremarkable with no stones or obstruction seen.  Because of ongoing symptoms it was recommended she come into the ED for further evaluation.  The patient denies having any chest pain or shortness of breath.  No cardiac history.  No dyspnea on exertion.  She has no history of blood clots.  Patient has had her appendix removed.    Allergies:  Allergies   Allergen Reactions     Bees Anaphylaxis     Has Epi pen      Codeine Nausea     Other reaction(s): Nausea     Imitrex [Sumatriptan]      Worsening of migraine, paresthesias     Ranitidine Rash and Itching     recvd IV zantac 75 and reaction was immediate,  was given Benadryl to counter act reaction in 2000     Sulfa Drugs Unknown and Rash       Problem List:    Patient Active Problem List    Diagnosis Date Noted     Intractable migraine without aura and without status migrainosus 07/17/2019     Priority: Medium     Alcohol abuse, in remission 08/20/2018     Priority: Medium     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) 08/20/2018     Priority: Medium     Mixed incontinence 03/03/2017     Priority: Medium     Vaginal delivery 10/31/2011     Priority: Medium     Rh negative status during pregnancy 09/23/2011     Priority: Medium        Past Medical History:    Past Medical History:   Diagnosis Date     Cellulitis 2012     Drug abuse (H)  2011     MRSA cellulitis 2012     Rh negative, antepartum        Past Surgical History:    Past Surgical History:   Procedure Laterality Date     EXCISE LESION FOOT Left 8/16/2019    Procedure: Excision of nevus second interdigital space left foot, Excision of nevus third interdigital space left foot, excision of plantar junctional nevus left foot;  Surgeon: Rinku Lopez DPM;  Location: [facility]     INCISION AND DRAINAGE  04/18/2012    chin abscess I&D     LAPAROSCOPIC HERNIORRHAPHY INCISIONAL N/A 7/17/2019    Procedure: laparoscopic incisional hernia repair with mesh;  Surgeon: Jovi Odom MD;  [facility]     LAPAROSCOPIC SALPINGECTOMY Bilateral 8/27/2018    Procedure: LAPAROSCOPIC SALPINGECTOMY;  Laparoscopic Bilateral Salpingectomy;  Surgeon: Chai Cui MD;  Location: [facility]     MAMMOPLASTY REDUCTION Bilateral 08/04/2017       Family History:    Family History   Problem Relation Age of Onset     Cancer Father      Pancreatic Cancer Father      Lung Cancer Father      Lupus Mother        Social History:  Marital Status:  Single [1]  Social History     Tobacco Use     Smoking status: Former Smoker     Smokeless tobacco: Former User     Quit date: 3/10/2016   Substance Use Topics     Alcohol use: No     Comment: history alcohol abuse 2012, s/p treatment     Drug use: No     Comment: former drug user (EtOH, cocaine, marijuana, meth), last use 2012        Medications:    acetaminophen (TYLENOL) 500 MG tablet  aspirin-acetaminophen-caffeine (EXCEDRIN MIGRAINE) 250-250-65 MG tablet  Botulinum Toxin Type A (BOTOX) 200 units injection  cyclobenzaprine (FLEXERIL) 10 MG tablet  EPINEPHrine (EPIPEN/ADRENACLICK/OR ANY BX GENERIC EQUIV) 0.3 MG/0.3ML injection 2-pack  fluticasone (FLONASE) 50 MCG/ACT nasal spray  gabapentin (NEURONTIN) 300 MG capsule  HYDROcodone-acetaminophen (NORCO) 5-325 MG tablet  ibuprofen (ADVIL/MOTRIN) 200 MG tablet  naproxen (NAPROSYN) 500 MG tablet  naratriptan (AMERGE) 2.5 MG  tablet  ondansetron (ZOFRAN) 4 MG tablet  prochlorperazine (COMPAZINE) 5 MG tablet  rizatriptan (MAXALT-MLT) 5 MG ODT          Review of Systems   Constitutional: Positive for chills. Negative for fever.   HENT: Negative for congestion and sore throat.    Eyes: Negative for pain, redness and visual disturbance.   Respiratory: Negative for cough, chest tightness and shortness of breath.    Cardiovascular: Negative for chest pain, palpitations and leg swelling.   Gastrointestinal: Positive for abdominal pain and nausea. Negative for constipation, diarrhea and vomiting.   Endocrine: Negative for polydipsia, polyphagia and polyuria.   Genitourinary: Negative for dysuria and frequency.   Musculoskeletal: Negative for back pain.   Skin: Negative for rash.   Allergic/Immunologic: Negative for immunocompromised state.   Neurological: Negative for dizziness and weakness.   Psychiatric/Behavioral: The patient is nervous/anxious.    All other systems reviewed and are negative.      Physical Exam   BP: (!) 136/96  Heart Rate: 66  Temp: 98.5  F (36.9  C)  Resp: 18  SpO2: 100 %      Physical Exam  Vitals signs and nursing note reviewed.   Constitutional:       Appearance: She is well-developed. She is obese.      Comments: Alert pleasant 34-year-old female.  She appears to be restless and in discomfort/pain. BP (!) 136/96   Temp 98.5  F (36.9  C) (Oral)   Resp 18   LMP 01/01/2020   SpO2 100%      HENT:      Head: Normocephalic and atraumatic.      Right Ear: Tympanic membrane normal.      Left Ear: Tympanic membrane normal.      Nose: Nose normal.      Mouth/Throat:      Mouth: Mucous membranes are moist.   Eyes:      General: No scleral icterus.     Extraocular Movements: Extraocular movements intact.      Pupils: Pupils are equal, round, and reactive to light.   Neck:      Musculoskeletal: Normal range of motion.   Cardiovascular:      Rate and Rhythm: Normal rate and regular rhythm.      Heart sounds: Normal heart sounds.    Pulmonary:      Effort: Pulmonary effort is normal.      Breath sounds: Normal breath sounds.   Abdominal:      General: Abdomen is flat. Bowel sounds are increased.      Palpations: Abdomen is soft.      Tenderness: There is abdominal tenderness in the right upper quadrant. There is no guarding or rebound.      Hernia: No hernia is present.   Musculoskeletal: Normal range of motion.   Skin:     General: Skin is warm.      Capillary Refill: Capillary refill takes less than 2 seconds.   Neurological:      General: No focal deficit present.      Mental Status: She is alert and oriented to person, place, and time.   Psychiatric:         Mood and Affect: Mood normal.         Behavior: Behavior normal.         ED Course     Results for orders placed or performed during the hospital encounter of 01/28/20   CT Abdomen Pelvis w Contrast     Status: None (Preliminary result)    Narrative    CT ABDOMEN AND PELVIS WITH CONTRAST   1/28/2020 6:22 PM     HISTORY: Six-day history of right upper quadrant right lower chest  pain. Ultrasound reported normal.    TECHNIQUE: CT abdomen and pelvis with Isovue 370, 100mL IV. Radiation  dose for this scan was reduced using automated exposure control,  adjustment of the mA and/or kV according to patient size, or iterative  reconstruction technique.    COMPARISON: CT abdomen and pelvis 8/10/2019.    FINDINGS: The liver, spleen, adrenals, pancreas, and kidneys do not  demonstrate any acute abnormalities. Small free pelvic fluid. No acute  bowel abnormality. Appendix not seen. No signs of appendicitis.      Impression    IMPRESSION:  1. No acute abnormality is seen.  2. Small free pelvic fluid may just be physiologic.   Results for orders placed or performed in visit on 01/28/20   US Abdomen Limited     Status: None    Narrative    LIMITED ABDOMINAL ULTRASOUND 1/28/2020 11:21 AM     COMPARISON: None.    HISTORY: Right upper quadrant pain.    TECHNIQUE: Routine right upper quadrant abdominal  ultrasound.    FINDINGS:   Fluid: No evidence of ascites or pleural effusions.    Liver: The liver demonstrates normal echogenicity and echotexture. No  focal mass.    Gallbladder: No shadowing gallstones. No gallbladder wall thickening  or pericholecystic fluid. Right upper quadrant tenderness is noted by  the sonographer during the exam.    Bile Ducts: Both the intra- and extrahepatic biliary system are of  normal caliber. The common bile duct measures 3 mm in diameter in the  abril hepatis.    Pancreas: Visualized portions of the head and body of the pancreas are  unremarkable.     Kidney: The right kidney measures 11.1 cm long. Normal renal  parenchymal echogenicity and cortical thickness without evidence of  mass. No hydronephrosis.      Impression    IMPRESSION:   1.  Right upper quadrant tenderness is noted by the sonographer during  the examination. However, normal sonographic appearance of the  gallbladder.  2. Otherwise normal limited abdominal ultrasound.    SHRUTHI KIRAN MD   Results for orders placed or performed in visit on 01/28/20   Comprehensive metabolic panel (BMP + Alb, Alk Phos, ALT, AST, Total. Bili, TP)     Status: None   Result Value Ref Range    Sodium 140 133 - 144 mmol/L    Potassium 3.9 3.4 - 5.3 mmol/L    Chloride 108 94 - 109 mmol/L    Carbon Dioxide 24 20 - 32 mmol/L    Anion Gap 8 3 - 14 mmol/L    Glucose 87 70 - 99 mg/dL    Urea Nitrogen 13 7 - 30 mg/dL    Creatinine 0.61 0.52 - 1.04 mg/dL    GFR Estimate >90 >60 mL/min/[1.73_m2]    GFR Estimate If Black >90 >60 mL/min/[1.73_m2]    Calcium 9.3 8.5 - 10.1 mg/dL    Bilirubin Total 0.5 0.2 - 1.3 mg/dL    Albumin 4.2 3.4 - 5.0 g/dL    Protein Total 7.6 6.8 - 8.8 g/dL    Alkaline Phosphatase 50 40 - 150 U/L    ALT 33 0 - 50 U/L    AST 13 0 - 45 U/L   Amylase     Status: None   Result Value Ref Range    Amylase 52 30 - 110 U/L   Lipase     Status: None   Result Value Ref Range    Lipase 171 73 - 393 U/L   CBC with platelets     Status:  None   Result Value Ref Range    WBC 8.0 4.0 - 11.0 10e9/L    RBC Count 4.60 3.8 - 5.2 10e12/L    Hemoglobin 13.7 11.7 - 15.7 g/dL    Hematocrit 40.8 35.0 - 47.0 %    MCV 89 78 - 100 fl    MCH 29.8 26.5 - 33.0 pg    MCHC 33.6 31.5 - 36.5 g/dL    RDW 13.5 10.0 - 15.0 %    Platelet Count 251 150 - 450 10e9/L          Procedures               Critical Care time:  none               Results for orders placed or performed during the hospital encounter of 01/28/20 (from the past 24 hour(s))   CT Abdomen Pelvis w Contrast    Narrative    CT ABDOMEN AND PELVIS WITH CONTRAST   1/28/2020 6:22 PM     HISTORY: Six-day history of right upper quadrant right lower chest  pain. Ultrasound reported normal.    TECHNIQUE: CT abdomen and pelvis with Isovue 370, 100mL IV. Radiation  dose for this scan was reduced using automated exposure control,  adjustment of the mA and/or kV according to patient size, or iterative  reconstruction technique.    COMPARISON: CT abdomen and pelvis 8/10/2019.    FINDINGS: The liver, spleen, adrenals, pancreas, and kidneys do not  demonstrate any acute abnormalities. Small free pelvic fluid. No acute  bowel abnormality. Appendix not seen. No signs of appendicitis.      Impression    IMPRESSION:  1. No acute abnormality is seen.  2. Small free pelvic fluid may just be physiologic.       Medications   ondansetron (ZOFRAN) injection 4 mg (4 mg Intravenous Given 1/28/20 1831)   LORazepam (ATIVAN) tablet 1 mg (has no administration in time range)   LORazepam (ATIVAN) injection 1 mg (has no administration in time range)   0.9% sodium chloride BOLUS (0 mLs Intravenous Stopped 1/28/20 1823)   HYDROmorphone (PF) (DILAUDID) injection 0.5 mg (0.5 mg Intravenous Given 1/28/20 1841)   sodium chloride (PF) 0.9% PF flush 100 mL (60 mLs Intracatheter Given 1/28/20 1815)   sodium chloride (PF) 0.9% PF flush 3 mL (10 mLs Intravenous Given 1/28/20 1815)   iopamidol (ISOVUE-370) solution 500 mL (100 mLs Intravenous Given  1/28/20 6225)       Assessments & Plan (with Medical Decision Making)   MDM--34-year-old female with 6-day history of severe right upper quadrant abdominal pain with radiation up into her shoulder and associate with nausea and some vomiting.  No diarrhea with this.  No hematemesis.  No melena.  Is never had trouble like this before.  No history of gallbladder disease and no family history of gallbladder disease.  On examination she is very tender in the right upper quadrant but otherwise benign abdominal exam with no rebound or guarding or peritoneal signs.  Her CT scan is normal.  Her ultrasound is normal.  She has no fever and a normal white count.  All her liver enzymes and lipase is all normal.  She has no respiratory symptoms and there is nothing noted on CT scan that would be irritating her right diaphragm.  She has had no change in her bowels.  Her symptoms and exam are very suspicious for biliary colic.  She has no evidence of cholecystitis.  The patient is very anxious here.  I did give her Ativan just prior to discharge.  She was also given IV fluids, Zofran and 0.5 mg of Dilaudid.  Her symptoms were somewhat improved.  I recommended following up with her primary care provider tomorrow.  I discussed a HIDA scan which could be ordered by her provider.  Patient is comfortable with this evaluation discharge and follow-up plan and she is discharged in improved condition.  I have reviewed the nursing notes.    I have reviewed the findings, diagnosis, plan and need for follow up with the patient.       New Prescriptions    No medications on file       Final diagnoses:   Abdominal pain, right upper quadrant       1/28/2020   [facility] EMERGENCY DEPARTMENT     Ellis, Marcelo DAVENPORT MD  01/28/20 9236

## 2020-01-28 NOTE — ED AVS SNAPSHOT
[facility] Emergency Department  [address] [place] [address]  Phone:  [phone]  Fax:  [phone]                                    Breanna Vidal   MRN: [id number]    Department:  [facility] Emergency Department   Date of Visit:  1/28/2020           After Visit Summary Signature Page    I have received my discharge instructions, and my questions have been answered. I have discussed any challenges I see with this plan with the nurse or doctor.    ..........................................................................................................................................  Patient/Patient Representative Signature      ..........................................................................................................................................  Patient Representative Print Name and Relationship to Patient    ..................................................               ................................................  Date                                   Time    ..........................................................................................................................................  Reviewed by Signature/Title    ...................................................              ..............................................  Date                                               Time          22EPIC Rev 08/18

## 2020-01-29 ENCOUNTER — PATIENT OUTREACH (OUTPATIENT)
Dept: CARE COORDINATION | Facility: CLINIC | Age: 35
End: 2020-01-29

## 2020-01-29 ENCOUNTER — TELEPHONE (OUTPATIENT)
Dept: FAMILY MEDICINE | Facility: OTHER | Age: 35
End: 2020-01-29

## 2020-01-29 DIAGNOSIS — R10.11 RUQ ABDOMINAL PAIN: Primary | ICD-10-CM

## 2020-01-29 ASSESSMENT — ACTIVITIES OF DAILY LIVING (ADL): DEPENDENT_IADLS:: INDEPENDENT

## 2020-01-29 NOTE — PROGRESS NOTES
"Clinic Care Coordination Contact    Clinic Care Coordination Contact  OUTREACH    Referral Information:  Referral Source: ED Follow-Up    Primary Diagnosis: GI Disorders(Abdominal Pain)    Chief Complaint   Patient presents with     Clinic Care Coordination - Initial        Universal Utilization:   Clinic Utilization  Difficulty keeping appointments:: No  Compliance Concerns: No  No-Show Concerns: No(11% no show rate)  No PCP office visit in Past Year: No  Utilization    Last refreshed: 1/29/2020  8:50 AM:  Hospital Admissions 0           Last refreshed: 1/29/2020  8:50 AM:  ED Visits 8           Last refreshed: 1/29/2020  8:50 AM:  No Show Count (past year) 2              Current as of: 1/29/2020  8:50 AM              Clinical Concerns:  Current Medical Concerns:  Patient was seen at [facility] ER 1/28/20 for abdominal pain. CC RN introduced self and role She is taking it easy. She is worried about managing her symptoms while waiting for the HIDA scan. Scheduled for next week 2/6/20. We discussed her discharge summary in detail. She will follow her discharge summary, and reach out to clinic as directed by discharge summary. Patient has tolerated fluids without vomiting since 630 am. She last took zofran and oxycodone at 730. She had a bowel movement today, denies blood or diarrhea. Reports it was like kristine. Continues to have nausea and abdominal pain. She is planning to eat a bland diet. She will have chicken noodle soup this afternoon. We discussed symptoms of dehydration. She bough \"liquid multiplier\" over the counter product that you add to a glass of water. It has added sugar, she is unsure if she will take this or not.    Current Behavioral Concerns: patient engaged in conversation.     Education Provided to patient: RN CC educated about Care Coordination Services, discharge instructions, medications reviewed and follow up    Pain  Pain (GOAL):: Yes  Type: Acute (<3mo)  Location of chronic pain:: Abdominal " Pain  Radiating: No  Progression: Constant  Alleviating Factors: Rest  Health Maintenance Reviewed: Not assessed  Clinical Pathway: None    Medication Management:  We discussed the instructions for her Zofran and oxycodone.  She verbalized understanding     Functional Status:  Dependent ADLs:: Independent  Dependent IADLs:: Independent  Bed or wheelchair confined:: No  Mobility Status: Independent    Living Situation:  Current living arrangement:: I live in a private home with family  Type of residence:: Private home - stairs    Lifestyle & Psychosocial Needs:        Diet:: Regular  Inadequate nutrition (GOAL):: No  Tube Feeding: No  Inadequate activity/exercise (GOAL):: No  Significant changes in sleep pattern (GOAL): No  Regular car     Oriental orthodox or spiritual beliefs that impact treatment:: No  Mental health DX:: No  Mental health management concern (GOAL):: No  Informal Support system:: Family, Neighbors   Socioeconomic History     Marital status: Single     Spouse name: Not on file     Number of children: 4     Years of education: Not on file     Highest education level: Not on file     Tobacco Use     Smoking status: Former Smoker     Smokeless tobacco: Former User     Quit date: 3/10/2016   Substance and Sexual Activity     Alcohol use: No     Comment: history alcohol abuse 2012, s/p treatment     Drug use: No     Comment: former drug user (EtOH, cocaine, marijuana, meth), last use 2012     Sexual activity: Yes     Partners: Male               Resources and Interventions:  Current Resources: discharges summary    Community Resources: [facility]     Equipment Currently Used at Home: none    Advance Care Plan/Directive  Advanced Care Plans/Directives on file:: No  Advanced Care Plan/Directive Status: In Process    Referrals Placed: None     Goals:   Goals        General    Medical (pt-stated)     Notes - Note created  1/29/2020 11:43 AM by Tiffany Coreas RN    Goal Statement: I want to manage my  symptoms while waiting for my HIDA scan. Prevent going back to the ER. There were many sick people there.   Date Goal set: 1/29/20  Date to Achieve By: 2 weeks  Patient expressed understanding of goal: yes  Action steps to achieve this goal:  1. I will follow my discharge summary with the education for abdominal pain  2. I will attend my HIDA scan next week  3. I will follow up/reach out as needed                Barriers: new symptoms to manage   Strengths: patient would like to engaged in care coordination services.   Patient/Caregiver understanding: yes    Outreach Frequency: weekly  Future Appointments              In 1 week [address]          Plan: 1. Patient will follow discharge summary.   2. Patient will follow up sooner should symptoms worsen, change or patient feels there is a need further care.  3. Care Coordination needs identified at this time. Patient enrolled in Care Coordination.   4. Complex care plan and letter sent.   5. CC RN will reach out to patient in 1 week, if additional needs arise patient encouraged to contact Care Coordinator sooner.     SHAWANDA Landin RN Clinic Care Coordinator   [place], [place], [place]  Phone: [phone]

## 2020-01-29 NOTE — DISCHARGE INSTRUCTIONS
Drink extra liquids.  Call your clinic in the morning to schedule a HIDA scan for you.  This is a gallbladder test.  Follow-up in the clinic if ongoing symptoms.  Take Zofran if needed for nausea.  Take Percocet if needed for pain.

## 2020-01-29 NOTE — LETTER
[facility]  Complex Care Plan  About Me:    Patient Name:  Breanna Vidal    YOB: 1985  Age:         34 year old   [facility] MRN:    [id number] Telephone Information:  Home Phone [phone]   Mobile [phone]       Address:  [address] [place] [address]  [place] [address] Email address:  [email or web address]      Emergency Contact(s)    Name Relationship Lgl Grd Work Phone Home Phone Mobile Phone   1. MIA SHERWOOD Significant ot*  [phone]   2. DEVORA JEFFERS Father   [phone]           Primary language:  English     needed? No   [facility] [employer]:  [phone] op. 1  Other communication barriers: None  Preferred Method of Communication:     Current living arrangement: I live in a private home with family  Mobility Status/ Medical Equipment: Independent    Health Maintenance  Health Maintenance Reviewed: Not assessed    My Access Plan  Medical Emergency 911   Primary Clinic Line [facility] [phone]   24 Hour Appointment Line [phone] or  [phone]) (toll-free)   24 Hour Nurse Line [phone] (toll-free)   Preferred Urgent Care [facility] [phone]   Preferred Hospital [facility]  [phone]   Preferred Pharmacy [facility] [address] [place] [id number] [facility]     Behavioral Health Crisis Line The National Suicide Prevention Lifeline at 1-214.843.3604 or 911             My Care Team Members  Patient Care Team       Relationship Specialty Notifications Start End    Karen Campos APRN CNP PCP - General Nurse Practitioner - Family  8/30/18     Phone: [phone] Fax: [phone]         [address] [place] [address]    Karen Campos APRN CNP Assigned PCP   9/16/18     Phone: [phone] Fax: [phone]         [address] [place] [address]    Tiffany Coreas  RN Lead Care Coordinator Primary Care - CC Admissions 1/29/20     Phone: [phone] Fax: [phone]                My Care Plans  Self Management and Treatment Plan  Goals and (Comments)  Goals        General    Medical (pt-stated)     Notes - Note created  1/29/2020 11:43 AM by Tiffany Coreas RN    Goal Statement: I want to manage my symptoms while waiting for my HIDA scan. Prevent going back to the ER. There were many sick people there.   Date Goal set: 1/29/20  Date to Achieve By: 2 weeks  Patient expressed understanding of goal: yes  Action steps to achieve this goal:  1. I will follow my discharge summary with the education for abdominal pain  2. I will attend my HIDA scan next week  3. I will follow up/reach out as needed                 Action Plans on File:                       Advance Care Plans/Directives Type:        My Medical and Care Information  Problem List   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)     Intractable migraine without aura and without status migrainosus          Care Coordination Start Date: 1/29/2020   Frequency of Care Coordination: weekly   Form Last Updated: 01/29/2020

## 2020-01-29 NOTE — TELEPHONE ENCOUNTER
Left a detailed message on patient's voicemail informing patient that an order has been placed and phone number was provided to patient to schedule her test.

## 2020-01-29 NOTE — LETTER
[facility]  [address] [place] [address]    January 29, 2020    Breanna Vidal  [id number] [address] [place] [address]      Dear Breanna,    I am a clinic care coordinator who works with MADELINE Hernandez CNP at [facility]. I wanted to thank you for spending the time to talk with me.  I wanted to introduce myself and provide you with my contact information so that you can call me with questions or concerns about your health care. Below is a description of clinic care coordination and how I can further assist you.     The clinic care coordinator is a registered nurse and/or  who understand the health care system. The goal of clinic care coordination is to help you manage your health and improve access to the [facility] system in the most efficient manner. The registered nurse can assist you in meeting your health care goals by providing education, coordinating services, and strengthening the communication among your providers. The  can assist you with financial, behavioral, psychosocial, chemical dependency, counseling, and/or psychiatric resources.    Please feel free to contact me at [phone], with any questions or concerns. We at [facility] are focused on providing you with the highest-quality healthcare experience possible and that all starts with you.     Sincerely,     SHAWANDA Landin RN Clinic Care Coordinator   [place], [place], [place]  Phone: [phone]     Enclosed: I have enclosed a copy of the Complex Care Plan. This has helpful information and goals that we have talked about. Please keep this in an easy to access place to use as needed.

## 2020-01-29 NOTE — TELEPHONE ENCOUNTER
Reason for Call: Request for an order or referral:    Order or referral being requested: Order     Date needed: as soon as possible    Has the patient been seen by the PCP for this problem? NO    Additional comments: Patient needs a hida scan ordered for her gallbladder     Phone number Patient can be reached at:  Home number on file [phone] (home)    Best Time:  Anytime     Can we leave a detailed message on this number?  YES    Call taken on 1/29/2020 at 8:22 AM by Debbie Donald

## 2020-01-31 DIAGNOSIS — R10.11 RUQ ABDOMINAL PAIN: Primary | ICD-10-CM

## 2020-01-31 RX ORDER — OXYCODONE AND ACETAMINOPHEN 5; 325 MG/1; MG/1
1 TABLET ORAL EVERY 4 HOURS PRN
Qty: 6 TABLET | Refills: 0 | Status: SHIPPED | OUTPATIENT
Start: 2020-01-31 | End: 2020-02-12

## 2020-01-31 NOTE — TELEPHONE ENCOUNTER
Reason for Call:  Medication or medication refill:    Do you use a [facility]?  Name of the pharmacy and phone number for the current request:  [facility] [place] - [phone]    Name of the medication requested: Percocet    Other request: Patient requesting refill.     Can we leave a detailed message on this number? YES    Phone number patient can be reached at: Home number on file [phone] (home)    Best Time: any    Call taken on 1/31/2020 at 9:54 AM by Delvin Haney

## 2020-01-31 NOTE — TELEPHONE ENCOUNTER
Please call patient and let her know I sent in a one time refill of Percocet but this needs to be used very sparingly and only for severe pain.   Karen Campos, KENISHA-C

## 2020-02-06 ENCOUNTER — ANCILLARY PROCEDURE (OUTPATIENT)
Dept: NUCLEAR MEDICINE | Facility: CLINIC | Age: 35
End: 2020-02-06
Attending: STUDENT IN AN ORGANIZED HEALTH CARE EDUCATION/TRAINING PROGRAM
Payer: COMMERCIAL

## 2020-02-06 DIAGNOSIS — R10.11 RUQ ABDOMINAL PAIN: ICD-10-CM

## 2020-02-06 PROCEDURE — 78227 HEPATOBIL SYST IMAGE W/DRUG: CPT

## 2020-02-06 PROCEDURE — A9537 TC99M MEBROFENIN: HCPCS | Performed by: STUDENT IN AN ORGANIZED HEALTH CARE EDUCATION/TRAINING PROGRAM

## 2020-02-06 RX ORDER — KIT FOR THE PREPARATION OF TECHNETIUM TC 99M MEBROFENIN 45 MG/10ML
4.8-7.2 INJECTION, POWDER, LYOPHILIZED, FOR SOLUTION INTRAVENOUS ONCE
Status: COMPLETED | OUTPATIENT
Start: 2020-02-06 | End: 2020-02-06

## 2020-02-06 RX ADMIN — KIT FOR THE PREPARATION OF TECHNETIUM TC 99M MEBROFENIN 4.9 MCI.: 45 INJECTION, POWDER, LYOPHILIZED, FOR SOLUTION INTRAVENOUS at 08:46

## 2020-02-07 ENCOUNTER — PATIENT OUTREACH (OUTPATIENT)
Dept: CARE COORDINATION | Facility: CLINIC | Age: 35
End: 2020-02-07

## 2020-02-07 DIAGNOSIS — K82.8 DYSFUNCTIONAL GALLBLADDER: Primary | ICD-10-CM

## 2020-02-07 NOTE — PROGRESS NOTES
Clinic Care Coordination Contact    Follow Up Progress Note      Assessment: Patient completed the HIDA scan, she was diagnosed with Dysfunctional gallbladder. PCP placed a general surgeon referral. Patient called and made an appointment for 2/10/20. Patients abdominal pain is intermittent and worse with after meals. She is following a bland diet. Patient is out of percocet (6tabs prescribed last week), she is waiting for her appointment Monday for follow up/advice. CC RN advised she may call the main scheduling line during non business hours if she needs triage advice over the wknd. Patients nausea is constant, but a varying degrees. Using Zofran as needed. Patient has patient education for gallbladder dos and donts. Declined further teaching.     Goals addressed this encounter:   Goals Addressed                 This Visit's Progress       General      COMPLETED: Medical (pt-stated)   On track     Goal Statement: I want to manage my symptoms while waiting for my HIDA scan. Prevent going back to the ER. There were many sick people there.   Date Goal set: 1/29/20  Date to Achieve By: 2 weeks  Patient expressed understanding of goal: yes  Action steps to achieve this goal:  1. I will follow my discharge summary with the education for abdominal pain  2. I will attend my HIDA scan next week  3. I will follow up/reach out as needed            Medical (pt-stated)        Goal Statement: I want to manage my symptoms while waiting for my general surgery consult appointment. I want to prevent going back to the ER. There were many sick people there.   Date Goal set: 2/7/20  Date to Achieve By: 1 week  Patient expressed understanding of goal: yes  Action steps to achieve this goal:  1. I will follow the education for gallbladder do's and don'ts   2.  I will follow up/reach out as needed                Intervention/Education provided during outreach: as above     Outreach Frequency: weekly    Plan:   Care Coordinator will follow  up in 1 week  NGUYEN LandinN RN Clinic Care Coordinator   [place], [place], [place]  Phone: [phone]

## 2020-02-08 ENCOUNTER — HOSPITAL ENCOUNTER (EMERGENCY)
Facility: CLINIC | Age: 35
Discharge: HOME | End: 2020-02-08
Attending: EMERGENCY MEDICINE | Admitting: EMERGENCY MEDICINE
Payer: COMMERCIAL

## 2020-02-08 VITALS
SYSTOLIC BLOOD PRESSURE: 118 MMHG | TEMPERATURE: 97.8 F | BODY MASS INDEX: 36.96 KG/M2 | HEIGHT: 66 IN | DIASTOLIC BLOOD PRESSURE: 75 MMHG | WEIGHT: 230 LBS | RESPIRATION RATE: 18 BRPM | HEART RATE: 73 BPM | OXYGEN SATURATION: 96 %

## 2020-02-08 DIAGNOSIS — T30.4 CHEMICAL BURN: ICD-10-CM

## 2020-02-08 PROCEDURE — 99282 EMERGENCY DEPT VISIT SF MDM: CPT | Mod: Z6 | Performed by: EMERGENCY MEDICINE

## 2020-02-08 PROCEDURE — 25000125 ZZHC RX 250: Performed by: EMERGENCY MEDICINE

## 2020-02-08 PROCEDURE — 99283 EMERGENCY DEPT VISIT LOW MDM: CPT | Performed by: EMERGENCY MEDICINE

## 2020-02-08 RX ORDER — GINSENG 100 MG
CAPSULE ORAL ONCE
Status: COMPLETED | OUTPATIENT
Start: 2020-02-08 | End: 2020-02-08

## 2020-02-08 RX ADMIN — BACITRACIN: 500 OINTMENT TOPICAL at 23:01

## 2020-02-08 ASSESSMENT — MIFFLIN-ST. JEOR: SCORE: 1760.02

## 2020-02-08 NOTE — ED AVS SNAPSHOT
[facility] Emergency Department  [address] [place] [address]  Phone:  [phone]  Fax:  [phone]                                    Breanna Vidal   MRN: [id number]    Department:  [facility] Emergency Department   Date of Visit:  2/8/2020           After Visit Summary Signature Page    I have received my discharge instructions, and my questions have been answered. I have discussed any challenges I see with this plan with the nurse or doctor.    ..........................................................................................................................................  Patient/Patient Representative Signature      ..........................................................................................................................................  Patient Representative Print Name and Relationship to Patient    ..................................................               ................................................  Date                                   Time    ..........................................................................................................................................  Reviewed by Signature/Title    ...................................................              ..............................................  Date                                               Time          22EPIC Rev 08/18

## 2020-02-09 NOTE — DISCHARGE INSTRUCTIONS
Apply bacitracin ointment several times a day, use ice packs, ibuprofen and follow up in the clinic for a recheck in a few days.
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
## 2020-02-10 ENCOUNTER — TELEPHONE (OUTPATIENT)
Dept: SURGERY | Facility: OTHER | Age: 35
End: 2020-02-10

## 2020-02-10 ENCOUNTER — OFFICE VISIT (OUTPATIENT)
Dept: SURGERY | Facility: CLINIC | Age: 35
End: 2020-02-10
Payer: COMMERCIAL

## 2020-02-10 VITALS
SYSTOLIC BLOOD PRESSURE: 122 MMHG | BODY MASS INDEX: 35.27 KG/M2 | OXYGEN SATURATION: 98 % | DIASTOLIC BLOOD PRESSURE: 70 MMHG | HEART RATE: 104 BPM | WEIGHT: 218.5 LBS

## 2020-02-10 DIAGNOSIS — K82.8 BILIARY DYSKINESIA: Primary | ICD-10-CM

## 2020-02-10 PROCEDURE — 99214 OFFICE O/P EST MOD 30 MIN: CPT | Performed by: SPECIALIST

## 2020-02-10 NOTE — PROGRESS NOTES
Consult requested by Karen Taveras    Reason for consultation - abnormal HIDA    HPI:  Patient is a 34-year-old white female who after eating Mexican food 2-1/2 weeks ago developed severe right upper quadrant pain radiating around to the right side.  The pain decreased slightly afterwards.  There was associated nausea but no vomiting fevers or chills.  She had a work-up by her PCP that eventually revealed an abnormal HIDA scan.  The fat intake during the HIDA scan did reproduce her symptoms and made the more severe.  She is now referred to me for an abnormal HIDA scan.      Past Medical History:   Diagnosis Date     Cellulitis 2012    MRSA septic olecranon bursitis and facial cellulitis     Drug abuse (H) 2011    methamphetamine and alcohol, sober since treatment 2011     MRSA cellulitis 2012    cleared with negative nasal swabs 8/16/17 and 8/28/17, reviewed     Rh negative, antepartum      Past Surgical History:   Procedure Laterality Date     EXCISE LESION FOOT Left 8/16/2019    Procedure: Excision of nevus second interdigital space left foot, Excision of nevus third interdigital space left foot, excision of plantar junctional nevus left foot;  Surgeon: Rinku Lopez DPM;  [facility]     INCISION AND DRAINAGE  04/18/2012    chin abscess I&D     LAPAROSCOPIC HERNIORRHAPHY INCISIONAL N/A 7/17/2019    Procedure: laparoscopic incisional hernia repair with mesh;  Surgeon: Jovi Odom MD;  Location[facility]     LAPAROSCOPIC SALPINGECTOMY Bilateral 8/27/2018    Procedure: LAPAROSCOPIC SALPINGECTOMY;  Laparoscopic Bilateral Salpingectomy;  Surgeon: Chai Cui MD;  Location: [facility]     MAMMOPLASTY REDUCTION Bilateral 08/04/2017     Current Outpatient Medications   Medication     acetaminophen (TYLENOL) 500 MG tablet     aspirin-acetaminophen-caffeine (EXCEDRIN MIGRAINE) 250-250-65 MG tablet     Botulinum Toxin Type A (BOTOX) 200 units injection     cyclobenzaprine (FLEXERIL) 10 MG tablet      EPINEPHrine (EPIPEN/ADRENACLICK/OR ANY BX GENERIC EQUIV) 0.3 MG/0.3ML injection 2-pack     fluticasone (FLONASE) 50 MCG/ACT nasal spray     gabapentin (NEURONTIN) 300 MG capsule     HYDROcodone-acetaminophen (NORCO) 5-325 MG tablet     ibuprofen (ADVIL/MOTRIN) 200 MG tablet     naproxen (NAPROSYN) 500 MG tablet     naratriptan (AMERGE) 2.5 MG tablet     ondansetron (ZOFRAN) 4 MG tablet     oxyCODONE-acetaminophen (PERCOCET) 5-325 MG tablet     No current facility-administered medications for this visit.         Allergies   Allergen Reactions     Bees Anaphylaxis     Has Epi pen      Codeine Nausea     Other reaction(s): Nausea     Imitrex [Sumatriptan]      Worsening of migraine, paresthesias     Ranitidine Rash and Itching     recvd IV zantac 75 and reaction was immediate,  was given Benadryl to counter act reaction in 2000     Sulfa Drugs Unknown and Rash     Social History     Socioeconomic History     Marital status: Single     Spouse name: Not on file     Number of children: 4     Years of education: Not on file     Highest education level: Not on file   Occupational History     Not on file   Social Needs     Financial resource strain: Not on file     Food insecurity:     Worry: Not on file     Inability: Not on file     Transportation needs:     Medical: Not on file     Non-medical: Not on file   Tobacco Use     Smoking status: Former Smoker     Smokeless tobacco: Former User     Quit date: 3/10/2016   Substance and Sexual Activity     Alcohol use: No     Comment: history alcohol abuse 2012, s/p treatment     Drug use: No     Comment: former drug user (EtOH, cocaine, marijuana, meth), last use 2012     Sexual activity: Yes     Partners: Male   Lifestyle     Physical activity:     Days per week: Not on file     Minutes per session: Not on file     Stress: Not on file   Relationships     Social connections:     Talks on phone: Not on file     Gets together: Not on file     Attends Zoroastrian service: Not on file      Active member of club or organization: Not on file     Attends meetings of clubs or organizations: Not on file     Relationship status: Not on file     Intimate partner violence:     Fear of current or ex partner: Not on file     Emotionally abused: Not on file     Physically abused: Not on file     Forced sexual activity: Not on file   Other Topics Concern     Not on file   Social History Narrative    Currently lives in [place] with fiance and 18 month old son.  Has a 13 year old dtr that lives with dtrs dad in [place], [place], and a 6  Year old that was adopted out in an open adoption, lives in [place]     Family History   Problem Relation Age of Onset     Cancer Father      Pancreatic Cancer Father      Lung Cancer Father      Lupus Mother       ROS: 10 point ROS neg other than the symptoms noted above in the HPI.    PE:  B/P: 122/70, T: Data Unavailable, P: 104, R: Data Unavailable  General: well developed, well nourished WF who appears her stated age  HEENT: NC/AT, EOMI, (-)icterus, (-)injection  Neck: Supple, No JVD  Chest: CTA  Heart: S1, S2, (-)m/r/g  Abd: Soft,  non distended, RUQ tenderness to deep palpation, no masses  Ext; Warm, no edema  Psych: AAOx3  Neuro: No focal deficits    Lab Results   Component Value Date    WBC 8.0 01/28/2020     Lab Results   Component Value Date    RBC 4.60 01/28/2020     Lab Results   Component Value Date    HGB 13.7 01/28/2020     Lab Results   Component Value Date    HCT 40.8 01/28/2020     No components found for: MCT  Lab Results   Component Value Date    MCV 89 01/28/2020     Lab Results   Component Value Date    MCH 29.8 01/28/2020     Lab Results   Component Value Date    MCHC 33.6 01/28/2020     Lab Results   Component Value Date    RDW 13.5 01/28/2020     Lab Results   Component Value Date     01/28/2020     Liver Function Studies -   Recent Labs   Lab Test 01/28/20  0857   PROTTOTAL 7.6   ALBUMIN 4.2   BILITOTAL 0.5   ALKPHOS 50   AST 13   ALT 33     Us -    LIMITED ABDOMINAL ULTRASOUND 1/28/2020 11:21 AM      COMPARISON: None.     HISTORY: Right upper quadrant pain.     TECHNIQUE: Routine right upper quadrant abdominal ultrasound.     FINDINGS:   Fluid: No evidence of ascites or pleural effusions.     Liver: The liver demonstrates normal echogenicity and echotexture. No  focal mass.     Gallbladder: No shadowing gallstones. No gallbladder wall thickening  or pericholecystic fluid. Right upper quadrant tenderness is noted by  the sonographer during the exam.     Bile Ducts: Both the intra- and extrahepatic biliary system are of  normal caliber. The common bile duct measures 3 mm in diameter in the  abril hepatis.     Pancreas: Visualized portions of the head and body of the pancreas are  unremarkable.      Kidney: The right kidney measures 11.1 cm long. Normal renal  parenchymal echogenicity and cortical thickness without evidence of  mass. No hydronephrosis.                                                                      IMPRESSION:   1.  Right upper quadrant tenderness is noted by the sonographer during  the examination. However, normal sonographic appearance of the  gallbladder.  2. Otherwise normal limited abdominal ultrasound.     SHRUTHI KIRAN MD      HIDA -   Examination:  NM HEPATOBILIARY SCAN WITH GB EF       Date: 2/6/2020 10:50 AM.     Indication:   RUQ pain, US shows no gallstones, biliary colic  suspected; RUQ abdominal pain      Additional Information: none     Technique:     The patient received 4.9 mCi of Tc-99m Choletec intravenously. Images  were obtained out through 60 minutes. At 60 minutes the patient  received fatty meal boost. An additional 45 minutes of images were  obtained after the gall bladder contraction intervention.     Findings:     There is prompt clearance of the radionuclide from the blood pool into  the liver. By 10 minutes there is clear visualization of the  intrahepatic ducts as well as the upper common bile duct. By  15  minutes there is visualization of the gallbladder. By 60 minutes there  is emptying from the common bile duct into the small bowel.     After  the fatty meal boost administration the Gallbladder ejection  fraction was measured at 17%. The normal gallbladder EF based on a 45  minute infusion is >40%.     Enterogastric reflux was not present.                                                                      Impression:  Decreased ejection fraction after fatty meal boost consistent with  functional gallbladder disorder.     =======================     The normal Gall Bladder ejection fraction for a 45 minute infusion is  >40%     I have personally reviewed the examination and initial interpretation  and I agree with the findings.     CAMRON VICTOR MD      Impression/Plan:  This is a 34-year-old lady with biliary dyskinesia.  After discussion with the patient the plan at this time is for laparoscopic cholecystectomy.  The procedure, risks, benefits alternatives were discussed and she agrees to proceed.  She also understands she is at high risk for complications due to previously paced mesh which will need to be worked around.  She will be scheduled near future and knows to remain on a low-fat diet in the meantime.    Renard Gerber MD, FACS

## 2020-02-10 NOTE — NURSING NOTE
[facility]    Breanna Vidal has been given the following teaching information:  Before Your Surgery booklet  Avinash: Understanding Laproscopic Gallbladder Surgery  Instructions for Showering or Bathing before Surgery

## 2020-02-10 NOTE — PROGRESS NOTES
[facility]  [address], [place] [address]  [phone]  Dept: [phone]    PRE-OP EVALUATION:  Today's date: 2020    Breanna Vidal (: 1985) presents for pre-operative evaluation assessment as requested by Dr. Gerber.  She requires evaluation and anesthesia risk assessment prior to undergoing surgery/procedure for treatment of Laparoscopic Cholescystectomy .    Fax number for surgical facility: na  Primary Physician: Karen Campos  Type of Anesthesia Anticipated: unkown    Patient has a Health Care Directive or Living Will:  NO    Preop Questions 2020   Who is doing your surgery? Renard Gerber MD   What are you having done? [address] [place]   Date of Surgery/Procedure: 2020   Facility or Hospital where procedure/surgery will be performed: [facility]   1.  Do you have a history of Heart attack, stroke, stent, coronary bypass surgery, or other heart surgery? No   2.  Do you ever have any pain or discomfort in your chest? No   3.  Do you have a history of  Heart Failure? No   4.   Are you troubled by shortness of breath when:  walking on a level surface, or up a slight hill, or at night? No   5.  Do you currently have a cold, bronchitis or other respiratory infection? No   6.  Do you have a cough, shortness of breath, or wheezing? No   7.  Do you sometimes get pains in the calves of your legs when you walk? No   8. Do you or anyone in your family have previous history of blood clots? Not known   9.  Do you or does anyone in your family have a serious bleeding problem such as prolonged bleeding following surgeries or cuts? No   10. Have you ever had problems with anemia or been told to take iron pills? No   11. Have you had any abnormal blood loss such as black, tarry or bloody stools, or abnormal vaginal bleeding? No   12. Have you ever had a blood transfusion? No   13. Have you or any of your relatives ever had  problems with anesthesia? No   14. Do you have sleep apnea, excessive snoring or daytime drowsiness? No   15. Do you have any prosthetic heart valves? No   16. Do you have prosthetic joints? No   17. Is there any chance that you may be pregnant? No         HPI:     HPI related to upcoming procedure: ongoing RUQ pain, HIDA scan shows biliary dyskinesia      See problem list for active medical problems.  Problems all longstanding and stable, except as noted/documented.  See ROS for pertinent symptoms related to these conditions.      MEDICAL HISTORY:     Patient Active Problem List    Diagnosis Date Noted     Biliary dyskinesia 02/10/2020     Priority: Medium     Added automatically from request for surgery [id number]       Intractable migraine without aura and without status migrainosus 07/17/2019     Priority: Medium     Alcohol abuse, in remission 08/20/2018     Priority: Medium     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) 08/20/2018     Priority: Medium     Mixed incontinence 03/03/2017     Priority: Medium     Vaginal delivery 10/31/2011     Priority: Medium     Rh negative status during pregnancy 09/23/2011     Priority: Medium      Past Medical History:   Diagnosis Date     Cellulitis 2012    MRSA septic olecranon bursitis and facial cellulitis     Drug abuse (H) 2011    methamphetamine and alcohol, sober since treatment 2011     MRSA cellulitis 2012    cleared with negative nasal swabs 8/16/17 and 8/28/17, reviewed     Rh negative, antepartum      Past Surgical History:   Procedure Laterality Date     EXCISE LESION FOOT Left 8/16/2019    Procedure: Excision of nevus second interdigital space left foot, Excision of nevus third interdigital space left foot, excision of plantar junctional nevus left foot;  Surgeon: Rinku Lopez DPM;  [facility]     INCISION AND DRAINAGE  04/18/2012    chin abscess I&D     LAPAROSCOPIC HERNIORRHAPHY INCISIONAL N/A 7/17/2019    Procedure: laparoscopic incisional hernia  repair with mesh;  Surgeon: Jovi Odom MD;  [facility]     LAPAROSCOPIC SALPINGECTOMY Bilateral 8/27/2018    Procedure: LAPAROSCOPIC SALPINGECTOMY;  Laparoscopic Bilateral Salpingectomy;  Surgeon: Chai Cui MD;  [facility]     MAMMOPLASTY REDUCTION Bilateral 08/04/2017     Current Outpatient Medications   Medication Sig Dispense Refill     acetaminophen (TYLENOL) 500 MG tablet Take 1,000 mg by mouth every 6 hours as needed for mild pain       EPINEPHrine (EPIPEN/ADRENACLICK/OR ANY BX GENERIC EQUIV) 0.3 MG/0.3ML injection 2-pack INJECT  0.3 ML IM 1 TIME PRF ANAPHYLAXIS  2     ondansetron (ZOFRAN ODT) 4 MG ODT tab Take 1 tablet (4 mg) by mouth every 6 hours as needed for nausea 20 tablet 0     aspirin-acetaminophen-caffeine (EXCEDRIN MIGRAINE) 250-250-65 MG tablet Take 1 tablet by mouth every 6 hours as needed for headaches       Botulinum Toxin Type A (BOTOX) 200 units injection Inject 155 Units into the muscle every 3 months (Patient not taking: Reported on 1/28/2020) 155 Units 4     ibuprofen (ADVIL/MOTRIN) 200 MG tablet Take 600 mg by mouth every 4 hours as needed for mild pain       naproxen (NAPROSYN) 500 MG tablet Take 1 tablet (500 mg) by mouth every 12 hours as needed for headaches (Patient not taking: Reported on 2/12/2020) 30 tablet 3     OTC products: Tylenol, stopped NSAIDs    Allergies   Allergen Reactions     Bees Anaphylaxis     Has Epi pen      Codeine Nausea     Other reaction(s): Nausea     Imitrex [Sumatriptan]      Worsening of migraine, paresthesias     Ranitidine Rash and Itching     recvd IV zantac 75 and reaction was immediate,  was given Benadryl to counter act reaction in 2000     Sulfa Drugs Unknown and Rash      Latex Allergy: NO    Social History     Tobacco Use     Smoking status: Former Smoker     Smokeless tobacco: Former User     Quit date: 3/10/2016   Substance Use Topics     Alcohol use: No     Comment: history alcohol abuse 2012, s/p treatment     History  "  Drug Use No     Comment: former drug user (EtOH, cocaine, marijuana, meth), last use 2012       REVIEW OF SYSTEMS:   CONSTITUTIONAL: NEGATIVE for fever, chills, change in weight  INTEGUMENTARY/SKIN: NEGATIVE for worrisome rashes, moles or lesions  EYES: NEGATIVE for vision changes or irritation  ENT/MOUTH: NEGATIVE for ear, mouth and throat problems  RESP: NEGATIVE for significant cough or SOB  BREAST: NEGATIVE for masses, tenderness or discharge  CV: NEGATIVE for chest pain, palpitations or peripheral edema  GI: POSITIVE for abdominal pain RUQ  : NEGATIVE for frequency, dysuria, or hematuria  MUSCULOSKELETAL: NEGATIVE for significant arthralgias or myalgia  NEURO: NEGATIVE for weakness, dizziness or paresthesias  ENDOCRINE: NEGATIVE for temperature intolerance, skin/hair changes  HEME: NEGATIVE for bleeding problems  PSYCHIATRIC: NEGATIVE for changes in mood or affect    EXAM:   /72   Pulse 80   Temp 97.5  F (36.4  C) (Temporal)   Resp 18   Ht 1.664 m (5' 5.5\")   Wt 98 kg (216 lb 1.6 oz)   LMP 02/02/2020   SpO2 97%   BMI 35.41 kg/m      GENERAL APPEARANCE: healthy, alert and no distress     EYES: EOMI, PERRL     HENT: ear canals and TM's normal and nose and mouth without ulcers or lesions     NECK: no adenopathy, no asymmetry, masses, or scars and thyroid normal to palpation     RESP: lungs clear to auscultation - no rales, rhonchi or wheezes     CV: regular rates and rhythm, normal S1 S2, no S3 or S4 and no murmur, click or rub     ABDOMEN:  soft, nontender, no HSM or masses and bowel sounds normal     MS: extremities normal- no gross deformities noted, no evidence of inflammation in joints, FROM in all extremities.     SKIN: no suspicious lesions or rashes     NEURO: Normal strength and tone, sensory exam grossly normal, mentation intact and speech normal     PSYCH: mentation appears normal. and affect normal/bright     LYMPHATICS: No cervical adenopathy    DIAGNOSTICS:   EKG: appears normal, " NSR, normal axis, normal intervals, no acute ST/T changes c/w ischemia, no LVH by voltage criteria, unchanged from previous tracings    Recent Labs   Lab Test 01/28/20  0857 11/25/19  1729   HGB 13.7 14.4    239    139   POTASSIUM 3.9 3.8   CR 0.61 0.64        IMPRESSION:   Reason for surgery/procedure: cholecystectomy  Diagnosis/reason for consult: preop    The proposed surgical procedure is considered INTERMEDIATE risk.    REVISED CARDIAC RISK INDEX  The patient has the following serious cardiovascular risks for perioperative complications such as (MI, PE, VFib and 3  AV Block):  No serious cardiac risks  INTERPRETATION: 0 risks: Class I (very low risk - 0.4% complication rate)    The patient has the following additional risks for perioperative complications:  No identified additional risks      ICD-10-CM    1. Preop general physical exam Z01.818 HCG Quantitative Pregnancy, Blood (SMK332)   2. Biliary dyskinesia K82.8 ondansetron (ZOFRAN ODT) 4 MG ODT tab   3. Nausea R11.0 ondansetron (ZOFRAN ODT) 4 MG ODT tab       RECOMMENDATIONS:     --Patient is on no chronic medications    APPROVAL GIVEN to proceed with proposed procedure, without further diagnostic evaluation       Signed Electronically by: MADELINE Hernandez CNP    Copy of this evaluation report is provided to requesting physician.    [facility] Preop Guidelines    Revised Cardiac Risk Index

## 2020-02-10 NOTE — LETTER
2/10/2020         RE: Breanna Vidal  [id number] [address] [place] [id number]        Dear Colleague,    Thank you for referring your patient, Breanna Vidal, to the [facility]. Please see a copy of my visit note below.    Consult requested by Karen Taveras    Reason for consultation - abnormal HIDA    HPI:  Patient is a 34-year-old white female who after eating Mexican food 2-1/2 weeks ago developed severe right upper quadrant pain radiating around to the right side.  The pain decreased slightly afterwards.  There was associated nausea but no vomiting fevers or chills.  She had a work-up by her PCP that eventually revealed an abnormal HIDA scan.  The fat intake during the HIDA scan did reproduce her symptoms and made the more severe.  She is now referred to me for an abnormal HIDA scan.      Past Medical History:   Diagnosis Date     Cellulitis 2012    MRSA septic olecranon bursitis and facial cellulitis     Drug abuse (H) 2011    methamphetamine and alcohol, sober since treatment 2011     MRSA cellulitis 2012    cleared with negative nasal swabs 8/16/17 and 8/28/17, reviewed     Rh negative, antepartum      Past Surgical History:   Procedure Laterality Date     EXCISE LESION FOOT Left 8/16/2019    Procedure: Excision of nevus second interdigital space left foot, Excision of nevus third interdigital space left foot, excision of plantar junctional nevus left foot;  Surgeon: Rinku Lopez DPM;  [facility]     INCISION AND DRAINAGE  04/18/2012    chin abscess I&D     LAPAROSCOPIC HERNIORRHAPHY INCISIONAL N/A 7/17/2019    Procedure: laparoscopic incisional hernia repair with mesh;  Surgeon: Jovi Odom MD;  [facility]     LAPAROSCOPIC SALPINGECTOMY Bilateral 8/27/2018    Procedure: LAPAROSCOPIC SALPINGECTOMY;  Laparoscopic Bilateral Salpingectomy;  Surgeon: Chai Cui MD;  [facility]     MAMMOPLASTY REDUCTION Bilateral 08/04/2017     Current Outpatient Medications    Medication     acetaminophen (TYLENOL) 500 MG tablet     aspirin-acetaminophen-caffeine (EXCEDRIN MIGRAINE) 250-250-65 MG tablet     Botulinum Toxin Type A (BOTOX) 200 units injection     cyclobenzaprine (FLEXERIL) 10 MG tablet     EPINEPHrine (EPIPEN/ADRENACLICK/OR ANY BX GENERIC EQUIV) 0.3 MG/0.3ML injection 2-pack     fluticasone (FLONASE) 50 MCG/ACT nasal spray     gabapentin (NEURONTIN) 300 MG capsule     HYDROcodone-acetaminophen (NORCO) 5-325 MG tablet     ibuprofen (ADVIL/MOTRIN) 200 MG tablet     naproxen (NAPROSYN) 500 MG tablet     naratriptan (AMERGE) 2.5 MG tablet     ondansetron (ZOFRAN) 4 MG tablet     oxyCODONE-acetaminophen (PERCOCET) 5-325 MG tablet     No current facility-administered medications for this visit.         Allergies   Allergen Reactions     Bees Anaphylaxis     Has Epi pen      Codeine Nausea     Other reaction(s): Nausea     Imitrex [Sumatriptan]      Worsening of migraine, paresthesias     Ranitidine Rash and Itching     recvd IV zantac 75 and reaction was immediate,  was given Benadryl to counter act reaction in 2000     Sulfa Drugs Unknown and Rash     Social History     Socioeconomic History     Marital status: Single     Spouse name: Not on file     Number of children: 4     Years of education: Not on file     Highest education level: Not on file   Occupational History     Not on file   Social Needs     Financial resource strain: Not on file     Food insecurity:     Worry: Not on file     Inability: Not on file     Transportation needs:     Medical: Not on file     Non-medical: Not on file   Tobacco Use     Smoking status: Former Smoker     Smokeless tobacco: Former User     Quit date: 3/10/2016   Substance and Sexual Activity     Alcohol use: No     Comment: history alcohol abuse 2012, s/p treatment     Drug use: No     Comment: former drug user (EtOH, cocaine, marijuana, meth), last use 2012     Sexual activity: Yes     Partners: Male   Lifestyle     Physical activity:      Days per week: Not on file     Minutes per session: Not on file     Stress: Not on file   Relationships     Social connections:     Talks on phone: Not on file     Gets together: Not on file     Attends Latter day service: Not on file     Active member of club or organization: Not on file     Attends meetings of clubs or organizations: Not on file     Relationship status: Not on file     Intimate partner violence:     Fear of current or ex partner: Not on file     Emotionally abused: Not on file     Physically abused: Not on file     Forced sexual activity: Not on file   Other Topics Concern     Not on file   Social History Narrative    Currently lives in [place] with fiance and 18 month old son.  Has a 13 year old dtr that lives with dtrs dad in [place], [place], and a 6  Year old that was adopted out in an open adoption, lives in [place]     Family History   Problem Relation Age of Onset     Cancer Father      Pancreatic Cancer Father      Lung Cancer Father      Lupus Mother       ROS: 10 point ROS neg other than the symptoms noted above in the HPI.    PE:  B/P: 122/70, T: Data Unavailable, P: 104, R: Data Unavailable  General: well developed, well nourished WF who appears her stated age  HEENT: NC/AT, EOMI, (-)icterus, (-)injection  Neck: Supple, No JVD  Chest: CTA  Heart: S1, S2, (-)m/r/g  Abd: Soft,  non distended, RUQ tenderness to deep palpation, no masses  Ext; Warm, no edema  Psych: AAOx3  Neuro: No focal deficits    Lab Results   Component Value Date    WBC 8.0 01/28/2020     Lab Results   Component Value Date    RBC 4.60 01/28/2020     Lab Results   Component Value Date    HGB 13.7 01/28/2020     Lab Results   Component Value Date    HCT 40.8 01/28/2020     No components found for: MCT  Lab Results   Component Value Date    MCV 89 01/28/2020     Lab Results   Component Value Date    MCH 29.8 01/28/2020     Lab Results   Component Value Date    MCHC 33.6 01/28/2020     Lab Results   Component Value Date     RDW 13.5 01/28/2020     Lab Results   Component Value Date     01/28/2020     Liver Function Studies -   Recent Labs   Lab Test 01/28/20  0857   PROTTOTAL 7.6   ALBUMIN 4.2   BILITOTAL 0.5   ALKPHOS 50   AST 13   ALT 33     Us -   LIMITED ABDOMINAL ULTRASOUND 1/28/2020 11:21 AM      COMPARISON: None.     HISTORY: Right upper quadrant pain.     TECHNIQUE: Routine right upper quadrant abdominal ultrasound.     FINDINGS:   Fluid: No evidence of ascites or pleural effusions.     Liver: The liver demonstrates normal echogenicity and echotexture. No  focal mass.     Gallbladder: No shadowing gallstones. No gallbladder wall thickening  or pericholecystic fluid. Right upper quadrant tenderness is noted by  the sonographer during the exam.     Bile Ducts: Both the intra- and extrahepatic biliary system are of  normal caliber. The common bile duct measures 3 mm in diameter in the  abril hepatis.     Pancreas: Visualized portions of the head and body of the pancreas are  unremarkable.      Kidney: The right kidney measures 11.1 cm long. Normal renal  parenchymal echogenicity and cortical thickness without evidence of  mass. No hydronephrosis.                                                                      IMPRESSION:   1.  Right upper quadrant tenderness is noted by the sonographer during  the examination. However, normal sonographic appearance of the  gallbladder.  2. Otherwise normal limited abdominal ultrasound.     SHRUTHI KIRAN MD      HIDA -   Examination:  NM HEPATOBILIARY SCAN WITH GB EF       Date: 2/6/2020 10:50 AM.     Indication:   RUQ pain, US shows no gallstones, biliary colic  suspected; RUQ abdominal pain      Additional Information: none     Technique:     The patient received 4.9 mCi of Tc-99m Choletec intravenously. Images  were obtained out through 60 minutes. At 60 minutes the patient  received fatty meal boost. An additional 45 minutes of images were  obtained after the gall bladder  contraction intervention.     Findings:     There is prompt clearance of the radionuclide from the blood pool into  the liver. By 10 minutes there is clear visualization of the  intrahepatic ducts as well as the upper common bile duct. By 15  minutes there is visualization of the gallbladder. By 60 minutes there  is emptying from the common bile duct into the small bowel.     After  the fatty meal boost administration the Gallbladder ejection  fraction was measured at 17%. The normal gallbladder EF based on a 45  minute infusion is >40%.     Enterogastric reflux was not present.                                                                      Impression:  Decreased ejection fraction after fatty meal boost consistent with  functional gallbladder disorder.     =======================     The normal Gall Bladder ejection fraction for a 45 minute infusion is  >40%     I have personally reviewed the examination and initial interpretation  and I agree with the findings.     CAMRON VICTOR MD      Impression/Plan:  This is a 34-year-old lady with biliary dyskinesia.  After discussion with the patient the plan at this time is for laparoscopic cholecystectomy.  The procedure, risks, benefits alternatives were discussed and she agrees to proceed.  She also understands she is at high risk for complications due to previously paced mesh which will need to be worked around.  She will be scheduled near future and knows to remain on a low-fat diet in the meantime.    Renard Gerber MD, FACS    Again, thank you for allowing me to participate in the care of your patient.        Sincerely,        Renard Gerber MD

## 2020-02-10 NOTE — TELEPHONE ENCOUNTER
Type of surgery: Laparoscopic Cholecystectomy (N/A)   Location of surgery: [facility]  Date and time of surgery: 2/14  Surgeon: Zabrina  Pre-Op Appt Date: 2/12  Post-Op Appt Date: 2/24   Packet sent out: Yes  Pre-cert/Authorization completed:  Not Applicable  Date: na

## 2020-02-12 ENCOUNTER — PATIENT OUTREACH (OUTPATIENT)
Dept: CARE COORDINATION | Facility: CLINIC | Age: 35
End: 2020-02-12

## 2020-02-12 ENCOUNTER — OFFICE VISIT (OUTPATIENT)
Dept: FAMILY MEDICINE | Facility: OTHER | Age: 35
End: 2020-02-12
Payer: COMMERCIAL

## 2020-02-12 VITALS
RESPIRATION RATE: 18 BRPM | HEART RATE: 80 BPM | TEMPERATURE: 97.5 F | SYSTOLIC BLOOD PRESSURE: 120 MMHG | DIASTOLIC BLOOD PRESSURE: 72 MMHG | BODY MASS INDEX: 34.73 KG/M2 | OXYGEN SATURATION: 97 % | WEIGHT: 216.1 LBS | HEIGHT: 66 IN

## 2020-02-12 DIAGNOSIS — K82.8 BILIARY DYSKINESIA: ICD-10-CM

## 2020-02-12 DIAGNOSIS — Z01.818 PREOP GENERAL PHYSICAL EXAM: Primary | ICD-10-CM

## 2020-02-12 DIAGNOSIS — R11.0 NAUSEA: ICD-10-CM

## 2020-02-12 LAB — B-HCG SERPL-ACNC: <1 IU/L (ref 0–5)

## 2020-02-12 PROCEDURE — 84702 CHORIONIC GONADOTROPIN TEST: CPT | Performed by: STUDENT IN AN ORGANIZED HEALTH CARE EDUCATION/TRAINING PROGRAM

## 2020-02-12 PROCEDURE — 99214 OFFICE O/P EST MOD 30 MIN: CPT | Performed by: STUDENT IN AN ORGANIZED HEALTH CARE EDUCATION/TRAINING PROGRAM

## 2020-02-12 PROCEDURE — 36415 COLL VENOUS BLD VENIPUNCTURE: CPT | Performed by: STUDENT IN AN ORGANIZED HEALTH CARE EDUCATION/TRAINING PROGRAM

## 2020-02-12 RX ORDER — ONDANSETRON 4 MG/1
4 TABLET, ORALLY DISINTEGRATING ORAL EVERY 6 HOURS PRN
Qty: 20 TABLET | Refills: 0 | Status: SHIPPED | OUTPATIENT
Start: 2020-02-12 | End: 2020-03-31

## 2020-02-12 ASSESSMENT — PAIN SCALES - GENERAL: PAINLEVEL: SEVERE PAIN (6)

## 2020-02-12 ASSESSMENT — MIFFLIN-ST. JEOR: SCORE: 1689.03

## 2020-02-12 NOTE — PROGRESS NOTES
Clinic Care Coordination Contact    Follow Up Progress Note      Assessment: patient saw surgeon 2/10, lap cholecystectomy was advised. Saw PCP for pre op 2/12/20, and surgery is scheduled for 2/14/20. Patient had all of her questions answered at her appointments.     Goals addressed this encounter:   Goals Addressed                 This Visit's Progress       General      COMPLETED: Medical (pt-stated)        Goal Statement: I want to manage my symptoms while waiting for my general surgery consult appointment. I want to prevent going back to the ER. There were many sick people there.   Date Goal set: 2/7/20  Date to Achieve By: 1 week  Patient expressed understanding of goal: yes  Action steps to achieve this goal:  1. I will follow the education for gallbladder do's and don'ts   2.  I will follow up/reach out as needed                Medical (pt-stated)        Goal Statement: I want to manage my symptoms while waiting for my surgery appointment. I want to prevent going back to the ER. There were many sick people there.   Date Goal set: 2/12/20  Date to Achieve By: 1 week  Patient expressed understanding of goal: yes  Action steps to achieve this goal:  1. I will follow the education for gallbladder do's and don'ts   2.  I will follow up/reach out as needed                       Intervention/Education provided during outreach: will monitor her symptoms and follow advice for preventing another gallbladder flare up. She will reach out to triage as needed before surgery     Outreach Frequency: 2 weeks    Plan:   Care Coordinator will follow up in 2-3 weeks.  NGUYEN LandinN RN Clinic Care Coordinator   [place], [place], [place]  Phone: [phone]

## 2020-02-14 ENCOUNTER — ANESTHESIA (OUTPATIENT)
Dept: SURGERY | Facility: CLINIC | Age: 35
End: 2020-02-14
Payer: COMMERCIAL

## 2020-02-14 ENCOUNTER — HOSPITAL ENCOUNTER (OUTPATIENT)
Facility: CLINIC | Age: 35
Discharge: HOME | End: 2020-02-14
Attending: SPECIALIST | Admitting: SPECIALIST
Payer: COMMERCIAL

## 2020-02-14 ENCOUNTER — ANESTHESIA EVENT (OUTPATIENT)
Dept: SURGERY | Facility: CLINIC | Age: 35
End: 2020-02-14
Payer: COMMERCIAL

## 2020-02-14 VITALS
DIASTOLIC BLOOD PRESSURE: 68 MMHG | SYSTOLIC BLOOD PRESSURE: 104 MMHG | HEART RATE: 79 BPM | RESPIRATION RATE: 16 BRPM | OXYGEN SATURATION: 90 % | TEMPERATURE: 98.6 F

## 2020-02-14 DIAGNOSIS — G89.18 POST-OP PAIN: Primary | ICD-10-CM

## 2020-02-14 DIAGNOSIS — Z98.890 POSTOPERATIVE NAUSEA: ICD-10-CM

## 2020-02-14 DIAGNOSIS — R11.0 POSTOPERATIVE NAUSEA: ICD-10-CM

## 2020-02-14 DIAGNOSIS — K82.8 BILIARY DYSKINESIA: ICD-10-CM

## 2020-02-14 PROCEDURE — 25000128 H RX IP 250 OP 636: Performed by: SPECIALIST

## 2020-02-14 PROCEDURE — 25000125 ZZHC RX 250: Performed by: NURSE ANESTHETIST, CERTIFIED REGISTERED

## 2020-02-14 PROCEDURE — 25000125 ZZHC RX 250: Performed by: SPECIALIST

## 2020-02-14 PROCEDURE — 37000009 ZZH ANESTHESIA TECHNICAL FEE, EACH ADDTL 15 MIN: Performed by: SPECIALIST

## 2020-02-14 PROCEDURE — 88304 TISSUE EXAM BY PATHOLOGIST: CPT | Mod: 26 | Performed by: SPECIALIST

## 2020-02-14 PROCEDURE — 25800030 ZZH RX IP 258 OP 636: Performed by: NURSE ANESTHETIST, CERTIFIED REGISTERED

## 2020-02-14 PROCEDURE — 25000128 H RX IP 250 OP 636: Performed by: NURSE ANESTHETIST, CERTIFIED REGISTERED

## 2020-02-14 PROCEDURE — 36000058 ZZH SURGERY LEVEL 3 EA 15 ADDTL MIN: Performed by: SPECIALIST

## 2020-02-14 PROCEDURE — 36000056 ZZH SURGERY LEVEL 3 1ST 30 MIN: Performed by: SPECIALIST

## 2020-02-14 PROCEDURE — 40000306 ZZH STATISTIC PRE PROC ASSESS II: Performed by: SPECIALIST

## 2020-02-14 PROCEDURE — 27210794 ZZH OR GENERAL SUPPLY STERILE: Performed by: SPECIALIST

## 2020-02-14 PROCEDURE — 37000008 ZZH ANESTHESIA TECHNICAL FEE, 1ST 30 MIN: Performed by: SPECIALIST

## 2020-02-14 PROCEDURE — 71000014 ZZH RECOVERY PHASE 1 LEVEL 2 FIRST HR: Performed by: SPECIALIST

## 2020-02-14 PROCEDURE — 71000027 ZZH RECOVERY PHASE 2 EACH 15 MINS: Performed by: SPECIALIST

## 2020-02-14 PROCEDURE — 25000132 ZZH RX MED GY IP 250 OP 250 PS 637: Performed by: NURSE ANESTHETIST, CERTIFIED REGISTERED

## 2020-02-14 PROCEDURE — 88304 TISSUE EXAM BY PATHOLOGIST: CPT | Performed by: SPECIALIST

## 2020-02-14 PROCEDURE — 25000566 ZZH SEVOFLURANE, EA 15 MIN: Performed by: SPECIALIST

## 2020-02-14 PROCEDURE — 25000132 ZZH RX MED GY IP 250 OP 250 PS 637: Performed by: SPECIALIST

## 2020-02-14 PROCEDURE — 47562 LAPAROSCOPIC CHOLECYSTECTOMY: CPT | Performed by: SPECIALIST

## 2020-02-14 RX ORDER — SODIUM CHLORIDE, SODIUM LACTATE, POTASSIUM CHLORIDE, CALCIUM CHLORIDE 600; 310; 30; 20 MG/100ML; MG/100ML; MG/100ML; MG/100ML
INJECTION, SOLUTION INTRAVENOUS CONTINUOUS
Status: DISCONTINUED | OUTPATIENT
Start: 2020-02-14 | End: 2020-02-14 | Stop reason: HOSPADM

## 2020-02-14 RX ORDER — FENTANYL CITRATE 50 UG/ML
INJECTION, SOLUTION INTRAMUSCULAR; INTRAVENOUS PRN
Status: DISCONTINUED | OUTPATIENT
Start: 2020-02-14 | End: 2020-02-14

## 2020-02-14 RX ORDER — ONDANSETRON 2 MG/ML
4 INJECTION INTRAMUSCULAR; INTRAVENOUS EVERY 30 MIN PRN
Status: DISCONTINUED | OUTPATIENT
Start: 2020-02-14 | End: 2020-02-14 | Stop reason: HOSPADM

## 2020-02-14 RX ORDER — NALOXONE HYDROCHLORIDE 0.4 MG/ML
.1-.4 INJECTION, SOLUTION INTRAMUSCULAR; INTRAVENOUS; SUBCUTANEOUS
Status: DISCONTINUED | OUTPATIENT
Start: 2020-02-14 | End: 2020-02-14 | Stop reason: HOSPADM

## 2020-02-14 RX ORDER — HYDROXYZINE HYDROCHLORIDE 25 MG/1
50 TABLET, FILM COATED ORAL ONCE
Status: COMPLETED | OUTPATIENT
Start: 2020-02-14 | End: 2020-02-14

## 2020-02-14 RX ORDER — FENTANYL CITRATE 50 UG/ML
25-50 INJECTION, SOLUTION INTRAMUSCULAR; INTRAVENOUS
Status: DISCONTINUED | OUTPATIENT
Start: 2020-02-14 | End: 2020-02-14 | Stop reason: HOSPADM

## 2020-02-14 RX ORDER — ONDANSETRON 2 MG/ML
INJECTION INTRAMUSCULAR; INTRAVENOUS PRN
Status: DISCONTINUED | OUTPATIENT
Start: 2020-02-14 | End: 2020-02-14

## 2020-02-14 RX ORDER — OXYCODONE AND ACETAMINOPHEN 5; 325 MG/1; MG/1
1-2 TABLET ORAL EVERY 6 HOURS PRN
Qty: 10 TABLET | Refills: 0 | Status: SHIPPED | OUTPATIENT
Start: 2020-02-14 | End: 2020-03-31

## 2020-02-14 RX ORDER — CEFAZOLIN SODIUM 2 G/100ML
2 INJECTION, SOLUTION INTRAVENOUS
Status: COMPLETED | OUTPATIENT
Start: 2020-02-14 | End: 2020-02-14

## 2020-02-14 RX ORDER — OXYCODONE AND ACETAMINOPHEN 5; 325 MG/1; MG/1
1-2 TABLET ORAL EVERY 6 HOURS PRN
Status: DISCONTINUED | OUTPATIENT
Start: 2020-02-14 | End: 2020-02-14 | Stop reason: HOSPADM

## 2020-02-14 RX ORDER — HYDROMORPHONE HYDROCHLORIDE 1 MG/ML
.3-.5 INJECTION, SOLUTION INTRAMUSCULAR; INTRAVENOUS; SUBCUTANEOUS EVERY 10 MIN PRN
Status: DISCONTINUED | OUTPATIENT
Start: 2020-02-14 | End: 2020-02-14 | Stop reason: HOSPADM

## 2020-02-14 RX ORDER — LIDOCAINE HYDROCHLORIDE 20 MG/ML
INJECTION, SOLUTION INFILTRATION; PERINEURAL PRN
Status: DISCONTINUED | OUTPATIENT
Start: 2020-02-14 | End: 2020-02-14

## 2020-02-14 RX ORDER — OXYCODONE AND ACETAMINOPHEN 5; 325 MG/1; MG/1
1 TABLET ORAL
Status: CANCELLED | OUTPATIENT
Start: 2020-02-14

## 2020-02-14 RX ORDER — PROPOFOL 10 MG/ML
INJECTION, EMULSION INTRAVENOUS PRN
Status: DISCONTINUED | OUTPATIENT
Start: 2020-02-14 | End: 2020-02-14

## 2020-02-14 RX ORDER — ONDANSETRON 4 MG/1
4 TABLET, ORALLY DISINTEGRATING ORAL EVERY 30 MIN PRN
Status: DISCONTINUED | OUTPATIENT
Start: 2020-02-14 | End: 2020-02-14 | Stop reason: HOSPADM

## 2020-02-14 RX ORDER — ONDANSETRON 4 MG/1
4-8 TABLET, ORALLY DISINTEGRATING ORAL EVERY 8 HOURS PRN
Qty: 10 TABLET | Refills: 0 | Status: SHIPPED | OUTPATIENT
Start: 2020-02-14 | End: 2020-02-26

## 2020-02-14 RX ORDER — SCOLOPAMINE TRANSDERMAL SYSTEM 1 MG/1
1 PATCH, EXTENDED RELEASE TRANSDERMAL
Status: DISCONTINUED | OUTPATIENT
Start: 2020-02-14 | End: 2020-02-14 | Stop reason: HOSPADM

## 2020-02-14 RX ORDER — DIMENHYDRINATE 50 MG/ML
25 INJECTION, SOLUTION INTRAMUSCULAR; INTRAVENOUS
Status: COMPLETED | OUTPATIENT
Start: 2020-02-14 | End: 2020-02-14

## 2020-02-14 RX ORDER — CEFAZOLIN SODIUM 1 G/3ML
1 INJECTION, POWDER, FOR SOLUTION INTRAMUSCULAR; INTRAVENOUS SEE ADMIN INSTRUCTIONS
Status: DISCONTINUED | OUTPATIENT
Start: 2020-02-14 | End: 2020-02-14 | Stop reason: HOSPADM

## 2020-02-14 RX ORDER — HYDROXYZINE HYDROCHLORIDE 25 MG/1
25-50 TABLET, FILM COATED ORAL ONCE
Status: COMPLETED | OUTPATIENT
Start: 2020-02-14 | End: 2020-02-14

## 2020-02-14 RX ORDER — MEPERIDINE HYDROCHLORIDE 50 MG/ML
12.5 INJECTION INTRAMUSCULAR; INTRAVENOUS; SUBCUTANEOUS
Status: DISCONTINUED | OUTPATIENT
Start: 2020-02-14 | End: 2020-02-14 | Stop reason: HOSPADM

## 2020-02-14 RX ORDER — BUPIVACAINE HYDROCHLORIDE AND EPINEPHRINE 2.5; 5 MG/ML; UG/ML
INJECTION, SOLUTION INFILTRATION; PERINEURAL PRN
Status: DISCONTINUED | OUTPATIENT
Start: 2020-02-14 | End: 2020-02-14 | Stop reason: HOSPADM

## 2020-02-14 RX ADMIN — LIDOCAINE HYDROCHLORIDE 0.1 ML: 10 INJECTION, SOLUTION EPIDURAL; INFILTRATION; INTRACAUDAL; PERINEURAL at 14:14

## 2020-02-14 RX ADMIN — MIDAZOLAM 2 MG: 1 INJECTION INTRAMUSCULAR; INTRAVENOUS at 15:38

## 2020-02-14 RX ADMIN — OXYCODONE HYDROCHLORIDE AND ACETAMINOPHEN 2 TABLET: 5; 325 TABLET ORAL at 17:32

## 2020-02-14 RX ADMIN — DIMENHYDRINATE 25 MG: 50 INJECTION, SOLUTION INTRAMUSCULAR; INTRAVENOUS at 18:13

## 2020-02-14 RX ADMIN — LIDOCAINE HYDROCHLORIDE 100 MG: 20 INJECTION, SOLUTION INFILTRATION; PERINEURAL at 15:45

## 2020-02-14 RX ADMIN — FENTANYL CITRATE 50 MCG: 50 INJECTION, SOLUTION INTRAMUSCULAR; INTRAVENOUS at 15:45

## 2020-02-14 RX ADMIN — SODIUM CHLORIDE, POTASSIUM CHLORIDE, SODIUM LACTATE AND CALCIUM CHLORIDE: 600; 310; 30; 20 INJECTION, SOLUTION INTRAVENOUS at 16:35

## 2020-02-14 RX ADMIN — HYDROXYZINE HYDROCHLORIDE 25 MG: 25 TABLET, FILM COATED ORAL at 18:00

## 2020-02-14 RX ADMIN — SCOPALAMINE 1 PATCH: 1 PATCH, EXTENDED RELEASE TRANSDERMAL at 14:29

## 2020-02-14 RX ADMIN — ONDANSETRON 4 MG: 2 INJECTION INTRAMUSCULAR; INTRAVENOUS at 16:04

## 2020-02-14 RX ADMIN — ROCURONIUM BROMIDE 50 MG: 10 INJECTION INTRAVENOUS at 15:45

## 2020-02-14 RX ADMIN — HYDROMORPHONE HYDROCHLORIDE 0.5 MG: 1 INJECTION, SOLUTION INTRAMUSCULAR; INTRAVENOUS; SUBCUTANEOUS at 16:09

## 2020-02-14 RX ADMIN — SODIUM CHLORIDE, POTASSIUM CHLORIDE, SODIUM LACTATE AND CALCIUM CHLORIDE: 600; 310; 30; 20 INJECTION, SOLUTION INTRAVENOUS at 14:14

## 2020-02-14 RX ADMIN — FENTANYL CITRATE 50 MCG: 50 INJECTION, SOLUTION INTRAMUSCULAR; INTRAVENOUS at 15:38

## 2020-02-14 RX ADMIN — PROPOFOL 250 MG: 10 INJECTION, EMULSION INTRAVENOUS at 15:45

## 2020-02-14 RX ADMIN — HYDROMORPHONE HYDROCHLORIDE 0.5 MG: 1 INJECTION, SOLUTION INTRAMUSCULAR; INTRAVENOUS; SUBCUTANEOUS at 17:11

## 2020-02-14 RX ADMIN — HYDROMORPHONE HYDROCHLORIDE 0.5 MG: 1 INJECTION, SOLUTION INTRAMUSCULAR; INTRAVENOUS; SUBCUTANEOUS at 17:00

## 2020-02-14 RX ADMIN — CEFAZOLIN SODIUM 2 G: 2 INJECTION, SOLUTION INTRAVENOUS at 15:47

## 2020-02-14 ASSESSMENT — LIFESTYLE VARIABLES: TOBACCO_USE: 1

## 2020-02-14 NOTE — DISCHARGE INSTRUCTIONS
Home Care Following Laparoscopic Surgery  Dr. Gerber        Wound Healing and Care of the Incision    Your recovery will be much quicker since you don t have a large abdominal incision.  During laparoscopic surgery, gas is put into your abdomen to lift the abdominal wall up and away from the operative site.  Before the end of your surgery, the doctor rinses the area with a sterile liquid.  Most, but not all of this gas is removed before your surgery ends.  Your body will absorb the rest.  You may experience pain in the shoulder areas or a bloated abdomen because of the gas.    Your incisions are glued shut.  Do not pick at the glue.  It will come off gradually by itself.    You may shower after 48 hours.  Pat your incisions dry after showering.  If there is any drainage from the incisions or if it is more comfortable for you, put a new band-aid/gauze over each incision.    Activity    For the first week, avoid heavy lifting and strenuous activities, otherwise you may return to your usual activity.    Listen to what your body is telling you.  If any activity hurts: STOP and try it again in a few days.    Many patients report feeling more tired the second week after surgery.  This is most likely related to the healing process.    Returning to work will depend on how you feel and the type of work that you do and should be discussed with your doctor.  Most people return to work within 1-2 weeks of surgery.    Drainage    You may experience drainage from one or more of your incisions.  If drainage is present, wash the area with mild soap and water twice a day.  If drainage is staining your clothing, cover with a light bandage (gauze or band-aid).  If you are concerned about the amount or the color of drainage, call your doctor.    Diet and Appetite    You may return to the diet you were on before surgery.    Remember - most prescription pain pills can and do cause constipation.  Walking, extra fluids  and increased fiber (fresh fruits and vegetables, etc.) are natural remedies for constipation.  Ask your doctor about a stool softener such as Colace or Metamucil.    Driving    Most people can drive within 2-3 days of surgery.  You should have stopped taking prescription pain medication and be pain free enough to make an emergency stop before beginning to drive.    Call you physician if you have:    Redness, or concerns about drainage from your incisions.    A temperature of more than 100.5 degrees F.    Pain not relieved by your prescription pain pills and or a short rest.    Any questions and concerns about your recovery.    Follow-up Care       Appt made already    [facility] patients please call the Specialty Clinic at [phone] with any questions or concerns during clinic hours; otherwise call the Nurse Advice Line at [phone]    DISCHARGE INSTRUCTIONS FOR PATIENT   SCOPOLAMINE TRANSDERMAL PATCH  You may leave the patch on behind your ear for three days, but NO LONGER. You may have withdrawal symptoms (nausea, vomiting, headache, dizziness) if used longer.  When you remove the patch, you may wash and dry your hands thoroughly and before touching your eyes, as pupil may dilate.  Discard patch (away from children and pets).  You may develop some urinary hesitancy or urine retention.  [facility]   Adult Discharge Orders & Instructions     For 24 hours after surgery    1. Get plenty of rest.  A responsible adult must stay with you for at least 24 hours after you leave the hospital.   2. Do not drive or use heavy equipment.  If you have weakness or tingling, don't drive or use heavy equipment until this feeling goes away.  3. Do not drink alcohol.  4. Avoid strenuous or risky activities.  Ask for help when climbing stairs.   5. You may feel lightheaded.  If so, sit for a few minutes before standing.  Have someone help you get up.   6. You may have a slight fever. Call the  doctor if your fever is over 100 F (37.7 C) (taken under the tongue) or lasts longer than 24 hours.  7. You may have a dry mouth, a sore throat, muscle aches or trouble sleeping.  These should go away after 24 hours.  8. Do not make important or legal decisions.  We don t expect you to have any problems from the surgery or treatment you had today. Just in case, here s what to do if you have pain, upset stomach (nausea), bleeding or infection:  Pain:  Take medicines your doctor has prescribed or over-the-counter medicine they have suggested. Resting and using ice packs can help, too. For surgery on an arm or leg, raise it on a pillow to ease swelling. Call your doctor if these methods don t work.  Copyright [employer] And[employer], Licensed under [employer]  Upset stomach (nausea):  Take anti-nausea medicine approved by your doctor. Drink clear liquids like apple juice, ginger ale, broth or 7-Up. Be sure to drink enough fluids. Rest can help, too. Move to normal foods when you re ready. Bleeding:  In the first 24 hours, you may see a little blood on your dressing, about the size of a quarter. You don t need to worry about this much blood, but if the blood spot keeps getting bigger:    Put pressure on the wound if you can, AND    Call your doctor.  Copyright [employer], Licensed under [employer]  Fever/Infection: Please call your doctor if you have any of these signs:    Redness    Swelling    Wound feels warm    Pain gets worse    Bad-smelling fluid leaks from wound    Fever or chills  Call your doctor for any of the followin.  It has been over 8 to 10 hours since surgery and you are still not able to urinate (pass water).    2.  Headache for over 24 hours.    3.  Numbness, tingling or weakness in your legs the day after surgery (if you had spinal anesthesia).    Nurse advice line: [phone]

## 2020-02-14 NOTE — ANESTHESIA PREPROCEDURE EVALUATION
Anesthesia Pre-Procedure Evaluation    Patient: Breanna Vidal   MRN: [id number] : 1985          Preoperative Diagnosis: Biliary dyskinesia [K82.8]    Procedure(s):  Laparoscopic Cholecystectomy    Past Medical History:   Diagnosis Date     Cellulitis     MRSA septic olecranon bursitis and facial cellulitis     Drug abuse (H)     methamphetamine and alcohol, sober since treatment      MRSA cellulitis     cleared with negative nasal swabs 17 and 17, reviewed     Rh negative, antepartum      Past Surgical History:   Procedure Laterality Date     EXCISE LESION FOOT Left 2019    Procedure: Excision of nevus second interdigital space left foot, Excision of nevus third interdigital space left foot, excision of plantar junctional nevus left foot;  Surgeon: Rinku Lopez DPM;  [facility]     INCISION AND DRAINAGE  2012    chin abscess I&D     LAPAROSCOPIC HERNIORRHAPHY INCISIONAL N/A 2019    Procedure: laparoscopic incisional hernia repair with mesh;  Surgeon: Jovi Odom MD;  [facility]     LAPAROSCOPIC SALPINGECTOMY Bilateral 2018    Procedure: LAPAROSCOPIC SALPINGECTOMY;  Laparoscopic Bilateral Salpingectomy;  Surgeon: Chai Cui MD;  [facility]     MAMMOPLASTY REDUCTION Bilateral 2017       Anesthesia Evaluation     . Pt has had prior anesthetic. Type: General and Regional    No history of anesthetic complications          ROS/MED HX    ENT/Pulmonary:     (+)tobacco use, Past use , . .    Neurologic:  - neg neurologic ROS   (+)migraines,     Cardiovascular:  - neg cardiovascular ROS   (+) ----. : . . . :. . Previous cardiac testing date:results:date: results:ECG reviewed date:4/10/19 results:SR date: results:          METS/Exercise Tolerance:  >4 METS   Hematologic:  - neg hematologic  ROS       Musculoskeletal:  - neg musculoskeletal ROS       GI/Hepatic: Comment: Hx of ETOH abuse    (+) cholecystitis/cholelithiasis,      "  Renal/Genitourinary: Comment: Mixed incontinence         Endo:  - neg endo ROS       Psychiatric: Comment: History of depression and anxiety, post partum, currently controlled. HX of  Methamphetamine, ETOH, cocaine and marijuana abuse.        Infectious Disease:  - neg infectious disease ROS       Malignancy:      - no malignancy   Other: Comment: History of ETOH abuse in remission  History of methamphetamine abuse in remission   (+) Possibly pregnant C-spine cleared: N/A, no H/O Chronic Pain,no other significant disability                         Physical Exam  Normal systems: cardiovascular, pulmonary and dental    Airway   Mallampati: II  TM distance: >3 FB  Neck ROM: full    Dental     Cardiovascular   Rhythm and rate: regular and normal      Pulmonary    breath sounds clear to auscultation            Lab Results   Component Value Date    WBC 8.0 01/28/2020    HGB 13.7 01/28/2020    HCT 40.8 01/28/2020     01/28/2020    CRP 57.5 (H) 10/14/2018     01/28/2020    POTASSIUM 3.9 01/28/2020    CHLORIDE 108 01/28/2020    CO2 24 01/28/2020    BUN 13 01/28/2020    CR 0.61 01/28/2020    GLC 87 01/28/2020    ELICIA 9.3 01/28/2020    ALBUMIN 4.2 01/28/2020    PROTTOTAL 7.6 01/28/2020    ALT 33 01/28/2020    AST 13 01/28/2020    ALKPHOS 50 01/28/2020    BILITOTAL 0.5 01/28/2020    LIPASE 171 01/28/2020    AMYLASE 52 01/28/2020    HCG Negative 08/16/2019       Preop Vitals  BP Readings from Last 3 Encounters:   02/12/20 120/72   02/10/20 122/70   02/08/20 118/75    Pulse Readings from Last 3 Encounters:   02/12/20 80   02/10/20 104   02/08/20 73      Resp Readings from Last 3 Encounters:   02/12/20 18   02/08/20 18   01/28/20 16    SpO2 Readings from Last 3 Encounters:   02/12/20 97%   02/10/20 98%   02/08/20 96%      Temp Readings from Last 1 Encounters:   02/12/20 97.5  F (36.4  C) (Temporal)    Ht Readings from Last 1 Encounters:   02/12/20 1.664 m (5' 5.5\")      Wt Readings from Last 1 Encounters:   02/12/20 " "98 kg (216 lb 1.6 oz)    Estimated body mass index is 35.41 kg/m  as calculated from the following:    Height as of 2/12/20: 1.664 m (5' 5.5\").    Weight as of 2/12/20: 98 kg (216 lb 1.6 oz).       Anesthesia Plan      History & Physical Review  History and physical reviewed and following examination; no interval change.    ASA Status:  2 .    NPO Status:  > 8 hours    Plan for MAC and ETT with Intravenous and Propofol induction. Maintenance will be Balanced.    PONV prophylaxis:  Ondansetron (or other 5HT-3) and Dexamethasone or Solumedrol       Postoperative Care  Postoperative pain management:  IV analgesics, Oral pain medications and Multi-modal analgesia.      Consents  Anesthetic plan, risks, benefits and alternatives discussed with:  Patient..                 MADELNIE Engle CRNA  "

## 2020-02-14 NOTE — ANESTHESIA CARE TRANSFER NOTE
Patient: Breanna Vidal    Procedure(s):  Laparoscopic Cholecystectomy    Diagnosis: Biliary dyskinesia [K82.8]  Diagnosis Additional Information: No value filed.    Anesthesia Type:   General     Note:  Airway :Face Mask  Patient transferred to:PACU  Handoff Report: Identifed the Patient, Identified the Reponsible Provider, Reviewed the pertinent medical history, Discussed the surgical course, Reviewed Intra-OP anesthesia mangement and issues during anesthesia, Set expectations for post-procedure period and Allowed opportunity for questions and acknowledgement of understanding      Vitals: (Last set prior to Anesthesia Care Transfer)    CRNA VITALS  2/14/2020 1618 - 2/14/2020 1656      2/14/2020             SpO2:  98 %    Resp Rate (observed):  (!) 4                Electronically Signed By: MADELINE Engle CRNA  February 14, 2020  4:56 PM

## 2020-02-14 NOTE — ANESTHESIA POSTPROCEDURE EVALUATION
Patient: Breanna Vidal    Procedure(s):  Laparoscopic Cholecystectomy    Diagnosis:Biliary dyskinesia [K82.8]  Diagnosis Additional Information: No value filed.    Anesthesia Type:  General    Note:  Anesthesia Post Evaluation    Patient location during evaluation: Phase 2 and Bedside  Patient participation: Able to fully participate in evaluation  Level of consciousness: awake and alert  Pain management: adequate  Airway patency: patent  Cardiovascular status: acceptable  Respiratory status: acceptable  Hydration status: acceptable  PONV: none     Anesthetic complications: None    Comments: Patient was pleased with her care today. Her IV looks a little red at the site. Will discontinue it and start her on oral medication. There were no anesthesia related complications noted. Will follow as needed.        Last vitals:  Vitals:    02/14/20 1715 02/14/20 1726 02/14/20 1730   BP: 109/76 114/76 114/75   Pulse: 77 80 83   Resp: 9 9 16   Temp:      SpO2: 98% 96% 97%         Electronically Signed By: MADELINE Engle CRNA  February 14, 2020  5:38 PM

## 2020-02-14 NOTE — BRIEF OP NOTE
[facility] Brief Operative Note    Pre-operative diagnosis: Biliary dyskinesia [K82.8]   Post-operative diagnosis Same   Procedure: Procedure(s):  Laparoscopic Cholecystectomy   Surgeon(s): Surgeon(s) and Role:     * Renard Gerber MD - Primary   Estimated blood loss: 5cc   Specimens: ID Type Source Tests Collected by Time Destination   A : gallbladder and contents Tissue Gallbladder and Contents SURGICAL PATHOLOGY EXAM Renard Gerber MD 2/14/2020  4:21 PM       Findings: Extensive adhesions to umbilical mesh.  RUQ free of adhesions.  NL Appearing gallbladder.         Renard Gerber MD, FACS    #[id number]

## 2020-02-15 ENCOUNTER — NURSE TRIAGE (OUTPATIENT)
Dept: NURSING | Facility: CLINIC | Age: 35
End: 2020-02-15

## 2020-02-15 NOTE — TELEPHONE ENCOUNTER
Clinic Action Needed:No  Reason for Call: Breanna had laparoscopic cholecystectomy 2/14 and she is having severe pain.  She also reports that skin was puckered and hard at incision site.  Yesterday prior to being discharged to home, nurse took picture of skin and contacted provider.  She was advised to call if there were any issues.  Oral pain medication not controlling pain.  She had a difficult time speaking to me over phone due to pain    Paged on call provider for [facility]/general surgery to speak to caller @ [phone].  Dr. Read is on call, page sent @ 6:44 am via smart web.    Caller advised to call back if they have not heard back from on call provider within 20 minutes.  Caller appears to understand directives and agrees with plan.    Also reviewed sxs to call 911.    Routed to: Not routed.    Nida Anaya RN  [facility]

## 2020-02-15 NOTE — OP NOTE
Procedure Date: 02/14/2020      PREOPERATIVE DIAGNOSIS:  Biliary dyskinesia.      POSTOPERATIVE DIAGNOSIS:  Biliary dyskinesia.      PROCEDURE:  Laparoscopic cholecystectomy.      SURGEON:  Renard Gerber MD, [facility]      ANESTHESIA:  General via endotracheal tube.      INDICATIONS FOR PROCEDURE:  This is a 34-year-old lady who presented with multiple episodes of nausea and right upper quadrant pain.  She subsequently had a HIDA scan that revealed a low ejection fraction, with the injection of CCK reproducing her symptoms.  For this reason, it was elected to take her to the operating room for laparoscopic cholecystectomy.      OPERATIVE FINDINGS:  Included extensive adhesions to the umbilical mesh.  Right upper quadrant was free of adhesions, and there was a normal-appearing gallbladder.      DETAILS OF PROCEDURE:  The patient was taken to the operating room and placed on the table in supine position.  After induction of general anesthesia, the abdomen was prepped and draped in sterile fashion.  A timeout was performed, confirming the identity of the patient as well as the procedure to be performed.  A small left upper quadrant incision was then made, and the abdomen was entered using a Visiport, and then the abdomen was insufflated to 15 mmHg pressure.  Generalized inspection of the abdomen was then carried out, and there was no evidence of injury from the Visiport entry.  There were extensive adhesions around the umbilicus of omentum due to her previous hernia repair with mesh.  We then inspected the right upper quadrant, and it was found to be relatively free of adhesions.  Two right upper quadrant 5 mm trocars were placed as well as a midline supraumbilical trocar; this was placed just above the adhesions at the edge of the mesh, and then a subxiphoid 11 mm trocar was placed.  The gallbladder was grasped, pulled up into position.  The peritoneum of the gallbladder was taken down using a ConMed dissector.  We then  created a window under the base of the gallbladder, clearly identifying the cystic artery and cystic duct.  Initially, the cystic artery was clipped and transected, followed by the cystic duct.  The gallbladder was removed from the liver bed using cautery and submitted as specimen.  Hemostasis was achieved using cautery.  The area was copiously irrigated.  All fluid was suctioned out.  The liver bed was inspected, and there was no evidence of any bleeding.  The subxiphoid trocar was removed, and the fascia was closed using a cone and a PMI needle, and then in a similar fashion the supraumbilical trocar was removed, and the fascia was closed using a cone and a PMI needle.  The 2 right upper quadrant 5 mm trocars were removed without evidence of any bleeding.  The abdomen was deflated, and the left upper quadrant 5 mm trocar was removed without evidence of any bleeding.  All skin incisions were closed using 3-0 Vicryl and Exofin glue.  Sterile dressings were applied.  The patient was taken from the operating room to the recovery room in stable condition to be sent home.         ANCELMO SLATER MD,FACS             D: 2020   T: 2020   MT: LEILA      Name:     LAURA CHEEMA   MRN:      [id number]        Account:        [id number]   :      1985           Procedure Date: 2020      Document: [id number]

## 2020-02-16 NOTE — OR NURSING
[facility] Same Day Surgery  Discharge Call Back  Breanna Vidal  1985  MRN: [id number]  Home: [phone] (home)   PCP: Karen Campos    We are calling to see how you're doing since your surgery/procedure with us?   Comments: N/A  Clinical Questions  1. Have you had time to look at your discharge instructions? Do you have any questions in regards to the instructions?   Comment: Yes/Yes  2. Do you feel your pain is being controlled with the regimen the surgeon sent you home on? (ie: prescription medications, over the counter pain medications, ice packs)   Comments: No.  Patient went to [place] ED yesterday for pain issues.  Had CT and was re-assured everything is ok.  Patient stated ED provider called Dr. Gerber to discuss.  Patient stated she chose not to spend the night for pain control.  3. Have you noticed any drainage on your dressing? Do you know what to do if you have bleeding as a result of your procedure?   Comments: No/Yes  4. Have you had any nausea/vomiting? Do you know how to treat this?   Comment: No/Yes- patient very pleased with no N&V as this has been an issue in the past.  5. Have you had any signs/symptoms of infection? (ie: fever, swelling, heat, drainage or redness) Do you know what to do if you have?   Comment: No/Yes  6. Do you have a follow up appointment made with your surgeon? Do you have a number to contact them at if you need it?   Comment: Yes/Yes  Retained Foreign Object: N/A      You may be randomly selected to fill out a [facility] survey. We would appreciate you taking the time to fill this out. It is important to us if you would answer all of the questions on the survey.

## 2020-02-17 ENCOUNTER — HOSPITAL ENCOUNTER (EMERGENCY)
Facility: CLINIC | Age: 35
Discharge: HOME | End: 2020-02-17
Attending: EMERGENCY MEDICINE | Admitting: EMERGENCY MEDICINE
Payer: COMMERCIAL

## 2020-02-17 VITALS
DIASTOLIC BLOOD PRESSURE: 75 MMHG | BODY MASS INDEX: 38.32 KG/M2 | HEIGHT: 65 IN | RESPIRATION RATE: 17 BRPM | OXYGEN SATURATION: 95 % | HEART RATE: 68 BPM | WEIGHT: 230 LBS | SYSTOLIC BLOOD PRESSURE: 111 MMHG | TEMPERATURE: 96.9 F

## 2020-02-17 DIAGNOSIS — G89.18 POSTOPERATIVE PAIN: ICD-10-CM

## 2020-02-17 DIAGNOSIS — Z90.49 STATUS POST CHOLECYSTECTOMY: ICD-10-CM

## 2020-02-17 LAB
ALBUMIN SERPL-MCNC: 3.9 G/DL (ref 3.4–5)
ALP SERPL-CCNC: 49 U/L (ref 40–150)
ALT SERPL W P-5'-P-CCNC: 36 U/L (ref 0–50)
ANION GAP SERPL CALCULATED.3IONS-SCNC: 6 MMOL/L (ref 3–14)
AST SERPL W P-5'-P-CCNC: 15 U/L (ref 0–45)
BASOPHILS # BLD AUTO: 0 10E9/L (ref 0–0.2)
BASOPHILS NFR BLD AUTO: 0.2 %
BILIRUB SERPL-MCNC: 0.5 MG/DL (ref 0.2–1.3)
BUN SERPL-MCNC: 12 MG/DL (ref 7–30)
CALCIUM SERPL-MCNC: 8.9 MG/DL (ref 8.5–10.1)
CHLORIDE SERPL-SCNC: 107 MMOL/L (ref 94–109)
CO2 SERPL-SCNC: 29 MMOL/L (ref 20–32)
CREAT SERPL-MCNC: 0.67 MG/DL (ref 0.52–1.04)
DIFFERENTIAL METHOD BLD: NORMAL
EOSINOPHIL NFR BLD AUTO: 1.3 %
ERYTHROCYTE [DISTWIDTH] IN BLOOD BY AUTOMATED COUNT: 12.3 % (ref 10–15)
GFR SERPL CREATININE-BSD FRML MDRD: >90 ML/MIN/{1.73_M2}
GLUCOSE SERPL-MCNC: 82 MG/DL (ref 70–99)
HCT VFR BLD AUTO: 36.3 % (ref 35–47)
HGB BLD-MCNC: 12.3 G/DL (ref 11.7–15.7)
IMM GRANULOCYTES # BLD: 0 10E9/L (ref 0–0.4)
IMM GRANULOCYTES NFR BLD: 0.3 %
LIPASE SERPL-CCNC: 109 U/L (ref 73–393)
LYMPHOCYTES # BLD AUTO: 2.3 10E9/L (ref 0.8–5.3)
LYMPHOCYTES NFR BLD AUTO: 25.2 %
MCH RBC QN AUTO: 30.4 PG (ref 26.5–33)
MCHC RBC AUTO-ENTMCNC: 33.9 G/DL (ref 31.5–36.5)
MCV RBC AUTO: 90 FL (ref 78–100)
MONOCYTES # BLD AUTO: 0.6 10E9/L (ref 0–1.3)
MONOCYTES NFR BLD AUTO: 6.3 %
NEUTROPHILS # BLD AUTO: 6.1 10E9/L (ref 1.6–8.3)
NEUTROPHILS NFR BLD AUTO: 66.7 %
NRBC # BLD AUTO: 0 10*3/UL
NRBC BLD AUTO-RTO: 0 /100
PLATELET # BLD AUTO: 214 10E9/L (ref 150–450)
POTASSIUM SERPL-SCNC: 3.4 MMOL/L (ref 3.4–5.3)
PROT SERPL-MCNC: 7.2 G/DL (ref 6.8–8.8)
RBC # BLD AUTO: 4.05 10E12/L (ref 3.8–5.2)
SODIUM SERPL-SCNC: 142 MMOL/L (ref 133–144)
WBC # BLD AUTO: 9.2 10E9/L (ref 4–11)

## 2020-02-17 PROCEDURE — 25000128 H RX IP 250 OP 636: Performed by: EMERGENCY MEDICINE

## 2020-02-17 PROCEDURE — 96375 TX/PRO/DX INJ NEW DRUG ADDON: CPT | Performed by: EMERGENCY MEDICINE

## 2020-02-17 PROCEDURE — 96374 THER/PROPH/DIAG INJ IV PUSH: CPT | Performed by: EMERGENCY MEDICINE

## 2020-02-17 PROCEDURE — 83690 ASSAY OF LIPASE: CPT | Performed by: EMERGENCY MEDICINE

## 2020-02-17 PROCEDURE — 80053 COMPREHEN METABOLIC PANEL: CPT | Performed by: EMERGENCY MEDICINE

## 2020-02-17 PROCEDURE — 99285 EMERGENCY DEPT VISIT HI MDM: CPT | Mod: 25 | Performed by: EMERGENCY MEDICINE

## 2020-02-17 PROCEDURE — 99285 EMERGENCY DEPT VISIT HI MDM: CPT | Mod: Z6 | Performed by: EMERGENCY MEDICINE

## 2020-02-17 PROCEDURE — 85025 COMPLETE CBC W/AUTO DIFF WBC: CPT | Performed by: EMERGENCY MEDICINE

## 2020-02-17 RX ORDER — LORAZEPAM 2 MG/ML
0.5 INJECTION INTRAMUSCULAR ONCE
Status: COMPLETED | OUTPATIENT
Start: 2020-02-17 | End: 2020-02-17

## 2020-02-17 RX ORDER — OXYCODONE AND ACETAMINOPHEN 5; 325 MG/1; MG/1
1 TABLET ORAL EVERY 6 HOURS PRN
Qty: 12 TABLET | Refills: 0 | Status: SHIPPED | OUTPATIENT
Start: 2020-02-17 | End: 2020-02-26

## 2020-02-17 RX ORDER — ONDANSETRON 2 MG/ML
4 INJECTION INTRAMUSCULAR; INTRAVENOUS ONCE
Status: COMPLETED | OUTPATIENT
Start: 2020-02-17 | End: 2020-02-17

## 2020-02-17 RX ADMIN — LORAZEPAM 0.5 MG: 2 INJECTION INTRAMUSCULAR; INTRAVENOUS at 11:35

## 2020-02-17 RX ADMIN — HYDROMORPHONE HYDROCHLORIDE 1 MG: 1 INJECTION, SOLUTION INTRAMUSCULAR; INTRAVENOUS; SUBCUTANEOUS at 11:38

## 2020-02-17 RX ADMIN — ONDANSETRON 4 MG: 2 INJECTION INTRAMUSCULAR; INTRAVENOUS at 11:37

## 2020-02-17 ASSESSMENT — MIFFLIN-ST. JEOR: SCORE: 1744.15

## 2020-02-17 ASSESSMENT — ENCOUNTER SYMPTOMS
CHILLS: 0
ABDOMINAL PAIN: 1
NAUSEA: 1
FEVER: 0
APPETITE CHANGE: 1
ACTIVITY CHANGE: 1
CARDIOVASCULAR NEGATIVE: 1

## 2020-02-17 NOTE — ED TRIAGE NOTES
Had gallbladder removed here 3 days ago and went to [place] Saturday for post op pain and they wanted to keep her and she went home cause she doesn't sleep at hospitals.  She presents back here with post op pain that is not improving and she feels like she is swollen near middle upper in between two puncture sites.

## 2020-02-17 NOTE — DISCHARGE INSTRUCTIONS
At this time we are identifying no postoperative complications.  Your pain is a combination of your recent surgery and also having to work through extensive adhesions to remove the gallbladder.  Continue to monitor for fever.  Keep scheduled appointment with your surgeon.  A prescription for Percocet is been provided.  Take only as directed.  Recommend using extra strength Tylenol or ibuprofen for mild to moderate pain.  Diet as tolerated.

## 2020-02-17 NOTE — ED PROVIDER NOTES
History     Chief Complaint   Patient presents with     Post-op Problem     HPI  Breanna Vidal is a 34 year old female who presents for postoperative abdominal pain.  The patient recently underwent laparoscopic cholecystectomy due to biliary dyskinesia identified with HIDA scan imaging in addition to development of concordant pain pattern during the time of the imaging.  Procedure was complicated by extensive adhesions and mesh from prior umbilical herniorrhaphy.  Gallbladder.  Removed without difficulty.  The patient continues to report severe pain described 8/10 intensity.  Procedure was completed on 14 February.  She was seen February 15 in the ED at [facility].  I reviewed the medical record through care everywhere identify the patient had a CT scan which showed no postoperative complications and there is no fluid collection to suggest a bile leak.  Her white count was mildly elevated at 13,000 but this was felt to be due to postoperative inflammatory response.  The other laboratory work was normal.  Patient was medicated  with Dilaudid a total of 4 mg along with Zofran and discharged home with hydrocodone 5/325 number 10 tablets.  She returns today with continued pain.  She has had no fever.  Nausea has remained present since postoperative.  She has had no vomiting.  Has had no dysuria.  Normal bowel movement this morning.  States that she feels distended though states it is not increased since being seen on the 15th.    Significant past medical history for methamphetamine and alcohol abuse both in remission.    Allergies:  Allergies   Allergen Reactions     Bees Anaphylaxis     Has Epi pen      Codeine Nausea     Other reaction(s): Nausea     Imitrex [Sumatriptan]      Worsening of migraine, paresthesias     Ranitidine Rash and Itching     recvd IV zantac 75 and reaction was immediate,  was given Benadryl to counter act reaction in 2000     Sulfa Drugs Unknown and Rash       Problem List:    Patient  Active Problem List    Diagnosis Date Noted     Biliary dyskinesia 02/10/2020     Priority: Medium     Added automatically from request for surgery [id number]       Intractable migraine without aura and without status migrainosus 07/17/2019     Priority: Medium     Alcohol abuse, in remission 08/20/2018     Priority: Medium     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) 08/20/2018     Priority: Medium     Mixed incontinence 03/03/2017     Priority: Medium     Vaginal delivery 10/31/2011     Priority: Medium     Rh negative status during pregnancy 09/23/2011     Priority: Medium        Past Medical History:    Past Medical History:   Diagnosis Date     Cellulitis 2012     Drug abuse (H) 2011     MRSA cellulitis 2012     Rh negative, antepartum        Past Surgical History:    Past Surgical History:   Procedure Laterality Date     EXCISE LESION FOOT Left 8/16/2019    Procedure: Excision of nevus second interdigital space left foot, Excision of nevus third interdigital space left foot, excision of plantar junctional nevus left foot;  Surgeon: Rinku Lopez DPM;  Location: [facility] OR     INCISION AND DRAINAGE  04/18/2012    chin abscess I&D     LAPAROSCOPIC CHOLECYSTECTOMY N/A 2/14/2020    Procedure: Laparoscopic Cholecystectomy;  Surgeon: Renard Gerber MD;  Location: [facility] OR     LAPAROSCOPIC HERNIORRHAPHY INCISIONAL N/A 7/17/2019    Procedure: laparoscopic incisional hernia repair with mesh;  Surgeon: Jovi Odom MD;  Location: [facility] OR     LAPAROSCOPIC SALPINGECTOMY Bilateral 8/27/2018    Procedure: LAPAROSCOPIC SALPINGECTOMY;  Laparoscopic Bilateral Salpingectomy;  Surgeon: Chai Cui MD;  Location: [facility] OR     MAMMOPLASTY REDUCTION Bilateral 08/04/2017       Family History:    Family History   Problem Relation Age of Onset     Cancer Father      Pancreatic Cancer Father      Lung Cancer Father      Lupus Mother        Social History:  Marital Status:  Single [1]  Social History     Tobacco Use     Smoking status:  "Former Smoker     Smokeless tobacco: Former User     Quit date: 3/10/2016   Substance Use Topics     Alcohol use: No     Comment: history alcohol abuse 2012, s/p treatment     Drug use: No     Comment: former drug user (EtOH, cocaine, marijuana, meth), last use 2012        Medications:    ondansetron (ZOFRAN ODT) 4 MG ODT tab  oxyCODONE-acetaminophen (PERCOCET) 5-325 MG tablet  acetaminophen (TYLENOL) 500 MG tablet  aspirin-acetaminophen-caffeine (EXCEDRIN MIGRAINE) 250-250-65 MG tablet  Botulinum Toxin Type A (BOTOX) 200 units injection  EPINEPHrine (EPIPEN/ADRENACLICK/OR ANY BX GENERIC EQUIV) 0.3 MG/0.3ML injection 2-pack  ibuprofen (ADVIL/MOTRIN) 200 MG tablet  naproxen (NAPROSYN) 500 MG tablet  ondansetron (ZOFRAN-ODT) 4 MG ODT tab          Review of Systems   Constitutional: Positive for activity change and appetite change. Negative for chills and fever.   Cardiovascular: Negative.    Gastrointestinal: Positive for abdominal pain (Abdominal pain remains generalized.  States is worse when she takes a deep breath in.  Chest x-ray completed on February 15 1 day postop showed no intrathoracic concerns, atelectasis, pleural effusion.  No signs for diaphragm injury..) and nausea.   All other systems reviewed and are negative.      Physical Exam   BP: (!) 130/93  Pulse: 69  Temp: 96.9  F (36.1  C)  Resp: 17  Height: 165.1 cm (5' 5\")  Weight: 104.3 kg (230 lb)  SpO2: 100 %      Physical Exam  Vitals signs and nursing note reviewed.   Constitutional:       General: She is in acute distress.      Appearance: She is obese. She is not ill-appearing.   HENT:      Nose: No congestion.      Mouth/Throat:      Mouth: Mucous membranes are moist.   Eyes:      General: No scleral icterus.     Pupils: Pupils are equal, round, and reactive to light.   Neck:      Musculoskeletal: Normal range of motion and neck supple.   Cardiovascular:      Rate and Rhythm: Normal rate.   Pulmonary:      Effort: Pulmonary effort is normal.      " Breath sounds: Normal breath sounds.   Abdominal:      General: There is distension.      Tenderness: There is abdominal tenderness. There is no rebound.   Musculoskeletal:         General: No swelling.   Skin:     Capillary Refill: Capillary refill takes less than 2 seconds.   Neurological:      Mental Status: She is alert.   Psychiatric:         Mood and Affect: Mood normal.         ED Course        Procedures                   Results for orders placed or performed during the hospital encounter of 02/17/20 (from the past 24 hour(s))   CBC with platelets differential   Result Value Ref Range    WBC 9.2 4.0 - 11.0 10e9/L    RBC Count 4.05 3.8 - 5.2 10e12/L    Hemoglobin 12.3 11.7 - 15.7 g/dL    Hematocrit 36.3 35.0 - 47.0 %    MCV 90 78 - 100 fl    MCH 30.4 26.5 - 33.0 pg    MCHC 33.9 31.5 - 36.5 g/dL    RDW 12.3 10.0 - 15.0 %    Platelet Count 214 150 - 450 10e9/L    Diff Method Automated Method     % Neutrophils 66.7 %    % Lymphocytes 25.2 %    % Monocytes 6.3 %    % Eosinophils 1.3 %    % Basophils 0.2 %    % Immature Granulocytes 0.3 %    Nucleated RBCs 0 0 /100    Absolute Neutrophil 6.1 1.6 - 8.3 10e9/L    Absolute Lymphocytes 2.3 0.8 - 5.3 10e9/L    Absolute Monocytes 0.6 0.0 - 1.3 10e9/L    Absolute Basophils 0.0 0.0 - 0.2 10e9/L    Abs Immature Granulocytes 0.0 0 - 0.4 10e9/L    Absolute Nucleated RBC 0.0    Comprehensive metabolic panel   Result Value Ref Range    Sodium 142 133 - 144 mmol/L    Potassium 3.4 3.4 - 5.3 mmol/L    Chloride 107 94 - 109 mmol/L    Carbon Dioxide 29 20 - 32 mmol/L    Anion Gap 6 3 - 14 mmol/L    Glucose 82 70 - 99 mg/dL    Urea Nitrogen 12 7 - 30 mg/dL    Creatinine 0.67 0.52 - 1.04 mg/dL    GFR Estimate >90 >60 mL/min/[1.73_m2]    GFR Estimate If Black >90 >60 mL/min/[1.73_m2]    Calcium 8.9 8.5 - 10.1 mg/dL    Bilirubin Total 0.5 0.2 - 1.3 mg/dL    Albumin 3.9 3.4 - 5.0 g/dL    Protein Total 7.2 6.8 - 8.8 g/dL    Alkaline Phosphatase 49 40 - 150 U/L    ALT 36 0 - 50 U/L     AST 15 0 - 45 U/L   Lipase   Result Value Ref Range    Lipase 109 73 - 393 U/L       Medications   ondansetron (ZOFRAN) injection 4 mg (has no administration in time range)   HYDROmorphone (DILAUDID) injection 1 mg (has no administration in time range)   LORazepam (ATIVAN) injection 0.5 mg (has no administration in time range)       Assessments & Plan (with Medical Decision Making)  Gordon is 34 years of age.  She presents with postoperative pain.  Cholecystectomy completed last week for biliary dyskinesia.  Procedure was complicated primarily by periumbilical scar tissue and mesh from prior umbilical herniorrhaphy.  They had no difficulty getting the gallbladder removed once he got past the zone of adhesions.  The patient presents with ongoing pain.  Denies fever, vomiting.  Normal bowel movements.  Intermittent mild nausea which she believes is primarily triggered by pain.  Patient was seen 24 hours after surgery at the [facility] emergency room.  That note was fully reviewed.  Work-up included CBC that showed mild leukocytosis = 13,000 with a normal comprehensive metabolic panel, normal lipase, normal CT of the abdomen and pelvis IV oral contrast and normal chest x-ray two-view.  Patient was discharged home with #10 count hydrocodone 5/325.  Since that visit she has had no status change other than just continued pain.  On examination patient is obese.  She has 5 small incisions that are closed healing well with no signs for erythema or drainage.  Bowel sounds are present.  Abdomen is mildly distended.  She has generalized pain with palpation nothing localizes.   Medical work-up: CBC has now normalized with a normal differential, lipase and conference metabolic panel remain normal.  Patient feeling much better after Dilaudid 0.5 mg IV and Ativan 0.5 mg IV.  She was very anxious and having some hyperventilation which came in which she self described as anxiety attack.  Patient was given reassurance.  This  time she is not showing any postoperative complications including bleeding, infection or signs for biliary leak.     .     I have reviewed the nursing notes.    I have reviewed the findings, diagnosis, plan and need for follow up with the patient.      New Prescriptions    No medications on file       Final diagnoses:   Status post cholecystectomy   Postoperative pain       2/17/2020   [facility] EMERGENCY DEPARTMENT     Sean Quigley,   02/17/20 0138

## 2020-02-18 ENCOUNTER — PATIENT OUTREACH (OUTPATIENT)
Dept: CARE COORDINATION | Facility: CLINIC | Age: 35
End: 2020-02-18

## 2020-02-18 LAB — COPATH REPORT: NORMAL

## 2020-02-18 NOTE — PROGRESS NOTES
Clinic Care Coordination Contact  [facility]/Voicemail       Clinical Data: Care Coordinator Outreach  Outreach attempted x 1.  Left message on patient's voicemail with call back information and requested return call.  Plan: Care Coordinator will try to reach patient again in 1-2 business days.    NGUYEN LandinN RN Clinic Care Coordinator   [place], [place], [place]  Phone: [phone]

## 2020-02-19 ENCOUNTER — TELEPHONE (OUTPATIENT)
Dept: SURGERY | Facility: OTHER | Age: 35
End: 2020-02-19

## 2020-02-19 DIAGNOSIS — R10.11 RUQ PAIN: Primary | ICD-10-CM

## 2020-02-19 NOTE — PROGRESS NOTES
"Clinic Care Coordination Contact    Follow Up Progress Note      Assessment: Patient has been seen in ER 2/15/20 and 2/17/20 for post operative pain. Patient is s/p laparoscopic cholecystectomy 2/14/20. Rating her current pain at 8/10. Has 3-4 tabs of the pain meds left. She will call surgeon for refill request. She feels she will need about 10 more tabs to get her to her post op appointment 2/24/20. Patient declined using mychart, and declined CC RN providing the phone number to [facility]. Still using zofran, still having \"big time\" nausea. Still eating a bland diet. Getting \"lots of water\"  Using senna for constipation prevention. Wondering if she is having complications with hernia mesh \"I can feel it\" with touch/pressure it hurts. The mesh area is visibly swollen. She will monitor her symptoms, and attend her follow up appointment  And will reach out with questions sooner as needed.     Goals addressed this encounter:   Goals Addressed                 This Visit's Progress       General      Medical (pt-stated)   On track     Goal Statement: I want to manage my symptoms while waiting for my post op surgery appointment. I want to prevent going back to the ER. There were many sick people there.   Date Goal set: 2/19/20  Date to Achieve By: 1 week  Patient expressed understanding of goal: yes  Action steps to achieve this goal:  1. I will follow the education for gallbladder do's and don'ts, review my discharge summary   2.  I will follow up/reach out as needed                       Intervention/Education provided during outreach: as above     Outreach Frequency: 2 weeks    Plan:   Care Coordinator will follow up in 2 weeks  SHAWANDA Landin RN Clinic Care Coordinator   [place], [place], [place]  Phone: [phone]        "

## 2020-02-19 NOTE — TELEPHONE ENCOUNTER
Nursing Note    D:  Writer called patient back. Patient having 5/10 pain on percocet and 8/10 pain without it & while coughing. Splinting abdomen while coughing. She's down to 8 tabs of percocet now. She feels her pain is improving slowly. Taking 1 tab of perocet q6h. Patient has been up and pushing herself to walk around throughout the day. Taking ibuprofen, minimal tylenol, icing. Eating and drinking wnl. Urine light yellow.    A:  Scheduled patient with Dr. Gerber for tomorrow AM. Patient will work on getting  and will only cancel if she cannot make it to clinic.    R:  Patient verbalizes understanding and will call back if needed.      Gail Rowe RN  [facility]  [phone]  2/19/2020 4:27 PM

## 2020-02-20 ENCOUNTER — OFFICE VISIT (OUTPATIENT)
Dept: SURGERY | Facility: CLINIC | Age: 35
End: 2020-02-20
Payer: COMMERCIAL

## 2020-02-20 VITALS
BODY MASS INDEX: 37.41 KG/M2 | TEMPERATURE: 97.4 F | WEIGHT: 224.8 LBS | DIASTOLIC BLOOD PRESSURE: 80 MMHG | SYSTOLIC BLOOD PRESSURE: 122 MMHG

## 2020-02-20 DIAGNOSIS — Z98.890 POST-OPERATIVE STATE: Primary | ICD-10-CM

## 2020-02-20 PROCEDURE — 99024 POSTOP FOLLOW-UP VISIT: CPT | Performed by: SPECIALIST

## 2020-02-20 NOTE — PROGRESS NOTES
F/U for lap nikos.    Subjective:  Pt feels Ok.  Concerned about RUQ pain when getting up.    Pre-op sx have resolved.  Has been to 2 ER visits - w/u neg.   CT neg.  No fever, chills and is tolerating a diet.  Pt very anxious.    Objective:  B/P: 122/80, T: 97.4, P: Data Unavailable, R: Data Unavailable  Abd: Soft, non tender, non distended,   Incisions healing well.    Lab Results   Component Value Date    WBC 9.2 02/17/2020     Lab Results   Component Value Date    RBC 4.05 02/17/2020     Lab Results   Component Value Date    HGB 12.3 02/17/2020     Lab Results   Component Value Date    HCT 36.3 02/17/2020     No components found for: MCT  Lab Results   Component Value Date    MCV 90 02/17/2020     Lab Results   Component Value Date    MCH 30.4 02/17/2020     Lab Results   Component Value Date    MCHC 33.9 02/17/2020     Lab Results   Component Value Date    RDW 12.3 02/17/2020     Lab Results   Component Value Date     02/17/2020     Liver Function Studies -   Recent Labs   Lab Test 02/17/20  1128   PROTTOTAL 7.2   ALBUMIN 3.9   BILITOTAL 0.5   ALKPHOS 49   AST 15   ALT 36         Assessment/Plan:  Pt s/p lap nikos.  Pre-op sx have resolved.  Pt and anxiety about pain - reassured and feels better.  By report everything is slowly improving.  Will goto scheduled Ibuprofen.  Patient will increase their activity as fatty food intake as tolerated.  F/U with me Monday PRN.    Renard Gerber MD, FACS

## 2020-02-20 NOTE — LETTER
2/20/2020         RE: Breanna Vidal  [address] [address] [place] [id number]        Dear Colleague,    Thank you for referring your patient, Breanna Vidal, to the [facility]. Please see a copy of my visit note below.    Breanna to follow up with Primary Care provider regarding elevated blood pressure.    F/U for lap nikos.    Subjective:  Pt feels Ok.  Concerned about RUQ pain when getting up.    Pre-op sx have resolved.  Has been to 2 ER visits - w/u neg.   CT neg.  No fever, chills and is tolerating a diet.  Pt very anxious.    Objective:  B/P: 122/80, T: 97.4, P: Data Unavailable, R: Data Unavailable  Abd: Soft, non tender, non distended,   Incisions healing well.    Lab Results   Component Value Date    WBC 9.2 02/17/2020     Lab Results   Component Value Date    RBC 4.05 02/17/2020     Lab Results   Component Value Date    HGB 12.3 02/17/2020     Lab Results   Component Value Date    HCT 36.3 02/17/2020     No components found for: MCT  Lab Results   Component Value Date    MCV 90 02/17/2020     Lab Results   Component Value Date    MCH 30.4 02/17/2020     Lab Results   Component Value Date    MCHC 33.9 02/17/2020     Lab Results   Component Value Date    RDW 12.3 02/17/2020     Lab Results   Component Value Date     02/17/2020     Liver Function Studies -   Recent Labs   Lab Test 02/17/20  1128   PROTTOTAL 7.2   ALBUMIN 3.9   BILITOTAL 0.5   ALKPHOS 49   AST 15   ALT 36         Assessment/Plan:  Pt s/p lap nikos.  Pre-op sx have resolved.  Pt and anxiety about pain - reassured and feels better.  By report everything is slowly improving.  Will goto scheduled Ibuprofen.  Patient will increase their activity as fatty food intake as tolerated.  F/U with me Monday PRN.    Renard Gerber MD, FACS      Again, thank you for allowing me to participate in the care of your patient.        Sincerely,        Renard Gerber MD

## 2020-02-25 ENCOUNTER — NURSE TRIAGE (OUTPATIENT)
Dept: NURSING | Facility: CLINIC | Age: 35
End: 2020-02-25

## 2020-02-25 NOTE — TELEPHONE ENCOUNTER
"She understands she needs to be seen in the ER right away. There were a lot of children making noise in the back ground. She is having pain with a deep breaths. She said there is always something there in the back ground when she's just breathing.  Mariah Mace RN-[facility]      Reason for Disposition    Major surgery in the past month    Additional Information    Negative: [1] Breathing stopped AND [2] hasn't returned    Negative: Choking on something    Negative: Severe difficulty breathing (e.g., struggling for each breath, speaks in single words)    Negative: Bluish (or gray) lips or face now    Negative: Difficult to awaken or acting confused (e.g., disoriented, slurred speech)    Negative: Passed out (i.e., lost consciousness, collapsed and was not responding)    Negative: Wheezing started suddenly after medicine, an allergic food or bee sting    Negative: Stridor    Negative: Slow, shallow and weak breathing    Negative: Sounds like a life-threatening emergency to the triager    Negative: Chest pain    Negative: [1] Wheezing (high pitched whistling sound) AND [2] previous asthma attacks or use of asthma medicines    Negative: [1] Difficulty breathing AND [2] only present when coughing    Negative: [1] Difficulty breathing AND [2] only from stuffy or runny nose    Negative: [1] MODERATE difficulty breathing (e.g., speaks in phrases, SOB even at rest, pulse 100-120) AND [2] NEW-onset or WORSE than normal    Negative: Wheezing can be heard across the room    Negative: Drooling or spitting out saliva (because can't swallow)    Negative: History of prior \"blood clot\" in leg or lungs (i.e., deep vein thrombosis, pulmonary embolism)    Negative: History of inherited increased risk of blood clots (e.g., Factor 5 Leiden, Anti-thrombin 3, Protein C or Protein S deficiency, Prothrombin mutation)    Negative: Recent illness requiring prolonged bedrest (i.e., immobilization)    Negative: Hip or leg " fracture in past 2 months (e.g., had cast on leg or ankle)    Protocols used: BREATHING DIFFICULTY-A-AH

## 2020-02-25 NOTE — TELEPHONE ENCOUNTER
Spoke to patient - just pain in RUQ with deep breath.  No fever, chills, natividad diet, SOB.  Nl void and BM.  Re-assured patient but she is very anxious.  Will get US as CT was normal and want to reduce radiation exposure.  She will call when complete.  Offered visit but she declined.

## 2020-02-25 NOTE — TELEPHONE ENCOUNTER
Per pt still having pain, see below. She would like a call back today about her pain. SOA, pain not getting any better. I will transfer to triage due to soa. Thank You Verenice

## 2020-02-25 NOTE — TELEPHONE ENCOUNTER
Patient states that her pain is still 6/10 where her galbladder pain was prior to her surgery.  She states that the ibuprofen helps a little. Denies any distention, fevers, etc.  She is not requesting narcotics but would really like to have some imaging done to reassure her that there is not something that were missing.  I will route to Dr. Gerber and send him a message regarding this.     Lenka BALBUENA, Lead RN, BSN. . .  2/25/2020, 8:34 AM

## 2020-02-26 ENCOUNTER — APPOINTMENT (OUTPATIENT)
Dept: GENERAL RADIOLOGY | Facility: CLINIC | Age: 35
End: 2020-02-26
Attending: EMERGENCY MEDICINE
Payer: COMMERCIAL

## 2020-02-26 ENCOUNTER — HOSPITAL ENCOUNTER (EMERGENCY)
Facility: CLINIC | Age: 35
Discharge: HOME | End: 2020-02-26
Attending: EMERGENCY MEDICINE | Admitting: EMERGENCY MEDICINE
Payer: COMMERCIAL

## 2020-02-26 ENCOUNTER — HOSPITAL ENCOUNTER (OUTPATIENT)
Dept: ULTRASOUND IMAGING | Facility: CLINIC | Age: 35
Discharge: HOME | End: 2020-02-26
Attending: SPECIALIST | Admitting: SPECIALIST
Payer: COMMERCIAL

## 2020-02-26 VITALS
TEMPERATURE: 97.4 F | OXYGEN SATURATION: 99 % | SYSTOLIC BLOOD PRESSURE: 119 MMHG | DIASTOLIC BLOOD PRESSURE: 81 MMHG | BODY MASS INDEX: 35.78 KG/M2 | HEART RATE: 81 BPM | WEIGHT: 215 LBS | RESPIRATION RATE: 16 BRPM

## 2020-02-26 DIAGNOSIS — R10.11 RUQ PAIN: ICD-10-CM

## 2020-02-26 DIAGNOSIS — G89.18 ACUTE POST-OPERATIVE PAIN: ICD-10-CM

## 2020-02-26 LAB
ALBUMIN SERPL-MCNC: 4.3 G/DL (ref 3.4–5)
ALP SERPL-CCNC: 50 U/L (ref 40–150)
ALT SERPL W P-5'-P-CCNC: 33 U/L (ref 0–50)
ANION GAP SERPL CALCULATED.3IONS-SCNC: 6 MMOL/L (ref 3–14)
AST SERPL W P-5'-P-CCNC: 15 U/L (ref 0–45)
BASOPHILS # BLD AUTO: 0.1 10E9/L (ref 0–0.2)
BASOPHILS NFR BLD AUTO: 0.5 %
BILIRUB SERPL-MCNC: 0.5 MG/DL (ref 0.2–1.3)
BUN SERPL-MCNC: 13 MG/DL (ref 7–30)
CALCIUM SERPL-MCNC: 9.1 MG/DL (ref 8.5–10.1)
CHLORIDE SERPL-SCNC: 107 MMOL/L (ref 94–109)
CO2 SERPL-SCNC: 26 MMOL/L (ref 20–32)
CREAT SERPL-MCNC: 0.58 MG/DL (ref 0.52–1.04)
DIFFERENTIAL METHOD BLD: NORMAL
EOSINOPHIL NFR BLD AUTO: 2.2 %
ERYTHROCYTE [DISTWIDTH] IN BLOOD BY AUTOMATED COUNT: 12.8 % (ref 10–15)
GFR SERPL CREATININE-BSD FRML MDRD: >90 ML/MIN/{1.73_M2}
GLUCOSE SERPL-MCNC: 94 MG/DL (ref 70–99)
HCT VFR BLD AUTO: 42.4 % (ref 35–47)
HGB BLD-MCNC: 14.5 G/DL (ref 11.7–15.7)
IMM GRANULOCYTES # BLD: 0 10E9/L (ref 0–0.4)
IMM GRANULOCYTES NFR BLD: 0.4 %
LIPASE SERPL-CCNC: 140 U/L (ref 73–393)
LYMPHOCYTES # BLD AUTO: 2.4 10E9/L (ref 0.8–5.3)
LYMPHOCYTES NFR BLD AUTO: 25.4 %
MCH RBC QN AUTO: 30.2 PG (ref 26.5–33)
MCHC RBC AUTO-ENTMCNC: 34.2 G/DL (ref 31.5–36.5)
MCV RBC AUTO: 88 FL (ref 78–100)
MONOCYTES # BLD AUTO: 0.5 10E9/L (ref 0–1.3)
MONOCYTES NFR BLD AUTO: 4.8 %
NEUTROPHILS # BLD AUTO: 6.2 10E9/L (ref 1.6–8.3)
NEUTROPHILS NFR BLD AUTO: 66.7 %
NRBC # BLD AUTO: 0 10*3/UL
NRBC BLD AUTO-RTO: 0 /100
PLATELET # BLD AUTO: 264 10E9/L (ref 150–450)
POTASSIUM SERPL-SCNC: 4.1 MMOL/L (ref 3.4–5.3)
PROT SERPL-MCNC: 8.1 G/DL (ref 6.8–8.8)
RBC # BLD AUTO: 4.8 10E12/L (ref 3.8–5.2)
SODIUM SERPL-SCNC: 139 MMOL/L (ref 133–144)
WBC # BLD AUTO: 9.3 10E9/L (ref 4–11)

## 2020-02-26 PROCEDURE — 80053 COMPREHEN METABOLIC PANEL: CPT | Performed by: EMERGENCY MEDICINE

## 2020-02-26 PROCEDURE — 99284 EMERGENCY DEPT VISIT MOD MDM: CPT | Mod: 25 | Performed by: EMERGENCY MEDICINE

## 2020-02-26 PROCEDURE — 36415 COLL VENOUS BLD VENIPUNCTURE: CPT | Performed by: EMERGENCY MEDICINE

## 2020-02-26 PROCEDURE — 71046 X-RAY EXAM CHEST 2 VIEWS: CPT | Mod: TC

## 2020-02-26 PROCEDURE — 85025 COMPLETE CBC W/AUTO DIFF WBC: CPT | Performed by: EMERGENCY MEDICINE

## 2020-02-26 PROCEDURE — 76705 ECHO EXAM OF ABDOMEN: CPT

## 2020-02-26 PROCEDURE — 83690 ASSAY OF LIPASE: CPT | Performed by: EMERGENCY MEDICINE

## 2020-02-26 PROCEDURE — 99283 EMERGENCY DEPT VISIT LOW MDM: CPT | Mod: Z6 | Performed by: EMERGENCY MEDICINE

## 2020-02-26 ASSESSMENT — ENCOUNTER SYMPTOMS
CHEST TIGHTNESS: 0
FEVER: 0
ABDOMINAL PAIN: 1
DIFFICULTY URINATING: 0
FATIGUE: 0
CHILLS: 0
DIARRHEA: 0
ABDOMINAL DISTENTION: 0
NAUSEA: 0
SHORTNESS OF BREATH: 0
PALPITATIONS: 0
BLOOD IN STOOL: 0
FLANK PAIN: 0
VOMITING: 0

## 2020-02-26 NOTE — DISCHARGE INSTRUCTIONS
You may use your ibuprofen as needed for pain.  We do not see any signs of anything worrisome today fortunately.  I hope you get feeling better soon.

## 2020-02-26 NOTE — ED PROVIDER NOTES
History     Chief Complaint   Patient presents with     Post-op Problem     HPI  Breanna Vidal is a 35 year old female who presents with right upper quadrant pain. She has a cholecystectomy on 02/14 and had improvement in biliary colic symptoms 3-4 day post-operatively. After that time-frame she began to experience right upper quadrant pain. She describes it as an achy constant pain 7/10. She has been taking  mg on an as needed basis with minimal relief. The pain does not radiate and she has not had any shortness of breath. She has not had any nausea or vomiting. She has had some loose stools since her procedure, but no noted blood in the stool. She has been able to retain a normal diet. No increase in activity or exertion beyond her baseline.     Allergies:  Allergies   Allergen Reactions     Bees Anaphylaxis     Has Epi pen      Codeine Nausea     Other reaction(s): Nausea     Imitrex [Sumatriptan]      Worsening of migraine, paresthesias     Ranitidine Rash and Itching     recvd IV zantac 75 and reaction was immediate,  was given Benadryl to counter act reaction in 2000     Sulfa Drugs Unknown and Rash       Problem List:    Patient Active Problem List    Diagnosis Date Noted     Biliary dyskinesia 02/10/2020     Priority: Medium     Added automatically from request for surgery [id number]       Intractable migraine without aura and without status migrainosus 07/17/2019     Priority: Medium     Alcohol abuse, in remission 08/20/2018     Priority: Medium     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) 08/20/2018     Priority: Medium     Mixed incontinence 03/03/2017     Priority: Medium     Vaginal delivery 10/31/2011     Priority: Medium     Rh negative status during pregnancy 09/23/2011     Priority: Medium        Past Medical History:    Past Medical History:   Diagnosis Date     Cellulitis 2012     Drug abuse (H) 2011     MRSA cellulitis 2012     Rh negative, antepartum        Past Surgical History:    Past  Surgical History:   Procedure Laterality Date     EXCISE LESION FOOT Left 8/16/2019    Procedure: Excision of nevus second interdigital space left foot, Excision of nevus third interdigital space left foot, excision of plantar junctional nevus left foot;  Surgeon: Rinku Lopez DPM;  Location: [facility] OR     INCISION AND DRAINAGE  04/18/2012    chin abscess I&D     LAPAROSCOPIC CHOLECYSTECTOMY N/A 2/14/2020    Procedure: Laparoscopic Cholecystectomy;  Surgeon: Renard Gerber MD;  Location: [facility] OR     LAPAROSCOPIC HERNIORRHAPHY INCISIONAL N/A 7/17/2019    Procedure: laparoscopic incisional hernia repair with mesh;  Surgeon: Jovi Odom MD;  Location: [facility] OR     LAPAROSCOPIC SALPINGECTOMY Bilateral 8/27/2018    Procedure: LAPAROSCOPIC SALPINGECTOMY;  Laparoscopic Bilateral Salpingectomy;  Surgeon: Chai Cui MD;  Location: [facility] OR     MAMMOPLASTY REDUCTION Bilateral 08/04/2017       Family History:    Family History   Problem Relation Age of Onset     Cancer Father      Pancreatic Cancer Father      Lung Cancer Father      Lupus Mother        Social History:  Marital Status:  Single [1]  Social History     Tobacco Use     Smoking status: Former Smoker     Smokeless tobacco: Former User     Quit date: 3/10/2016   Substance Use Topics     Alcohol use: No     Comment: history alcohol abuse 2012, s/p treatment     Drug use: No     Comment: former drug user (EtOH, cocaine, marijuana, meth), last use 2012        Medications:    acetaminophen (TYLENOL) 500 MG tablet  aspirin-acetaminophen-caffeine (EXCEDRIN MIGRAINE) 250-250-65 MG tablet  EPINEPHrine (EPIPEN/ADRENACLICK/OR ANY BX GENERIC EQUIV) 0.3 MG/0.3ML injection 2-pack  ibuprofen (ADVIL/MOTRIN) 200 MG tablet  naproxen (NAPROSYN) 500 MG tablet  ondansetron (ZOFRAN ODT) 4 MG ODT tab  oxyCODONE-acetaminophen (PERCOCET) 5-325 MG tablet          Review of Systems   Constitutional: Negative for chills, fatigue and fever.   Respiratory: Negative for chest tightness  and shortness of breath.    Cardiovascular: Negative for chest pain and palpitations.   Gastrointestinal: Positive for abdominal pain. Negative for abdominal distention, blood in stool, diarrhea, nausea and vomiting.   Genitourinary: Negative for difficulty urinating and flank pain.   All other systems reviewed and are negative.      Physical Exam   BP: 119/81  Pulse: 81  Temp: 97.4  F (36.3  C)  Resp: 16  Weight: 97.5 kg (215 lb)  SpO2: 99 %      Physical Exam  Constitutional:       General: She is awake.      Appearance: Normal appearance. She is not ill-appearing or toxic-appearing.   Cardiovascular:      Rate and Rhythm: Normal rate and regular rhythm.      Pulses: Normal pulses.           Radial pulses are 2+ on the right side and 2+ on the left side.      Heart sounds: Normal heart sounds.   Pulmonary:      Effort: Pulmonary effort is normal.      Breath sounds: Normal breath sounds and air entry.   Abdominal:      General: Abdomen is flat. Bowel sounds are normal. There is no distension.      Palpations: Abdomen is soft. There is no mass.      Tenderness: There is abdominal tenderness in the right upper quadrant.      Hernia: No hernia is present.   Musculoskeletal:      Right lower leg: No edema.      Left lower leg: No edema.   Neurological:      Mental Status: She is alert and oriented to person, place, and time.   Psychiatric:         Attention and Perception: Attention normal.         Mood and Affect: Mood and affect normal.         Speech: Speech normal.         Behavior: Behavior normal. Behavior is cooperative.         Thought Content: Thought content normal.         ED Course  Patient was interviewed to gather HPI and ROS details. Physical exam was preformed and she did exhibit some RUQ pain. She had an complete abdominal US prior to her arrival to the ED. We are awaiting the results. We will draw a CBC, CMP, and lipase. We will also proceed with with a 2V chest xray.          Results for orders  placed or performed during the hospital encounter of 02/26/20 (from the past 24 hour(s))   XR Chest 2 Views    Narrative    CHEST TWO VIEWS   2/26/2020 11:28 AM     HISTORY: Chest pain.    COMPARISON: 11/25/2019.      Impression    IMPRESSION: No acute cardiopulmonary disease.    SHRUTHI GRAYSON MD   CBC with platelets differential   Result Value Ref Range    WBC 9.3 4.0 - 11.0 10e9/L    RBC Count 4.80 3.8 - 5.2 10e12/L    Hemoglobin 14.5 11.7 - 15.7 g/dL    Hematocrit 42.4 35.0 - 47.0 %    MCV 88 78 - 100 fl    MCH 30.2 26.5 - 33.0 pg    MCHC 34.2 31.5 - 36.5 g/dL    RDW 12.8 10.0 - 15.0 %    Platelet Count 264 150 - 450 10e9/L    Diff Method Automated Method     % Neutrophils 66.7 %    % Lymphocytes 25.4 %    % Monocytes 4.8 %    % Eosinophils 2.2 %    % Basophils 0.5 %    % Immature Granulocytes 0.4 %    Nucleated RBCs 0 0 /100    Absolute Neutrophil 6.2 1.6 - 8.3 10e9/L    Absolute Lymphocytes 2.4 0.8 - 5.3 10e9/L    Absolute Monocytes 0.5 0.0 - 1.3 10e9/L    Absolute Basophils 0.1 0.0 - 0.2 10e9/L    Abs Immature Granulocytes 0.0 0 - 0.4 10e9/L    Absolute Nucleated RBC 0.0    Comprehensive metabolic panel   Result Value Ref Range    Sodium 139 133 - 144 mmol/L    Potassium 4.1 3.4 - 5.3 mmol/L    Chloride 107 94 - 109 mmol/L    Carbon Dioxide 26 20 - 32 mmol/L    Anion Gap 6 3 - 14 mmol/L    Glucose 94 70 - 99 mg/dL    Urea Nitrogen 13 7 - 30 mg/dL    Creatinine 0.58 0.52 - 1.04 mg/dL    GFR Estimate >90 >60 mL/min/[1.73_m2]    GFR Estimate If Black >90 >60 mL/min/[1.73_m2]    Calcium 9.1 8.5 - 10.1 mg/dL    Bilirubin Total 0.5 0.2 - 1.3 mg/dL    Albumin 4.3 3.4 - 5.0 g/dL    Protein Total 8.1 6.8 - 8.8 g/dL    Alkaline Phosphatase 50 40 - 150 U/L    ALT 33 0 - 50 U/L    AST 15 0 - 45 U/L   Lipase   Result Value Ref Range    Lipase 140 73 - 393 U/L       Medications - No data to display    Assessments & Plan (with Medical Decision Making)  1. Cholecystectomy Post-operative Pain   -The diagnostic testing including  labs and abdominal US were all within normal limits. She was reassured by these findings. She has agreed to continue using OTC medication for pain and she is scheduled to follow-up with Dr. Gerber tomorrow.      I have reviewed the nursing notes.    I have reviewed the findings, diagnosis, plan and need for follow up with the patient.       Discharge Medication List as of 2/26/2020 11:51 AM          Final diagnoses:   Acute post-operative pain       2/26/2020   [facility] EMERGENCY DEPARTMENT     Otis Garcia MD  02/26/20 [id number]

## 2020-02-26 NOTE — ED TRIAGE NOTES
"Presents from ultrasound with concerns for post-op pain. She reports her surgeon ordered an US to check after she had her gall bladder out 2/14/2020. She states it's hard to breathe\". Patient is on the phone talking during triage. Nehal Bloom RN    "

## 2020-02-27 ENCOUNTER — PATIENT OUTREACH (OUTPATIENT)
Dept: CARE COORDINATION | Facility: CLINIC | Age: 35
End: 2020-02-27

## 2020-02-27 NOTE — PROGRESS NOTES
Clinic Care Coordination Contact    Follow Up Progress Note      Assessment: Patient went to ER 2/26/20 for post operative pain. She was to see surgeon today, but patient stated she canceled this appointment. Starting to feel better. She was reassured with labs and imaging in ER. Took ibuprofen yesterday. slept well. Rating her pain at 2 out of 10 located at RUQ. No longer following bland diet. Her nausea subsided once she stopped taking oxycodone. Started following new diet. Seeing a  in person. Using herbalife shake 2x per day. for weight loss. Eating protein, fruit and veggies. Getting adequate fluids. Had surgeon post op appointment.  incisions look good, no fevers, no symptoms of infection. She will follow up with PCP from here on out.     Goals addressed this encounter:   Goals Addressed                 This Visit's Progress       General      COMPLETED: Medical (pt-stated)        Goal Statement: I want to manage my symptoms while waiting for my post op surgery appointment. I want to prevent going back to the ER. There were many sick people there.   Date Goal set: 2/19/20  Date to Achieve By: 1 week  Patient expressed understanding of goal: yes  Action steps to achieve this goal:  1. I will follow the education for gallbladder do's and don'ts, review my discharge summary   2.  I will follow up/reach out as needed                       Intervention/Education provided during outreach: patient has completed the above goal. She feels she will not need any further care. CC RN will place patient at maintenance status.      Outreach Frequency: 2 weeks    Plan:   Care Coordinator will follow up in 2 months. Anticipation of graduation at that time.   SHAWANDA Landin RN Clinic Care Coordinator   [place], [place], [place]  Phone: [phone]

## 2020-03-18 DIAGNOSIS — J01.00 ACUTE NON-RECURRENT MAXILLARY SINUSITIS: ICD-10-CM

## 2020-03-18 RX ORDER — FLUTICASONE PROPIONATE 50 MCG
1 SPRAY, SUSPENSION (ML) NASAL DAILY
Qty: 16 ML | Refills: 1 | Status: SHIPPED | OUTPATIENT
Start: 2020-03-18 | End: 2020-06-10

## 2020-03-18 NOTE — TELEPHONE ENCOUNTER
Reason for Call:  Medication or medication refill: Medication     Do you use a [facility]?  Name of the pharmacy and phone number for the current request:  [facility] - [phone]    Name of the medication requested: Flonase     Other request: Anytime     Can we leave a detailed message on this number? YES    Phone number patient can be reached at: Cell number on file:    Telephone Information:   Mobile [phone]       Best Time: Anytime     Call taken on 3/18/2020 at 8:00 AM by Debbie Donald

## 2020-03-30 ENCOUNTER — MYC MEDICAL ADVICE (OUTPATIENT)
Dept: SURGERY | Facility: CLINIC | Age: 35
End: 2020-03-30

## 2020-03-31 ENCOUNTER — OFFICE VISIT (OUTPATIENT)
Dept: SURGERY | Facility: CLINIC | Age: 35
End: 2020-03-31
Payer: COMMERCIAL

## 2020-03-31 ENCOUNTER — HOSPITAL ENCOUNTER (EMERGENCY)
Facility: CLINIC | Age: 35
Discharge: HOME | End: 2020-03-31
Attending: FAMILY MEDICINE | Admitting: FAMILY MEDICINE
Payer: COMMERCIAL

## 2020-03-31 VITALS
WEIGHT: 226.5 LBS | BODY MASS INDEX: 37.69 KG/M2 | TEMPERATURE: 96.9 F | DIASTOLIC BLOOD PRESSURE: 85 MMHG | SYSTOLIC BLOOD PRESSURE: 116 MMHG

## 2020-03-31 VITALS
HEART RATE: 70 BPM | BODY MASS INDEX: 37.61 KG/M2 | WEIGHT: 226 LBS | DIASTOLIC BLOOD PRESSURE: 86 MMHG | OXYGEN SATURATION: 100 % | RESPIRATION RATE: 16 BRPM | TEMPERATURE: 97.2 F | SYSTOLIC BLOOD PRESSURE: 137 MMHG

## 2020-03-31 DIAGNOSIS — T81.41XA POSTOPERATIVE STITCH ABSCESS: Primary | ICD-10-CM

## 2020-03-31 DIAGNOSIS — Z48.89 ENCOUNTER FOR POST SURGICAL WOUND CHECK: ICD-10-CM

## 2020-03-31 PROCEDURE — 87070 CULTURE OTHR SPECIMN AEROBIC: CPT | Performed by: FAMILY MEDICINE

## 2020-03-31 PROCEDURE — 99283 EMERGENCY DEPT VISIT LOW MDM: CPT | Performed by: FAMILY MEDICINE

## 2020-03-31 PROCEDURE — 87077 CULTURE AEROBIC IDENTIFY: CPT | Performed by: FAMILY MEDICINE

## 2020-03-31 PROCEDURE — 87186 SC STD MICRODIL/AGAR DIL: CPT | Performed by: FAMILY MEDICINE

## 2020-03-31 PROCEDURE — 99024 POSTOP FOLLOW-UP VISIT: CPT | Performed by: SPECIALIST

## 2020-03-31 PROCEDURE — 99283 EMERGENCY DEPT VISIT LOW MDM: CPT | Mod: Z6 | Performed by: FAMILY MEDICINE

## 2020-03-31 NOTE — ED TRIAGE NOTES
"Patient comes from Dr. Gerber's office with concerns for post op incision infection. She states Dr. Gerber saw her and \"dug around and it really hurt.\" She is requesting antibiotics. Nehal Bloom RN   "

## 2020-03-31 NOTE — TELEPHONE ENCOUNTER
Per Dr. Gerber he would like to see patient in clinic, VM left with call back number [phone]............................Sheyla Orosco CMA  ([facility])

## 2020-03-31 NOTE — LETTER
3/31/2020         RE: Breanna Vidal  [address] [place] [id number]        Dear Colleague,    Thank you for referring your patient, Breanna Vidal, to the [facility]. Please see a copy of my visit note below.    F/U for Incision    Subjective:  Patient is 6 weeks status post laparoscopic cholecystectomy.  Her supraumbilical incision has been intermittently draining and she noticed a small white spot in the middle and she now presents for evaluation.      Objective:  B/P: 116/85, T: 96.9, P: Data Unavailable, R: Data Unavailable  Abd: small stitch abscess - residual vicryl removed.    Assessment/plan:  This is a 35-year lady status post laparoscopic cholecystectomy the small stitch abscess.  Residual Vicryl was removed today.  The plan at this time is have her wash area daily soap and water and cover with a Band-Aid.  She will follow-up with me in 2 weeks should it not fully close.    Renard Gerber MD, FACS    Again, thank you for allowing me to participate in the care of your patient.        Sincerely,        Renard Gerber MD

## 2020-03-31 NOTE — ED AVS SNAPSHOT
[facility] Emergency Department  [address] [place] [address]  Phone:  [phone]  Fax:  [phone]                                    Breanna Vidal   MRN: [id number]    Department:  [facility] Emergency Department   Date of Visit:  3/31/2020           After Visit Summary Signature Page    I have received my discharge instructions, and my questions have been answered. I have discussed any challenges I see with this plan with the nurse or doctor.    ..........................................................................................................................................  Patient/Patient Representative Signature      ..........................................................................................................................................  Patient Representative Print Name and Relationship to Patient    ..................................................               ................................................  Date                                   Time    ..........................................................................................................................................  Reviewed by Signature/Title    ...................................................              ..............................................  Date                                               Time          22EPIC Rev 08/18

## 2020-03-31 NOTE — RESULT ENCOUNTER NOTE
[facility] ED discharge antibiotic (if prescribed):  None  Incision and Drainage performed in [facility] ED [Yes / No] : No  No changes in treatment per ED lab result protocol.

## 2020-03-31 NOTE — ED PROVIDER NOTES
History     Chief Complaint   Patient presents with     Post-op Problem     HPI  Breanna Vidal is a 35 year old female who presents to the emergency department after being seen by her surgeon just 15 minutes ago.  Patient comes in because she is very concerned that she has a wound infection.  She was seen by the surgeon and had a stitch removed and told that her wound is not infected.  She states that she wants to believe the surgeon but she has to trust her gut.  She has been concerned because every morning there is been a whitish discharge coming out.  Denies any fevers or chills.  Area is somewhat tender.  Please see Dr. Gerber's note from just 15 minutes ago earlier today.    Allergies:  Allergies   Allergen Reactions     Bees Anaphylaxis     Has Epi pen      Codeine Nausea     Other reaction(s): Nausea     Imitrex [Sumatriptan]      Worsening of migraine, paresthesias     Ranitidine Rash and Itching     recvd IV zantac 75 and reaction was immediate,  was given Benadryl to counter act reaction in 2000     Sulfa Drugs Unknown and Rash       Problem List:    Patient Active Problem List    Diagnosis Date Noted     Biliary dyskinesia 02/10/2020     Priority: Medium     Added automatically from request for surgery [id number]       Intractable migraine without aura and without status migrainosus 07/17/2019     Priority: Medium     Alcohol abuse, in remission 08/20/2018     Priority: Medium     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) 08/20/2018     Priority: Medium     Mixed incontinence 03/03/2017     Priority: Medium     Vaginal delivery 10/31/2011     Priority: Medium     Rh negative status during pregnancy 09/23/2011     Priority: Medium        Past Medical History:    Past Medical History:   Diagnosis Date     Cellulitis 2012     Drug abuse (H) 2011     MRSA cellulitis 2012     Rh negative, antepartum        Past Surgical History:    Past Surgical History:   Procedure Laterality Date     EXCISE LESION FOOT Left  8/16/2019    Procedure: Excision of nevus second interdigital space left foot, Excision of nevus third interdigital space left foot, excision of plantar junctional nevus left foot;  Surgeon: Rinku Lopez DPM;  [facility]     INCISION AND DRAINAGE  04/18/2012    chin abscess I&D     LAPAROSCOPIC CHOLECYSTECTOMY N/A 2/14/2020    Procedure: Laparoscopic Cholecystectomy;  Surgeon: Renard Gerber MD;  [facility]     LAPAROSCOPIC HERNIORRHAPHY INCISIONAL N/A 7/17/2019    Procedure: laparoscopic incisional hernia repair with mesh;  Surgeon: Jovi Odom MD;  [facility]     LAPAROSCOPIC SALPINGECTOMY Bilateral 8/27/2018    Procedure: LAPAROSCOPIC SALPINGECTOMY;  Laparoscopic Bilateral Salpingectomy;  Surgeon: Chai Cui MD;  Location: [facility]     MAMMOPLASTY REDUCTION Bilateral 08/04/2017       Family History:    Family History   Problem Relation Age of Onset     Cancer Father      Pancreatic Cancer Father      Lung Cancer Father      Lupus Mother        Social History:  Marital Status:  Single [1]  Social History     Tobacco Use     Smoking status: Former Smoker     Smokeless tobacco: Former User     Quit date: 3/10/2016   Substance Use Topics     Alcohol use: No     Comment: history alcohol abuse 2012, s/p treatment     Drug use: No     Comment: former drug user (EtOH, cocaine, marijuana, meth), last use 2012        Medications:    acetaminophen (TYLENOL) 500 MG tablet  ibuprofen (ADVIL/MOTRIN) 200 MG tablet  aspirin-acetaminophen-caffeine (EXCEDRIN MIGRAINE) 250-250-65 MG tablet  EPINEPHrine (EPIPEN/ADRENACLICK/OR ANY BX GENERIC EQUIV) 0.3 MG/0.3ML injection 2-pack  fluticasone (FLONASE) 50 MCG/ACT nasal spray  naproxen (NAPROSYN) 500 MG tablet          Review of Systems   All other systems reviewed and are negative.      Physical Exam   BP: 137/86  Pulse: 70  Temp: 97.2  F (36.2  C)  Resp: 16  Weight: 102.5 kg (226 lb)  SpO2: 100 %      Physical Exam  Vitals signs and nursing note  reviewed.   Constitutional:       General: She is not in acute distress.     Appearance: Normal appearance. She is not ill-appearing.   Skin:     General: Skin is warm and dry.          Neurological:      Mental Status: She is alert.         ED Course        Procedures           I agree with Dr. Gerber, this wound does not feel infected.  Patient is very worried about coming home and having to come back if it does get infected and she feels like she has to trust her own judgment.  She is requesting to get a wound culture.  I definitely do not think starting antibiotics is warranted but if she would like a wound culture I think this is okay.  I did do a swab of the bloody drainage coming from umbilicus area.  Area was bandaged.  Patient will be discharged home and follow-up with Dr. Gerber if anything does grow.    Assessments & Plan (with Medical Decision Making)  Encounter for wound check     I have reviewed the nursing notes.    I have reviewed the findings, diagnosis, plan and need for follow up with the patient.          Final diagnoses:   Encounter for post surgical wound check       3/31/2020   [facility] EMERGENCY DEPARTMENT     Franck Beasley MD  03/31/20 [id number]

## 2020-03-31 NOTE — DISCHARGE INSTRUCTIONS
1.  If your culture does grow anything, our lab nurse should call you in about 48 hours.  If you do not hear anything, this means that your culture was negative.  2.  Please follow-up with pamela Gerber with any further concerns.

## 2020-03-31 NOTE — PROGRESS NOTES
F/U for Incision    Subjective:  Patient is 6 weeks status post laparoscopic cholecystectomy.  Her supraumbilical incision has been intermittently draining and she noticed a small white spot in the middle and she now presents for evaluation.      Objective:  B/P: 116/85, T: 96.9, P: Data Unavailable, R: Data Unavailable  Abd: small stitch abscess - residual vicryl removed.    Assessment/plan:  This is a 35-year lady status post laparoscopic cholecystectomy the small stitch abscess.  Residual Vicryl was removed today.  The plan at this time is have her wash area daily soap and water and cover with a Band-Aid.  She will follow-up with me in 2 weeks should it not fully close.    Renard Gerber MD, FACS

## 2020-04-01 ENCOUNTER — PATIENT OUTREACH (OUTPATIENT)
Dept: CARE COORDINATION | Facility: CLINIC | Age: 35
End: 2020-04-01

## 2020-04-01 SDOH — ECONOMIC STABILITY: FOOD INSECURITY: HOW HARD IS IT FOR YOU TO PAY FOR THE VERY BASICS LIKE FOOD, HOUSING, MEDICAL CARE, AND HEATING?: NOT HARD AT ALL

## 2020-04-01 SDOH — ECONOMIC STABILITY: FOOD INSECURITY: WITHIN THE PAST 12 MONTHS, THE FOOD YOU BOUGHT JUST DIDN'T LAST AND YOU DIDN'T HAVE MONEY TO GET MORE.: NEVER TRUE

## 2020-04-01 SDOH — ECONOMIC STABILITY: TRANSPORTATION INSECURITY: IN THE PAST 12 MONTHS, HAS LACK OF TRANSPORTATION KEPT YOU FROM MEDICAL APPOINTMENTS OR FROM GETTING MEDICATIONS?: NO

## 2020-04-01 SDOH — HEALTH STABILITY: PHYSICAL HEALTH: ON AVERAGE, HOW MANY MINUTES DO YOU ENGAGE IN EXERCISE AT THIS LEVEL?: 20 MIN

## 2020-04-01 SDOH — HEALTH STABILITY: PHYSICAL HEALTH: ON AVERAGE, HOW MANY DAYS PER WEEK DO YOU ENGAGE IN MODERATE TO STRENUOUS EXERCISE (LIKE A BRISK WALK)?: 3 DAYS

## 2020-04-01 SDOH — ECONOMIC STABILITY: FOOD INSECURITY: WITHIN THE PAST 12 MONTHS, YOU WORRIED THAT YOUR FOOD WOULD RUN OUT BEFORE YOU GOT THE MONEY TO BUY MORE.: NEVER TRUE

## 2020-04-01 ASSESSMENT — ACTIVITIES OF DAILY LIVING (ADL): LACK_OF_TRANSPORTATION: NO

## 2020-04-01 NOTE — PROGRESS NOTES
Clinic Care Coordination Contact  [facility]/Voicemail       Clinical Data: Care Coordinator Outreach  Outreach attempted x 1.  Left message on patient's voicemail with call back information and requested return call.  Plan: Care Coordinator will try to reach patient again in 1-2 business days.    NGUYEN LandinN RN Clinic Care Coordinator   [place], [place], [place]  Phone: [phone]

## 2020-04-01 NOTE — PROGRESS NOTES
Clinic Care Coordination Contact    Follow Up Progress Note      Assessment:   Patient returned call, patient saw her surgeon 3/31 for drainage from supraumbilical incision. There was a small stitch abscess. 15 min later patient presented to ER for same concerns, wound infection. A wound culture sample was taken.  Patient has the phone # to her surgeons office if she needs to reach out to them.    There is redness and cheesy, white discharge. She is keeping area clean and dry. Waiting on culture results.       Goals addressed this encounter:   Goals Addressed                 This Visit's Progress       General      Other (pt-stated)        Goal Statement: I want the results of my wound culture and for the incision to heal  Date Goal set: 4/1/20  Barriers: possible infection   Strengths: wound culture in process  Date to Achieve By: 4/3/20  Patient expressed understanding of goal: yes  Action steps to achieve this goal:  1. I will keep area clean and dry  2. I will await follow up for the culture.   3. I will reach out to surgeons office if I don't hear back in 1-2 days.                  Intervention/Education provided during outreach: as above      Plan:   1. Patient will await culture results.  2. CC RN will reach out to patient again in 2 weeks.     NGUYEN LandinN RN Clinic Care Coordinator   [place], [place], [place]  Phone: [phone]

## 2020-04-02 ENCOUNTER — TELEPHONE (OUTPATIENT)
Dept: FAMILY MEDICINE | Facility: OTHER | Age: 35
End: 2020-04-02

## 2020-04-02 LAB
BACTERIA SPEC CULT: ABNORMAL
Lab: ABNORMAL
SPECIMEN SOURCE: ABNORMAL

## 2020-04-02 NOTE — TELEPHONE ENCOUNTER
The results were forwarded to Dr. Gerber today so I called patient and told her I will forward her telephone message to him.  Karen Campos, JONATHANC

## 2020-04-02 NOTE — TELEPHONE ENCOUNTER
Patient is asking for clarification on her lab results: she thinks it says she has a staph infection but would like provider to call her and explain verbiage.   [phone]  Flower Ovalle MA on 4/2/2020 at 11:57 AM

## 2020-04-02 NOTE — RESULT ENCOUNTER NOTE
Final result  Routed to Dr Gerber, Surgeon.  Inquire is treatment is necessary or not    Miles Suarez RN  [facility]  Emergency Dept Lab Result RN  Ph# [phone]

## 2020-04-03 NOTE — TELEPHONE ENCOUNTER
Spoke to patient and explained wound is colonized - doubt need for abx.  Wound has healed as per patient.  Still very anxious - will have her send a picture.

## 2020-04-04 ENCOUNTER — MYC MEDICAL ADVICE (OUTPATIENT)
Dept: SURGERY | Facility: CLINIC | Age: 35
End: 2020-04-04

## 2020-04-06 ENCOUNTER — TELEPHONE (OUTPATIENT)
Dept: SURGERY | Facility: OTHER | Age: 35
End: 2020-04-06

## 2020-04-06 DIAGNOSIS — T81.41XA POSTOPERATIVE STITCH ABSCESS: Primary | ICD-10-CM

## 2020-04-06 RX ORDER — CEPHALEXIN 500 MG/1
500 CAPSULE ORAL 2 TIMES DAILY
Qty: 14 CAPSULE | Refills: 0 | Status: SHIPPED | OUTPATIENT
Start: 2020-04-06 | End: 2020-05-18

## 2020-04-06 NOTE — TELEPHONE ENCOUNTER
Writing nurse spoke to patient who reported to low grade fever of 99.9, chills, redness to incision. Minimal drainage. Patient is requesting abx. Writing nurse spoke to provider who prescribed Keflex and wants to see patient in clinic tomorrow. Writer returned call to patient and informed her, but she would not make an appointment with writer reporting she does not want to expose herself to COVID-19. Patient said she will talk it over with her  and return call to clinic. Patient returned call to clinic and scheduled appointment for tomorrow at 0915.    Devora Garber RN on 4/6/2020 at 12:08 PM

## 2020-04-06 NOTE — TELEPHONE ENCOUNTER
Reason for Call:  Other call back and prescription    Detailed comments: patient states if she did not improve to call for a antibiotic.  She would like it sent to the [facility].  Thanks    Phone Number Patient can be reached at: Home number on file [phone] (home)    Best Time: any    Can we leave a detailed message on this number? YES    Call taken on 4/6/2020 at 8:55 AM by Roz Levin

## 2020-04-06 NOTE — TELEPHONE ENCOUNTER
Patient called back.  Please call again.  Patient does not want to come into clinic.  She is willing to do a phone visit if necessary.

## 2020-04-14 ENCOUNTER — VIRTUAL VISIT (OUTPATIENT)
Dept: FAMILY MEDICINE | Facility: OTHER | Age: 35
End: 2020-04-14
Payer: COMMERCIAL

## 2020-04-14 ENCOUNTER — TELEPHONE (OUTPATIENT)
Dept: FAMILY MEDICINE | Facility: OTHER | Age: 35
End: 2020-04-14

## 2020-04-14 DIAGNOSIS — Z20.822 EXPOSURE TO COVID-19 VIRUS: ICD-10-CM

## 2020-04-14 DIAGNOSIS — Z20.822 SUSPECTED COVID-19 VIRUS INFECTION: Primary | ICD-10-CM

## 2020-04-14 PROCEDURE — 99442 ZZC PHYSICIAN TELEPHONE EVALUATION 11-20 MIN: CPT | Performed by: STUDENT IN AN ORGANIZED HEALTH CARE EDUCATION/TRAINING PROGRAM

## 2020-04-14 NOTE — TELEPHONE ENCOUNTER
Wrote letter for  to quarantine which should be sufficient. Patient does not work so does not need letter for herself.  JONATHAN FlorianC

## 2020-04-14 NOTE — PROGRESS NOTES
"Breanna Vidal is a 35 year old female who is being evaluated via a billable telephone visit.      The patient has been notified of following:     \"This telephone visit will be conducted via a call between you and your physician/provider. We have found that certain health care needs can be provided without the need for a physical exam.  This service lets us provide the care you need with a short phone conversation.  If a prescription is necessary we can send it directly to your pharmacy.  If lab work is needed we can place an order for that and you can then stop by our lab to have the test done at a later time.    Telephone visits are billed at different rates depending on your insurance coverage. During this emergency period, for some insurers they may be billed the same as an in-person visit.  Please reach out to your insurance provider with any questions.    If during the course of the call the physician/provider feels a telephone visit is not appropriate, you will not be charged for this service.\"    Patient has given verbal consent for Telephone visit?  Yes    How would you like to obtain your AVS? [place]     Breanna Vidal is a 35 year old female who presents to clinic today for the following health issues:      Acute Illness   Acute illness concerns: cough, fever, woke up yesterday with harsh cough, hurts to breathe deep, dry cough, coughing so hard sounds like a \"honking goose\". She also has a headache and sore throat. She feels very tired. Yesterday she thinks she might have eaten something she was allergic to? She had steak and potato. Later that night she looked in the mirror at bedtime and saw rash on face, chest, back, none on arms. Took Benadryl and the rash was gone by morning.    Cousin is positive for Covid-19 and in the ICU. She visited her cousin last Thursday.  has had cough that is productive and is being treated with antibiotics.      Onset: yesterday     Fever: YES- 100.2 " "    Chills/Sweats: YES    Headache (location?): YES    Sinus Pressure:no    Conjunctivitis:  no    Ear Pain: no    Rhinorrhea: no     Congestion: no     Sore Throat: YES     Cough: YES-non-productive    Wheeze: no     Decreased Appetite: no    Nausea: no    Vomiting: no    Diarrhea:  YES    Dysuria/Freq.: no     Fatigue/Achiness: no     Sick/Strep Exposure: YES- cousin in ICU due of covid-19  Rash-  \"all over my stomach, chest, back, facial, throat\" onset yesterday. Took benadryl, woke up this morning it was clear.     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)     Intractable migraine without aura and without status migrainosus     Biliary dyskinesia     Past Surgical History:   Procedure Laterality Date     EXCISE LESION FOOT Left 8/16/2019    Procedure: Excision of nevus second interdigital space left foot, Excision of nevus third interdigital space left foot, excision of plantar junctional nevus left foot;  Surgeon: Rinku Lopez DPM;  [facility]     INCISION AND DRAINAGE  04/18/2012    chin abscess I&D     LAPAROSCOPIC CHOLECYSTECTOMY N/A 2/14/2020    Procedure: Laparoscopic Cholecystectomy;  Surgeon: Renard Gerber MD;  [facility]     LAPAROSCOPIC HERNIORRHAPHY INCISIONAL N/A 7/17/2019    Procedure: laparoscopic incisional hernia repair with mesh;  Surgeon: Jovi Odom MD;  [facility]     LAPAROSCOPIC SALPINGECTOMY Bilateral 8/27/2018    Procedure: LAPAROSCOPIC SALPINGECTOMY;  Laparoscopic Bilateral Salpingectomy;  Surgeon: Chai Cui MD;  [facility]     MAMMOPLASTY REDUCTION Bilateral 08/04/2017       Social History     Tobacco Use     Smoking status: Former Smoker     Smokeless tobacco: Former User     Quit date: 3/10/2016   Substance Use Topics     Alcohol use: " No     Comment: history alcohol abuse 2012, s/p treatment     Family History   Problem Relation Age of Onset     Cancer Father      Pancreatic Cancer Father      Lung Cancer Father      Lupus Mother          Current Outpatient Medications   Medication Sig Dispense Refill     acetaminophen (TYLENOL) 500 MG tablet Take 1,000 mg by mouth every 6 hours as needed for mild pain       fluticasone (FLONASE) 50 MCG/ACT nasal spray Spray 1 spray into both nostrils daily 16 mL 1     ibuprofen (ADVIL/MOTRIN) 200 MG tablet Take 600 mg by mouth every 4 hours as needed for mild pain       aspirin-acetaminophen-caffeine (EXCEDRIN MIGRAINE) 250-250-65 MG tablet Take 1 tablet by mouth every 6 hours as needed for headaches       EPINEPHrine (EPIPEN/ADRENACLICK/OR ANY BX GENERIC EQUIV) 0.3 MG/0.3ML injection 2-pack INJECT  0.3 ML IM 1 TIME PRF ANAPHYLAXIS  2     naproxen (NAPROSYN) 500 MG tablet Take 1 tablet (500 mg) by mouth every 12 hours as needed for headaches (Patient not taking: Reported on 4/14/2020) 30 tablet 3     Allergies   Allergen Reactions     Bees Anaphylaxis     Has Epi pen      Codeine Nausea     Other reaction(s): Nausea     Imitrex [Sumatriptan]      Worsening of migraine, paresthesias     Ranitidine Rash and Itching     recvd IV zantac 75 and reaction was immediate,  was given Benadryl to counter act reaction in 2000     Sulfa Drugs Unknown and Rash     BP Readings from Last 3 Encounters:   03/31/20 137/86   03/31/20 116/85   02/26/20 119/81    Wt Readings from Last 3 Encounters:   03/31/20 102.5 kg (226 lb)   03/31/20 102.7 kg (226 lb 8 oz)   02/26/20 97.5 kg (215 lb)                    Reviewed and updated as needed this visit by Provider         Review of Systems   ROS COMP: Constitutional, HEENT, cardiovascular, pulmonary, gi and gu systems are negative, except as otherwise noted.       Objective   Reported vitals:  There were no vitals taken for this visit.   alert and no distress  PSYCH: Alert and oriented  times 3; coherent speech, normal   rate and volume, able to articulate logical thoughts, able   to abstract reason  RESP: dry cough heard occasionally, no audible wheezing, able to talk in full sentences  Remainder of exam unable to be completed due to telephone visits    Diagnostic Test Results:  Labs reviewed in Epic        Assessment/Plan:  1. Suspected Covid-19 Virus Infection  2. Exposure to Covid-19 Virus  Recent exposure to cousin who is positive for Covid-19 along with dry cough, fever, fatigue. Discussed with patient that her symptoms are suspicious for Covid-19. I recommended she quarantine for the next 2 weeks. Discussed supportive measures, Tylenol for headaches or fever, lozenges for sore throat, rest, push fluids. Recommend follow up at once if develops shortness of breath or worsening symptoms.       No follow-ups on file.      Phone call duration:  12 minutes    Karen Campos NP-C

## 2020-04-14 NOTE — TELEPHONE ENCOUNTER
Patient had virtual visit today and is now stating that  is showing signs of COVID and would like a note to for him to excuse from work. I did start a phone encounter in his chart to route to DJ to review. Patient is asking for a call to discuss.    She is needing a note for herself as well.      [phone]

## 2020-04-15 ENCOUNTER — PATIENT OUTREACH (OUTPATIENT)
Dept: CARE COORDINATION | Facility: CLINIC | Age: 35
End: 2020-04-15

## 2020-04-15 NOTE — PROGRESS NOTES
Clinic Care Coordination Contact    Follow Up Progress Note      Assessment: Patient and  are under self quarantine for COVID19. Had virtual visit with PCP 4/14/20 for symptoms. She verbalized understanding of patient instructions. Monitoring for worsening symptoms, and taking temperature. She is now ordering things online.  is drawing from unemployment. Does not need resources for quarantine at this time. Goal was archived, she did receive antibiotic for surgical incision. She stated the site looks good, denies symptoms of infection. No follow up needed.     Goals addressed this encounter:   Goals Addressed                 This Visit's Progress       General      COMPLETED: Other (pt-stated)   On track     Goal Statement: I want the results of my wound culture and for the incision to heal  Date Goal set: 4/1/20  Barriers: possible infection   Strengths: wound culture in process  Date to Achieve By: 4/3/20  Patient expressed understanding of goal: yes  Action steps to achieve this goal:  1. I will keep area clean and dry  2. I will await follow up for the culture.   3. I will reach out to surgeons office if I don't hear back in 1-2 days.                Intervention/Education provided during outreach: patient will reach out to CC RN or clinic with questions. Patient placed on maintenance status as her goal was met.      Outreach Frequency: 2 months    Plan:   Care Coordinator will follow up in 2 months, patient hopeful the COVID19 pandemic is over then.    SHAWANDA Landin RN Clinic Care Coordinator   [place], [place], [place]  Phone: [phone]

## 2020-04-24 ENCOUNTER — HOSPITAL ENCOUNTER (EMERGENCY)
Facility: CLINIC | Age: 35
Discharge: HOME | End: 2020-04-24
Attending: PHYSICIAN ASSISTANT | Admitting: PHYSICIAN ASSISTANT
Payer: COMMERCIAL

## 2020-04-24 ENCOUNTER — APPOINTMENT (OUTPATIENT)
Dept: GENERAL RADIOLOGY | Facility: CLINIC | Age: 35
End: 2020-04-24
Attending: PHYSICIAN ASSISTANT
Payer: COMMERCIAL

## 2020-04-24 VITALS
WEIGHT: 230 LBS | TEMPERATURE: 98.1 F | BODY MASS INDEX: 38.27 KG/M2 | HEART RATE: 90 BPM | SYSTOLIC BLOOD PRESSURE: 145 MMHG | DIASTOLIC BLOOD PRESSURE: 125 MMHG | RESPIRATION RATE: 18 BRPM | OXYGEN SATURATION: 98 %

## 2020-04-24 DIAGNOSIS — S93.401A RIGHT ANKLE SPRAIN: ICD-10-CM

## 2020-04-24 PROCEDURE — 99283 EMERGENCY DEPT VISIT LOW MDM: CPT | Mod: Z6 | Performed by: PHYSICIAN ASSISTANT

## 2020-04-24 PROCEDURE — 73630 X-RAY EXAM OF FOOT: CPT | Mod: TC,RT

## 2020-04-24 PROCEDURE — 29515 APPLICATION SHORT LEG SPLINT: CPT | Mod: RT | Performed by: PHYSICIAN ASSISTANT

## 2020-04-24 PROCEDURE — 73610 X-RAY EXAM OF ANKLE: CPT | Mod: TC,RT

## 2020-04-24 PROCEDURE — 99284 EMERGENCY DEPT VISIT MOD MDM: CPT | Mod: 25 | Performed by: PHYSICIAN ASSISTANT

## 2020-04-24 PROCEDURE — 25000132 ZZH RX MED GY IP 250 OP 250 PS 637: Performed by: PHYSICIAN ASSISTANT

## 2020-04-24 PROCEDURE — 25000128 H RX IP 250 OP 636: Performed by: PHYSICIAN ASSISTANT

## 2020-04-24 PROCEDURE — 96374 THER/PROPH/DIAG INJ IV PUSH: CPT | Performed by: PHYSICIAN ASSISTANT

## 2020-04-24 RX ORDER — ACETAMINOPHEN 500 MG
1000 TABLET ORAL ONCE
Status: COMPLETED | OUTPATIENT
Start: 2020-04-24 | End: 2020-04-24

## 2020-04-24 RX ORDER — KETOROLAC TROMETHAMINE 30 MG/ML
30 INJECTION, SOLUTION INTRAMUSCULAR; INTRAVENOUS ONCE
Status: COMPLETED | OUTPATIENT
Start: 2020-04-24 | End: 2020-04-24

## 2020-04-24 RX ADMIN — KETOROLAC TROMETHAMINE 30 MG: 30 INJECTION, SOLUTION INTRAMUSCULAR at 13:00

## 2020-04-24 RX ADMIN — ACETAMINOPHEN 1000 MG: 500 TABLET, FILM COATED ORAL at 14:42

## 2020-04-24 NOTE — ED PROVIDER NOTES
History     Chief Complaint   Patient presents with     Ankle Pain     HPI  Breanna Vidal is a 35 year old female who presents to the emergency department complaining of right ankle pain. The patient reports she stepped wrong on some hardwood floors and ended up stepping on her right foot sideways over the lateral malleolus.  She complains of pain to bilateral aspects of the ankle and the anterior portion of the proximal dorsal foot area.  She immediately wrapped it with an ice pack and came directly here.  She has not taken anything for pain.  She denies any other injuries.    Allergies:  Allergies   Allergen Reactions     Bees Anaphylaxis     Has Epi pen      Codeine Nausea     Other reaction(s): Nausea     Imitrex [Sumatriptan]      Worsening of migraine, paresthesias     Ranitidine Rash and Itching     recvd IV zantac 75 and reaction was immediate,  was given Benadryl to counter act reaction in 2000     Sulfa Drugs Unknown and Rash       Problem List:    Patient Active Problem List    Diagnosis Date Noted     Biliary dyskinesia 02/10/2020     Priority: Medium     Added automatically from request for surgery [id number]       Intractable migraine without aura and without status migrainosus 07/17/2019     Priority: Medium     Alcohol abuse, in remission 08/20/2018     Priority: Medium     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) 08/20/2018     Priority: Medium     Mixed incontinence 03/03/2017     Priority: Medium     Vaginal delivery 10/31/2011     Priority: Medium     Rh negative status during pregnancy 09/23/2011     Priority: Medium        Past Medical History:    Past Medical History:   Diagnosis Date     Cellulitis 2012     Drug abuse (H) 2011     MRSA cellulitis 2012     Rh negative, antepartum        Past Surgical History:    Past Surgical History:   Procedure Laterality Date     EXCISE LESION FOOT Left 8/16/2019    Procedure: Excision of nevus second interdigital space left foot, Excision of nevus third  interdigital space left foot, excision of plantar junctional nevus left foot;  Surgeon: Rinku Lopez DPM;  Location: [facility]     INCISION AND DRAINAGE  04/18/2012    chin abscess I&D     LAPAROSCOPIC CHOLECYSTECTOMY N/A 2/14/2020    Procedure: Laparoscopic Cholecystectomy;  Surgeon: Renard Gerber MD;  Location: [facility]     LAPAROSCOPIC HERNIORRHAPHY INCISIONAL N/A 7/17/2019    Procedure: laparoscopic incisional hernia repair with mesh;  Surgeon: Jovi Odom MD;  [facility]     LAPAROSCOPIC SALPINGECTOMY Bilateral 8/27/2018    Procedure: LAPAROSCOPIC SALPINGECTOMY;  Laparoscopic Bilateral Salpingectomy;  Surgeon: Chai Cui MD;  Location: [facility]     MAMMOPLASTY REDUCTION Bilateral 08/04/2017       Family History:    Family History   Problem Relation Age of Onset     Cancer Father      Pancreatic Cancer Father      Lung Cancer Father      Lupus Mother        Social History:  Marital Status:  Single [1]  Social History     Tobacco Use     Smoking status: Former Smoker     Smokeless tobacco: Former User     Quit date: 3/10/2016   Substance Use Topics     Alcohol use: No     Comment: history alcohol abuse 2012, s/p treatment     Drug use: No     Comment: former drug user (EtOH, cocaine, marijuana, meth), last use 2012        Medications:    acetaminophen (TYLENOL) 500 MG tablet  aspirin-acetaminophen-caffeine (EXCEDRIN MIGRAINE) 250-250-65 MG tablet  EPINEPHrine (EPIPEN/ADRENACLICK/OR ANY BX GENERIC EQUIV) 0.3 MG/0.3ML injection 2-pack  fluticasone (FLONASE) 50 MCG/ACT nasal spray  ibuprofen (ADVIL/MOTRIN) 200 MG tablet  naproxen (NAPROSYN) 500 MG tablet          Review of Systems   All other systems reviewed and are negative.      Physical Exam   BP: (!) 145/125  Pulse: 90  Temp: 98.1  F (36.7  C)  Resp: 18  Weight: 104.3 kg (230 lb)  SpO2: 98 %      Physical Exam  Vitals signs and nursing note reviewed.   Constitutional:       General: She is in acute distress.      Appearance: She is obese. She  is not ill-appearing, toxic-appearing or diaphoretic.      Comments: Patient hyperventilating, tearful, wailing.   HENT:      Head: Normocephalic and atraumatic.      Nose: Nose normal.      Mouth/Throat:      Mouth: Mucous membranes are moist.   Eyes:      Extraocular Movements: Extraocular movements intact.      Conjunctiva/sclera: Conjunctivae normal.   Neck:      Musculoskeletal: Neck supple.   Cardiovascular:      Pulses: Normal pulses.   Pulmonary:      Effort: Pulmonary effort is normal. No respiratory distress.   Musculoskeletal:      Right ankle: She exhibits decreased range of motion (patient unwilling to move) and swelling (mild, lateral aspect). She exhibits no deformity and normal pulse. Tenderness. Lateral malleolus and medial malleolus tenderness found. Achilles tendon normal.      Right foot: Normal capillary refill. Tenderness (to proximal 4-5th metatarsal region) present. No swelling or deformity.   Skin:     General: Skin is warm and dry.   Neurological:      General: No focal deficit present.      Mental Status: She is alert and oriented to person, place, and time. Mental status is at baseline.   Psychiatric:         Mood and Affect: Mood is anxious. Affect is tearful.         Behavior: Behavior is cooperative.         ED Course        Procedures      Results for orders placed or performed during the hospital encounter of 04/24/20 (from the past 24 hour(s))   Ankle XR, G/E 3 views, right    Narrative    XR ANKLE RT G/E 3 VW 4/24/2020 1:31 PM     HISTORY: Right ankle pain following injury.    COMPARISON: 4/24/2020 right foot.      Impression    IMPRESSION: Lateral soft tissue swelling. No fractures are identified.  The ankle mortise is intact. The talar dome is unremarkable.     ELISEO RODRIGUEZ MD   Foot  XR, G/E 3 views, right    Narrative    XR FOOT RT G/E 3 VW 4/24/2020 1:32 PM     HISTORY: Pain following recent injury.    COMPARISON: 4/24/2028 ankle radiographs.      Impression    IMPRESSION:  No fractures are identified. Normal joint spacing and  alignment.     ELISEO RODRIGUEZ MD       Medications   ketorolac (TORADOL) injection 30 mg (30 mg Intravenous Given 4/24/20 1300)   acetaminophen (TYLENOL) tablet 1,000 mg (1,000 mg Oral Given 4/24/20 1442)       Assessments & Plan (with Medical Decision Making)  Breanna Vidal is a 35 year old female who presents to the ED complaining of right ankle pain after stepping wrong earlier today.  Denies any other injuries.  On arrival she was very distressed over pain, tearful and anxious.  Exam however revealed no evidence of deformity, only mild swelling to the lateral ankle and tenderness to bilateral malleoli and anterior aspect of the ankle into the proximal dorsal foot.  She was given Toradol here for pain control as well as Tylenol.  X-rays of the foot and ankle were obtained which were fortunately negative for any acute fractures or dislocations.  I discussed imaging results with the patient and she was more calm after pain is better managed.  I explained that she likely sustained a sprain of the ankle and discussed treatment options.  She was agreeable to wearing a walking boot for support and she has crutches at home she will use to get around until she can weight-bear as tolerated.  I advised use of ibuprofen or Tylenol for pain control, ice, and elevation.  If symptoms are not improved in 1 to 2 weeks she can follow-up in the clinic for reassessment.  Return precautions were provided.  All questions answered and patient discharged home in suitable condition.     I have reviewed the nursing notes.    I have reviewed the findings, diagnosis, plan and need for follow up with the patient.    Discharge Medication List as of 4/24/2020  2:36 PM          Final diagnoses:   Right ankle sprain     Note: Chart documentation done in part with Dragon Voice Recognition software. Although reviewed after completion, some word and grammatical errors may remain.    4/24/2020    [facility] EMERGENCY DEPARTMENT     Bell Chavis PA-C  04/24/20 5027

## 2020-04-24 NOTE — ED AVS SNAPSHOT
[facility] Emergency Department  [address] [place] [address]  Phone:  [phone]  Fax:  [phone]                                    Breanna Vidal   MRN: [id number]    Department:  [facility] Emergency Department   Date of Visit:  4/24/2020           After Visit Summary Signature Page    I have received my discharge instructions, and my questions have been answered. I have discussed any challenges I see with this plan with the nurse or doctor.    ..........................................................................................................................................  Patient/Patient Representative Signature      ..........................................................................................................................................  Patient Representative Print Name and Relationship to Patient    ..................................................               ................................................  Date                                   Time    ..........................................................................................................................................  Reviewed by Signature/Title    ...................................................              ..............................................  Date                                               Time          22EPIC Rev 08/18

## 2020-04-24 NOTE — ED TRIAGE NOTES
approx 30 mins stepped wrong on hardwood floors at home and stepped on the outside of her right ankle.  Immediate burning pain.  10/10

## 2020-04-27 ENCOUNTER — PATIENT OUTREACH (OUTPATIENT)
Dept: CARE COORDINATION | Facility: CLINIC | Age: 35
End: 2020-04-27

## 2020-04-27 NOTE — PROGRESS NOTES
Clinic Care Coordination Contact    Follow Up Progress Note      Assessment: Breanna Vidal is a 35 year old female who presented to the emergency department complaining of right ankle pain 4/24/20. Diagnosed with ankle sprain. Called patient, she is following discharge summary,using the walking boot, using ice, taking ibuprofen and tylenol per label, overall, as the day goes on her pain is getting better. Patient will follow the advice of her discharge summary for follow up. No further questions. Patient stated call needed to be short as she is outside with her kids.     Goals addressed this encounter: none     Intervention/Education provided during outreach: patient will follow up per discharge summary.     Outreach Frequency: 2 months    Plan:   Care Coordinator will follow up in 2 months.     SHAWANDA Landin RN Clinic Care Coordinator   [place], [place], [place]  Phone: [phone]

## 2020-04-27 NOTE — PROGRESS NOTES
Clinic Care Coordination Contact  [facility]/Voicemail       Clinical Data: Care Coordinator Outreach  Outreach attempted x 1.  Left message on patient's voicemail with call back information and requested return call.  Plan:  Care Coordinator will try to reach patient again in 1-2 business days.    NGUYEN LandinN RN Clinic Care Coordinator   [place], [place], [place]  Phone: [phone]

## 2020-05-15 NOTE — PROGRESS NOTES
"Breanna Vidal is a 35 year old female who is being evaluated via a billable video visit.      The patient has been notified of following:     \"This video visit will be conducted via a call between you and your physician/provider. We have found that certain health care needs can be provided without the need for an in-person physical exam.  This service lets us provide the care you need with a video conversation.  If a prescription is necessary we can send it directly to your pharmacy.  If lab work is needed we can place an order for that and you can then stop by our lab to have the test done at a later time.    Video visits are billed at different rates depending on your insurance coverage.  Please reach out to your insurance provider with any questions.    If during the course of the call the physician/provider feels a video visit is not appropriate, you will not be charged for this service.\"    Patient has given verbal consent for Video visit? Yes    How would you like to obtain your AVS? [place]    Patient would like the video invitation sent by: Text to cell phone: [phone]    Will anyone else be joining your video visit? No      Subjective     Breanna Vidal is a 35 year old female who presents today via video visit for the following health issues:    HPI  Possible Hernia      Duration: not sure how long its been there but noticed it the last few weeks. She has been gaining weight since being home with the pandemic. She has a history of umbilical hernia surgery and this feels the same as the hernia she had there. She can push down on it and it is soft. Denies pain.     Description (location/character/radiation): upper center and to the right side of stomach    Intensity:  0/10    Accompanying signs and symptoms: raised, wondering if this is a hernia, no redness, tender to the touch, no cramping, no burning, no itching     History (similar episodes/previous evaluation): YES-about a year ago and this is a different " spot     Precipitating or alleviating factors: None    Therapies tried and outcome: None     Weight   She has been trying to eat healthier, cereal in the morning with almond milk, fruits. She does find herself going to the fridge frequently since they are home all the time to get a little snack. She is not as active because they are home. Usually she would be out and about with the kids, going to activities, etc. She would like to know how to lose weight. She has not been tracking calories.     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)     Intractable migraine without aura and without status migrainosus     Biliary dyskinesia     Past Surgical History:   Procedure Laterality Date     EXCISE LESION FOOT Left 8/16/2019    Procedure: Excision of nevus second interdigital space left foot, Excision of nevus third interdigital space left foot, excision of plantar junctional nevus left foot;  Surgeon: Rinku Lopez DPM;  [facility]: [facility]     INCISION AND DRAINAGE  04/18/2012    chin abscess I&D     LAPAROSCOPIC CHOLECYSTECTOMY N/A 2/14/2020    Procedure: Laparoscopic Cholecystectomy;  Surgeon: Renard Gerber MD;  [facility]     LAPAROSCOPIC HERNIORRHAPHY INCISIONAL N/A 7/17/2019    Procedure: laparoscopic incisional hernia repair with mesh;  Surgeon: Jovi Odom MD;  [facility]     LAPAROSCOPIC SALPINGECTOMY Bilateral 8/27/2018    Procedure: LAPAROSCOPIC SALPINGECTOMY;  Laparoscopic Bilateral Salpingectomy;  Surgeon: Chai Cui MD;  [facility]: [facility]     MAMMOPLASTY REDUCTION Bilateral 08/04/2017       Social History     Tobacco Use     Smoking status: Former Smoker     Smokeless tobacco: Former User     Quit date: 3/10/2016   Substance Use Topics     Alcohol use: No     Comment: history alcohol abuse 2012, s/p  "treatment     Family History   Problem Relation Age of Onset     Cancer Father      Pancreatic Cancer Father      Lung Cancer Father      Lupus Mother          Current Outpatient Medications   Medication Sig Dispense Refill     acetaminophen (TYLENOL) 500 MG tablet Take 1,000 mg by mouth every 6 hours as needed for mild pain       aspirin-acetaminophen-caffeine (EXCEDRIN MIGRAINE) 250-250-65 MG tablet Take 1 tablet by mouth every 6 hours as needed for headaches       EPINEPHrine (EPIPEN/ADRENACLICK/OR ANY BX GENERIC EQUIV) 0.3 MG/0.3ML injection 2-pack INJECT  0.3 ML IM 1 TIME PRF ANAPHYLAXIS  2     fluticasone (FLONASE) 50 MCG/ACT nasal spray Spray 1 spray into both nostrils daily 16 mL 1     ibuprofen (ADVIL/MOTRIN) 200 MG tablet Take 600 mg by mouth every 4 hours as needed for mild pain       naproxen (NAPROSYN) 500 MG tablet Take 1 tablet (500 mg) by mouth every 12 hours as needed for headaches 30 tablet 3     Allergies   Allergen Reactions     Bees Anaphylaxis     Has Epi pen      Codeine Nausea     Other reaction(s): Nausea     Imitrex [Sumatriptan]      Worsening of migraine, paresthesias     Ranitidine Rash and Itching     recvd IV zantac 75 and reaction was immediate,  was given Benadryl to counter act reaction in 2000     Sulfa Drugs Unknown and Rash     BP Readings from Last 3 Encounters:   04/24/20 (!) 145/125   03/31/20 137/86   03/31/20 116/85    Wt Readings from Last 3 Encounters:   04/24/20 104.3 kg (230 lb)   03/31/20 102.5 kg (226 lb)   03/31/20 102.7 kg (226 lb 8 oz)                    Reviewed and updated as needed this visit by Provider         Review of Systems   Constitutional, HEENT, cardiovascular, pulmonary, gi and gu systems are negative, except as otherwise noted.      Objective    There were no vitals taken for this visit.  Estimated body mass index is 38.27 kg/m  as calculated from the following:    Height as of 2/17/20: 1.651 m (5' 5\").    Weight as of 4/24/20: 104.3 kg (230 " "lb).  Physical Exam     GENERAL: alert, no distress and obese  EYES: Eyes grossly normal to inspection.  No discharge or erythema, or obvious scleral/conjunctival abnormalities.  RESP: No audible wheeze, cough, or visible cyanosis.  No visible retractions or increased work of breathing.    ABDOMEN: appearance of a bulge on right upper abdomen just below laparotomy scar   SKIN: Visible skin clear. No significant rash, abnormal pigmentation or lesions.  NEURO: Cranial nerves grossly intact.  Mentation and speech appropriate for age.  PSYCH: Mentation appears normal, affect normal/bright, judgement and insight intact, normal speech and appearance well-groomed.      Diagnostic Test Results:  Labs reviewed in Epic        Assessment & Plan     1. Abdominal hernia without obstruction or gangrene  See general surgery in clinic to verify this is a hernia and discuss options for treatment.   - GENERAL SURG ADULT REFERRAL; Future    2. Class 2 obesity due to excess calories without serious comorbidity with body mass index (BMI) of 38.0 to 38.9 in adult  Recommended she start tracking daily calorie intake then send me a message with her average and will determine from there a target for calorie intake to help her lose weight. Discussed will need to probably go down to [id number] calories per day depending on where her current calorie intake is at. Also needs to increase activity to burn calories.        BMI:   Estimated body mass index is 38.27 kg/m  as calculated from the following:    Height as of 2/17/20: 1.651 m (5' 5\").    Weight as of 4/24/20: 104.3 kg (230 lb).   Weight management plan: Discussed healthy diet and exercise guidelines      No follow-ups on file.    MADELINE Hernandez CNP  [facility]      Video-Visit Details    Type of service:  Video Visit    Video End Time:9:35 am    Originating Location (pt. Location): Home    Distant Location (provider location):  [facility] "     Platform used for Video Visit: Doximity    No follow-ups on file.       MADELINE Hernandez CNP

## 2020-05-18 ENCOUNTER — VIRTUAL VISIT (OUTPATIENT)
Dept: FAMILY MEDICINE | Facility: OTHER | Age: 35
End: 2020-05-18
Payer: COMMERCIAL

## 2020-05-18 DIAGNOSIS — E66.09 CLASS 2 OBESITY DUE TO EXCESS CALORIES WITHOUT SERIOUS COMORBIDITY WITH BODY MASS INDEX (BMI) OF 38.0 TO 38.9 IN ADULT: ICD-10-CM

## 2020-05-18 DIAGNOSIS — K43.9 VENTRAL HERNIA WITHOUT OBSTRUCTION OR GANGRENE: Primary | ICD-10-CM

## 2020-05-18 DIAGNOSIS — E66.812 CLASS 2 OBESITY DUE TO EXCESS CALORIES WITHOUT SERIOUS COMORBIDITY WITH BODY MASS INDEX (BMI) OF 38.0 TO 38.9 IN ADULT: ICD-10-CM

## 2020-05-18 PROCEDURE — 99213 OFFICE O/P EST LOW 20 MIN: CPT | Mod: GT | Performed by: STUDENT IN AN ORGANIZED HEALTH CARE EDUCATION/TRAINING PROGRAM

## 2020-05-18 ASSESSMENT — PAIN SCALES - GENERAL: PAINLEVEL: NO PAIN (0)

## 2020-06-01 ENCOUNTER — VIRTUAL VISIT (OUTPATIENT)
Dept: FAMILY MEDICINE | Facility: OTHER | Age: 35
End: 2020-06-01
Payer: COMMERCIAL

## 2020-06-01 ENCOUNTER — VIRTUAL VISIT (OUTPATIENT)
Dept: SURGERY | Facility: CLINIC | Age: 35
End: 2020-06-01
Attending: STUDENT IN AN ORGANIZED HEALTH CARE EDUCATION/TRAINING PROGRAM
Payer: COMMERCIAL

## 2020-06-01 DIAGNOSIS — K43.9 VENTRAL HERNIA WITHOUT OBSTRUCTION OR GANGRENE: ICD-10-CM

## 2020-06-01 DIAGNOSIS — K46.9 ABDOMINAL HERNIA WITHOUT OBSTRUCTION AND WITHOUT GANGRENE, RECURRENCE NOT SPECIFIED, UNSPECIFIED HERNIA TYPE: Primary | ICD-10-CM

## 2020-06-01 PROCEDURE — 99213 OFFICE O/P EST LOW 20 MIN: CPT | Mod: GT | Performed by: STUDENT IN AN ORGANIZED HEALTH CARE EDUCATION/TRAINING PROGRAM

## 2020-06-01 PROCEDURE — 99213 OFFICE O/P EST LOW 20 MIN: CPT | Mod: 95 | Performed by: SURGERY

## 2020-06-01 ASSESSMENT — PAIN SCALES - GENERAL: PAINLEVEL: NO PAIN (0)

## 2020-06-01 NOTE — PROGRESS NOTES
"Breanna Vidal is a 35 year old female who is being evaluated via a billable video visit.      The patient has been notified of following:     \"This video visit will be conducted via a call between you and your physician/provider. We have found that certain health care needs can be provided without the need for an in-person physical exam.  This service lets us provide the care you need with a video conversation.  If a prescription is necessary we can send it directly to your pharmacy.  If lab work is needed we can place an order for that and you can then stop by our lab to have the test done at a later time.    Video visits are billed at different rates depending on your insurance coverage.  Please reach out to your insurance provider with any questions.    If during the course of the call the physician/provider feels a video visit is not appropriate, you will not be charged for this service.\"    Patient has given verbal consent for Video visit? Yes    How would you like to obtain your AVS? [place]    Patient would like the video invitation sent by: Text to cell phone: [phone]    Will anyone else be joining your video visit? No      [id number], MEDICAL ASSISTANT         Video-Visit Details    Type of service:  Video Visit    Video Start Time: 9:00 AM  Video End Time: 9:45 AM    Originating Location (pt. Location): Home    Distant Location (provider location):  [facility]     Platform used for Video Visit: [facility]    Chief Complaint: abdominal protrusion    History of Present Illness:   35 year old woman sent in consultation by Karen Campos for evaluation of a bulge in her abdomen. She notes that this has been present for about 1 month. She describes some mild pain associated with the protrusion when she strains to have bowel movements, but otherwise does not have any symptoms. The pain is localized, without radiation. It is alleviated by rest. She denies any episodes of acute incarceration. She " underwent a laparoscopic umbilical hernia repair 07/2019- 1 cm defect closed primarily with use of ventralight ST 11 cm round mesh and absobatack. She also underwent a laparoscopic cholecystectomy 02/2020. At the time of the lap nikos, the mesh was avoided, but comments made about there being significant adhesions around the mesh.       Past Medical History:   Diagnosis Date     Cellulitis 2012    MRSA septic olecranon bursitis and facial cellulitis     Drug abuse (H) 2011    methamphetamine and alcohol, sober since treatment 2011     MRSA cellulitis 2012    cleared with negative nasal swabs 8/16/17 and 8/28/17, reviewed     Rh negative, antepartum      Past Surgical History:   Procedure Laterality Date     EXCISE LESION FOOT Left 8/16/2019    Procedure: Excision of nevus second interdigital space left foot, Excision of nevus third interdigital space left foot, excision of plantar junctional nevus left foot;  Surgeon: Rinku Lopez DPM;  [facility]     INCISION AND DRAINAGE  04/18/2012    chin abscess I&D     LAPAROSCOPIC CHOLECYSTECTOMY N/A 2/14/2020    Procedure: Laparoscopic Cholecystectomy;  Surgeon: Renard Gerber MD;  Location: [facility]     LAPAROSCOPIC HERNIORRHAPHY INCISIONAL N/A 7/17/2019    Procedure: laparoscopic incisional hernia repair with mesh;  Surgeon: Jovi Odom MD;  [facility]     LAPAROSCOPIC SALPINGECTOMY Bilateral 8/27/2018    Procedure: LAPAROSCOPIC SALPINGECTOMY;  Laparoscopic Bilateral Salpingectomy;  Surgeon: Chai Cui MD;  Location: [facility]     MAMMOPLASTY REDUCTION Bilateral 08/04/2017     Current Outpatient Medications   Medication     acetaminophen (TYLENOL) 500 MG tablet     aspirin-acetaminophen-caffeine (EXCEDRIN MIGRAINE) 250-250-65 MG tablet     EPINEPHrine (EPIPEN/ADRENACLICK/OR ANY BX GENERIC EQUIV) 0.3 MG/0.3ML injection 2-pack     fluticasone (FLONASE) 50 MCG/ACT nasal spray     ibuprofen (ADVIL/MOTRIN) 200 MG tablet     naproxen (NAPROSYN) 500 MG  tablet     No current facility-administered medications for this visit.         Allergies   Allergen Reactions     Bees Anaphylaxis     Has Epi pen      Codeine Nausea     Other reaction(s): Nausea     Imitrex [Sumatriptan]      Worsening of migraine, paresthesias     Ranitidine Rash and Itching     recvd IV zantac 75 and reaction was immediate,  was given Benadryl to counter act reaction in 2000     Sulfa Drugs Unknown and Rash     Social History     Socioeconomic History     Marital status: Single     Spouse name: Not on file     Number of children: 4     Years of education: Not on file     Highest education level: Not on file   Occupational History     Not on file   Social Needs     Financial resource strain: Not hard at all     Food insecurity     Worry: Never true     Inability: Never true     Transportation needs     Medical: No     Non-medical: No   Tobacco Use     Smoking status: Former Smoker     Smokeless tobacco: Former User     Quit date: 3/10/2016   Substance and Sexual Activity     Alcohol use: No     Comment: history alcohol abuse 2012, s/p treatment     Drug use: No     Comment: former drug user (EtOH, cocaine, marijuana, meth), last use 2012     Sexual activity: Yes     Partners: Male   Lifestyle     Physical activity     Days per week: 3 days     Minutes per session: 20 min     Stress: Not on file   Relationships     Social connections     Talks on phone: Not on file     Gets together: Not on file     Attends [employer] service: Not on file     Active member of club or organization: Not on file     Attends meetings of clubs or organizations: Not on file     Relationship status: Not on file     Intimate partner violence     Fear of current or ex partner: Not on file     Emotionally abused: Not on file     Physically abused: Not on file     Forced sexual activity: Not on file   Other Topics Concern     Not on file   Social History Narrative    Currently lives in [place] with fiance and 18 month old  son.  Has a 13 year old dtr that lives with dtrs dad in [place], [place], and a 6  Year old that was adopted out in an open adoption, lives in [place]     Family History   Problem Relation Age of Onset     Cancer Father      Pancreatic Cancer Father      Lung Cancer Father      Lupus Mother            Review of Systems:  No chest pain, dyspnea, weight loss, fevers or night sweats.   All other systems questioned and negative.     Exam:  Vital signs for exam: reported weight 230-235  Constitutional: healthy, alert and no distress.  Head: Normocephalic. No masses visible.   Respiratory: No audible wheezing, no accessory muscle use  Gastrointestinal: obese- some asymmetry noted just below midline supraumbilical well healed laparoscopic incision, she is able to lie down and reduce the protrusion  Musculoskeletal: extremities normal- no gross deformities noted   Skin: no jaundice, rashes or petechiae.   Neurologic: Gait normal.  Psychiatric: Mentation appears normal and affect normal.    Laboratory Studies:    Latest CBC:  Lab Results   Component Value Date    WBC 9.3 02/26/2020     Lab Results   Component Value Date    RBC 4.80 02/26/2020     Lab Results   Component Value Date    HGB 14.5 02/26/2020     Lab Results   Component Value Date    HCT 42.4 02/26/2020     Lab Results   Component Value Date    MCV 88 02/26/2020     Lab Results   Component Value Date    MCH 30.2 02/26/2020     Lab Results   Component Value Date    MCHC 34.2 02/26/2020     Lab Results   Component Value Date    RDW 12.8 02/26/2020     Lab Results   Component Value Date     02/26/2020       Latest Basic Metabolic Panel:  Lab Results   Component Value Date     02/26/2020      Lab Results   Component Value Date    POTASSIUM 4.1 02/26/2020     Lab Results   Component Value Date    CHLORIDE 107 02/26/2020     Lab Results   Component Value Date    ELICIA 9.1 02/26/2020     Lab Results   Component Value Date    CO2 26 02/26/2020     Lab Results    Component Value Date    BUN 13 02/26/2020     Lab Results   Component Value Date    CR 0.58 02/26/2020     Lab Results   Component Value Date    GLC 94 02/26/2020       Latest Liver Studies:  Lab Results   Component Value Date    PROTTOTAL 8.1 02/26/2020     Lab Results   Component Value Date    ALBUMIN 4.3 02/26/2020     Lab Results   Component Value Date    BILITOTAL 0.5 02/26/2020     Lab Results   Component Value Date    ALKPHOS 50 02/26/2020     Lab Results   Component Value Date    AST 15 02/26/2020     Lab Results   Component Value Date    ALT 33 02/26/2020         IMPRESSION AND PLAN:  Probable incisional hernia related to port site from lap nikos. We discussed options at length, and I do think that weight loss prior to a hernia repair is important in this young woman with a minimally symptomatic hernia. We will see her in clinic for follow-up. The small risks of acute incarceration explained to her.

## 2020-06-01 NOTE — LETTER
"    6/1/2020         RE: Breanna Vidal  [address] [facility] [address]        Dear Colleague,    Thank you for referring your patient, Breanna Vidal, to the [facility]. Please see a copy of my visit note below.    Breanna Vidal is a 35 year old female who is being evaluated via a billable video visit.      The patient has been notified of following:     \"This video visit will be conducted via a call between you and your physician/provider. We have found that certain health care needs can be provided without the need for an in-person physical exam.  This service lets us provide the care you need with a video conversation.  If a prescription is necessary we can send it directly to your pharmacy.  If lab work is needed we can place an order for that and you can then stop by our lab to have the test done at a later time.    Video visits are billed at different rates depending on your insurance coverage.  Please reach out to your insurance provider with any questions.    If during the course of the call the physician/provider feels a video visit is not appropriate, you will not be charged for this service.\"    Patient has given verbal consent for Video visit? Yes    How would you like to obtain your AVS? MyChart    Patient would like the video invitation sent by: Text to cell phone: [phone]    Will anyone else be joining your video visit? No      LUIZ, MEDICAL ASSISTANT         Video-Visit Details    Type of service:  Video Visit    Video Start Time: 9:00 AM  Video End Time: 9:45 AM    Originating Location (pt. Location): Home    Distant Location (provider location):  [facility]     Platform used for Video Visit: [place]    Chief Complaint: abdominal protrusion    History of Present Illness:   35 year old woman sent in consultation by Karen Campos for evaluation of a bulge in her abdomen. She notes that this has been present for about 1 month. She describes some mild pain " associated with the protrusion when she strains to have bowel movements, but otherwise does not have any symptoms. The pain is localized, without radiation. It is alleviated by rest. She denies any episodes of acute incarceration. She underwent a laparoscopic umbilical hernia repair 07/2019- 1 cm defect closed primarily with use of ventralight ST 11 cm round mesh and absobatack. She also underwent a laparoscopic cholecystectomy 02/2020. At the time of the lap nikos, the mesh was avoided, but comments made about there being significant adhesions around the mesh.       Past Medical History:   Diagnosis Date     Cellulitis 2012    MRSA septic olecranon bursitis and facial cellulitis     Drug abuse (H) 2011    methamphetamine and alcohol, sober since treatment 2011     MRSA cellulitis 2012    cleared with negative nasal swabs 8/16/17 and 8/28/17, reviewed     Rh negative, antepartum      Past Surgical History:   Procedure Laterality Date     EXCISE LESION FOOT Left 8/16/2019    Procedure: Excision of nevus second interdigital space left foot, Excision of nevus third interdigital space left foot, excision of plantar junctional nevus left foot;  Surgeon: Rinku Lopez DPM;  [facility]     INCISION AND DRAINAGE  04/18/2012    chin abscess I&D     LAPAROSCOPIC CHOLECYSTECTOMY N/A 2/14/2020    Procedure: Laparoscopic Cholecystectomy;  Surgeon: Renard Gerber MD;  [facility]     LAPAROSCOPIC HERNIORRHAPHY INCISIONAL N/A 7/17/2019    Procedure: laparoscopic incisional hernia repair with mesh;  Surgeon: Jovi Odom MD;  [facility]     LAPAROSCOPIC SALPINGECTOMY Bilateral 8/27/2018    Procedure: LAPAROSCOPIC SALPINGECTOMY;  Laparoscopic Bilateral Salpingectomy;  Surgeon: Chai Cui MD;  [facility]     MAMMOPLASTY REDUCTION Bilateral 08/04/2017     Current Outpatient Medications   Medication     acetaminophen (TYLENOL) 500 MG tablet     aspirin-acetaminophen-caffeine (EXCEDRIN MIGRAINE)  250-250-65 MG tablet     EPINEPHrine (EPIPEN/ADRENACLICK/OR ANY BX GENERIC EQUIV) 0.3 MG/0.3ML injection 2-pack     fluticasone (FLONASE) 50 MCG/ACT nasal spray     ibuprofen (ADVIL/MOTRIN) 200 MG tablet     naproxen (NAPROSYN) 500 MG tablet     No current facility-administered medications for this visit.         Allergies   Allergen Reactions     Bees Anaphylaxis     Has Epi pen      Codeine Nausea     Other reaction(s): Nausea     Imitrex [Sumatriptan]      Worsening of migraine, paresthesias     Ranitidine Rash and Itching     recvd IV zantac 75 and reaction was immediate,  was given Benadryl to counter act reaction in 2000     Sulfa Drugs Unknown and Rash     Social History     Socioeconomic History     Marital status: Single     Spouse name: Not on file     Number of children: 4     Years of education: Not on file     Highest education level: Not on file   Occupational History     Not on file   Social Needs     Financial resource strain: Not hard at all     Food insecurity     Worry: Never true     Inability: Never true     Transportation needs     Medical: No     Non-medical: No   Tobacco Use     Smoking status: Former Smoker     Smokeless tobacco: Former User     Quit date: 3/10/2016   Substance and Sexual Activity     Alcohol use: No     Comment: history alcohol abuse 2012, s/p treatment     Drug use: No     Comment: former drug user (EtOH, cocaine, marijuana, meth), last use 2012     Sexual activity: Yes     Partners: Male   Lifestyle     Physical activity     Days per week: 3 days     Minutes per session: 20 min     Stress: Not on file   Relationships     Social connections     Talks on phone: Not on file     Gets together: Not on file     Attends Yarsanism service: Not on file     Active member of club or organization: Not on file     Attends meetings of clubs or organizations: Not on file     Relationship status: Not on file     Intimate partner violence     Fear of current or ex partner: Not on file      Emotionally abused: Not on file     Physically abused: Not on file     Forced sexual activity: Not on file   Other Topics Concern     Not on file   Social History Narrative    Currently lives in [place] with fiance and 18 month old son.  Has a 13 year old dtr that lives with dtrs dad in [place], [place], and a 6  Year old that was adopted out in an open adoption, lives in [place]     Family History   Problem Relation Age of Onset     Cancer Father      Pancreatic Cancer Father      Lung Cancer Father      Lupus Mother            Review of Systems:  No chest pain, dyspnea, weight loss, fevers or night sweats.   All other systems questioned and negative.     Exam:  Vital signs for exam: reported weight 230-235  Constitutional: healthy, alert and no distress.  Head: Normocephalic. No masses visible.   Respiratory: No audible wheezing, no accessory muscle use  Gastrointestinal: obese- some asymmetry noted just below midline supraumbilical well healed laparoscopic incision, she is able to lie down and reduce the protrusion  Musculoskeletal: extremities normal- no gross deformities noted   Skin: no jaundice, rashes or petechiae.   Neurologic: Gait normal.  Psychiatric: Mentation appears normal and affect normal.    Laboratory Studies:    Latest CBC:  Lab Results   Component Value Date    WBC 9.3 02/26/2020     Lab Results   Component Value Date    RBC 4.80 02/26/2020     Lab Results   Component Value Date    HGB 14.5 02/26/2020     Lab Results   Component Value Date    HCT 42.4 02/26/2020     Lab Results   Component Value Date    MCV 88 02/26/2020     Lab Results   Component Value Date    MCH 30.2 02/26/2020     Lab Results   Component Value Date    MCHC 34.2 02/26/2020     Lab Results   Component Value Date    RDW 12.8 02/26/2020     Lab Results   Component Value Date     02/26/2020       Latest Basic Metabolic Panel:  Lab Results   Component Value Date     02/26/2020      Lab Results   Component Value Date     POTASSIUM 4.1 02/26/2020     Lab Results   Component Value Date    CHLORIDE 107 02/26/2020     Lab Results   Component Value Date    ELICIA 9.1 02/26/2020     Lab Results   Component Value Date    CO2 26 02/26/2020     Lab Results   Component Value Date    BUN 13 02/26/2020     Lab Results   Component Value Date    CR 0.58 02/26/2020     Lab Results   Component Value Date    GLC 94 02/26/2020       Latest Liver Studies:  Lab Results   Component Value Date    PROTTOTAL 8.1 02/26/2020     Lab Results   Component Value Date    ALBUMIN 4.3 02/26/2020     Lab Results   Component Value Date    BILITOTAL 0.5 02/26/2020     Lab Results   Component Value Date    ALKPHOS 50 02/26/2020     Lab Results   Component Value Date    AST 15 02/26/2020     Lab Results   Component Value Date    ALT 33 02/26/2020         IMPRESSION AND PLAN:  Probable incisional hernia related to port site from lap nikos. We discussed options at length, and I do think that weight loss prior to a hernia repair is important in this young woman with a minimally symptomatic hernia. We will see her in clinic for follow-up. The small risks of acute incarceration explained to her.            Again, thank you for allowing me to participate in the care of your patient.        Sincerely,        Camelia Vincent MD

## 2020-06-01 NOTE — PROGRESS NOTES
"Breanna Vidal is a 35 year old female who is being evaluated via a billable video visit.      The patient has been notified of following:     \"This video visit will be conducted via a call between you and your physician/provider. We have found that certain health care needs can be provided without the need for an in-person physical exam.  This service lets us provide the care you need with a video conversation.  If a prescription is necessary we can send it directly to your pharmacy.  If lab work is needed we can place an order for that and you can then stop by our lab to have the test done at a later time.    Video visits are billed at different rates depending on your insurance coverage.  Please reach out to your insurance provider with any questions.    If during the course of the call the physician/provider feels a video visit is not appropriate, you will not be charged for this service.\"    Patient has given verbal consent for Video visit? Yes    How would you like to obtain your AVS? [place]    Patient would like the video invitation sent by: Text to cell phone: [phone]    Will anyone else be joining your video visit? No      Subjective     Breanna Vidal is a 35 year old female who presents today via video visit for the following health issues:    HPI  Chief Complaint   Patient presents with     Weight Loss     states that you  need to lose before surgery, is wondering if she could get a recommend someone who can get her in sooner.     Hernia bothering her when she bumps it or when she pushes when having a BM. Scared when having a BM because it pushes out more. She presses on it with her hand when having a bowel movement to try to keep it in but then it is tender when she pushes on it. Her son is at the height where his head hits it when he hugs her or runs into her. The hernia is also at counter height and it feels tender when she bumps it. She was told by the surgeon she saw virtually today that she needs to " lose 30-35 lbs before she would do surgery and was told to do a follow up appointment in August. Breanna is concerned because she won't have childcare after August and surgery would be difficult to recover from when caring for her young children. She is wondering if another surgeon would consider repairing it this summer.           Video Start Time: 12:17 PM    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)     Intractable migraine without aura and without status migrainosus     Biliary dyskinesia     Past Surgical History:   Procedure Laterality Date     EXCISE LESION FOOT Left 8/16/2019    Procedure: Excision of nevus second interdigital space left foot, Excision of nevus third interdigital space left foot, excision of plantar junctional nevus left foot;  Surgeon: Rinku Lopez DPM;  Location: [facility] OR     INCISION AND DRAINAGE  04/18/2012    chin abscess I&D     LAPAROSCOPIC CHOLECYSTECTOMY N/A 2/14/2020    Procedure: Laparoscopic Cholecystectomy;  Surgeon: Renard Gerber MD;  Location: [facility] OR     LAPAROSCOPIC HERNIORRHAPHY INCISIONAL N/A 7/17/2019    Procedure: laparoscopic incisional hernia repair with mesh;  Surgeon: Jovi Odom MD;  Location: [facility]     LAPAROSCOPIC SALPINGECTOMY Bilateral 8/27/2018    Procedure: LAPAROSCOPIC SALPINGECTOMY;  Laparoscopic Bilateral Salpingectomy;  Surgeon: Chai Cui MD;  Location: [facility] OR     MAMMOPLASTY REDUCTION Bilateral 08/04/2017       Social History     Tobacco Use     Smoking status: Former Smoker     Smokeless tobacco: Former User     Quit date: 3/10/2016   Substance Use Topics     Alcohol use: No     Comment: history alcohol abuse 2012, s/p treatment     Family History   Problem Relation Age of Onset     Cancer Father      Pancreatic Cancer Father      Lung Cancer Father      Lupus Mother          Current  Outpatient Medications   Medication Sig Dispense Refill     acetaminophen (TYLENOL) 500 MG tablet Take 1,000 mg by mouth every 6 hours as needed for mild pain       fluticasone (FLONASE) 50 MCG/ACT nasal spray Spray 1 spray into both nostrils daily 16 mL 1     ibuprofen (ADVIL/MOTRIN) 200 MG tablet Take 600 mg by mouth every 4 hours as needed for mild pain       aspirin-acetaminophen-caffeine (EXCEDRIN MIGRAINE) 250-250-65 MG tablet Take 1 tablet by mouth every 6 hours as needed for headaches       EPINEPHrine (EPIPEN/ADRENACLICK/OR ANY BX GENERIC EQUIV) 0.3 MG/0.3ML injection 2-pack INJECT  0.3 ML IM 1 TIME PRF ANAPHYLAXIS  2     naproxen (NAPROSYN) 500 MG tablet Take 1 tablet (500 mg) by mouth every 12 hours as needed for headaches (Patient not taking: Reported on 6/1/2020) 30 tablet 3     Allergies   Allergen Reactions     Bees Anaphylaxis     Has Epi pen      Codeine Nausea     Other reaction(s): Nausea     Imitrex [Sumatriptan]      Worsening of migraine, paresthesias     Ranitidine Rash and Itching     recvd IV zantac 75 and reaction was immediate,  was given Benadryl to counter act reaction in 2000     Sulfa Drugs Unknown and Rash     Recent Labs   Lab Test 02/26/20  1133 02/17/20  1128 01/28/20  0857   ALT 33 36 33   CR 0.58 0.67 0.61   GFRESTIMATED >90 >90 >90   GFRESTBLACK >90 >90 >90   POTASSIUM 4.1 3.4 3.9      BP Readings from Last 3 Encounters:   04/24/20 (!) 145/125   03/31/20 137/86   03/31/20 116/85    Wt Readings from Last 3 Encounters:   04/24/20 104.3 kg (230 lb)   03/31/20 102.5 kg (226 lb)   03/31/20 102.7 kg (226 lb 8 oz)                    Reviewed and updated as needed this visit by Provider         Review of Systems   Constitutional, HEENT, cardiovascular, pulmonary, GI, , musculoskeletal, neuro, skin, endocrine and psych systems are negative, except as otherwise noted.      Objective    LMP 05/10/2020 (Approximate)   Estimated body mass index is 38.27 kg/m  as calculated from the  "following:    Height as of 2/17/20: 1.651 m (5' 5\").    Weight as of 4/24/20: 104.3 kg (230 lb).  Physical Exam     GENERAL: alert, no distress and obese  EYES: Eyes grossly normal to inspection.  No discharge or erythema, or obvious scleral/conjunctival abnormalities.  RESP: No audible wheeze, cough, or visible cyanosis.  No visible retractions or increased work of breathing.    ABDOMEN: hernia on right mid-abdomen approximately baseball-sized in appearance   SKIN: Visible skin clear. No significant rash, abnormal pigmentation or lesions.  NEURO: Cranial nerves grossly intact.  Mentation and speech appropriate for age.  PSYCH: Mentation appears normal, affect normal/bright, judgement and insight intact, normal speech and appearance well-groomed.      Diagnostic Test Results:  Labs reviewed in Epic        Assessment & Plan     1. Abdominal hernia without obstruction and without gangrene, recurrence not specified, unspecified hernia type  Sent message to Dr. Odom inquiring if she would be willing to see patient and repair the hernia this summer as it is bothersome.       No follow-ups on file.    MADELINE Hernandez CNP  [facility]      Video-Visit Details    Type of service:  Video Visit    Video End Time:12:25 pm    Originating Location (pt. Location): Home    Distant Location (provider location):  [facility]     Platform used for Video Visit: Kassi    No follow-ups on file.       MADELINE Hernandez CNP      "

## 2020-06-04 ENCOUNTER — OFFICE VISIT (OUTPATIENT)
Dept: SURGERY | Facility: CLINIC | Age: 35
End: 2020-06-04
Payer: COMMERCIAL

## 2020-06-04 ENCOUNTER — TELEPHONE (OUTPATIENT)
Dept: SURGERY | Facility: CLINIC | Age: 35
End: 2020-06-04

## 2020-06-04 ENCOUNTER — HOSPITAL ENCOUNTER (OUTPATIENT)
Dept: CT IMAGING | Facility: CLINIC | Age: 35
Discharge: HOME | End: 2020-06-04
Attending: SURGERY | Admitting: SURGERY
Payer: COMMERCIAL

## 2020-06-04 VITALS
HEIGHT: 67 IN | TEMPERATURE: 97.6 F | SYSTOLIC BLOOD PRESSURE: 112 MMHG | DIASTOLIC BLOOD PRESSURE: 74 MMHG | WEIGHT: 239.5 LBS | BODY MASS INDEX: 37.59 KG/M2

## 2020-06-04 DIAGNOSIS — R19.00 ABDOMINAL WALL BULGE: Primary | ICD-10-CM

## 2020-06-04 DIAGNOSIS — Z11.59 ENCOUNTER FOR SCREENING FOR OTHER VIRAL DISEASES: Primary | ICD-10-CM

## 2020-06-04 DIAGNOSIS — E66.09 CLASS 2 OBESITY DUE TO EXCESS CALORIES WITH BODY MASS INDEX (BMI) OF 37.0 TO 37.9 IN ADULT, UNSPECIFIED WHETHER SERIOUS COMORBIDITY PRESENT: ICD-10-CM

## 2020-06-04 DIAGNOSIS — E66.812 CLASS 2 OBESITY DUE TO EXCESS CALORIES WITH BODY MASS INDEX (BMI) OF 37.0 TO 37.9 IN ADULT, UNSPECIFIED WHETHER SERIOUS COMORBIDITY PRESENT: ICD-10-CM

## 2020-06-04 DIAGNOSIS — R19.00 ABDOMINAL WALL BULGE: ICD-10-CM

## 2020-06-04 PROBLEM — K43.9 VENTRAL HERNIA WITHOUT OBSTRUCTION OR GANGRENE: Status: ACTIVE | Noted: 2020-06-04

## 2020-06-04 PROCEDURE — 99215 OFFICE O/P EST HI 40 MIN: CPT | Performed by: SURGERY

## 2020-06-04 PROCEDURE — 25000125 ZZHC RX 250: Performed by: RADIOLOGY

## 2020-06-04 PROCEDURE — 74177 CT ABD & PELVIS W/CONTRAST: CPT

## 2020-06-04 PROCEDURE — 25000128 H RX IP 250 OP 636: Performed by: RADIOLOGY

## 2020-06-04 RX ORDER — IOPAMIDOL 755 MG/ML
500 INJECTION, SOLUTION INTRAVASCULAR ONCE
Status: COMPLETED | OUTPATIENT
Start: 2020-06-04 | End: 2020-06-04

## 2020-06-04 RX ADMIN — SODIUM CHLORIDE 60 ML: 9 INJECTION, SOLUTION INTRAVENOUS at 11:07

## 2020-06-04 RX ADMIN — IOPAMIDOL 100 ML: 755 INJECTION, SOLUTION INTRAVENOUS at 11:06

## 2020-06-04 ASSESSMENT — MIFFLIN-ST. JEOR: SCORE: 1806.05

## 2020-06-04 NOTE — LETTER
6/4/2020         RE: Breanna Vidal  [address] [address] [place] [id number]        Dear Colleague,    Thank you for referring your patient, Breanna Vidal, to the [facility]. Please see a copy of my visit note below.    [facility] FOLLOW-UP NOTE  GENERAL SURGERY    PCP: Karen Campos         Assessment and Plan:   Assessment:   Breanna Vidal is a 35 year old female who presented with concern for a new hernia on her abdomen.       ICD-10-CM    1. Abdominal wall bulge  R19.00 CT Abdomen Pelvis w Contrast     Case Request: Robotic incisional hernia repair with mesh, possible lysis of adhesions, possible explantation of old mesh, possible open     Case Request: Robotic incisional hernia repair with mesh, possible lysis of adhesions, possible explantation of old mesh, possible open   2. Class 2 obesity due to excess calories with body mass index (BMI) of 37.0 to 37.9 in adult, unspecified whether serious comorbidity present  E66.09     Z68.37          Plan:  Patient has an incisional hernia from her lap nikos port site.  Proven on the most recent CT.  Due to patient's BMI of 37 close to 38, I recommend that she continues to try to lose weight.  She has lost 3 pounds thus far.  We will revisit with the patient in 1 month and if her weight continues to decrease, I will proceed with the planned robotic incisional hernia repair with mesh.  Patient would like me to do a case request so that she has a target to help with her weight loss.  I will visit with her in the office to go through the risks, benefits, alternatives more thoroughly when we reevaluate her weight in 1 month.  All of her questions were answered to her satisfaction.  I did call her regarding her CT of the abdomen and pelvis which noted an incarcerated incisional hernia at the previous port site.      40 mins visit, more than 50% of face to face time was spent in counseling and coordinating care as discussed above.              "Subjective:     Breanna Vidal is a 35 year old female who presents with noted bulge after her lap nikos.  Patient is a patient of mine, I previously did her incarcerated incisional hernia at the umbilicus almost 1 year ago.  Patient's been doing well with this.  He unfortunately had biliary dyskinesia and underwent a laparoscopic cholecystectomy 1 of my partners recently.  That went well however she now presents with a bulge at 1 of the port site.  Noted bulge between her supraumbilical incision and previous hernia repair.  Tender with pressure and palpation.  Patient has thanks again significant weight since I saw her last.  When I saw her for the incisional hernia about a year ago her BMI was 31.  Her BMI now is 38.  Patient stated due to the COVID quarantine and activity she has gained significant weight.  She seen her primary regarding this, she is motivated to lose weight.  Lost 3 lbs since she been more active and is more cognizant of her diet.    Patient denies any food intolerance.  Denies any nausea or vomiting.  Denies any melena or hematochezia.  No additional surgeries since I last saw her besides the laparoscopic cholecystectomy.           Objective:     Skin: negative, acne, rash, scaling  Ears/Nose/Throat: negative, nasal congestion, purulent rhinorrhea  Respiratory: No shortness of breath, dyspnea on exertion, cough, or hemoptysis  Cardiovascular: negative, tachycardia and irregular heart beat  Gastrointestinal: negative, poor appetite, dysphagia and hematemesis  Genitourinary: negative  Musculoskeletal: negative, back pain and neck pain  Neurologic: negative  Hematologic/Lymphatic/Immunologic: negative  Endocrine: negative    /74   Temp 97.6  F (36.4  C) (Temporal)   Ht 1.689 m (5' 6.5\")   Wt 108.6 kg (239 lb 8 oz)   LMP 05/10/2020 (Approximate)   BMI 38.08 kg/m     Constitutional: Awake, alert, in no acute distress.  Respiratory: Non-labored.   Cardiovascular: Regular rate and rhythm. "   Abdomen: soft; noted bulge that would reduce on supine; but not obvious defect on palpation likely due to pt's body's habitus.  Noted diastasis rectus; but pt stated the bulge is different than the rectus. Incisions from previous lap nikos healed.     StewartGen Odom DO  [facility]                Again, thank you for allowing me to participate in the care of your patient.        Sincerely,        StewartGen Odom MD

## 2020-06-04 NOTE — PROGRESS NOTES
[facility] FOLLOW-UP NOTE  GENERAL SURGERY    PCP: Karen Campos         Assessment and Plan:   Assessment:   Breanna Vidal is a 35 year old female who presented with concern for a new hernia on her abdomen.       ICD-10-CM    1. Abdominal wall bulge  R19.00 CT Abdomen Pelvis w Contrast     Case Request: Robotic incisional hernia repair with mesh, possible lysis of adhesions, possible explantation of old mesh, possible open     Case Request: Robotic incisional hernia repair with mesh, possible lysis of adhesions, possible explantation of old mesh, possible open   2. Class 2 obesity due to excess calories with body mass index (BMI) of 37.0 to 37.9 in adult, unspecified whether serious comorbidity present  E66.09     Z68.37          Plan:  Patient has an incisional hernia from her lap nikos port site.  Proven on the most recent CT.  Due to patient's BMI of 37 close to 38, I recommend that she continues to try to lose weight.  She has lost 3 pounds thus far.  We will revisit with the patient in 1 month and if her weight continues to decrease, I will proceed with the planned robotic incisional hernia repair with mesh.  Patient would like me to do a case request so that she has a target to help with her weight loss.  I will visit with her in the office to go through the risks, benefits, alternatives more thoroughly when we reevaluate her weight in 1 month.  All of her questions were answered to her satisfaction.  I did call her regarding her CT of the abdomen and pelvis which noted an incarcerated incisional hernia at the previous port site.      40 mins visit, more than 50% of face to face time was spent in counseling and coordinating care as discussed above.             Subjective:     Breanna Vidal is a 35 year old female who presents with noted bulge after her lap nikos.  Patient is a patient of mine, I previously did her incarcerated incisional hernia at the umbilicus almost 1 year ago.  Patient's been  "doing well with this.  He unfortunately had biliary dyskinesia and underwent a laparoscopic cholecystectomy 1 of my partners recently.  That went well however she now presents with a bulge at 1 of the port site.  Noted bulge between her supraumbilical incision and previous hernia repair.  Tender with pressure and palpation.  Patient has thanks again significant weight since I saw her last.  When I saw her for the incisional hernia about a year ago her BMI was 31.  Her BMI now is 38.  Patient stated due to the COVID quarantine and activity she has gained significant weight.  She seen her primary regarding this, she is motivated to lose weight.  Lost 3 lbs since she been more active and is more cognizant of her diet.    Patient denies any food intolerance.  Denies any nausea or vomiting.  Denies any melena or hematochezia.  No additional surgeries since I last saw her besides the laparoscopic cholecystectomy.           Objective:     Skin: negative, acne, rash, scaling  Ears/Nose/Throat: negative, nasal congestion, purulent rhinorrhea  Respiratory: No shortness of breath, dyspnea on exertion, cough, or hemoptysis  Cardiovascular: negative, tachycardia and irregular heart beat  Gastrointestinal: negative, poor appetite, dysphagia and hematemesis  Genitourinary: negative  Musculoskeletal: negative, back pain and neck pain  Neurologic: negative  Hematologic/Lymphatic/Immunologic: negative  Endocrine: negative    /74   Temp 97.6  F (36.4  C) (Temporal)   Ht 1.689 m (5' 6.5\")   Wt 108.6 kg (239 lb 8 oz)   LMP 05/10/2020 (Approximate)   BMI 38.08 kg/m     Constitutional: Awake, alert, in no acute distress.  Respiratory: Non-labored.   Cardiovascular: Regular rate and rhythm.   Abdomen: soft; noted bulge that would reduce on supine; but not obvious defect on palpation likely due to pt's body's habitus.  Noted diastasis rectus; but pt stated the bulge is different than the rectus. Incisions from previous lap nikos " healed.     [facility], [facility]

## 2020-06-09 DIAGNOSIS — J01.00 ACUTE NON-RECURRENT MAXILLARY SINUSITIS: ICD-10-CM

## 2020-06-10 RX ORDER — FLUTICASONE PROPIONATE 50 MCG
SPRAY, SUSPENSION (ML) NASAL
Qty: 16 G | Refills: 1 | Status: SHIPPED | OUTPATIENT
Start: 2020-06-10 | End: 2021-10-18

## 2020-06-10 NOTE — TELEPHONE ENCOUNTER
Prescription approved per [facility] Refill Protocol.  Lalo Leonard, RN, BSN

## 2020-06-17 ENCOUNTER — TELEPHONE (OUTPATIENT)
Dept: SURGERY | Facility: CLINIC | Age: 35
End: 2020-06-17

## 2020-06-17 NOTE — TELEPHONE ENCOUNTER
Writer attempted to call patient. No answer. LVM for patient to return call to clinic and ask for Devora.     Devora Garber RN on 6/17/2020 at 10:51 AM

## 2020-06-17 NOTE — TELEPHONE ENCOUNTER
Reason for Call:  Other call back    Detailed comments: Pt calls stating that she is having some pain/discomfort in the area that her hernia is located.  She is scheduled for surgery on 7/10.  Pt did schedule with Dr Odom 6/18 but would like to speak to somebody today regarding her symptoms.    Phone Number Patient can be reached at: Home number on file [phone] (home)    Best Time: any    Can we leave a detailed message on this number? YES    Call taken on 6/17/2020 at 8:27 AM by Brooke Bloom

## 2020-06-17 NOTE — TELEPHONE ENCOUNTER
Writing nurse received call from patient. Nurse informed patient of the remedies she could try at home and if symptoms worsen she should go to the ED. Patients appointment for tomorrow was also changed to 1:30. Patient verbalized understanding. Patient was told to return call to clinic if she has further questions or concerns.     Devora Garber RN on 6/17/2020 at 11:09 AM

## 2020-06-17 NOTE — TELEPHONE ENCOUNTER
Writing nurse returned call to patient regarding symptoms. Patient reports she is having a burning/stinging feeling on and around her hernia site and gallbladder area. Onset last night. Patient was up during the night. Pain rated 5/10 increased to 7/10 this morning. No appetite, nausea, hot flashes, no fever/ temp. 97.0. Patient reports  being constipated this morning.     Patient scheduled tomorrow at 12:00 with Dr. Odom.     Will route to provider for recommendations.     Devora Garber RN on 6/17/2020 at 9:47 AM

## 2020-06-18 ENCOUNTER — HOSPITAL ENCOUNTER (EMERGENCY)
Facility: CLINIC | Age: 35
Discharge: HOME | End: 2020-06-18
Attending: EMERGENCY MEDICINE | Admitting: EMERGENCY MEDICINE
Payer: COMMERCIAL

## 2020-06-18 VITALS
DIASTOLIC BLOOD PRESSURE: 85 MMHG | BODY MASS INDEX: 38 KG/M2 | RESPIRATION RATE: 18 BRPM | OXYGEN SATURATION: 100 % | SYSTOLIC BLOOD PRESSURE: 116 MMHG | TEMPERATURE: 97.9 F | HEART RATE: 56 BPM | WEIGHT: 239 LBS

## 2020-06-18 DIAGNOSIS — G89.29 CHRONIC ABDOMINAL PAIN: ICD-10-CM

## 2020-06-18 DIAGNOSIS — K43.9 VENTRAL HERNIA WITHOUT OBSTRUCTION OR GANGRENE: ICD-10-CM

## 2020-06-18 DIAGNOSIS — R10.9 CHRONIC ABDOMINAL PAIN: ICD-10-CM

## 2020-06-18 PROCEDURE — 96374 THER/PROPH/DIAG INJ IV PUSH: CPT | Performed by: EMERGENCY MEDICINE

## 2020-06-18 PROCEDURE — 99284 EMERGENCY DEPT VISIT MOD MDM: CPT | Mod: 25 | Performed by: EMERGENCY MEDICINE

## 2020-06-18 PROCEDURE — 25000128 H RX IP 250 OP 636: Performed by: EMERGENCY MEDICINE

## 2020-06-18 PROCEDURE — 99284 EMERGENCY DEPT VISIT MOD MDM: CPT | Mod: Z6 | Performed by: EMERGENCY MEDICINE

## 2020-06-18 RX ORDER — ONDANSETRON 4 MG/1
4 TABLET, ORALLY DISINTEGRATING ORAL EVERY 8 HOURS PRN
Qty: 3 TABLET | Refills: 0 | Status: SHIPPED | OUTPATIENT
Start: 2020-06-18 | End: 2020-07-02

## 2020-06-18 RX ORDER — GABAPENTIN 300 MG/1
300 CAPSULE ORAL 3 TIMES DAILY
Qty: 30 CAPSULE | Refills: 0 | Status: SHIPPED | OUTPATIENT
Start: 2020-06-18 | End: 2020-07-06

## 2020-06-18 RX ORDER — KETOROLAC TROMETHAMINE 30 MG/ML
30 INJECTION, SOLUTION INTRAMUSCULAR; INTRAVENOUS ONCE
Status: COMPLETED | OUTPATIENT
Start: 2020-06-18 | End: 2020-06-18

## 2020-06-18 RX ADMIN — KETOROLAC TROMETHAMINE 30 MG: 30 INJECTION, SOLUTION INTRAMUSCULAR at 11:05

## 2020-06-18 NOTE — ED TRIAGE NOTES
She has mid abdominal pain for the past 2 days and diagnosed with a hernia and says she was told there is tissue stuck.

## 2020-06-18 NOTE — ED AVS SNAPSHOT
[facility] Emergency Department  [address] [place] [address]  Phone:  [phone]  Fax:  [phone]                                    Breanna Vidal   MRN: [id number]    Department:  [facility] Emergency Department   Date of Visit:  6/18/2020           After Visit Summary Signature Page    I have received my discharge instructions, and my questions have been answered. I have discussed any challenges I see with this plan with the nurse or doctor.    ..........................................................................................................................................  Patient/Patient Representative Signature      ..........................................................................................................................................  Patient Representative Print Name and Relationship to Patient    ..................................................               ................................................  Date                                   Time    ..........................................................................................................................................  Reviewed by Signature/Title    ...................................................              ..............................................  Date                                               Time          22EPIC Rev 08/18

## 2020-06-18 NOTE — DISCHARGE INSTRUCTIONS
Clear liquid/soft diet and advance as tolerated.  Start Neurontin 300 mg daily to see if that will help with your ongoing pain issues.  Congratulations on your weight loss.

## 2020-06-18 NOTE — ED PROVIDER NOTES
History     Chief Complaint   Patient presents with     Abdominal Pain     HPI  Breanna Vidal is a 35 year old female who presents by ambulance with complaints of 2 days of persistent epigastric abdominal pain with associated nausea but no vomiting.  No fever or chills.  Patient was seen in the [facility] last night for the same complaint.  She was given pain meds and had a CT evaluation.  She does have postoperative changes in the midline from previous mesh hernia repair.  She is also had a gallbladder removed.  On the scan yesterday she had a 15 mm ventral fascial defect about 6 cm cephalad to the umbilicus.  Small volume of fat protruding into a elongated 2.3 cm hernia sac.  Patient presents today by ambulance with complaints of severe pain and nausea.  She has had no vomiting.  No diarrhea with a small normal bowel movement yesterday.  Has an appointment with the surgeon today.  In route she was given Zofran and Dilaudid with improvement in nausea and pain.  When she stands or bears down the hernia is more evident.  No discoloration or change in skin color over the area.  Denies recent illness such as cough, fever, chills, diarrhea or dysuria.  No chest pain or shortness of breath.    Allergies:  Allergies   Allergen Reactions     Bees Anaphylaxis     Has Epi pen      Codeine Nausea     Other reaction(s): Nausea     Imitrex [Sumatriptan]      Worsening of migraine, paresthesias     Ranitidine Rash and Itching     recvd IV zantac 75 and reaction was immediate,  was given Benadryl to counter act reaction in 2000     Sulfa Drugs Unknown and Rash       Problem List:    Patient Active Problem List    Diagnosis Date Noted     Ventral hernia without obstruction or gangrene 06/04/2020     Priority: Medium     Added automatically from request for surgery [id number]       Biliary dyskinesia 02/10/2020     Priority: Medium     Added automatically from request for surgery [id number]       Intractable migraine without aura  and without status migrainosus 07/17/2019     Priority: Medium     Alcohol abuse, in remission 08/20/2018     Priority: Medium     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) 08/20/2018     Priority: Medium     Mixed incontinence 03/03/2017     Priority: Medium     Vaginal delivery 10/31/2011     Priority: Medium     Rh negative status during pregnancy 09/23/2011     Priority: Medium        Past Medical History:    Past Medical History:   Diagnosis Date     Cellulitis 2012     Drug abuse (H) 2011     MRSA cellulitis 2012     Rh negative, antepartum        Past Surgical History:    Past Surgical History:   Procedure Laterality Date     EXCISE LESION FOOT Left 8/16/2019    Procedure: Excision of nevus second interdigital space left foot, Excision of nevus third interdigital space left foot, excision of plantar junctional nevus left foot;  Surgeon: Rinku Lopez DPM;  Location: [facility] OR     INCISION AND DRAINAGE  04/18/2012    chin abscess I&D     LAPAROSCOPIC CHOLECYSTECTOMY N/A 2/14/2020    Procedure: Laparoscopic Cholecystectomy;  Surgeon: Renard Gerber MD;  Location: [facility] OR     LAPAROSCOPIC HERNIORRHAPHY INCISIONAL N/A 7/17/2019    Procedure: laparoscopic incisional hernia repair with mesh;  Surgeon: Jovi Odom MD;  Location: [facility]     LAPAROSCOPIC SALPINGECTOMY Bilateral 8/27/2018    Procedure: LAPAROSCOPIC SALPINGECTOMY;  Laparoscopic Bilateral Salpingectomy;  Surgeon: Chai Cui MD;  Location: [facility] OR     MAMMOPLASTY REDUCTION Bilateral 08/04/2017       Family History:    Family History   Problem Relation Age of Onset     Cancer Father      Pancreatic Cancer Father      Lung Cancer Father      Lupus Mother        Social History:  Marital Status:  Single [1]  Social History     Tobacco Use     Smoking status: Former Smoker     Smokeless tobacco: Former User     Quit date: 3/10/2016   Substance Use Topics     Alcohol use: No     Comment: history alcohol abuse 2012, s/p treatment     Drug use: No      Comment: former drug user (EtOH, cocaine, marijuana, meth), last use 2012        Medications:    acetaminophen (TYLENOL) 500 MG tablet  EPINEPHrine (EPIPEN/ADRENACLICK/OR ANY BX GENERIC EQUIV) 0.3 MG/0.3ML injection 2-pack  fluticasone (FLONASE) 50 MCG/ACT nasal spray  gabapentin (NEURONTIN) 300 MG capsule  ibuprofen (ADVIL/MOTRIN) 200 MG tablet  ondansetron (ZOFRAN ODT) 4 MG ODT tab  aspirin-acetaminophen-caffeine (EXCEDRIN MIGRAINE) 250-250-65 MG tablet  naproxen (NAPROSYN) 500 MG tablet          Review of Systems all other systems are reviewed and are negative.    Physical Exam   BP: 129/82  Pulse: 57  Temp: 97.9  F (36.6  C)  Resp: 18  Weight: 108.4 kg (239 lb)  SpO2: 100 %      Physical Exam on presentation patient is somewhat dramatic.  No scleral icterus.  Able to speak in complete sentences.  Her abdominal exam reveals obesity with active bowel sounds.  On palpation in the area described as the hernia I cannot feel a distinct hernia or ventral wall defect.  Patient will quiet with pain but with distraction does not seem that uncomfortable.  Then she cries out that she is quite scared.  There is no skin rash over the affected area.  The surgical incisions from previous procedures appear intact.  She does not have rebound.  Cannot appreciate organomegaly but her body habitus makes this a difficult exam.    ED Course        Procedures               Critical Care time:  none               No results found for this or any previous visit (from the past 24 hour(s)).    Medications   ketorolac (TORADOL) injection 30 mg (30 mg Intravenous Given 6/18/20 1105)       IV established in route.  An improvement in her pain and nausea with the meds provided by paramedics.  Patient requests some additional pain meds at 10:50 AM.  Toradol was ordered  I spoke with Dr. Odom from surgery.  She was this patient quite well.  At this point with recent imaging last night showing no obstruction and no significant change from most  recent CT 3 weeks ago did not feel emergent procedure was necessary.  Did recommend however to place her on Neurontin 200 mg 3-4 times daily to see if that will help with her pain.  Patient states that she is doing well with weight loss and stated she was down to 230 pounds today.  She had an appointment later this afternoon with Dr. Odom but Dr. Odom requested that we cancel it as she has had a visit yesterday and today already.  Assessments & Plan (with Medical Decision Making)   Breanna Vidal is a 35 year old female who presents by ambulance with complaints of 2 days of persistent epigastric abdominal pain with associated nausea but no vomiting.  No fever or chills.  Patient was seen in the [facility] last night for the same complaint.  She was given pain meds and had a CT evaluation.  She does have postoperative changes in the midline from previous mesh hernia repair.  She is also had a gallbladder removed.  On the scan yesterday she had a 15 mm ventral fascial defect about 6 cm cephalad to the umbilicus.  Small volume of fat protruding into a elongated 2.3 cm hernia sac.  Patient presents today by ambulance with complaints of severe pain and nausea.  She has had no vomiting.  No diarrhea with a small normal bowel movement yesterday.  Has an appointment with the surgeon today.  In route she was given Zofran and Dilaudid with improvement in nausea and pain.  When she stands or bears down the hernia is more evident.  No discoloration or change in skin color over the area.  Denies recent illness such as cough, fever, chills, diarrhea or dysuria.  No chest pain or shortness of breath.  I spoke to Dr. Odom from surgery.  She is knows the patient quite well.  After discussion with the imaging from yesterday did not feel there is significant change from most recent CT imaging.  Did not think emergent procedure is necessary.  Did request that I start the patient on Neurontin to see if that will help with some of her  ongoing pain issues.  She is given Zofran for nausea.  Handout on the hernias provided.  Reasons to return to the emergency room were discussed.  I have reviewed the nursing notes.    I have reviewed the findings, diagnosis, plan and need for follow up with the patient.       New Prescriptions    GABAPENTIN (NEURONTIN) 300 MG CAPSULE    Take 1 capsule (300 mg) by mouth 3 times daily    ONDANSETRON (ZOFRAN ODT) 4 MG ODT TAB    Take 1 tablet (4 mg) by mouth every 8 hours as needed for vomiting       Final diagnoses:   Chronic abdominal pain   Ventral hernia without obstruction or gangrene       6/18/2020   [facility] EMERGENCY DEPARTMENT     Jhony Parish MD  06/18/20 1113       Jhony Parish MD  06/18/20 111

## 2020-06-19 ENCOUNTER — PATIENT OUTREACH (OUTPATIENT)
Dept: CARE COORDINATION | Facility: CLINIC | Age: 35
End: 2020-06-19

## 2020-06-19 ENCOUNTER — VIRTUAL VISIT (OUTPATIENT)
Dept: URGENT CARE | Facility: CLINIC | Age: 35
End: 2020-06-19
Payer: COMMERCIAL

## 2020-06-19 DIAGNOSIS — Z20.822 EXPOSURE TO COVID-19 VIRUS: ICD-10-CM

## 2020-06-19 DIAGNOSIS — J02.9 SORE THROAT: Primary | ICD-10-CM

## 2020-06-19 PROCEDURE — 99213 OFFICE O/P EST LOW 20 MIN: CPT | Mod: TEL

## 2020-06-19 NOTE — PROGRESS NOTES
"Breanna Vidal is a 35 year old female who is being evaluated via a billable telephone visit.      The patient has been notified of following:     \"This telephone visit will be conducted via a call between you and your physician/provider. We have found that certain health care needs can be provided without the need for a physical exam.  This service lets us provide the care you need with a short phone conversation.  If a prescription is necessary we can send it directly to your pharmacy.  If lab work is needed we can place an order for that and you can then stop by our lab to have the test done at a later time.    Telephone visits are billed at different rates depending on your insurance coverage. During this emergency period, for some insurers they may be billed the same as an in-person visit.  Please reach out to your insurance provider with any questions.    If during the course of the call the physician/provider feels a telephone visit is not appropriate, you will not be charged for this service.\"    Patient has given verbal consent for Telephone visit?  Yes    How would you like to obtain your AVS? [place]     Breanna Vidal is a 35 year old female who presents via phone visit today for the following health issues:    HPI   Patient was exposed to a known positive Covid patient on Sunday and has developed symptoms in the last 24 hours   Acute Illness   Acute illness concerns: runny nose, sore throat and body aches   Onset: 1 day     Fever: no    Chills/Sweats: YES    Headache (location?): YES    Sinus Pressure:no    Conjunctivitis:  no    Ear Pain: no    Rhinorrhea: YES    Congestion: no    Sore Throat: YES     Cough: YES-non-productive    Wheeze: no    Decreased Appetite: YES    Nausea: yes     Vomiting: no    Diarrhea:  no    Dysuria/Freq.: no    Fatigue/Achiness: YES    Sick/Strep Exposure: YES     Therapies Tried and outcome: none    BP Readings from Last 3 Encounters:   06/18/20 116/85   06/04/20 " 112/74   04/24/20 (!) 145/125    Wt Readings from Last 3 Encounters:   06/18/20 108.4 kg (239 lb)   06/04/20 108.6 kg (239 lb 8 oz)   04/24/20 104.3 kg (230 lb)                    Reviewed and updated as needed this visit by Provider         Review of Systems   Constitutional, HEENT, cardiovascular, pulmonary, GI, , musculoskeletal, neuro, skin, endocrine and psych systems are negative, except as otherwise noted.       Objective   Reported vitals:  There were no vitals taken for this visit.   alert and no distress  PSYCH: Alert and oriented times 3; coherent speech, normal   rate and volume, able to articulate logical thoughts, able   to abstract reason, no tangential thoughts, no hallucinations   or delusions  Her affect is normal  RESP: No cough, no audible wheezing, able to talk in full sentences  Remainder of exam unable to be completed due to telephone visits    Diagnostic Test Results:  none         Assessment/Plan:  ASSESSMENT AND PLAN    ICD-10-CM    1. Sore throat  J02.9 Symptomatic COVID-19 Virus (Coronavirus) by PCR   2. Exposure to COVID-19 virus  Z20.828 Symptomatic COVID-19 Virus (Coronavirus) by PCR     I will have patient start self quarantining and will order Covid testing. I explained the importance of quarantine until it has been at least 10 days since symptoms started and at least 3 days since they resolved.    Return if symptoms worsen or fail to improve.      Phone call duration:  11 minutes    Amira Capellan PA-C

## 2020-06-19 NOTE — PROGRESS NOTES
Clinic Care Coordination Contact    Follow Up Progress Note      Assessment: Patient was seen at [facility] for chronic abdominal pain and ventral hernia without obstruction. CC RN called patient she reports her abdominal symptoms were completely resolved. She is slightly nauseated and will take zofran. Patient filled the gabapentin, but is not planning to take due to resolution of abdominal pain. Patient had a bowel movement today. Had not had a bowel movement in 3 days which is abnormal for her. Patient is more concerned about being informed her father in law was tested positive for covid19. She tried [facility] and was unsuccessful getting through, even called the help line. She reports headache ( but is better than yesterday), runny nose, cough, and congestion. Patient is going to call her clinic to make a virtual visit for her above symptoms.     Goals addressed this encounter: na, patient on maintenance status.      Intervention/Education provided during outreach: patient will reach out to [facility] scheduling line to make virtual visit for positive exposure to COVID19.      Outreach Frequency: 2 months    Plan:   Care Coordinator will follow up in 2 months.   SHAWANDA Landin RN Clinic Care Coordinator   [place], [place], [place]  Phone: [phone]

## 2020-06-19 NOTE — PATIENT INSTRUCTIONS
As you recover from COVID-19    Patients who have symptoms (cough, fever, or shortness of breath), need to isolate for 10 days from when symptoms started AND 72 hours after fever resolves (without fever reducing medications) AND improvement of respiratory symptoms (whichever is longer).    During this time:    Isolate yourself at home (in own room/own bathroom if possible)    Do not allow any visitors    Do not go to work or school    Do not go to Hindu,  centers, shopping, or other public places    Do not shake hands    Avoid close and intimate contact with others (hugging, kissing)    Follow CDC recommendations for household cleaning of frequently touched services    After the initial 10 days, continue to isolate yourself from household members as much as possible. To continue decrease the risk of community spread and exposure, you and any members of your household should limit activities in public for 14 days after starting home isolation.     You can reference the following CDC link for helpful home isolation/care tips:  https://www.cdc.gov/coronavirus/2019-ncov/downloads/10Things.pdf    Protect Others:    Cover your mouth and nose with a mask, disposable tissue or wash cloth to avoid spreading germs.    Wash your hands and face often. Use soap and water    Call Back If: Breathing difficulty develops or you become worse.      For more information about COVID19 and options for caring for yourself at home, please visit the CDC website at https://www.cdc.gov/coronavirus/2019-ncov/about/steps-when-sick.html  For more options for care at [facility], please visit our website at [email or web address]    For information about [facility] COVID-19 Clinical Trials, please visit [email or web address]    For more information, please use the [place] Department of Health COVID-19 Website:  https://www.[email or web address]/diseases/coronavirus/index.html  [place] Department of Health ([facility]) COVID-19 Hotlines (Interpreters  available):      Health questions: Phone Number: [phone] or [phone] and Hours: 7 a.m. to 7 p.m.    Schools and  questions: Phone Number: [phone] or [phone] and Hours 7 a.m. to 7 p.m.

## 2020-06-20 DIAGNOSIS — Z11.59 ENCOUNTER FOR SCREENING FOR OTHER VIRAL DISEASES: ICD-10-CM

## 2020-06-20 DIAGNOSIS — Z20.822 EXPOSURE TO COVID-19 VIRUS: ICD-10-CM

## 2020-06-20 DIAGNOSIS — J02.9 SORE THROAT: ICD-10-CM

## 2020-06-20 PROCEDURE — 99000 SPECIMEN HANDLING OFFICE-LAB: CPT | Performed by: PHYSICIAN ASSISTANT

## 2020-06-20 PROCEDURE — U0003 INFECTIOUS AGENT DETECTION BY NUCLEIC ACID (DNA OR RNA); SEVERE ACUTE RESPIRATORY SYNDROME CORONAVIRUS 2 (SARS-COV-2) (CORONAVIRUS DISEASE [COVID-19]), AMPLIFIED PROBE TECHNIQUE, MAKING USE OF HIGH THROUGHPUT TECHNOLOGIES AS DESCRIBED BY CMS-2020-01-R: HCPCS | Mod: 90 | Performed by: PHYSICIAN ASSISTANT

## 2020-06-20 NOTE — LETTER
June 21, 2020        Breanna Colebrook  [id number] [address] [place] [id number]    This letter provides a written record that you were tested for COVID-19 on 6/20/20.       Your result was negative. This means that we didn t find the virus that causes COVID-19 in your sample. A test may show negative when you do actually have the virus. This can happen when the virus is in the early stages of infection, before you feel illness symptoms.    If you have symptoms   Stay home and away from others (self-isolate) until you meet ALL of the guidelines below:    You ve had no fever--and no medicine that reduces fever--for 3 full days (72 hours). And      Your other symptoms have gotten better. For example, your cough or breathing has improved. And     At least 10 days have passed since your symptoms started.    During this time:    Stay home. Don t go to work, school or anywhere else.     Stay in your own room, including for meals. Use your own bathroom if you can.    Stay away from others in your home. No hugging, kissing or shaking hands. No visitors.    Clean  high touch  surfaces often (doorknobs, counters, handles, etc.). Use a household cleaning spray or wipes. You can find a full list on the EPA website at www.epa.gov/pesticide-registration/list-n-disinfectants-use-against-sars-cov-2.    Cover your mouth and nose with a mask, tissue or washcloth to avoid spreading germs.    Wash your hands and face often with soap and water.    Going back to work  Check with your employer for any guidelines to follow for going back to work.    Employers: This document serves as formal notice that your employee tested negative for COVID-19, as of the testing date shown above.

## 2020-06-21 LAB
SARS-COV-2 RNA SPEC QL NAA+PROBE: NOT DETECTED
SPECIMEN SOURCE: NORMAL

## 2020-07-01 ENCOUNTER — VIRTUAL VISIT (OUTPATIENT)
Dept: FAMILY MEDICINE | Facility: OTHER | Age: 35
End: 2020-07-01
Payer: COMMERCIAL

## 2020-07-01 DIAGNOSIS — N30.00 ACUTE CYSTITIS WITHOUT HEMATURIA: Primary | ICD-10-CM

## 2020-07-01 PROCEDURE — 99213 OFFICE O/P EST LOW 20 MIN: CPT | Mod: TEL | Performed by: PHYSICIAN ASSISTANT

## 2020-07-01 RX ORDER — NITROFURANTOIN 25; 75 MG/1; MG/1
100 CAPSULE ORAL 2 TIMES DAILY
Qty: 14 CAPSULE | Refills: 0 | Status: SHIPPED | OUTPATIENT
Start: 2020-07-01 | End: 2020-07-21

## 2020-07-01 NOTE — PROGRESS NOTES
"Breanna Vidal is a 35 year old female who is being evaluated via a billable telephone visit.      The patient has been notified of following:     \"This telephone visit will be conducted via a call between you and your physician/provider. We have found that certain health care needs can be provided without the need for a physical exam.  This service lets us provide the care you need with a short phone conversation.  If a prescription is necessary we can send it directly to your pharmacy.  If lab work is needed we can place an order for that and you can then stop by our lab to have the test done at a later time.    Telephone visits are billed at different rates depending on your insurance coverage. During this emergency period, for some insurers they may be billed the same as an in-person visit.  Please reach out to your insurance provider with any questions.    If during the course of the call the physician/provider feels a telephone visit is not appropriate, you will not be charged for this service.\"    Patient has given verbal consent for Telephone visit?  Yes    What phone number would you like to be contacted at? [phone]    How would you like to obtain your AVS? [facility]     Breanna Vidal is a 35 year old female who presents via phone visit today for the following health issues:    HPI     URINARY TRACT SYMPTOMS  Onset: yesterday     Description:   Painful urination (Dysuria): YES           Frequency: YES  Blood in urine (Hematuria): no   Delay in urine (Hesitency): no     Intensity: mild    Progression of Symptoms:  worsening    Accompanying Signs & Symptoms:  Fever/chills: no   Flank pain no   Nausea and vomiting: no   Any vaginal symptoms: vaginal odor  Abdominal/Pelvic Pain: no     History:   History of frequent UTI's: no   History of kidney stones: no   Sexually Active: YES.   Possibility of pregnancy: No  Therapies Tried and outcome:  Increase fluid intake    Patient reports she has noticed " symptoms for the last couple days but this really seemed to worsen yesterday. She reports urine is cloudy, more odorous. She reports pain with urination and frequency. Only going small amounts. Mild lower stomach pressure. No flank pain. No fevers. Had a UTI last year and this feels the same. She has been pushing fluids. Has surgery coming up.     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)     Intractable migraine without aura and without status migrainosus     Biliary dyskinesia     Ventral hernia without obstruction or gangrene     Past Surgical History:   Procedure Laterality Date     EXCISE LESION FOOT Left 8/16/2019    Procedure: Excision of nevus second interdigital space left foot, Excision of nevus third interdigital space left foot, excision of plantar junctional nevus left foot;  Surgeon: Rinku Lopez DPM;  [facility]     INCISION AND DRAINAGE  04/18/2012    chin abscess I&D     LAPAROSCOPIC CHOLECYSTECTOMY N/A 2/14/2020    Procedure: Laparoscopic Cholecystectomy;  Surgeon: Renard Gerber MD;  [facility]     LAPAROSCOPIC HERNIORRHAPHY INCISIONAL N/A 7/17/2019    Procedure: laparoscopic incisional hernia repair with mesh;  Surgeon: Jovi Odom MD;  [facility]     LAPAROSCOPIC SALPINGECTOMY Bilateral 8/27/2018    Procedure: LAPAROSCOPIC SALPINGECTOMY;  Laparoscopic Bilateral Salpingectomy;  Surgeon: Chai Cui MD;  [facility]     MAMMOPLASTY REDUCTION Bilateral 08/04/2017       Social History     Tobacco Use     Smoking status: Former Smoker     Smokeless tobacco: Former User     Quit date: 3/10/2016   Substance Use Topics     Alcohol use: No     Comment: history alcohol abuse 2012, s/p treatment     Family History   Problem Relation Age of Onset     Cancer Father      Pancreatic Cancer Father      Lung Cancer  Father      Lupus Mother          Current Outpatient Medications   Medication Sig Dispense Refill     acetaminophen (TYLENOL) 500 MG tablet Take 1,000 mg by mouth every 6 hours as needed for mild pain       aspirin-acetaminophen-caffeine (EXCEDRIN MIGRAINE) 250-250-65 MG tablet Take 1 tablet by mouth every 6 hours as needed for headaches       EPINEPHrine (EPIPEN/ADRENACLICK/OR ANY BX GENERIC EQUIV) 0.3 MG/0.3ML injection 2-pack INJECT  0.3 ML IM 1 TIME PRF ANAPHYLAXIS  2     fluticasone (FLONASE) 50 MCG/ACT nasal spray USE ONE SPRAY IN EACH NOSTRIL DAILY 16 g 1     ibuprofen (ADVIL/MOTRIN) 200 MG tablet Take 600 mg by mouth every 4 hours as needed for mild pain       nitroFURantoin macrocrystal-monohydrate (MACROBID) 100 MG capsule Take 1 capsule (100 mg) by mouth 2 times daily for 7 days 14 capsule 0     gabapentin (NEURONTIN) 300 MG capsule Take 1 capsule (300 mg) by mouth 3 times daily (Patient not taking: Reported on 7/1/2020) 30 capsule 0     naproxen (NAPROSYN) 500 MG tablet Take 1 tablet (500 mg) by mouth every 12 hours as needed for headaches (Patient not taking: Reported on 6/4/2020) 30 tablet 3     Allergies   Allergen Reactions     Bees Anaphylaxis     Has Epi pen      Codeine Nausea     Other reaction(s): Nausea     Imitrex [Sumatriptan]      Worsening of migraine, paresthesias     Ranitidine Rash and Itching     recvd IV zantac 75 and reaction was immediate,  was given Benadryl to counter act reaction in 2000     Sulfa Drugs Unknown and Rash       Reviewed and updated as needed this visit by Provider         Review of Systems   Constitutional, HEENT, cardiovascular, pulmonary, gi and gu systems are negative, except as otherwise noted.       Objective   Reported vitals:  There were no vitals taken for this visit.   PSYCH: Alert and oriented times 3; coherent speech, normal   rate and volume, able to articulate logical thoughts, able   to abstract reason, no tangential thoughts, no hallucinations   or  delusions  Her affect is normal and pleasant  RESP: No cough, no audible wheezing, able to talk in full sentences  Remainder of exam unable to be completed due to telephone visits    Diagnostic Test Results:  Labs reviewed in Epic        Assessment/Plan:  1. Acute cystitis without hematuria  Will treat based on symptoms especially given upcoming surgery. Patient instructed to continue to push fluids. Discussed follow-up in 2-3 days if symptoms not seeming to resolve.   - nitroFURantoin macrocrystal-monohydrate (MACROBID) 100 MG capsule; Take 1 capsule (100 mg) by mouth 2 times daily for 7 days  Dispense: 14 capsule; Refill: 0    The patient indicates understanding of these issues and agrees with the plan.    Phone call duration:  5 minutes    Marcelle Alicea PA-C

## 2020-07-01 NOTE — PROGRESS NOTES
[address]  [facility] [address]  [phone]  Dept: [phone]    PRE-OP EVALUATION:  Today's date: 2020    Breanna Vidal (: 1985) presents for pre-operative evaluation assessment as requested by Dr. Odom.  She requires evaluation and anesthesia risk assessment prior to undergoing surgery/procedure for treatment of ventral hernia repair .    Proposed Surgery/ Procedure: Robotic incisional hernia repair with mesh,  possible explantation of old mesh, possible open possible lysis of adhesions   Date of Surgery/ Procedure:   Time of Surgery/ Procedure: 730  Hospital/Surgical Facility: [facility]  Fax number for surgical facility:   Primary Physician: Karen Campos  Type of Anesthesia Anticipated: General and Local with MAC    Patient has a Health Care Directive or Living Will:  NO    1. NO - Do you have a history of heart attack, stroke, stent, bypass or surgery on an artery in the head, neck, heart or legs?  2. NO - Do you ever have any pain or discomfort in your chest?  3. NO - Do you have a history of  Heart Failure?  4. NO - Are you troubled by shortness of breath when: walking on the level, up a slight hill or at night?  5. NO - Do you currently have a cold, bronchitis or other respiratory infection?  6. NO - Do you have a cough, shortness of breath or wheezing?  7. NO - Do you sometimes get pains in the calves of your legs when you walk?  8. NO - Do you or anyone in your family have previous history of blood clots?  9. NO - Do you or does anyone in your family have a serious bleeding problem such as prolonged bleeding following surgeries or cuts?  10. NO - Have you ever had problems with anemia or been told to take iron pills?  11. NO - Have you had any abnormal blood loss such as black, tarry or bloody stools, or abnormal vaginal bleeding?  12. NO - Have you ever had a blood transfusion?  13. YES - HAVE YOU OR ANY OF YOUR RELATIVES  EVER HAD PROBLEMS WITH ANESTHESIA? Nausea post-anesthesia  14. NO - Do you have sleep apnea, excessive snoring or daytime drowsiness?  15. NO - Do you have any prosthetic heart valves?  16. NO - Do you have prosthetic joints?  17. NO - Is there any chance that you may be pregnant?    Days 2-3 heavy bleeding, 7-10      HPI:     HPI related to upcoming procedure: ventral hernia      See problem list for active medical problems.  Problems all longstanding and stable, except as noted/documented.  See ROS for pertinent symptoms related to these conditions.      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) 08/20/2018     Priority: Medium     Mixed incontinence 03/03/2017     Priority: Medium     Vaginal delivery 10/31/2011     Priority: Medium     Rh negative status during pregnancy 09/23/2011     Priority: Medium      Past Medical History:   Diagnosis Date     Cellulitis 2012    MRSA septic olecranon bursitis and facial cellulitis     Drug abuse (H) 2011    methamphetamine and alcohol, sober since treatment 2011     MRSA cellulitis 2012    cleared with negative nasal swabs 8/16/17 and 8/28/17, reviewed     Rh negative, antepartum      Past Surgical History:   Procedure Laterality Date     EXCISE LESION FOOT Left 8/16/2019    Procedure: Excision of nevus second interdigital space left foot, Excision of nevus third interdigital space left foot, excision of plantar junctional nevus left foot;  Surgeon: Rinku Lopez DPM;  Location:   OR     INCISION AND DRAINAGE  04/18/2012    chin abscess I&D     LAPAROSCOPIC CHOLECYSTECTOMY N/A 2/14/2020    Procedure: Laparoscopic Cholecystectomy;  Surgeon: Renard Gerber MD;  [facility]     LAPAROSCOPIC HERNIORRHAPHY INCISIONAL N/A 7/17/2019    Procedure: laparoscopic incisional hernia repair with mesh;  Surgeon: Jovi Odom MD;  [facility]     LAPAROSCOPIC SALPINGECTOMY Bilateral 8/27/2018    Procedure: LAPAROSCOPIC SALPINGECTOMY;  Laparoscopic Bilateral Salpingectomy;  Surgeon: Chai Cui MD;  [facility]     MAMMOPLASTY REDUCTION Bilateral 08/04/2017     Current Outpatient Medications   Medication Sig Dispense Refill     acetaminophen (TYLENOL) 500 MG tablet Take 1,000 mg by mouth every 6 hours as needed for mild pain       aspirin-acetaminophen-caffeine (EXCEDRIN MIGRAINE) 250-250-65 MG tablet Take 1 tablet by mouth every 6 hours as needed for headaches       EPINEPHrine (EPIPEN/ADRENACLICK/OR ANY BX GENERIC EQUIV) 0.3 MG/0.3ML injection 2-pack INJECT  0.3 ML IM 1 TIME PRF ANAPHYLAXIS  2     fluticasone (FLONASE) 50 MCG/ACT nasal spray USE ONE SPRAY IN EACH NOSTRIL DAILY 16 g 1     ibuprofen (ADVIL/MOTRIN) 200 MG tablet Take 600 mg by mouth every 4 hours as needed for mild pain       naproxen (NAPROSYN) 500 MG tablet Take 1 tablet (500 mg) by mouth every 12 hours as needed for headaches 30 tablet 3     nitroFURantoin macrocrystal-monohydrate (MACROBID) 100 MG capsule Take 1 capsule (100 mg) by mouth 2 times daily for 7 days 14 capsule 0     OTC products: none    Allergies   Allergen Reactions     Bees Anaphylaxis     Has Epi pen      Codeine Nausea     Other reaction(s): Nausea     Imitrex [Sumatriptan]      Worsening of migraine, paresthesias     Ranitidine Rash and Itching     recvd IV zantac 75 and reaction was immediate,  was given Benadryl to counter act reaction in 2000     Sulfa Drugs Unknown and Rash      Latex Allergy: NO    Social History     Tobacco Use     Smoking  "status: Former Smoker     Smokeless tobacco: Former User     Quit date: 3/10/2016   Substance Use Topics     Alcohol use: No     Comment: history alcohol abuse 2012, s/p treatment     History   Drug Use No     Comment: former drug user (EtOH, cocaine, marijuana, meth), last use 2012       REVIEW OF SYSTEMS:   CONSTITUTIONAL: NEGATIVE for fever, chills, change in weight  INTEGUMENTARY/SKIN: NEGATIVE for worrisome rashes, moles or lesions  EYES: NEGATIVE for vision changes or irritation  ENT/MOUTH: NEGATIVE for ear, mouth and throat problems  RESP: NEGATIVE for significant cough or SOB  BREAST: NEGATIVE for masses, tenderness or discharge  CV: NEGATIVE for chest pain, palpitations or peripheral edema  GI: POSITIVE for ventral hernia NEGATIVE for nausea, abdominal pain, heartburn, or change in bowel habits  : NEGATIVE for frequency, dysuria, or hematuria  MUSCULOSKELETAL: NEGATIVE for significant arthralgias or myalgia  NEURO: NEGATIVE for weakness, dizziness or paresthesias  ENDOCRINE: NEGATIVE for temperature intolerance, skin/hair changes  HEME: NEGATIVE for bleeding problems  PSYCHIATRIC: NEGATIVE for changes in mood or affect    EXAM:   /78   Pulse 66   Temp 97.6  F (36.4  C)   Resp 16   Ht 1.676 m (5' 6\")   Wt 104.3 kg (230 lb)   SpO2 98%   BMI 37.12 kg/m      GENERAL APPEARANCE: healthy, alert and no distress     EYES: EOMI, PERRL     HENT: ear canals and TM's normal and nose and mouth without ulcers or lesions     NECK: no adenopathy, no asymmetry, masses, or scars and thyroid normal to palpation     RESP: lungs clear to auscultation - no rales, rhonchi or wheezes     CV: regular rates and rhythm, normal S1 S2, no S3 or S4 and no murmur, click or rub     ABDOMEN:  soft, nontender, no HSM or masses and bowel sounds normal, soft reducible ventral hernia right mid-upper abdomen     MS: extremities normal- no gross deformities noted, no evidence of inflammation in joints, FROM in all extremities.     " SKIN: no suspicious lesions or rashes     NEURO: Normal strength and tone, sensory exam grossly normal, mentation intact and speech normal     PSYCH: mentation appears normal. and affect normal/bright     LYMPHATICS: No cervical adenopathy    DIAGNOSTICS:   EKG from 1/28/20: appears normal, NSR, normal axis, normal intervals, no acute ST/T changes c/w ischemia, no LVH by voltage criteria, unchanged from previous tracings    Recent Labs   Lab Test 02/26/20  1133 02/17/20  1128   HGB 14.5 12.3    214    142   POTASSIUM 4.1 3.4   CR 0.58 0.67      IMPRESSION:  1. New, small, fat-containing ventral abdominal hernia corresponds  with patient's new palpable finding.  2. Postoperative changes status post cholecystectomy.  3. Mildly enlarged right ovary with multiple cystic structures likely  represent follicles, although they are larger than typically seen and  follow-up pelvic ultrasound in 6 or 10 weeks, at a different stage of  the patient's cycle, is recommended to ensure resolution of the right  ovarian findings.     KETAN GONZALEZ MD  IMPRESSION:   Reason for surgery/procedure: ventral hernia repair  Diagnosis/reason for consult: preop    The proposed surgical procedure is considered INTERMEDIATE risk.    REVISED CARDIAC RISK INDEX  The patient has the following serious cardiovascular risks for perioperative complications such as (MI, PE, VFib and 3  AV Block):  No serious cardiac risks  INTERPRETATION: 0 risks: Class I (very low risk - 0.4% complication rate)    The patient has the following additional risks for perioperative complications:  Morbid obesity      ICD-10-CM    1. Preop general physical exam  Z01.818    2. Ventral hernia without obstruction or gangrene  K43.9    3. Right ovarian cyst  N83.201 US Pelvic Complete w Transvaginal  Plan for US in 6-10 weeks to recheck right ovarian cyst   4. 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)  F15.11 Remains in remission       RECOMMENDATIONS:     --Patient  is on no chronic medications  Advised to take no NSAIDs 10 days prior to surgery    APPROVAL GIVEN to proceed with proposed procedure, without further diagnostic evaluation       Signed Electronically by: MADELINE Hernandez CNP    Copy of this evaluation report is provided to requesting physician.    [facility] Preop Guidelines    Revised Cardiac Risk Index

## 2020-07-01 NOTE — PATIENT INSTRUCTIONS
Do not take aspirin, ibuprofen or naproxen 10 days prior to surgery as it thins the blood and increases bleeding risk during surgery.    Call to schedule an appointment with Dr. Chambers to discuss heavy periods.        Before Your Surgery      Call your surgeon if there is any change in your health. This includes signs of a cold or flu (such as a sore throat, runny nose, cough, rash or fever).    Do not smoke, drink alcohol or take over the counter medicine (unless your surgeon or primary care doctor tells you to) for the 24 hours before and after surgery.    If you take prescribed drugs: Follow your doctor s orders about which medicines to take and which to stop until after surgery.    Eating and drinking prior to surgery: follow the instructions from your surgeon    Take a shower or bath the night before surgery. Use the soap your surgeon gave you to gently clean your skin. If you do not have soap from your surgeon, use your regular soap. Do not shave or scrub the surgery site.  Wear clean pajamas and have clean sheets on your bed.

## 2020-07-06 ENCOUNTER — OFFICE VISIT (OUTPATIENT)
Dept: FAMILY MEDICINE | Facility: OTHER | Age: 35
End: 2020-07-06
Payer: COMMERCIAL

## 2020-07-06 VITALS
TEMPERATURE: 97.6 F | WEIGHT: 230 LBS | OXYGEN SATURATION: 98 % | HEART RATE: 66 BPM | HEIGHT: 66 IN | DIASTOLIC BLOOD PRESSURE: 78 MMHG | SYSTOLIC BLOOD PRESSURE: 118 MMHG | RESPIRATION RATE: 16 BRPM | BODY MASS INDEX: 36.96 KG/M2

## 2020-07-06 DIAGNOSIS — N83.201 RIGHT OVARIAN CYST: ICD-10-CM

## 2020-07-06 DIAGNOSIS — Z01.818 PREOP GENERAL PHYSICAL EXAM: Primary | ICD-10-CM

## 2020-07-06 DIAGNOSIS — K43.9 VENTRAL HERNIA WITHOUT OBSTRUCTION OR GANGRENE: ICD-10-CM

## 2020-07-06 DIAGNOSIS — F15.11 METHAMPHETAMINE ABUSE IN REMISSION (H): ICD-10-CM

## 2020-07-06 PROCEDURE — 99214 OFFICE O/P EST MOD 30 MIN: CPT | Performed by: STUDENT IN AN ORGANIZED HEALTH CARE EDUCATION/TRAINING PROGRAM

## 2020-07-06 ASSESSMENT — MIFFLIN-ST. JEOR: SCORE: 1755.02

## 2020-07-06 ASSESSMENT — PAIN SCALES - GENERAL: PAINLEVEL: NO PAIN (0)

## 2020-07-07 ENCOUNTER — OFFICE VISIT (OUTPATIENT)
Dept: SURGERY | Facility: CLINIC | Age: 35
End: 2020-07-07
Payer: COMMERCIAL

## 2020-07-07 VITALS
SYSTOLIC BLOOD PRESSURE: 110 MMHG | HEIGHT: 66 IN | DIASTOLIC BLOOD PRESSURE: 68 MMHG | WEIGHT: 229 LBS | BODY MASS INDEX: 36.8 KG/M2 | TEMPERATURE: 97.5 F

## 2020-07-07 DIAGNOSIS — E66.9 OBESITY (BMI 30-39.9): ICD-10-CM

## 2020-07-07 DIAGNOSIS — K43.2 INCISIONAL HERNIA, WITHOUT OBSTRUCTION OR GANGRENE: Primary | ICD-10-CM

## 2020-07-07 PROCEDURE — 99214 OFFICE O/P EST MOD 30 MIN: CPT | Performed by: SURGERY

## 2020-07-07 ASSESSMENT — MIFFLIN-ST. JEOR: SCORE: 1750.49

## 2020-07-07 NOTE — LETTER
7/7/2020         RE: Breanna Vidal  [address] [address] [place] [id number]        Dear Colleague,    Thank you for referring your patient, Breanna Vidal, to the [facility]. Please see a copy of my visit note below.    [facility] FOLLOW-UP NOTE  GENERAL SURGERY    PCP: Karen Campos         Assessment and Plan:   Assessment:   Breanna Vidal is a 35 year old female who presented to check in with her weight loss and upcoming hernia repair      ICD-10-CM    1. Incisional hernia, without obstruction or gangrene  K43.2    2. Obesity (BMI 30-39.9)  E66.9        Plan:  We will proceed with the robotic incisional hernia repair with mesh.  Patient understands that her Eclectic risk is 26%.  Patient stated she is willing to take this risk as she has no other time to repair this hernia.  She has lost 9 kg since I last saw her and stated that she will continue to lose weight after the hernia repair.  Patient understands that her risk of recurrence is much higher, rate of mesh infection is higher, and risk of other hernias is higher.  Patient stated she is having a lot of pain and she just wants to have this done as she has worked so hard to lose some of the weight already.  We will proceed with a hernia repair.  I will give patient Percocet, a muscle relaxant and also sublingual Zofran postoperatively.  Patient understands to not go to the ER for any sort of pain without consulting with the on-call surgeon first.  All of her questions were answered to her satisfaction.    25 mins visit, more than 50% of face to face time was spent in counseling and coordinating care as discussed above.             Subjective:     Breanna Vidal is a 35 year old female who presents  to check in with her weight loss and upcoming hernia repair.  Patient have lost about 9 kg since she last saw me..  She has been doing higher protein, lower fat and lower carbohydrate foods.  She is also been a lot more active.  Her hernia still  "presents.  She does think that the \"hernia\" is getting bigger.  I reassured her that the hernia defect itself is not getting bigger however the content is more.  It is difficult to appreciate the defect without the CT scan as there is so much incarcerated likely omentum or falciform within the hernia defect.  Patient has not had any abdominal pain, issues with bowel movements.  No fevers and no chills.  We discussed her risks in detail.  We discussed continue to watch the hernia without repairing it until she loses more weight.  Patient does not want that.  Patient stated she is already set up everything for her postoperative recovery and she cannot really set it up.  And since she is having intermittent pain and her hernia content has increased in size she would like to proceed with the repair.         Objective:       /68   Temp 97.5  F (36.4  C) (Temporal)   Ht 1.676 m (5' 6\")   Wt 103.9 kg (229 lb)   LMP 06/15/2020 (Approximate)   BMI 36.96 kg/m     Constitutional: Awake, alert, in no acute distress.  Respiratory: Non-labored.   Cardiovascular: Regular rate and rhythm.   Abdomen: noted supraumbilical content; content unchanged; still incarcerated; defect from most recent lap nikos.  No signs of obstruction.      [facility] O[facility] DO  [facility]                Again, thank you for allowing me to participate in the care of your patient.        Sincerely,        [facility] Odom MD    "

## 2020-07-07 NOTE — PROGRESS NOTES
"[facility] FOLLOW-UP NOTE  GENERAL SURGERY    PCP: Karen Campos         Assessment and Plan:   Assessment:   Breanna Vidal is a 35 year old female who presented to check in with her weight loss and upcoming hernia repair      ICD-10-CM    1. Incisional hernia, without obstruction or gangrene  K43.2    2. Obesity (BMI 30-39.9)  E66.9        Plan:  We will proceed with the robotic incisional hernia repair with mesh.  Patient understands that her [place] risk is 26%.  Patient stated she is willing to take this risk as she has no other time to repair this hernia.  She has lost 9 kg since I last saw her and stated that she will continue to lose weight after the hernia repair.  Patient understands that her risk of recurrence is much higher, rate of mesh infection is higher, and risk of other hernias is higher.  Patient stated she is having a lot of pain and she just wants to have this done as she has worked so hard to lose some of the weight already.  We will proceed with a hernia repair.  I will give patient Percocet, a muscle relaxant and also sublingual Zofran postoperatively.  Patient understands to not go to the ER for any sort of pain without consulting with the on-call surgeon first.  All of her questions were answered to her satisfaction.    25 mins visit, more than 50% of face to face time was spent in counseling and coordinating care as discussed above.             Subjective:     Breanna Vidal is a 35 year old female who presents  to check in with her weight loss and upcoming hernia repair.  Patient have lost about 9 kg since she last saw me..  She has been doing higher protein, lower fat and lower carbohydrate foods.  She is also been a lot more active.  Her hernia still presents.  She does think that the \"hernia\" is getting bigger.  I reassured her that the hernia defect itself is not getting bigger however the content is more.  It is difficult to appreciate the defect without the CT scan as there " "is so much incarcerated likely omentum or falciform within the hernia defect.  Patient has not had any abdominal pain, issues with bowel movements.  No fevers and no chills.  We discussed her risks in detail.  We discussed continue to watch the hernia without repairing it until she loses more weight.  Patient does not want that.  Patient stated she is already set up everything for her postoperative recovery and she cannot really set it up.  And since she is having intermittent pain and her hernia content has increased in size she would like to proceed with the repair.         Objective:       /68   Temp 97.5  F (36.4  C) (Temporal)   Ht 1.676 m (5' 6\")   Wt 103.9 kg (229 lb)   LMP 06/15/2020 (Approximate)   BMI 36.96 kg/m     Constitutional: Awake, alert, in no acute distress.  Respiratory: Non-labored.   Cardiovascular: Regular rate and rhythm.   Abdomen: noted supraumbilical content; content unchanged; still incarcerated; defect from most recent lap nikos.  No signs of obstruction.      [facility], [facility] General Surgery              "

## 2020-07-08 DIAGNOSIS — Z11.59 ENCOUNTER FOR SCREENING FOR OTHER VIRAL DISEASES: ICD-10-CM

## 2020-07-08 PROBLEM — K43.2 INCISIONAL HERNIA, WITHOUT OBSTRUCTION OR GANGRENE: Status: ACTIVE | Noted: 2020-06-04

## 2020-07-08 PROBLEM — K43.9 VENTRAL HERNIA WITHOUT OBSTRUCTION OR GANGRENE: Status: ACTIVE | Noted: 2020-06-04

## 2020-07-08 PROCEDURE — U0003 INFECTIOUS AGENT DETECTION BY NUCLEIC ACID (DNA OR RNA); SEVERE ACUTE RESPIRATORY SYNDROME CORONAVIRUS 2 (SARS-COV-2) (CORONAVIRUS DISEASE [COVID-19]), AMPLIFIED PROBE TECHNIQUE, MAKING USE OF HIGH THROUGHPUT TECHNOLOGIES AS DESCRIBED BY CMS-2020-01-R: HCPCS | Performed by: SURGERY

## 2020-07-09 ENCOUNTER — ANESTHESIA EVENT (OUTPATIENT)
Dept: SURGERY | Facility: CLINIC | Age: 35
End: 2020-07-09
Payer: COMMERCIAL

## 2020-07-09 LAB
SARS-COV-2 RNA SPEC QL NAA+PROBE: NOT DETECTED
SPECIMEN SOURCE: NORMAL

## 2020-07-09 ASSESSMENT — LIFESTYLE VARIABLES: TOBACCO_USE: 1

## 2020-07-09 NOTE — ANESTHESIA PREPROCEDURE EVALUATION
Anesthesia Pre-Procedure Evaluation    Patient: Breanna Vidal   MRN: [id number] : 1985          Preoperative Diagnosis: Ventral hernia without obstruction or gangrene [K43.9]    Procedure(s):  Robotic incisional hernia repair with mesh,  possible explantation of old mesh, possible open  possible lysis of adhesions    Past Medical History:   Diagnosis Date     Cellulitis     MRSA septic olecranon bursitis and facial cellulitis     Drug abuse (H)     methamphetamine and alcohol, sober since treatment      MRSA cellulitis     cleared with negative nasal swabs 17 and 17, reviewed     Rh negative, antepartum      Past Surgical History:   Procedure Laterality Date     EXCISE LESION FOOT Left 2019    Procedure: Excision of nevus second interdigital space left foot, Excision of nevus third interdigital space left foot, excision of plantar junctional nevus left foot;  Surgeon: Rinku Lopez DPM;  Location: [facility] OR     INCISION AND DRAINAGE  2012    chin abscess I&D     LAPAROSCOPIC CHOLECYSTECTOMY N/A 2020    Procedure: Laparoscopic Cholecystectomy;  Surgeon: Renard Gerber MD;  Location: [facility] OR     LAPAROSCOPIC HERNIORRHAPHY INCISIONAL N/A 2019    Procedure: laparoscopic incisional hernia repair with mesh;  Surgeon: Jovi Odom MD;  Location: [facility] OR     LAPAROSCOPIC SALPINGECTOMY Bilateral 2018    Procedure: LAPAROSCOPIC SALPINGECTOMY;  Laparoscopic Bilateral Salpingectomy;  Surgeon: Chai Cui MD;  Location: [facility] OR     MAMMOPLASTY REDUCTION Bilateral 2017       Anesthesia Evaluation     . Pt has had prior anesthetic. Type: General, Regional and MAC    History of anesthetic complications   - PONV        ROS/MED HX    ENT/Pulmonary:     (+)tobacco use, Past use , . .    Neurologic:     (+)migraines,     Cardiovascular:  - neg cardiovascular ROS   (+) ----. : . . . :. . Previous cardiac testing date:results:date: results:ECG reviewed date:20  results:Sinus Rhythm   WITHIN NORMAL LIMITS date: results:          METS/Exercise Tolerance:  >4 METS   Hematologic:  - neg hematologic  ROS       Musculoskeletal:  - neg musculoskeletal ROS       GI/Hepatic:     (+) Other GI/Hepatic Biliary dyskinesia      Renal/Genitourinary:  - ROS Renal section negative       Endo:  - neg endo ROS       Psychiatric:     (+) psychiatric history other (comment) (hx of postpartum dep/anxiety)      Infectious Disease:  - neg infectious disease ROS       Malignancy:      - no malignancy   Other: Comment: Alcohol abuse (in remission since 2012) and Methamphetamine abuse (in remission since 2011)                         Physical Exam  Normal systems: cardiovascular, pulmonary and dental    Airway   Mallampati: II  TM distance: >3 FB  Neck ROM: full    Dental     Cardiovascular   Rhythm and rate: regular and normal      Pulmonary    breath sounds clear to auscultation            Lab Results   Component Value Date    WBC 9.3 02/26/2020    HGB 14.5 02/26/2020    HCT 42.4 02/26/2020     02/26/2020    CRP 57.5 (H) 10/14/2018     02/26/2020    POTASSIUM 4.1 02/26/2020    CHLORIDE 107 02/26/2020    CO2 26 02/26/2020    BUN 13 02/26/2020    CR 0.58 02/26/2020    GLC 94 02/26/2020    ELICIA 9.1 02/26/2020    ALBUMIN 4.3 02/26/2020    PROTTOTAL 8.1 02/26/2020    ALT 33 02/26/2020    AST 15 02/26/2020    ALKPHOS 50 02/26/2020    BILITOTAL 0.5 02/26/2020    LIPASE 140 02/26/2020    AMYLASE 52 01/28/2020    HCG Negative 08/16/2019       Preop Vitals  BP Readings from Last 3 Encounters:   07/07/20 110/68   07/06/20 118/78   06/18/20 116/85    Pulse Readings from Last 3 Encounters:   07/06/20 66   06/18/20 56   04/24/20 90      Resp Readings from Last 3 Encounters:   07/06/20 16   06/18/20 18   04/24/20 18    SpO2 Readings from Last 3 Encounters:   07/06/20 98%   06/18/20 100%   04/24/20 98%      Temp Readings from Last 1 Encounters:   07/07/20 97.5  F (36.4  C) (Temporal)    Ht Readings  "from Last 1 Encounters:   07/07/20 1.676 m (5' 6\")      Wt Readings from Last 1 Encounters:   07/07/20 103.9 kg (229 lb)    Estimated body mass index is 36.96 kg/m  as calculated from the following:    Height as of 7/7/20: 1.676 m (5' 6\").    Weight as of 7/7/20: 103.9 kg (229 lb).       Anesthesia Plan      History & Physical Review  History and physical reviewed and following examination; no interval change.    ASA Status:  2 .    NPO Status:  > 8 hours    Plan for General and Peripheral Nerve Block with Intravenous and Propofol induction. Maintenance will be Balanced.    PONV prophylaxis:  Ondansetron (or other 5HT-3), Dexamethasone or Solumedrol, Other (See comment) and Scopolamine patch (prop gtt)    The patient is not a current smoker  and patient did not smoke on day of surgery     Postoperative Care  Postoperative pain management:  IV analgesics, Peripheral nerve block (Single Shot), Multi-modal analgesia and Oral pain medications.      Consents  Anesthetic plan, risks, benefits and alternatives discussed with:  Patient.  Use of blood products discussed: No .   .                 Diann Pacheco  "

## 2020-07-10 ENCOUNTER — ANESTHESIA (OUTPATIENT)
Dept: SURGERY | Facility: CLINIC | Age: 35
End: 2020-07-10
Payer: COMMERCIAL

## 2020-07-10 ENCOUNTER — HOSPITAL ENCOUNTER (OUTPATIENT)
Facility: CLINIC | Age: 35
Discharge: HOME | End: 2020-07-10
Attending: SURGERY | Admitting: SURGERY
Payer: COMMERCIAL

## 2020-07-10 ENCOUNTER — ANCILLARY PROCEDURE (OUTPATIENT)
Dept: ULTRASOUND IMAGING | Facility: CLINIC | Age: 35
End: 2020-07-10
Attending: NURSE ANESTHETIST, CERTIFIED REGISTERED
Payer: COMMERCIAL

## 2020-07-10 VITALS
SYSTOLIC BLOOD PRESSURE: 128 MMHG | OXYGEN SATURATION: 98 % | RESPIRATION RATE: 20 BRPM | TEMPERATURE: 97.7 F | HEART RATE: 90 BPM | DIASTOLIC BLOOD PRESSURE: 86 MMHG

## 2020-07-10 DIAGNOSIS — K43.9 VENTRAL HERNIA WITHOUT OBSTRUCTION OR GANGRENE: ICD-10-CM

## 2020-07-10 DIAGNOSIS — Z87.19 S/P HERNIA REPAIR: Primary | ICD-10-CM

## 2020-07-10 DIAGNOSIS — Z98.890 S/P HERNIA REPAIR: Primary | ICD-10-CM

## 2020-07-10 DIAGNOSIS — K43.2 INCISIONAL HERNIA, WITHOUT OBSTRUCTION OR GANGRENE: ICD-10-CM

## 2020-07-10 LAB — B-HCG SERPL-ACNC: <1 IU/L (ref 0–5)

## 2020-07-10 PROCEDURE — 25000128 H RX IP 250 OP 636: Performed by: SURGERY

## 2020-07-10 PROCEDURE — 40000306 ZZH STATISTIC PRE PROC ASSESS II: Performed by: SURGERY

## 2020-07-10 PROCEDURE — 25000128 H RX IP 250 OP 636: Performed by: NURSE ANESTHETIST, CERTIFIED REGISTERED

## 2020-07-10 PROCEDURE — 36000085 ZZH SURGERY LEVEL 8 1ST 30 MIN: Performed by: SURGERY

## 2020-07-10 PROCEDURE — 25000566 ZZH SEVOFLURANE, EA 15 MIN: Performed by: SURGERY

## 2020-07-10 PROCEDURE — 27210794 ZZH OR GENERAL SUPPLY STERILE: Performed by: SURGERY

## 2020-07-10 PROCEDURE — 71000027 ZZH RECOVERY PHASE 2 EACH 15 MINS: Performed by: SURGERY

## 2020-07-10 PROCEDURE — 37000008 ZZH ANESTHESIA TECHNICAL FEE, 1ST 30 MIN: Performed by: SURGERY

## 2020-07-10 PROCEDURE — 49655 ZZHC LAP INC HERN REPAIR COMP: CPT | Performed by: SURGERY

## 2020-07-10 PROCEDURE — 25000125 ZZHC RX 250: Performed by: NURSE ANESTHETIST, CERTIFIED REGISTERED

## 2020-07-10 PROCEDURE — 25000132 ZZH RX MED GY IP 250 OP 250 PS 637: Performed by: SPECIALIST

## 2020-07-10 PROCEDURE — C1781 MESH (IMPLANTABLE): HCPCS | Performed by: SURGERY

## 2020-07-10 PROCEDURE — 36000087 ZZH SURGERY LEVEL 8 EA 15 ADDTL MIN: Performed by: SURGERY

## 2020-07-10 PROCEDURE — S2900 ROBOTIC SURGICAL SYSTEM: HCPCS | Performed by: SURGERY

## 2020-07-10 PROCEDURE — 25800030 ZZH RX IP 258 OP 636: Performed by: NURSE ANESTHETIST, CERTIFIED REGISTERED

## 2020-07-10 PROCEDURE — 84702 CHORIONIC GONADOTROPIN TEST: CPT | Performed by: SURGERY

## 2020-07-10 PROCEDURE — 71000014 ZZH RECOVERY PHASE 1 LEVEL 2 FIRST HR: Performed by: SURGERY

## 2020-07-10 PROCEDURE — 25000125 ZZHC RX 250: Performed by: SURGERY

## 2020-07-10 PROCEDURE — 36415 COLL VENOUS BLD VENIPUNCTURE: CPT | Performed by: SURGERY

## 2020-07-10 PROCEDURE — 37000009 ZZH ANESTHESIA TECHNICAL FEE, EACH ADDTL 15 MIN: Performed by: SURGERY

## 2020-07-10 PROCEDURE — 71000015 ZZH RECOVERY PHASE 1 LEVEL 2 EA ADDTL HR: Performed by: SURGERY

## 2020-07-10 PROCEDURE — 25000132 ZZH RX MED GY IP 250 OP 250 PS 637: Performed by: SURGERY

## 2020-07-10 DEVICE — IMPLANTABLE DEVICE: Type: IMPLANTABLE DEVICE | Site: ABDOMEN | Status: FUNCTIONAL

## 2020-07-10 RX ORDER — SODIUM CHLORIDE, SODIUM LACTATE, POTASSIUM CHLORIDE, CALCIUM CHLORIDE 600; 310; 30; 20 MG/100ML; MG/100ML; MG/100ML; MG/100ML
INJECTION, SOLUTION INTRAVENOUS CONTINUOUS
Status: DISCONTINUED | OUTPATIENT
Start: 2020-07-10 | End: 2020-07-10 | Stop reason: HOSPADM

## 2020-07-10 RX ORDER — ONDANSETRON 4 MG/1
4 TABLET, ORALLY DISINTEGRATING ORAL EVERY 30 MIN PRN
Status: DISCONTINUED | OUTPATIENT
Start: 2020-07-10 | End: 2020-07-10 | Stop reason: HOSPADM

## 2020-07-10 RX ORDER — DEXAMETHASONE SODIUM PHOSPHATE 10 MG/ML
INJECTION, SOLUTION INTRAMUSCULAR; INTRAVENOUS PRN
Status: DISCONTINUED | OUTPATIENT
Start: 2020-07-10 | End: 2020-07-10

## 2020-07-10 RX ORDER — SCOLOPAMINE TRANSDERMAL SYSTEM 1 MG/1
1 PATCH, EXTENDED RELEASE TRANSDERMAL
Status: DISCONTINUED | OUTPATIENT
Start: 2020-07-10 | End: 2020-07-10 | Stop reason: HOSPADM

## 2020-07-10 RX ORDER — OXYCODONE HYDROCHLORIDE 5 MG/1
5 TABLET ORAL
Status: COMPLETED | OUTPATIENT
Start: 2020-07-10 | End: 2020-07-10

## 2020-07-10 RX ORDER — BUPIVACAINE HYDROCHLORIDE AND EPINEPHRINE 2.5; 5 MG/ML; UG/ML
INJECTION, SOLUTION INFILTRATION; PERINEURAL PRN
Status: DISCONTINUED | OUTPATIENT
Start: 2020-07-10 | End: 2020-07-10

## 2020-07-10 RX ORDER — DIMENHYDRINATE 50 MG/ML
25 INJECTION, SOLUTION INTRAMUSCULAR; INTRAVENOUS
Status: COMPLETED | OUTPATIENT
Start: 2020-07-10 | End: 2020-07-10

## 2020-07-10 RX ORDER — LIDOCAINE 40 MG/G
CREAM TOPICAL
Status: DISCONTINUED | OUTPATIENT
Start: 2020-07-10 | End: 2020-07-10 | Stop reason: HOSPADM

## 2020-07-10 RX ORDER — NALOXONE HYDROCHLORIDE 0.4 MG/ML
.1-.4 INJECTION, SOLUTION INTRAMUSCULAR; INTRAVENOUS; SUBCUTANEOUS
Status: DISCONTINUED | OUTPATIENT
Start: 2020-07-10 | End: 2020-07-10 | Stop reason: HOSPADM

## 2020-07-10 RX ORDER — ONDANSETRON 2 MG/ML
4 INJECTION INTRAMUSCULAR; INTRAVENOUS EVERY 30 MIN PRN
Status: DISCONTINUED | OUTPATIENT
Start: 2020-07-10 | End: 2020-07-10 | Stop reason: HOSPADM

## 2020-07-10 RX ORDER — OXYCODONE HYDROCHLORIDE 5 MG/1
5 TABLET ORAL EVERY 6 HOURS PRN
Qty: 12 TABLET | Refills: 0 | Status: SHIPPED | OUTPATIENT
Start: 2020-07-10 | End: 2020-07-21

## 2020-07-10 RX ORDER — HYDROMORPHONE HYDROCHLORIDE 1 MG/ML
.3-.5 INJECTION, SOLUTION INTRAMUSCULAR; INTRAVENOUS; SUBCUTANEOUS EVERY 10 MIN PRN
Status: DISCONTINUED | OUTPATIENT
Start: 2020-07-10 | End: 2020-07-10 | Stop reason: HOSPADM

## 2020-07-10 RX ORDER — ONDANSETRON 4 MG/1
4 TABLET, ORALLY DISINTEGRATING ORAL EVERY 8 HOURS PRN
Qty: 30 TABLET | Refills: 0 | Status: SHIPPED | OUTPATIENT
Start: 2020-07-10 | End: 2020-07-21

## 2020-07-10 RX ORDER — ONDANSETRON 2 MG/ML
INJECTION INTRAMUSCULAR; INTRAVENOUS PRN
Status: DISCONTINUED | OUTPATIENT
Start: 2020-07-10 | End: 2020-07-10

## 2020-07-10 RX ORDER — METHOCARBAMOL 750 MG/1
750 TABLET, FILM COATED ORAL 3 TIMES DAILY
Qty: 30 TABLET | Refills: 0 | Status: SHIPPED | OUTPATIENT
Start: 2020-07-10 | End: 2021-02-18

## 2020-07-10 RX ORDER — PROPOFOL 10 MG/ML
INJECTION, EMULSION INTRAVENOUS PRN
Status: DISCONTINUED | OUTPATIENT
Start: 2020-07-10 | End: 2020-07-10

## 2020-07-10 RX ORDER — BUPIVACAINE HYDROCHLORIDE AND EPINEPHRINE 2.5; 5 MG/ML; UG/ML
INJECTION, SOLUTION INFILTRATION; PERINEURAL PRN
Status: DISCONTINUED | OUTPATIENT
Start: 2020-07-10 | End: 2020-07-10 | Stop reason: HOSPADM

## 2020-07-10 RX ORDER — GABAPENTIN 300 MG/1
600 CAPSULE ORAL ONCE
Status: COMPLETED | OUTPATIENT
Start: 2020-07-10 | End: 2020-07-10

## 2020-07-10 RX ORDER — CEFAZOLIN SODIUM 1 G/3ML
1 INJECTION, POWDER, FOR SOLUTION INTRAMUSCULAR; INTRAVENOUS SEE ADMIN INSTRUCTIONS
Status: DISCONTINUED | OUTPATIENT
Start: 2020-07-10 | End: 2020-07-10 | Stop reason: HOSPADM

## 2020-07-10 RX ORDER — MEPERIDINE HYDROCHLORIDE 25 MG/ML
12.5 INJECTION INTRAMUSCULAR; INTRAVENOUS; SUBCUTANEOUS
Status: COMPLETED | OUTPATIENT
Start: 2020-07-10 | End: 2020-07-10

## 2020-07-10 RX ORDER — FENTANYL CITRATE 50 UG/ML
25-50 INJECTION, SOLUTION INTRAMUSCULAR; INTRAVENOUS
Status: DISCONTINUED | OUTPATIENT
Start: 2020-07-10 | End: 2020-07-10 | Stop reason: HOSPADM

## 2020-07-10 RX ORDER — CEFAZOLIN SODIUM 2 G/100ML
2 INJECTION, SOLUTION INTRAVENOUS
Status: COMPLETED | OUTPATIENT
Start: 2020-07-10 | End: 2020-07-10

## 2020-07-10 RX ORDER — HEPARIN SODIUM 5000 [USP'U]/.5ML
5000 INJECTION, SOLUTION INTRAVENOUS; SUBCUTANEOUS
Status: COMPLETED | OUTPATIENT
Start: 2020-07-10 | End: 2020-07-10

## 2020-07-10 RX ORDER — METHOCARBAMOL 750 MG/1
750 TABLET, FILM COATED ORAL 3 TIMES DAILY
Status: DISCONTINUED | OUTPATIENT
Start: 2020-07-10 | End: 2020-07-10 | Stop reason: HOSPADM

## 2020-07-10 RX ORDER — ACETAMINOPHEN 325 MG/1
975 TABLET ORAL ONCE
Status: COMPLETED | OUTPATIENT
Start: 2020-07-10 | End: 2020-07-10

## 2020-07-10 RX ORDER — KETOROLAC TROMETHAMINE 30 MG/ML
INJECTION, SOLUTION INTRAMUSCULAR; INTRAVENOUS PRN
Status: DISCONTINUED | OUTPATIENT
Start: 2020-07-10 | End: 2020-07-10

## 2020-07-10 RX ORDER — PROPOFOL 10 MG/ML
INJECTION, EMULSION INTRAVENOUS CONTINUOUS PRN
Status: DISCONTINUED | OUTPATIENT
Start: 2020-07-10 | End: 2020-07-10

## 2020-07-10 RX ORDER — FENTANYL CITRATE 50 UG/ML
INJECTION, SOLUTION INTRAMUSCULAR; INTRAVENOUS PRN
Status: DISCONTINUED | OUTPATIENT
Start: 2020-07-10 | End: 2020-07-10

## 2020-07-10 RX ORDER — LIDOCAINE HYDROCHLORIDE 20 MG/ML
INJECTION, SOLUTION INFILTRATION; PERINEURAL PRN
Status: DISCONTINUED | OUTPATIENT
Start: 2020-07-10 | End: 2020-07-10

## 2020-07-10 RX ORDER — KETAMINE HYDROCHLORIDE 10 MG/ML
INJECTION INTRAMUSCULAR; INTRAVENOUS PRN
Status: DISCONTINUED | OUTPATIENT
Start: 2020-07-10 | End: 2020-07-10

## 2020-07-10 RX ADMIN — MEPERIDINE HYDROCHLORIDE 12.5 MG: 25 INJECTION INTRAMUSCULAR; INTRAVENOUS; SUBCUTANEOUS at 12:38

## 2020-07-10 RX ADMIN — DEXMEDETOMIDINE HYDROCHLORIDE 4 MCG: 100 INJECTION, SOLUTION INTRAVENOUS at 09:31

## 2020-07-10 RX ADMIN — KETAMINE HYDROCHLORIDE 25 MG: 10 INJECTION, SOLUTION INTRAMUSCULAR; INTRAVENOUS at 08:15

## 2020-07-10 RX ADMIN — LIDOCAINE HYDROCHLORIDE 100 MG: 20 INJECTION, SOLUTION INFILTRATION; PERINEURAL at 07:44

## 2020-07-10 RX ADMIN — FENTANYL CITRATE 50 MCG: 50 INJECTION INTRAMUSCULAR; INTRAVENOUS at 12:44

## 2020-07-10 RX ADMIN — ONDANSETRON 4 MG: 2 INJECTION INTRAMUSCULAR; INTRAVENOUS at 08:35

## 2020-07-10 RX ADMIN — DEXMEDETOMIDINE HYDROCHLORIDE 10 MCG: 100 INJECTION, SOLUTION INTRAVENOUS at 11:49

## 2020-07-10 RX ADMIN — ROCURONIUM BROMIDE 10 MG: 10 INJECTION INTRAVENOUS at 09:31

## 2020-07-10 RX ADMIN — METHOCARBAMOL 750 MG: 750 TABLET, FILM COATED ORAL at 13:24

## 2020-07-10 RX ADMIN — PROPOFOL 100 MCG/KG/MIN: 10 INJECTION, EMULSION INTRAVENOUS at 08:15

## 2020-07-10 RX ADMIN — MIDAZOLAM 2 MG: 1 INJECTION INTRAMUSCULAR; INTRAVENOUS at 07:35

## 2020-07-10 RX ADMIN — MEPERIDINE HYDROCHLORIDE 12.5 MG: 25 INJECTION INTRAMUSCULAR; INTRAVENOUS; SUBCUTANEOUS at 13:13

## 2020-07-10 RX ADMIN — DEXAMETHASONE SODIUM PHOSPHATE 2.5 MG: 10 INJECTION, SOLUTION INTRAMUSCULAR; INTRAVENOUS at 11:40

## 2020-07-10 RX ADMIN — PROPOFOL 100 MCG/KG/MIN: 10 INJECTION, EMULSION INTRAVENOUS at 07:44

## 2020-07-10 RX ADMIN — SODIUM CHLORIDE, POTASSIUM CHLORIDE, SODIUM LACTATE AND CALCIUM CHLORIDE: 600; 310; 30; 20 INJECTION, SOLUTION INTRAVENOUS at 09:50

## 2020-07-10 RX ADMIN — KETAMINE HYDROCHLORIDE 5 MG: 10 INJECTION, SOLUTION INTRAMUSCULAR; INTRAVENOUS at 09:18

## 2020-07-10 RX ADMIN — DEXAMETHASONE SODIUM PHOSPHATE 2.5 MG: 10 INJECTION, SOLUTION INTRAMUSCULAR; INTRAVENOUS at 11:30

## 2020-07-10 RX ADMIN — DEXMEDETOMIDINE HYDROCHLORIDE 10 MCG: 100 INJECTION, SOLUTION INTRAVENOUS at 07:35

## 2020-07-10 RX ADMIN — ROCURONIUM BROMIDE 20 MG: 10 INJECTION INTRAVENOUS at 08:15

## 2020-07-10 RX ADMIN — PROPOFOL 100 MG: 10 INJECTION, EMULSION INTRAVENOUS at 07:45

## 2020-07-10 RX ADMIN — FENTANYL CITRATE 50 MCG: 50 INJECTION, SOLUTION INTRAMUSCULAR; INTRAVENOUS at 11:49

## 2020-07-10 RX ADMIN — ROCURONIUM BROMIDE 50 MG: 10 INJECTION INTRAVENOUS at 07:44

## 2020-07-10 RX ADMIN — ROCURONIUM BROMIDE 20 MG: 10 INJECTION INTRAVENOUS at 09:07

## 2020-07-10 RX ADMIN — CEFAZOLIN SODIUM 2 G: 2 INJECTION, SOLUTION INTRAVENOUS at 07:52

## 2020-07-10 RX ADMIN — ONDANSETRON 4 MG: 2 INJECTION INTRAMUSCULAR; INTRAVENOUS at 13:06

## 2020-07-10 RX ADMIN — OXYCODONE HYDROCHLORIDE 5 MG: 5 TABLET ORAL at 13:24

## 2020-07-10 RX ADMIN — ACETAMINOPHEN 975 MG: 325 TABLET, FILM COATED ORAL at 06:21

## 2020-07-10 RX ADMIN — PROPOFOL 200 MG: 10 INJECTION, EMULSION INTRAVENOUS at 07:44

## 2020-07-10 RX ADMIN — SUGAMMADEX 200 MG: 100 INJECTION, SOLUTION INTRAVENOUS at 11:42

## 2020-07-10 RX ADMIN — CEFAZOLIN SODIUM 1 G: 2 INJECTION, SOLUTION INTRAVENOUS at 09:47

## 2020-07-10 RX ADMIN — FENTANYL CITRATE 50 MCG: 50 INJECTION INTRAMUSCULAR; INTRAVENOUS at 12:17

## 2020-07-10 RX ADMIN — LIDOCAINE HYDROCHLORIDE 0.3 ML: 10 INJECTION, SOLUTION EPIDURAL; INFILTRATION; INTRACAUDAL; PERINEURAL at 07:16

## 2020-07-10 RX ADMIN — BUPIVACAINE HYDROCHLORIDE AND EPINEPHRINE BITARTRATE 10 ML: 2.5; .005 INJECTION, SOLUTION INFILTRATION; PERINEURAL at 11:28

## 2020-07-10 RX ADMIN — SODIUM CHLORIDE, POTASSIUM CHLORIDE, SODIUM LACTATE AND CALCIUM CHLORIDE: 600; 310; 30; 20 INJECTION, SOLUTION INTRAVENOUS at 07:16

## 2020-07-10 RX ADMIN — SCOPALAMINE 1 PATCH: 1 PATCH, EXTENDED RELEASE TRANSDERMAL at 07:12

## 2020-07-10 RX ADMIN — BUPIVACAINE HYDROCHLORIDE AND EPINEPHRINE BITARTRATE 10 ML: 2.5; .005 INJECTION, SOLUTION INFILTRATION; PERINEURAL at 11:39

## 2020-07-10 RX ADMIN — BUPIVACAINE HYDROCHLORIDE AND EPINEPHRINE BITARTRATE 10 ML: 2.5; .005 INJECTION, SOLUTION INFILTRATION; PERINEURAL at 11:30

## 2020-07-10 RX ADMIN — FENTANYL CITRATE 50 MCG: 50 INJECTION, SOLUTION INTRAMUSCULAR; INTRAVENOUS at 11:43

## 2020-07-10 RX ADMIN — HEPARIN SODIUM 5000 UNITS: 10000 INJECTION, SOLUTION INTRAVENOUS; SUBCUTANEOUS at 07:53

## 2020-07-10 RX ADMIN — DEXAMETHASONE SODIUM PHOSPHATE 2.5 MG: 10 INJECTION, SOLUTION INTRAMUSCULAR; INTRAVENOUS at 11:28

## 2020-07-10 RX ADMIN — DEXAMETHASONE SODIUM PHOSPHATE 2.5 MG: 10 INJECTION, SOLUTION INTRAMUSCULAR; INTRAVENOUS at 11:38

## 2020-07-10 RX ADMIN — KETOROLAC TROMETHAMINE 30 MG: 30 INJECTION, SOLUTION INTRAMUSCULAR at 11:53

## 2020-07-10 RX ADMIN — DEXMEDETOMIDINE HYDROCHLORIDE 10 MCG: 100 INJECTION, SOLUTION INTRAVENOUS at 07:55

## 2020-07-10 RX ADMIN — ROCURONIUM BROMIDE 10 MG: 10 INJECTION INTRAVENOUS at 10:26

## 2020-07-10 RX ADMIN — DIMENHYDRINATE 12.5 MG: 50 INJECTION, SOLUTION INTRAMUSCULAR; INTRAVENOUS at 14:06

## 2020-07-10 RX ADMIN — OXYCODONE HYDROCHLORIDE 5 MG: 5 TABLET ORAL at 14:28

## 2020-07-10 RX ADMIN — BUPIVACAINE HYDROCHLORIDE AND EPINEPHRINE BITARTRATE 10 ML: 2.5; .005 INJECTION, SOLUTION INFILTRATION; PERINEURAL at 11:29

## 2020-07-10 RX ADMIN — GABAPENTIN 600 MG: 300 CAPSULE ORAL at 06:22

## 2020-07-10 RX ADMIN — DEXAMETHASONE SODIUM PHOSPHATE 8 MG: 10 INJECTION, SOLUTION INTRAMUSCULAR; INTRAVENOUS at 08:19

## 2020-07-10 RX ADMIN — BUPIVACAINE HYDROCHLORIDE AND EPINEPHRINE BITARTRATE 10 ML: 2.5; .005 INJECTION, SOLUTION INFILTRATION; PERINEURAL at 11:40

## 2020-07-10 RX ADMIN — BUPIVACAINE HYDROCHLORIDE AND EPINEPHRINE BITARTRATE 10 ML: 2.5; .005 INJECTION, SOLUTION INFILTRATION; PERINEURAL at 11:38

## 2020-07-10 RX ADMIN — DEXMEDETOMIDINE HYDROCHLORIDE 4 MCG: 100 INJECTION, SOLUTION INTRAVENOUS at 10:42

## 2020-07-10 RX ADMIN — ROCURONIUM BROMIDE 10 MG: 10 INJECTION INTRAVENOUS at 10:52

## 2020-07-10 SDOH — HEALTH STABILITY: MENTAL HEALTH: CURRENT SMOKER: 0

## 2020-07-10 NOTE — ANESTHESIA CARE TRANSFER NOTE
Patient: Breanna Vidal    Procedure(s):  Robotic incisional hernia repair with mesh    Diagnosis: Ventral hernia without obstruction or gangrene [K43.9]  Diagnosis Additional Information: No value filed.    Anesthesia Type:   General, Peripheral Nerve Block     Note:  Airway :Face Mask  Patient transferred to:PACU  Handoff Report: Identifed the Patient, Identified the Reponsible Provider, Reviewed the pertinent medical history, Discussed the surgical course, Reviewed Intra-OP anesthesia mangement and issues during anesthesia, Set expectations for post-procedure period and Allowed opportunity for questions and acknowledgement of understanding      Vitals: (Last set prior to Anesthesia Care Transfer)    CRNA VITALS  7/10/2020 1120 - 7/10/2020 1159      7/10/2020             Pulse:  94    SpO2:  100 %                Electronically Signed By: MADELINE Arroyo CRNA  July 10, 2020  11:59 AM

## 2020-07-10 NOTE — OP NOTE
[facility]  Operative Note    Pre-operative diagnosis: Incisional hernia, morbid obesity, history of laparoscopic cholecystectomy   Post-operative diagnosis Incarcerated incisional hernia with portion of omentum, morbid obesity, history of laparoscopic cholecystectomy   Procedure: Procedure(s):  Robotic incisional hernia repair with mesh   Surgeon: Jovi Odom MD   Assistants(s): n/a   Anesthesia: Combined General with Block    Estimated blood loss: Minimal                Drains: None   Specimens: None   Implants: 11cm round bard echo 2   Findings: Previous periumbilical incisional hernia with mesh still intake with large amount of omentum around mesh; current incisional hernia is incarcerated with omentum and ~3-4cm superior to the superior edge of previous mesh.  I was able to place this mesh via rTAPP. .   Complications: None.   Condition: Stable       Indications for the procedure:   This is a 36 yo female with a 2nd incisional hernia.  Patient is well-known to me.  I repaired her initial incisional hernia around the periumbilical area 1 year ago.  Patient underwent a laparoscopic cholecystectomy about 6 months ago by 1 of my partners.  She is also gained 30 pounds during COVID.  Thus she presented to me again now with a second incisional hernia just superior to the edge of the previous mesh and at the 12 mm port from the laparoscopic cholecystectomy.  I initially recommended weight loss before reevaluating for hernia repair.  Patient did follow-up with me again and did lose 10 kg thus she would like to proceed with the procedure.  Patient is symptomatic and would like it to be repaired.  We discussed risks, benefits, and alternatives of the procedure during the office visit.  Patient agreed and wishes to proceed with the procedures above.    Description of procedure:  The patient was preoperatively identified. Consent was signed and placed on the chart. Breanna was brought back  to the operative suite where patient was laid supine on the operating table. General endotracheal anesthesia was induced per anesthesia protocol. Patient was then prepped and draped in sterile fashion. We started by infiltrating 5 cc of local anesthetic in the left upper quadrant area and made small skin incision and accessing the abdominal cavity by using Optiview trocar and 5-mm trocar site visualizing all layers of the abdominal wall until we reached the peritoneal cavity. We then insufflated  with CO2 gas to create pneumoperitoneum.  Brief glance of the abdomen noted incarcerated incisional hernia above the previous incisional hernia.  Previous hernia with mesh noted large amount of omentum adherent to the mesh.  An 8mm robotic trocar was then placed at the LLQ U centimeters medial to the ASIS.  And an 8mm robotic  was then place at the lateral aspect of the mid left abdomen.  I then exchanged my 5 mm trocar at the left upper quadrant with a 11 mm balloon trocar (I pigback another 8mm port to this 11mm balloon trocar)  I break the bed at the center to elevate the left hip.  And the patient was rolled to her right side.  I then inserted a #1 stratafix through the 11 mm trocar under along with two 2-0 Stratafix all under direct visualization.   At this point the brought robot was then brought in at a 90 degree fashion.  And it was docked.  Utilizing the Cadiere forceps and the monopolar scissors, I took down peritoneum from the left rectus and some the the peritoneum above the previous mesh enough to fit the diameter of my mesh.  I did have to takedown some posterior rectus sheath on the right rectus area as the peritonuem on that side was quite thin  I then took down the incision hernia content and reduced the hernia sac.  Noted the hernia defect was 2cm.   At this point the monopolar scissors was exchanged for a Mathew suture cut.  I then primary repair the defect with the #1 strata fix.  I picked out a 11cm  Polypropylene Echo PS2 mesh to get at least 4-5cm of overlap.  The mesh was placed through the 11 mm port under direct visualization.  Once I got a hold of the mesh I grabbed onto the suture.  My assistant then place a PMI to grab onto that suture right through the very center of the defect at the umbilicus under direct supervision and visualization with the robot.  The mesh was then pulled against the abdominal wall secure with a curved stat by my assistant.  I then used 2-0 strata fix to circumferentially so in the mesh on the left side and down the center diameter; I tucked the right side of the mesh into the small rectrorectus space created  Once the mesh was sewn in place.  Noted hemostasis was achieved.  It was a good repair.  I then used a 2-0 stratafix to closed the peritoneum flap; any rents were closed with a figure of 8 2-0 vicryl.   I then had my assistant replaced my #1 arm and under direct visualization I gave him the echo positioning system and this was removed through arm 1 which is an 8 mm robotic trocar.  The echo positioning system came out in 1 piece with nothing missing on it.  At this point our repair is complete.  We then undocked the robot.  I then re-scrubbed to close the 11 mm trocar trans-fascially with a cone/PMI under direct visualization trans-fascially with an 0 Vicryl in a figure-of-eight fashion.    All counts were correct x2.  The patient tolerated the procedure well.    [facility], DO

## 2020-07-10 NOTE — ANESTHESIA PROCEDURE NOTES
Procedure note : TAP  Staff -     Resident/Fellow: Diann Pacheco  CRNA: Jodi Tamayo APRN CRNA    Performed By: CRNA and SRNA        Pre-Procedure    Location: OR    Procedure Times:7/10/2020 11:20 AM and 7/10/2020 11:40 AM  Pre-Anesthestic Checklist: patient identified, IV checked, site marked, risks and benefits discussed, informed consent, monitors and equipment checked, pre-op evaluation, at physician/surgeon's request and post-op pain management    Timeout  Correct Patient: Yes   Correct Procedure: Yes   Correct Site: Yes   Correct Laterality: N/A   Correct Position: Yes   Site Marked: Yes   .   Procedure Documentation    Diagnosis:POST OP PAIN CONTROL.    Procedure: TAP, bilateral.   Patient Position:supine   Ultrasound used to identify targeted nerve, plexus, or vascular marker and placed a needle adjacent to it., Ultrasound was used to visualize the spread of the anesthetic in close proximity to the above stated nerve. A permanent image is entered into the patient's record.  Patient Prep/Sterile Barriers; mask, sterile gloves, chlorhexidine gluconate and isopropyl alcohol.  .  Needle: insulated   Needle Gauge: 21.  Needle Length (millimeters) 100  Insertion Method: Single Shot.        Assessment/Narrative  Paresthesias: No.  Injection made incrementally with aspirations every 5 mL..  The placement was negative for: blood aspirated, painful injection and site bleeding.  Bolus given via needle..   Secured via.   Complications: none. Test dose of mL at. Test dose negative for signs of intravascular, subdural or intrathecal injection. Comments:  Pt tolerated the procedure well as under General Anesthesia.  There was good visualization of the space between the internal oblique and the transverses abdominis.  There was also good visualization of fluid dissection in this layer.  No complications were noted.  I will follow up with this pt if needed.

## 2020-07-10 NOTE — ANESTHESIA POSTPROCEDURE EVALUATION
Patient: Breanna Vidal    Procedure(s):  Robotic incisional hernia repair with mesh    Diagnosis:Ventral hernia without obstruction or gangrene [K43.9]  Diagnosis Additional Information: No value filed.    Anesthesia Type:  General, Peripheral Nerve Block    Note:  Anesthesia Post Evaluation    Patient location during evaluation: Phase 2  Patient participation: Able to fully participate in evaluation  Level of consciousness: awake and alert  Pain management: adequate  Airway patency: patent  Cardiovascular status: acceptable and stable  Respiratory status: acceptable and room air  Hydration status: acceptable  PONV: none     Anesthetic complications: None    Comments:  Patient was happy with the anesthesia care received and no anesthesia related complications were noted.  I will follow up with the patient again if it is needed.        Last vitals:  Vitals:    07/10/20 1250 07/10/20 1300 07/10/20 1315   BP: 105/74 101/77 115/81   Pulse: 89 86    Resp: 10 10 14   Temp: 97.5  F (36.4  C) 97.9  F (36.6  C) 97.7  F (36.5  C)   SpO2: 95% 95% 100%         Electronically Signed By: MADELINE Arroyo CRNA  July 10, 2020  2:28 PM

## 2020-07-10 NOTE — OR NURSING
Verbal report received from Orlin OR RN. Phase 2 recovery assumed by Terri MATAMOROS. Pt post-op general anesthesia for lap hernia repair per .

## 2020-07-10 NOTE — DISCHARGE INSTRUCTIONS
[facility]    Home Care Following Hernia Repair    [facility] Odom, DO      Hernia Type:  Incisional    Pain meds:     600mg ibuprofen every 6hrs prn - You may take any time after 6:00 pm.    650mg of acetaminophen every 6hrs prn    You can alternate these meds so that you take one every 3 hrs.      Make sure you do not go over 4000mg of acetaminophen every 24hrs, especially if you are taking Norco or percocet 5/325mg.    Care of the Incision:    Remove gauze dressing (if present) after 48 hours.    If surgical glue was used, keep your incision dry for 24 hours.  Then you may shower, but don t submerge under water for at least 2 weeks.  Gently pat your incision dry with a freshly laundered towel.    Do not touch your incision with bare hands or pick at scabs.    Leave your incision open to air.  Cover it only if clothing rubs or irritates it.  Activity:    Gradually increase your activity.  Walk short distances several times each day and increase the distance as your strength allows.    To promote circulation, do not cross your legs while sitting.    No strenuous lifting or straining for 2-3 weeks.   Do not lift anything over 10-20 pounds until your doctor approves an increase.    Return to work will be determined by the type of work you do and should be discussed with your physician.    Do not drive or operate equipment while taking prescription pain medicines.  You may drive 1 week after surgery if you have stopped taking prescription pain medicines and are pain-free enough to react quickly and make an emergency stop if necessary.    Diet:    Return to the diet you were on before surgery.    Drink plenty of  water.    Avoid foods that cause constipation.      REMEMBER--most prescription pain pills cause constipation.  Walking, extra fluids, and increased fiber (fresh fruits and vegetables, etc.) are natural remedies for constipation.  You can also take mineral oil, 1-2 Tablespoons per day.  If still  constipated you may try a stool softener such as Colace or Miralax.    Call Your Physician if You Have:    Redness, increased swelling or cloudy drainage from your incision.    A temperature of more than 101 degrees F.    Worsening pain in your incision not relieved by your prescription pain pills and/or a short rest.    Any questions or concerns about your recovery, please call Business hours [phone]  After hours [phone] Nurse Advice Line (24 hours a day)    Follow-up Care:    Make an appointment 2-3 weeks after your surgery.  Call [phone]    [facility] Same-Day Surgery   Adult Discharge Orders & Instructions - After Anesthesia    For 24 hours after surgery    1. Get plenty of rest.  A responsible adult must stay with you for at least 24 hours after you leave the hospital.   2. Do not drive or use heavy equipment.  If you have weakness or tingling, don't drive or use heavy equipment until this feeling goes away.  3. Do not drink alcohol.  4. Avoid strenuous or risky activities.  Ask for help when climbing stairs.   5. You may feel lightheaded.  If so, sit for a few minutes before standing.  Have someone help you get up.   6. You may have a slight fever. Call the doctor if your fever is over 100 F (37.7 C) (taken under the tongue) or lasts longer than 24 hours.  7. You may have a dry mouth, a sore throat, muscle aches or trouble sleeping.  These should go away after 24 hours.  8. Do not make important or legal decisions.  We don t expect you to have any problems from the surgery or treatment you had today. Just in case, here s what to do if you have pain, upset stomach (nausea), bleeding or infection:  Pain:  Take medicines your doctor has prescribed or over-the-counter medicine they have suggested. Resting and using ice packs can help, too. For surgery on an arm or leg, raise it on a pillow to ease swelling. Call your doctor if these methods don t work.  Copyright [employer],  Licensed under [employer]  Upset stomach (nausea):  Take anti-nausea medicine approved by your doctor. Drink clear liquids like apple juice, ginger ale, broth or 7-Up. Be sure to drink enough fluids. Rest can help, too. Move to normal foods when you re ready. Bleeding:  In the first 24 hours, you may see a little blood on your dressing, about the size of a quarter. You don t need to worry about this much blood, but if the blood spot keeps getting bigger:    Put pressure on the wound if you can, AND    Call your doctor.  Copyright [employer], Licensed under [employer]  Fever/Infection: Please call your doctor if you have any of these signs:    Redness    Swelling    Wound feels warm    Pain gets worse    Bad-smelling fluid leaks from wound    Fever or chills  Call your doctor for any of the followin.  It has been over 8 to 10 hours since surgery and you are still not able to urinate (pass water).    2.  Headache for over 24 hours.      [facility] Nurse advice line: [phone] (24 hr)

## 2020-07-11 ENCOUNTER — NURSE TRIAGE (OUTPATIENT)
Dept: NURSING | Facility: CLINIC | Age: 35
End: 2020-07-11

## 2020-07-11 NOTE — TELEPHONE ENCOUNTER
Breanna had laparoscopic hernia repair surgery today and she has some questions.    She wants to know if she needs to wear the abdominal binder all the time.  - Info re: abdominal binder not found in the AVS. Advised to wear the binder; Adjust it as needed for comfort and swelling; She reports increased comfort with band in place. She is currently applying an ice pack to the site as well    Questions regarding medications - Robaxin:  - prescribed 3 times a day; advised taking approximately every 8 hours. Ok to take first thing in the morning, before going to bed at night and one other dose spaced throughout the day    - advised to take non-narcotic pain control medications on a schedule - including Gabapentin, Robaxin, Ibuprofen, Acetaminophen. Reserve narcotic pain medicine if needed.    - consider elevated toilet seat to aid in getting on/off toilet    She reports shoulder pain.  - notified that this is likely due to gas that was introduced into her abdomen during surgery and will gradually wear off.    Marina Wilkins RN  [facility]    Additional Information    Other post-op symptom or question    Protocols used: POST-OP SYMPTOMS AND [id number]

## 2020-07-12 NOTE — PROGRESS NOTES
"[facility] Same Day Surgery  Discharge Call Back  Breanna Vidal  1985  MRN: [id number]  Home: [phone] (home)   PCP: Karen Campos    We are calling to see how you're doing since your surgery/procedure with us?   Comments: \"It still hurts really bad on my left side, not sure if the pain meds are helping as well as they should be. I'm finding myself wanting to take two but don't want to over do it\"  Clinical Questions  1. Have you had time to look at your discharge instructions? Do you have any questions in regards to the instructions?   Comment: yes, Can they send me different pain meds? Encourage that her pain meds are very effective with post op pain. Encouraged to call if needed but that surgeon was pretty adamant that she wouldn't give any other pain meds once pt discharged to home. Pt understands and states she is able to get up and walk around frequently and otherwise feels like she will be ok.  2. Do you feel your pain is being controlled with the regimen the surgeon sent you home on? (ie: prescription medications, over the counter pain medications, ice packs)   Comments: yes but getting out of bed is extremely pain full. Pain is in left side and wraps around to back.  3. Have you noticed any drainage on your dressing? Do you know what to do if you have bleeding as a result of your procedure?   Comments: no  4. Have you had any nausea/vomiting? Do you know how to treat this?   Comment: no  5. Have you had any signs/symptoms of infection? (ie: fever, swelling, heat, drainage or redness) Do you know what to do if you have?   Comment: no  6. Do you have a follow up appointment made with your surgeon? Do you have a number to contact them at if you need it?   Comment: yes, yes  Retained Foreign Object (VIANEY, Hemovac, Penrose, Wound Packing, Vaginal Packing, Nasal Splints, Urethral Stents, [place] Catheter)  1. Do you still have na in place?   2. If the item is still in place, can you review " the plan for removal with me? na      You may be randomly selected to fill out a [facility] survey. We would appreciate you taking the time to fill this out. It is important to us if you would answer all of the questions on the survey.

## 2020-07-14 ENCOUNTER — PATIENT OUTREACH (OUTPATIENT)
Dept: CARE COORDINATION | Facility: CLINIC | Age: 35
End: 2020-07-14

## 2020-07-14 NOTE — PROGRESS NOTES
"Clinic Care Coordination Contact    Assessment: Assessment: patient had hernia surgery on 7/10. Her pain is controlled with scheduled tylenol  and Is still slightly nauseated using Zofran every 8 hours as needed. She is using muscle relaxant PRN, has copy of discharge summary. Post surgery doing \"well.\" Has post op scheduled. Following her weight loss diet, surgeon encouraging weight loss to prevent another hernia. Using an sylvia on her phone to count calories, provide healthy meal plans. Declined setting a goal around this. Will reach out to CC RN in the future should she feel she needs additional support.     Patient has continued to follow the plan of care and assessment is negative for any new needs or concerns.    Enrollment status: Graduated.      Plan: No further outreaches at this time.  Patient will continue to follow the plan of care.  If new needs arise a new Care Coordination referral may be placed.     SHAWANDA aLndin RN Clinic Care Coordinator   [place], [place], [place]  Phone: [phone]     "

## 2020-07-20 ENCOUNTER — TELEPHONE (OUTPATIENT)
Dept: SURGERY | Facility: CLINIC | Age: 35
End: 2020-07-20

## 2020-07-20 ENCOUNTER — MYC MEDICAL ADVICE (OUTPATIENT)
Dept: SURGERY | Facility: CLINIC | Age: 35
End: 2020-07-20

## 2020-07-20 NOTE — TELEPHONE ENCOUNTER
Call back to patient- patient states the surgical area looks red and itchy, she feels like she did have the chills, but no fever.  Glue has fallen off, has been itchy since the glue has fallen off, patient has taken some bendaryl not sure if it helped.  Patient states surgical sites look red and has some warmth to touch.      Patient states she will send pictures VIA [employer] for review.    Per EFREN Odom after reviewing Librett[employer] pictures-patient to use OTC hydrocortisone cream, keep follow-up appointment, do not go to ER.    Patient called and informed of above message and patient verbally states she understands and is in agreement...................Sheyla Orosco CMA  ([facility])

## 2020-07-20 NOTE — TELEPHONE ENCOUNTER
Please call pt at#[phone] she is having itching and pain at incision site. Please call to discuss. Thank You Verenice

## 2020-07-21 ENCOUNTER — OFFICE VISIT (OUTPATIENT)
Dept: SURGERY | Facility: CLINIC | Age: 35
End: 2020-07-21
Payer: COMMERCIAL

## 2020-07-21 ENCOUNTER — VIRTUAL VISIT (OUTPATIENT)
Dept: FAMILY MEDICINE | Facility: CLINIC | Age: 35
End: 2020-07-21
Payer: COMMERCIAL

## 2020-07-21 VITALS
DIASTOLIC BLOOD PRESSURE: 72 MMHG | WEIGHT: 226.5 LBS | TEMPERATURE: 97.1 F | BODY MASS INDEX: 36.56 KG/M2 | SYSTOLIC BLOOD PRESSURE: 118 MMHG

## 2020-07-21 DIAGNOSIS — Z98.890 S/P HERNIA REPAIR: Primary | ICD-10-CM

## 2020-07-21 DIAGNOSIS — Z87.19 S/P HERNIA REPAIR: Primary | ICD-10-CM

## 2020-07-21 DIAGNOSIS — E66.9 OBESITY (BMI 30-39.9): ICD-10-CM

## 2020-07-21 DIAGNOSIS — Z91.048 ALLERGY TO ADHESIVE: Primary | ICD-10-CM

## 2020-07-21 PROCEDURE — 99213 OFFICE O/P EST LOW 20 MIN: CPT | Mod: GT | Performed by: PHYSICIAN ASSISTANT

## 2020-07-21 PROCEDURE — 99024 POSTOP FOLLOW-UP VISIT: CPT | Performed by: SURGERY

## 2020-07-21 ASSESSMENT — ENCOUNTER SYMPTOMS
FATIGUE: 0
HEADACHES: 0
COUGH: 0
SHORTNESS OF BREATH: 0
FEVER: 0
CHILLS: 0
WHEEZING: 0
ADENOPATHY: 0
DIAPHORESIS: 0
WOUND: 1

## 2020-07-21 NOTE — PROGRESS NOTES
"Breanna Vidal is a 35 year old female who is being evaluated via a billable video visit.      The patient has been notified of following:     \"This video visit will be conducted via a call between you and your physician/provider. We have found that certain health care needs can be provided without the need for an in-person physical exam.  This service lets us provide the care you need with a video conversation.  If a prescription is necessary we can send it directly to your pharmacy.  If lab work is needed we can place an order for that and you can then stop by our lab to have the test done at a later time.    Video visits are billed at different rates depending on your insurance coverage.  Please reach out to your insurance provider with any questions.    If during the course of the call the physician/provider feels a video visit is not appropriate, you will not be charged for this service.\"    Patient has given verbal consent for Video visit? Yes  How would you like to obtain your AVS? MyChart  If you are dropped from the video visit, the video invite should be resent to: Text to cell phone: [phone]  Will anyone else be joining your video visit? No    Subjective     Breanna Vidal is a 35 year old female who presents today via video visit for the following health issues:    HPI    Rash      Duration: 1 week    Description  Location: Incision site, abdomen( patient had a hernia repair, 7/10/2020) red, raised  Itching: severe    Intensity:  severe    Accompanying signs and symptoms: Burning sensation,     History (similar episodes/previous evaluation): None    Precipitating or alleviating factors:  New exposures:  None  Recent travel: no      Therapies tried and outcome: hydrocortisone cream -  not effective      Video Start Time: 5:13 PM    Breanna had a laparoscopic hernia repair on 7/10/2020, 11 days ago. She had 4 incisions. Today she had a post-op follow up with her surgeon, Dr Odom, and showed her the incisions. " Breanna is concerned because they are red and very itchy. They are tender when she scratches too hard but not painful. No discharge. She has had surgery before and has never had a reaction like this before. Dr Odom thought this looked like an allergic reaction vs an infection. Breanna states she trusts Dr Odom but would like a second opinion just to make sure she doesn't need an antibiotic.    Reviewed and updated as needed this visit by Provider      Review of Systems   Constitutional: Negative for chills, diaphoresis, fatigue and fever.   Respiratory: Negative for cough, shortness of breath and wheezing.    Skin: Positive for wound (redness around 4 incisions). Negative for pallor and rash.   Neurological: Negative for headaches.   Hematological: Negative for adenopathy.         Objective       GENERAL: Healthy, alert and no distress  EYES: Eyes grossly normal to inspection.  No discharge or erythema, or obvious scleral/conjunctival abnormalities.  RESP: No audible wheeze, cough, or visible cyanosis.  No visible retractions or increased work of breathing.    SKIN: 4 incisions on abdomen, each with about 2 inch diameter redness surrounding incision site. All incisions appear equally affected. Remainder of visible skin clear. No significant rash, abnormal pigmentation or lesions.  NEURO: Cranial nerves grossly intact.  Mentation and speech appropriate for age.  PSYCH: Mentation appears normal, affect normal/bright, judgement and insight intact, normal speech and appearance well-groomed.    Assessment & Plan     1. Allergy to adhesive  I agree with Dr Odom's opinion that this is an allergy vs infection. The redness is equal around all incisions with supports an allergic reaction vs infection. There is no discharge and the area is not painful. I discussed this rational with Breanna who stated she felt reassured that she does not have an infection. I advised Zyrtec and continuing hydrocortisone to treat itching. Avoid  scratching. She agrees to this plan.      Return in about 1 month (around 8/21/2020) for Physical Exam with primary care provider.    Bernice Mitchell PA-C  [facility]      Video-Visit Details    Type of service:  Video Visit    Video End Time: 5:20 PM    Originating Location (pt. Location): Home    Distant Location (provider location):  [facility]     Platform used for Video Visit: Doximity    No follow-ups on file.       Bernice Mitchell PA-C

## 2020-07-21 NOTE — PROGRESS NOTES
[facility] FOLLOW-UP NOTE  GENERAL SURGERY    PCP: Karen Campos         Assessment and Plan:   Assessment:   Breanna Vidal is a 35 year old female who presented post operatively from robotic assisted incisional hernia repair with mesh on 7/10/2020 and is doing well.       ICD-10-CM    1. S/P hernia repair  Z98.890     Z87.19     supraumbilical incisional hernia repair   2. Obesity (BMI 30-39.9)  E66.9          Plan:  She has been doing well.  Developed a aldo-incisional dermatitis.  This is likely due to reaction to the exofin.  I recommend that patient use cortisone cream.  This should help with the dermatitis.  I reassured her that this is not an infection.  This is not cellulitis and therefore she does not need any antibiotics.  She is to come back to me if the dermatitis worsens.  She understands that she is on a lifting restriction of 20 pounds or less for a good month from the date of surgery.  She understands that if she does not continue to lose weight her chances of recurrence is much higher.  All of her questions were answered to her satisfaction.  She did not need a work note.           Subjective:     Breanna Vidal is a 35 year old female who presents post operatively from  robotic assisted incisional hernia repair with mesh on 7/10/2020. Pain has been controled. Currently is not using pain medications. Eating a Regular and having regular bladder/bowel function. Overall doing well.  Noted redness around all of her incisions that started 2 days ago.  Denies any fevers or chills.  It is warm to the touch.  Denies any seepage or discharge from the incision.           Objective:       /72   Temp 97.1  F (36.2  C) (Temporal)   Wt 102.7 kg (226 lb 8 oz)   BMI 36.56 kg/m     Constitutional: Awake, alert, in no acute distress.  Respiratory: Non-labored.   Cardiovascular: Regular rate and rhythm.   Abdomen: soft; NT; Incision is Healing well.  Noted circumferential redness around the  incision, this looks like dermatitis relating to the glue that was placed around the incision.  No signs of cellulitis.  No signs of abscess.    [facility], [facility]

## 2020-07-21 NOTE — PATIENT INSTRUCTIONS
I am confident that this is not infected today.  Continue symptomatic measures to control itching.  Zyrtec once in morning  Benadryl or Zyrtec once in evening  Avoid heat as much as possible  Ice or cool packs  Hydrocortisone (in refrigerator) 2-3 times a day  Avoid scratching  Monitor for signs of infection including fever, thick yellow/white discharge, increasing pain, redness, warmth and/or swelling.

## 2020-07-21 NOTE — LETTER
7/21/2020         RE: Breanna Vidal  [address] [address] [place] [address]        Dear Colleague,    Thank you for referring your patient, Breanna Vidal, to the [facility]. Please see a copy of my visit note below.    [facility] FOLLOW-UP NOTE  GENERAL SURGERY    PCP: Karen Campos         Assessment and Plan:   Assessment:   Breanna Vidal is a 35 year old female who presented post operatively from robotic assisted incisional hernia repair with mesh on 7/10/2020 and is doing well.       ICD-10-CM    1. S/P hernia repair  Z98.890     Z87.19     supraumbilical incisional hernia repair   2. Obesity (BMI 30-39.9)  E66.9          Plan:  She has been doing well.  Developed a aldo-incisional dermatitis.  This is likely due to reaction to the exofin.  I recommend that patient use cortisone cream.  This should help with the dermatitis.  I reassured her that this is not an infection.  This is not cellulitis and therefore she does not need any antibiotics.  She is to come back to me if the dermatitis worsens.  She understands that she is on a lifting restriction of 20 pounds or less for a good month from the date of surgery.  She understands that if she does not continue to lose weight her chances of recurrence is much higher.  All of her questions were answered to her satisfaction.  She did not need a work note.           Subjective:     Breanna Vidal is a 35 year old female who presents post operatively from  robotic assisted incisional hernia repair with mesh on 7/10/2020. Pain has been controled. Currently is not using pain medications. Eating a Regular and having regular bladder/bowel function. Overall doing well.  Noted redness around all of her incisions that started 2 days ago.  Denies any fevers or chills.  It is warm to the touch.  Denies any seepage or discharge from the incision.           Objective:       /72   Temp 97.1  F (36.2  C) (Temporal)   Wt 102.7 kg (226 lb 8 oz)   BMI  36.56 kg/m     Constitutional: Awake, alert, in no acute distress.  Respiratory: Non-labored.   Cardiovascular: Regular rate and rhythm.   Abdomen: soft; NT; Incision is Healing well.  Noted circumferential redness around the incision, this looks like dermatitis relating to the glue that was placed around the incision.  No signs of cellulitis.  No signs of abscess.    Jovi Odom DO  [facility]                Again, thank you for allowing me to participate in the care of your patient.        Sincerely,        Jovi Odom MD

## 2020-07-22 PROBLEM — K43.2 INCISIONAL HERNIA, WITHOUT OBSTRUCTION OR GANGRENE: Status: RESOLVED | Noted: 2020-06-04 | Resolved: 2020-07-22

## 2020-07-27 ENCOUNTER — TELEPHONE (OUTPATIENT)
Dept: FAMILY MEDICINE | Facility: OTHER | Age: 35
End: 2020-07-27

## 2020-07-27 ENCOUNTER — VIRTUAL VISIT (OUTPATIENT)
Dept: FAMILY MEDICINE | Facility: OTHER | Age: 35
End: 2020-07-27
Payer: COMMERCIAL

## 2020-07-27 DIAGNOSIS — R23.8 PAPULE OF SKIN: Primary | ICD-10-CM

## 2020-07-27 PROCEDURE — 99213 OFFICE O/P EST LOW 20 MIN: CPT | Mod: GT | Performed by: FAMILY MEDICINE

## 2020-07-27 NOTE — PATIENT INSTRUCTIONS
Breanna,    It was great seeing you in clinic today.  I summarized our discussion and your plan below.  Please let me know if you have any questions or concerns.  Please follow up with me as discussed in clinic or sooner if any worsening or additional concerns.     1. Papule of skin  35-year-old female with round papule, hyperpigmented, right lower abdomen.  Video was not the best of quality but differential includes nevus and an angioma.  She would like it removed.  We will schedule procedure for 2 weeks from now.       Sincerely,  Dr. CHRISTINE Kebede MD, FAAFP  Family Medicine Physician  [facility]  [address] [place] [address]    Patient Education

## 2020-07-27 NOTE — TELEPHONE ENCOUNTER
Patient needs:  0  Schedule 40 minutes procedure appointment with me in 2 weeks for skin lesion removal      Left message to return call  Sadia Zavala CMA

## 2020-07-27 NOTE — PROGRESS NOTES
"Breanna Vidal is a 35 year old female who is being evaluated via a billable video visit.      The patient has been notified of following:     \"This video visit will be conducted via a call between you and your physician/provider. We have found that certain health care needs can be provided without the need for an in-person physical exam.  This service lets us provide the care you need with a video conversation.  If a prescription is necessary we can send it directly to your pharmacy.  If lab work is needed we can place an order for that and you can then stop by our lab to have the test done at a later time.    Video visits are billed at different rates depending on your insurance coverage.  Please reach out to your insurance provider with any questions.    If during the course of the call the physician/provider feels a video visit is not appropriate, you will not be charged for this service.\"    Patient has given verbal consent for Video visit? Yes  How would you like to obtain your AVS? MyChart  If you are dropped from the video visit, the video invite should be resent to: Text to cell phone: [phone]  Will anyone else be joining your video visit? No    Subjective     Breanna Vidal is a 35 year old female who presents today via video visit for the following health issues:    HPI    Concern - Skin Spot  Onset: Noticed today    Description:   Pelvic Bone- purple color    Intensity: mild    Progression of Symptoms:  same    Accompanying Signs & Symptoms:  None    Previous history of similar problem:   No    Precipitating factors:   Worsened by: NA    Alleviating factors:  Improved by: NA  Therapies Tried and outcome: None       Video Start Time: 1640    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)     Intractable migraine without aura and without status migrainosus     " Biliary dyskinesia     Past Surgical History:   Procedure Laterality Date     DAVINCI ASSISTED HERNIORRHAPHY INCISIONAL N/A 7/10/2020    Procedure: Robotic incisional hernia repair with mesh;  Surgeon: Jovi Odom MD;  Location: [facility] OR     EXCISE LESION FOOT Left 8/16/2019    Procedure: Excision of nevus second interdigital space left foot, Excision of nevus third interdigital space left foot, excision of plantar junctional nevus left foot;  Surgeon: Rinku Lopez DPM;  Location: [facility] OR     INCISION AND DRAINAGE  04/18/2012    chin abscess I&D     LAPAROSCOPIC CHOLECYSTECTOMY N/A 2/14/2020    Procedure: Laparoscopic Cholecystectomy;  Surgeon: Renard Gerber MD;  Location: [facility] OR     LAPAROSCOPIC HERNIORRHAPHY INCISIONAL N/A 7/17/2019    Procedure: laparoscopic incisional hernia repair with mesh;  Surgeon: Jovi Odom MD;  Location: [facility] OR     LAPAROSCOPIC SALPINGECTOMY Bilateral 8/27/2018    Procedure: LAPAROSCOPIC SALPINGECTOMY;  Laparoscopic Bilateral Salpingectomy;  Surgeon: Chai Cui MD;  Location: [facility] OR     MAMMOPLASTY REDUCTION Bilateral 08/04/2017       Social History     Tobacco Use     Smoking status: Former Smoker     Smokeless tobacco: Former User     Quit date: 3/10/2016   Substance Use Topics     Alcohol use: No     Comment: history alcohol abuse 2012, s/p treatment     Family History   Problem Relation Age of Onset     Cancer Father      Pancreatic Cancer Father      Lung Cancer Father      Lupus Mother          Current Outpatient Medications   Medication Sig Dispense Refill     acetaminophen (TYLENOL) 500 MG tablet Take 1,000 mg by mouth every 6 hours as needed for mild pain       aspirin-acetaminophen-caffeine (EXCEDRIN MIGRAINE) 250-250-65 MG tablet Take 1 tablet by mouth every 6 hours as needed for headaches       EPINEPHrine (EPIPEN/ADRENACLICK/OR ANY BX GENERIC EQUIV) 0.3 MG/0.3ML injection 2-pack INJECT  0.3 ML IM 1 TIME PRF ANAPHYLAXIS  2     fluticasone (FLONASE) 50 MCG/ACT  nasal spray USE ONE SPRAY IN EACH NOSTRIL DAILY 16 g 1     ibuprofen (ADVIL/MOTRIN) 200 MG tablet Take 600 mg by mouth every 4 hours as needed for mild pain       methocarbamol (ROBAXIN) 750 MG tablet Take 1 tablet (750 mg) by mouth 3 times daily 30 tablet 0     naproxen (NAPROSYN) 500 MG tablet Take 1 tablet (500 mg) by mouth every 12 hours as needed for headaches 30 tablet 3     Allergies   Allergen Reactions     Bees Anaphylaxis     Has Epi pen      Codeine Nausea     Other reaction(s): Nausea     Imitrex [Sumatriptan]      Worsening of migraine, paresthesias     Liquid Adhesive Itching     Redness and itching around incision after hernia repair surgery     Ranitidine Rash and Itching     recvd IV zantac 75 and reaction was immediate,  was given Benadryl to counter act reaction in 2000     Sulfa Drugs Unknown and Rash       Reviewed and updated as needed this visit by Provider         Review of Systems   Constitutional, HEENT, cardiovascular, pulmonary, gi and gu systems are negative, except as otherwise noted.      Objective             Physical Exam     GENERAL: Healthy, alert and no distress  EYES: Eyes grossly normal to inspection.  No discharge or erythema, or obvious scleral/conjunctival abnormalities.  RESP: No audible wheeze, cough, or visible cyanosis.  No visible retractions or increased work of breathing.    SKIN: Approximately 8 mm round brownish papule right lower abdomen  NEURO: Cranial nerves grossly intact.  Mentation and speech appropriate for age.  PSYCH: Mentation appears normal, affect normal/bright, judgement and insight intact, normal speech and appearance well-groomed.      Diagnostic Test Results:  Labs reviewed in Epic        ASSESSMENT and PLAN    1. Papule of skin  35-year-old female with round papule, hyperpigmented, right lower abdomen.  Video was not the best of quality but differential includes nevus and an angioma.  She would like it removed.  We will schedule procedure for 2 weeks  from now.         Return in about 2 weeks (around 8/10/2020).     Ramírez Kebede MD, FAAFP  Family Medicine Physician  [facility]  [id number] [place] [id number]          Video-Visit Details    Type of service:  Video Visit    Video End Time:1654    Originating Location (pt. Location): Home    Distant Location (provider location):  [facility]     Platform used for Video Visit: AmWell    Return in about 2 weeks (around 8/10/2020).       Ramírez Kebede MD

## 2020-08-09 ENCOUNTER — VIRTUAL VISIT (OUTPATIENT)
Dept: FAMILY MEDICINE | Facility: OTHER | Age: 35
End: 2020-08-09
Payer: COMMERCIAL

## 2020-08-09 PROCEDURE — 99421 OL DIG E/M SVC 5-10 MIN: CPT | Performed by: PHYSICIAN ASSISTANT

## 2020-08-10 NOTE — PROGRESS NOTES
"Date: 2020 22:11:39  Clinician: Ana Mireles  Clinician NPI: [id number]  Patient: Breanna Vidal  Patient : 1985  Patient Address: [address] [place] [address]  Patient Phone: [phone]  Visit Protocol: URI  Patient Summary:  Breanna is a 35 year old ( : 1985 ) female who initiated a Visit for COVID-19 (Coronavirus) evaluation and screening. When asked the question \"Please sign me up to receive news, health information and promotions from [employer].\", Breanna responded \"Yes\".    Breanna states her symptoms started gradually 3-4 days ago.   Her symptoms consist of ear pain, a headache, chills, nausea, a sore throat, a cough, nasal congestion, rhinitis, myalgia, anosmia, and malaise. She is experiencing difficulty breathing due to nasal congestion but she is not short of breath. Breanna also feels feverish.   Symptom details     Nasal secretions: The color of her mucus is clear.    Cough: Breanna coughs a few times an hour and her cough is not more bothersome at night. Phlegm does not come into her throat when she coughs. She does not believe her cough is caused by post-nasal drip.     Sore throat: Breanna reports having moderate throat pain (4-6 on a 10 point pain scale), does not have exudate on her tonsils, and can swallow liquids. She is not sure if the lymph nodes in her neck are enlarged. A rash has not appeared on the skin since the sore throat started.     Temperature: Her current temperature is 101.2 degrees Fahrenheit. Breanna has had a temperature over 100 degrees Fahrenheit for 1-2 days.     Headache: She states the headache is mild (1-3 on a 10 point pain scale).      Breanna denies having teeth pain, ageusia, diarrhea, vomiting, facial pain or pressure, and wheezing. She also denies having a sinus infection within the past year, taking antibiotic medication in the past month, double sickening (worsening symptoms after initial improvement), and having recent facial or sinus surgery in the " past 60 days.   Precipitating events  Breanna is not sure if she has been exposed to someone with strep throat. She has not recently been exposed to someone with influenza. Breanna has been in close contact with the following high risk individuals: people with asthma, heart disease or diabetes and children under the age of 5.   Pertinent COVID-19 (Coronavirus) information  In the past 14 days, Breanna has not worked in a congregate living setting.   She does not work or volunteer as healthcare worker or a  and does not work or volunteer in a healthcare facility.   Breanna also has not lived in a congregate living setting in the past 14 days. She does not live with a healthcare worker.   Breanna has had a close contact with a laboratory-confirmed COVID-19 patient within 14 days of symptom onset.   Since December 2019, Breanna and has not had upper respiratory infection or influenza-like illness. Has not been diagnosed with lab-confirmed COVID-19 test   Pertinent medical history  Breanna typically gets a yeast infection when she takes antibiotics. She is not sure if she has used fluconazole (Diflucan) to treat previous yeast infections.   Breanna does not need a return to work/school note.   Weight: 230 lbs   Breanna does not smoke or use smokeless tobacco.   She denies pregnancy and denies breastfeeding. She has menstruated in the past month.   Weight: 230 lbs    MEDICATIONS: Tylenol 8 Hour oral, ALLERGIES: NKDA  Clinician Response:  Dear Breanna,   Your symptoms show that you may have coronavirus (COVID-19). This illness can cause fever, cough and trouble breathing. Many people get a mild case and get better on their own. Some people can get very sick.  What should I do?  We would like to test you for this virus.   1. Please call [phone] to schedule your visit. Explain that you were referred by [facility] to have a COVID-19 test. Be ready to share your [facility] visit ID number.  The following will serve as your written  "order for this COVID Test, ordered by me, for the indication of suspected COVID [Z20.828]: The test will be ordered in [employer], our electronic health record, after you are scheduled. It will show as ordered and authorized by Sen Pozo MD.  Order: COVID-19 (Coronavirus) PCR [facility]TOMATIC [facility]      2. When it's time for your COVID test:  Stay at least 6 feet away from others. (If someone will drive you to your test, stay in the backseat, as far away from the  as you can.)   Cover your mouth and nose with a mask, tissue or washcloth.  Go straight to the testing site. Don't make any stops on the way there or back.      3.Starting now: Stay home and away from others (self-isolate) until:   You've had no fever---and no medicine that reduces fever---for one full day (24 hours). And...   Your other symptoms have gotten better. For example, your cough or breathing has improved. And...   At least 10 days have passed since your symptoms started.       During this time, don't leave the house except for testing or medical care.   Stay in your own room, even for meals. Use your own bathroom if you can.   Stay away from others in your home. No hugging, kissing or shaking hands. No visitors.  Don't go to work, school or anywhere else.    Clean \"high touch\" surfaces often (doorknobs, counters, handles, etc.). Use a household cleaning spray or wipes. You'll find a full list of  on the EPA website: www.epa.gov/pesticide-registration/list-n-disinfectants-use-against-sars-cov-2.   Cover your mouth and nose with a mask, tissue or washcloth to avoid spreading germs.  Wash your hands and face often. Use soap and water.  Caregivers in these groups are at risk for severe illness due to COVID-19:  o People 65 years and older  o People who live in a nursing home or long-term care facility  o People with chronic disease (lung, heart, cancer, diabetes, kidney, liver, immunologic)  o People who have a weakened " immune system, including those who:   Are in cancer treatment  Take medicine that weakens the immune system, such as corticosteroids  Had a bone marrow or organ transplant  Have an immune deficiency  Have poorly controlled HIV or AIDS  Are obese (body mass index of 40 or higher)  Smoke regularly   o Caregivers should wear gloves while washing dishes, handling laundry and cleaning bedrooms and bathrooms.  o Use caution when washing and drying laundry: Don't shake dirty laundry, and use the warmest water setting that you can.  o For more tips, go to www.cdc.gov/coronavirus/2019-ncov/downloads/10Things.pdf.    4.Sign up for [employer]. We know it's scary to hear that you might have COVID-19. We want to track your symptoms to make sure you're okay over the next 2 weeks. Please look for an email from [employer]---this is a free, online program that we'll use to keep in touch. To sign up, follow the link in the email. Learn more at [email or web address]  How can I take care of myself?   Get lots of rest. Drink extra fluids (unless a doctor has told you not to).   Take Tylenol (acetaminophen) for fever or pain. If you have liver or kidney problems, ask your family doctor if it's okay to take Tylenol.   Adults can take either:    650 mg (two 325 mg pills) every 4 to 6 hours, or...   1,000 mg (two 500 mg pills) every 8 hours as needed.    Note: Don't take more than 3,000 mg in one day. Acetaminophen is found in many medicines (both prescribed and over-the-counter medicines). Read all labels to be sure you don't take too much.   For children, check the Tylenol bottle for the right dose. The dose is based on the child's age or weight.    If you have other health problems (like cancer, heart failure, an organ transplant or severe kidney disease): Call your specialty clinic if you don't feel better in the next 2 days.       Know when to call 911. Emergency warning signs include:    Trouble breathing or shortness  of breath Pain or pressure in the chest that doesn't go away Feeling confused like you haven't felt before, or not being able to wake up Bluish-colored lips or face.  Where can I get more information?   [facility] -- About COVID-19: [email or web address]   CDC -- What to Do If You're Sick: www.cdc.gov/coronavirus/2019-ncov/about/steps-when-sick.html   CDC -- Ending Home Isolation: www.cdc.gov/coronavirus/2019-ncov/hcp/disposition-in-home-patients.html   CDC -- Caring for Someone: www.cdc.gov/coronavirus/2019-ncov/if-you-are-sick/care-for-someone.html   [facility] -- Interim Guidance for Hospital Discharge to Home: www.[facility].mn.us/diseases/coronavirus/hcp/hospdischarge.pdf   [facility] clinical trials (COVID-19 research studies): clinicalaffairs.[facility]-clinical-trials    Below are the COVID-19 hotlines at the [facility] ([facility]). Interpreters are available.    For health questions: Call 274-403-1496 or 1-399.468.9042 (7 a.m. to 7 p.m.) For questions about schools and childcare: Call 435-617-2953 or 1-163.190.1950 (7 a.m. to 7 p.m.)    Diagnosis: Acute upper respiratory infection, unspecified  Diagnosis ICD: J06.9

## 2020-08-11 ENCOUNTER — VIRTUAL VISIT (OUTPATIENT)
Dept: FAMILY MEDICINE | Facility: OTHER | Age: 35
End: 2020-08-11
Payer: COMMERCIAL

## 2020-08-11 DIAGNOSIS — J06.9 UPPER RESPIRATORY TRACT INFECTION, UNSPECIFIED TYPE: Primary | ICD-10-CM

## 2020-08-11 PROCEDURE — 99213 OFFICE O/P EST LOW 20 MIN: CPT | Mod: GT | Performed by: STUDENT IN AN ORGANIZED HEALTH CARE EDUCATION/TRAINING PROGRAM

## 2020-08-11 NOTE — PROGRESS NOTES
"Breanna Vidal is a 35 year old female who is being evaluated via a billable video visit.      The patient has been notified of following:     \"This video visit will be conducted via a call between you and your physician/provider. We have found that certain health care needs can be provided without the need for an in-person physical exam.  This service lets us provide the care you need with a video conversation.  If a prescription is necessary we can send it directly to your pharmacy.  If lab work is needed we can place an order for that and you can then stop by our lab to have the test done at a later time.    Video visits are billed at different rates depending on your insurance coverage.  Please reach out to your insurance provider with any questions.    If during the course of the call the physician/provider feels a video visit is not appropriate, you will not be charged for this service.\"    Patient has given verbal consent for Video visit? Yes  How would you like to obtain your AVS? MyChart  If you are dropped from the video visit, the video invite should be resent to: Text to cell phone: [phone]  Will anyone else be joining your video visit? No      Subjective     Breanna Vidal is a 35 year old female who presents today via video visit for the following health issues:    HPI    Concern for COVID-19  About how many days ago did these symptoms start? Last Thursday   Is this your first visit for this illness? No - [facility] to get tested which was done on August 10th, results are not back yet  How would you describe your symptoms since your last visit? My symptoms have worsened  In the 14 days before your symptoms started, have you had close contact with someone with COVID-19 (Coronavirus)? Yes, I have been in contact with someone who has COVID-19/Coronavirus (confirmed by lab test). 11 days after they were cleared  Do you have a fever or chills? Yes, I felt feverish or had chills  Are you having new or worsening " difficulty breathing? Yes   Please describe what kind of difficulty you are having breathing:No dyspnea, or dyspnea at patients normal baseline  Do you have new or worsening cough? Yes, it's a dry cough.   Have you had any new or unexplained body aches? YES    Have you experienced any of the following NEW symptoms?    Headache: YES    Sore throat: YES    Loss of taste or smell: YES    Chest pain: YES    Diarrhea: YES    Rash: No  What treatments have you tried? Tylenol ibuprofen and dayquil   Who do you live with? Fiance and 2 children   Are you, or a household member, a healthcare worker or a ? No  Do you live in a nursing home, group home, or shelter? No  Do you have a way to get food/medications if quarantined? Yes, I have a friend or family member who can help me.  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)     Intractable migraine without aura and without status migrainosus     Biliary dyskinesia     Past Surgical History:   Procedure Laterality Date     APPENDECTOMY       DAVINCI ASSISTED HERNIORRHAPHY INCISIONAL N/A 7/10/2020    Procedure: Robotic incisional hernia repair with mesh;  Surgeon: Jovi Odom MD;  Location: [facility]     EXCISE LESION FOOT Left 8/16/2019    Procedure: Excision of nevus second interdigital space left foot, Excision of nevus third interdigital space left foot, excision of plantar junctional nevus left foot;  Surgeon: Rinku Lopez DPM;  Location: [facility] OR     INCISION AND DRAINAGE  04/18/2012    chin abscess I&D     LAPAROSCOPIC CHOLECYSTECTOMY N/A 2/14/2020    Procedure: Laparoscopic Cholecystectomy;  Surgeon: Renard Gerber MD;  Location: [facility]     LAPAROSCOPIC HERNIORRHAPHY INCISIONAL N/A 7/17/2019    Procedure: laparoscopic  incisional hernia repair with mesh;  Surgeon: Jovi Odom MD;  [facility]     LAPAROSCOPIC SALPINGECTOMY Bilateral 8/27/2018    Procedure: LAPAROSCOPIC SALPINGECTOMY;  Laparoscopic Bilateral Salpingectomy;  Surgeon: Chai Cui MD;  Location: [facility]     MAMMOPLASTY REDUCTION Bilateral 08/04/2017       Social History     Tobacco Use     Smoking status: Former Smoker     Packs/day: 0.00     Years: 0.00     Pack years: 0.00     Types: Cigarettes     Smokeless tobacco: Former User     Quit date: 3/10/2016   Substance Use Topics     Alcohol use: No     Comment: history alcohol abuse 2012, s/p treatment     Family History   Problem Relation Age of Onset     Cancer Father      Pancreatic Cancer Father      Lung Cancer Father      Lupus Mother          Current Outpatient Medications   Medication Sig Dispense Refill     acetaminophen (TYLENOL) 500 MG tablet Take 1,000 mg by mouth every 6 hours as needed for mild pain       aspirin-acetaminophen-caffeine (EXCEDRIN MIGRAINE) 250-250-65 MG tablet Take 1 tablet by mouth every 6 hours as needed for headaches       fluticasone (FLONASE) 50 MCG/ACT nasal spray USE ONE SPRAY IN EACH NOSTRIL DAILY 16 g 1     ibuprofen (ADVIL/MOTRIN) 200 MG tablet Take 600 mg by mouth every 4 hours as needed for mild pain       naproxen (NAPROSYN) 500 MG tablet Take 1 tablet (500 mg) by mouth every 12 hours as needed for headaches 30 tablet 3     EPINEPHrine (EPIPEN/ADRENACLICK/OR ANY BX GENERIC EQUIV) 0.3 MG/0.3ML injection 2-pack INJECT  0.3 ML IM 1 TIME PRF ANAPHYLAXIS  2     methocarbamol (ROBAXIN) 750 MG tablet Take 1 tablet (750 mg) by mouth 3 times daily (Patient not taking: Reported on 8/6/2020) 30 tablet 0     Allergies   Allergen Reactions     Bees Anaphylaxis     Has Epi pen      Codeine Nausea     Other reaction(s): Nausea     Imitrex [Sumatriptan]      Worsening of migraine, paresthesias     Liquid Adhesive Itching     Redness and itching around incision after hernia repair  surgery     Ranitidine Rash and Itching     recvd IV zantac 75 and reaction was immediate,  was given Benadryl to counter act reaction in 2000     Sulfa Drugs Unknown and Rash     Recent Labs   Lab Test 02/26/20  1133 02/17/20  1128 01/28/20  0857   ALT 33 36 33   CR 0.58 0.67 0.61   GFRESTIMATED >90 >90 >90   GFRESTBLACK >90 >90 >90   POTASSIUM 4.1 3.4 3.9      BP Readings from Last 3 Encounters:   07/21/20 118/72   07/10/20 128/86   07/07/20 110/68    Wt Readings from Last 3 Encounters:   07/21/20 102.7 kg (226 lb 8 oz)   07/07/20 103.9 kg (229 lb)   07/06/20 104.3 kg (230 lb)                    Reviewed and updated as needed this visit by Provider         Review of Systems   Constitutional, HEENT, cardiovascular, pulmonary, gi and gu systems are negative, except as otherwise noted.      Objective             Physical Exam     GENERAL: alert, no acute distress and fatigued  EYES: Eyes grossly normal to inspection.  No discharge or erythema, or obvious scleral/conjunctival abnormalities.  RESP: dry hacking cough, no audible wheeze  SKIN: Visible skin clear. No significant rash, abnormal pigmentation or lesions.  NEURO: Cranial nerves grossly intact.  Mentation and speech appropriate for age.  PSYCH: Mentation appears normal, affect normal/bright, judgement and insight intact, normal speech and appearance well-groomed.      Diagnostic Test Results:  Labs reviewed in Epic        Assessment & Plan     1. Upper respiratory tract infection, unspecified type  Had COVID test done on August 10 and the results are not back yet.  Her symptoms are consistent with viral upper respiratory infection and discussed that most often these kinds of infections will improve with time, rest, over-the-counter cold medications and pushing fluids to stay well-hydrated.  She has been taking Mucinex with a decongestant which I recommended she continue.  She is about a week into symptoms so I recommended if her symptoms persist or worsening  to follow-up for further evaluation.         No follow-ups on file.    MADELINE Hernandez CNP  [facility]      Video-Visit Details    Type of service:  Video Visit    Video End Time:8:53 AM    Originating Location (pt. Location): Home    Distant Location (provider location):  [facility]     Platform used for Video Visit: Kassi    No follow-ups on file.       MADELINE Hernandez CNP

## 2020-08-12 ENCOUNTER — VIRTUAL VISIT (OUTPATIENT)
Dept: FAMILY MEDICINE | Facility: OTHER | Age: 35
End: 2020-08-12
Payer: COMMERCIAL

## 2020-08-12 ENCOUNTER — MYC MEDICAL ADVICE (OUTPATIENT)
Dept: FAMILY MEDICINE | Facility: OTHER | Age: 35
End: 2020-08-12

## 2020-08-12 DIAGNOSIS — J20.9 ACUTE BRONCHITIS, UNSPECIFIED ORGANISM: Primary | ICD-10-CM

## 2020-08-12 PROCEDURE — 99213 OFFICE O/P EST LOW 20 MIN: CPT | Mod: GT | Performed by: STUDENT IN AN ORGANIZED HEALTH CARE EDUCATION/TRAINING PROGRAM

## 2020-08-12 RX ORDER — CODEINE PHOSPHATE AND GUAIFENESIN 10; 100 MG/5ML; MG/5ML
1-2 SOLUTION ORAL EVERY 4 HOURS PRN
Qty: 120 ML | Refills: 0 | Status: SHIPPED | OUTPATIENT
Start: 2020-08-12 | End: 2021-01-14

## 2020-08-12 RX ORDER — ALBUTEROL SULFATE 90 UG/1
2 AEROSOL, METERED RESPIRATORY (INHALATION) EVERY 4 HOURS PRN
Qty: 1 INHALER | Refills: 0 | Status: SHIPPED | OUTPATIENT
Start: 2020-08-12 | End: 2022-01-20

## 2020-08-12 RX ORDER — AZITHROMYCIN 250 MG/1
TABLET, FILM COATED ORAL
Qty: 6 TABLET | Refills: 0 | Status: SHIPPED | OUTPATIENT
Start: 2020-08-12 | End: 2020-08-17

## 2020-08-12 NOTE — TELEPHONE ENCOUNTER
Talked to Breanna when doing visit with her fiance today about kids. They were playing in the background and appear that they are doing okay. Recommend they are seen if symptoms worsen of persist.   Deanne Campos, CNP

## 2020-08-12 NOTE — PROGRESS NOTES
"Breanna Vidal is a 35 year old female who is being evaluated via a billable telephone visit.      The patient has been notified of following:     \"This telephone visit will be conducted via a call between you and your physician/provider. We have found that certain health care needs can be provided without the need for a physical exam.  This service lets us provide the care you need with a short phone conversation.  If a prescription is necessary we can send it directly to your pharmacy.  If lab work is needed we can place an order for that and you can then stop by our lab to have the test done at a later time.    Telephone visits are billed at different rates depending on your insurance coverage. During this emergency period, for some insurers they may be billed the same as an in-person visit.  Please reach out to your insurance provider with any questions.    If during the course of the call the physician/provider feels a telephone visit is not appropriate, you will not be charged for this service.\"    Patient has given verbal consent for Telephone visit?  Yes    What phone number would you like to be contacted at? [phone]    How would you like to obtain your AVS? [place]     Breanna Vidal is a 35 year old female who presents via phone visit today for the following health issues:    HPI    Acute Illness   Acute illness concerns: Cough  Onset: 1 week    Fever: YES    Chills/Sweats: YES    Headache (location?): YES    Sinus Pressure:no    Conjunctivitis:  no    Ear Pain: YES: right    Rhinorrhea: no    Congestion: YES    Sore Throat: YES     Cough: YES    Wheeze: no    Decreased Appetite: YES- loss of taste    Nausea: no    Vomiting: no    Diarrhea:  YES    Dysuria/Freq.: no    Fatigue/Achiness: no    Sick/Strep Exposure: no     Therapies Tried and outcome: nothing, taking OTC Mucinex cold and flu, Covid test is negative.     Patient Active Problem List   Diagnosis     Mixed incontinence     Rh negative " status during pregnancy     Vaginal delivery     Alcohol abuse, in remission     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)     Intractable migraine without aura and without status migrainosus     Biliary dyskinesia     Past Surgical History:   Procedure Laterality Date     APPENDECTOMY       DAVINCI ASSISTED HERNIORRHAPHY INCISIONAL N/A 7/10/2020    Procedure: Robotic incisional hernia repair with mesh;  Surgeon: Jovi Odom MD;  Location: [facility]     EXCISE LESION FOOT Left 8/16/2019    Procedure: Excision of nevus second interdigital space left foot, Excision of nevus third interdigital space left foot, excision of plantar junctional nevus left foot;  Surgeon: Rinku Lopez DPM;  Location: [facility]     INCISION AND DRAINAGE  04/18/2012    chin abscess I&D     LAPAROSCOPIC CHOLECYSTECTOMY N/A 2/14/2020    Procedure: Laparoscopic Cholecystectomy;  Surgeon: Renard Gerber MD;  Location: [facility]     LAPAROSCOPIC HERNIORRHAPHY INCISIONAL N/A 7/17/2019    Procedure: laparoscopic incisional hernia repair with mesh;  Surgeon: Jovi Odom MD;  Location[facility]     LAPAROSCOPIC SALPINGECTOMY Bilateral 8/27/2018    Procedure: LAPAROSCOPIC SALPINGECTOMY;  Laparoscopic Bilateral Salpingectomy;  Surgeon: Chai Cui MD;  Location: [facility]     MAMMOPLASTY REDUCTION Bilateral 08/04/2017       Social History     Tobacco Use     Smoking status: Former Smoker     Packs/day: 0.00     Years: 0.00     Pack years: 0.00     Types: Cigarettes     Smokeless tobacco: Former User     Quit date: 3/10/2016   Substance Use Topics     Alcohol use: No     Comment: history alcohol abuse 2012, s/p treatment     Family History   Problem Relation Age of Onset     Cancer Father      Pancreatic Cancer Father      Lung Cancer Father      Lupus Mother          Current Outpatient Medications   Medication Sig Dispense Refill     EPINEPHrine (EPIPEN/ADRENACLICK/OR ANY BX GENERIC EQUIV) 0.3 MG/0.3ML injection 2-pack INJECT  0.3 ML IM 1 TIME PRF  ANAPHYLAXIS  2     fluticasone (FLONASE) 50 MCG/ACT nasal spray USE ONE SPRAY IN EACH NOSTRIL DAILY 16 g 1     methocarbamol (ROBAXIN) 750 MG tablet Take 1 tablet (750 mg) by mouth 3 times daily 30 tablet 0     acetaminophen (TYLENOL) 500 MG tablet Take 1,000 mg by mouth every 6 hours as needed for mild pain       aspirin-acetaminophen-caffeine (EXCEDRIN MIGRAINE) 250-250-65 MG tablet Take 1 tablet by mouth every 6 hours as needed for headaches       ibuprofen (ADVIL/MOTRIN) 200 MG tablet Take 600 mg by mouth every 4 hours as needed for mild pain       naproxen (NAPROSYN) 500 MG tablet Take 1 tablet (500 mg) by mouth every 12 hours as needed for headaches (Patient not taking: Reported on 8/12/2020) 30 tablet 3     Allergies   Allergen Reactions     Bees Anaphylaxis     Has Epi pen      Codeine Nausea     Other reaction(s): Nausea     Imitrex [Sumatriptan]      Worsening of migraine, paresthesias     Liquid Adhesive Itching     Redness and itching around incision after hernia repair surgery     Ranitidine Rash and Itching     recvd IV zantac 75 and reaction was immediate,  was given Benadryl to counter act reaction in 2000     Sulfa Drugs Unknown and Rash     Recent Labs   Lab Test 02/26/20  1133 02/17/20  1128 01/28/20  0857   ALT 33 36 33   CR 0.58 0.67 0.61   GFRESTIMATED >90 >90 >90   GFRESTBLACK >90 >90 >90   POTASSIUM 4.1 3.4 3.9      BP Readings from Last 3 Encounters:   07/21/20 118/72   07/10/20 128/86   07/07/20 110/68    Wt Readings from Last 3 Encounters:   07/21/20 102.7 kg (226 lb 8 oz)   07/07/20 103.9 kg (229 lb)   07/06/20 104.3 kg (230 lb)                    Reviewed and updated as needed this visit by Provider         Review of Systems   Constitutional, HEENT, cardiovascular, pulmonary, gi and gu systems are negative, except as otherwise noted.       Objective          Vitals:  No vitals were obtained today due to virtual visit.    alert and tearful, appears fatigued  PSYCH: Alert and oriented  times 3; coherent speech, normal   rate and volume, able to articulate logical thoughts, able   to abstract reason, no tangential thoughts, no hallucinations   or delusions  Her affect is tearful  RESP: dry cough, no audible wheezing, able to talk in full sentences  Remainder of exam unable to be completed due to telephone visits    Diagnostic Test Results:  Labs reviewed in Epic        Assessment & Plan     1. Acute bronchitis, unspecified organism  Covid test negative. Will treat with azithromycin for possible bacterial infection and for the anti-inflammatory properties of the medication. Advised to use the albuterol as needed for shortness of breath which seems to be mostly related to when she has coughing fits that make her feel like it is hard to breath. Follow up if symptoms persist or worsen.   - albuterol (PROAIR HFA/PROVENTIL HFA/VENTOLIN HFA) 108 (90 Base) MCG/ACT inhaler; Inhale 2 puffs into the lungs every 4 hours as needed for shortness of breath / dyspnea  Dispense: 1 Inhaler; Refill: 0  - guaiFENesin-codeine (ROBITUSSIN AC) 100-10 MG/5ML solution; Take 5-10 mLs by mouth every 4 hours as needed for cough  Dispense: 120 mL; Refill: 0  - azithromycin (ZITHROMAX) 250 MG tablet; Take 2 tablets (500 mg) by mouth daily for 1 day, THEN 1 tablet (250 mg) daily for 4 days.  Dispense: 6 tablet; Refill: 0       No follow-ups on file.    Telephone call duration: 10 min    MADELINE Hernandez CNP  [facility]    No follow-ups on file.     MADELINE Hernandez CNP

## 2020-08-25 ENCOUNTER — VIRTUAL VISIT (OUTPATIENT)
Dept: FAMILY MEDICINE | Facility: CLINIC | Age: 35
End: 2020-08-25
Payer: COMMERCIAL

## 2020-08-25 ENCOUNTER — MYC MEDICAL ADVICE (OUTPATIENT)
Dept: FAMILY MEDICINE | Facility: OTHER | Age: 35
End: 2020-08-25
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
## 2020-08-25 DIAGNOSIS — S80.869A INSECT BITE OF LOWER LEG, UNSPECIFIED LATERALITY, INITIAL ENCOUNTER: Primary | ICD-10-CM

## 2020-08-25 PROCEDURE — 99213 OFFICE O/P EST LOW 20 MIN: CPT | Mod: GT | Performed by: FAMILY MEDICINE

## 2020-08-25 RX ORDER — TRIAMCINOLONE ACETONIDE 1 MG/G
OINTMENT TOPICAL 3 TIMES DAILY
Qty: 30 G | Refills: 0 | Status: SHIPPED | OUTPATIENT
Start: 2020-08-25 | End: 2021-01-14

## 2020-08-25 NOTE — PATIENT INSTRUCTIONS
"  Patient Education     Chigger Bite     Chiggers often bite on the feet or around the waist as they crawl until they reach constrictive clothing.   Chiggers are tiny mites that attach themselves to the skin to feed on skin cells. It is only the larvae (babies) of the chigger that bite people. When a chigger bites you, it attaches tiny mouth parts to the skin, usually near a skin pore or hair follicle. The bite is painless. They do not burrow under your skin or suck your blood.  After biting, they inject saliva (spit) into the skin. The saliva has an enzyme that breaks down skin cells into a liquid. This is what they eat. To prevent the saliva from spreading, the skin cells around the saliva harden. This causes the redness, swelling, and severe itching. In [place], chigger bites do not transmit disease. But they do cause significant itching as the bite heals. This may take a week or longer. Chigger bites are most common in the summer and the fall in the [place]  Boys can sometimes get penile swelling, itching, and painful urination from a hypersensitivity reaction to chigger bites. This is called \"summer penile syndrome\" and can sometimes last up to a few weeks.  Home care  The healthcare provider may recommend over-the-counter (OTC) medicines containing antihistamines to help relieve itching and swelling. Use each medicine according to the directions on the package. If the bite becomes infected, an antibiotic will be prescribed. This may be in pill form taken by mouth or as an ointment or cream put directly on the skin. Be sure to use this medicine exactly as prescribed. You should continue using your antibiotic until you are told you can stop, even if you re feeling better.  The following are general care guidelines:      Symptoms usually go away on their own within 1 to 2 weeks.    Wash all the clothing you were using when you were bitten, since there may still be mites in them. The same goes for blankets or " sleeping bags. Use hot water.    To help prevent infection, avoid scratching and picking as much as possible.    To help relieve itching and swelling, apply ice wrapped in a thin towel to the bites. Do this for up to 10 minutes at a time. Be careful not to freeze your skin, as this will damage it.    An OTC anti-itch cream like calamine, diphenhydramine, or hydrocortisone may be helpful.    Avoid hot baths or showers. This can make itching worse.    Contrary to popular belief, chiggers do not burrow in the skin. Do not apply alcohol, heat, or other home remedies to the skin in an attempt to remove chiggers.  Avoiding chigger bites  These are things you can do to prevent chigger bites:    Wear socks, long sleeves and long pants when you are outdoors where chiggers have likely gathered, such as in tall grass. Also use insect repellent. The most effective repellents contain DEET (10% to 30%). Children should not use more than 10% strength of DEET. Infants and pregnant women in the first trimester should not use DEET.    You can spray your clothing with a repellent containing DEET or permethrin. If you do this, you do not need to put repellent on the skin under clothing that has been sprayed. [place] it around the openings of your clothes. This includes the cuffs, waistband, shirt neck, and tops of boots.    Sulfur powder can also be applied to clothing. But sulfur can have a strong odor, especially when mixed with sweat. It can also irritate sensitive skin.  Follow-up care  Follow up with your healthcare provider, or as advised.  When to seek medical advice  Call your healthcare provider right away if any of the following occur:    Shortness of breath or difficulty breathing    Fever of 100.4 F (38 C) or higher    Headache, fever, chills, muscle or joint aching, or vomiting    New rash    Signs of infection, such as increased swelling and pain, warmth, red streaks, or drainage    Drainage from the bite area  Call  911  Call 911 or get immediate medical care if any of the following occur:    Dizziness    Weakness    Fainting  Date Last Reviewed: 6/1/2018 2000-2019 [employer]. [address] [place] [address], [place] [address]. All rights reserved. This information is not intended as a substitute for professional medical care. Always follow your healthcare professional's instructions.

## 2020-08-25 NOTE — PROGRESS NOTES
"Breanna Vidal is a 35 year old female who is being evaluated via a billable video visit.      The patient has been notified of following:     \"This video visit will be conducted via a call between you and your physician/provider. We have found that certain health care needs can be provided without the need for an in-person physical exam.  This service lets us provide the care you need with a video conversation.  If a prescription is necessary we can send it directly to your pharmacy.  If lab work is needed we can place an order for that and you can then stop by our lab to have the test done at a later time.    Video visits are billed at different rates depending on your insurance coverage.  Please reach out to your insurance provider with any questions.    If during the course of the call the physician/provider feels a video visit is not appropriate, you will not be charged for this service.\"    Patient has given verbal consent for Video visit? Yes  How would you like to obtain your AVS? MyChart  If you are dropped from the video visit, the video invite should be resent to: Text to cell phone: [phone]  Will anyone else be joining your video visit? No      Subjective     Breanna Vidal is a 35 year old female who presents today via video visit for the following health issues:    HPI    Rash   Onset/Duration: Sunday night   Description  Location: both legs   Character: round, red  Itching: moderate  Intensity:  moderate  Progression of Symptoms:  worsening  Accompanying signs and symptoms:   Fever: no  Body aches or joint pain: no  Sore throat symptoms: no  Recent cold symptoms: no  History:           Previous episodes of similar rash: None  New exposures:  None  [place]   Exposure to similar rash: no  Precipitating or alleviating factors: Did have a tick yesterday and [place] the [place]     Therapies tried and outcome: chigger cream, cortisone     [place] " [place]. Her and a friend's daughter both developed red itchy spots on their lower extremities. No other new exposures. Has not had this before. No fevers, chills. May have some mild nausea. No other concerns.     Has tried topical allegra and hydrocortisone cream without benefit.     Video Start Time: 10:24 AM    Review of Systems   Constitutional, derm, msk, lymph, cardiovascular, pulmonary, gi and gu systems are negative, except as otherwise noted.      Objective       Vitals:  No vitals were obtained today due to virtual visit.    Physical Exam     GENERAL: Healthy, alert and no distress  EYES: Eyes grossly normal to inspection.  No discharge or erythema, or obvious scleral/conjunctival abnormalities.  RESP: No audible wheeze, cough, or visible cyanosis.  No visible retractions or increased work of breathing.    SKIN: Red papules located on bilateral lower extremities. Remaining visible skin clear.  NEURO: Cranial nerves grossly intact.  Mentation and speech appropriate for age.  PSYCH: Mentation appears normal, affect normal/bright, judgement and insight intact, normal speech and appearance well-groomed.      Labs    None    Assessment & Plan   1. Insect bite of lower leg, unspecified laterality, initial encounter: Likely chigger bite or other insect bite given appearance and another individual with similar exposure developing symptoms. Recommend topical kenalog and Rx sent. Additionally can take OTC oral antihistamines for itching. Also recommend cold compress, calamine/menthol lotion as well to help with itching. Patient agreeable. Return if new or worsening symptoms.  - triamcinolone (KENALOG) 0.1 % external ointment; Apply topically 3 times daily  Dispense: 30 g; Refill: 0      Return in about 2 weeks (around 9/8/2020), or if symptoms worsen or fail to improve.    Sen Curtis MD  [facility]    This chart is completed utilizing dictation software; typos and/or  incorrect word substitutions may unintentionally occur.    Video-Visit Details    Type of service:  Video Visit    Video End Time:10:38    Originating Location (pt. Location): Home    Distant Location (provider location):  [facility]     Platform used for Video Visit: Kassi

## 2020-08-27 ENCOUNTER — NURSE TRIAGE (OUTPATIENT)
Dept: FAMILY MEDICINE | Facility: OTHER | Age: 35
End: 2020-08-27

## 2020-08-27 ENCOUNTER — MYC MEDICAL ADVICE (OUTPATIENT)
Dept: FAMILY MEDICINE | Facility: OTHER | Age: 35
End: 2020-08-27

## 2020-08-27 NOTE — TELEPHONE ENCOUNTER
Patient was triaged.       Additional Information    Negative: Life-threatening reaction in the past to similar substance (e.g., food, insect bite/sting, medication, etc.) and < 2 hours since exposure    Negative: Wheezing, stridor, hoarseness, or difficulty breathing    Negative: Tightness in the chest or throat and begins within 2 hours of exposure to allergic substance    Negative: Difficulty swallowing, drooling, or slurred speech    Negative: Difficult to awaken or acting confused (e.g., disoriented, slurred speech)    Negative: Unresponsive, passed out, or very weak    Negative: Other symptom of severe allergic reaction (Exception: Hives or facial swelling alone)    Negative: Sounds like a life-threatening emergency to the triager    Widespread hives and onset > 2 hours after exposure to high-risk allergen (e.g., sting, nuts, 1st dose of antibiotic)    Taking a new medicine now or within last 3 days (EXCEPTION: antihistamine, decongestant or other OTC cough/cold medicines)    Negative: Difficulty breathing or wheezing now    Negative: Rapid onset of swollen tongue    Negative: Rapid onset of hoarseness or cough    Negative: Very weak (can't stand)    Negative: Difficult to awaken or acting confused (e.g., disoriented, slurred speech)    Negative: Life-threatening reaction (anaphylaxis) in the past to similar substance (e.g., food, insect bite/sting, chemical, etc.) and < 2 hours since exposure    Negative: Sounds like a life-threatening emergency to the triager    Negative: Bee, wasp, or yellow jacket sting within last 24 hours    Negative: Doesn't match the SYMPTOMS of hives    Negative: Swollen tongue    Negative: Widespread hives and onset < 2 hours of exposure to high-risk allergen (e.g., peanuts, tree nuts, fish, or shellfish)    Negative: Patient sounds very sick or weak to the triager    Negative: MODERATE-SEVERE hives persist (i.e., hives interfere with normal activities or work) and taking  antihistamine (e.g., Benadryl, Claritin) > 24 hours    Negative: Hives have become worse and taking oral steroids (e.g., prednisone) > 24 hours    Negative: Abdominal pain    Negative: Joint swelling    Negative: Fever    Negative: Patient wants to be seen    Hives persist > 1 week    Protocols used: NADRDOKLTFA-O-CG, HIVES-A-OH

## 2020-08-27 NOTE — TELEPHONE ENCOUNTER
[place] The next day the patient woke up with red spots all over  Both legs. The left leg is worse. Patient denies any SOB, swelling of tongue, numbness or tingling of, no swelling on face.   Patient did say the the lumps were appearing and spreading up her leg quickly.     Patient was seen on 08/25/20 with .   1. Insect bite of lower leg, unspecified laterality, initial encounter: Likely chigger bite or other insect bite given appearance and another individual with similar exposure developing symptoms. Recommend topical kenalog and Rx sent. Additionally can take OTC oral antihistamines for itching. Also recommend cold compress, calamine/menthol lotion as well to help with itching. Patient agreeable. Return if new or worsening symptoms.  - triamcinolone (KENALOG) 0.1 % external ointment; Apply topically 3 times daily  Dispense: 30 g; Refill: 0        Return in about 2 weeks (around 9/8/2020), or if symptoms worsen or fail to improve.     Sen Curtis MD  [facility]       Patient is sending pictures please refer to her recent Hymite message. 8/27/20.     PLAN:   Patient has already had a virtual visit and was informed to have another visit if symptoms failed to improve. Recommended UC visit, due to hives getting worse, but is not affecting airway.   Patient agreed with recommendation.   HomeCare advice provided.     Etelvina Chen RN  August 27, 2020      Additional Information    Negative: Life-threatening reaction in the past to similar substance (e.g., food, insect bite/sting, medication, etc.) and < 2 hours since exposure    Negative: Wheezing, stridor, hoarseness, or difficulty breathing    Negative: Tightness in the chest or throat and begins within 2 hours of exposure to allergic substance    Negative: Difficulty swallowing, drooling, or slurred speech    Negative: Difficult to awaken or acting confused (e.g.,  disoriented, slurred speech)    Negative: Unresponsive, passed out, or very weak    Negative: Other symptom of severe allergic reaction (Exception: Hives or facial swelling alone)    Negative: Sounds like a life-threatening emergency to the triager    Widespread hives and onset > 2 hours after exposure to high-risk allergen (e.g., sting, nuts, 1st dose of antibiotic)    Taking a new medicine now or within last 3 days (EXCEPTION: antihistamine, decongestant or other OTC cough/cold medicines)    Negative: Difficulty breathing or wheezing now    Negative: Rapid onset of swollen tongue    Negative: Rapid onset of hoarseness or cough    Negative: Very weak (can't stand)    Negative: Difficult to awaken or acting confused (e.g., disoriented, slurred speech)    Negative: Life-threatening reaction (anaphylaxis) in the past to similar substance (e.g., food, insect bite/sting, chemical, etc.) and < 2 hours since exposure    Negative: Sounds like a life-threatening emergency to the triager    Negative: Bee, wasp, or yellow jacket sting within last 24 hours    Negative: Doesn't match the SYMPTOMS of hives    Negative: Swollen tongue    Negative: Widespread hives and onset < 2 hours of exposure to high-risk allergen (e.g., peanuts, tree nuts, fish, or shellfish)    Negative: Patient sounds very sick or weak to the triager    Negative: MODERATE-SEVERE hives persist (i.e., hives interfere with normal activities or work) and taking antihistamine (e.g., Benadryl, Claritin) > 24 hours    Negative: Hives have become worse and taking oral steroids (e.g., prednisone) > 24 hours    Negative: Abdominal pain    Negative: Joint swelling    Negative: Fever    Negative: Patient wants to be seen    Hives persist > 1 week    Protocols used: [id number]-O-XO, HIVES-A-OH

## 2020-09-09 ENCOUNTER — HOSPITAL ENCOUNTER (OUTPATIENT)
Dept: ULTRASOUND IMAGING | Facility: CLINIC | Age: 35
Discharge: HOME | End: 2020-09-09
Attending: STUDENT IN AN ORGANIZED HEALTH CARE EDUCATION/TRAINING PROGRAM | Admitting: STUDENT IN AN ORGANIZED HEALTH CARE EDUCATION/TRAINING PROGRAM
Payer: COMMERCIAL

## 2020-09-09 DIAGNOSIS — N83.201 RIGHT OVARIAN CYST: ICD-10-CM

## 2020-09-09 PROCEDURE — 76830 TRANSVAGINAL US NON-OB: CPT

## 2020-09-10 ENCOUNTER — TELEPHONE (OUTPATIENT)
Dept: FAMILY MEDICINE | Facility: OTHER | Age: 35
End: 2020-09-10

## 2020-09-10 NOTE — TELEPHONE ENCOUNTER
Reason for Call:  Request for results:    Name of test or procedure: ultrasound    Date of test of procedure: 9-9    Location of the test or procedure: [facility] to leave the result message on voice mail or with a family member? YES    Phone number Patient can be reached at:  Home number on file [phone] (home)    Additional comments: please call to go over her results    Call taken on 9/10/2020 at 2:06 PM by Bernice Bernstein

## 2020-09-11 NOTE — TELEPHONE ENCOUNTER
Called patient relayed message below. Would like to discuss results with provider. Please call patient. Had some communication concerns with [employer]. [employer] told patient she had a cyst rupture between going to the bathroom and back for scan patient concerned. Will route to provider to call patient.  Darren Goddard MA on 9/11/2020 at 1:48 PM]

## 2020-09-11 NOTE — TELEPHONE ENCOUNTER
I sent Breanna the following result note yesterday but sounds like she didn't get it. Please call her and read message below to her.   Thanks!    Result note  Dain Carlos,   The cystic structures that were seen on the CT are no longer present and the ultrasound is otherwise normal. This is good news and no follow up is needed.   Deanne Campos, CNP

## 2020-09-21 NOTE — TELEPHONE ENCOUNTER
Call patient back.  She did not have any right lower quadrant pain during the ultrasound.  She wanted to clarify that she was having some external discomfort with the transvaginal ultrasound but nothing internal.  It appears that it was a simple cyst that ruptured so no follow-up is needed unless she develops new symptoms.  She also reports that since she had tubal ligation 2 years ago her periods have been heavier and lasting up to 10 days with cramping.  She does not want to do birth control that has estrogen as that has affected her mood and weight in the past.  I discussed IUD with her as an option and she is open to that.  She will call to schedule appointment with OB/GYN.  Deanne Campos, CNP

## 2020-11-29 ENCOUNTER — HEALTH MAINTENANCE LETTER (OUTPATIENT)
Age: 35
End: 2020-11-29

## 2020-12-09 ENCOUNTER — VIRTUAL VISIT (OUTPATIENT)
Dept: FAMILY MEDICINE | Facility: OTHER | Age: 35
End: 2020-12-09
Payer: COMMERCIAL

## 2020-12-09 ENCOUNTER — TRANSFERRED RECORDS (OUTPATIENT)
Dept: HEALTH INFORMATION MANAGEMENT | Facility: CLINIC | Age: 35
End: 2020-12-09

## 2020-12-09 DIAGNOSIS — H44.002 INFECTION OF LEFT EYE: Primary | ICD-10-CM

## 2020-12-09 PROCEDURE — 99213 OFFICE O/P EST LOW 20 MIN: CPT | Mod: GT | Performed by: PHYSICIAN ASSISTANT

## 2020-12-09 NOTE — PROGRESS NOTES
"Breanna Vidal is a 35 year old female who is being evaluated via a billable video visit.      The patient has been notified of following:     \"This video visit will be conducted via a call between you and your physician/provider. We have found that certain health care needs can be provided without the need for an in-person physical exam.  This service lets us provide the care you need with a video conversation.  If a prescription is necessary we can send it directly to your pharmacy.  If lab work is needed we can place an order for that and you can then stop by our lab to have the test done at a later time.    Video visits are billed at different rates depending on your insurance coverage.  Please reach out to your insurance provider with any questions.    If during the course of the call the physician/provider feels a video visit is not appropriate, you will not be charged for this service.\"    Patient has given verbal consent for Video visit? Yes  How would you like to obtain your AVS? MyChart  If you are dropped from the video visit, the video invite should be resent to: Text to cell phone: [phone]  Will anyone else be joining your video visit? No  Subjective     Breanna Vidal is a 35 year old female who presents today via video visit for the following health issues:    HPI     Eye(s) Problem  Onset/Duration: a few days, rapidly getting worse this morning   Description:   Location: left  Pain: YES  Redness: YES  Accompanying Signs & Symptoms:  Discharge/mattering: YES  Swelling: YES  Visual changes: YES  Fever: no  Nasal Congestion: no  Bothered by bright lights: YES  History:  Trauma: rubbed eye hard on Monday   Foreign body exposure: no  Wearing contacts: no  Precipitating or alleviating factors: None  Therapies tried and outcome: warm compress     Breanna developed left eye pain after feeling something was in her eye and rubbing it on Monday. Since that time, it has become very painful, swollen all around her " eye, and vision reduced to 5-10% today. Her vision appears cloudy/hazy and white with pus and clear drainage. The drainage seems to be going down her nasal passages as well. She states that she has painful throbbing behind and all around the eye which is causing a major headache. Denies fever, chills, and sweats. Temporal temperature was 98.1 F. She is nauseous secondary to the severe headache. She has used Tylenol/ibuprofen with little relief. Warm compresses increase the pain and cause more drainage. Cool compresses provide more relief.      Video Start Time: 10:39 am     Review of Systems   Constitutional, HEENT, cardiovascular, pulmonary, gi and gu systems are negative, except as otherwise noted.  HEENT: eye pain, swelling, discharge, and vision loss      Objective         Vitals:  No vitals were obtained today due to virtual visit.    Physical Exam     GENERAL: Healthy, alert and no distress  EYES: significant swelling of left upper eyelid and surrounding with some serous drainage noted. No obvious erythema or scleral injection/erythema.   RESP: No audible wheeze, cough, or visible cyanosis.  No visible retractions or increased work of breathing.    SKIN: Visible skin clear. No significant rash, abnormal pigmentation or lesions.  NEURO: Cranial nerves grossly intact.  Mentation and speech appropriate for age.  PSYCH: Mentation appears normal, affect normal/bright, judgement and insight intact, normal speech and appearance well-groomed.        Assessment & Plan       ICD-10-CM    1. Infection of left eye  H44.002        1. Symptoms and appearance concerning for a periorbital or orbital cellulitis but cannot rule out a corneal infection. Patient experiencing a very painful left eye with swelling, pus/clear drainage, throbbing behind the eye, and vision loss reducing 5% of normal vision. No erythema. It is unlikely that this is a viral/bacterial conjunctivitis or blepharitis due to the vision changes and lack of  erythema, but cannot completely rule out. Patient should be seen more urgently to assess for a more severe infection like keratitis, ulcerative keratitis, papilledema, orbital cellulitis. Patient prefers to avoid the emergency department due to possibility of being exposed to COVID. Appointment made for patient at an eye specialist in [facility]. If symptoms worsen (fever, dizziness) in the meantime, be seen more urgently.       Seen in conjunction with MINISTERIO ForresterS     Lobo Son PA-C  [facility]      Video-Visit Details    Type of service:  Video Visit    Video End Time: 10:54 am    Originating Location (pt. Location): Home    Distant Location (provider location):  [facility]     Platform used for Video Visit: [employer]

## 2021-01-14 ENCOUNTER — MYC MEDICAL ADVICE (OUTPATIENT)
Dept: FAMILY MEDICINE | Facility: OTHER | Age: 36
End: 2021-01-14

## 2021-01-14 ENCOUNTER — VIRTUAL VISIT (OUTPATIENT)
Dept: FAMILY MEDICINE | Facility: OTHER | Age: 36
End: 2021-01-14
Payer: COMMERCIAL

## 2021-01-14 DIAGNOSIS — R30.0 DYSURIA: Primary | ICD-10-CM

## 2021-01-14 DIAGNOSIS — N30.00 ACUTE CYSTITIS WITHOUT HEMATURIA: ICD-10-CM

## 2021-01-14 PROCEDURE — 99213 OFFICE O/P EST LOW 20 MIN: CPT | Mod: TEL | Performed by: PHYSICIAN ASSISTANT

## 2021-01-14 RX ORDER — NITROFURANTOIN 25; 75 MG/1; MG/1
100 CAPSULE ORAL 2 TIMES DAILY
Qty: 14 CAPSULE | Refills: 0 | Status: SHIPPED | OUTPATIENT
Start: 2021-01-14 | End: 2021-01-21

## 2021-01-14 NOTE — PROGRESS NOTES
Breanna is a 35 year old who is being evaluated via a billable telephone visit.      What phone number would you like to be contacted at? See demographics   How would you like to obtain your AVS? MyChart     Assessment & Plan     ICD-10-CM    1. Dysuria  R30.0 CANCELED: *UA reflex to Microscopic and Culture ([facility] and [facility] (except [place] and [place])     CANCELED: Wet prep   2. Acute cystitis without hematuria  N30.00 nitroFURantoin macrocrystal-monohydrate (MACROBID) 100 MG capsule     - Patient not able to come in for UA/Wet prep due to having her children with her   - States feels just the same as her last UTI, both due to new lubricant   - Symptoms seem consistent with infection      Discussed could treat but risks discussed including medication not having coverage for urinary tract infection etiology   - Did well on Macrobid in the past, will do this again  - Discussed antibiotic use, duration, and side effects  - Discussed if doesn't improve, will need another visit and need to have UA/wet prep   - Hand out sent     Review of the result(s) of each unique test - None  Diagnosis or treatment significantly limited by social determinants of health - None     20 minutes spent on the date of the encounter doing chart review, history and exam, documentation and further activities as noted above  Phone call duration: 9 minutes    The patient indicates understanding of these issues and agrees with the plan.    Return in about 1 week (around 1/21/2021) for if not improving.    FERNANDEZ Taylor [facility]     Breanna is a 35 year old who presents to clinic today for the following health issues     HPI   Genitourinary - Female  Onset/Duration: 5 days   Description:   Painful urination (Dysuria): no           Frequency: YES  Blood in urine (Hematuria): no  Delay in urine (Hesitency): no  Intensity: moderate  Progression of Symptoms:  same  Accompanying  Signs & Symptoms:  Fever/chills: no  Flank pain: Yes, little bit on right side   Nausea and vomiting: no  Vaginal symptoms: none  Abdominal/Pelvic Pain: YES  History:   History of frequent UTI s: YES, gets like one per year   History of kidney stones: no  Sexually Active: YES  Possibility of pregnancy: No  Precipitating or alleviating factors: Lubrication for intercourse (warming kind) last time, this was a new brand again   Therapies tried and outcome: Increase fluid intake and OTC advil or tylenol - helped with abdominal pain      - Pee smells   - Frequent urination   - Same partner for 7 years   - Once had right flank pain     Took some advil that helped   - Pressure over bladder   - Did check vaginal area, no redness, not itchy   - Feels just like the UTI she had last year     Review of Systems   Constitutional, HEENT, cardiovascular, pulmonary, gi and gu systems are negative, except as otherwise noted.      Objective       Vitals:  No vitals were obtained today due to virtual visit.    Physical Exam   healthy, alert and no distress  PSYCH: Alert and oriented times 3; coherent speech, normal   rate and volume, able to articulate logical thoughts, able   to abstract reason, no tangential thoughts, no hallucinations   or delusions  Her affect is normal  RESP: No cough, no audible wheezing, able to talk in full sentences  Remainder of exam unable to be completed due to telephone visits    Diagnostics  None       Phone call duration: 9 minutes

## 2021-01-14 NOTE — PATIENT INSTRUCTIONS

## 2021-01-14 NOTE — TELEPHONE ENCOUNTER
Responded via [employer].  Lalo Leonard, BSN, RN, PHN    Needs visit and possibly a UA

## 2021-01-15 NOTE — TELEPHONE ENCOUNTER
Pt has read message with no response. Will close.    Shahrzad Rai CMA ([facility])

## 2021-01-17 ENCOUNTER — VIRTUAL VISIT (OUTPATIENT)
Dept: URGENT CARE | Facility: CLINIC | Age: 36
End: 2021-01-17
Payer: COMMERCIAL

## 2021-01-17 DIAGNOSIS — R43.0 LOSS OF SENSE OF SMELL: Primary | ICD-10-CM

## 2021-01-17 DIAGNOSIS — Z20.822 SUSPECTED COVID-19 VIRUS INFECTION: ICD-10-CM

## 2021-01-17 DIAGNOSIS — R53.83 FATIGUE, UNSPECIFIED TYPE: ICD-10-CM

## 2021-01-17 DIAGNOSIS — R50.9 FEVER AND CHILLS: ICD-10-CM

## 2021-01-17 PROCEDURE — 99213 OFFICE O/P EST LOW 20 MIN: CPT | Mod: GT | Performed by: PHYSICIAN ASSISTANT

## 2021-01-18 ENCOUNTER — NURSE TRIAGE (OUTPATIENT)
Dept: FAMILY MEDICINE | Facility: OTHER | Age: 36
End: 2021-01-18

## 2021-01-18 DIAGNOSIS — R50.9 FEVER AND CHILLS: ICD-10-CM

## 2021-01-18 DIAGNOSIS — R43.0 LOSS OF SENSE OF SMELL: ICD-10-CM

## 2021-01-18 DIAGNOSIS — R53.83 FATIGUE, UNSPECIFIED TYPE: ICD-10-CM

## 2021-01-18 PROCEDURE — U0003 INFECTIOUS AGENT DETECTION BY NUCLEIC ACID (DNA OR RNA); SEVERE ACUTE RESPIRATORY SYNDROME CORONAVIRUS 2 (SARS-COV-2) (CORONAVIRUS DISEASE [COVID-19]), AMPLIFIED PROBE TECHNIQUE, MAKING USE OF HIGH THROUGHPUT TECHNOLOGIES AS DESCRIBED BY CMS-2020-01-R: HCPCS | Performed by: PHYSICIAN ASSISTANT

## 2021-01-18 PROCEDURE — U0005 INFEC AGEN DETEC AMPLI PROBE: HCPCS | Performed by: PHYSICIAN ASSISTANT

## 2021-01-18 NOTE — TELEPHONE ENCOUNTER
Started taking antibtioc Friday am of last week.     Stomach was flipping by the afternoon. Did eat when talking it. Started feeling tired and feverish.  103     Took ibuprofen.  Help with the body aches. 102.1 after OTC. Then 100.3    Today she notice is has a pimple in her pubic area.  Will have her hot pack a couple times a day and wash well.  OTC for any pain.  If not improving will needed F2F.      Will call back to schedule.    NGUYEN PrinceN, RN, PHN  [facility] [place] [facility] [place]  January 18, 2021            Additional Information    Negative: Sounds like a life-threatening emergency to the triager    [1] Small swelling or lump AND [2] unexplained AND [3] present < 1 week    Negative: [1] Small swelling or lump AND [2] unexplained AND [3] present > 1 week    Negative: [1] New-onset hernia suspected (reducible bulge in groin or abdomen; non-tender) AND [2] NO pain or vomiting    Negative: [1] Swelling is painful to touch AND [2] no fever    Negative: Looks like a boil, infected sore, deep ulcer or other infected rash    Negative: SEVERE pain (e.g., excruciating)    Negative: [1] Swelling is painful to touch AND [2] fever    Negative: [1] Swelling is red AND [2] fever    Negative: [1] Swelling is red AND [2] size > 2 inches (5.0 cm) (Exception: itchy area of skin)    Negative: [1] Swelling of groin (inguinal area) AND [2] painful    Negative: Patient sounds very sick or weak to the triager    Negative: Small growth, spot, bump, or pigmented area of skin (e.g., moles, skin tags, wart, melanoma, skin cancer)    Negative: Inguinal hernia previously diagnosed by a physician    Negative: Followed a skin injury    Negative: Follows an insect bite    Negative: Swelling of lymph node suspected    Negative: Swelling of vaccination site    Negative: Swelling of tongue    Negative: Swelling of lip    Negative: Swelling of eye    Negative: Swelling of entire face    Negative: Swelling of scrotum     Negative: Swelling of labia    Negative: Swelling of surgical incision    Negative: Swelling of ankle joint    Negative: Swelling of elbow joint    Negative: Swelling of knee joint    Negative: Swelling with a skin rash    Protocols used: SKIN LUMP OR LOCALIZED SWELLING-A-AH

## 2021-01-18 NOTE — PROGRESS NOTES
Breanna is a 35 year old who is being evaluated via a billable video visit.      How would you like to obtain your AVS? MyChart  If the video visit is dropped, the invitation should be resent by: Text to Cell  Will anyone else be joining your video visit? No    Video Start Time: 6:23 pm    Subjective     Breanna is a 35 year old who presents to clinic today for the following health issues :fever and Chills     HPI     Patient reports that she developed fevers and chills on Friday around 2 pm. She had taken an antibiotic that morning for a UTI so when she started having fever and chills thought it was due to that but since then she has continued to have fever as well as body aches, mild sore throat, fatigue and loss of taste and smell. She has not had any cough or respiratory symptoms. She reports that her urinary symptoms have resolved. She has not had any known sick contacts, her family members are not having symptoms.     Review of Systems   Constitutional, HEENT, cardiovascular, pulmonary, gi and gu systems are negative, except as otherwise noted.      Objective           Vitals:  No vitals were obtained today due to virtual visit.    Physical Exam   GENERAL: Healthy, alert and no distress  EYES: Eyes grossly normal to inspection.  No discharge or erythema, or obvious scleral/conjunctival abnormalities.  RESP: No audible wheeze, cough, or visible cyanosis.  No visible retractions or increased work of breathing.    SKIN: Visible skin clear. No significant rash, abnormal pigmentation or lesions.  NEURO: Cranial nerves grossly intact.  Mentation and speech appropriate for age.  PSYCH: Mentation appears normal, affect normal/bright, judgement and insight intact, normal speech and appearance well-groomed.    ASSESSMENT AND PLAN    ICD-10-CM    1. Loss of sense of smell  R43.0 Symptomatic COVID-19 Virus (Coronavirus) by PCR     COVID-19 [facility] Referral   2. Fever and chills  R50.9 Symptomatic COVID-19 Virus  (Coronavirus) by PCR     COVID-19 GetWell Loop Referral   3. Fatigue, unspecified type  R53.83 Symptomatic COVID-19 Virus (Coronavirus) by PCR     COVID-19 GetWell Loop Referral   4. Suspected COVID-19 virus infection  Z20.822 COVID-19 GetWell Loop Referral     I discussed symptoms and testing as well as the importance of self quarantine and quarantine of other household members. She voices understanding and will follow up as needed.     Video-Visit Details    Type of service:  Video Visit    Video End Time:6:44pm    Originating Location (pt. Location): Home    Distant Location (provider location):  [facility]     Platform used for Video Visit: Kassi Capellan PA-C

## 2021-01-18 NOTE — PATIENT INSTRUCTIONS
"  Patient Education   Discharge Instructions for COVID-19 Patients  You have--or may have--COVID-19. Please follow the instructions listed below.   If you have a weakened immune system, discuss with your doctor any other actions you need to take.  How can I protect others?  If you have symptoms (fever, cough, body aches or trouble breathing):    Stay home and away from others (self-isolate) until:  ? Your other symptoms have resolved (gotten better). And   ? You've had no fever--and no medicine that reduces fever--for 1 full day (24 hours). And   ? At least 10 days have passed since your symptoms started. (You may need to wait 20 days. Follow the advice of your care team.)  If you don't show symptoms, but testing showed that you have COVID-19:    Stay home and away from others (self-isolate) until at least 10 days have passed since the date of your first positive COVID-19 test.  During this time    Stay in your own room, even for meals. Use your own bathroom if you can.    Stay away from others in your home. No hugging, kissing or shaking hands. No visitors.    Don't go to work, school or anywhere else.    Clean \"high touch\" surfaces often (doorknobs, counters, handles). Use household cleaning spray or wipes.    You'll find a full list of  on the EPA website: www.epa.gov/pesticide-registration/list-n-disinfectants-use-against-sars-cov-2.    Cover your mouth and nose with a mask or other face covering to avoid spreading germs.    Wash your hands and face often. Use soap and water.    Caregivers in these groups are at risk for severe illness due to COVID-19:  ? People 65 years and older  ? People who live in a nursing home or long-term care facility  ? People with chronic disease (lung, heart, cancer, diabetes, kidney, liver, immunologic)  ? People who have a weakened immune system, including those who:    Are in cancer treatment    Take medicine that weakens the immune system, such as corticosteroids    Had a " bone marrow or organ transplant    Have an immune deficiency    Have poorly controlled HIV or AIDS    Are obese (body mass index of 40 or higher)    Smoke regularly    Caregivers should wear gloves while washing dishes, handling laundry and cleaning bedrooms and bathrooms.    Use caution when washing and drying laundry: Don't shake dirty laundry and use the warmest water setting that you can.    For more tips on managing your health at home, go to www.cdc.gov/coronavirus/2019-ncov/downloads/10Things.pdf.  How can I take care of myself at home?  1. Get lots of rest. Drink extra fluids (unless a doctor has told you not to).  2. Take Tylenol (acetaminophen) for fever or pain. If you have liver or kidney problems, ask your family doctor if it's okay to take Tylenol.   Adults can take either:   ? 650 mg (two 325 mg pills) every 4 to 6 hours, or   ? 1,000 mg (two 500 mg pills) every 8 hours as needed.  ? Note: Don't take more than 3,000 mg in one day. Acetaminophen is found in many medicines (both prescribed and over-the-counter medicines). Read all labels to be sure you don't take too much.   For children, check the Tylenol bottle for the right dose. The dose is based on the child's age or weight.  3. If you have other health problems (like cancer, heart failure, an organ transplant or severe kidney disease): Call your specialty clinic if you don't feel better in the next 2 days.  4. Know when to call 911. Emergency warning signs include:  ? Trouble breathing or shortness of breath  ? Pain or pressure in the chest that doesn't go away  ? Feeling confused like you haven't felt before, or not being able to wake up  ? Bluish-colored lips or face  5. Your doctor may have prescribed a blood thinner medicine. Follow their instructions.  Where can I get more information?     [facility] [place] - About COVID-19: [email or web address]    CDC - What to Do If You're Sick:  www.cdc.gov/coronavirus/2019-ncov/about/steps-when-sick.html    CDC - Ending Home Isolation: www.cdc.gov/coronavirus/2019-ncov/hcp/disposition-in-home-patients.html    CDC - Caring for Someone: www.cdc.gov/coronavirus/2019-ncov/if-you-are-sick/care-for-someone.html    [facility] - Interim Guidance for Hospital Discharge to Home: www.health.[facility].[place]./diseases/coronavirus/hcp/hospdischarge.pdf    Below are the COVID-19 hotlines at the [place] Department of Health ([facility]). Interpreters are available.  ? For health questions: Call 068-924-4883 or 1-719.875.1940 (7 a.m. to 7 p.m.)  ? For questions about schools and childcare: Call 905-571-8765 or 1-305.883.9995 (7 a.m. to 7 p.m.)    For informational purposes only. Not to replace the advice of your health care provider. Clinically reviewed by Dr. Conner Mclaughlin.   Copyright   2020 [facility]. All rights reserved. [employer] [id number] - REV 01/05/21.

## 2021-01-19 LAB
SARS-COV-2 RNA RESP QL NAA+PROBE: NOT DETECTED
SPECIMEN SOURCE: NORMAL

## 2021-02-15 ENCOUNTER — NURSE TRIAGE (OUTPATIENT)
Dept: FAMILY MEDICINE | Facility: OTHER | Age: 36
End: 2021-02-15

## 2021-02-15 NOTE — TELEPHONE ENCOUNTER
Pain is not in the same spot.  When bend or move or pushing to poop, feels a pulling in stomach on bottom right side.  Hurts like a itzel horse.   Felt like pushing stomach in would help, but it stayed in. Started overall month ago.  Last time it happened was a week ago and now today.  Lasted about 5 mins.      Advised to be seen for follow.  Appointment made. Return call with any questions or concerns.    Next 5 appointments (look out 90 days)    Feb 16, 2021  7:00 AM  Office Visit with MADELINE Rodriguez CNP  [facility] ([facility] ) [address]  [facility] [address] [phone]        NGUYEN PrinceN, RN, PHN  [facility]  February 15, 2021              Additional Information    Negative: Passed out (i.e., fainted, collapsed and was not responding)    Negative: Shock suspected (e.g., cold/pale/clammy skin, too weak to stand, low BP, rapid pulse)    Negative: Sounds like a life-threatening emergency to the triager    Negative: Chest pain    Negative: Pain is mainly in upper abdomen (if needed ask: 'is it mainly above the belly button?')    Negative: Abdominal pain and pregnant > 20 weeks    Negative: Abdominal pain and pregnant < 20 weeks    Negative: SEVERE abdominal pain (e.g., excruciating)    Negative: Vomiting red blood or black (coffee ground) material    Negative: Bloody, black, or tarry bowel movements    Negative: Constant abdominal pain lasting > 2 hours    Negative: Vomiting bile (green color)    Negative: Patient sounds very sick or weak to the triager    Negative: Vomiting and abdomen looks much more swollen than usual    Negative: White of the eyes have turned yellow (i.e., jaundice)    Negative: Blood in urine (red, pink, or tea-colored)    Negative: Fever > 103 F (39.4 C)    Negative: Fever > 101 F (38.3 C) and over 60 years of age    Negative: Fever > 100.0 F (37.8 C) and has  diabetes mellitus or a weak immune system (e.g., HIV positive, cancer chemotherapy, organ transplant, splenectomy, chronic steroids)    Negative: Fever > 100.0 F (37.8 C) and bedridden (e.g., nursing home patient, stroke, chronic illness, recovering from surgery)    Negative: Pregnant or could be pregnant (i.e., missed last menstrual period)    Negative: MODERATE OR MILD pain that comes and goes (cramps) lasts > 24 hours    Negative: Unusual vaginal discharge    Negative: Age > 60 years    Patient wants to be seen    Protocols used: ABDOMINAL PAIN - FEMALE-A-OH

## 2021-02-15 NOTE — PROGRESS NOTES
"    Assessment & Plan     Probable non-recurrent right inguinal hernia without obstruction or gangrene  Symptoms consistent with probable hernia thought difficult to assess on exam due to overlying adipose tissue. Recommend she see Dr. Odom to evaluate further.   - GENERAL SURG ADULT REFERRAL; Future    Morbid obesity (H)  Planning to use treadmill for exercise at home and work on calorie reduction to lose weight    Breast pain  Lower right breast pain with history of reconstructive surgeries. Will evaluate further with mammo and ultrasound.  - MA Diagnostic Digital Bilateral; Future  - US Breast Right Complete 4 Quadrants; Future           BMI:   Estimated body mass index is 40.19 kg/m  as calculated from the following:    Height as of 7/7/20: 1.676 m (5' 6\").    Weight as of this encounter: 112.9 kg (249 lb).   Weight management plan: Discussed healthy diet and exercise guidelines        No follow-ups on file.    MADELINE Hernandez CNP  [facility] CARI Carlos is a 35 year old who presents for the following health issues     HPI     Pain in area of previous hernia repair.    Abdominal/Flank Pain  Onset/Duration: 3 weeks ago   Description:   Character: Burning and Cramping  Location: right lower quadrant  Radiation: None  Intensity: 5/10  Progression of Symptoms:  intermittent  Accompanying Signs & Symptoms:  Fever/Chills: no  Gas/Bloating: no  Nausea: no  Vomitting: no  Diarrhea: no  Constipation: no  Dysuria or Hematuria: no  History:   Trauma: no  Previous similar pain: YES- previous hernia. Similar pain but not this extreme  Previous tests done: none  Precipitating factors:   Does the pain change with:     Food: no    Bowel Movement: YES- worsened    Urination: no   Other factors:  no  Therapies tried and outcome: heat pad did help temporarily.   No LMP recorded. (Menstrual status: Tubal ).  Has gained a lot of weight over the pandemic.  Her  just bought her a " treadmill and she is planning to start exercising.  She is also planning on eating healthier and reducing her portion sizes.    She would also like to discuss right breast pain that has been going on for the past 2 months.  She previously had breast reduction surgery and then subsequently had 2 reconstructive surgeries when she was living in [place].  She had J-tubes after the reconstructive surgeries for fluid collections.  It has felt similar to when she had those fluid collections.  The pain is at the bottom of the right breast and she feels like there is a pocket there.        Review of Systems   Constitutional, HEENT, cardiovascular, pulmonary, gi and gu systems are negative, except as otherwise noted.      Objective    /72   Pulse 74   Temp 97.4  F (36.3  C) (Temporal)   Resp 12   Wt 112.9 kg (249 lb)   SpO2 97%   BMI 40.19 kg/m    Body mass index is 40.19 kg/m .  Physical Exam   GENERAL APPEARANCE: alert, no distress and overweight  BREAST: Tenderness along inferior aspect of right breast with area around incision that feels atrophied thickened area on lateral inferior aspect at bra line  ABDOMEN: tender area on RLQ, unable to feel intestinal protrusion upon flexion of the wall due to adiposity  SKIN: no suspicious lesions or rashes  PSYCH: mentation appears normal and affect normal/bright

## 2021-02-15 NOTE — TELEPHONE ENCOUNTER
Left message for the patient to return call.   RN please triage patient for her symptoms.     BACKGROUND:   Patient was seen by Deanne Campos on 05/18/20 for a hernia. General Surgery referral was placed. Patient had surgery on 7/10/20 th[facility].

## 2021-02-15 NOTE — TELEPHONE ENCOUNTER
Reason for call:  Symptom   Symptom or request: on and off stomach pain had a hernia repair before     Duration (how long have symptoms been present): One week   Have you been treated for this before? Yes    Additional comments: na    Phone number to reach patient:  Home number on file [phone] (home)    Best Time:  any    Can we leave a detailed message on this number?  YES    Travel screening: Not Applicable

## 2021-02-16 ENCOUNTER — HOSPITAL ENCOUNTER (OUTPATIENT)
Dept: CT IMAGING | Facility: CLINIC | Age: 36
Discharge: HOME | End: 2021-02-16
Attending: SURGERY | Admitting: SURGERY
Payer: COMMERCIAL

## 2021-02-16 ENCOUNTER — OFFICE VISIT (OUTPATIENT)
Dept: FAMILY MEDICINE | Facility: OTHER | Age: 36
End: 2021-02-16
Payer: COMMERCIAL

## 2021-02-16 ENCOUNTER — OFFICE VISIT (OUTPATIENT)
Dept: SURGERY | Facility: CLINIC | Age: 36
End: 2021-02-16
Payer: COMMERCIAL

## 2021-02-16 VITALS
TEMPERATURE: 97.8 F | SYSTOLIC BLOOD PRESSURE: 128 MMHG | HEIGHT: 67 IN | WEIGHT: 249 LBS | BODY MASS INDEX: 39.08 KG/M2 | DIASTOLIC BLOOD PRESSURE: 82 MMHG

## 2021-02-16 VITALS
WEIGHT: 249 LBS | HEART RATE: 74 BPM | RESPIRATION RATE: 12 BRPM | SYSTOLIC BLOOD PRESSURE: 128 MMHG | DIASTOLIC BLOOD PRESSURE: 72 MMHG | OXYGEN SATURATION: 97 % | BODY MASS INDEX: 40.19 KG/M2 | TEMPERATURE: 97.4 F

## 2021-02-16 DIAGNOSIS — R10.31 RLQ ABDOMINAL PAIN: ICD-10-CM

## 2021-02-16 DIAGNOSIS — K40.90 NON-RECURRENT UNILATERAL INGUINAL HERNIA WITHOUT OBSTRUCTION OR GANGRENE: Primary | ICD-10-CM

## 2021-02-16 DIAGNOSIS — R10.31 RLQ ABDOMINAL PAIN: Primary | ICD-10-CM

## 2021-02-16 DIAGNOSIS — N64.4 BREAST PAIN: ICD-10-CM

## 2021-02-16 DIAGNOSIS — E66.01 MORBID OBESITY (H): ICD-10-CM

## 2021-02-16 PROCEDURE — 250N000011 HC RX IP 250 OP 636: Performed by: RADIOLOGY

## 2021-02-16 PROCEDURE — 99215 OFFICE O/P EST HI 40 MIN: CPT | Performed by: SURGERY

## 2021-02-16 PROCEDURE — 250N000009 HC RX 250: Performed by: RADIOLOGY

## 2021-02-16 PROCEDURE — 74177 CT ABD & PELVIS W/CONTRAST: CPT

## 2021-02-16 PROCEDURE — 99214 OFFICE O/P EST MOD 30 MIN: CPT | Performed by: STUDENT IN AN ORGANIZED HEALTH CARE EDUCATION/TRAINING PROGRAM

## 2021-02-16 RX ORDER — IOPAMIDOL 755 MG/ML
500 INJECTION, SOLUTION INTRAVASCULAR ONCE
Status: COMPLETED | OUTPATIENT
Start: 2021-02-16 | End: 2021-02-16

## 2021-02-16 RX ADMIN — IOPAMIDOL 100 ML: 755 INJECTION, SOLUTION INTRAVENOUS at 15:26

## 2021-02-16 RX ADMIN — SODIUM CHLORIDE 60 ML: 9 INJECTION, SOLUTION INTRAVENOUS at 15:27

## 2021-02-16 ASSESSMENT — PAIN SCALES - GENERAL: PAINLEVEL: MODERATE PAIN (5)

## 2021-02-16 ASSESSMENT — MIFFLIN-ST. JEOR: SCORE: 1849.15

## 2021-02-16 NOTE — PROGRESS NOTES
"    Assessment & Plan   Problem List Items Addressed This Visit     None      Visit Diagnoses     RLQ abdominal pain    -  Primary    Relevant Orders    CT Abdomen Pelvis w Contrast    BMI 39.0-39.9,adult        Relevant Orders    COMPREHENSIVE WEIGHT MANAGEMENT         Patient has right lower quadrant pain and has questionable bulge and concerning for hernia.  No obvious hernia palpated on physical exam.  However it was very difficult as patient did gain significant amount of weight around this area.  There is no hernia around her previous incisional hernia repair or around the previous umbilical hernia repair.  Thus I recommend a CT of the abdomen and pelvis to rule out any fascial defects, I will call the patient after the CT result.    As for patient morbid obesity, she is interested in seeing someone to talk about lifestyle changes and perhaps bariatric surgery.  Thus a referral is placed.  Patient is to notify me if she has not gotten a call from anyone in the next 2 to 3 days.  All of her questions were answered to her satisfaction.    40 minutes spent on the date of the encounter doing chart review, review of test results, interpretation of tests, patient visit and documentation         No follow-ups on file.    Jovi Odom MD  [facility]    Bailey Carlos is a 35 year old who presents for the following health issues:    RLQ pain - feels like there is a bulge especially during and after a BM.  Feels better when she is able to \"push it back in.\"  The pain goes away after the pressure go away.  Yesterday, she wasn't able to \"push in the bulge\" for several mins - patient stated she held pressure there for several minutes - then bulge and pain went away and she felt better.  Having normal BM; +flatus.  No blood in stool.  No issues with PO intake.  Does not feel bloated or distention after she eats    Had tubal ligation; Still having periods - LMP usually at the beginning month - " "already had it this month - her period is usually 10 days. Had hx of complex cystic structure a while back; most recent US pelvic did not see complex cystic structure.      Patient gained ~20 lbs since I last saw her.  Patient expressed interest in seeing a lifestyle  and possible bariatric surgery as she has been struggling with keep her weight off the past few years.  Pt no longer smokes; pt understands why she's having gaining weight - she's eating as she is \"bored.\"         Review of Systems   Constitutional, HEENT, cardiovascular, pulmonary, GI, , musculoskeletal, neuro, skin, endocrine and psych systems are negative, except as otherwise noted.      Objective    /82   Temp 97.8  F (36.6  C) (Temporal)   Ht 1.689 m (5' 6.5\")   Wt 112.9 kg (249 lb)   BMI 39.59 kg/m    Body mass index is 39.59 kg/m .  Physical Exam  Vitals signs reviewed.   Eyes:      Conjunctiva/sclera: Conjunctivae normal.   Neck:      Musculoskeletal: Normal range of motion.   Cardiovascular:      Pulses: Normal pulses.   Abdominal:      Palpations: Abdomen is soft.      Tenderness: There is abdominal tenderness.       Neurological:      Mental Status: She is alert.                    "

## 2021-02-16 NOTE — LETTER
"    2/16/2021         RE: Breanna Vidal  [address] [address] [place] [id number]        Dear Colleague,    Thank you for referring your patient, Breanna Vidal, to the [facility]. Please see a copy of my visit note below.        Assessment & Plan   Problem List Items Addressed This Visit     None      Visit Diagnoses     RLQ abdominal pain    -  Primary    Relevant Orders    CT Abdomen Pelvis w Contrast    BMI 39.0-39.9,adult        Relevant Orders    COMPREHENSIVE WEIGHT MANAGEMENT         Patient has right lower quadrant pain and has questionable bulge and concerning for hernia.  No obvious hernia palpated on physical exam.  However it was very difficult as patient did gain significant amount of weight around this area.  There is no hernia around her previous incisional hernia repair or around the previous umbilical hernia repair.  Thus I recommend a CT of the abdomen and pelvis to rule out any fascial defects, I will call the patient after the CT result.    As for patient morbid obesity, she is interested in seeing someone to talk about lifestyle changes and perhaps bariatric surgery.  Thus a referral is placed.  Patient is to notify me if she has not gotten a call from anyone in the next 2 to 3 days.  All of her questions were answered to her satisfaction.    40 minutes spent on the date of the encounter doing chart review, review of test results, interpretation of tests, patient visit and documentation         No follow-ups on file.    Jovi Odom MD  [facility]    Bailey Carlos is a 35 year old who presents for the following health issues:    RLQ pain - feels like there is a bulge especially during and after a BM.  Feels better when she is able to \"push it back in.\"  The pain goes away after the pressure go away.  Yesterday, she wasn't able to \"push in the bulge\" for several mins - patient stated she held pressure there for several minutes - then bulge and " "pain went away and she felt better.  Having normal BM; +flatus.  No blood in stool.  No issues with PO intake.  Does not feel bloated or distention after she eats    Had tubal ligation; Still having periods - LMP usually at the beginning month - already had it this month - her period is usually 10 days. Had hx of complex cystic structure a while back; most recent US pelvic did not see complex cystic structure.      Patient gained ~20 lbs since I last saw her.  Patient expressed interest in seeing a lifestyle  and possible bariatric surgery as she has been struggling with keep her weight off the past few years.  Pt no longer smokes; pt understands why she's having gaining weight - she's eating as she is \"bored.\"         Review of Systems   Constitutional, HEENT, cardiovascular, pulmonary, GI, , musculoskeletal, neuro, skin, endocrine and psych systems are negative, except as otherwise noted.      Objective    /82   Temp 97.8  F (36.6  C) (Temporal)   Ht 1.689 m (5' 6.5\")   Wt 112.9 kg (249 lb)   BMI 39.59 kg/m    Body mass index is 39.59 kg/m .  Physical Exam  Vitals signs reviewed.   Eyes:      Conjunctiva/sclera: Conjunctivae normal.   Neck:      Musculoskeletal: Normal range of motion.   Cardiovascular:      Pulses: Normal pulses.   Abdominal:      Palpations: Abdomen is soft.      Tenderness: There is abdominal tenderness.       Neurological:      Mental Status: She is alert.                        Again, thank you for allowing me to participate in the care of your patient.        Sincerely,        StewartGen Odom MD    "

## 2021-02-18 ENCOUNTER — OFFICE VISIT (OUTPATIENT)
Dept: FAMILY MEDICINE | Facility: CLINIC | Age: 36
End: 2021-02-18
Payer: COMMERCIAL

## 2021-02-18 ENCOUNTER — TELEPHONE (OUTPATIENT)
Dept: UROLOGY | Facility: CLINIC | Age: 36
End: 2021-02-18

## 2021-02-18 ENCOUNTER — TELEPHONE (OUTPATIENT)
Dept: FAMILY MEDICINE | Facility: OTHER | Age: 36
End: 2021-02-18

## 2021-02-18 VITALS
HEART RATE: 89 BPM | OXYGEN SATURATION: 99 % | BODY MASS INDEX: 39.08 KG/M2 | WEIGHT: 249 LBS | RESPIRATION RATE: 18 BRPM | TEMPERATURE: 98.3 F | HEIGHT: 67 IN

## 2021-02-18 DIAGNOSIS — R31.9 HEMATURIA, UNSPECIFIED TYPE: ICD-10-CM

## 2021-02-18 DIAGNOSIS — L73.9 FOLLICULITIS: ICD-10-CM

## 2021-02-18 DIAGNOSIS — N76.0 BV (BACTERIAL VAGINOSIS): ICD-10-CM

## 2021-02-18 DIAGNOSIS — R30.0 DYSURIA: Primary | ICD-10-CM

## 2021-02-18 DIAGNOSIS — B96.89 BV (BACTERIAL VAGINOSIS): ICD-10-CM

## 2021-02-18 LAB
ALBUMIN UR-MCNC: NEGATIVE MG/DL
APPEARANCE UR: CLEAR
BACTERIA #/AREA URNS HPF: ABNORMAL /HPF
BILIRUB UR QL STRIP: NEGATIVE
COLOR UR AUTO: YELLOW
GLUCOSE UR STRIP-MCNC: NEGATIVE MG/DL
HGB UR QL STRIP: ABNORMAL
KETONES UR STRIP-MCNC: NEGATIVE MG/DL
LEUKOCYTE ESTERASE UR QL STRIP: NEGATIVE
NITRATE UR QL: NEGATIVE
NON-SQ EPI CELLS #/AREA URNS LPF: ABNORMAL /LPF
PH UR STRIP: 6.5 PH (ref 5–7)
RBC #/AREA URNS AUTO: ABNORMAL /HPF
SOURCE: ABNORMAL
SP GR UR STRIP: 1.02 (ref 1–1.03)
SPECIMEN SOURCE: ABNORMAL
UROBILINOGEN UR STRIP-ACNC: 0.2 EU/DL (ref 0.2–1)
WBC #/AREA URNS AUTO: ABNORMAL /HPF
WET PREP SPEC: ABNORMAL

## 2021-02-18 PROCEDURE — 81001 URINALYSIS AUTO W/SCOPE: CPT | Performed by: FAMILY MEDICINE

## 2021-02-18 PROCEDURE — 99214 OFFICE O/P EST MOD 30 MIN: CPT | Performed by: FAMILY MEDICINE

## 2021-02-18 PROCEDURE — 87210 SMEAR WET MOUNT SALINE/INK: CPT | Performed by: FAMILY MEDICINE

## 2021-02-18 PROCEDURE — 87086 URINE CULTURE/COLONY COUNT: CPT | Performed by: FAMILY MEDICINE

## 2021-02-18 RX ORDER — DOXYCYCLINE 100 MG/1
100 CAPSULE ORAL 2 TIMES DAILY
Qty: 14 CAPSULE | Refills: 0 | Status: SHIPPED | OUTPATIENT
Start: 2021-02-18 | End: 2021-09-07

## 2021-02-18 RX ORDER — LIDOCAINE 50 MG/G
OINTMENT TOPICAL PRN
Qty: 30 G | Refills: 0 | Status: SHIPPED | OUTPATIENT
Start: 2021-02-18 | End: 2021-09-07

## 2021-02-18 RX ORDER — METRONIDAZOLE 500 MG/1
500 TABLET ORAL 2 TIMES DAILY
Qty: 14 TABLET | Refills: 0 | Status: SHIPPED | OUTPATIENT
Start: 2021-02-18 | End: 2021-02-18

## 2021-02-18 RX ORDER — METRONIDAZOLE 500 MG/1
500 TABLET ORAL 2 TIMES DAILY
Qty: 14 TABLET | Refills: 0 | Status: SHIPPED | OUTPATIENT
Start: 2021-02-18 | End: 2021-10-18

## 2021-02-18 RX ORDER — DOXYCYCLINE 100 MG/1
100 CAPSULE ORAL 2 TIMES DAILY
Qty: 14 CAPSULE | Refills: 0 | Status: SHIPPED | OUTPATIENT
Start: 2021-02-18 | End: 2021-02-18

## 2021-02-18 ASSESSMENT — MIFFLIN-ST. JEOR: SCORE: 1849.15

## 2021-02-18 ASSESSMENT — ENCOUNTER SYMPTOMS: HEADACHES: 1

## 2021-02-18 NOTE — PROGRESS NOTES
"Assessment & Plan   1. Dysuria: Possibly due to kidney stone.  No significant signs of infection.  Sent for culture.  Hematuria likely due to kidney stone.  Should recheck within the next month and if not resolving consider referral to urology for cystoscopy.  - Wet prep  - *UA reflex to Microscopic and Culture ([place] and [facility] (except [place] and [place])  - Urine Microscopic  - Urine Culture Aerobic Bacterial    2. BV (bacterial vaginosis): Likely cause of her bacterial discharge and odor.  Will treat with Flagyl twice daily for 7 days.  Did discuss not drinking alcohol this medication.  - metroNIDAZOLE (FLAGYL) 500 MG tablet; Take 1 tablet (500 mg) by mouth 2 times daily  Dispense: 14 tablet; Refill: 0    3. Skin infection: Patient with some mild erythema in pea-sized area of induration.  Not large enough to yuko to get significant drainage.  Recommend topical lidocaine and doxycycline given MRSA history.  Should take twice daily for 7 days.  Follow-up if not improving.  - doxycycline hyclate (VIBRAMYCIN) 100 MG capsule; Take 1 capsule (100 mg) by mouth 2 times daily  Dispense: 14 capsule; Refill: 0  - lidocaine (XYLOCAINE) 5 % external ointment; Apply topically as needed for moderate pain  Dispense: 30 g; Refill: 0    4. Hematuria, unspecified type: Repeat UA within 1 month.  If not resolving should see urology for further evaluation with possible cystoscopy.  - UA with Microscopic reflex to Culture; Future      Return in about 1 week (around 2/25/2021), or if symptoms worsen or fail to improve.    Sen Curtis MD  [facility]    This chart is completed utilizing dictation software; typos and/or incorrect word substitutions may unintentionally occur.    Subjective     \"Breanna Vidal is a 35 year old female who presents to clinic today for the following health issues:     Patient was transferred to triage.      Patient describes a area that sounds like an " "ingrown hair or boil on the left side in her pubic region. Symptoms have been ongoing for over a month. She was triaged on 2/15/21 and seen on 2/16/21 by Deanne Campos.      Patient describes a quarter sized lump that is warm, red, and painful to touch. She rates the pain a 8/10. Patient has been putting heat on the lump. The lump has been draining and increasing in size. The patient describes the drainage as thick and yellow.      PLAN:   Concerned with infection. Patient was scheduled on 's schedule in [place] for a further evaluation.      Etelvina Chen, RN, BSN  [place]  February 18, 2021\"    Genitourinary - Female  Onset/Duration: 2 months   Description:   Painful urination (Dysuria): YES           Frequency: YES  Blood in urine (Hematuria): no  Delay in urine (Hesitency): YES  Intensity: mild  Progression of Symptoms:  same  Accompanying Signs & Symptoms:  Fever/chills: Yes chills. No fevers.   Flank pain: YES. Lower left side.   Nausea and vomiting: YES. Nausea only.   Vaginal symptoms: discharge  Abdominal/Pelvic Pain: YES. Abdominal cramping. No pelvic pain. Ingrown hair around pelvic area.   History:   History of frequent UTI s: YES  History of kidney stones: no  Sexually Active: YES  Possibility of pregnancy: No  Therapies tried and outcome: Tylenol     Patient notes pain in left groin/vaginal area with an area of redness that was previously draining but now is healed over and is no longer draining.  She denies any fever, chills.  She denies any significant flank pain but has occasional left-sided lower back pain.  She has been found to have a 2 mm kidney stone on CT scan recently and following with urology.  She has been having some vaginal discharge and odor.  She is also noticed some hematuria once and occasional painful urination.    Review of Systems   Neurological: Positive for headaches.      Constitutional, HEENT, neuro, derm, msk, cardiovascular, pulmonary, gi and " "gu systems are negative, except as otherwise noted.       Objective    Pulse 89   Temp 98.3  F (36.8  C) (Temporal)   Resp 18   Ht 1.689 m (5' 6.5\")   Wt 112.9 kg (249 lb)   LMP 02/05/2021 (Exact Date)   SpO2 99%   Breastfeeding No   BMI 39.59 kg/m    Body mass index is 39.59 kg/m .  Physical Exam   General: Obese female. Appears well and in no acute distress.  Cardiovascular: Regular rate and rhythm, normal S1 and S2 without murmur. No extra heartsounds or friction rub. Radial pulses present and equal bilaterally.  Respiratory: Lungs clear to auscultation bilaterally. No wheezing or crackles. No prolonged expiration. Symmetrical chest rise.  Musculoskeletal: No gross extremity deformities. No peripheral edema. Normal muscle bulk.  Derm: Small left-sided approximately pea-sized area of induration in the vulva.  No suspicious lesions or rashes over exposed surfaces.    Results for orders placed or performed in visit on 02/18/21   *UA reflex to Microscopic and Culture ([place] and [facility] (except [place] and [place])     Status: Abnormal    Specimen: Midstream Urine   Result Value Ref Range    Color Urine Yellow     Appearance Urine Clear     Glucose Urine Negative NEG^Negative mg/dL    Bilirubin Urine Negative NEG^Negative    Ketones Urine Negative NEG^Negative mg/dL    Specific Gravity Urine 1.025 1.003 - 1.035    Blood Urine Trace (A) NEG^Negative    pH Urine 6.5 5.0 - 7.0 pH    Protein Albumin Urine Negative NEG^Negative mg/dL    Urobilinogen Urine 0.2 0.2 - 1.0 EU/dL    Nitrite Urine Negative NEG^Negative    Leukocyte Esterase Urine Negative NEG^Negative    Source Midstream Urine    Urine Microscopic     Status: Abnormal   Result Value Ref Range    WBC Urine 0 - 5 OTO5^0 - 5 /HPF    RBC Urine 2-5 (A) OTO2^O - 2 /HPF    Squamous Epithelial /LPF Urine Few FEW^Few /LPF    Bacteria Urine Few (A) NEG^Negative /HPF   Wet prep     Status: Abnormal    Specimen: Vagina   Result Value Ref Range    " Specimen Description Vagina     Wet Prep No Trichomonas seen     Wet Prep Clue cells seen  Few   (A)     Wet Prep No yeast seen     Wet Prep WBC'S seen  Few      Urine Culture Aerobic Bacterial     Status: None (Preliminary result)    Specimen: Midstream Urine   Result Value Ref Range    Specimen Description Midstream Urine     Special Requests Specimen received in preservative     Culture Micro PENDING

## 2021-02-18 NOTE — TELEPHONE ENCOUNTER
Reason for Call:  Other call back    Detailed comments: Is wanting to go over results    Phone Number Patient can be reached at: Home number on file [phone] (home)    Best Time: Any    Can we leave a detailed message on this number? YES    Call taken on 2/18/2021 at 1:48 PM by Archie Mcdaniels

## 2021-02-18 NOTE — TELEPHONE ENCOUNTER
Patient was transferred to triage.     Patient describes a area that sounds like an ingrown hair or boil on the left side in her pubic region. Symptoms have been ongoing for over a month. She was triaged on 2/15/21 and seen on 2/16/21 by Deanne Campos.     Patient describes a quarter sized lump that is warm, red, and painful to touch. She rates the pain a 8/10. Patient has been putting heat on the lump. The lump has been draining and increasing in size. The patient describes the drainage as thick and yellow.     PLAN:   Concerned with infection. Patient was scheduled on 's schedule in [place] for a further evaluation.     Etelvina Chen RN, BSN  [place]  February 18, 2021

## 2021-02-19 NOTE — TELEPHONE ENCOUNTER
Called patient, these were all discussed at appointment yesterday.    Sen Curtis MD  [facility]

## 2021-02-20 LAB
BACTERIA SPEC CULT: NO GROWTH
Lab: NORMAL
SPECIMEN SOURCE: NORMAL

## 2021-03-16 ENCOUNTER — VIRTUAL VISIT (OUTPATIENT)
Dept: FAMILY MEDICINE | Facility: CLINIC | Age: 36
End: 2021-03-16
Payer: COMMERCIAL

## 2021-03-16 DIAGNOSIS — J01.01 ACUTE RECURRENT MAXILLARY SINUSITIS: Primary | ICD-10-CM

## 2021-03-16 DIAGNOSIS — J01.01 ACUTE RECURRENT MAXILLARY SINUSITIS: ICD-10-CM

## 2021-03-16 PROCEDURE — U0005 INFEC AGEN DETEC AMPLI PROBE: HCPCS | Performed by: PHYSICIAN ASSISTANT

## 2021-03-16 PROCEDURE — 99207 PR NO CHARGE LOS: CPT

## 2021-03-16 PROCEDURE — 99213 OFFICE O/P EST LOW 20 MIN: CPT | Mod: GT | Performed by: PHYSICIAN ASSISTANT

## 2021-03-16 PROCEDURE — U0003 INFECTIOUS AGENT DETECTION BY NUCLEIC ACID (DNA OR RNA); SEVERE ACUTE RESPIRATORY SYNDROME CORONAVIRUS 2 (SARS-COV-2) (CORONAVIRUS DISEASE [COVID-19]), AMPLIFIED PROBE TECHNIQUE, MAKING USE OF HIGH THROUGHPUT TECHNOLOGIES AS DESCRIBED BY CMS-2020-01-R: HCPCS | Performed by: PHYSICIAN ASSISTANT

## 2021-03-16 RX ORDER — GUAIFENESIN AND DEXTROMETHORPHAN HYDROBROMIDE 1200; 60 MG/1; MG/1
1 TABLET, EXTENDED RELEASE ORAL 2 TIMES DAILY
Qty: 28 TABLET | Refills: 0 | Status: SHIPPED | OUTPATIENT
Start: 2021-03-16 | End: 2021-10-18

## 2021-03-16 NOTE — PROGRESS NOTES
Breanna is a 36 year old who is being evaluated via a billable video visit.      How would you like to obtain your AVS? MyChart  If the video visit is dropped, the invitation should be resent by:   Will anyone else be joining your video visit? No    Video Start Time: 10:00 AM    Assessment & Plan   Problem List Items Addressed This Visit     None      Visit Diagnoses     Acute recurrent maxillary sinusitis    -  Primary    Relevant Medications    amoxicillin-clavulanate (AUGMENTIN) 875-125 MG tablet    Dextromethorphan-Guaifenesin  MG TB12    Other Relevant Orders    Symptomatic COVID-19 Virus (Coronavirus) by PCR         Augmentin 875 mg, 1 tablet twice a day for 10 days  Increase fluids  Nasal wash  Mucinex DM 1 tablet twice a day for 10-14 days   Follow up if not better after the antibiotic course.     Patient will get retested since its highly likely that rapid test may comeback falsely negative if tested too soon (before 5 days of exposure and symptom onset).   PCR test was ordered.   Patient will call [address] [place] [address]      Follow up if get chest pain, shortness of breath or wheezing     15 minutes spent on the date of the encounter doing chart review, history and exam, documentation and further activities as noted above       Return in about 1 day (around 3/17/2021) for lab.    Alicia Worrell PA-C  [facility]    Bailey Carlos is a 36 year old who presents for the following health issues     HPI     Acute Illness  Acute illness concerns: nasal congestion  Onset/Duration: 4-5 days   Symptoms:  Fever: no  Chills/Sweats: no  Headache (location?): YES  Sinus Pressure: YES, severe  Conjunctivitis:  no  Ear Pain: YES: bilateral  Rhinorrhea: YES  Congestion: YES  Sore Throat: YES  Cough: no  Wheeze: no  Decreased Appetite: no  Nausea: no  Vomiting: no  Diarrhea: no  Dysuria/Freq.: no  Dysuria or Hematuria: no  Fatigue/Achiness: YES  Sick/Strep Exposure: YES  Therapies tried  and outcome: Sudafed      Patient got tested for Covid with a rapid test 2 days after of the onset of illness and it was negative.  Patient reports that her  and kids are also sick. kids are on Amoxicillin for ear infections.      Review of Systems   Constitutional, HEENT, cardiovascular, pulmonary, gi and gu systems are negative, except as otherwise noted.      Objective           Vitals:  No vitals were obtained today due to virtual visit.    Physical Exam   GENERAL: Healthy, alert and no distress  EYES: Eyes grossly normal to inspection.  No discharge or erythema, or obvious scleral/conjunctival abnormalities.  RESP: No audible wheeze, cough, or visible cyanosis.  No visible retractions or increased work of breathing.    SKIN: Visible skin clear. No significant rash, abnormal pigmentation or lesions.  NEURO: Cranial nerves grossly intact.  Mentation and speech appropriate for age.  PSYCH: Mentation appears normal, affect normal/bright, judgement and insight intact, normal speech and appearance well-groomed.                Video-Visit Details    Type of service:  Video Visit    Video End Time:10:46 AM    Originating Location (pt. Location): Home    Distant Location (provider location):  [facility]     Platform used for Video Visit: [employer]

## 2021-03-17 LAB
SARS-COV-2 RNA RESP QL NAA+PROBE: NOT DETECTED
SPECIMEN SOURCE: NORMAL

## 2021-05-29 NOTE — TELEPHONE ENCOUNTER
"   Telephone Triage Documentation- [facility]    33 weeks pregnant    Contractions:\" Occasional less than 6/hour\"        Leaking Fluid: \"denies\"    Bleeding: \"denies\"    Headache: Yes, severe Tylenol doesn't touch it\" States having blurred vision and kalidecope vision also\"Denies any hx of migraines in the past.    : yes denies back pain or pelvic pressure, or vag dc.    Fetal Movement: yes\"normal\"    Other:  Was seen at [facility] over the weekend having ctns. But only 1/2 cm dilated.        Patient Instructed To:  Come to [facility]: yes, but pt is getting an oil change in [place]. Encourage pt to go to [facility] for an assessment.     Lie on L side, ^ fluids: unable   Fetal Kick Counts: yes      Monitor Ctx: yes if greater than 6/hour to be assessed.  See MD in clinic: NO      Call back in NO hours  Call 911:  NO  Other: NO        Patient Response  Will come in: NO , Pt agrees to be assessed at [facility].  ETA: NA      Will follow instructions: Yes, Pt stated \"will go in for an assessment.\"  Will call back in NA hours      Refuses recommendations: NA  Other: NA        " Normal for race

## 2021-06-22 ENCOUNTER — TELEPHONE (OUTPATIENT)
Dept: FAMILY MEDICINE | Facility: OTHER | Age: 36
End: 2021-06-22

## 2021-06-22 DIAGNOSIS — E66.812 CLASS 2 OBESITY DUE TO EXCESS CALORIES WITHOUT SERIOUS COMORBIDITY WITH BODY MASS INDEX (BMI) OF 39.0 TO 39.9 IN ADULT: Primary | ICD-10-CM

## 2021-06-22 DIAGNOSIS — E66.09 CLASS 2 OBESITY DUE TO EXCESS CALORIES WITHOUT SERIOUS COMORBIDITY WITH BODY MASS INDEX (BMI) OF 39.0 TO 39.9 IN ADULT: Primary | ICD-10-CM

## 2021-07-06 ENCOUNTER — OFFICE VISIT (OUTPATIENT)
Dept: FAMILY MEDICINE | Facility: OTHER | Age: 36
End: 2021-07-06
Payer: COMMERCIAL

## 2021-07-06 VITALS
DIASTOLIC BLOOD PRESSURE: 82 MMHG | RESPIRATION RATE: 16 BRPM | TEMPERATURE: 97.4 F | SYSTOLIC BLOOD PRESSURE: 104 MMHG | OXYGEN SATURATION: 98 % | HEART RATE: 67 BPM

## 2021-07-06 DIAGNOSIS — J02.9 SORE THROAT: Primary | ICD-10-CM

## 2021-07-06 LAB
DEPRECATED S PYO AG THROAT QL EIA: NEGATIVE
LABORATORY COMMENT REPORT: NORMAL
SARS-COV-2 RNA RESP QL NAA+PROBE: NEGATIVE
SARS-COV-2 RNA RESP QL NAA+PROBE: NORMAL
SPECIMEN SOURCE: NORMAL
STREP GROUP A PCR: NOT DETECTED

## 2021-07-06 PROCEDURE — U0005 INFEC AGEN DETEC AMPLI PROBE: HCPCS | Performed by: FAMILY MEDICINE

## 2021-07-06 PROCEDURE — 87651 STREP A DNA AMP PROBE: CPT | Performed by: FAMILY MEDICINE

## 2021-07-06 PROCEDURE — 99213 OFFICE O/P EST LOW 20 MIN: CPT | Performed by: FAMILY MEDICINE

## 2021-07-06 PROCEDURE — 99N1174 PR STATISTIC STREP A RAPID: Performed by: FAMILY MEDICINE

## 2021-07-06 PROCEDURE — U0003 INFECTIOUS AGENT DETECTION BY NUCLEIC ACID (DNA OR RNA); SEVERE ACUTE RESPIRATORY SYNDROME CORONAVIRUS 2 (SARS-COV-2) (CORONAVIRUS DISEASE [COVID-19]), AMPLIFIED PROBE TECHNIQUE, MAKING USE OF HIGH THROUGHPUT TECHNOLOGIES AS DESCRIBED BY CMS-2020-01-R: HCPCS | Performed by: FAMILY MEDICINE

## 2021-07-06 ASSESSMENT — PAIN SCALES - GENERAL: PAINLEVEL: MODERATE PAIN (5)

## 2021-07-06 NOTE — PROGRESS NOTES
Assessment & Plan     Sore throat  Symptoms concerning for viral URI versus COVID-19 versus strep.  Labs as ordered.  Will await results.  Advised conservative management in the interim.  Advised self-isolation, aggressive hydration, rest, Tylenol and ibuprofen as needed for fevers and pain.  Discussed home care  Reportable signs and symptoms discussed  RTC if symptoms persist or fail to improve    - Streptococcus A Rapid Scr w Reflx to PCR  - Symptomatic COVID-19 Virus (Coronavirus) by PCR; Future  - Symptomatic COVID-19 Virus (Coronavirus) by PCR  - Group A Streptococcus PCR Throat Swab    }     See Patient Instructions    No follow-ups on file.    Karen Contreras MD  [facility] CARI Carlos is a 36 year old who presents for the following health issues     HPI     Acute Illness  Acute illness concerns: sore throat and swollen glands. Pt was vaccinated on 4/7/21 and 5/8/21  Onset/Duration: 3 days ago  Symptoms:  Fever: pt has not checked it  Chills/Sweats: YES, since last night  Headache (location?): YES, front  Sinus Pressure: no  Conjunctivitis:  no  Ear Pain: YES: right  Rhinorrhea: no  Congestion: no  Sore Throat: YES  Cough: YES-non-productive  Wheeze: no  Decreased Appetite: no  Nausea: no  Vomiting: no  Diarrhea: YES  Dysuria/Freq.: no  Dysuria or Hematuria: no  Fatigue/Achiness: YES  Sick/Strep Exposure: could have been this weekend was with a large group of people but is unaware of anyone that was sick  Therapies tried and outcome: ibuprofen      Review of Systems   Constitutional, HEENT, cardiovascular, pulmonary, gi and gu systems are negative, except as otherwise noted.      Objective    /82   Pulse 67   Temp 97.4  F (36.3  C) (Temporal)   Resp 16   LMP 06/24/2021 (Exact Date)   SpO2 98%   There is no height or weight on file to calculate BMI.  Physical Exam   GENERAL: healthy, alert and no distress  NECK: no adenopathy, no asymmetry, masses, or scars and  thyroid normal to palpation  RESP: lungs clear to auscultation - no rales, rhonchi or wheezes  CV: regular rate and rhythm, normal S1 S2, no S3 or S4, no murmur, click or rub, no peripheral edema and peripheral pulses strong  ABDOMEN: soft, nontender, no hepatosplenomegaly, no masses and bowel sounds normal  MS: no gross musculoskeletal defects noted, no edema

## 2021-07-08 ENCOUNTER — HOSPITAL ENCOUNTER (EMERGENCY)
Facility: CLINIC | Age: 36
Discharge: HOME | End: 2021-07-08
Attending: FAMILY MEDICINE | Admitting: FAMILY MEDICINE
Payer: COMMERCIAL

## 2021-07-08 ENCOUNTER — NURSE TRIAGE (OUTPATIENT)
Dept: NURSING | Facility: CLINIC | Age: 36
End: 2021-07-08

## 2021-07-08 VITALS
OXYGEN SATURATION: 98 % | SYSTOLIC BLOOD PRESSURE: 127 MMHG | RESPIRATION RATE: 20 BRPM | DIASTOLIC BLOOD PRESSURE: 92 MMHG | TEMPERATURE: 98.2 F | HEART RATE: 85 BPM

## 2021-07-08 DIAGNOSIS — S61.211A LACERATION OF LEFT INDEX FINGER WITHOUT FOREIGN BODY WITHOUT DAMAGE TO NAIL, INITIAL ENCOUNTER: ICD-10-CM

## 2021-07-08 PROCEDURE — 12001 RPR S/N/AX/GEN/TRNK 2.5CM/<: CPT | Performed by: FAMILY MEDICINE

## 2021-07-08 PROCEDURE — 99282 EMERGENCY DEPT VISIT SF MDM: CPT | Mod: 25 | Performed by: FAMILY MEDICINE

## 2021-07-08 PROCEDURE — 99283 EMERGENCY DEPT VISIT LOW MDM: CPT | Performed by: FAMILY MEDICINE

## 2021-07-08 NOTE — TELEPHONE ENCOUNTER
Patient is complaining of chest pain with heaviness along with shortness of breath and a deep cough. Patient says symptoms started five days ago, Covid test and rapid strep test done on 07/06/21 with negative results. Patient says she had a crowd of people over for the holiday and her Mother in law was here from [place].  Triage guidelines recommend to go to ED. Caller verbalized and understands directives.  COVID 19 Nurse Triage Plan/Patient Instructions    Please be aware that novel coronavirus (COVID-19) may be circulating in the community. If you develop symptoms such as fever, cough, or SOB or if you have concerns about the presence of another infection including coronavirus (COVID-19), please contact your health care provider or visit [email or web address].     Disposition/Instructions    ED Visit recommended. Follow protocol based instructions.     Bring Your Own Device:  Please also bring your smart device(s) (smart phones, tablets, laptops) and their charging cables for your personal use and to communicate with your care team during your visit.    Thank you for taking steps to prevent the spread of this virus.  o Limit your contact with others.  o Wear a simple mask to cover your cough.  o Wash your hands well and often.    Resources    [facility]: About COVID-19: [email or web address]    CDC: What to Do If You're Sick: www.cdc.gov/coronavirus/2019-ncov/about/steps-when-sick.html    CDC: Ending Home Isolation: www.cdc.gov/coronavirus/2019-ncov/hcp/disposition-in-home-patients.html     CDC: Caring for Someone: www.cdc.gov/coronavirus/2019-ncov/if-you-are-sick/care-for-someone.html     [facility]: Interim Guidance for Hospital Discharge to Home: www.health.[facility].mn.us/diseases/coronavirus/hcp/hospdischarge.pdf    [facility] clinical trials (COVID-19 research studies): clinicalaffairs.[facility]/umn-clinical-trials     Below are the COVID-19 hotlines at the [facility]  [facility] ([facility]). Interpreters are available.   o For health questions: Call 017-969-6178 or 1-297.195.8798 (7 a.m. to 7 p.m.)  o For questions about schools and childcare: Call 723-332-0815 or 1-691.352.9957 (7 a.m. to 7 p.m.)                     Reason for Disposition    [1] Lives with someone known to have influenza (flu test positive) AND [2] flu-like symptoms (e.g., cough, runny nose, sore throat, SOB; with or without fever)    Chest pain  (Exception: MILD central chest pain, present only when coughing)    Additional Information    Negative: SEVERE difficulty breathing (e.g., struggling for each breath, speaks in single words)    Negative: Difficult to awaken or acting confused (e.g., disoriented, slurred speech)    Negative: Bluish (or gray) lips or face now    Negative: Shock suspected (e.g., cold/pale/clammy skin, too weak to stand, low BP, rapid pulse)    Negative: Sounds like a life-threatening emergency to the triager    Negative: [1] COVID-19 exposure AND [2] has not completed COVID-19 vaccine series AND [3] no symptoms    Negative: [1] COVID-19 exposure AND [2] completed COVID-19 vaccine series (fully vaccinated) AND [3] no symptoms    Negative: COVID-19 vaccine reaction suspected (e.g., fever, headache, muscle aches) occurring during days 1-3 after getting vaccine    Negative: COVID-19 vaccine, questions about    Negative: [1] COVID-19 vaccine series completed (fully vaccinated) in past 3 months AND [2] new-onset of COVID-19 symptoms BUT [3] no known exposure    Negative: [1] Had lab test confirmed COVID-19 infection within last 3 monthsAND [2] new-onset of COVID-19 symptoms BUT [3] no known exposure    Negative: Severe difficulty breathing (e.g., struggling for each breath, speaks in single words)    Negative: Difficult to awaken or acting confused (e.g., disoriented, slurred speech)    Negative: Bluish (or gray) lips or face now    Negative: Shock suspected (e.g., cold/pale/clammy skin, too weak to  stand, low BP, rapid pulse)    Negative: Sounds like a life-threatening emergency to the triager    Negative: [1] Sounds like a cold AND [2] no fever    Negative: [1] Cough with sputum AND [2] no fever    Negative: [1] Dry cough (no sputum) sputum AND [2] no fever    Negative: [1] Throat pain AND [2] no fever    Negative: Influenza vaccine reaction is suspected    Protocols used: CORONAVIRUS (COVID-19) DIAGNOSED OR GXIRYFESS-P-EO 3.25, INFLUENZA - [facility]

## 2021-07-09 NOTE — ED PROVIDER NOTES
History     Chief Complaint   Patient presents with     Laceration     HPI  Breanna Vidal is a 36 year old female who presents with a laceration to her left index finger.  Patient was cutting a watermelon when it slipped and cut her index finger.  Bleeding was controlled with pressure.  Patient has normal range of motion.  Denies any other injuries.    Allergies:  Allergies   Allergen Reactions     Bees Anaphylaxis     Has Epi pen      Codeine Nausea     Other reaction(s): Nausea     Imitrex [Sumatriptan]      Worsening of migraine, paresthesias     Adhesive [Liquid Adhesive] Itching     Redness and itching around incision after hernia repair surgery     Ranitidine Rash and Itching     recvd IV zantac 75 and reaction was immediate,  was given Benadryl to counter act reaction in 2000     Sulfa Drugs Unknown and Rash       Problem List:    Patient Active Problem List    Diagnosis Date Noted     Morbid obesity (H) 02/16/2021     Priority: Medium     Biliary dyskinesia 02/10/2020     Priority: Medium     Added automatically from request for surgery [id number]       Intractable migraine without aura and without status migrainosus 07/17/2019     Priority: Medium     Alcohol abuse, in remission 08/20/2018     Priority: Medium     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) 08/20/2018     Priority: Medium     Mixed incontinence 03/03/2017     Priority: Medium     Vaginal delivery 10/31/2011     Priority: Medium     Rh negative status during pregnancy 09/23/2011     Priority: Medium        Past Medical History:    Past Medical History:   Diagnosis Date     Cellulitis 2012     Drug abuse (H) 2011     Mild pre-eclampsia in third trimester      MRSA cellulitis 2012     PONV (postoperative nausea and vomiting)      Pregnancy-induced hypertension in third trimester      Rh negative, antepartum        Past Surgical History:    Past Surgical History:   Procedure Laterality Date     APPENDECTOMY       DAVINCI ASSISTED HERNIORRHAPHY  INCISIONAL N/A 7/10/2020    Procedure: Robotic incisional hernia repair with mesh;  Surgeon: Jovi Odom MD;  Location: [facility] OR     EXCISE LESION FOOT Left 8/16/2019    Procedure: Excision of nevus second interdigital space left foot, Excision of nevus third interdigital space left foot, excision of plantar junctional nevus left foot;  Surgeon: Rinku Lopez DPM;  Location: [facility] OR     INCISION AND DRAINAGE  04/18/2012    chin abscess I&D     LAPAROSCOPIC CHOLECYSTECTOMY N/A 2/14/2020    Procedure: Laparoscopic Cholecystectomy;  Surgeon: Renard Gerber MD;  Location: [facility] OR     LAPAROSCOPIC HERNIORRHAPHY INCISIONAL N/A 7/17/2019    Procedure: laparoscopic incisional hernia repair with mesh;  Surgeon: Jovi Odom MD;  Location: [facility] OR     LAPAROSCOPIC SALPINGECTOMY Bilateral 8/27/2018    Procedure: LAPAROSCOPIC SALPINGECTOMY;  Laparoscopic Bilateral Salpingectomy;  Surgeon: Chai Cui MD;  Location: [facility] OR     MAMMOPLASTY REDUCTION Bilateral 08/04/2017       Family History:    Family History   Problem Relation Age of Onset     Cancer Father      Pancreatic Cancer Father      Lung Cancer Father      Lupus Mother        Social History:  Marital Status:  Single [1]  Social History     Tobacco Use     Smoking status: Former Smoker     Packs/day: 0.00     Years: 0.00     Pack years: 0.00     Types: Cigarettes     Smokeless tobacco: Former User     Quit date: 3/10/2016   Substance Use Topics     Alcohol use: No     Comment: history alcohol abuse 2012, s/p treatment     Drug use: No     Comment: former drug user (EtOH, cocaine, marijuana, meth), last use 2012        Medications:    acetaminophen (TYLENOL) 500 MG tablet  albuterol (PROAIR HFA/PROVENTIL HFA/VENTOLIN HFA) 108 (90 Base) MCG/ACT inhaler  albuterol (PROVENTIL HFA) 108 (90 Base) MCG/ACT inhaler  azithromycin (ZITHROMAX) 250 MG tablet  benzonatate (TESSALON) 200 MG capsule  Dextromethorphan-Guaifenesin  MG TB12  doxycycline hyclate  (VIBRAMYCIN) 100 MG capsule  EPINEPHrine (EPIPEN/ADRENACLICK/OR ANY BX GENERIC EQUIV) 0.3 MG/0.3ML injection 2-pack  fluticasone (FLONASE) 50 MCG/ACT nasal spray  ibuprofen (ADVIL/MOTRIN) 200 MG tablet  lidocaine (XYLOCAINE) 5 % external ointment  magic mouthwash (ENTER INGREDIENTS IN COMMENTS) suspension  metroNIDAZOLE (FLAGYL) 500 MG tablet          Review of Systems   All other systems reviewed and are negative.      Physical Exam   BP: (!) 155/98  Pulse: 97  Temp: 98.2  F (36.8  C)  Resp: 24(crying)  SpO2: 99 %      Physical Exam  Vitals signs and nursing note reviewed.   Constitutional:       General: She is not in acute distress.     Appearance: Normal appearance. She is not ill-appearing.   Musculoskeletal:      Left hand: She exhibits tenderness and laceration. She exhibits normal range of motion.   Neurological:      Mental Status: She is alert.         ED [facility]    -Laceration Repair    Date/Time: 7/8/2021 9:18 PM  Performed by: Franck Beasley MD  Authorized by: Franck Beasley MD       ANESTHESIA (see MAR for exact dosages):     Anesthesia method:  Local infiltration    Local anesthetic:  Lidocaine 1% w/o epi  LACERATION DETAILS     Location:  Finger    Finger location:  L index finger    Length (cm):  1.5    REPAIR TYPE:     Repair type:  Simple      EXPLORATION:     Wound exploration: wound explored through full range of motion and entire depth of wound probed and visualized      TREATMENT:     Area cleansed with:  Betadine    Amount of cleaning:  Standard    SKIN REPAIR     Repair method:  Sutures    Suture size:  5-0    APPROXIMATION     Approximation:  Close    POST-PROCEDURE DETAILS     Dressing:  Antibiotic ointment      PROCEDURE   Patient Tolerance:  Patient tolerated the procedure well with no immediate complications            No results found for this or any previous visit (from the past 24 hour(s)).    Medications - No data to  display     Laceration was repaired as noted above.  Patient will be discharged at this time.    Assessments & Plan (with Medical Decision Making)  Left index finger laceration     I have reviewed the nursing notes.    I have reviewed the findings, diagnosis, plan and need for follow up with the patient.          Final diagnoses:   Laceration of left index finger without foreign body without damage to nail, initial encounter       7/8/2021   [facility] EMERGENCY DEPT     Franck Beasley MD  07/08/21 [id number]

## 2021-08-30 ENCOUNTER — TELEPHONE (OUTPATIENT)
Dept: FAMILY MEDICINE | Facility: CLINIC | Age: 36
End: 2021-08-30

## 2021-08-30 NOTE — TELEPHONE ENCOUNTER
Patient called to report has people who are staying with them - one of the tow children has been diagnosed with RSV and the other has same symptoms so  gave script for steroid for both the visiting children.    Has a sore throat chief complaint.  Diarrhea, stomach ache, headache, cough. Body aches, no fever. Ears are bothering patient, right ear feels like it will not drain.     Taking DayQuil, Ibuprofen Tylenol       Patient has been fully vaccinated for Covid    Just took home Covid test and was negative.     Pharmacy cued. - wondering if provider would send a prescription       Mother doing E-visit for Bryant Leroy RN  [facility]

## 2021-09-01 NOTE — TELEPHONE ENCOUNTER
Patient needs to be seen in person for her symptoms or do a video visit. Please call to advise to set up appointment.  Deanne Campos, CNP

## 2021-09-02 ENCOUNTER — VIRTUAL VISIT (OUTPATIENT)
Dept: FAMILY MEDICINE | Facility: CLINIC | Age: 36
End: 2021-09-02
Payer: COMMERCIAL

## 2021-09-02 DIAGNOSIS — Z20.828 RSV EXPOSURE: ICD-10-CM

## 2021-09-02 DIAGNOSIS — R05.9 COUGH: Primary | ICD-10-CM

## 2021-09-02 DIAGNOSIS — H92.01 RIGHT EAR PAIN: ICD-10-CM

## 2021-09-02 PROCEDURE — 99213 OFFICE O/P EST LOW 20 MIN: CPT | Mod: TEL | Performed by: PHYSICIAN ASSISTANT

## 2021-09-02 NOTE — PATIENT INSTRUCTIONS
Flonase (fluticasone) 2 sprays in each nostril daily until symptoms resolve, then continue 1 spray in each nostril for at least 5 more days.  Tylenol and/or ibuprofen for pain relief and fever reduction.  Warm compresses next to ear for pain relief.  Massage behind ear to encourage drainage.  Drink plenty of fluids.  Be seen in person if ear pain not improved tomorrow    COUGH  No indication for antibiotics discussed.   Rest! Your body needs more rest to heal.  Drink plenty of fluids (warm fluids like tea or soup are soothing and reduce cough)  Sit in the bathroom with a hot shower running and breathe in the steam.  Honey may soothe your sore throat and help manage your cough- may take straight or in warm water with lemon juice.  Avoid smoke (cigarettes, bonfires, fireplace, wood burning stoves).  Take Tylenol or an NSAID such as ibuprofen or naproxen as needed for pain.  Delsym (dextromethorphan polistirex) is an over the counter cough medication that lasts 12 hours.   Mucinex or Robitussin (guiafenesin) thin mucus and may help it to loosen more quickly  Good handwashing is the best way to prevent spread of germs  Present to emergency room if you develop trouble breathing, swallowing or cough-up blood.  Follow up with your primary care provider if symptoms worsen or fail to improve as expected.

## 2021-09-02 NOTE — PROGRESS NOTES
Breanna is a 36 year old who is being evaluated via a billable telephone visit.      What phone number would you like to be contacted at? [phone]  How would you like to obtain your AVS? MyChart    Assessment & Plan     Cough    Right ear pain    RSV exposure    Breanna was seen at urgent care for the symptoms yesterday.  She was exposed to RSV from 5 children who are currently home with coughs and tested positive for RSV.  Symptoms are very likely due to RSV.  She was tested for Covid yesterday.  Symptoms at this point are likely viral.  We discussed symptomatic measures to help encourage drainage of the fluid in her ear.  Hot packs, massage, Flonase, Tylenol and ibuprofen, plenty of fluids.  Follow-up in clinic for physical evaluation of your if symptoms do not improve in 24 hours.    20 minutes spent on the date of the encounter doing chart review, patient visit and documentation     Return in about 1 day (around 9/3/2021) for be seen in clinic if ear pain is not improved.    Bernice Mitchell PA-C  [facility]   Breanna is a 36 year old who presents for the following health issues    HPI     ED/UC Followup:    Facility:  [facility]  Date of visit: 9/1/2021  Reason for visit: RSV/Suspected COVID  Current Status: thinks she may have fluid in right ear, mentioned they looked in her ears but just stated their was fluid that could turn into an infection and is much worse than yesterday     Breanna presents via telephone for evaluation of right ear pain.  She was seen at urgent care yesterday with right ear pain and a cough.  She was tested for Covid and the result is currently still pending.  She notes that her right ear pain is worse today than it was yesterday.  In reviewing the provider's note from yesterday there is no mention of fluid in her ear however she notes that she was told that she had fluid in her ear and it could potentially turn into an ear  infection.  She has no fever.     Review of Systems   ROS negative except as noted above      Objective           Vitals:  No vitals were obtained today due to virtual visit.    Physical Exam   healthy, alert and no distress  PSYCH: Alert and oriented times 3; coherent speech, normal   rate and volume, able to articulate logical thoughts, able   to abstract reason, no tangential thoughts, no hallucinations   or delusions  Her affect is normal and pleasant  RESP: No cough, no audible wheezing, able to talk in full sentences  Remainder of exam unable to be completed due to telephone visits    No results found for any visits on 09/02/21.             Phone call duration: 9 minutes

## 2021-09-02 NOTE — TELEPHONE ENCOUNTER
I called pt and informed her of the below msg and she was doing a video visit at that time.  CARLOS ALBERTOh

## 2021-09-07 ENCOUNTER — VIRTUAL VISIT (OUTPATIENT)
Dept: FAMILY MEDICINE | Facility: CLINIC | Age: 36
End: 2021-09-07
Payer: COMMERCIAL

## 2021-09-07 DIAGNOSIS — J21.9 BRONCHIOLITIS: Primary | ICD-10-CM

## 2021-09-07 PROCEDURE — 99213 OFFICE O/P EST LOW 20 MIN: CPT | Mod: TEL | Performed by: PHYSICIAN ASSISTANT

## 2021-09-07 RX ORDER — PREDNISONE 20 MG/1
40 TABLET ORAL DAILY
Qty: 10 TABLET | Refills: 0 | Status: SHIPPED | OUTPATIENT
Start: 2021-09-07 | End: 2021-09-12

## 2021-09-07 NOTE — PROGRESS NOTES
We will see Breanna is a 36 year old who is being evaluated via a billable telephone visit.      What phone number would you like to be contacted at? [phone]  How would you like to obtain your AVS? MyChart    Assessment & Plan     Bronchiolitis  - predniSONE (DELTASONE) 20 MG tablet; Take 2 tablets (40 mg) by mouth daily for 5 days    Continue current symptomatic measures including DayQuil, NyQuil, Tessalon Perles, albuterol inhaler.  Add prednisone daily for 5 days.  Follow-up with PCP if symptoms worsen or have not improved at all in 5 days.    20 minutes spent on the date of the encounter doing chart review, patient visit and documentation     Return in about 1 week (around 9/14/2021) for Recheck with primary care provider if not improving.    FERNANDEZ [facility]   Breanna is a 36 year old who presents for the following health issues    HPI     Acute Illness  Acute illness concerns: Cough  Onset/Duration: 2 weeks  Symptoms:  Fever: no  Chills/Sweats: YES- off and on  Headache (location?): YES  Sinus Pressure: no  Conjunctivitis:  no  Ear Pain: YES: right  Rhinorrhea: no  Congestion: no  Sore Throat: no  Cough: YES-productive of green sputum, with shortness of breath  Wheeze: YES  Decreased Appetite: YES  Nausea: no  Vomiting: no  Diarrhea: YES  Dysuria/Freq.: no  Dysuria or Hematuria: no  Fatigue/Achiness: YES  Sick/Strep Exposure: YES- Family   Therapies tried and outcome: Mucinex, tylenol,ibuprofen, robitussin -  Does not help symptoms. Dayquil helps symptoms    Breanna presents via telephone for evaluation of a cough with worsening chest tightness.  Symptoms first began approximately 2 weeks ago.  She was exposed to RSV by her kids and she tested positive for RSV and negative for Covid.  Since then she noticed that she has had off-and-on chills and a headache, a cough that has been persistent and productive of green sputum.  She does have associated shortness  of breath with activity.  She notes wheezing with exhalation and a decreased appetite.  She has had diarrhea recently as well as fatigue.  She has been taking Mucinex, Robitussin, Tylenol and ibuprofen which have not been helpful.  DayQuil is occasionally helpful but her cough is still persistent.    Review of Systems   ROS negative except as noted above      Objective           Vitals:  No vitals were obtained today due to virtual visit.    Physical Exam   healthy, alert and no distress  PSYCH: Alert and oriented times 3; coherent speech, normal   rate and volume, able to articulate logical thoughts, able   to abstract reason, no tangential thoughts, no hallucinations   or delusions  Her affect is normal and pleasant  RESP: frequent cough, audible wheezing with forced exhalation, able to talk in full sentences  Remainder of exam unable to be completed due to telephone visits    No results found for any visits on 09/07/21.          Phone call duration: 15 minutes

## 2021-09-07 NOTE — PATIENT INSTRUCTIONS
Patient Education     * Bronchiolitis (RSV Infection)    Bronchiolitis is a viral infection of the small air passages in the lung, called the bronchioles. It is usually caused by the  RSV  virus (Respiratory Syncytial Virus). This virus affects babies under 2 years old. Older children and adults can get RSV, but it feels just like a common cold to them.  The virus is very contagious during the first few days. It spreads easily from person to person by coughing, sneezing or direct contact (touching your sick child, then touching your own eyes, nose or mouth). Washing your hands often lowers the risk of spread to others.  This illness usually starts like a cold, with fever and stuffy nose. After a few days, the virus spreads into the bronchioles. This causes mild wheezing and rapid breathing for up to a week. The congestion and cough may last up to 2 weeks. Antibiotic treatment does not help with this illness. Sometimes asthma medicines are used, but they do not help all children.  Home Care    FLUIDS: Fever makes the body lose more water.  ? For infants under 1 year old, continue regular feedings (formula or breast). Between feedings offer Pedialyte, Infalyte, Rehydralyte or another oral rehydration drink. You can get these from grocery and drug stores without a prescription. DON T give honey to a child younger than 1 year old.  ? For children over 1 year old, give plenty of liquids. Children may prefer cool drinks, frozen desserts or ice pops. They may also like warm soup or drinks with lemon and honey.    HYGIENE: Wash your hands well with soap and warm water before and after caring for your child. This helps prevent spreading the infection.    FEEDING: If your child doesn t want to eat solid foods, it s OK for a few days, as long as they drink lots of fluid.    ACTIVITY: Keep children with fever at home, resting or playing quietly. Encourage lots of naps. Keep your child home from  or school for the first 3  days of the illness. Your child may return to  or school when the fever is gone and they are eating well and feeling better.    SLEEP: Give your child plenty of time to rest. Sleeplessness and fussing are common. A congested child will sleep best with the head and upper body propped up on pillows. You can also try raising the head of the bed frame on a 6-inch block. An infant may sleep in a car seat placed on the bed. Don t use pillows for babies under 1 year old.    COUGH: Coughing is a normal part of this illness.  ? A cool mist humidifier at the bedside may help. Be sure to clean and dry the humidifier every day to prevent bacteria and mold.  ? Over-the-counter cough and cold medicine doesn t help young children and can cause serious side effects. They are especially bad for babies under 2 years of age.  ? Don t give over-the-counter cough and cold medicines to children under 6 years unless your doctor has told you to do so.  ? Don t expose your child to cigarette smoke. It can make the cough worse.    STUFFY NOSE (NASAL CONGESTION): Suction the nose of infants with a rubber bulb syringe. Talk with your child s doctor if you don t know how to use a bulb syringe. It may help to put 2 to 3 drops of saltwater (saline) nose drops in each nostril before suctioning. You can get saline nose drops without a prescription. You can also make saline by adding 1/4 teaspoon table salt to 1 cup of water.    MEDICINE: Use Tylenol (acetaminophen) for fever, fussiness or discomfort, unless the doctor prescribed another medicine. In infants over 6 months of age, you may use Children s Motrin (ibuprofen) instead of Tylenol. Never give aspirin to anyone under 18 years of age who has a fever. It may cause severe liver damage.  Follow-up care  Follow up as directed by your child s doctor.  Note: If your child had an X-ray, a doctor will review it. We ll let you know if we find anything that may affect your child's care.  When to  call the doctor  For a usually healthy child, call your child s doctor right away if any of these occur:  1. Your child is 3 months old or younger and has a fever of 100.4 F (38 C) or higher. Get medical care right away. Fever in a young baby can be a sign of a dangerous infection.  2. Your child is younger than 2 years of age and has a fever of 100.4 F (38 C) for more than 1 day.  3. Your child is 2 years old or older and has a fever of 100.4 F (38 C) for more than 3 days.  4. Your child is any age and has repeated fevers above 104 F (40 C).  5. Symptoms don t get better, or get worse.  6. Breathing doesn t get better.  7. Your child loses their appetite or feeds poorly.  8. A new rash appears.  9. Your child has any of these problems:  ? Earache  ? Pain around the nose or eyes (sinus pain)  ? Stiff or painful neck  ? Headache  ? Repeated loose, watery poop (diarrhea)  ? Throwing up (vomiting)  Call 911  Call 911 if any of these occur:    Breathing gets worse    Fast breathing:  ? Birth to 6 weeks: over 60 breaths per minute  ? 6 weeks to 2 years: over 45 breaths per minute  ? 3 to 6 years: over 35 breaths per minute  ? 7 to 10 years: over 30 breaths per minute  ? Older than 10 years: over 25 breaths per minute    Blue tint to the lips or fingernails    Signs of dehydration, such as dry mouth, crying with no tears or peeing less than normal (For babies, this means no wet diapers for 8 hours.)    Unusual fussiness, drowsiness or confusion  This information has been modified by your health care provider with permission from the publisher.  Modifications clinically reviewed by Fox Jay DO, MBA, HERO, Director of Physician Informatics for Emergency Medicine, [facility] on 8/20/18.  For informational purposes only. Not to replace the advice of your health care provider.  Copyright   2018 [employer]. All rights reserved.

## 2021-09-16 ENCOUNTER — E-VISIT (OUTPATIENT)
Dept: FAMILY MEDICINE | Facility: CLINIC | Age: 36
End: 2021-09-16
Payer: COMMERCIAL

## 2021-09-16 DIAGNOSIS — R30.0 DYSURIA: Primary | ICD-10-CM

## 2021-09-16 PROCEDURE — 99421 OL DIG E/M SVC 5-10 MIN: CPT | Performed by: STUDENT IN AN ORGANIZED HEALTH CARE EDUCATION/TRAINING PROGRAM

## 2021-09-17 RX ORDER — NITROFURANTOIN 25; 75 MG/1; MG/1
100 CAPSULE ORAL 2 TIMES DAILY
Qty: 10 CAPSULE | Refills: 0 | Status: SHIPPED | OUTPATIENT
Start: 2021-09-17 | End: 2021-09-22

## 2021-09-19 ENCOUNTER — HEALTH MAINTENANCE LETTER (OUTPATIENT)
Age: 36
End: 2021-09-19

## 2021-10-17 ENCOUNTER — NURSE TRIAGE (OUTPATIENT)
Dept: NURSING | Facility: CLINIC | Age: 36
End: 2021-10-17

## 2021-10-17 NOTE — TELEPHONE ENCOUNTER
Pt has been fully vaccinated.   Pt is calling in about having Covid 19 symptoms for the past 3 days after being exposed to a neighbor who was fully vaccinated and then tested positive for Covid 19. Pt reports cough, and congestion, runny nose, and nasal congestion, sore throat, headache, body aches, chills, earache, fever, fatigue. Pt had a temp of 99.9 today, after Tylenol, and reports it hurts in her chest to cough.     Care advice given, use of humidified air, increase fluid intake, cough drops, and warm chicken broth, and use of Tylenol, and per protocol pt should be evaluated by a physician, and was advised to do a telephone visit. Pt was transferred to scheduling. Pt was advised to call back if she develops difficulty breathing, or chest tightness, or if fever goes over 103.0. Pt verbalized understanding.     Chai Castellanos RN on 10/17/2021 at 4:35 PM      COVID 19 Nurse Triage Plan/Patient Instructions    Please be aware that novel coronavirus (COVID-19) may be circulating in the community. If you develop symptoms such as fever, cough, or SOB or if you have concerns about the presence of another infection including coronavirus (COVID-19), please contact your health care provider or visit [email or web address].     Disposition/Instructions    Virtual Visit with provider recommended. Reference Visit Selection Guide.    Thank you for taking steps to prevent the spread of this virus.  o Limit your contact with others.  o Wear a simple mask to cover your cough.  o Wash your hands well and often.    Resources    [facility]: About COVID-19: [email or web address]    CDC: What to Do If You're Sick: www.cdc.gov/coronavirus/2019-ncov/about/steps-when-sick.html    CDC: Ending Home Isolation: www.cdc.gov/coronavirus/2019-ncov/hcp/disposition-in-home-patients.html     CDC: Caring for Someone: www.cdc.gov/coronavirus/2019-ncov/if-you-are-sick/care-for-someone.html     [facility]: Interim Guidance for  Hospital Discharge to Home: www.health.[facility].[place].us/diseases/coronavirus/hcp/hospdischarge.pdf    [facility] clinical trials (COVID-19 research studies): [email or web address]     Below are the COVID-19 hotlines at the [place] Department of Health ([facility]). Interpreters are available.   o For health questions: Call 451-600-2625 or 1-942.495.3487 (7 a.m. to 7 p.m.)  o For questions about schools and childcare: Call 544-040-7035 or 1-161.295.9135 (7 a.m. to 7 p.m.)     Reason for Disposition    [1] Symptoms of COVID-19 (e.g., cough, fever, SOB, or others) AND [2] within 14 days of EXPOSURE (close contact) with diagnosed or suspected COVID-19 patient    Fever present > 3 days (72 hours)    Additional Information    Negative: SEVERE difficulty breathing (e.g., struggling for each breath, speaks in single words)    Negative: Difficult to awaken or acting confused (e.g., disoriented, slurred speech)    Negative: Bluish (or gray) lips or face now    Negative: Shock suspected (e.g., cold/pale/clammy skin, too weak to stand, low BP, rapid pulse)    Negative: Sounds like a life-threatening emergency to the triager    Negative: [1] COVID-19 exposure AND [2] no symptoms    Negative: COVID-19 vaccine reaction suspected (e.g., fever, headache, muscle aches) occurring 1 to 3 days after getting vaccine    Negative: COVID-19 vaccine, questions about    Negative: [1] Lives with someone known to have influenza (flu test positive) AND [2] flu-like symptoms (e.g., cough, runny nose, sore throat, SOB; with or without fever)    Negative: [1] Adult with possible COVID-19 symptoms AND [2] triager concerned about severity of symptoms or other causes    Negative: COVID-19 and breastfeeding, questions about    Negative: SEVERE or constant chest pain or pressure (Exception: mild central chest pain, present only when coughing)    Negative: MODERATE difficulty breathing (e.g., speaks in phrases, SOB even at rest,  pulse 100-120)    Negative: [1] Headache AND [2] stiff neck (can't touch chin to chest)    Negative: COVID-19 lab test positive    Negative: [1] Lives with someone known to have influenza (flu test positive) AND [2] flu-like symptoms (e.g., cough, runny nose, sore throat, SOB; with or without fever)    Negative: [1] Symptoms of COVID-19 (e.g., cough, fever, SOB, or others) AND [2] HCP diagnosed COVID-19 based on symptoms    Negative: [1] Symptoms of COVID-19 (e.g., cough, fever, SOB, or others) AND [2] lives in an area with community spread    Negative: [1] Symptoms of COVID-19 (e.g., cough, fever, SOB, or others) AND [2] within 14 days of travel from high-risk area for COVID-19 community spread (identified by CDC)    Negative: [1] Difficulty breathing (shortness of breath) occurs AND [2] onset > 14 days after COVID-19 EXPOSURE (Close Contact)    Negative: [1] Dry cough occurs AND [2] onset > 14 days after COVID-19 EXPOSURE    Negative: [1] Wet cough (i.e., white-yellow, yellow, green, or vipin colored sputum) AND [2] onset > 14 days after COVID-19 EXPOSURE    Negative: [1] Common cold symptoms AND [2] onset > 14 days after COVID-19 EXPOSURE    Negative: COVID-19 vaccine reaction suspected (e.g., fever, headache, muscle aches) occurring during days 1-3 after getting vaccine    Negative: COVID-19 vaccine, questions about    Negative: MILD difficulty breathing (e.g., minimal/no SOB at rest, SOB with walking, pulse <100)    Negative: Chest pain or pressure    Negative: Patient sounds very sick or weak to the triager    Negative: Fever > 103 F (39.4 C)    Negative: [1] Fever > 101 F (38.3 C) AND [2] age > 60 years    Negative: [1] Fever > 100.0 F (37.8 C) AND [2] bedridden (e.g., nursing home patient, CVA, chronic illness, recovering from surgery)    Negative: HIGH RISK for severe COVID complications (e.g., age > 64 years, obesity with BMI > 25, pregnant, chronic lung disease or other chronic medical condition)   (Exception: Already seen by PCP and no new or worsening symptoms.)    Negative: [1] HIGH RISK patient AND [2] influenza is widespread in the community AND [3] ONE OR MORE respiratory symptoms: cough, sore throat, runny or stuffy nose    Negative: [1] HIGH RISK patient AND [2] influenza exposure within the last 7 days AND [3] ONE OR MORE respiratory symptoms: cough, sore throat, runny or stuffy nose    Negative: [1] COVID-19 infection suspected by caller or triager AND [2] mild symptoms (cough, fever, or others) AND [3] negative COVID-19 rapid test    Protocols used: CORONAVIRUS (COVID-19) EXPOSURE-[facility] 8.25.2021, CORONAVIRUS (COVID-19) DIAGNOSED OR AQUCKOUJJ-S-ER 8.25.2021

## 2021-10-18 ENCOUNTER — VIRTUAL VISIT (OUTPATIENT)
Dept: FAMILY MEDICINE | Facility: OTHER | Age: 36
End: 2021-10-18
Payer: COMMERCIAL

## 2021-10-18 ENCOUNTER — NURSE TRIAGE (OUTPATIENT)
Dept: NURSING | Facility: CLINIC | Age: 36
End: 2021-10-18

## 2021-10-18 DIAGNOSIS — Z20.822 SUSPECTED 2019 NOVEL CORONAVIRUS INFECTION: Primary | ICD-10-CM

## 2021-10-18 PROCEDURE — 99213 OFFICE O/P EST LOW 20 MIN: CPT | Mod: 95 | Performed by: PHYSICIAN ASSISTANT

## 2021-10-18 RX ORDER — BENZONATATE 100 MG/1
100 CAPSULE ORAL 3 TIMES DAILY PRN
Qty: 30 CAPSULE | Refills: 0 | Status: SHIPPED | OUTPATIENT
Start: 2021-10-18 | End: 2022-01-20

## 2021-10-18 NOTE — PATIENT INSTRUCTIONS
"Very high suspicion for COVID-19.  Will order testing.  You should stay quarantined for at least 10 days from onset of symptoms or until you are feeling better with no fever for at least 24 hours, whichever is longer.  Stay well hydrated and try zinc, vitamin D and vitamin C.  Continue with albuterol inhaler.  Will prescribe Tessalon pearls for the cough.  If you have significantly worsening shortness of breath, you should be seen emergently.    Discharge Instructions for COVID-19 Patients  You have--or may have--COVID-19. Please follow the instructions listed below.   If you have a weakened immune system, discuss with your doctor any other actions you need to take.  How can I protect others?  If you have symptoms (fever, cough, body aches or trouble breathing):    Stay home and away from others (self-isolate) until:  ? Your other symptoms have resolved (gotten better). And   ? You've had no fever--and no medicine that reduces fever--for 1 full day (24 hours). And   ? At least 10 days have passed since your symptoms started. (You may need to wait 20 days. Follow the advice of your care team.)  If you don't show symptoms, but testing showed that you have COVID-19:    Stay home and away from others (self-isolate) until at least 10 days have passed since the date of your first positive COVID-19 test.  During this time    Stay in your own room, even for meals. Use your own bathroom if you can.    Stay away from others in your home. No hugging, kissing or shaking hands. No visitors.    Don't go to work, school or anywhere else.    Clean \"high touch\" surfaces often (doorknobs, counters, handles). Use household cleaning spray or wipes.    You'll find a full list of  on the EPA website: www.epa.gov/pesticide-registration/list-n-disinfectants-use-against-sars-cov-2.    Cover your mouth and nose with a mask or other face covering to avoid spreading germs.    Wash your hands and face often. Use soap and " water.    Caregivers in these groups are at risk for severe illness due to COVID-19:  ? People 65 years and older  ? People who live in a nursing home or long-term care facility  ? People with chronic disease (lung, heart, cancer, diabetes, kidney, liver, immunologic)  ? People who have a weakened immune system, including those who:    Are in cancer treatment    Take medicine that weakens the immune system, such as corticosteroids    Had a bone marrow or organ transplant    Have an immune deficiency    Have poorly controlled HIV or AIDS    Are obese (body mass index of 40 or higher)    Smoke regularly    Caregivers should wear gloves while washing dishes, handling laundry and cleaning bedrooms and bathrooms.    Use caution when washing and drying laundry: Don't shake dirty laundry and use the warmest water setting that you can.    For more tips on managing your health at home, go to www.cdc.gov/coronavirus/2019-ncov/downloads/10Things.pdf.  How can I take care of myself at home?  1. Get lots of rest. Drink extra fluids (unless a doctor has told you not to).  2. Take Tylenol (acetaminophen) for fever or pain. If you have liver or kidney problems, ask your family doctor if it's okay to take Tylenol.   Adults can take either:   ? 650 mg (two 325 mg pills) every 4 to 6 hours, or   ? 1,000 mg (two 500 mg pills) every 8 hours as needed.  ? Note: Don't take more than 3,000 mg in one day. Acetaminophen is found in many medicines (both prescribed and over-the-counter medicines). Read all labels to be sure you don't take too much.   For children, check the Tylenol bottle for the right dose. The dose is based on the child's age or weight.  3. If you have other health problems (like cancer, heart failure, an organ transplant or severe kidney disease): Call your specialty clinic if you don't feel better in the next 2 days.  4. Know when to call 911. Emergency warning signs include:  ? Trouble breathing or shortness of  breath  ? Pain or pressure in the chest that doesn't go away  ? Feeling confused like you haven't felt before, or not being able to wake up  ? Bluish-colored lips or face  5. Your doctor may have prescribed a blood thinner medicine. Follow their instructions.  Where can I get more information?    [facility] - About COVID-19:   [email or web address]    CDC - What to Do If You're Sick: www.cdc.gov/coronavirus/2019-ncov/about/steps-when-sick.html    CDC - Ending Home Isolation: www.cdc.gov/coronavirus/2019-ncov/hcp/disposition-in-home-patients.html    CDC - Caring for Someone: www.cdc.gov/coronavirus/2019-ncov/if-you-are-sick/care-for-someone.html    [facility] - Interim Guidance for Hospital Discharge to Home: www.health.[facility].[place].us/diseases/coronavirus/hcp/hospdischarge.pdf    Below are the COVID-19 hotlines at the [place] Department of Health ([facility]). Interpreters are available.  ? For health questions: Call 542-928-4783 or 1-727.230.3334 (7 a.m. to 7 p.m.)  ? For questions about schools and childcare: Call 651-518-9900 or 1-515.853.3165 (7 a.m. to 7 p.m.)    For informational purposes only. Not to replace the advice of your health care provider. Clinically reviewed by Dr. Conner Mclaughlin.   Copyright   2020 [employer]. All rights reserved. [employer] [address]363 - REV 01/05/21.

## 2021-10-18 NOTE — PROGRESS NOTES
Breanna is a 36 year old who is being evaluated via a billable telephone visit.      What phone number would you like to be contacted at? [phone]  How would you like to obtain your AVS? MyChart    Assessment & Plan       ICD-10-CM    1. Suspected 2019 novel coronavirus infection  Z20.822 Symptomatic COVID-19 Virus (Coronavirus) by PCR Nose     benzonatate (TESSALON) 100 MG capsule       Very high suspicion for COVID-19 given recent exposure and her current symptoms.  I recommend she continue with symptomatic treatment including ibuprofen, Tylenol, rest, Mucinex and albuterol inhaler as needed.   I recommend zinc, vitamin D and vitamin C.  Will prescribe Tessalon pearls to use as needed for cough.  Instructed to stay self quarantined for at least 10 days from onset of symptoms or until feeling better with no fever for 24 hours.  If she has significant shortness of breath, she should be seen emergently.  Will place [facility] referral if positive.  She is in agreement with this plan.       Lobo Son PA-C  [facility]   Breanna is a 36 year old who presents for the following health issues:    HPI     RN TRIAGE NOTE    Symptoms started two days ago. Cough, loss of taste, loss of smell, fever. Today has shortness of breath with walking and some chest pain with deep breaths.  Today is worse. Kids have come down with what mom has. Has a video visit today. There are no clinic appointments available. Needs 2nd level triage. Should she got to the ER? Can she be seen in the clinic. Please call her at:  [phone].  Thank you,  Mariah Mace RN  [facility]    MA note -     She is coughing up very thick and green phlegm. Chest pain is more consistent now. Fever last night 101.7. She has a headache, sinus pressure, ear pain, runny nose/congestion, wheezing, sore throat, and fatigue/achy. She was exposed 2 days ago to someone that has COVID. Taking ibuprofen,  Dayquil, and Mucinex for symptoms. She has slightly decreased sense of taste and smell. Denies shortness of breath.    Review of Systems   Constitutional, HEENT, cardiovascular, pulmonary, gi and gu systems are negative, except as otherwise noted.      Objective       Vitals:  No vitals were obtained today due to virtual visit.    Physical Exam   healthy, alert and no distress  PSYCH: Alert and oriented times 3; coherent speech, normal   rate and volume, able to articulate logical thoughts, able   to abstract reason, no tangential thoughts, no hallucinations   or delusions. Her affect is normal  RESP: No cough, no audible wheezing, able to talk in full sentences  Remainder of exam unable to be completed due to telephone visits        Phone call duration: 11 minutes

## 2021-10-18 NOTE — TELEPHONE ENCOUNTER
She probably should be evaluated in person for these symptoms since it would be difficult to assess over a virtual visit. Please call patient to advise ER or urgent care if there are no in person visits available.   Deanne Campos, CNP

## 2021-10-18 NOTE — TELEPHONE ENCOUNTER
Symptoms started two days ago. Cough, loss of taste, loss of smell, fever. Today has shortness of breath with walking and some chest pain with deep breaths.  Today is worse. Kids have come down with what mom has. Has a video visit today. There are no clinic appointments available. Needs 2nd level triage. Should she got to the ER? Can she be seen in the clinic. Please call her at:  [phone].  Thank you,  Mariah Mace RN  [facility]      Reason for Disposition    MILD difficulty breathing (e.g., minimal/no SOB at rest, SOB with walking, pulse <100)    Additional Information    Negative: SEVERE difficulty breathing (e.g., struggling for each breath, speaks in single words)    Negative: Difficult to awaken or acting confused (e.g., disoriented, slurred speech)    Negative: Bluish (or gray) lips or face now    Negative: Shock suspected (e.g., cold/pale/clammy skin, too weak to stand, low BP, rapid pulse)    Negative: Sounds like a life-threatening emergency to the triager    Negative: [1] COVID-19 exposure AND [2] no symptoms    Negative: COVID-19 vaccine reaction suspected (e.g., fever, headache, muscle aches) occurring 1 to 3 days after getting vaccine    Negative: COVID-19 vaccine, questions about    Negative: [1] Lives with someone known to have influenza (flu test positive) AND [2] flu-like symptoms (e.g., cough, runny nose, sore throat, SOB; with or without fever)    Negative: [1] Adult with possible COVID-19 symptoms AND [2] triager concerned about severity of symptoms or other causes    Negative: COVID-19 and breastfeeding, questions about    Negative: SEVERE or constant chest pain or pressure (Exception: mild central chest pain, present only when coughing)    Negative: MODERATE difficulty breathing (e.g., speaks in phrases, SOB even at rest, pulse 100-120)    Negative: [1] Headache AND [2] stiff neck (can't touch chin to chest)    Protocols used: CORONAVIRUS (COVID-19) DIAGNOSED OR  [id number] 8.25.2021

## 2021-10-19 ENCOUNTER — LAB (OUTPATIENT)
Dept: FAMILY MEDICINE | Facility: CLINIC | Age: 36
End: 2021-10-19
Attending: PHYSICIAN ASSISTANT
Payer: COMMERCIAL

## 2021-10-19 DIAGNOSIS — Z20.822 SUSPECTED 2019 NOVEL CORONAVIRUS INFECTION: ICD-10-CM

## 2021-10-19 PROCEDURE — U0003 INFECTIOUS AGENT DETECTION BY NUCLEIC ACID (DNA OR RNA); SEVERE ACUTE RESPIRATORY SYNDROME CORONAVIRUS 2 (SARS-COV-2) (CORONAVIRUS DISEASE [COVID-19]), AMPLIFIED PROBE TECHNIQUE, MAKING USE OF HIGH THROUGHPUT TECHNOLOGIES AS DESCRIBED BY CMS-2020-01-R: HCPCS

## 2021-10-19 PROCEDURE — U0005 INFEC AGEN DETEC AMPLI PROBE: HCPCS

## 2021-10-20 LAB — SARS-COV-2 RNA RESP QL NAA+PROBE: NEGATIVE

## 2021-10-28 ENCOUNTER — E-VISIT (OUTPATIENT)
Dept: URGENT CARE | Facility: CLINIC | Age: 36
End: 2021-10-28
Payer: COMMERCIAL

## 2021-10-28 ENCOUNTER — TELEPHONE (OUTPATIENT)
Dept: FAMILY MEDICINE | Facility: OTHER | Age: 36
End: 2021-10-28

## 2021-10-28 DIAGNOSIS — J01.90 ACUTE SINUSITIS WITH SYMPTOMS > 10 DAYS: Primary | ICD-10-CM

## 2021-10-28 PROCEDURE — 99421 OL DIG E/M SVC 5-10 MIN: CPT | Performed by: PHYSICIAN ASSISTANT

## 2021-10-28 NOTE — TELEPHONE ENCOUNTER
Patient calling to update on virtual appointment that she had with Lobo Son on the 18th. Note Covid testing was negative on the 19th. Patient calling and stating she is still having issues with her sinuses,congestion, cough has gotten better. Please advise on an antibiotic. Tram Rai LPN

## 2021-10-28 NOTE — PATIENT INSTRUCTIONS
Dear Breanna Vidal    After reviewing your responses, I've been able to diagnose you with?a sinus infection caused by bacteria.?     Based on your responses and diagnosis, I have prescribed antibiotics to treat your symptoms. I have sent this to your pharmacy.?     It is also important to stay well hydrated, get lots of rest and take over-the-counter decongestants,?tylenol?or ibuprofen if you?are able to?take those medications per your primary care provider to help relieve discomfort.?     It is important that you take?all of?your prescribed medication even if your symptoms are improving after a few doses.? Taking?all of?your medicine helps prevent the symptoms from returning.?     If your symptoms worsen, you develop severe headache, vomiting, high fever (>102), or are not improving in 7 days, please contact your primary care provider for an appointment or visit any of our convenient Walk-in Care or Urgent Care Centers to be seen which can be found on our website?here.?     Thanks again for choosing?us?as your health care partner,?   ?  Jerzy Cruz PA-C?

## 2021-11-02 ENCOUNTER — NURSE TRIAGE (OUTPATIENT)
Dept: NURSING | Facility: CLINIC | Age: 36
End: 2021-11-02

## 2021-11-02 NOTE — TELEPHONE ENCOUNTER
Telephone call:     Patient is calling after an E-visit for sinus infection on 10/28. She was prescribed Amoxicillin and hasn't taken it because she is prone to yeast infection.   Her symptoms are not improved and she would like to start taking the antibiotic today. Answered patient's questions using protocol care advice. No triage-denies new or worsening symptoms at the time of the call.     Instructed to call back with additional concerns.     Mariya St RN   11/02/21 10:09 AM  [facility] Nurse Advisor    Reason for Disposition    [1] Symptoms of a yeast infection (i.e., itchy, white discharge, not bad smelling) AND [2] feels like prior vaginal yeast infections    Caller has question and triager able to answer question    Protocols used: VAGINAL SYMPTOMS-A-AH, INFECTION ON ANTIBIOTIC FOLLOW-UP CALL-A-OH      COVID 19 Nurse Triage Plan/Patient Instructions    Please be aware that novel coronavirus (COVID-19) may be circulating in the community. If you develop symptoms such as fever, cough, or SOB or if you have concerns about the presence of another infection including coronavirus (COVID-19), please contact your health care provider or visit [email or web address].     Disposition/Instructions    Home care recommended. Follow home care protocol based instructions.    Thank you for taking steps to prevent the spread of this virus.  o Limit your contact with others.  o Wear a simple mask to cover your cough.  o Wash your hands well and often.    Resources    [facility]: About COVID-19: [email or web address]    CDC: What to Do If You're Sick: www.cdc.gov/coronavirus/2019-ncov/about/steps-when-sick.html    CDC: Ending Home Isolation: www.cdc.gov/coronavirus/2019-ncov/hcp/disposition-in-home-patients.html     CDC: Caring for Someone: www.cdc.gov/coronavirus/2019-ncov/if-you-are-sick/care-for-someone.html     [facility]: Interim Guidance for Hospital Discharge to Home:  www.health.[facility].[place]/diseases/coronavirus/hcp/hospdischarge.pdf    [facility] clinical trials (COVID-19 research studies): clinicalaffairs.[facility]/umn-clinical-trials     Below are the COVID-19 hotlines at the [facility] ([facility]). Interpreters are available.   o For health questions: Call 842-781-4889 or 1-134.640.6705 (7 a.m. to 7 p.m.)  o For questions about schools and childcare: Call 356-693-5366 or 1-812.557.8489 (7 a.m. to 7 p.m.)

## 2021-11-14 ENCOUNTER — E-VISIT (OUTPATIENT)
Dept: FAMILY MEDICINE | Facility: CLINIC | Age: 36
End: 2021-11-14
Payer: COMMERCIAL

## 2021-11-14 ENCOUNTER — MYC MEDICAL ADVICE (OUTPATIENT)
Dept: PODIATRY | Facility: CLINIC | Age: 36
End: 2021-11-14

## 2021-11-14 DIAGNOSIS — R52 PAIN: Primary | ICD-10-CM

## 2021-11-14 PROCEDURE — 99207 PR NON-BILLABLE SERV PER CHARTING: CPT | Performed by: INTERNAL MEDICINE

## 2021-11-15 ENCOUNTER — E-VISIT (OUTPATIENT)
Dept: URGENT CARE | Facility: CLINIC | Age: 36
End: 2021-11-15
Payer: COMMERCIAL

## 2021-11-15 DIAGNOSIS — Z20.822 CLOSE EXPOSURE TO 2019 NOVEL CORONAVIRUS: Primary | ICD-10-CM

## 2021-11-15 PROCEDURE — 99421 OL DIG E/M SVC 5-10 MIN: CPT | Performed by: FAMILY MEDICINE

## 2021-11-15 NOTE — PATIENT INSTRUCTIONS
"  Dear Breanna Luna,    Based on your exposure to COVID-19 (coronavirus), we would like to test you for this virus. I have placed an order for this test.The best time for testing is 5-7 days after the exposure.    How to schedule:  Go to your [facility] home page and scroll down to the section that says  You have an appointment that needs to be scheduled  and click the large green button that says  Schedule Now  and follow the steps to find the next available opening.     If you are unable to complete these [facility] scheduling steps, please call [phone] to schedule your testing.     Return to work/school/ guidance:   For people with high risk exposures outside the home    Please let your workplace manager and staffing office know when your quarantine ends.     We can not give you an exact date as it depends on the information below. You can calculate this on your own or work with your manager/staffing office to calculate this. (For example if you were exposed on 10/4, you would have to quarantine for 14 full days. That would be through 10/18. You could return on 10/19.)    Quarantine Guidelines:  Patients (\"contacts\") who have been in close prolonged contact of an infected person(s) (within six feet for at least 15 minutes within a 24 hour period), and remain asymptomatic should enter quarantine based on the following options:    14-day quarantine period (this remains the CDC recommendation for the greatest protection against spread of COVID-19) OR    Minimum 7-day quarantine with negative RT-PCR test collected on day 5 or later OR    10-day quarantine with no test  Quarantine Guideline exceptions are as follows:    People who have been fully vaccinated do not need to quarantine if the exposure was at least 2 weeks after the last vaccination. This includes vaccinated health care workers.    Not fully vaccinated and unvaccinated Individuals who work in health care, congregate care, or congregate living " should be off work for 14 days from their last date of exposure. Community activities for this group can be resumed based on options above. Fully vaccinated individuals in this group do not need to quarantine from work after exposure.    Not fully vaccinated and unvaccinated people whose high-risk exposure was a household member should always quarantine for 14 days from their last date of exposure. Fully vaccinated people in this category do not need to quarantine.    Not fully vaccinated or unvaccinated residents of congregate care and congregate living settings should always quarantine for 14 days from their last date of exposure. Fully vaccinated residents do not need to quarantine.  Note: If you have ongoing exposure to the covid positive person, this quarantine period may be more than 14 days. (For example, if you are continued to be exposed to your child who tested positive and cannot isolate from them, then the quarantine of 7-14 days can't start until your child is no longer contagious. This is typically 10 days from onset of the child's symptoms. So the total duration may be 17-24 days in this case.)    You should continue symptom monitoring until day 14 post-exposure. If you develop signs or symptoms of COVID-19, isolate and get tested (even if you have been tested already).    How to quarantine:   Stay home and away from others. Don't go to school or anywhere else. Generally quarantine means staying home from work but there are some exceptions to this. Please contact your workplace.  No hugging, kissing or shaking hands.  Don't let anyone visit.  Cover your mouth and nose with a mask, tissue or washcloth to avoid spreading germs.  Wash your hands and face often. Use soap and water.    What are the symptoms of COVID-19?  The most common symptoms are cough, fever and trouble breathing. Less common symptoms include headache, body aches, fatigue (feeling very tired), chills, sore throat, stuffy or runny nose,  diarrhea (loose poop), loss of taste or smell, belly pain, and nausea or vomiting (feeling sick to your stomach or throwing up).  After 14 days, if you have still don't have symptoms, you likely don't have this virus.  If you develop symptoms, follow these guidelines.  If you're normally healthy: Please start another eVisit.  If you have a serious health problem (like cancer, heart failure, an organ transplant or kidney disease): Call your specialty clinic. Let them know that you might have COVID-19.    Where can I get more information?  [facility] - About COVID-19: [email or web address]  CDC - What to Do If You're Sick: www.cdc.gov/coronavirus/2019-ncov/about/steps-when-sick.html  CDC - Ending Home Isolation: www.cdc.gov/coronavirus/2019-ncov/hcp/disposition-in-home-patients.html  CDC - Caring for Someone: www.cdc.gov/coronavirus/2019-ncov/if-you-are-sick/care-for-someone.html  [facility] clinical trials (COVID-19 research studies): clinicalaffairs.[facility]-clinical-trials  Below are the COVID-19 hotlines at the [place] [facility] ([facility]). Interpreters are available.  For health questions: Call 613-827-1473 or 1-984.339.7715 (7 a.m. to 7 p.m.)  For questions about schools and childcare: Call 629-151-6039 or 1-387.912.1949 (7 a.m. to 7 p.m.)

## 2021-11-15 NOTE — TELEPHONE ENCOUNTER
Spoke to patient, advised patient to have covid test tomorrow , wait for results, if she is negative she can then call us back to get in to see Dr Lopez.       Hanh Gonzalez on 11/15/2021 at 9:07 AM

## 2021-11-16 ENCOUNTER — LAB (OUTPATIENT)
Dept: URGENT CARE | Facility: URGENT CARE | Age: 36
End: 2021-11-16
Attending: FAMILY MEDICINE
Payer: COMMERCIAL

## 2021-11-16 DIAGNOSIS — Z20.822 CLOSE EXPOSURE TO 2019 NOVEL CORONAVIRUS: ICD-10-CM

## 2021-11-16 PROCEDURE — U0003 INFECTIOUS AGENT DETECTION BY NUCLEIC ACID (DNA OR RNA); SEVERE ACUTE RESPIRATORY SYNDROME CORONAVIRUS 2 (SARS-COV-2) (CORONAVIRUS DISEASE [COVID-19]), AMPLIFIED PROBE TECHNIQUE, MAKING USE OF HIGH THROUGHPUT TECHNOLOGIES AS DESCRIBED BY CMS-2020-01-R: HCPCS

## 2021-11-16 PROCEDURE — U0005 INFEC AGEN DETEC AMPLI PROBE: HCPCS

## 2021-11-17 LAB — SARS-COV-2 RNA RESP QL NAA+PROBE: NEGATIVE

## 2021-11-19 ENCOUNTER — OFFICE VISIT (OUTPATIENT)
Dept: INTERNAL MEDICINE | Facility: CLINIC | Age: 36
End: 2021-11-19
Payer: COMMERCIAL

## 2021-11-19 ENCOUNTER — HOSPITAL ENCOUNTER (OUTPATIENT)
Dept: GENERAL RADIOLOGY | Facility: CLINIC | Age: 36
Discharge: HOME | End: 2021-11-19
Attending: INTERNAL MEDICINE | Admitting: INTERNAL MEDICINE
Payer: COMMERCIAL

## 2021-11-19 ENCOUNTER — TELEPHONE (OUTPATIENT)
Dept: FAMILY MEDICINE | Facility: CLINIC | Age: 36
End: 2021-11-19

## 2021-11-19 ENCOUNTER — MYC MEDICAL ADVICE (OUTPATIENT)
Dept: INTERNAL MEDICINE | Facility: CLINIC | Age: 36
End: 2021-11-19

## 2021-11-19 VITALS
SYSTOLIC BLOOD PRESSURE: 114 MMHG | BODY MASS INDEX: 35.78 KG/M2 | RESPIRATION RATE: 18 BRPM | TEMPERATURE: 97.1 F | HEART RATE: 90 BPM | DIASTOLIC BLOOD PRESSURE: 80 MMHG | OXYGEN SATURATION: 97 % | WEIGHT: 225.06 LBS

## 2021-11-19 DIAGNOSIS — R07.89 CHEST WALL PAIN: ICD-10-CM

## 2021-11-19 DIAGNOSIS — F15.11 METHAMPHETAMINE ABUSE IN REMISSION (H): ICD-10-CM

## 2021-11-19 DIAGNOSIS — R05.9 COUGH: Primary | ICD-10-CM

## 2021-11-19 DIAGNOSIS — R05.9 COUGH: ICD-10-CM

## 2021-11-19 PROCEDURE — 99214 OFFICE O/P EST MOD 30 MIN: CPT | Performed by: INTERNAL MEDICINE

## 2021-11-19 PROCEDURE — 71046 X-RAY EXAM CHEST 2 VIEWS: CPT

## 2021-11-19 RX ORDER — PREDNISONE 20 MG/1
40 TABLET ORAL DAILY
Qty: 10 TABLET | Refills: 0 | Status: SHIPPED | OUTPATIENT
Start: 2021-11-19 | End: 2022-01-20

## 2021-11-19 RX ORDER — HYDROCODONE BITARTRATE AND ACETAMINOPHEN 5; 325 MG/1; MG/1
1 TABLET ORAL EVERY 6 HOURS PRN
Qty: 10 TABLET | Refills: 0 | Status: SHIPPED | OUTPATIENT
Start: 2021-11-19 | End: 2021-11-22

## 2021-11-19 RX ORDER — AZITHROMYCIN 250 MG/1
TABLET, FILM COATED ORAL
Qty: 6 TABLET | Refills: 0 | Status: SHIPPED | OUTPATIENT
Start: 2021-11-19 | End: 2022-01-20

## 2021-11-19 ASSESSMENT — PAIN SCALES - GENERAL: PAINLEVEL: EXTREME PAIN (8)

## 2021-11-19 NOTE — TELEPHONE ENCOUNTER
Provider E-Visit time total (minutes): Please add 5 minute billing charge as [employer] billing disappeared.

## 2021-11-19 NOTE — TELEPHONE ENCOUNTER
S-(situation): cough    B-(background): low grade fevers with chills.  Recent COVID testing is negative.  Productive cough with green phlegm.  Nasal congestion and chest congestion.  Taking day quil , mucinex tylenol and IB and tessalon perles.  Nothing is working.     A-(assessment): productive cough    R-(recommendations): in person appt needed. Asia HICKMAN RN

## 2021-11-19 NOTE — PROGRESS NOTES
"  Assessment & Plan   Problem List Items Addressed This Visit     None      Visit Diagnoses     Cough    -  Primary    Relevant Medications    azithromycin (ZITHROMAX) 250 MG tablet    predniSONE (DELTASONE) 20 MG tablet    Other Relevant Orders    XR Chest 2 Views    Chest wall pain        Relevant Medications    HYDROcodone-acetaminophen (NORCO) 5-325 MG tablet         Is a 36-year-old woman who has a history of some drug abuse.  She has been sick for the last couple months.  Tested negative for Covid a couple times most recently 3 days ago.  She is got a severe cough, some fever, sputum which is green.  She has lot of chest wall pain when she is coughing.  She is achy and tired.  She worries about pneumonia.  Throughout the fall she is been treated with antibiotics including Augmentin, prednisone and using her albuterol inhaler.  Her lungs are surprisingly clear but I still worry about pneumonia today we will do a chest x-ray.  Vera treat her with a Z-Donovan but depending on the x-ray may need to add another antibiotic.  Due to the inflammation and severe chest wall pain when to treat her with prednisone as well.  I will give her a small dose of Vicodin 10 for the pain as it is quite unbearable.  She is been using Tylenol and ibuprofen.  The patient will follow up as needed next week if not improving.  Will discuss her chest x-ray later today with her.           BMI:   Estimated body mass index is 35.78 kg/m  as calculated from the following:    Height as of 2/18/21: 1.689 m (5' 6.5\").    Weight as of this encounter: 102.1 kg (225 lb 1 oz).           No follow-ups on file.    Elmo Moore MD  [facility]    Bailey Carlos is a 36 year old who presents for the following health issues     HPI     Acute Illness  Acute illness concerns: cough, fever,   Onset/Duration: 2 weeks   Symptoms:  Fever: YES  Chills/Sweats: YES  Headache (location?): YES  Sinus Pressure: YES- some  Conjunctivitis:  " no  Ear Pain: YES: right  Rhinorrhea: no  Congestion: YES  Sore Throat: no  Cough: YES-productive of yellow sputum, productive of green sputum, with shortness of breath  Wheeze: YES  Decreased Appetite: YES  Nausea: YES  Vomiting: no  Diarrhea: YES  Dysuria/Freq.: no  Dysuria or Hematuria: no  Fatigue/Achiness: YES  Sick/Strep Exposure: no  Therapies tried and outcome: dayquil, tylenol, ibu, mucinex, tessalon pearls have not helped. Patient stated chest hurts.     Sick on and off since September, had cough, now sputum, green yellow, chest pain with cough.     She has been on augmentin, prednisone before, has an albuterol inhaler and tessalon perles.      Fever as well.     Negative covid on the 16th.  Family was tested and negative,  and kids aren't sick.      Past Medical History:   Diagnosis Date     Cellulitis 2012    MRSA septic olecranon bursitis and facial cellulitis     Drug abuse (H) 2011    methamphetamine and alcohol, sober since treatment 2011     Mild pre-eclampsia in third trimester      MRSA cellulitis 2012    cleared with negative nasal swabs 8/16/17 and 8/28/17, reviewed     PONV (postoperative nausea and vomiting)      Pregnancy-induced hypertension in third trimester      Rh negative, antepartum      Current Outpatient Medications   Medication     acetaminophen (TYLENOL) 500 MG tablet     albuterol (PROAIR HFA/PROVENTIL HFA/VENTOLIN HFA) 108 (90 Base) MCG/ACT inhaler     albuterol (PROVENTIL HFA) 108 (90 Base) MCG/ACT inhaler     benzonatate (TESSALON) 100 MG capsule     ibuprofen (ADVIL/MOTRIN) 200 MG tablet     EPINEPHrine (EPIPEN/ADRENACLICK/OR ANY BX GENERIC EQUIV) 0.3 MG/0.3ML injection 2-pack     No current facility-administered medications for this visit.     Social History     Tobacco Use     Smoking status: Former Smoker     Packs/day: 0.00     Years: 0.00     Pack years: 0.00     Types: Cigarettes     Smokeless tobacco: Former User     Quit date: 3/10/2016   Vaping Use     Vaping  Use: Never used   Substance Use Topics     Alcohol use: No     Comment: history alcohol abuse 2012, s/p treatment     Drug use: No     Comment: former drug user (EtOH, cocaine, marijuana, meth), last use 2012       Review of Systems         Objective    /80 (BP Location: Left arm, Patient Position: Chair, Cuff Size: Adult Large)   Pulse 90   Temp 97.1  F (36.2  C) (Temporal)   Resp 18   Wt 102.1 kg (225 lb 1 oz)   LMP 10/21/2021   SpO2 97%   BMI 35.78 kg/m    There is no height or weight on file to calculate BMI.  Physical Exam   Moderate distress from coughing and chest pain.   Heart is regular  Lungs have mild crackles, not severe,   Cough and sputum during exam.

## 2021-11-21 NOTE — PROGRESS NOTES
Chief Complaint - Hearing loss, snoring    History of Present Illness - Breanna Luna is a 36 year old female who presents to me today with hearing loss for 2-3 years. The patient has noticed increased difficulty hearing.  notices it more. There is no history of chronic ear disease or ear surgery.  With regards to recreational, , and work-related noise exposure she had some with a job when younger. 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 No family history of hearing loss at a young age.     She also snores. She denies apnea. She feels well rested. She isn't waking a lot. She has tried breath-right strips. She has some congestion. She uses flonase, afrin, nasal saline. She weaned off the afrin. Occasionally has congestion during the day. No significant allergies.     Past Medical History -   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)     Intractable migraine without aura and without status migrainosus     Biliary dyskinesia     Morbid obesity (H)       Current Medications -   Current Outpatient Medications:      acetaminophen (TYLENOL) 500 MG tablet, Take 1,000 mg by mouth every 6 hours as needed for mild pain, Disp: , Rfl:      albuterol (PROAIR HFA/PROVENTIL HFA/VENTOLIN HFA) 108 (90 Base) MCG/ACT inhaler, Inhale 2 puffs into the lungs every 4 hours as needed for shortness of breath / dyspnea, Disp: 1 Inhaler, Rfl: 0     albuterol (PROVENTIL HFA) 108 (90 Base) MCG/ACT inhaler, Inhale 2 puffs into the lungs every 6 hours, Disp: 18 g, Rfl: 0     azithromycin (ZITHROMAX) 250 MG tablet, Two tablets first day, then one tablet daily for four days., Disp: 6 tablet, Rfl: 0     benzonatate (TESSALON) 100 MG capsule, Take 1 capsule (100 mg) by mouth 3 times daily as needed for cough, Disp: 30 capsule, Rfl: 0     EPINEPHrine (EPIPEN/ADRENACLICK/OR ANY BX GENERIC  EQUIV) 0.3 MG/0.3ML injection 2-pack, INJECT  0.3 ML IM 1 TIME PRF ANAPHYLAXIS (Patient not taking: Reported on 10/18/2021), Disp: , Rfl: 2     HYDROcodone-acetaminophen (NORCO) 5-325 MG tablet, Take 1 tablet by mouth every 6 hours as needed for severe pain, Disp: 10 tablet, Rfl: 0     ibuprofen (ADVIL/MOTRIN) 200 MG tablet, Take 600 mg by mouth every 4 hours as needed for mild pain, Disp: , Rfl:      predniSONE (DELTASONE) 20 MG tablet, Take 2 tablets (40 mg) by mouth daily, Disp: 10 tablet, Rfl: 0    Allergies -   Allergies   Allergen Reactions     Bees Anaphylaxis     Has Epi pen      Codeine Nausea     Other reaction(s): Nausea     Imitrex [Sumatriptan]      Worsening of migraine, paresthesias     Adhesive [Liquid Adhesive] Itching     Redness and itching around incision after hernia repair surgery     Ranitidine Rash and Itching     recvd IV zantac 75 and reaction was immediate,  was given Benadryl to counter act reaction in      Sulfa Drugs Unknown and Rash       Social History -   Social History     Socioeconomic History     Marital status: Single     Spouse name: Not on file     Number of children: 4     Years of education: Not on file     Highest education level: Not on file   Occupational History     Not on file   Tobacco Use     Smoking status: Former Smoker     Packs/day: 0.00     Years: 0.00     Pack years: 0.00     Types: Cigarettes     Smokeless tobacco: Former User     Quit date: 3/10/2016   Vaping Use     Vaping Use: Never used   Substance and Sexual Activity     Alcohol use: No     Comment: history alcohol abuse 2012, s/p treatment     Drug use: No     Comment: former drug user (EtOH, cocaine, marijuana, meth), last use      Sexual activity: Yes     Partners: Male     Birth control/protection: Female Surgical   Other Topics Concern     Parent/sibling w/ CABG, MI or angioplasty before 65F 55M? Yes     Comment: Mother had a bypass surgery mother  from lupus   Social History Narrative     Currently lives in [place] with fiance and 18 month old son.  Has a 13 year old dtr that lives with dtrs dad in [place], [place], and a 6  Year old that was adopted out in an open adoption, lives in [place]     Social Determinants of Health     Financial Resource Strain: Low Risk      Difficulty of Paying Living Expenses: Not hard at all   Food Insecurity: No Food Insecurity     Worried About Running Out of Food in the Last Year: Never true     Ran Out of Food in the Last Year: Never true   Transportation Needs: No Transportation Needs     Lack of Transportation (Medical): No     Lack of Transportation (Non-Medical): No   Physical Activity: Not on file   Stress: Not on file   Social Connections: Not on file   Intimate Partner Violence: Not on file   Housing Stability: Not on file       Family History -   Family History   Problem Relation Age of Onset     Cancer Father      Pancreatic Cancer Father      Lung Cancer Father      Lupus Mother        Review of Systems - As per HPI and PMHx, otherwise 7 system review is negative.    Physical Exam  BP (!) 126/91   Pulse 94   Resp 16   SpO2 99%   General - The patient is in no distress.  Alert and oriented to person and place, answers questions and cooperates with examination appropriately.   Voice and Breathing - The patient was breathing comfortably without the use of accessory muscles. There was no wheezing, stridor, or stertor.  The patients voice was clear and strong.  Ears - The auricles are normal. The tympanic membranes are normal in appearance, bony landmarks are intact.  No retraction, perforation, or masses.  No fluid or purulence was seen in the external canal or the middle ear. No evidence of infection of the middle ear or external canal, cerumen was normal in appearance.  Eyes - Extraocular movements intact.  Sclera were not icteric or injected.  Mouth - Examination of the oral cavity showed pink, healthy mucosa. No lesions or ulcerations noted.  The tongue  was mobile and midline.  Throat - The walls of the oropharynx were smooth, symmetric, and had no lesions or ulcerations. The uvula was midline on elevation. Tonsils 1+, no masses.  Neck - Soft, non-tender. Palpation of the occipital, submental, submandibular, internal jugular chain, and supraclavicular nodes did not demonstrate any abnormal lymph nodes or masses. Parotid glands had no masses. Palpation of the thyroid was soft and smooth, with no nodules or goiter appreciated.  The trachea was mobile and midline.  Neurological - Cranial nerves 2 through 12 were grossly intact. House-Brackmann grade 1 out of 6 bilaterally.   Nose - septum mildly deviated. turbinates normal.    Audiologic Studies - An audiogram and tympanogram were performed today as part of the evaluation and personally reviewed. The tympanogram shows a normal Type A curve, with normal canal volume and middle ear pressure.  There is no sign of eustachian tube dysfunction or middle ear effusion.  The audiogram showed a a mild left-sided down-sloping sensorineural hearing loss at 8k, boarderline right ear.      Assessment and Plan - Breanna Luna is a 36 year old female who presents to me today with 2 chronic problems.  She suspects hearing loss.  Overall her hearing is mostly normal with just borderline to mild downsloping sensorineural hearing loss.  Certainly nothing to necessitate a hearing aid.  We discussed some techniques and tips to improve the hearing especially with background noise and facing one another when speaking.  Recheck audiogram in 1 year.    She also has snoring but not likely sleep apnea.  She seems well rested and denies frequent awakenings or being tired in the morning and throughout the day.  We discussed tips such as a mouthguard, sleeping on the side, weight loss, possible sleep study if things worsen or it seems like she has sleep apnea.  She can use Flonase for some nasal obstruction.    Lobo Narayanan MD  Otolaryngology  [facility]

## 2021-11-23 ENCOUNTER — OFFICE VISIT (OUTPATIENT)
Dept: AUDIOLOGY | Facility: CLINIC | Age: 36
End: 2021-11-23
Payer: COMMERCIAL

## 2021-11-23 ENCOUNTER — OFFICE VISIT (OUTPATIENT)
Dept: OTOLARYNGOLOGY | Facility: CLINIC | Age: 36
End: 2021-11-23
Payer: COMMERCIAL

## 2021-11-23 VITALS
HEART RATE: 94 BPM | RESPIRATION RATE: 16 BRPM | SYSTOLIC BLOOD PRESSURE: 126 MMHG | DIASTOLIC BLOOD PRESSURE: 91 MMHG | OXYGEN SATURATION: 99 %

## 2021-11-23 DIAGNOSIS — H90.42 SENSORINEURAL HEARING LOSS (SNHL) OF LEFT EAR WITH UNRESTRICTED HEARING OF RIGHT EAR: Primary | ICD-10-CM

## 2021-11-23 DIAGNOSIS — R06.83 SNORING: ICD-10-CM

## 2021-11-23 PROCEDURE — 92550 TYMPANOMETRY & REFLEX THRESH: CPT | Performed by: AUDIOLOGIST

## 2021-11-23 PROCEDURE — 92557 COMPREHENSIVE HEARING TEST: CPT | Performed by: AUDIOLOGIST

## 2021-11-23 PROCEDURE — 99207 PR NO CHARGE LOS: CPT | Performed by: AUDIOLOGIST

## 2021-11-23 PROCEDURE — 99203 OFFICE O/P NEW LOW 30 MIN: CPT | Performed by: OTOLARYNGOLOGY

## 2021-11-23 NOTE — LETTER
11/23/2021         RE: Breanna Luna  [address] [address] [place] [id number]        Dear Colleague,    Thank you for referring your patient, Breanna Luna, to the [facility]. Please see a copy of my visit note below.    Chief Complaint - Hearing loss, snoring    History of Present Illness - Breanna Luna is a 36 year old female who presents to me today with hearing loss for 2-3 years. The patient has noticed increased difficulty hearing.  notices it more. There is no history of chronic ear disease or ear surgery.  With regards to recreational, , and work-related noise exposure she had some with a job when younger. 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 No family history of hearing loss at a young age.     She also snores. She denies apnea. She feels well rested. She isn't waking a lot. She has tried breath-right strips. She has some congestion. She uses flonase, afrin, nasal saline. She weaned off the afrin. Occasionally has congestion during the day. No significant allergies.     Past Medical History -   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)     Intractable migraine without aura and without status migrainosus     Biliary dyskinesia     Morbid obesity (H)       Current Medications -   Current Outpatient Medications:      acetaminophen (TYLENOL) 500 MG tablet, Take 1,000 mg by mouth every 6 hours as needed for mild pain, Disp: , Rfl:      albuterol (PROAIR HFA/PROVENTIL HFA/VENTOLIN HFA) 108 (90 Base) MCG/ACT inhaler, Inhale 2 puffs into the lungs every 4 hours as needed for shortness of breath / dyspnea, Disp: 1 Inhaler, Rfl: 0     albuterol (PROVENTIL HFA) 108 (90 Base) MCG/ACT inhaler, Inhale 2 puffs into the lungs every 6 hours, Disp: 18 g, Rfl: 0     azithromycin (ZITHROMAX) 250 MG tablet, Two tablets first day, then  one tablet daily for four days., Disp: 6 tablet, Rfl: 0     benzonatate (TESSALON) 100 MG capsule, Take 1 capsule (100 mg) by mouth 3 times daily as needed for cough, Disp: 30 capsule, Rfl: 0     EPINEPHrine (EPIPEN/ADRENACLICK/OR ANY BX GENERIC EQUIV) 0.3 MG/0.3ML injection 2-pack, INJECT  0.3 ML IM 1 TIME PRF ANAPHYLAXIS (Patient not taking: Reported on 10/18/2021), Disp: , Rfl: 2     HYDROcodone-acetaminophen (NORCO) 5-325 MG tablet, Take 1 tablet by mouth every 6 hours as needed for severe pain, Disp: 10 tablet, Rfl: 0     ibuprofen (ADVIL/MOTRIN) 200 MG tablet, Take 600 mg by mouth every 4 hours as needed for mild pain, Disp: , Rfl:      predniSONE (DELTASONE) 20 MG tablet, Take 2 tablets (40 mg) by mouth daily, Disp: 10 tablet, Rfl: 0    Allergies -   Allergies   Allergen Reactions     Bees Anaphylaxis     Has Epi pen      Codeine Nausea     Other reaction(s): Nausea     Imitrex [Sumatriptan]      Worsening of migraine, paresthesias     Adhesive [Liquid Adhesive] Itching     Redness and itching around incision after hernia repair surgery     Ranitidine Rash and Itching     recvd IV zantac 75 and reaction was immediate,  was given Benadryl to counter act reaction in 2000     Sulfa Drugs Unknown and Rash       Social History -   Social History     Socioeconomic History     Marital status: Single     Spouse name: Not on file     Number of children: 4     Years of education: Not on file     Highest education level: Not on file   Occupational History     Not on file   Tobacco Use     Smoking status: Former Smoker     Packs/day: 0.00     Years: 0.00     Pack years: 0.00     Types: Cigarettes     Smokeless tobacco: Former User     Quit date: 3/10/2016   Vaping Use     Vaping Use: Never used   Substance and Sexual Activity     Alcohol use: No     Comment: history alcohol abuse 2012, s/p treatment     Drug use: No     Comment: former drug user (EtOH, cocaine, marijuana, meth), last use 2012     Sexual activity: Yes      Partners: Male     Birth control/protection: Female Surgical   Other Topics Concern     Parent/sibling w/ CABG, MI or angioplasty before 65F 55M? Yes     Comment: Mother had a bypass surgery mother  from lupus   Social History Narrative    Currently lives in [place] with fiance and 18 month old son.  Has a 13 year old dtr that lives with dtrs dad in [place], [place], and a 6  Year old that was adopted out in an open adoption, lives in [place]     Social Determinants of Health     Financial Resource Strain: Low Risk      Difficulty of Paying Living Expenses: Not hard at all   Food Insecurity: No Food Insecurity     Worried About Running Out of Food in the Last Year: Never true     Ran Out of Food in the Last Year: Never true   Transportation Needs: No Transportation Needs     Lack of Transportation (Medical): No     Lack of Transportation (Non-Medical): No   Physical Activity: Not on file   Stress: Not on file   Social Connections: Not on file   Intimate Partner Violence: Not on file   Housing Stability: Not on file       Family History -   Family History   Problem Relation Age of Onset     Cancer Father      Pancreatic Cancer Father      Lung Cancer Father      Lupus Mother        Review of Systems - As per HPI and PMHx, otherwise 7 system review is negative.    Physical Exam  BP (!) 126/91   Pulse 94   Resp 16   SpO2 99%   General - The patient is in no distress.  Alert and oriented to person and place, answers questions and cooperates with examination appropriately.   Voice and Breathing - The patient was breathing comfortably without the use of accessory muscles. There was no wheezing, stridor, or stertor.  The patients voice was clear and strong.  Ears - The auricles are normal. The tympanic membranes are normal in appearance, bony landmarks are intact.  No retraction, perforation, or masses.  No fluid or purulence was seen in the external canal or the middle ear. No evidence of infection of the middle ear  or external canal, cerumen was normal in appearance.  Eyes - Extraocular movements intact.  Sclera were not icteric or injected.  Mouth - Examination of the oral cavity showed pink, healthy mucosa. No lesions or ulcerations noted.  The tongue was mobile and midline.  Throat - The walls of the oropharynx were smooth, symmetric, and had no lesions or ulcerations. The uvula was midline on elevation. Tonsils 1+, no masses.  Neck - Soft, non-tender. Palpation of the occipital, submental, submandibular, internal jugular chain, and supraclavicular nodes did not demonstrate any abnormal lymph nodes or masses. Parotid glands had no masses. Palpation of the thyroid was soft and smooth, with no nodules or goiter appreciated.  The trachea was mobile and midline.  Neurological - Cranial nerves 2 through 12 were grossly intact. House-Brackmann grade 1 out of 6 bilaterally.   Nose - septum mildly deviated. turbinates normal.    Audiologic Studies - An audiogram and tympanogram were performed today as part of the evaluation and personally reviewed. The tympanogram shows a normal Type A curve, with normal canal volume and middle ear pressure.  There is no sign of eustachian tube dysfunction or middle ear effusion.  The audiogram showed a a mild left-sided down-sloping sensorineural hearing loss at 8k, boarderline right ear.      Assessment and Plan - Breanna Luna is a 36 year old female who presents to me today with 2 chronic problems.  She suspects hearing loss.  Overall her hearing is mostly normal with just borderline to mild downsloping sensorineural hearing loss.  Certainly nothing to necessitate a hearing aid.  We discussed some techniques and tips to improve the hearing especially with background noise and facing one another when speaking.  Recheck audiogram in 1 year.    She also has snoring but not likely sleep apnea.  She seems well rested and denies frequent awakenings or being tired in the morning and throughout the  day.  We discussed tips such as a mouthguard, sleeping on the side, weight loss, possible sleep study if things worsen or it seems like she has sleep apnea.  She can use Flonase for some nasal obstruction.    Lobo Narayanan MD  Otolaryngology  [facility]          Again, thank you for allowing me to participate in the care of your patient.        Sincerely,        Lobo Narayanan MD

## 2021-11-23 NOTE — PROGRESS NOTES
AUDIOLOGY REPORT:    Patient was referred from ENT by Dr. Narayanan for audiology evaluation. The patient reports worsening hearing, noting that her  has expressed concern. She also has concerns about snoring and adenoids. She reports pressure in the right ear but has not had any recent ear infections. The patient reports some noise exposure at work as well as from a firearm going off near her. She reports no known history of ear problems or ear surgery, but notes that she was adopted at age 7, so her early childhood history and family history are unknown.     Testing:    Otoscopy:   Otoscopic exam indicates ears are clear of cerumen bilaterally     Tympanograms:    RIGHT: normal eardrum mobility     LEFT:   normal eardrum mobility    Reflexes (reported by stimulus ear):  RIGHT: Ipsilateral is present at normal levels  RIGHT: Contralateral is present at normal levels  LEFT:   Ipsilateral is present at normal levels  LEFT:   Contralateral is present at normal levels    Thresholds:   Pure Tone Thresholds assessed using conventional audiometry with good reliability from 250-8000 Hz bilaterally using insert earphones and circumaural headphones     RIGHT:  normal hearing sensitivity at all tested frequencies     LEFT:    normal hearing sensitivity through 6000 Hz sloping to mild likely sensorineural hearing loss    Speech Reception Threshold:    RIGHT: 15 dB HL    LEFT:   15 dB HL  Results are in agreement with pure tone average.     Word Recognition Score:     RIGHT: 100% at 55 dB HL using NU-6 recorded word list.    LEFT:   96% at 55 dB HL using NU-6 recorded word list.    Discussed results with the patient.     Patient was returned to ENT for follow up.     Jaydon Green, CCC-A  Licensed Audiologist #[id number]  11/23/2021

## 2021-12-09 ENCOUNTER — E-VISIT (OUTPATIENT)
Dept: URGENT CARE | Facility: CLINIC | Age: 36
End: 2021-12-09
Payer: COMMERCIAL

## 2021-12-09 DIAGNOSIS — N39.0 ACUTE UTI (URINARY TRACT INFECTION): Primary | ICD-10-CM

## 2021-12-09 PROCEDURE — 99421 OL DIG E/M SVC 5-10 MIN: CPT | Performed by: FAMILY MEDICINE

## 2021-12-09 RX ORDER — NITROFURANTOIN 25; 75 MG/1; MG/1
100 CAPSULE ORAL 2 TIMES DAILY
Qty: 10 CAPSULE | Refills: 0 | Status: SHIPPED | OUTPATIENT
Start: 2021-12-09 | End: 2021-12-14

## 2021-12-09 NOTE — PATIENT INSTRUCTIONS
Dear Breanna Luna    After reviewing your responses, I've been able to diagnose you with a urinary tract infection, which is a common infection of the bladder with bacteria.  This is not a sexually transmitted infection, though urinating immediately after intercourse can help prevent infections.  Drinking lots of fluids is also helpful to clear your current infection and prevent the next one.      I have sent a prescription for antibiotics to your pharmacy to treat this infection.    It is important that you take all of your prescribed medication even if your symptoms are improving after a few doses.  Taking all of your medicine helps prevent the symptoms from returning.     If your symptoms worsen, you develop pain in your back or stomach, develop fevers, or are not improving in 5 days, please contact your primary care provider for an appointment or visit any of our convenient Walk-in or Urgent Care Centers to be seen, which can be found on our website here.    Thanks again for choosing us as your health care partner,    Mindy Galicia MD    Urinary Tract Infections in Women  Urinary tract infections (UTIs) are most often caused by bacteria. These bacteria enter the urinary tract. The bacteria may come from inside the body. Or they may travel from the skin outside the rectum or vagina into the urethra. Female anatomy makes it easy for bacteria from the bowel to enter a woman s urinary tract, which is the most common source of UTI. This means women develop UTIs more often than men. Pain in or around the urinary tract is a common UTI symptom. But the only way to know for sure if you have a UTI for the healthcare provider to test your urine. The two tests that may be done are the urinalysis and urine culture.     Types of UTIs    Cystitis. A bladder infection (cystitis) is the most common UTI in women. You may have urgent or frequent need to pee. You may also have pain, burning when you pee, and bloody  urine.    Urethritis. This is an inflamed urethra, which is the tube that carries urine from the bladder to outside the body. You may have lower stomach or back pain. You may also have urgent or frequent need to pee.    Pyelonephritis. This is a kidney infection. If not treated, it can be serious and damage your kidneys. In severe cases, you may need to stay in the hospital. You may have a fever and lower back pain.    Medicines to treat a UTI  Most UTIs are treated with antibiotics. These kill the bacteria. The length of time you need to take them depends on the type of infection. It may be as short as 3 days. If you have repeated UTIs, you may need a low-dose antibiotic for several months. Take antibiotics exactly as directed. Don t stop taking them until all of the medicine is gone. If you stop taking the antibiotic too soon, the infection may not go away. You may also develop a resistance to the antibiotic. This can make it much harder to treat.   Lifestyle changes to treat and prevent UTIs   The lifestyle changes below will help get rid of your UTI. They may also help prevent future UTIs.     Drink plenty of fluids. This includes water, juice, or other caffeine-free drinks. Fluids help flush bacteria out of your body.    Empty your bladder. Always empty your bladder when you feel the urge to pee. And always pee before going to sleep. Urine that stays in your bladder can lead to infection. Try to pee before and after sex as well.    Practice good personal hygiene. Wipe yourself from front to back after using the toilet. This helps keep bacteria from getting into the urethra.    Use condoms during sex. These help prevent UTIs caused by sexually transmitted bacteria. Also don't use spermicides during sex. These can increase the risk for UTIs. Choose other forms of birth control instead. For women who tend to get UTIs after sex, a low-dose of a preventive antibiotic may be used. Be sure to discuss this option with  your healthcare provider.    Follow up with your healthcare provider as directed. He or she may test to make sure the infection has cleared. If needed, more treatment may be started.  Rochelle last reviewed this educational content on 7/1/2019 2000-2021 [employer]. All rights reserved. This information is not intended as a substitute for professional medical care. Always follow your healthcare professional's instructions.

## 2021-12-16 ENCOUNTER — IMMUNIZATION (OUTPATIENT)
Dept: PEDIATRICS | Facility: OTHER | Age: 36
End: 2021-12-16
Payer: COMMERCIAL

## 2021-12-16 PROCEDURE — 91300 PR COVID VAC PFIZER DIL RECON 30 MCG/0.3 ML IM: CPT

## 2021-12-16 PROCEDURE — 0004A PR COVID VAC PFIZER DIL RECON 30 MCG/0.3 ML IM: CPT

## 2021-12-17 ENCOUNTER — NURSE TRIAGE (OUTPATIENT)
Dept: FAMILY MEDICINE | Facility: CLINIC | Age: 36
End: 2021-12-17
Payer: COMMERCIAL

## 2021-12-17 NOTE — TELEPHONE ENCOUNTER
"Patient states she is having a 10/10 headache with eye pain and neck pain.  She verbalized she knows she has taken too much ibuprofen and tylenol in the past 12 hours and she stated it is still is not helping at all.   She states she is waiting for her  to come home, she verbalized that she is nervous about going into the ED because she has had allergic/ vomiting reactions to most medications that they have tried to treat her with when having a migraine in the past.  Advised patient to seek urgent care/ emergency care for her symptoms.  Patient stated they had last treated her with benadryl and that helped.  She stated she would try taking benadryl  prior to her  coming home.  Advised patient to phone pharmacy on dosing.  Advised patient to not take anymore tylenol or ibuprofen or products containing those ingredients.  Again advised patient to seek emergent care for symptoms.  Patient stated understanding.      Reason for Disposition    SEVERE headache, states 'worst headache' of life    Additional Information    Negative: Difficult to awaken or acting confused (e.g., disoriented, slurred speech)    Negative: Weakness of the face, arm or leg on one side of the body and new onset    Negative: Numbness of the face, arm or leg on one side of the body and new onset    Negative: Loss of speech or garbled speech and new onset    Negative: Passed out (i.e., fainted, collapsed and was not responding)    Negative: Sounds like a life-threatening emergency to the triager    Negative: Followed a head injury within last 3 days    Negative: Traumatic Brain Injury (TBI) is suspected    Negative: Sinus pain of forehead and yellow or green nasal discharge    Negative: Pregnant    Negative: Unable to walk without falling    Negative: Stiff neck (can't touch chin to chest)    Negative: Possibility of carbon monoxide exposure    Answer Assessment - Initial Assessment Questions  1. LOCATION: \"Where does it hurt?\"       " "Behind eyes, and neck, forehead    2. ONSET: \"When did the headache start?\" (Minutes, hours or days)       Headache for 3 days, now worse    3. PATTERN: \"Does the pain come and go, or has it been constant since it started?\"      Constant    4. SEVERITY: \"How bad is the pain?\" and \"What does it keep you from doing?\"  (e.g., Scale 1-10; mild, moderate, or severe)    - MILD (1-3): doesn't interfere with normal activities     - MODERATE (4-7): interferes with normal activities or awakens from sleep     - SEVERE (8-10): excruciating pain, unable to do any normal activities         10/10    5. RECURRENT SYMPTOM: \"Have you ever had headaches before?\" If so, ask: \"When was the last time?\" and \"What happened that time?\"       One time, been treated for migraines in past    6. CAUSE: \"What do you think is causing the headache?\"      Unknown    7. MIGRAINE: \"Have you been diagnosed with migraine headaches?\" If so, ask: \"Is this headache similar?\"       Yes    8. HEAD INJURY: \"Has there been any recent injury to the head?\"       No    9. OTHER SYMPTOMS: \"Do you have any other symptoms?\" (fever, stiff neck, eye pain, sore throat, cold symptoms)      Stiff in back, eye pain more on left    10. PREGNANCY: \"Is there any chance you are pregnant?\" \"When was your last menstrual period?\"        No    Protocols used: HEADACHE-A-OH  Kelly Richmond RN      "

## 2021-12-30 ENCOUNTER — E-VISIT (OUTPATIENT)
Dept: FAMILY MEDICINE | Facility: CLINIC | Age: 36
End: 2021-12-30
Payer: COMMERCIAL

## 2021-12-30 DIAGNOSIS — J01.01 ACUTE RECURRENT MAXILLARY SINUSITIS: Primary | ICD-10-CM

## 2021-12-30 PROCEDURE — 99421 OL DIG E/M SVC 5-10 MIN: CPT | Performed by: PHYSICIAN ASSISTANT

## 2021-12-30 NOTE — PATIENT INSTRUCTIONS
Thank you for choosing us for your care. I have placed an order for a prescription so that you can start treatment. View your full visit summary for details by clicking on the link below. Your pharmacist will able to address any questions you may have about the medication.     If you're not feeling better within 5-7 days, please schedule an appointment.  You can schedule an appointment right here in [facility], or call [phone]  If the visit is for the same symptoms as your eVisit, we'll refund the cost of your eVisit if seen within seven days.

## 2021-12-31 ENCOUNTER — TELEPHONE (OUTPATIENT)
Dept: FAMILY MEDICINE | Facility: OTHER | Age: 36
End: 2021-12-31
Payer: COMMERCIAL

## 2021-12-31 NOTE — TELEPHONE ENCOUNTER
"Call from patient  Patient calling with questions regarding a procedure she had in 2018. Patient wanting clarification if she had her tubes tied or removed.  RN searched records and patient had a \"laparoscopic bilateral salpingectomy\" in 2018.    Patient then wanted to schedule an appointment with her OB/GYN provider for some other issues she has been experiencing including frequent UTI's, discharge changes, and irregular periods.     Transferred patient to OB scheduling to help assist with finding an appointment.     Sadia CEDILLON, RN   "

## 2022-01-09 ENCOUNTER — HEALTH MAINTENANCE LETTER (OUTPATIENT)
Age: 37
End: 2022-01-09

## 2022-01-11 ENCOUNTER — TELEPHONE (OUTPATIENT)
Dept: NURSING | Facility: CLINIC | Age: 37
End: 2022-01-11
Payer: COMMERCIAL

## 2022-01-11 NOTE — TELEPHONE ENCOUNTER
Patient calling wanting to schedule:    MA Diagnostic Bilateral w/Jamie  US Breast Right    States these tests have already been ordered last Feb/2021.    Patient is transferred to imaging scheduler.    Jesica Edouard RN, Nurse Advisor 2:57 PM 1/11/2022    COVID 19 Nurse Triage Plan/Patient Instructions    Please be aware that novel coronavirus (COVID-19) may be circulating in the community. If you develop symptoms such as fever, cough, or SOB or if you have concerns about the presence of another infection including coronavirus (COVID-19), please contact your health care provider or visit [email or web address].     Disposition/Instructions    In-Person Visit with provider recommended. Reference Visit Selection Guide.    Thank you for taking steps to prevent the spread of this virus.  o Limit your contact with others.  o Wear a simple mask to cover your cough.  o Wash your hands well and often.    Resources    [facility]: About COVID-19: [email or web address]    CDC: What to Do If You're Sick: www.cdc.gov/coronavirus/2019-ncov/about/steps-when-sick.html    CDC: Ending Home Isolation: www.cdc.gov/coronavirus/2019-ncov/hcp/disposition-in-home-patients.html     CDC: Caring for Someone: www.cdc.gov/coronavirus/2019-ncov/if-you-are-sick/care-for-someone.html     [facility]: Interim Guidance for Hospital Discharge to Home: www.health.[facility].[place]/diseases/coronavirus/hcp/hospdischarge.pdf    [facility] clinical trials (COVID-19 research studies): [email or web address]-clinical-trials     Below are the COVID-19 hotlines at the [facility] ([facility]). Interpreters are available.   o For health questions: Call 982-578-5879 or 1-715.898.4417 (7 a.m. to 7 p.m.)  o For questions about schools and childcare: Call 480-301-3274 or 1-216.600.4677 (7 a.m. to 7 p.m.)

## 2022-01-19 ENCOUNTER — ALLIED HEALTH/NURSE VISIT (OUTPATIENT)
Dept: FAMILY MEDICINE | Facility: OTHER | Age: 37
End: 2022-01-19
Payer: COMMERCIAL

## 2022-01-19 DIAGNOSIS — Z23 ENCOUNTER FOR IMMUNIZATION: Primary | ICD-10-CM

## 2022-01-19 PROCEDURE — 90471 IMMUNIZATION ADMIN: CPT

## 2022-01-19 PROCEDURE — 99207 PR NO CHARGE NURSE ONLY: CPT

## 2022-01-19 PROCEDURE — 90686 IIV4 VACC NO PRSV 0.5 ML IM: CPT

## 2022-01-20 ENCOUNTER — HOSPITAL ENCOUNTER (OUTPATIENT)
Dept: ULTRASOUND IMAGING | Facility: CLINIC | Age: 37
End: 2022-01-20
Attending: STUDENT IN AN ORGANIZED HEALTH CARE EDUCATION/TRAINING PROGRAM
Payer: COMMERCIAL

## 2022-01-20 ENCOUNTER — HOSPITAL ENCOUNTER (OUTPATIENT)
Dept: MAMMOGRAPHY | Facility: CLINIC | Age: 37
End: 2022-01-20
Attending: STUDENT IN AN ORGANIZED HEALTH CARE EDUCATION/TRAINING PROGRAM
Payer: COMMERCIAL

## 2022-01-20 ENCOUNTER — OFFICE VISIT (OUTPATIENT)
Dept: OBGYN | Facility: OTHER | Age: 37
End: 2022-01-20
Payer: COMMERCIAL

## 2022-01-20 VITALS
DIASTOLIC BLOOD PRESSURE: 87 MMHG | TEMPERATURE: 97.9 F | HEART RATE: 78 BPM | SYSTOLIC BLOOD PRESSURE: 128 MMHG | WEIGHT: 220.5 LBS | BODY MASS INDEX: 35.06 KG/M2

## 2022-01-20 DIAGNOSIS — N64.4 BREAST PAIN: ICD-10-CM

## 2022-01-20 DIAGNOSIS — N94.6 DYSMENORRHEA: Primary | ICD-10-CM

## 2022-01-20 DIAGNOSIS — N92.0 MENORRHAGIA WITH REGULAR CYCLE: ICD-10-CM

## 2022-01-20 PROCEDURE — 76642 ULTRASOUND BREAST LIMITED: CPT | Mod: 50

## 2022-01-20 PROCEDURE — 99203 OFFICE O/P NEW LOW 30 MIN: CPT | Performed by: ADVANCED PRACTICE MIDWIFE

## 2022-01-20 PROCEDURE — 77062 BREAST TOMOSYNTHESIS BI: CPT

## 2022-01-20 NOTE — PROGRESS NOTES
Breanna Luna is a 36 year old who presents to the clinic for discussion of her periods.  She finds them heavy, long and painful since she had her bilateral salpingectomy.  Feels her periods are relatively regular but have lengthened a bit from 28-30 days and sometimes a bit longer.   She uses a combination of heat, pamprin and motrin for the discomfort and doesn't feel these treatments afford her much relief.   She wants to know why this is happening and would like to consider an ablation or hyst.       Histories reviewed and updated  Past Medical History:   Diagnosis Date     Cellulitis 2012    MRSA septic olecranon bursitis and facial cellulitis     Drug abuse (H) 2011    methamphetamine and alcohol, sober since treatment 2011     Mild pre-eclampsia in third trimester      MRSA cellulitis 2012    cleared with negative nasal swabs 8/16/17 and 8/28/17, reviewed     PONV (postoperative nausea and vomiting)      Pregnancy-induced hypertension in third trimester      Rh negative, antepartum      Past Surgical History:   Procedure Laterality Date     APPENDECTOMY       DAVINCI ASSISTED HERNIORRHAPHY INCISIONAL N/A 7/10/2020    Procedure: Robotic incisional hernia repair with mesh;  Surgeon: Jovi Odom MD;  Location: [facility]     EXCISE LESION FOOT Left 8/16/2019    Procedure: Excision of nevus second interdigital space left foot, Excision of nevus third interdigital space left foot, excision of plantar junctional nevus left foot;  Surgeon: Rinku Lopez DPM;  Location: [facility]     INCISION AND DRAINAGE  04/18/2012    chin abscess I&D     LAPAROSCOPIC CHOLECYSTECTOMY N/A 2/14/2020    Procedure: Laparoscopic Cholecystectomy;  Surgeon: Renard Gerber MD;  Location: [facility]     LAPAROSCOPIC HERNIORRHAPHY INCISIONAL N/A 7/17/2019    Procedure: laparoscopic incisional hernia repair with mesh;  Surgeon: Jovi Odom MD;  Location: [facility]     LAPAROSCOPIC SALPINGECTOMY Bilateral 8/27/2018    Procedure: LAPAROSCOPIC  SALPINGECTOMY;  Laparoscopic Bilateral Salpingectomy;  Surgeon: Chai Cui MD;  Location: [facility]     MAMMOPLASTY REDUCTION Bilateral 2017     Social History     Socioeconomic History     Marital status: Single     Spouse name: Not on file     Number of children: 4     Years of education: Not on file     Highest education level: Not on file   Occupational History     Not on file   Tobacco Use     Smoking status: Former Smoker     Packs/day: 0.00     Years: 0.00     Pack years: 0.00     Types: Cigarettes     Smokeless tobacco: Former User     Quit date: 3/10/2016   Vaping Use     Vaping Use: Never used   Substance and Sexual Activity     Alcohol use: No     Comment: history alcohol abuse , s/p treatment     Drug use: No     Comment: former drug user (EtOH, cocaine, marijuana, meth), last use      Sexual activity: Yes     Partners: Male     Birth control/protection: Female Surgical   Other Topics Concern     Parent/sibling w/ CABG, MI or angioplasty before 65F 55M? Yes     Comment: Mother had a bypass surgery mother  from lupus   Social History Narrative    Currently lives in [place] with fiance and 18 month old son.  Has a 13 year old dtr that lives with dtrs dad in [place], [place], and a 6  Year old that was adopted out in an open adoption, lives in [place]     Social Determinants of Health     Financial Resource Strain: Low Risk      Difficulty of Paying Living Expenses: Not hard at all   Food Insecurity: No Food Insecurity     Worried About Running Out of Food in the Last Year: Never true     Ran Out of Food in the Last Year: Never true   Transportation Needs: No Transportation Needs     Lack of Transportation (Medical): No     Lack of Transportation (Non-Medical): No   Physical Activity: Not on file   Stress: Not on file   Social Connections: Not on file   Intimate Partner Violence: Not on file   Housing Stability: Not on file     Family History   Problem Relation Age of Onset     Cancer  Father      Pancreatic Cancer Father      Lung Cancer Father      Lupus Mother           ROS: 10 point ROS neg other than the symptoms noted above in the HPI.    EXAM:  /87 (BP Location: Left arm, Patient Position: Sitting, Cuff Size: Adult Large)   Pulse 78   Temp 97.9  F (36.6  C) (Temporal)   Wt 100 kg (220 lb 8 oz)   LMP 01/03/2022 (Exact Date)   BMI 35.06 kg/m          ASSESSMENT/PLAN:    (N94.6) Dysmenorrhea  (primary encounter diagnosis)  Comment:   Plan:     (N92.0) Menorrhagia with regular cycle  Comment:   Plan:          Labs were reviewed in Epic  and the results were negative for anemia from about a year ago    Imaging was not reviewed in Epic. And the results were negative pelvic ultrasound from about a year ago.       30 minutes spent on the date of the encounter doing chart review, review of test results, patient visit and documentation       We discussed why her periods have been heavier and more crampy.   She is not a candidate for COCs.  She initially isn't interested in hormonal methods until we discussed the Mirena then she thought she might be.  A brochure was provided.   We also discussed ablation and the testing required prior.  She was really very interested in the ablation so will plan follow up with Dr Tesfaye.

## 2022-03-10 ENCOUNTER — VIRTUAL VISIT (OUTPATIENT)
Dept: FAMILY MEDICINE | Facility: CLINIC | Age: 37
End: 2022-03-10
Payer: COMMERCIAL

## 2022-03-10 DIAGNOSIS — J01.90 ACUTE NON-RECURRENT SINUSITIS, UNSPECIFIED LOCATION: Primary | ICD-10-CM

## 2022-03-10 DIAGNOSIS — J06.9 UPPER RESPIRATORY TRACT INFECTION, UNSPECIFIED TYPE: ICD-10-CM

## 2022-03-10 PROCEDURE — 99213 OFFICE O/P EST LOW 20 MIN: CPT | Mod: 95 | Performed by: PHYSICIAN ASSISTANT

## 2022-03-10 NOTE — PROGRESS NOTES
Breanna is a 37 year old who is being evaluated via a billable video visit.      How would you like to obtain your AVS? MyChart  If the video visit is dropped, the invitation should be resent by: Text to cell phone: [phone]  Will anyone else be joining your video visit? No    Video Start Time: 11:47 AM    Assessment & Plan     Acute non-recurrent sinusitis, unspecified location  Upper respiratory tract infection, unspecified type  Patient is a 37-year-old female who presents to video phone visit due to 4 days of sinus pressure, facial pain, headache.  COVID-19 testing negative.  No signs of respiratory distress.  Shared decision making completed.  Given severity of symptoms, patient would like to start antibiotics for sinus infection.  Augmentin prescribed.  Discussed OTC treatment options and return precautions.  - amoxicillin-clavulanate (AUGMENTIN) 875-125 MG tablet; Take 1 tablet by mouth 2 times daily for 7 days      See Patient Instructions    Return in about 1 week (around 3/17/2022), or if symptoms worsen or fail to improve.    Devora Flores PA-C  [facility] YELENA Carlos is a 37 year old who presents for the following health issues     HPI     Acute Illness  Acute illness concerns: Sinus pain. Symptoms started with sore/dry throat and then symptoms progressed into congestion. Yesterday and today, patient notes headache and facial pain. No sinus drainage. Patient completed COVD19 PCR test today and it was negative.    Onset/Duration: 4 days  Symptoms:  Fever: YES- Warm feeling  Chills/Sweats: no  Headache (location?): YES  Sinus Pressure: YES- facial pain  Conjunctivitis:  no  Ear Pain: no  Rhinorrhea: no  Congestion: YES-thick yellow discharge   Sore Throat: YES  Cough: no  Wheeze: no  Decreased Appetite: no  Nausea: no  Vomiting: no  Diarrhea: no  Dysuria/Freq.: no  Dysuria or Hematuria: no  Fatigue/Achiness: YES  Sick/Strep Exposure: no  Therapies tried and outcome:  Dayquil, Tylenol      Objective         Vitals:  No vitals were obtained today due to virtual visit.    Physical Exam   GENERAL: Healthy, alert and no distress  EYES: Eyes grossly normal to inspection.  No discharge or erythema, or obvious scleral/conjunctival abnormalities.  RESP: No audible wheeze, cough, or visible cyanosis.  No visible retractions or increased work of breathing.    SKIN: Visible skin clear. No significant rash, abnormal pigmentation or lesions.  NEURO: Cranial nerves grossly intact.  Mentation and speech appropriate for age.  PSYCH: Mentation appears normal, affect normal/bright, judgement and insight intact, normal speech and appearance well-groomed.          Video-Visit Details    Type of service:  Video Visit    Video End Time:11:54 AM    Originating Location (pt. Location): Home    Distant Location (provider location):  [facility]     Platform used for Video Visit: [employer]

## 2022-03-10 NOTE — PATIENT INSTRUCTIONS
Your symptoms are concerning for sinus infection or other viral illness. As discussed, if your symptoms are worsening, you can started the prescribed antibiotic, Augmentin. You may use Tylenol for fever and pain management and Robitussin-DM/Mucinex or DayQuil/Nyquil  for cough and congestion.  Make sure to get plenty of rest and stay hydrated.      If you have severe symptoms such as chest pain, wheezing, coughing up blood, shortness of breath, or fevers that you cannot control, please go to the emergency department.    Please reach out with any questions or concerns.  Take care,  Devora Flores PA-C    Patient Education     Sinusitis (Antibiotic Treatment)    The sinuses are air-filled spaces within the bones of the face. They connect to the inside of the nose. Sinusitis is an inflammation of the tissue that lines the sinuses. Sinusitis can occur during a cold. It can also happen due to allergies to pollens and other particles in the air. Sinusitis can cause symptoms of sinus congestion and a feeling of fullness. A sinus infection causes fever, headache, and facial pain. There is often green or yellow fluid draining from the nose or into the back of the throat (post-nasal drip). You have been given antibiotics to treat this condition.   Home care    Take the full course of antibiotics as instructed. Don't stop taking them, even when you feel better.    Drink plenty of water, hot tea, and other liquids as directed by the healthcare provider. This may help thin nasal mucus. It also may help your sinuses drain fluids.    Heat may help soothe painful areas of your face. Use a towel soaked in hot water. Or,  the shower and direct the warm spray onto your face. Using a vaporizer along with a menthol rub at night may also help soothe symptoms.     An expectorant with guaifenesin may help thin nasal mucus and help your sinuses drain fluids. Talk with your provider or pharmacists before taking an over-the-counter  (OTC) medicine if you have any questions about it or its side effects..    You can use an OTC decongestant, unless a similar medicine was prescribed to you. Nasal sprays work the fastest. Use one that contains phenylephrine or oxymetazoline. First blow your nose gently. Then use the spray. Don't use these medicines more often than directed on the label. If you do, your symptoms may get worse. You may also take pills that contain pseudoephedrine. Don t use products that combine multiple medicines. This is because side effects may be increased. Read labels. You can also ask the pharmacist for help. (People with high blood pressure should not use decongestants. They can raise blood pressure.) Talk with your provider or pharmacist if you have any questions about the medicine..    OTC antihistamines may help if allergies contributed to your sinusitis. Talk with your provider or pharmacist if you have any questions about the medicine..    Don't use nasal rinses or irrigation during an acute sinus infection, unless your healthcare provider tells you to. Rinsing may spread the infection to other areas in your sinuses.    Use acetaminophen or ibuprofen to control pain, unless another pain medicine was prescribed to you. If you have chronic liver or kidney disease or ever had a stomach ulcer, talk with your healthcare provider before using these medicines. Never give aspirin to anyone under age 18 who is ill with a fever. It may cause severe liver damage.    Don't smoke. This can make symptoms worse.    Follow-up care  Follow up with your healthcare provider, or as advised.   When to seek medical advice  Call your healthcare provider if any of these occur:     Facial pain or headache that gets worse    Stiff neck    Unusual drowsiness or confusion    Swelling of your forehead or eyelids    Symptoms don't go away in 10 days    Vision problems, such as blurred or double vision    Fever of 100.4 F (38 C) or higher, or as  directed by your healthcare provider  Call 911  Call 911 if any of these occur:     Seizure    Trouble breathing    Feeling dizzy or faint    Fingernails, skin or lips look blue, purple , or gray  Prevention  Here are steps you can take to help prevent an infection:     Keep good hand washing habits.    Don t have close contact with people who have sore throats, colds, or other upper respiratory infections.    Don t smoke, and stay away from secondhand smoke.    Stay up to date with of your vaccines.  [employer] last reviewed this educational content on 12/1/2019 2000-2021 [employer]. All rights reserved. This information is not intended as a substitute for professional medical care. Always follow your healthcare professional's instructions.

## 2022-03-22 ENCOUNTER — E-VISIT (OUTPATIENT)
Dept: URGENT CARE | Facility: CLINIC | Age: 37
End: 2022-03-22
Payer: COMMERCIAL

## 2022-03-22 DIAGNOSIS — J01.90 ACUTE SINUSITIS WITH SYMPTOMS > 10 DAYS: Primary | ICD-10-CM

## 2022-03-22 PROCEDURE — 99421 OL DIG E/M SVC 5-10 MIN: CPT | Performed by: EMERGENCY MEDICINE

## 2022-03-22 RX ORDER — LEVOFLOXACIN 500 MG/1
500 TABLET, FILM COATED ORAL DAILY
Qty: 7 TABLET | Refills: 0 | Status: SHIPPED | OUTPATIENT
Start: 2022-03-22 | End: 2022-03-29

## 2022-03-22 NOTE — PATIENT INSTRUCTIONS
Dear Breanna Luna    After reviewing your responses, I've been able to diagnose you with?a sinus infection caused by bacteria.?     Based on your responses and diagnosis, I have prescribed Levaquin to treat your symptoms. I have sent this to your pharmacy.?     This antibiotic will treat sinuses and cover for a UTI. Please be seen in person in 4-5 days if symptoms persist.    It is also important to stay well hydrated, get lots of rest and take over-the-counter decongestants,?tylenol?or ibuprofen if you?are able to?take those medications per your primary care provider to help relieve discomfort.?     It is important that you take?all of?your prescribed medication even if your symptoms are improving after a few doses.? Taking?all of?your medicine helps prevent the symptoms from returning.?     If your symptoms worsen, you develop severe headache, vomiting, high fever (>102), or are not improving in 7 days, please contact your primary care provider for an appointment or visit any of our convenient Walk-in Care or Urgent Care Centers to be seen which can be found on our website?here.?     Thanks again for choosing?us?as your health care partner,?   ?  John Sears MD?

## 2022-04-06 ENCOUNTER — VIRTUAL VISIT (OUTPATIENT)
Dept: FAMILY MEDICINE | Facility: CLINIC | Age: 37
End: 2022-04-06
Payer: COMMERCIAL

## 2022-04-06 DIAGNOSIS — J06.9 UPPER RESPIRATORY TRACT INFECTION, UNSPECIFIED TYPE: Primary | ICD-10-CM

## 2022-04-06 PROCEDURE — 99213 OFFICE O/P EST LOW 20 MIN: CPT | Mod: 95 | Performed by: FAMILY MEDICINE

## 2022-04-06 NOTE — PROGRESS NOTES
Breanna is a 37 year old who is being evaluated via a billable video visit.      How would you like to obtain your AVS? MyChart  If the video visit is dropped, the invitation should be resent by: Text to cell phone: [phone]  Will anyone else be joining your video visit? No    Video Start Time: 9:40 AM    Assessment & Plan     Upper respiratory tract infection, unspecified type  Differentials discussed in detail including upper respiratory tract infection, acute sinusitis.  Recommended to have COVID-19 test.  Patient was treated for sinus infection about a month ago.  Suggested conservative treatment including well hydration, warm fluids, steam inhalation, humidifier use, Flonase and over-the-counter antihistamine.  Return criteria explained in detail.  Patient understood and in agreement with above plan.  All questions answered.      Brock Guevara MD  [facility]   Breanna is a 37 year old who presents for the following health issues     HPI     Acute Illness  Acute illness concerns: Sinus congestion  Onset/Duration: 3 days ago  Symptoms:  Fever: no  Chills/Sweats: no  Headache (location?): YES- sinus headache  Sinus Pressure: YES  Conjunctivitis:  no  Ear Pain: YES: right  Rhinorrhea: no  Congestion: YES  Sore Throat: YES- slight sore throat  Cough: no  Wheeze: no  Decreased Appetite: no  Nausea: no  Vomiting: no  Diarrhea: no  Dysuria/Freq.: no  Dysuria or Hematuria: no  Fatigue/Achiness: YES- slight fatigue  Sick/Strep Exposure: no  Therapies tried and outcome: Sudafed, Mucinex D, acetaminophen, ibuprofen all help some.      Review of Systems   Constitutional, HEENT, cardiovascular, pulmonary, GI, , musculoskeletal, neuro, skin, endocrine and psych systems are negative, except as otherwise noted.      Objective           Vitals:  No vitals were obtained today due to virtual visit.    Physical Exam   GENERAL: alert and no distress  EYES: Eyes grossly normal to  inspection.  No discharge or erythema, or obvious scleral/conjunctival abnormalities.  RESP: No audible wheeze, cough, or visible cyanosis.  No visible retractions or increased work of breathing.    SKIN: Visible skin clear. No significant rash, abnormal pigmentation or lesions.  NEURO: Cranial nerves grossly intact.  Mentation and speech appropriate for age.  PSYCH: Mentation appears normal, affect normal/bright, judgement and insight intact, normal speech and appearance well-groomed.        Video-Visit Details    Type of service:  Video Visit    Video End Time:9:51 AM    Originating Location (pt. Location): Home    Distant Location (provider location):  [facility]     Platform used for Video Visit: [place]

## 2022-04-07 ENCOUNTER — VIRTUAL VISIT (OUTPATIENT)
Dept: FAMILY MEDICINE | Facility: CLINIC | Age: 37
End: 2022-04-07
Payer: COMMERCIAL

## 2022-04-07 DIAGNOSIS — J06.9 VIRAL URI WITH COUGH: Primary | ICD-10-CM

## 2022-04-07 PROCEDURE — 99213 OFFICE O/P EST LOW 20 MIN: CPT | Mod: 95 | Performed by: FAMILY MEDICINE

## 2022-04-07 NOTE — PATIENT INSTRUCTIONS
[facility]   [address]   [place] [address]   Phone: [phone]     Schedule with ENT  Ok to take sudafed  Continue flonase and zyrtec     Take probiotic    Schedule physical with PCP

## 2022-04-07 NOTE — PROGRESS NOTES
"Breanna is a 37 year old who is being evaluated via a billable video visit.      How would you like to obtain your AVS? MyChart  If the video visit is dropped, the invitation should be resent by: Text to cell phone: [phone]  Will anyone else be joining your video visit? No    Video Start Time: 8:35 AM    Assessment & Plan     Viral URI with cough  I would not recommend any more antibiotics at this time. Continue zyrtec daily plus flonase. Ok to use sudafed. Nasal rinses recommended. humidifer in the bedroom recommended. Schedule with pcp and ENT.  Take probiotics for gut health. The patient indicates understanding of these issues and agrees with the plan.   - Otolaryngology Referral; Future     Return in about 1 week (around 4/14/2022) for with specialist and pcp for physical .    Suzanne Mi MD  [facility] LIZA Carlos is a 37 year old who presents for the following health issues :    New patient to me   No primary   Lives in [place]     Had a virtual visit yesterday with [place] provider and recommended conservative treatment for viral upper respiratory infection/sinus.  Told to get a covid test  - did a home test that was negative   Started flonase and nasal saline   Current symptoms 4-5 days  - feels that symptoms got worse overnight   Wonders if she can take sudafed  No f/c     E-visit on 3/22/22 - treated with levaquin for sinus infection   3/10/22 - virtual visit - treated with augmentin     Feels like these symptoms have been going on \"for months\"     Acute Illness  Acute illness concerns: Sinus   Onset/Duration: past 4 days. Was on antibiotic a week ago for sinus infection felt fine but now has sinus pressure pain again   Symptoms:  Fever: YES- possible   Chills/Sweats: YES  Headache (location?): YES  Sinus Pressure: YES  Conjunctivitis:  YES- watery   Ear Pain: YES: bilateral. Using hot packs.   Rhinorrhea: no- bleeding though   Congestion: YES  Sore Throat: " YES- post nasal drainage   Cough: YES- slight   Wheeze: no  Decreased Appetite: YES  Nausea: no  Vomiting: no  Diarrhea: YES  Dysuria/Freq.: no  Dysuria or Hematuria: no  Fatigue/Achiness: YES  Sick/Strep Exposure: no  Therapies tried and outcome: tylenol and ibuprofen    No asthma   Says she has allergies and is taking benadryl or zyrtec daily     Saw ENT 11/23/21 Dr Narayanan       Review of Systems   Constitutional, cardiovascular, pulmonary, gi and gu systems are negative, except as otherwise noted.      Objective       Vitals:  No vitals were obtained today due to virtual visit.    Physical Exam   GENERAL: Healthy, alert and no distress  RESP: No audible wheeze, cough, or visible cyanosis.  No visible retractions or increased work of breathing.    PSYCH: Mentation appears normal, affect normal/bright, judgement and insight intact, normal speech and appearance well-groomed.            Video-Visit Details    Type of service:  Video Visit    Video End Time:8:51 AM    Originating Location (pt. Location): Home    Distant Location (provider location):  [facility]     Platform used for Video Visit: Unable to complete video visit

## 2022-04-28 ENCOUNTER — OFFICE VISIT (OUTPATIENT)
Dept: OTOLARYNGOLOGY | Facility: CLINIC | Age: 37
End: 2022-04-28
Payer: COMMERCIAL

## 2022-04-28 VITALS — HEART RATE: 94 BPM | RESPIRATION RATE: 22 BRPM | OXYGEN SATURATION: 98 %

## 2022-04-28 DIAGNOSIS — J34.2 NASAL SEPTAL DEVIATION: ICD-10-CM

## 2022-04-28 DIAGNOSIS — J34.89 NASAL OBSTRUCTION: ICD-10-CM

## 2022-04-28 DIAGNOSIS — J35.01 CHRONIC TONSILLITIS: ICD-10-CM

## 2022-04-28 DIAGNOSIS — J01.01 ACUTE RECURRENT MAXILLARY SINUSITIS: Primary | ICD-10-CM

## 2022-04-28 DIAGNOSIS — J34.3 NASAL TURBINATE HYPERTROPHY: ICD-10-CM

## 2022-04-28 PROCEDURE — 99214 OFFICE O/P EST MOD 30 MIN: CPT | Performed by: OTOLARYNGOLOGY

## 2022-04-28 RX ORDER — FLUTICASONE PROPIONATE 50 MCG
1 SPRAY, SUSPENSION (ML) NASAL DAILY
COMMUNITY
End: 2022-06-10

## 2022-04-28 RX ORDER — CETIRIZINE HYDROCHLORIDE 10 MG/1
TABLET ORAL DAILY
COMMUNITY
End: 2023-02-28

## 2022-04-28 RX ORDER — DOXYCYCLINE 100 MG/1
100 CAPSULE ORAL 2 TIMES DAILY
Qty: 20 CAPSULE | Refills: 0 | Status: SHIPPED | OUTPATIENT
Start: 2022-04-28 | End: 2022-05-08

## 2022-04-28 NOTE — PROGRESS NOTES
Chief Complaint - sinusitis    History of Present Illness - Breanna Luna is a 37 year old female who presents for evaluation of possible recurrent sinusitis. The patient describes symptoms of sinus pressure, headaches, right nasal obstruction. Currently symptoms have been going on for a week, but she has had recurrent sinus infections and nasal obstruction for months. Presently, the patient is symptomatic. The patient notes no allergy. Treatments have included antibiotics (a lot of antibiotics - augmentin), nasal steroids, sudafed, and oral antihistamines. The treatments seem to not help much. No prior history of sinus surgery. Tonsils swell when she gets sick. Strep is negative.     I saw her 11/2021 for mild sensorineural hearing loss and snoring.     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)     Intractable migraine without aura and without status migrainosus     Biliary dyskinesia     Morbid obesity (H)       Current Medications -   Current Outpatient Medications:      acetaminophen (TYLENOL) 500 MG tablet, Take 1,000 mg by mouth every 6 hours as needed for mild pain, Disp: , Rfl:      ibuprofen (ADVIL/MOTRIN) 200 MG tablet, Take 600 mg by mouth every 4 hours as needed for mild pain, Disp: , Rfl:     Allergies -   Allergies   Allergen Reactions     Codeine Nausea     Other reaction(s): Nausea     Imitrex [Sumatriptan]      Worsening of migraine, paresthesias     Adhesive [Liquid Adhesive] Itching     Redness and itching around incision after hernia repair surgery     Ranitidine Rash and Itching     recvd IV zantac 75 and reaction was immediate,  was given Benadryl to counter act reaction in 2000     Sulfa Drugs Unknown and Rash       Social History -   Social History     Socioeconomic History     Marital status:      Number of children: 4   Tobacco Use     Smoking status: Former Smoker      Packs/day: 0.00     Years: 0.00     Pack years: 0.00     Types: Cigarettes     Smokeless tobacco: Former User     Quit date: 3/10/2016   Vaping Use     Vaping Use: Never used   Substance and Sexual Activity     Alcohol use: No     Comment: history alcohol abuse , s/p treatment     Drug use: No     Comment: former drug user (EtOH, cocaine, marijuana, meth), last use      Sexual activity: Yes     Partners: Male     Birth control/protection: Female Surgical   Other Topics Concern     Parent/sibling w/ CABG, MI or angioplasty before 65F 55M? Yes     Comment: Mother had a bypass surgery mother  from lupus   Social History Narrative    Currently lives in [place] with fiance and 18 month old son.  Has a 13 year old dtr that lives with dtrs dad in [place], [place], and a 6  Year old that was adopted out in an open adoption, lives in [place]       Family History -   Family History   Problem Relation Age of Onset     Cancer Father      Pancreatic Cancer Father      Lung Cancer Father      Lupus Mother      Physical Exam  General - The patient is nontoxic, in no distress. Alert and oriented to person and place, answers questions and cooperates with examination appropriately.   Neurologic - CN II-XII are intact. No focal neurologic deficits.   Voice and Breathing - The patient was breathing comfortably without the use of accessory muscles. There was no wheezing, stridor, or stertor.  The patients voice was clear and strong.  Eyes - Extraocular movements intact.  Sclera were not icteric or injected, conjunctiva were pink and moist.  Mouth - Examination of the oral cavity showed pink, healthy oral mucosa. No lesions or ulcerations noted.  The tongue was mobile and midline.  Throat - The walls of the oropharynx were smooth, symmetric, and had no lesions or ulcerations.  No postnasal drainage.  The uvula was midline on elevation. Tonsils 1+, one stone left side. I removed this.   Ears - Bilateral pinna and EACs with  normal appearing overlying skin. Tympanic membrane intact bilaterally. Bony landmarks of the ossicular chain are normal. No retraction, perforation, or masses.  No fluid or purulence was seen in the external canal or the middle ear.   Nose - External contour is symmetric, no gross deflection or scars.  Nasal mucosa is pink and moist with congestion. No pus anteriorly.  The septum was deviated to the left and obstructive, turbinates hypertrophic.  Neck - Soft, non-tender. Palpation of the occipital, submental, submandibular, internal jugular chain, and supraclavicular nodes did not demonstrate any abnormal lymph nodes or masses. No parotid masses. Palpation of the thyroid was soft and smooth, with no nodules or goiter appreciated.  The trachea was mobile and midline.        A/P - Breanna Luna is a 37 year old female with recurrent sinusitis, septal deviation, turbinate hypertrophy and chronic nasal obstruction (right-side). She has tried and failed numerous medical therapies. I recommend a CT scan to determine if the patient is having sinusitis and to visualize the anatomy. We will review the study at that time to determine any evidence of sinus disease that might be helped with surgical intervention. If she has sinus disease I recommend FESS, but if this is clear, I recommend septoplasty and bilateral inferior turbinate reduction given her nasal obstruction.  I recommend doxycycline for acute sinusitis.     I counseled the patient on the risks of surgery, including infection, bleeding, risks of general anesthesia, the risk of failure of the surgery to relieve nasal obstruction, the possible need for additional procedures, the possible need for additional medical therapy, and the possibility of alteration of the appearance of the external nose.  I will call after the CT scan to further discuss the details of surgery based on the CT results and then have the surgery schedulers call her.    ADDENDUM - CT sinus shows  no sinusitis, and she has patent sinus drainage pathways. Therefore, I recommend septoplasty and bilateral inferior turbinate reduction. She would like to proceed with tonsillectomy as well for chronic tonsillitis.     Risks, benefits, and alternatives of tonsillectomy also discussed.       Lobo Narayanan MD  Otolaryngology  [facility]

## 2022-04-28 NOTE — LETTER
4/28/2022         RE: Breanna Luna  [address]  [place] [address]        Dear Colleague,    Thank you for referring your patient, Breanna Luna, to the [facility]. Please see a copy of my visit note below.    Chief Complaint - sinusitis    History of Present Illness - Breanna Luna is a 37 year old female who presents for evaluation of possible recurrent sinusitis. The patient describes symptoms of sinus pressure, headaches, right nasal obstruction. Currently symptoms have been going on for a week, but she has had recurrent sinus infections and nasal obstruction for months. Presently, the patient is symptomatic. The patient notes no allergy. Treatments have included antibiotics (a lot of antibiotics - augmentin), nasal steroids, sudafed, and oral antihistamines. The treatments seem to not help much. No prior history of sinus surgery. Tonsils swell when she gets sick. Strep is negative.     I saw her 11/2021 for mild sensorineural hearing loss and snoring.     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)     Intractable migraine without aura and without status migrainosus     Biliary dyskinesia     Morbid obesity (H)       Current Medications -   Current Outpatient Medications:      acetaminophen (TYLENOL) 500 MG tablet, Take 1,000 mg by mouth every 6 hours as needed for mild pain, Disp: , Rfl:      ibuprofen (ADVIL/MOTRIN) 200 MG tablet, Take 600 mg by mouth every 4 hours as needed for mild pain, Disp: , Rfl:     Allergies -   Allergies   Allergen Reactions     Codeine Nausea     Other reaction(s): Nausea     Imitrex [Sumatriptan]      Worsening of migraine, paresthesias     Adhesive [Liquid Adhesive] Itching     Redness and itching around incision after hernia repair surgery     Ranitidine Rash and Itching     recvd IV zantac 75 and reaction was immediate,  was  given Benadryl to counter act reaction in      Sulfa Drugs Unknown and Rash       Social History -   Social History     Socioeconomic History     Marital status:      Number of children: 4   Tobacco Use     Smoking status: Former Smoker     Packs/day: 0.00     Years: 0.00     Pack years: 0.00     Types: Cigarettes     Smokeless tobacco: Former User     Quit date: 3/10/2016   Vaping Use     Vaping Use: Never used   Substance and Sexual Activity     Alcohol use: No     Comment: history alcohol abuse 2012, s/p treatment     Drug use: No     Comment: former drug user (EtOH, cocaine, marijuana, meth), last use      Sexual activity: Yes     Partners: Male     Birth control/protection: Female Surgical   Other Topics Concern     Parent/sibling w/ CABG, MI or angioplasty before 65F 55M? Yes     Comment: Mother had a bypass surgery mother  from lupus   Social History Narrative    Currently lives in [place] with fiance and 18 month old son.  Has a 13 year old dtr that lives with dtrs dad in [place], [place], and a 6  Year old that was adopted out in an open adoption, lives in [place]       Family History -   Family History   Problem Relation Age of Onset     Cancer Father      Pancreatic Cancer Father      Lung Cancer Father      Lupus Mother      Physical Exam  General - The patient is nontoxic, in no distress. Alert and oriented to person and place, answers questions and cooperates with examination appropriately.   Neurologic - CN II-XII are intact. No focal neurologic deficits.   Voice and Breathing - The patient was breathing comfortably without the use of accessory muscles. There was no wheezing, stridor, or stertor.  The patients voice was clear and strong.  Eyes - Extraocular movements intact.  Sclera were not icteric or injected, conjunctiva were pink and moist.  Mouth - Examination of the oral cavity showed pink, healthy oral mucosa. No lesions or ulcerations noted.  The tongue was mobile and  midline.  Throat - The walls of the oropharynx were smooth, symmetric, and had no lesions or ulcerations.  No postnasal drainage.  The uvula was midline on elevation. Tonsils 1+, one stone left side. I removed this.   Ears - Bilateral pinna and EACs with normal appearing overlying skin. Tympanic membrane intact bilaterally. Bony landmarks of the ossicular chain are normal. No retraction, perforation, or masses.  No fluid or purulence was seen in the external canal or the middle ear.   Nose - External contour is symmetric, no gross deflection or scars.  Nasal mucosa is pink and moist with congestion. No pus anteriorly.  The septum was deviated to the left and obstructive, turbinates hypertrophic.  Neck - Soft, non-tender. Palpation of the occipital, submental, submandibular, internal jugular chain, and supraclavicular nodes did not demonstrate any abnormal lymph nodes or masses. No parotid masses. Palpation of the thyroid was soft and smooth, with no nodules or goiter appreciated.  The trachea was mobile and midline.        A/P - Breanna Luna is a 37 year old female with recurrent sinusitis, septal deviation, turbinate hypertrophy and chronic nasal obstruction (right-side). She has tried and failed numerous medical therapies. I recommend a CT scan to determine if the patient is having sinusitis and to visualize the anatomy. We will review the study at that time to determine any evidence of sinus disease that might be helped with surgical intervention. If she has sinus disease I recommend FESS, but if this is clear, I recommend septoplasty and bilateral inferior turbinate reduction given her nasal obstruction.  I recommend doxycycline for acute sinusitis.     I counseled the patient on the risks of surgery, including infection, bleeding, risks of general anesthesia, the risk of failure of the surgery to relieve nasal obstruction, the possible need for additional procedures, the possible need for additional medical  therapy, and the possibility of alteration of the appearance of the external nose.  I will call after the CT scan to further discuss the details of surgery based on the CT results and then have the surgery schedulers call her.        Lobo Narayanan MD  Otolaryngology  [facility]        Again, thank you for allowing me to participate in the care of your patient.        Sincerely,        Lobo Narayanan MD

## 2022-04-29 ENCOUNTER — HOSPITAL ENCOUNTER (OUTPATIENT)
Dept: CT IMAGING | Facility: CLINIC | Age: 37
Discharge: HOME | End: 2022-04-29
Attending: OTOLARYNGOLOGY | Admitting: OTOLARYNGOLOGY
Payer: COMMERCIAL

## 2022-04-29 DIAGNOSIS — J01.01 ACUTE RECURRENT MAXILLARY SINUSITIS: ICD-10-CM

## 2022-04-29 PROCEDURE — 70486 CT MAXILLOFACIAL W/O DYE: CPT

## 2022-05-02 ENCOUNTER — TELEPHONE (OUTPATIENT)
Dept: OTOLARYNGOLOGY | Facility: CLINIC | Age: 37
End: 2022-05-02
Payer: COMMERCIAL

## 2022-05-02 ENCOUNTER — HOSPITAL ENCOUNTER (OUTPATIENT)
Facility: AMBULATORY SURGERY CENTER | Age: 37
End: 2022-05-02
Attending: OTOLARYNGOLOGY | Admitting: OTOLARYNGOLOGY
Payer: COMMERCIAL

## 2022-05-02 DIAGNOSIS — J34.2 NASAL SEPTAL DEVIATION: ICD-10-CM

## 2022-05-02 DIAGNOSIS — J34.89 NASAL OBSTRUCTION: ICD-10-CM

## 2022-05-02 DIAGNOSIS — J34.3 NASAL TURBINATE HYPERTROPHY: ICD-10-CM

## 2022-05-02 NOTE — TELEPHONE ENCOUNTER
Type of surgery: Septoplasty bilateral inferior turbinate reduction   CPT 91583, 58815   Acute recurrent maxillary sinusitis J01.01     Nasal obstruction J34.89     Nasal turbinate hypertrophy J34.3     Nasal septal deviation J34.2    Location of surgery: [facility]  Date and time of surgery: 06/10/2022  Surgeon: Oracio  Pre-Op Appt Date: 06/06/2022  Post-Op Appt Date: 06/23/2022   Packet sent out: Yes  Pre-cert/Authorization completed:  No prior auth needed per [employer] online list.    Date: 5-3-22    Sharri Santiago  Prior Authorization Dept  [phone]

## 2022-05-09 ASSESSMENT — ENCOUNTER SYMPTOMS
PALPITATIONS: 0
BREAST MASS: 0
CONSTIPATION: 0
DYSURIA: 0
NERVOUS/ANXIOUS: 0
EYE PAIN: 0
ABDOMINAL PAIN: 0
FEVER: 0
COUGH: 0
DIARRHEA: 0
HEMATOCHEZIA: 0
HEADACHES: 0
HEARTBURN: 0
CHILLS: 0
JOINT SWELLING: 0
NAUSEA: 0
FREQUENCY: 0
HEMATURIA: 0
WEAKNESS: 0
SHORTNESS OF BREATH: 0
PARESTHESIAS: 0
ARTHRALGIAS: 0
MYALGIAS: 0
DIZZINESS: 0
SORE THROAT: 0

## 2022-05-13 ENCOUNTER — OFFICE VISIT (OUTPATIENT)
Dept: FAMILY MEDICINE | Facility: CLINIC | Age: 37
End: 2022-05-13
Payer: COMMERCIAL

## 2022-05-13 VITALS
HEART RATE: 85 BPM | BODY MASS INDEX: 35.84 KG/M2 | SYSTOLIC BLOOD PRESSURE: 116 MMHG | RESPIRATION RATE: 14 BRPM | HEIGHT: 66 IN | TEMPERATURE: 97.7 F | WEIGHT: 223 LBS | OXYGEN SATURATION: 100 % | DIASTOLIC BLOOD PRESSURE: 72 MMHG

## 2022-05-13 DIAGNOSIS — E66.01 MORBID OBESITY (H): ICD-10-CM

## 2022-05-13 DIAGNOSIS — G43.019 INTRACTABLE MIGRAINE WITHOUT AURA AND WITHOUT STATUS MIGRAINOSUS: ICD-10-CM

## 2022-05-13 DIAGNOSIS — Z12.4 CERVICAL CANCER SCREENING: ICD-10-CM

## 2022-05-13 DIAGNOSIS — Z13.1 SCREENING FOR DIABETES MELLITUS: ICD-10-CM

## 2022-05-13 DIAGNOSIS — S93.492A SPRAIN OF ANTERIOR TALOFIBULAR LIGAMENT OF LEFT ANKLE, INITIAL ENCOUNTER: ICD-10-CM

## 2022-05-13 DIAGNOSIS — M54.2 NECK PAIN: ICD-10-CM

## 2022-05-13 DIAGNOSIS — G44.209 TENSION HEADACHE: ICD-10-CM

## 2022-05-13 DIAGNOSIS — R06.83 SNORING: ICD-10-CM

## 2022-05-13 DIAGNOSIS — Z00.00 ROUTINE GENERAL MEDICAL EXAMINATION AT A HEALTH CARE FACILITY: Primary | ICD-10-CM

## 2022-05-13 LAB
CHOLEST SERPL-MCNC: 159 MG/DL
FASTING STATUS PATIENT QL REPORTED: YES
FASTING STATUS PATIENT QL REPORTED: YES
GLUCOSE BLD-MCNC: 99 MG/DL (ref 70–99)
HDLC SERPL-MCNC: 57 MG/DL
LDLC SERPL CALC-MCNC: 61 MG/DL
NONHDLC SERPL-MCNC: 102 MG/DL
TRIGL SERPL-MCNC: 205 MG/DL

## 2022-05-13 PROCEDURE — 36415 COLL VENOUS BLD VENIPUNCTURE: CPT | Performed by: STUDENT IN AN ORGANIZED HEALTH CARE EDUCATION/TRAINING PROGRAM

## 2022-05-13 PROCEDURE — 82947 ASSAY GLUCOSE BLOOD QUANT: CPT | Performed by: STUDENT IN AN ORGANIZED HEALTH CARE EDUCATION/TRAINING PROGRAM

## 2022-05-13 PROCEDURE — 90471 IMMUNIZATION ADMIN: CPT | Performed by: STUDENT IN AN ORGANIZED HEALTH CARE EDUCATION/TRAINING PROGRAM

## 2022-05-13 PROCEDURE — 87624 HPV HI-RISK TYP POOLED RSLT: CPT | Performed by: STUDENT IN AN ORGANIZED HEALTH CARE EDUCATION/TRAINING PROGRAM

## 2022-05-13 PROCEDURE — 99214 OFFICE O/P EST MOD 30 MIN: CPT | Mod: 25 | Performed by: STUDENT IN AN ORGANIZED HEALTH CARE EDUCATION/TRAINING PROGRAM

## 2022-05-13 PROCEDURE — 99395 PREV VISIT EST AGE 18-39: CPT | Mod: 25 | Performed by: STUDENT IN AN ORGANIZED HEALTH CARE EDUCATION/TRAINING PROGRAM

## 2022-05-13 PROCEDURE — 90715 TDAP VACCINE 7 YRS/> IM: CPT | Performed by: STUDENT IN AN ORGANIZED HEALTH CARE EDUCATION/TRAINING PROGRAM

## 2022-05-13 PROCEDURE — G0145 SCR C/V CYTO,THINLAYER,RESCR: HCPCS | Performed by: STUDENT IN AN ORGANIZED HEALTH CARE EDUCATION/TRAINING PROGRAM

## 2022-05-13 PROCEDURE — 80061 LIPID PANEL: CPT | Performed by: STUDENT IN AN ORGANIZED HEALTH CARE EDUCATION/TRAINING PROGRAM

## 2022-05-13 RX ORDER — TIZANIDINE 2 MG/1
2 TABLET ORAL 3 TIMES DAILY PRN
Qty: 40 TABLET | Refills: 0 | Status: SHIPPED | OUTPATIENT
Start: 2022-05-13 | End: 2023-01-31

## 2022-05-13 RX ORDER — PHENTERMINE HYDROCHLORIDE 37.5 MG/1
37.5 CAPSULE ORAL EVERY MORNING
Qty: 30 CAPSULE | Refills: 0 | Status: SHIPPED | OUTPATIENT
Start: 2022-05-13 | End: 2022-06-30

## 2022-05-13 ASSESSMENT — ENCOUNTER SYMPTOMS
DIZZINESS: 0
EYE PAIN: 0
ABDOMINAL PAIN: 0
ARTHRALGIAS: 0
NAUSEA: 0
CONSTIPATION: 0
DYSURIA: 0
COUGH: 0
JOINT SWELLING: 0
HEARTBURN: 0
HEMATURIA: 0
HEADACHES: 0
CHILLS: 0
HEMATOCHEZIA: 0
WEAKNESS: 0
FEVER: 0
PALPITATIONS: 0
NERVOUS/ANXIOUS: 0
PARESTHESIAS: 0
FREQUENCY: 0
BREAST MASS: 0
SHORTNESS OF BREATH: 0
DIARRHEA: 0
SORE THROAT: 0
MYALGIAS: 0

## 2022-05-13 NOTE — PROGRESS NOTES
SUBJECTIVE:   CC: Breanna Luna is an 37 year old woman who presents for preventive health visit.       Patient has been advised of split billing requirements and indicates understanding: Yes  Healthy Habits:     Getting at least 3 servings of Calcium per day:  Yes    Bi-annual eye exam:  NO    Dental care twice a year:  Yes    Sleep apnea or symptoms of sleep apnea:  None    Diet:  Regular (no restrictions)    Frequency of exercise:  1 day/week    Duration of exercise:  15-30 minutes    Taking medications regularly:  Yes    Medication side effects:  None    PHQ-2 Total Score: 0    Additional concerns today:  No        Patient here today for regular physical.  Also has multiple concerns.  Has struggled with migraines her whole life as well as tension headaches.  Has seen neurology back in 2019 and followed him for a while.  They were recommending Botox injections at the time to help with her tension headaches.  Was unable to pay for these with insurance.  Has been multiple triptans in the past as well as preventative medications without improvement or worsening of her symptoms.  True migraines are only 4 to 6/year but tension headaches are often and she has used lots of NSAIDs.  Also notes chronic neck pain.  Injury when she was in [place] at 15 where she had a small vertebral fracture per patient.  No notes available.  Lots of neck tightness and stiffness.  Also seen plastic surgery and has a septoplasty coming up.  Working on weight loss and has been focused on this for the last 6 months.  Down 20 pounds now.  Has been getting phentermine from plastic surgery but they wanted to discuss things with me prior to continuing this.  She is also interested in seeing the weight loss clinic.  Also notes left ankle pain for the last 5 days has been minimal.    Today's PHQ-2 Score:   PHQ-2 ( 1999 Pfizer) 5/9/2022   Q1: Little interest or pleasure in doing things 0   Q2: Feeling down, depressed or hopeless 0   PHQ-2  Score 0   PHQ-2 Total Score (12-17 Years)- Positive if 3 or more points; Administer PHQ-A if positive -   Q1: Little interest or pleasure in doing things Not at all   Q2: Feeling down, depressed or hopeless Not at all   PHQ-2 Score 0       Abuse: Current or Past (Physical, Sexual or Emotional) - No  Do you feel safe in your environment? Yes    Have you ever done Advance Care Planning? (For example, a Health Directive, POLST, or a discussion with a medical provider or your loved ones about your wishes): No, advance care planning information given to patient to review.  Patient declined advance care planning discussion at this time.    Social History     Tobacco Use     Smoking status: Former Smoker     Packs/day: 0.00     Years: 0.00     Pack years: 0.00     Types: Cigarettes     Smokeless tobacco: Former User     Quit date: 3/10/2016   Substance Use Topics     Alcohol use: No     Comment: history alcohol abuse 2012, s/p treatment         Alcohol Use 5/9/2022   Prescreen: >3 drinks/day or >7 drinks/week? No       Reviewed orders with patient.  Reviewed health maintenance and updated orders accordingly - Yes  Lab work is in process    Breast Cancer Screening:    Breast CA Risk Assessment (FHS-7) 5/9/2022   Do you have a family history of breast, colon, or ovarian cancer? No / Unknown       Patient under 40 years of age: Routine Mammogram Screening not recommended.   Pertinent mammograms are reviewed under the imaging tab.  She should continue self breast exams.    History of abnormal Pap smear: NO - age 30-65 PAP every 5 years with negative HPV co-testing recommended     Reviewed and updated as needed this visit by clinical staff   Tobacco  Allergies  Meds   Med Hx  Surg Hx  Fam Hx  Soc Hx          Reviewed and updated as needed this visit by Provider                   Past Medical History:   Diagnosis Date     Cellulitis 2012    MRSA septic olecranon bursitis and facial cellulitis     Drug abuse (H) 2011     methamphetamine and alcohol, sober since treatment      Mild pre-eclampsia in third trimester      MRSA cellulitis 2012    cleared with negative nasal swabs 17 and 17, reviewed     PONV (postoperative nausea and vomiting)      Pregnancy-induced hypertension in third trimester      Rh negative, antepartum       Past Surgical History:   Procedure Laterality Date     APPENDECTOMY       DAVINCI ASSISTED HERNIORRHAPHY INCISIONAL N/A 7/10/2020    Procedure: Robotic incisional hernia repair with mesh;  Surgeon: Jovi Odom MD;  Location: [facility]     EXCISE LESION FOOT Left 2019    Procedure: Excision of nevus second interdigital space left foot, Excision of nevus third interdigital space left foot, excision of plantar junctional nevus left foot;  Surgeon: Rinku Lopez DPM;  Location: [facility]     INCISION AND DRAINAGE  2012    chin abscess I&D     LAPAROSCOPIC CHOLECYSTECTOMY N/A 2020    Procedure: Laparoscopic Cholecystectomy;  Surgeon: Renard Gerber MD;  Location: [facility]     LAPAROSCOPIC HERNIORRHAPHY INCISIONAL N/A 2019    Procedure: laparoscopic incisional hernia repair with mesh;  Surgeon: Jovi Odom MD;  [facility]     LAPAROSCOPIC SALPINGECTOMY Bilateral 2018    Procedure: LAPAROSCOPIC SALPINGECTOMY;  Laparoscopic Bilateral Salpingectomy;  Surgeon: Chai Cui MD;  Location: [facility]     MAMMOPLASTY REDUCTION Bilateral 2017     OB History    Para Term  AB Living   4 4 4 0 0 4   SAB IAB Ectopic Multiple Live Births   0 0 0 0 4      # Outcome Date GA Lbr Jian/2nd Weight Sex Delivery Anes PTL Lv   4 Term 18 37w0d 08:00 / 00:04 3.487 kg (7 lb 11 oz) F  EPI  WOODY      Complications: Mild pre-eclampsia      Name: Elaine      Apgar1: 8  Apgar5: 9   3 Term 16    M  EPI  WOODY      Complications: Postpartum hemorrhage   2 Term 10/29/11    F Vag-Spont EPI  WOODY      Birth Comments: Adopted out   1 Term 04    F Vag-Vacuum EPI   "WOODY      Complications: Postpartum hemorrhage       Review of Systems   Constitutional: Negative for chills and fever.   HENT: Negative for congestion, ear pain, hearing loss and sore throat.    Eyes: Negative for pain and visual disturbance.   Respiratory: Negative for cough and shortness of breath.    Cardiovascular: Negative for chest pain, palpitations and peripheral edema.   Gastrointestinal: Negative for abdominal pain, constipation, diarrhea, heartburn, hematochezia and nausea.   Breasts:  Negative for tenderness, breast mass and discharge.   Genitourinary: Negative for dysuria, frequency, genital sores, hematuria, pelvic pain, urgency, vaginal bleeding and vaginal discharge.   Musculoskeletal: Negative for arthralgias, joint swelling and myalgias.   Skin: Negative for rash.   Neurological: Negative for dizziness, weakness, headaches and paresthesias.   Psychiatric/Behavioral: Negative for mood changes. The patient is not nervous/anxious.         OBJECTIVE:   /72 (BP Location: Right arm, Patient Position: Sitting, Cuff Size: Adult Large)   Pulse 85   Temp 97.7  F (36.5  C) (Temporal)   Resp 14   Ht 1.676 m (5' 6\")   Wt 101.2 kg (223 lb)   LMP 04/13/2022 (Approximate)   SpO2 100%   Breastfeeding No   BMI 35.99 kg/m    Physical Exam  GENERAL: healthy, alert and no distress  EYES: Eyes grossly normal to inspection, PERRL and conjunctivae and sclerae normal  HENT: ear canals and TM's normal, nose and mouth without ulcers or lesions  NECK: no adenopathy, no asymmetry, masses, or scars and thyroid normal to palpation  RESP: lungs clear to auscultation - no rales, rhonchi or wheezes  CV: regular rate and rhythm, normal S1 S2, no S3 or S4, no murmur, click or rub, no peripheral edema and peripheral pulses strong  ABDOMEN: soft, nontender, no hepatosplenomegaly, no masses and bowel sounds normal  MS: no gross musculoskeletal defects noted, no edema, bilateral trapezius tenderness and tenderness to the " posterior occiput.  Range of motion of the neck is intact.  Does have left anterior talofibular ligament tenderness without malleoli or fifth metatarsal tenderness.  SKIN: no suspicious lesions or rashes  NEURO: Normal strength and tone, mentation intact and speech normal  PSYCH: mentation appears normal, affect normal/bright    Diagnostic Test Results:  Labs reviewed in Epic    ASSESSMENT/PLAN:   Breanna was seen today for physical.    Diagnoses and all orders for this visit:    Routine general medical examination at a health care facility  -     Lipid panel reflex to direct LDL Fasting; Future    Screening for diabetes mellitus  -     Glucose; Future    Morbid obesity (H)  We did discuss diet and exercise plan which she has put into place.  We will plan for her to restart phentermine and follow-up with weight management clinic.    Cervical cancer screening  -     Pap Screen with HPV - recommended age 30 - 65 years    Snoring  Does have some daytime fatigue as well as snores frequently.  Was recommended at 630 in the past but has not done so.  We will have her follow-up with them now.  -     Adult Sleep Eval & Management  Referral; Future    Intractable migraine without aura and without status migrainosus  -     Adult Neurology  Referral; Future    Tension headache  We will plan for to follow-up with neurology.    Neck pain  Chronic neck and upper back pain.  Patient is willing to see physical therapy.  We will try Zanaflex and hope this does not make her groggy during the day.  Could use nightly.  Try to limit NSAIDs in hopes of preventing rebound headaches.  -     Physical Therapy Referral; Future  -     tiZANidine (ZANAFLEX) 2 MG tablet; Take 1 tablet (2 mg) by mouth 3 times daily as needed for muscle spasms    Sprain of anterior talofibular ligament of left ankle, initial encounter    Other orders  -     TDAP VACCINE (Adacel, Boostrix)        Patient has been advised of split billing  "requirements and indicates understanding: Yes    COUNSELING:  Reviewed preventive health counseling, as reflected in patient instructions       Regular exercise       Healthy diet/nutrition       Vision screening       Hearing screening       Alcohol Use       Contraception    Estimated body mass index is 35.99 kg/m  as calculated from the following:    Height as of this encounter: 1.676 m (5' 6\").    Weight as of this encounter: 101.2 kg (223 lb).    Weight management plan: Discussed healthy diet and exercise guidelines    She reports that she has quit smoking. Her smoking use included cigarettes. She smoked 0.00 packs per day for 0.00 years. She quit smokeless tobacco use about 6 years ago.      Counseling Resources:  ATP IV Guidelines  Pooled Cohorts Equation Calculator  Breast Cancer Risk Calculator  BRCA-Related Cancer Risk Assessment: FHS-7 Tool  FRAX Risk Assessment  ICSI Preventive Guidelines  Dietary Guidelines for Americans, 2010  USDA's MyPlate  ASA Prophylaxis  Lung CA Screening    Bradley Mcconnell MD  [facility]  "

## 2022-05-13 NOTE — PROGRESS NOTES
Prior to immunization administration, verified patients identity using patient s name and date of birth. Please see Immunization Activity for additional information.     Screening Questionnaire for Adult Immunization    Are you sick today?   No   Do you have allergies to medications, food, a vaccine component or latex?   Yes   Have you ever had a serious reaction after receiving a vaccination?   No   Do you have a long-term health problem with heart, lung, kidney, or metabolic disease (e.g., diabetes), asthma, a blood disorder, no spleen, complement component deficiency, a cochlear implant, or a spinal fluid leak?  Are you on long-term aspirin therapy?   No   Do you have cancer, leukemia, HIV/AIDS, or any other immune system problem?   No   Do you have a parent, brother, or sister with an immune system problem?   No   In the past 3 months, have you taken medications that affect  your immune system, such as prednisone, other steroids, or anticancer drugs; drugs for the treatment of rheumatoid arthritis, Crohn s disease, or psoriasis; or have you had radiation treatments?   No   Have you had a seizure, or a brain or other nervous system problem?   No   During the past year, have you received a transfusion of blood or blood    products, or been given immune (gamma) globulin or antiviral drug?   No   For women: Are you pregnant or is there a chance you could become       pregnant during the next month?   No   Have you received any vaccinations in the past 4 weeks?   No     Immunization questionnaire was positive for at least one answer.  Notified Yes.        Per orders of Dr. Mcconnell, injection of Tdap given by Francisca Quigley CMA. Patient instructed to remain in clinic for 15 minutes afterwards, and to report any adverse reaction to me immediately.       Screening performed by Francisca Quigley CMA on 5/13/2022 at 8:11 AM.

## 2022-05-16 ENCOUNTER — TELEPHONE (OUTPATIENT)
Dept: OTOLARYNGOLOGY | Facility: CLINIC | Age: 37
End: 2022-05-16
Payer: COMMERCIAL

## 2022-05-16 NOTE — TELEPHONE ENCOUNTER
Reason for call:  Patient reporting a symptom    Symptom or request: Says throat feels fragile, can feel tonsils swelling    Duration (how long have symptoms been present): few days    Have you been treated for this before? Yes    Additional comments: n/a    Phone Number patient can be reached at:  Cell number on file:    Telephone Information:   Mobile [phone]       Best Time:  Any    Can we leave a detailed message on this number:  YES    Call taken on 5/16/2022 at 1:52 PM by Samir Thompson

## 2022-05-17 ENCOUNTER — ALLIED HEALTH/NURSE VISIT (OUTPATIENT)
Dept: FAMILY MEDICINE | Facility: CLINIC | Age: 37
End: 2022-05-17

## 2022-05-17 ENCOUNTER — VIRTUAL VISIT (OUTPATIENT)
Dept: FAMILY MEDICINE | Facility: CLINIC | Age: 37
End: 2022-05-17
Payer: COMMERCIAL

## 2022-05-17 DIAGNOSIS — F15.11 METHAMPHETAMINE ABUSE IN REMISSION (H): ICD-10-CM

## 2022-05-17 DIAGNOSIS — J02.9 SORE THROAT: Primary | ICD-10-CM

## 2022-05-17 DIAGNOSIS — J02.9 ACUTE PHARYNGITIS, UNSPECIFIED ETIOLOGY: Primary | ICD-10-CM

## 2022-05-17 DIAGNOSIS — J02.9 ACUTE PHARYNGITIS, UNSPECIFIED ETIOLOGY: ICD-10-CM

## 2022-05-17 LAB
BKR LAB AP GYN ADEQUACY: NORMAL
BKR LAB AP GYN INTERPRETATION: NORMAL
BKR LAB AP HPV REFLEX: NORMAL
BKR LAB AP LMP: NORMAL
BKR LAB AP PREVIOUS ABNORMAL: NORMAL
DEPRECATED S PYO AG THROAT QL EIA: NEGATIVE
GROUP A STREP BY PCR: NOT DETECTED
PATH REPORT.COMMENTS IMP SPEC: NORMAL
PATH REPORT.COMMENTS IMP SPEC: NORMAL
PATH REPORT.RELEVANT HX SPEC: NORMAL

## 2022-05-17 PROCEDURE — 99213 OFFICE O/P EST LOW 20 MIN: CPT | Mod: 95 | Performed by: NURSE PRACTITIONER

## 2022-05-17 PROCEDURE — 87651 STREP A DNA AMP PROBE: CPT

## 2022-05-17 PROCEDURE — 99207 PR NO CHARGE NURSE ONLY: CPT

## 2022-05-17 NOTE — TELEPHONE ENCOUNTER
See mychart conversation which has been routed to MD Debbie KING, RN Specialty Triage 5/17/2022 4:11 PM

## 2022-05-17 NOTE — RESULT ENCOUNTER NOTE
Breanna,  Your labs that have returned are normal. More labs are still processing. Adriana Snider, DNP

## 2022-05-18 ENCOUNTER — LAB (OUTPATIENT)
Dept: LAB | Facility: CLINIC | Age: 37
End: 2022-05-18
Payer: COMMERCIAL

## 2022-05-18 DIAGNOSIS — J02.9 ACUTE PHARYNGITIS, UNSPECIFIED ETIOLOGY: ICD-10-CM

## 2022-05-18 PROBLEM — J35.01 CHRONIC TONSILLITIS: Status: ACTIVE | Noted: 2022-05-18

## 2022-05-18 LAB
BASOPHILS # BLD AUTO: 0 10E3/UL (ref 0–0.2)
BASOPHILS NFR BLD AUTO: 0 %
EOSINOPHIL # BLD AUTO: 0.2 10E3/UL (ref 0–0.7)
EOSINOPHIL NFR BLD AUTO: 2 %
ERYTHROCYTE [DISTWIDTH] IN BLOOD BY AUTOMATED COUNT: 12.1 % (ref 10–15)
HCT VFR BLD AUTO: 39.4 % (ref 35–47)
HGB BLD-MCNC: 13.6 G/DL (ref 11.7–15.7)
IMM GRANULOCYTES # BLD: 0 10E3/UL
IMM GRANULOCYTES NFR BLD: 0 %
LYMPHOCYTES # BLD AUTO: 2.1 10E3/UL (ref 0.8–5.3)
LYMPHOCYTES NFR BLD AUTO: 24 %
MCH RBC QN AUTO: 30.7 PG (ref 26.5–33)
MCHC RBC AUTO-ENTMCNC: 34.5 G/DL (ref 31.5–36.5)
MCV RBC AUTO: 89 FL (ref 78–100)
MONOCYTES # BLD AUTO: 0.4 10E3/UL (ref 0–1.3)
MONOCYTES NFR BLD AUTO: 5 %
MONOCYTES NFR BLD AUTO: NEGATIVE %
NEUTROPHILS # BLD AUTO: 5.9 10E3/UL (ref 1.6–8.3)
NEUTROPHILS NFR BLD AUTO: 69 %
NRBC # BLD AUTO: 0 10E3/UL
NRBC BLD AUTO-RTO: 0 /100
PLATELET # BLD AUTO: 245 10E3/UL (ref 150–450)
RBC # BLD AUTO: 4.43 10E6/UL (ref 3.8–5.2)
WBC # BLD AUTO: 8.6 10E3/UL (ref 4–11)

## 2022-05-18 PROCEDURE — 86308 HETEROPHILE ANTIBODY SCREEN: CPT

## 2022-05-18 PROCEDURE — 85025 COMPLETE CBC W/AUTO DIFF WBC: CPT

## 2022-05-18 PROCEDURE — 36415 COLL VENOUS BLD VENIPUNCTURE: CPT

## 2022-05-18 NOTE — RESULT ENCOUNTER NOTE
Breanna,  I have reviewed your labs, and they are all normal. If you have questions, please notify me through MyChart or a telephone call.   Adriana Snider, DNP

## 2022-05-19 LAB
HUMAN PAPILLOMA VIRUS 16 DNA: NEGATIVE
HUMAN PAPILLOMA VIRUS 18 DNA: NEGATIVE
HUMAN PAPILLOMA VIRUS FINAL DIAGNOSIS: NORMAL
HUMAN PAPILLOMA VIRUS OTHER HR: NEGATIVE

## 2022-05-19 NOTE — RESULT ENCOUNTER NOTE
Breanna,  I have reviewed your labs, and they are all normal. If you have questions, please notify me through MyChart or a telephone call. If you are not better, I recommend an in person visit. Sincerely,  Adriana Snider DNP

## 2022-05-25 DIAGNOSIS — Z11.59 ENCOUNTER FOR SCREENING FOR OTHER VIRAL DISEASES: Primary | ICD-10-CM

## 2022-05-26 ENCOUNTER — HOSPITAL ENCOUNTER (OUTPATIENT)
Dept: PHYSICAL THERAPY | Facility: CLINIC | Age: 37
Setting detail: THERAPIES SERIES
Discharge: HOME | End: 2022-05-26
Attending: STUDENT IN AN ORGANIZED HEALTH CARE EDUCATION/TRAINING PROGRAM
Payer: COMMERCIAL

## 2022-05-26 DIAGNOSIS — M54.2 NECK PAIN: ICD-10-CM

## 2022-05-26 PROCEDURE — 97112 NEUROMUSCULAR REEDUCATION: CPT | Mod: GP | Performed by: PHYSICAL THERAPIST

## 2022-05-26 PROCEDURE — 97161 PT EVAL LOW COMPLEX 20 MIN: CPT | Mod: GP | Performed by: PHYSICAL THERAPIST

## 2022-05-26 PROCEDURE — 97140 MANUAL THERAPY 1/> REGIONS: CPT | Mod: GP | Performed by: PHYSICAL THERAPIST

## 2022-05-26 NOTE — PROGRESS NOTES
"   05/26/22 0945   General Information   Type of Visit Initial OP Ortho PT Evaluation   Start of Care Date 05/26/22   Referring Physician Bradley Mcconnell MD   Patient/Family Goals Statement ROM and alleviate tension and HAs, see if structure of my neck/spine needs to be fixed   Orders Evaluate and Treat   Date of Order 05/13/22   Certification Required? No   Medical Diagnosis neck pain   Surgical/Medical history reviewed Yes   Precautions/Limitations no known precautions/limitations   General Information Comments PMH: breast reduction (helped with LBP)   Body Part(s)   Body Part(s) Cervical Spine   Presentation and Etiology   Pertinent history of current problem (include personal factors and/or comorbidities that impact the POC) Neck pain and HAs, migraines and tension HAs, from neck wrapping around to front of head, neck gets really tight; \"puts me out of commission 3 days at a time.\"  Uses ibuprofen.  Tension is mostly on the right and beind right eye.  She had HAs as a kid too, but could just take a nap and it would be fine.   Impairments A. Pain;D. Decreased ROM;E. Decreased flexibility;N. Headaches   Functional Limitations perform activities of daily living;perform required work activities;perform desired leisure / sports activities   Symptom Location right side of head and neck most but both sides invovled   How/Where did it occur From insidious onset   Chronicity Chronic   Pain rating (0-10 point scale) Best (/10);Worst (/10);Other   Best (/10) 0 (if migraine does not coem on)   Worst (/10) 10 (completely out of commision and  had to come home from work to take care of her; or ambulance ride 3 years ago)   Pain rating comment Current: no HA, \"just a bunch of tension in neck and will probably take a bunch of ibuprofen to avoid HA.\"   Pain quality   (tension)   Frequency of pain/symptoms B. Intermittent   Pain/symptoms are: Other   Pain symptoms comment worse as day goes on   Pain/symptoms " exacerbated by G. Certain positions;M. Other   Pain exacerbation comment needing to support neck properly--pillows help   Pain/symptoms eased by I. OTC medication(s);F. Certain positions;K. Other   Pain eased by comment pillows help; OTC antiinflammatory meds help   Current / Previous Interventions   Diagnostic Tests: X-ray   X-ray Results unremarkable  (from 2019)   Fall Risk Screen   Fall screen completed by PT   Have you fallen 2 or more times in the past year? No   Have you fallen and had an injury in the past year? No   Is patient a fall risk? No   Abuse Screen (yes response referral indicated)   Feels Unsafe at Home or Work/School no   Feels Threatened by Someone no   Does Anyone Try to Keep You From Having Contact with Others or Doing Things Outside Your Home? no   Physical Signs of Abuse Present no   Patient needs abuse support services and resources No   Functional Scales   Functional Scales Other   Other Scales  NDI = 52%   Cervical Spine   Cervical Left Side Bending ROM about 10-15% less than right   Cervical Right Rotation ROM WFL-WNL   Cervical Left Rotation ROM about 10% less then to right   Cervical Flexion ROM WFL   Cervical Extension ROM WFL   Cervical Right Side Bending ROM WFL-WNL   Palpation not closing on mid cervical toward left.  Needing repositioning low thoracic. tight paraspinals in cervical, SO and thoracic and UT (UT more on right)   Posture leans to left   Planned Therapy Interventions   Planned Therapy Interventions neuromuscular re-education;stretching;strengthening;manual therapy;ROM   Clinical Impression   Criteria for Skilled Therapeutic Interventions Met yes, treatment indicated   PT Diagnosis neck pain, mm tension   Influenced by the following impairments pain, HA, mm spasms   Functional limitations due to impairments lifting, reading, driving, sleeping   Clinical Presentation Stable/Uncomplicated   Clinical Decision Making (Complexity) Low complexity   Therapy Frequency 1  time/week   Predicted Duration of Therapy Intervention (days/wks) for 8 weeks with increase to 2x/wk if mm tension and HAs repsonding favorably but only mildly.   Risk & Benefits of therapy have been explained Yes   Patient, Family & other staff in agreement with plan of care Yes   Clinical Impression Comments Tension HAs from left leaning posture, right UT tension and bilat cerv and thoracic parspinals tension with decreased closing on left of mid cervical spine.   Education Assessment   Preferred Learning Style Listening   Barriers to Learning No barriers   ORTHO GOALS   PT Ortho Eval Goals 1;2;3;4   Ortho Goal 1   Goal Identifier HAs   Goal Description Pt will have control of aborting or preventing HAs with techniques trained for less than daily HAs.   Target Date 07/22/22   Ortho Goal 2   Goal Identifier sleeping   Goal Description Sleep will be distrubed less than an hour to increase restlful recovry at night.   Target Date 07/22/22   Ortho Goal 3   Goal Identifier neck structure   Goal Description Techniques of PT working to control her symptoms to assure her neck sturcture is not compromnised, as noted by symptom control increased bu 80% better.   Target Date 07/22/22   Ortho Goal 4   Goal Identifier ROM   Goal Description Neck ROM will be wihtin 5% of each other bilat for cerv rot and SB.   Target Date 07/22/22   Total Evaluation Time   PT Eval, Low Complexity Minutes (43639) 20

## 2022-06-03 RX ORDER — IBUPROFEN 200 MG
200 TABLET ORAL EVERY 4 HOURS PRN
COMMUNITY
End: 2023-01-31

## 2022-06-06 ENCOUNTER — LAB (OUTPATIENT)
Dept: LAB | Facility: CLINIC | Age: 37
End: 2022-06-06
Payer: COMMERCIAL

## 2022-06-06 ENCOUNTER — OFFICE VISIT (OUTPATIENT)
Dept: FAMILY MEDICINE | Facility: CLINIC | Age: 37
End: 2022-06-06
Payer: COMMERCIAL

## 2022-06-06 VITALS
BODY MASS INDEX: 35.19 KG/M2 | OXYGEN SATURATION: 98 % | HEART RATE: 92 BPM | WEIGHT: 218 LBS | DIASTOLIC BLOOD PRESSURE: 66 MMHG | TEMPERATURE: 97.5 F | RESPIRATION RATE: 10 BRPM | SYSTOLIC BLOOD PRESSURE: 118 MMHG

## 2022-06-06 DIAGNOSIS — Z11.59 ENCOUNTER FOR SCREENING FOR OTHER VIRAL DISEASES: ICD-10-CM

## 2022-06-06 DIAGNOSIS — J35.01 CHRONIC TONSILLITIS: ICD-10-CM

## 2022-06-06 DIAGNOSIS — J34.2 NASAL SEPTAL DEVIATION: ICD-10-CM

## 2022-06-06 DIAGNOSIS — Z01.818 PRE-OP EXAM: Primary | ICD-10-CM

## 2022-06-06 PROCEDURE — U0005 INFEC AGEN DETEC AMPLI PROBE: HCPCS

## 2022-06-06 PROCEDURE — 87081 CULTURE SCREEN ONLY: CPT | Performed by: FAMILY MEDICINE

## 2022-06-06 PROCEDURE — U0003 INFECTIOUS AGENT DETECTION BY NUCLEIC ACID (DNA OR RNA); SEVERE ACUTE RESPIRATORY SYNDROME CORONAVIRUS 2 (SARS-COV-2) (CORONAVIRUS DISEASE [COVID-19]), AMPLIFIED PROBE TECHNIQUE, MAKING USE OF HIGH THROUGHPUT TECHNOLOGIES AS DESCRIBED BY CMS-2020-01-R: HCPCS

## 2022-06-06 PROCEDURE — 99214 OFFICE O/P EST MOD 30 MIN: CPT | Performed by: FAMILY MEDICINE

## 2022-06-06 NOTE — H&P (VIEW-ONLY)
[address] [address]  Phone: [phone]  Fax: [phone]  Primary Provider: No Ref-Primary, Physician  Pre-op Performing Provider: DONN COUCH      PREOPERATIVE EVALUATION:  Today's date: 6/6/2022    Breanna Luna is a 37 year old female who presents for a preoperative evaluation.    Surgical Information:  Surgery/Procedure: Septoplasty, Bilateral Inferior Turbinate Reduction and Bilateral Tonsillectomy   Surgery Location: [facility]   Surgeon: Oracio   Surgery Date: 6/10/2022  Time of Surgery: 11:15  Where patient plans to recover: At home with family  Fax number for surgical facility: Note does not need to be faxed, will be available electronically in Epic.      ASK's about needing MRSA swab?   Type of Anesthesia Anticipated: General    Assessment & Plan     The proposed surgical procedure is considered INTERMEDIATE risk.    Pre-op exam    - MRSA Culture    Nasal septal deviation      Chronic tonsillitis                     RECOMMENDATION:  APPROVAL GIVEN to proceed with proposed procedure, without further diagnostic evaluation.                      Subjective     HPI related to upcoming procedure: Chronic problems with nasal congestion and chronic tonsillitis.    Preop Questions 6/5/2022   1. Have you ever had a heart attack or stroke? No   2. Have you ever had surgery on your heart or blood vessels, such as a stent placement, a coronary artery bypass, or surgery on an artery in your head, neck, heart, or legs? No   3. Do you have chest pain with activity? No   4. Do you have a history of  heart failure? No   5. Do you currently have a cold, bronchitis or symptoms of other infection? No   6. Do you have a cough, shortness of breath, or wheezing? No   7. Do you or anyone in your family have previous history of blood clots? No   8. Do you or does anyone in your family have a serious bleeding problem such as prolonged bleeding  following surgeries or cuts? No   9. Have you ever had problems with anemia or been told to take iron pills? No   10. Have you had any abnormal blood loss such as black, tarry or bloody stools, or abnormal vaginal bleeding? No   11. Have you ever had a blood transfusion? No   12. Are you willing to have a blood transfusion if it is medically needed before, during, or after your surgery? Yes   13. Have you or any of your relatives ever had problems with anesthesia? No   14. Do you have sleep apnea, excessive snoring or daytime drowsiness? No   15. Do you have any artifical heart valves or other implanted medical devices like a pacemaker, defibrillator, or continuous glucose monitor? No   16. Do you have artificial joints? No   17. Are you allergic to latex? No   18. Is there any chance that you may be pregnant? No       Health Care Directive:  Patient does not have a Health Care Directive or Living Will: Discussed advance care planning with patient; however, patient declined at this time.    Preoperative Review of :   reviewed - no record of controlled substances prescribed.          Review of Systems  Constitutional, neuro, ENT, endocrine, pulmonary, cardiac, gastrointestinal, genitourinary, musculoskeletal, integument and psychiatric systems are negative, except as otherwise noted.    Patient Active Problem List    Diagnosis Date Noted     Chronic tonsillitis 05/18/2022     Priority: Medium     Added automatically from request for surgery [id number]       Nasal obstruction 05/02/2022     Priority: Medium     Added automatically from request for surgery [id number]       Nasal turbinate hypertrophy 05/02/2022     Priority: Medium     Added automatically from request for surgery [id number]       Nasal septal deviation 05/02/2022     Priority: Medium     Added automatically from request for surgery [id number]       Morbid obesity (H) 02/16/2021     Priority: Medium     Biliary dyskinesia 02/10/2020     Priority: Medium      Added automatically from request for surgery [id number]       Intractable migraine without aura and without status migrainosus 07/17/2019     Priority: Medium     Alcohol abuse, in remission 08/20/2018     Priority: Medium     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) 08/20/2018     Priority: Medium     Mixed incontinence 03/03/2017     Priority: Medium      Past Medical History:   Diagnosis Date     Cellulitis 2012    MRSA septic olecranon bursitis and facial cellulitis     Drug abuse (H) 2011    methamphetamine and alcohol, sober since treatment 2011     Mild pre-eclampsia in third trimester      Motion sickness      MRSA cellulitis 2012    cleared with negative nasal swabs 8/16/17 and 8/28/17, reviewed     PONV (postoperative nausea and vomiting)      Pregnancy-induced hypertension in third trimester      Rh negative, antepartum      Past Surgical History:   Procedure Laterality Date     APPENDECTOMY       DAVINCI ASSISTED HERNIORRHAPHY INCISIONAL N/A 7/10/2020    Procedure: Robotic incisional hernia repair with mesh;  Surgeon: Jovi Odom MD;  Location: [facility] OR     EXCISE LESION FOOT Left 8/16/2019    Procedure: Excision of nevus second interdigital space left foot, Excision of nevus third interdigital space left foot, excision of plantar junctional nevus left foot;  Surgeon: Rinku Lopez DPM;  Location: [facility] OR     INCISION AND DRAINAGE  04/18/2012    chin abscess I&D     LAPAROSCOPIC CHOLECYSTECTOMY N/A 2/14/2020    Procedure: Laparoscopic Cholecystectomy;  Surgeon: Renard Gerber MD;  Location: [facility] OR     LAPAROSCOPIC HERNIORRHAPHY INCISIONAL N/A 7/17/2019    Procedure: laparoscopic incisional hernia repair with mesh;  Surgeon: Jovi Odom MD;  Location: [facility] OR     LAPAROSCOPIC SALPINGECTOMY Bilateral 8/27/2018    Procedure: LAPAROSCOPIC SALPINGECTOMY;  Laparoscopic Bilateral Salpingectomy;  Surgeon: Chai Cui MD;  Location: [facility] OR     MAMMOPLASTY REDUCTION Bilateral 08/04/2017     Current  Outpatient Medications   Medication Sig Dispense Refill     acetaminophen (TYLENOL) 500 MG tablet Take 1,000 mg by mouth every 6 hours as needed for mild pain       cetirizine (ZYRTEC) 10 MG tablet Take by mouth daily       fluticasone (FLONASE) 50 MCG/ACT nasal spray Spray 1 spray into both nostrils daily       ibuprofen (ADVIL/MOTRIN) 200 MG tablet Take 200 mg by mouth every 4 hours as needed for mild pain       phentermine (ADIPEX-P) 37.5 MG capsule Take 1 capsule (37.5 mg) by mouth every morning 30 capsule 0     tiZANidine (ZANAFLEX) 2 MG tablet Take 1 tablet (2 mg) by mouth 3 times daily as needed for muscle spasms 40 tablet 0       Allergies   Allergen Reactions     Codeine Nausea     Other reaction(s): Nausea     Imitrex [Sumatriptan]      Worsening of migraine, paresthesias     Vancomycin Other (See Comments)     Adhesive [Liquid Adhesive] Itching     Redness and itching around incision after hernia repair surgery     Ranitidine Rash and Itching     recvd IV zantac 75 and reaction was immediate,  was given Benadryl to counter act reaction in      Sulfa Drugs Unknown and Rash        Social History     Tobacco Use     Smoking status: Former Smoker     Packs/day: 0.00     Years: 0.00     Pack years: 0.00     Types: Cigarettes     Quit date: 3/10/2016     Years since quittin.2     Smokeless tobacco: Never Used   Substance Use Topics     Alcohol use: No     Comment: history alcohol abuse , s/p treatment     Family History   Problem Relation Age of Onset     Cancer Father      Pancreatic Cancer Father      Lung Cancer Father      Lupus Mother      History   Drug Use No     Comment: former drug user (EtOH, cocaine, marijuana, meth), last use          Objective     LMP 05/10/2022 (Exact Date)     Physical Exam    GENERAL APPEARANCE: healthy, alert and no distress     EYES: EOMI, PERRL     HENT: ear canals and TM's normal and nose and mouth without ulcers or lesions     NECK: no adenopathy, no  asymmetry, masses, or scars and thyroid normal to palpation     RESP: lungs clear to auscultation - no rales, rhonchi or wheezes     CV: regular rates and rhythm, normal S1 S2, no S3 or S4 and no murmur, click or rub     ABDOMEN:  soft, nontender, no HSM or masses and bowel sounds normal     MS: extremities normal- no gross deformities noted, no evidence of inflammation in joints, FROM in all extremities.     SKIN: no suspicious lesions or rashes     NEURO: Normal strength and tone, sensory exam grossly normal, mentation intact and speech normal     PSYCH: mentation appears normal. and affect normal/bright     LYMPHATICS: No cervical adenopathy    Recent Labs   Lab Test 05/18/22  1446   HGB 13.6           Diagnostics:  MRSA culture ordered and pending.  No EKG required, no history of coronary heart disease, significant arrhythmia, peripheral arterial disease or other structural heart disease.    Revised Cardiac Risk Index (RCRI):  The patient has the following serious cardiovascular risks for perioperative complications:   - No serious cardiac risks = 0 points     RCRI Interpretation: 0 points: Class I (very low risk - 0.4% complication rate)           Signed Electronically by: Ag Quigley MD  Copy of this evaluation report is provided to requesting physician.

## 2022-06-06 NOTE — PROGRESS NOTES
[address] [address]  Phone: [phone]  Fax: [phone]  Primary Provider: No Ref-Primary, Physician  Pre-op Performing Provider: DONN COUCH      PREOPERATIVE EVALUATION:  Today's date: 6/6/2022    Breanna Luna is a 37 year old female who presents for a preoperative evaluation.    Surgical Information:  Surgery/Procedure: Septoplasty, Bilateral Inferior Turbinate Reduction and Bilateral Tonsillectomy   Surgery Location: [facility]   Surgeon: Oracio   Surgery Date: 6/10/2022  Time of Surgery: 11:15  Where patient plans to recover: At home with family  Fax number for surgical facility: Note does not need to be faxed, will be available electronically in Epic.      ASK's about needing MRSA swab?   Type of Anesthesia Anticipated: General    Assessment & Plan     The proposed surgical procedure is considered INTERMEDIATE risk.    Pre-op exam    - MRSA Culture    Nasal septal deviation      Chronic tonsillitis                     RECOMMENDATION:  APPROVAL GIVEN to proceed with proposed procedure, without further diagnostic evaluation.                      Subjective     HPI related to upcoming procedure: Chronic problems with nasal congestion and chronic tonsillitis.    Preop Questions 6/5/2022   1. Have you ever had a heart attack or stroke? No   2. Have you ever had surgery on your heart or blood vessels, such as a stent placement, a coronary artery bypass, or surgery on an artery in your head, neck, heart, or legs? No   3. Do you have chest pain with activity? No   4. Do you have a history of  heart failure? No   5. Do you currently have a cold, bronchitis or symptoms of other infection? No   6. Do you have a cough, shortness of breath, or wheezing? No   7. Do you or anyone in your family have previous history of blood clots? No   8. Do you or does anyone in your family have a serious bleeding problem such as prolonged bleeding  following surgeries or cuts? No   9. Have you ever had problems with anemia or been told to take iron pills? No   10. Have you had any abnormal blood loss such as black, tarry or bloody stools, or abnormal vaginal bleeding? No   11. Have you ever had a blood transfusion? No   12. Are you willing to have a blood transfusion if it is medically needed before, during, or after your surgery? Yes   13. Have you or any of your relatives ever had problems with anesthesia? No   14. Do you have sleep apnea, excessive snoring or daytime drowsiness? No   15. Do you have any artifical heart valves or other implanted medical devices like a pacemaker, defibrillator, or continuous glucose monitor? No   16. Do you have artificial joints? No   17. Are you allergic to latex? No   18. Is there any chance that you may be pregnant? No       Health Care Directive:  Patient does not have a Health Care Directive or Living Will: Discussed advance care planning with patient; however, patient declined at this time.    Preoperative Review of :   reviewed - no record of controlled substances prescribed.          Review of Systems  Constitutional, neuro, ENT, endocrine, pulmonary, cardiac, gastrointestinal, genitourinary, musculoskeletal, integument and psychiatric systems are negative, except as otherwise noted.    Patient Active Problem List    Diagnosis Date Noted     Chronic tonsillitis 05/18/2022     Priority: Medium     Added automatically from request for surgery [id number]       Nasal obstruction 05/02/2022     Priority: Medium     Added automatically from request for surgery [id number]       Nasal turbinate hypertrophy 05/02/2022     Priority: Medium     Added automatically from request for surgery [id number]       Nasal septal deviation 05/02/2022     Priority: Medium     Added automatically from request for surgery [id number]       Morbid obesity (H) 02/16/2021     Priority: Medium     Biliary dyskinesia 02/10/2020     Priority: Medium      Added automatically from request for surgery [id number]       Intractable migraine without aura and without status migrainosus 07/17/2019     Priority: Medium     Alcohol abuse, in remission 08/20/2018     Priority: Medium     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) 08/20/2018     Priority: Medium     Mixed incontinence 03/03/2017     Priority: Medium      Past Medical History:   Diagnosis Date     Cellulitis 2012    MRSA septic olecranon bursitis and facial cellulitis     Drug abuse (H) 2011    methamphetamine and alcohol, sober since treatment 2011     Mild pre-eclampsia in third trimester      Motion sickness      MRSA cellulitis 2012    cleared with negative nasal swabs 8/16/17 and 8/28/17, reviewed     PONV (postoperative nausea and vomiting)      Pregnancy-induced hypertension in third trimester      Rh negative, antepartum      Past Surgical History:   Procedure Laterality Date     APPENDECTOMY       DAVINCI ASSISTED HERNIORRHAPHY INCISIONAL N/A 7/10/2020    Procedure: Robotic incisional hernia repair with mesh;  Surgeon: Jovi Odom MD;  Location: [facility] OR     EXCISE LESION FOOT Left 8/16/2019    Procedure: Excision of nevus second interdigital space left foot, Excision of nevus third interdigital space left foot, excision of plantar junctional nevus left foot;  Surgeon: Rinku Lopez DPM;  Location: [facility] OR     INCISION AND DRAINAGE  04/18/2012    chin abscess I&D     LAPAROSCOPIC CHOLECYSTECTOMY N/A 2/14/2020    Procedure: Laparoscopic Cholecystectomy;  Surgeon: Renard Gerber MD;  Location: [facility] OR     LAPAROSCOPIC HERNIORRHAPHY INCISIONAL N/A 7/17/2019    Procedure: laparoscopic incisional hernia repair with mesh;  Surgeon: Jovi Odom MD;  Location: [facility] OR     LAPAROSCOPIC SALPINGECTOMY Bilateral 8/27/2018    Procedure: LAPAROSCOPIC SALPINGECTOMY;  Laparoscopic Bilateral Salpingectomy;  Surgeon: Chai Cui MD;  Location: [facility] OR     MAMMOPLASTY REDUCTION Bilateral 08/04/2017     Current  Outpatient Medications   Medication Sig Dispense Refill     acetaminophen (TYLENOL) 500 MG tablet Take 1,000 mg by mouth every 6 hours as needed for mild pain       cetirizine (ZYRTEC) 10 MG tablet Take by mouth daily       fluticasone (FLONASE) 50 MCG/ACT nasal spray Spray 1 spray into both nostrils daily       ibuprofen (ADVIL/MOTRIN) 200 MG tablet Take 200 mg by mouth every 4 hours as needed for mild pain       phentermine (ADIPEX-P) 37.5 MG capsule Take 1 capsule (37.5 mg) by mouth every morning 30 capsule 0     tiZANidine (ZANAFLEX) 2 MG tablet Take 1 tablet (2 mg) by mouth 3 times daily as needed for muscle spasms 40 tablet 0       Allergies   Allergen Reactions     Codeine Nausea     Other reaction(s): Nausea     Imitrex [Sumatriptan]      Worsening of migraine, paresthesias     Vancomycin Other (See Comments)     Adhesive [Liquid Adhesive] Itching     Redness and itching around incision after hernia repair surgery     Ranitidine Rash and Itching     recvd IV zantac 75 and reaction was immediate,  was given Benadryl to counter act reaction in      Sulfa Drugs Unknown and Rash        Social History     Tobacco Use     Smoking status: Former Smoker     Packs/day: 0.00     Years: 0.00     Pack years: 0.00     Types: Cigarettes     Quit date: 3/10/2016     Years since quittin.2     Smokeless tobacco: Never Used   Substance Use Topics     Alcohol use: No     Comment: history alcohol abuse , s/p treatment     Family History   Problem Relation Age of Onset     Cancer Father      Pancreatic Cancer Father      Lung Cancer Father      Lupus Mother      History   Drug Use No     Comment: former drug user (EtOH, cocaine, marijuana, meth), last use          Objective     LMP 05/10/2022 (Exact Date)     Physical Exam    GENERAL APPEARANCE: healthy, alert and no distress     EYES: EOMI, PERRL     HENT: ear canals and TM's normal and nose and mouth without ulcers or lesions     NECK: no adenopathy, no  asymmetry, masses, or scars and thyroid normal to palpation     RESP: lungs clear to auscultation - no rales, rhonchi or wheezes     CV: regular rates and rhythm, normal S1 S2, no S3 or S4 and no murmur, click or rub     ABDOMEN:  soft, nontender, no HSM or masses and bowel sounds normal     MS: extremities normal- no gross deformities noted, no evidence of inflammation in joints, FROM in all extremities.     SKIN: no suspicious lesions or rashes     NEURO: Normal strength and tone, sensory exam grossly normal, mentation intact and speech normal     PSYCH: mentation appears normal. and affect normal/bright     LYMPHATICS: No cervical adenopathy    Recent Labs   Lab Test 05/18/22  1446   HGB 13.6           Diagnostics:  MRSA culture ordered and pending.  No EKG required, no history of coronary heart disease, significant arrhythmia, peripheral arterial disease or other structural heart disease.    Revised Cardiac Risk Index (RCRI):  The patient has the following serious cardiovascular risks for perioperative complications:   - No serious cardiac risks = 0 points     RCRI Interpretation: 0 points: Class I (very low risk - 0.4% complication rate)           Signed Electronically by: Ag Quigley MD  Copy of this evaluation report is provided to requesting physician.

## 2022-06-07 LAB — SARS-COV-2 RNA RESP QL NAA+PROBE: NEGATIVE

## 2022-06-08 LAB — BACTERIA SPEC CULT: NORMAL

## 2022-06-09 ENCOUNTER — ANESTHESIA EVENT (OUTPATIENT)
Dept: SURGERY | Facility: AMBULATORY SURGERY CENTER | Age: 37
End: 2022-06-09
Payer: COMMERCIAL

## 2022-06-09 ENCOUNTER — HOSPITAL ENCOUNTER (OUTPATIENT)
Dept: PHYSICAL THERAPY | Facility: CLINIC | Age: 37
Setting detail: THERAPIES SERIES
Discharge: HOME | End: 2022-06-09
Attending: STUDENT IN AN ORGANIZED HEALTH CARE EDUCATION/TRAINING PROGRAM
Payer: COMMERCIAL

## 2022-06-09 PROCEDURE — 97110 THERAPEUTIC EXERCISES: CPT | Mod: GP | Performed by: PHYSICAL THERAPIST

## 2022-06-09 PROCEDURE — 97112 NEUROMUSCULAR REEDUCATION: CPT | Mod: GP | Performed by: PHYSICAL THERAPIST

## 2022-06-09 PROCEDURE — 97140 MANUAL THERAPY 1/> REGIONS: CPT | Mod: GP | Performed by: PHYSICAL THERAPIST

## 2022-06-10 ENCOUNTER — ANESTHESIA (OUTPATIENT)
Dept: SURGERY | Facility: AMBULATORY SURGERY CENTER | Age: 37
End: 2022-06-10
Payer: COMMERCIAL

## 2022-06-10 ENCOUNTER — HOSPITAL ENCOUNTER (OUTPATIENT)
Facility: AMBULATORY SURGERY CENTER | Age: 37
Discharge: HOME | End: 2022-06-10
Attending: OTOLARYNGOLOGY | Admitting: OTOLARYNGOLOGY
Payer: COMMERCIAL

## 2022-06-10 VITALS
OXYGEN SATURATION: 98 % | HEART RATE: 83 BPM | WEIGHT: 218 LBS | TEMPERATURE: 97.8 F | RESPIRATION RATE: 22 BRPM | BODY MASS INDEX: 35.19 KG/M2 | DIASTOLIC BLOOD PRESSURE: 87 MMHG | SYSTOLIC BLOOD PRESSURE: 126 MMHG

## 2022-06-10 DIAGNOSIS — J34.89 NASAL OBSTRUCTION: Primary | ICD-10-CM

## 2022-06-10 DIAGNOSIS — J34.3 NASAL TURBINATE HYPERTROPHY: ICD-10-CM

## 2022-06-10 DIAGNOSIS — J34.2 NASAL SEPTAL DEVIATION: ICD-10-CM

## 2022-06-10 DIAGNOSIS — J35.01 CHRONIC TONSILLITIS: ICD-10-CM

## 2022-06-10 PROCEDURE — 88304 TISSUE EXAM BY PATHOLOGIST: CPT | Performed by: PATHOLOGY

## 2022-06-10 PROCEDURE — G8916 PT W IV AB GIVEN ON TIME: HCPCS

## 2022-06-10 PROCEDURE — 42826 REMOVAL OF TONSILS: CPT

## 2022-06-10 PROCEDURE — 30140 RESECT INFERIOR TURBINATE: CPT | Mod: RT

## 2022-06-10 PROCEDURE — 30140 RESECT INFERIOR TURBINATE: CPT | Mod: 50 | Performed by: OTOLARYNGOLOGY

## 2022-06-10 PROCEDURE — 30520 REPAIR OF NASAL SEPTUM: CPT | Performed by: OTOLARYNGOLOGY

## 2022-06-10 PROCEDURE — G8907 PT DOC NO EVENTS ON DISCHARG: HCPCS

## 2022-06-10 PROCEDURE — 30520 REPAIR OF NASAL SEPTUM: CPT

## 2022-06-10 PROCEDURE — 42826 REMOVAL OF TONSILS: CPT | Performed by: OTOLARYNGOLOGY

## 2022-06-10 RX ORDER — FENTANYL CITRATE 50 UG/ML
25 INJECTION, SOLUTION INTRAMUSCULAR; INTRAVENOUS EVERY 5 MIN PRN
Status: DISCONTINUED | OUTPATIENT
Start: 2022-06-10 | End: 2022-06-11 | Stop reason: HOSPADM

## 2022-06-10 RX ORDER — CEPHALEXIN 250 MG/5ML
500 POWDER, FOR SUSPENSION ORAL 3 TIMES DAILY
Qty: 180 ML | Refills: 0 | Status: SHIPPED | OUTPATIENT
Start: 2022-06-10 | End: 2022-06-16

## 2022-06-10 RX ORDER — FENTANYL CITRATE 50 UG/ML
INJECTION, SOLUTION INTRAMUSCULAR; INTRAVENOUS PRN
Status: DISCONTINUED | OUTPATIENT
Start: 2022-06-10 | End: 2022-06-10

## 2022-06-10 RX ORDER — SODIUM CHLORIDE, SODIUM LACTATE, POTASSIUM CHLORIDE, CALCIUM CHLORIDE 600; 310; 30; 20 MG/100ML; MG/100ML; MG/100ML; MG/100ML
INJECTION, SOLUTION INTRAVENOUS CONTINUOUS
Status: DISCONTINUED | OUTPATIENT
Start: 2022-06-10 | End: 2022-06-11 | Stop reason: HOSPADM

## 2022-06-10 RX ORDER — MEPERIDINE HYDROCHLORIDE 25 MG/ML
12.5 INJECTION INTRAMUSCULAR; INTRAVENOUS; SUBCUTANEOUS
Status: DISCONTINUED | OUTPATIENT
Start: 2022-06-10 | End: 2022-06-11 | Stop reason: HOSPADM

## 2022-06-10 RX ORDER — ACETAMINOPHEN 325 MG/1
975 TABLET ORAL ONCE
Status: COMPLETED | OUTPATIENT
Start: 2022-06-10 | End: 2022-06-10

## 2022-06-10 RX ORDER — ONDANSETRON 4 MG/1
4 TABLET, ORALLY DISINTEGRATING ORAL EVERY 30 MIN PRN
Status: DISCONTINUED | OUTPATIENT
Start: 2022-06-10 | End: 2022-06-11 | Stop reason: HOSPADM

## 2022-06-10 RX ORDER — PROPOFOL 10 MG/ML
INJECTION, EMULSION INTRAVENOUS PRN
Status: DISCONTINUED | OUTPATIENT
Start: 2022-06-10 | End: 2022-06-10

## 2022-06-10 RX ORDER — PROPOFOL 10 MG/ML
INJECTION, EMULSION INTRAVENOUS CONTINUOUS PRN
Status: DISCONTINUED | OUTPATIENT
Start: 2022-06-10 | End: 2022-06-10

## 2022-06-10 RX ORDER — LIDOCAINE HYDROCHLORIDE AND EPINEPHRINE 10; 10 MG/ML; UG/ML
INJECTION, SOLUTION INFILTRATION; PERINEURAL PRN
Status: DISCONTINUED | OUTPATIENT
Start: 2022-06-10 | End: 2022-06-10 | Stop reason: HOSPADM

## 2022-06-10 RX ORDER — LIDOCAINE 40 MG/G
CREAM TOPICAL
Status: DISCONTINUED | OUTPATIENT
Start: 2022-06-10 | End: 2022-06-11 | Stop reason: HOSPADM

## 2022-06-10 RX ORDER — FENTANYL CITRATE 50 UG/ML
25 INJECTION, SOLUTION INTRAMUSCULAR; INTRAVENOUS
Status: DISCONTINUED | OUTPATIENT
Start: 2022-06-10 | End: 2022-06-11 | Stop reason: HOSPADM

## 2022-06-10 RX ORDER — BACITRACIN ZINC 500 [USP'U]/G
OINTMENT TOPICAL PRN
Status: DISCONTINUED | OUTPATIENT
Start: 2022-06-10 | End: 2022-06-10 | Stop reason: HOSPADM

## 2022-06-10 RX ORDER — CEFAZOLIN SODIUM 2 G/100ML
2 INJECTION, SOLUTION INTRAVENOUS
Status: COMPLETED | OUTPATIENT
Start: 2022-06-10 | End: 2022-06-10

## 2022-06-10 RX ORDER — LABETALOL HYDROCHLORIDE 5 MG/ML
10 INJECTION, SOLUTION INTRAVENOUS
Status: DISCONTINUED | OUTPATIENT
Start: 2022-06-10 | End: 2022-06-11 | Stop reason: HOSPADM

## 2022-06-10 RX ORDER — ALBUTEROL SULFATE 0.83 MG/ML
2.5 SOLUTION RESPIRATORY (INHALATION) EVERY 4 HOURS PRN
Status: DISCONTINUED | OUTPATIENT
Start: 2022-06-10 | End: 2022-06-11 | Stop reason: HOSPADM

## 2022-06-10 RX ORDER — OXYCODONE HYDROCHLORIDE 5 MG/1
5 TABLET ORAL EVERY 4 HOURS PRN
Status: DISCONTINUED | OUTPATIENT
Start: 2022-06-10 | End: 2022-06-11 | Stop reason: HOSPADM

## 2022-06-10 RX ORDER — LIDOCAINE HYDROCHLORIDE 20 MG/ML
INJECTION, SOLUTION INFILTRATION; PERINEURAL PRN
Status: DISCONTINUED | OUTPATIENT
Start: 2022-06-10 | End: 2022-06-10

## 2022-06-10 RX ORDER — CEFAZOLIN SODIUM 2 G/100ML
2 INJECTION, SOLUTION INTRAVENOUS SEE ADMIN INSTRUCTIONS
Status: DISCONTINUED | OUTPATIENT
Start: 2022-06-10 | End: 2022-06-11 | Stop reason: HOSPADM

## 2022-06-10 RX ORDER — ONDANSETRON 4 MG/1
4 TABLET, ORALLY DISINTEGRATING ORAL EVERY 8 HOURS PRN
Qty: 20 TABLET | Refills: 1 | Status: SHIPPED | OUTPATIENT
Start: 2022-06-10 | End: 2022-06-30

## 2022-06-10 RX ORDER — DEXAMETHASONE SODIUM PHOSPHATE 10 MG/ML
INJECTION, SOLUTION INTRAMUSCULAR; INTRAVENOUS PRN
Status: DISCONTINUED | OUTPATIENT
Start: 2022-06-10 | End: 2022-06-10

## 2022-06-10 RX ORDER — ONDANSETRON 2 MG/ML
INJECTION INTRAMUSCULAR; INTRAVENOUS PRN
Status: DISCONTINUED | OUTPATIENT
Start: 2022-06-10 | End: 2022-06-10

## 2022-06-10 RX ORDER — COCAINE HYDROCHLORIDE 40 MG/ML
SOLUTION NASAL PRN
Status: DISCONTINUED | OUTPATIENT
Start: 2022-06-10 | End: 2022-06-10 | Stop reason: HOSPADM

## 2022-06-10 RX ORDER — ONDANSETRON 2 MG/ML
4 INJECTION INTRAMUSCULAR; INTRAVENOUS EVERY 30 MIN PRN
Status: DISCONTINUED | OUTPATIENT
Start: 2022-06-10 | End: 2022-06-11 | Stop reason: HOSPADM

## 2022-06-10 RX ORDER — DIAZEPAM 10 MG/2ML
2.5 INJECTION, SOLUTION INTRAMUSCULAR; INTRAVENOUS
Status: DISCONTINUED | OUTPATIENT
Start: 2022-06-10 | End: 2022-06-11 | Stop reason: HOSPADM

## 2022-06-10 RX ORDER — SCOLOPAMINE TRANSDERMAL SYSTEM 1 MG/1
1 PATCH, EXTENDED RELEASE TRANSDERMAL ONCE
Status: DISCONTINUED | OUTPATIENT
Start: 2022-06-10 | End: 2022-06-11 | Stop reason: HOSPADM

## 2022-06-10 RX ORDER — OXYCODONE HCL 5 MG/5 ML
5-10 SOLUTION, ORAL ORAL EVERY 4 HOURS PRN
Qty: 400 ML | Refills: 0 | Status: SHIPPED | OUTPATIENT
Start: 2022-06-10 | End: 2022-06-17

## 2022-06-10 RX ADMIN — ACETAMINOPHEN 975 MG: 325 TABLET ORAL at 12:08

## 2022-06-10 RX ADMIN — OXYCODONE HYDROCHLORIDE 5 MG: 5 TABLET ORAL at 15:32

## 2022-06-10 RX ADMIN — SCOLOPAMINE TRANSDERMAL SYSTEM 1 PATCH: 1 PATCH, EXTENDED RELEASE TRANSDERMAL at 12:47

## 2022-06-10 RX ADMIN — PROPOFOL 200 MCG/KG/MIN: 10 INJECTION, EMULSION INTRAVENOUS at 13:55

## 2022-06-10 RX ADMIN — LIDOCAINE HYDROCHLORIDE 60 MG: 20 INJECTION, SOLUTION INFILTRATION; PERINEURAL at 13:55

## 2022-06-10 RX ADMIN — PROPOFOL 200 MG: 10 INJECTION, EMULSION INTRAVENOUS at 13:55

## 2022-06-10 RX ADMIN — FENTANYL CITRATE 50 MCG: 50 INJECTION, SOLUTION INTRAMUSCULAR; INTRAVENOUS at 13:55

## 2022-06-10 RX ADMIN — Medication 40 MG: at 13:53

## 2022-06-10 RX ADMIN — SODIUM CHLORIDE, SODIUM LACTATE, POTASSIUM CHLORIDE, CALCIUM CHLORIDE: 600; 310; 30; 20 INJECTION, SOLUTION INTRAVENOUS at 13:53

## 2022-06-10 RX ADMIN — FENTANYL CITRATE 25 MCG: 50 INJECTION, SOLUTION INTRAMUSCULAR; INTRAVENOUS at 15:46

## 2022-06-10 RX ADMIN — FENTANYL CITRATE 25 MCG: 50 INJECTION, SOLUTION INTRAMUSCULAR; INTRAVENOUS at 16:02

## 2022-06-10 RX ADMIN — DEXAMETHASONE SODIUM PHOSPHATE 10 MG: 10 INJECTION, SOLUTION INTRAMUSCULAR; INTRAVENOUS at 14:00

## 2022-06-10 RX ADMIN — FENTANYL CITRATE 50 MCG: 50 INJECTION, SOLUTION INTRAMUSCULAR; INTRAVENOUS at 14:37

## 2022-06-10 RX ADMIN — FENTANYL CITRATE 25 MCG: 50 INJECTION, SOLUTION INTRAMUSCULAR; INTRAVENOUS at 15:30

## 2022-06-10 RX ADMIN — FENTANYL CITRATE 25 MCG: 50 INJECTION, SOLUTION INTRAMUSCULAR; INTRAVENOUS at 15:35

## 2022-06-10 RX ADMIN — ONDANSETRON 4 MG: 2 INJECTION INTRAMUSCULAR; INTRAVENOUS at 15:08

## 2022-06-10 RX ADMIN — CEFAZOLIN SODIUM 2 G: 2 INJECTION, SOLUTION INTRAVENOUS at 13:49

## 2022-06-10 NOTE — ANESTHESIA CARE TRANSFER NOTE
Patient: Breanna Luna    Procedure: Procedure(s):  SEPTOPLASTY; BILATERAL INFERIOR TURBINATE REDUCTION;  BILATERAL TONSILLECTOMY       Diagnosis: Nasal turbinate hypertrophy [J34.3]  Nasal obstruction [J34.89]  Nasal septal deviation [J34.2]  Chronic tonsillitis [J35.01]  Diagnosis Additional Information: No value filed.    Anesthesia Type:   General     Note:    Oropharynx: oropharynx clear of all foreign objects  Level of Consciousness: awake  Oxygen Supplementation: face mask    Independent Airway: airway patency satisfactory and stable  Dentition: dentition unchanged  Vital Signs Stable: post-procedure vital signs reviewed and stable  Report to RN Given: handoff report given  Patient transferred to: PACU    Handoff Report: Identifed the Patient, Identified the Reponsible Provider, Reviewed the pertinent medical history, Discussed the surgical course, Reviewed Intra-OP anesthesia mangement and issues during anesthesia, Set expectations for post-procedure period and Allowed opportunity for questions and acknowledgement of understanding      Vitals:  Vitals Value Taken Time   /102 06/10/22 1522   Temp 97.8  F (36.6  C) 06/10/22 1522   Pulse 90 06/10/22 1523   Resp 15 06/10/22 1523   SpO2 100 % 06/10/22 1523   Vitals shown include unvalidated device data.    Electronically Signed By: MADELINE Barrientos CRNA  Uzma 10, 2022  3:24 PM

## 2022-06-10 NOTE — ANESTHESIA POSTPROCEDURE EVALUATION
Patient: Breanna Luna    Procedure: Procedure(s):  SEPTOPLASTY; BILATERAL INFERIOR TURBINATE REDUCTION;  BILATERAL TONSILLECTOMY       Anesthesia Type:  General    Note:     Postop Pain Control: Uneventful            Sign Out: Well controlled pain   PONV: No   Neuro/Psych: Uneventful            Sign Out: Acceptable/Baseline neuro status   Airway/Respiratory: Uneventful            Sign Out: Acceptable/Baseline resp. status   CV/Hemodynamics: Uneventful            Sign Out: Acceptable CV status; No obvious hypovolemia; No obvious fluid overload   Other NRE: NONE   DID A NON-ROUTINE EVENT OCCUR? No           Last vitals:  Vitals Value Taken Time   /99 06/10/22 1535   Temp 97.8  F (36.6  C) 06/10/22 1522   Pulse 94 06/10/22 1536   Resp 11 06/10/22 1536   SpO2 100 % 06/10/22 1536   Vitals shown include unvalidated device data.    Electronically Signed By: Radhames De Luna MD  Uzma 10, 2022  3:37 PM

## 2022-06-10 NOTE — ANESTHESIA PROCEDURE NOTES
Airway       Patient location during procedure: OR       Procedure Start/Stop Times: 6/10/2022 1:59 PM  Staff -        Performed By: CRNAIndications and Patient Condition       Indications for airway management: aldo-procedural       Induction type:intravenous       Mask difficulty assessment: 1 - vent by mask    Final Airway Details       Final airway type: endotracheal airway       Successful airway: Oral and YOHANA  Endotracheal Airway Details        ETT size (mm): 7.0       Cuffed: yes       Successful intubation technique: direct laryngoscopy       DL Blade Type: Son 2       Grade View of Cords: 1       Adjucts: stylet and tooth guard    Post intubation assessment        Placement verified by: capnometry, equal breath sounds and chest rise        Number of attempts at approach: 1       Number of other approaches attempted: 0       Secured with: cloth tape       Ease of procedure: easy       Dentition: Intact    Medication(s) Administered   Medication Administration Time: 6/10/2022 1:59 PM

## 2022-06-10 NOTE — INTERVAL H&P NOTE
"I have reviewed the surgical (or preoperative) H&P that is linked to this encounter, and examined the patient. There are no significant changes    Clinical Conditions Present on Arrival:  Clinically Significant Risk Factors Present on Admission                   # Obesity: Estimated body mass index is 35.19 kg/m  as calculated from the following:    Height as of 5/13/22: 1.676 m (5' 6\").    Weight as of this encounter: 98.9 kg (218 lb).       "

## 2022-06-10 NOTE — DISCHARGE INSTRUCTIONS
Instructions following Septoplasty    Recovery - Everyone recovers differently from a general anesthetic.  Symptoms such as fatigue, nausea, lightheadedness, and sometimes a low grade fever (up to 101 degrees) are not unusual.  As your body removes the anesthetic drugs from circulation, these symptoms will resolve.  Your nose will be sore after surgery, and you may even have symptoms similar to a sinus infection with headache, congestion, and pressure.  These will resolve with healing.  For several days you may experience bloody drainage from the nose, please use the drip pad as necessary for this. Call the office if the bleeding persists after 3 days or is perfuse. Please do not blow your nose for two weeks after surgery. You may gently dab your nose with Kleenex. Limit your activity to no strenuous activities until I see you for the first follow up visit, and sleep with your head elevated.    Medications - You were sent home with narcotic pain medication.  If you can tolerate the discomfort during your recovery by using just plain Tylenol or ibuprofen (advil), please do so.  However, do not hesitate to use the stronger pain medication if needed.  If you were sent home with an antibiotic, it is primarily used to help the healing process.     Complications - Problems related to septoplasty almost always are detected during the operation, and special instruction will be given in that situation.  However, unexpected things can happen.  If you experience persistent bleeding, fevers, changes in vision, severe headache or nasal pain, this may be the sign of an infection.  Any of these symptoms should be called into my office or to the on call ENT if after hours. There may be small plastic pieces placed inside your nose during surgery (splints). These help to promote the septum into healing in its straightened position, and will be removed at the follow up visit.    Postoperative Care for Tonsillectomy (with or without  adenoidectomy)    Recovery:  The pain and swelling almost always gets worse before it gets better, this is normal.  Usually it peaks 3 to 5 days after the surgery, and then begins improving at 7 to 8 days after surgery.  Of course, this is variable from person to person.  Maintain a strict soft diet for the first two weeks. Avoid hard or crunchy things.  If it makes a noise when you bite it, it is too hard; or if it requires much chewing, it is too hard.  Although it is good to begin eating again from day one, it is not unusual to not eat for several days after the procedure.  The most important thing is staying hydrated.  Drink plenty of fluids, with electrolytes if possible, such as dilute sports drinks.  The liquid pain medication you were sent home with can make some people very nauseated.  To minimize this, avoid taking it on an empty stomach, or take smaller does.  Try to stay ahead of the pain.  In other words, do not wait for pain medication to completely wear off before taking more pain medicine.  Instead, take the medication every 4 hours, even if it requires setting an alarm clock at night.  This is especially helpful during the first 5-7 days. You should also add tylenol (and possibly ibuprofen if needed) to help with pain.  The uvula (the small hanging object in the back of your mouth) frequently swells up after tonsillectomy, but will go back to normal.  This swelling can temporarily cause the sensation of something being stuck in your throat, it will go away with recovery.  Also, because of the arrangement of nerves under where the tonsils were, sharp ear pain is very common during recovery, and will also go away with recovery. Temporary tongue pain, numbness, or taste disturbance is common, and will go away with time. Foul breath is common, and is part of the healing process. This too will go away with time.      Activity - Avoid heavy lifting (greater than 10 pounds) or strenuous exercise until two  weeks after surgery.  Also, try to sleep with your head elevated.      Medications - Except blood thinners, almost all medication can be re-started after tonsillectomy.      Complications - Bleeding is the most common serious complication after tonsillectomy.  If there are a few small drops or streaks of blood in the saliva that then goes away, this can be watched.  Gentle gargling with the ice water can also help stop this minor bleeding.  However, if the bleeding is persistent, or heavy bleeding occurs, do not hesitate, go to the emergency room to be evaluated.    Follow up - I like to see my patient approximately 4 weeks after the procedure to make sure that everything has healed appropriately.     If there are any questions or issues with the above, or if there are other issues that concern you, always feel free to call the clinic and I am happy to speak with you as soon as I can.    Lobo Narayanan MD   Otolaryngology  [facility]  [phone] or [phone]      After hours/ weekends call #[phone]      Tonsillectomy and Adenoidectomy    What is a tonsillectomy and adenoidectomy?    Tonsillectomy is removal of the tonsils. Adenoidectomy is removal of the adenoids. Tonsils and adenoids are lumps of tissue at the back of the throat. The tonsils and adenoids fight infection. Your child may need the tonsillectomy if he has many bad colds, sore throats, or ear infections. Tonsillectomy and adenoidectomy (T&A) are often done together.    What can I expect after Surgery?    It is common to have an upset stomach and vomiting during the first 24 hours after surgery.    Your child s throat may be sore for two weeks, especially when eating. The soreness may get better after a few days and then get worse again. Your child s voice may change a little after surgery.    Ear pain is common, often when swallowing, because the ear and throat have a common sensory nerve. Jaw spasms, or uncontrollable movement of  the jaw, may also occur and cause pain.    Neck soreness is common after an adenoidectomy and usually last about one week.    Your child will have bad breath for a few weeks because your child s throat is swollen, snoring is common after surgery but should go away after about two weeks.    How should I care for my child?    Encourage your child to drink plenty of liquids (at least 2 -3 ounces per hour)  keeping the throat moist decreases discomfort and prevents dehydration( a  dangerous condition in which the body gets dried out.)    Give pain medication regularly within the limits directed by your doctor. Give  it before bed and first thing after waking in the morning. Try to give the   pain medication 30 minutes before meals to help make swallowing easier.    To prevent bleeding, avoid coughing, nose-blowing, clearing throat, and   spitting. Wipe nose gently if needed. When sneezing, encourage your child to   Open the mouth and make a sound, to prevent pressure buildup.    Avoid coming in contact with people who have colds, flu, or infections.      What can my child eat?    The day of surgery, give only cool, Clear liquids such as:    Apple Juice  Jell-o*  Mason-aid*  Popsicles*  Flat pop (remove bubbles)  Water    If your child has an upset stomach, give small amounts often. Note: If   Your child vomits after drinking red liquids the vomit will be the same  color.    After the first day, when your child wants them add dairy and soft foods such as:  Ice cream  Milk shakes(use spoon)  Pudding  Smooth yogurt  Liquids are more important than food.    Be sure your child is drinking a lot.    When your child wants them, add soft foods (foods without rough  edges). See the chart for ideas. If a food is not on the list ask yourself:    Is it easy to chew? Is it free of coarse, rough, or crispy edges?  If the answer  is yes, your child can probably eat it.    Be sure to cut foods very small and encourage your child to chew  them  well. Continue the soft diet for 2 weeks after surgery Avoid citrus fruits and juices   such as orange juice and lemonade, as they may sting your child s throat. Avoid  foods that are hot in temperature or spicy hot.                           May Eat Should not eat   - Soft bread  - Soggy waffles or   Khmer toast (no crusts).  Soaked in syrup  - Pancakes  - Scrambled or   poached egg   - Toast  - Crispy waffles  - Fried foods   - Oatmeal,or   Creamy cereal  - Soggy cold cereal  (soaked in milk   - Crunchy cold   cereal   - Soup  - Hot dogs  - Hamburgers  - Tender, moist  meat  - Pasta, noodles  - Spaghetti-Os  - Macaroni and  Cheese   - Tough, dry meat,  chicken or fish   - Milk  - Custard, pudding  - Ice cream  - Malts, shakes  - Yogurt (smooth)  - Cottage cheese   - Cookies  - Crackers  - Pretzels  - Chips  - Popcorn  - Nuts   - Sandwiches, (no crusts)  - Smooth peanut butter   and jelly  - Processed cheese  - Tuna - Grilled cheese  sandwiches   - Cooked vegetables  - Mashed potatoes - Raw vegetables   - Tomatoes   - Applesauce  - Bananas  - Canned fruits  - Watermelon with out  seeds - Citrus fruits  - Moist fresh fruits   - Juices (not citrus)  - Mason aid  - Flat pop (no bubbles)  - Jell-O - Citrus juices  - Pop with bubbles            Tylenol 975  mg was given at 12:08 PM.    You should not take more then 4,000 mg of tylenol/acetaminophen in a 24 hour period.    SCOPALAMINE Patch placed behind RIGHT ear for nausea relief.  Remove the patch in 24-26 hours.  Dispose in trash and wash hands carefully.     Information for Patients Discharging with a Transderm Scopolamine Patch     Dry mouth is a common side effect.  Drowsiness is another common side effect especially when combined with pain medication.  Please avoid activities that require mental alertness such as driving a car or making important legal decisions.  Since Scopolamine can cause temporary dilation of the pupils and blurred vision if it comes in  contact with the eyes; be sure to wash your hands thoroughly with soap and water immediately after handling the patch.   When you remove your patch, please stick it to a tissue or paper towel for disposal.    Remove the patch immediately and contact a physician in the unlikely event that you experience symptoms of acute glaucoma (pain and reddening of the eyes, accompanied by dilated pupils).  Remove the patch if you develop any difficulties urinating.  If you cannot urinate after removing your patch, please notify your surgeon.

## 2022-06-10 NOTE — ANESTHESIA PREPROCEDURE EVALUATION
Anesthesia Pre-Procedure Evaluation    Patient: Breanna Luna   MRN: [id number] : 1985        Procedure : Procedure(s):  SEPTOPLASTY; BILATERAL INFERIOR TURBINATE REDUCTION;  BILATERAL TONSILLECTOMY          Past Medical History:   Diagnosis Date     Cellulitis     MRSA septic olecranon bursitis and facial cellulitis     Drug abuse (H)     methamphetamine and alcohol, sober since treatment      Mild pre-eclampsia in third trimester      Motion sickness      MRSA cellulitis 2012    cleared with negative nasal swabs 17 and 17, reviewed     PONV (postoperative nausea and vomiting)      Pregnancy-induced hypertension in third trimester      Rh negative, antepartum       Past Surgical History:   Procedure Laterality Date     APPENDECTOMY       DAVINCI ASSISTED HERNIORRHAPHY INCISIONAL N/A 07/10/2020    Procedure: Robotic incisional hernia repair with mesh;  Surgeon: Jovi Odom MD;  Location: [facility]     EXCISE LESION FOOT Left 2019    Procedure: Excision of nevus second interdigital space left foot, Excision of nevus third interdigital space left foot, excision of plantar junctional nevus left foot;  Surgeon: Rinku Lopez DPM;  Location: [facility]     GYN SURGERY       INCISION AND DRAINAGE  2012    chin abscess I&D     LAPAROSCOPIC CHOLECYSTECTOMY N/A 2020    Procedure: Laparoscopic Cholecystectomy;  Surgeon: Renard Gerber MD;  Location[facility]     LAPAROSCOPIC HERNIORRHAPHY INCISIONAL N/A 2019    Procedure: laparoscopic incisional hernia repair with mesh;  Surgeon: Jovi Odom MD;  [facility]     LAPAROSCOPIC SALPINGECTOMY Bilateral 2018    Procedure: LAPAROSCOPIC SALPINGECTOMY;  Laparoscopic Bilateral Salpingectomy;  Surgeon: Chai Cui MD;  Location: [facility]     MAMMOPLASTY REDUCTION Bilateral 2017      Allergies   Allergen Reactions     Codeine Nausea     Other reaction(s): Nausea     Imitrex [Sumatriptan]      Worsening of  migraine, paresthesias     Vancomycin Other (See Comments)     Adhesive [Liquid Adhesive] Itching     Redness and itching around incision after hernia repair surgery     Ranitidine Rash and Itching     recvd IV zantac 75 and reaction was immediate,  was given Benadryl to counter act reaction in      Sulfa Drugs Unknown and Rash      Social History     Tobacco Use     Smoking status: Former Smoker     Packs/day: 0.00     Years: 0.00     Pack years: 0.00     Types: Cigarettes     Quit date: 3/10/2016     Years since quittin.2     Smokeless tobacco: Never Used   Substance Use Topics     Alcohol use: No     Comment: history alcohol abuse , s/p treatment      Wt Readings from Last 1 Encounters:   06/10/22 98.9 kg (218 lb)        Anesthesia Evaluation   Pt has had prior anesthetic. Type: General.    History of anesthetic complications  - PONV.      ROS/MED HX  ENT/Pulmonary:  - neg pulmonary ROS     Neurologic:  - neg neurologic ROS     Cardiovascular:       METS/Exercise Tolerance:     Hematologic:  - neg hematologic  ROS     Musculoskeletal:  - neg musculoskeletal ROS     GI/Hepatic:  - neg GI/hepatic ROS     Renal/Genitourinary:  - neg Renal ROS     Endo:     (+) Obesity,     Psychiatric/Substance Use: Comment: Methamphetamine abuse-in remission.    (+) alcohol abuse     Infectious Disease:  - neg infectious disease ROS     Malignancy:  - neg malignancy ROS     Other:  - neg other ROS          Physical Exam    Airway  airway exam normal           Respiratory Devices and Support         Dental  no notable dental history         Cardiovascular   cardiovascular exam normal          Pulmonary   pulmonary exam normal                OUTSIDE LABS:  CBC:   Lab Results   Component Value Date    WBC 8.6 2022    WBC 9.3 2020    HGB 13.6 2022    HGB 14.5 2020    HCT 39.4 2022    HCT 42.4 2020     2022     2020     BMP:   Lab Results   Component Value Date      02/26/2020     02/17/2020    POTASSIUM 4.1 02/26/2020    POTASSIUM 3.4 02/17/2020    CHLORIDE 107 02/26/2020    CHLORIDE 107 02/17/2020    CO2 26 02/26/2020    CO2 29 02/17/2020    BUN 13 02/26/2020    BUN 12 02/17/2020    CR 0.58 02/26/2020    CR 0.67 02/17/2020    GLC 99 05/13/2022    GLC 94 02/26/2020     COAGS: No results found for: PTT, INR, FIBR  POC:   Lab Results   Component Value Date    HCG Negative 08/16/2019     HEPATIC:   Lab Results   Component Value Date    ALBUMIN 4.3 02/26/2020    PROTTOTAL 8.1 02/26/2020    ALT 33 02/26/2020    AST 15 02/26/2020    ALKPHOS 50 02/26/2020    BILITOTAL 0.5 02/26/2020     OTHER:   Lab Results   Component Value Date    LACT <0.4 (L) 07/22/2019    ELICIA 9.1 02/26/2020    LIPASE 140 02/26/2020    AMYLASE 52 01/28/2020    CRP 57.5 (H) 10/14/2018       Anesthesia Plan    ASA Status:  3   NPO Status:  NPO Appropriate    Anesthesia Type: General.     - Airway: ETT   Induction: Intravenous, Propofol.   Maintenance: TIVA.        Consents    Anesthesia Plan(s) and associated risks, benefits, and realistic alternatives discussed. Questions answered and patient/representative(s) expressed understanding.    - Discussed:     - Discussed with:  Patient, Spouse      - Extended Intubation/Ventilatory Support Discussed: No.      - Patient is DNR/DNI Status: No    Use of blood products discussed: No .     Postoperative Care    Pain management: IV analgesics, Oral pain medications.   PONV prophylaxis: Ondansetron (or other 5HT-3), Dexamethasone or Solumedrol, Background Propofol Infusion, Scopolamine patch     Comments:                Radhames De Luna MD

## 2022-06-10 NOTE — OP NOTE
PREOPERATIVE DIAGNOSIS  1. Nasal obstruction.   2. Septal deviation.   3. Bilateral turbinate hypertrophy  4. Chronic tonsillitis    POSTOPERATIVE DIAGNOSIS  1. Nasal obstruction.   2. Septal deviation.   3. Bilateral turbinate hypertrophy  4. Chronic tonsillitis    PROCEDURES PERFORMED:   1. Septoplasty  2. Bilateral submucous resection of inferior turbinates.   3. Bilateral inferior turbinate out-fracture  4. Bilateral tonsillectomy    SURGEON: Lobo Narayanan MD   ASSISTANTS: none  BLOOD LOSS: 20 mL.   COMPLICATIONS: None.   SPECIMENS: None.   ANESTHESIA: GETA.   DRAINS, IMPLANTS, and DEVICES: bilateral Conn splints    INDICATIONS: Breanna Luna presented to me with a history of chronic nasal obstruction and chronic tonsillitis. On evaluation, the patient had a deviated septum to the left and bilateral inferior turbinate hypertrophy. Therefore, my recommendation was for the above-named procedures. Preoperatively, risks discussed included the risks of infection, bleeding, the risks of general anesthesia, possible injury to the eyes, base of skull and tear duct system, and possible failure of the surgery to achieve the desired result, septal perforation, and need for revision, and tonsil bleeding with possible need to return to the operating room to control hemorrhage. The patient understood these risks and possible outcomes and wished to proceed.   OPERATIVE PROCEDURE: After being taken to the operating room and induction of general endotracheal tube anesthesia, the bed was rotated 90 degrees. A procedural pause was performed to identify the patient by name, birthday, and procedure. She was prepped and draped in the normal clean fashion.  The patient was suspended from the Stanton stand with a McGyver mouth gag. I turned my attention to the tonsils and they were severely hypertrophic and cryptic. I grasped the left tonsil with an Allis forceps and retracted medially and performed dissection of the tonsil from the fossa  "utilizing monopolar cautery, and the left tonsil came out very smoothly. I then turned my attention to the right side, once again using an Allis forceps to grasp it and retract it medially, and then I performed dissection of the tonsil from the fossa, and the right tonsil also came out very smoothly.  I reinspected the tonsil beds and there was good hemostasis. I inspected the adenoid pad which was atrophic and left alone. I cauterized any potential bleeders in the tonsil beds, irrigated, and valsalved the patient. Hemostasis was achieved. I suctioned the stomach with a flexible catheter.  After that, I began by applying topical anesthetic in the form of 2 cottonoids on each side of the nose which had been soaked with a total of 4 mL of 4% liquid cocaine. In addition, I injected 1% lidocaine with 1:100,000 epinephrine into the right hemitransfixion incision area, bilateral anterior septum, and overlying any spurs.   I removed the cottonoids from the nose and entered the right nasal cavity.   I made a right hemitransfixion septoplasty incision in the right nasal vestibule in a traditional open fashion. I then dissected down onto the left side of the cartilaginous septum through the right-sided incision. I then was able to start a submucoperichondrial pocket directly on the left side of the cartilaginous septum. The patient had a deviated maxillary crest as well as the cartilage deflected to the left off the maxillary crest behind the caudal deflection creating a large spur posteriorly impacting the left inferior turbinate. After I completely elevated the mucoperichondrium off the left side of the septum and the bony spur, I continued posterior raising a flap off the bone. I then made a chondrotomy incision through the cartilage in a \"C\" shape fashion approximately 1.5 cm back from the anterior edge and 1.5 cm from the dorsum. I broke over to the right side and raised a submucoperiosteal flap on the entire right side " of the nasal septum. After this was done, I freed the cartilage from the bony septum, and I removed it in one large piece. It was brought to the back table and carved free of the defelection for later reinserted back into the mucoperichondrial pocket. I then used a osteotome to remove the deflected maxillary crest. I also used a double-action scissors and removed a portion of the posterior bony septum by cutting superiorly leaving an adequate strut. Some of the bony septum was removed and discarded including the left bony spur. I placed the cartilage back in to the flaps. I laid the flaps back together and this significantly improved the nasal airway. I irrigated the septum in between the flaps. I then closed my septoplasty incision with 3 simple interrupted 4-0 chromic gut sutures.   I proceeded to the final components of the operation, which was submucous resection of inferior turbinates with out-fracture.  I injected both inferior turbinates along their entire length with 1% lidocaine, 1:100,000 epinephrine using a spinal needle. I used the 15 blade to make a stab incision at the head of the right inferior turbinate. I then used a caudal to free the mucoperiosteum from the inferior turbinate bone along the medial aspect of the bone all the way posteriorly. Using a 2.0 mm medtronic shaver blade, I slowly entered the pocket and ran the shaver function to perform my submucous resection of the right inferior turbinate. I used the shaver along the entire length of the inferior turbinate from anterior to posterior.   I then performed the same procedure on the left side. Once again using the 15 blade, I made a stab incision at the head of the left inferior turbinate.  I then used a caudal to free the mucoperiosteum from the inferior turbinate bone along the medial aspect of the bone all the way posteriorly. Using a 2.0 mm medtronic shaver blade, I slowly entered the pocket and ran the shaver function to perform my  submucous resection of the left inferior turbinate. I used the shaver along the entire length of the inferior turbinate from anterior to posterior. I closed each stab incision with a simple interrupted 5-0 chromic suture.   Lastly I performed out-fracture by using the back end of the caudal to fracture both inferior turbinate bones laterally. I did this at multiple spots along each bone. I then used the longest nasal speculum to fracture the entire inferior turbinates laterally on both sides. The airway was now wide open on both sides.   Lastly, I placed a Conn stent on each side and stitched them together with a 3-0 nylon suture.  At this point, the entire procedure was now complete. I reinspected both sides of the nose and there was good hemostasis with a greatly improved airway bilaterally.  All instruments were accounted for and all counts were correct. The patient's bed was rotated 90 degrees back to the care of anesthesia. They were awakened, extubated and sent to the recovery room in good condition.

## 2022-06-14 ENCOUNTER — TELEPHONE (OUTPATIENT)
Dept: OTOLARYNGOLOGY | Facility: CLINIC | Age: 37
End: 2022-06-14
Payer: COMMERCIAL

## 2022-06-14 NOTE — TELEPHONE ENCOUNTER
Reason for Call:  Other appointment and call back    Detailed comments: Patient had surgery 6-10-22. Patient is scheduled for a post op for 6-15-22 and patient is unable to come to 6-15 appointment due to lack of transportation. Patient could get a ride today but no appts available. Dr Narayanan is not available 6-16 or 6-17. Please call patient to advise.    Phone Number Patient can be reached at: Cell number on file:    Telephone Information:   Mobile [phone]       Best Time: any    Can we leave a detailed message on this number? YES    Call taken on 6/14/2022 at 1:41 PM by Martha Garcia

## 2022-06-15 ENCOUNTER — TELEPHONE (OUTPATIENT)
Dept: OTOLARYNGOLOGY | Facility: CLINIC | Age: 37
End: 2022-06-15

## 2022-06-15 LAB
PATH REPORT.COMMENTS IMP SPEC: NORMAL
PATH REPORT.COMMENTS IMP SPEC: NORMAL
PATH REPORT.FINAL DX SPEC: NORMAL
PATH REPORT.GROSS SPEC: NORMAL
PATH REPORT.MICROSCOPIC SPEC OTHER STN: NORMAL
PATH REPORT.RELEVANT HX SPEC: NORMAL
PHOTO IMAGE: NORMAL

## 2022-06-15 NOTE — TELEPHONE ENCOUNTER
Reason for Call:  Other appointment    Detailed comments: patient called and has an appointment at  ENT tomorrow.  Patient wondering if any provider at  ENT;  ENT; or ER ENT can see her today?      Reason:  Post op f/u on tonsil removal.    Please contact patient.    Thank you.    Phone Number Patient can be reached at: Home number on file [phone] (home)    Best Time: any    Can we leave a detailed message on this number? YES    Call taken on 6/15/2022 at 3:06 PM by Jessy Reich

## 2022-06-15 NOTE — TELEPHONE ENCOUNTER
Called patient and she is unable to come today 6/15.  I have scheduled her with Dr. Downing at 11:45 tomorrow.     Emeli Albarran MA, CMA ......6/15/2022...7:51 AM

## 2022-06-15 NOTE — TELEPHONE ENCOUNTER
Spoke with patient and she is going to keep her appointment on 6/16/2022 with Dr. Downing.  11:45.     Emeli Albarran MA, CMA ......6/15/2022...3:25 PM

## 2022-06-16 ENCOUNTER — OFFICE VISIT (OUTPATIENT)
Dept: OTOLARYNGOLOGY | Facility: CLINIC | Age: 37
End: 2022-06-16
Payer: COMMERCIAL

## 2022-06-16 VITALS — BODY MASS INDEX: 35.19 KG/M2 | WEIGHT: 218 LBS

## 2022-06-16 DIAGNOSIS — J35.01 CHRONIC TONSILLITIS: ICD-10-CM

## 2022-06-16 DIAGNOSIS — J34.2 NASAL SEPTAL DEVIATION: Primary | ICD-10-CM

## 2022-06-16 PROCEDURE — 99024 POSTOP FOLLOW-UP VISIT: CPT | Performed by: OTOLARYNGOLOGY

## 2022-06-16 NOTE — PROGRESS NOTES
History of Present Illness - Breanna Luna is a 37 year old female who is status post septoplasty, turbinate reduction and tonsillectomy on 6/10/2022 by Dr Narayanan.  For scheduling reasons, she had to come to me for splint removal.      The patient tells me that other than the sense of congestion, they have had no problems.  No headaches, fevers, chills, or change in vision.    Wt 98.9 kg (218 lb)   LMP 05/10/2022 (Exact Date)   BMI 35.19 kg/m      General - The patient is well nourished and well developed, and appears to have good nutritional status.  Alert and oriented to person and place, answers questions and cooperates with examination appropriately.   Head and Face - Normocephalic and atraumatic, with no gross asymmetry noted of the contour of the facial features.  The facial nerve is intact, with strong symmetric movements.  Eyes - Extraocular movements intact, and the pupils were reactive to light.  Sclera were not icteric or injected, conjunctiva were pink and moist.  Mouth - Examination of the oral cavity shows pink, healthy, moist mucosa.  No lesions or ulceration noted.  The dentition are in good repair.  The tongue is mobile and midline.  The tonsil beds have appropriate eschar, no clots or bleeding  Nose - After removing the splints and debris, everything looks great.  The incision is well healed and the turbinates are well lateralized. No sign of synechiae or infection.    A/P - Breanna Luna has had a nice result from septoplasty and tonsillectomy.  The position of the septum and nasal tip look good.  I will see the patient in one month for another follow up.  I cautioned them to avoid any trauma to the nose, hard blowing, or twisting.

## 2022-06-16 NOTE — LETTER
6/16/2022         RE: Breanna Luna  [address]  [place] [address]        Dear Colleague,    Thank you for referring your patient, Breanna Luna, to the [facility]. Please see a copy of my visit note below.    History of Present Illness - Breanna Luna is a 37 year old female who is status post septoplasty, turbinate reduction and tonsillectomy on 6/10/2022 by Dr Narayanan.  For scheduling reasons, she had to come to me for splint removal.      The patient tells me that other than the sense of congestion, they have had no problems.  No headaches, fevers, chills, or change in vision.    Wt 98.9 kg (218 lb)   LMP 05/10/2022 (Exact Date)   BMI 35.19 kg/m      General - The patient is well nourished and well developed, and appears to have good nutritional status.  Alert and oriented to person and place, answers questions and cooperates with examination appropriately.   Head and Face - Normocephalic and atraumatic, with no gross asymmetry noted of the contour of the facial features.  The facial nerve is intact, with strong symmetric movements.  Eyes - Extraocular movements intact, and the pupils were reactive to light.  Sclera were not icteric or injected, conjunctiva were pink and moist.  Mouth - Examination of the oral cavity shows pink, healthy, moist mucosa.  No lesions or ulceration noted.  The dentition are in good repair.  The tongue is mobile and midline.  The tonsil beds have appropriate eschar, no clots or bleeding  Nose - After removing the splints and debris, everything looks great.  The incision is well healed and the turbinates are well lateralized. No sign of synechiae or infection.    A/P - Breanna Luna has had a nice result from septoplasty and tonsillectomy.  The position of the septum and nasal tip look good.  I will see the patient in one month for another follow up.  I cautioned them to avoid any trauma to the nose, hard blowing, or twisting.        Again, thank you  for allowing me to participate in the care of your patient.        Sincerely,        Sean Downing MD

## 2022-06-16 NOTE — NURSING NOTE
"Chief Complaint   Patient presents with     RECHECK       There were no vitals filed for this visit.  Wt Readings from Last 1 Encounters:   06/10/22 98.9 kg (218 lb)     Ht Readings from Last 1 Encounters:   05/13/22 1.676 m (5' 6\")       Opal Cortes [facility], 6/16/2022 12:07 PM    "

## 2022-06-27 ENCOUNTER — HOSPITAL ENCOUNTER (OUTPATIENT)
Dept: PHYSICAL THERAPY | Facility: CLINIC | Age: 37
Setting detail: THERAPIES SERIES
Discharge: HOME | End: 2022-06-27
Attending: STUDENT IN AN ORGANIZED HEALTH CARE EDUCATION/TRAINING PROGRAM
Payer: COMMERCIAL

## 2022-06-27 DIAGNOSIS — E66.01 MORBID OBESITY (H): ICD-10-CM

## 2022-06-27 PROCEDURE — 97140 MANUAL THERAPY 1/> REGIONS: CPT | Mod: GP | Performed by: PHYSICAL THERAPIST

## 2022-06-27 PROCEDURE — 97110 THERAPEUTIC EXERCISES: CPT | Mod: GP | Performed by: PHYSICAL THERAPIST

## 2022-06-27 PROCEDURE — 97112 NEUROMUSCULAR REEDUCATION: CPT | Mod: GP | Performed by: PHYSICAL THERAPIST

## 2022-06-29 ENCOUNTER — TELEPHONE (OUTPATIENT)
Dept: SURGERY | Facility: CLINIC | Age: 37
End: 2022-06-29

## 2022-06-29 NOTE — TELEPHONE ENCOUNTER
Spoke to patient reminding them to fill out the new patient questionnaire before their appointment.    Sulma BATES MA

## 2022-06-30 ENCOUNTER — VIRTUAL VISIT (OUTPATIENT)
Dept: SURGERY | Facility: CLINIC | Age: 37
End: 2022-06-30
Attending: STUDENT IN AN ORGANIZED HEALTH CARE EDUCATION/TRAINING PROGRAM
Payer: COMMERCIAL

## 2022-06-30 VITALS — WEIGHT: 218 LBS | HEIGHT: 66 IN | BODY MASS INDEX: 35.03 KG/M2

## 2022-06-30 DIAGNOSIS — E66.09 CLASS 2 OBESITY DUE TO EXCESS CALORIES WITH BODY MASS INDEX (BMI) OF 35.0 TO 35.9 IN ADULT, UNSPECIFIED WHETHER SERIOUS COMORBIDITY PRESENT: ICD-10-CM

## 2022-06-30 DIAGNOSIS — E66.812 CLASS 2 OBESITY DUE TO EXCESS CALORIES WITH BODY MASS INDEX (BMI) OF 35.0 TO 35.9 IN ADULT, UNSPECIFIED WHETHER SERIOUS COMORBIDITY PRESENT: ICD-10-CM

## 2022-06-30 DIAGNOSIS — G43.019 INTRACTABLE MIGRAINE WITHOUT AURA AND WITHOUT STATUS MIGRAINOSUS: ICD-10-CM

## 2022-06-30 DIAGNOSIS — E66.812 CLASS 2 OBESITY DUE TO EXCESS CALORIES WITH BODY MASS INDEX (BMI) OF 35.0 TO 35.9 IN ADULT, UNSPECIFIED WHETHER SERIOUS COMORBIDITY PRESENT: Primary | ICD-10-CM

## 2022-06-30 DIAGNOSIS — E66.01 MORBID OBESITY (H): ICD-10-CM

## 2022-06-30 DIAGNOSIS — E66.09 CLASS 2 OBESITY DUE TO EXCESS CALORIES WITH BODY MASS INDEX (BMI) OF 35.0 TO 35.9 IN ADULT, UNSPECIFIED WHETHER SERIOUS COMORBIDITY PRESENT: Primary | ICD-10-CM

## 2022-06-30 PROCEDURE — 97802 MEDICAL NUTRITION INDIV IN: CPT | Mod: 95 | Performed by: DIETITIAN, REGISTERED

## 2022-06-30 PROCEDURE — 99204 OFFICE O/P NEW MOD 45 MIN: CPT | Mod: 95 | Performed by: PHYSICIAN ASSISTANT

## 2022-06-30 RX ORDER — PHENTERMINE HYDROCHLORIDE 37.5 MG/1
37.5 CAPSULE ORAL EVERY MORNING
Qty: 30 CAPSULE | Refills: 0 | Status: SHIPPED | OUTPATIENT
Start: 2022-06-30 | End: 2023-01-31

## 2022-06-30 NOTE — PROGRESS NOTES
"  Video-Visit Details    Type of service:  Video Visit    Video Start Time (time video started): 1:05    Video End Time (time video stopped): 1:47    Originating Location (pt. Location): Home    Distant Location (provider location):  [facility]     Mode of Communication:  [facility] Conference [facility]    New Bariatric Nutrition Consultation Note    Reason For Visit: Nutrition Assessment    Breanna Luna is a 37 year old presenting today for new bariatric nutrition consult.  Pt is interested in laparoscopic undecided .  Patient is accompanied by self.    Support System Reviewed With Patient 6/30/2022   Who do you have in your support network that can be available to help you for the first 2 weeks after surgery? I have my friends and neighbors also family   Who can you count on for support throughout your weight loss surgery journey? Everyone I know including my        ANTHROPOMETRICS:  Estimated body mass index is 35.19 kg/m  as calculated from the following:    Height as of an earlier encounter on 6/30/22: 1.676 m (5' 6\").    Weight as of an earlier encounter on 6/30/22: 98.9 kg (218 lb).    Required weight loss goal pre-op-will be determined at in person appointment.     SUPPLEMENT INFORMATION:  MVI     MEDICATIONS:  Phentermine from PCP 2 months on and 1 month off     NUTRITION HISTORY:  Recall Diet Questions Reviewed With Patient 6/30/2022   Describe what you typically consume for breakfast (typical or most recent): Cereal-Honey Nut Cheerios or Kix (7:30-8:00)    Describe what you typically consume for lunch (typical or most recent): Dodge-turkey, cheese + fruit (1:00)   Describe what you typically consume for supper (typical or most recent): Pasta -chicken with nikhil (5:00-6:00)    Describe what you typically consume as snacks (typical or most recent): Variety of food- vegetables, 2 oz turkey or chicken breast or hummus and carrots or mini bell peppers " -previously chips   How many ounces of water, or other low calorie drinks, do you drink daily (8 oz=1 glass)? 48 oz   How many ounces of caffeine (coffee, tea, pop) do you drink daily (8 oz=1 glass)? 32 oz   How many ounces of carbonated (pop, beer, sparkling water) drinks do you drinky daily (8 oz=1 glass)? 24 oz   How many ounces of juice, pop, sweet tea, sports drinks, protein drinks, other sweetened drinks, do you drink daily (8 oz=1 glass)? 16 oz   How many ounces of milk do you drink daily (8 oz=1 glass) 8 oz   Please indicate the type of milk: 2%   How often do you drink alcohol? Never       Eating Habits 6/30/2022   Do you have any dietary restrictions? No   Do you currently binge eat (eat a large amount of food in a short time)? No   Are you an emotional eater? No   Do you get up to eat after falling asleep? Yes   What foods do you crave? Carbs and sweets       ADDITIONAL INFORMATION:  Patient feels her biggest struggle with is food convenience.  Patient has been trying to pack foods ahead of time for when she is out and about as otherwise will buy fast food.  Patient has been focused on weight loss for the past 6 months.  Patient was 250 lbs and now 218 lbs.  Patient frustrated that she is at a plateau.       Patient state she has been eating in the middle of the night-last night ate -strawberry shortcake (feels hungry in the middle of the night).  Patient tracking intake on Lose it sylvia.  Patient consuming 2300 kcals per day.      6 days per week -rowing machine     Patient lives with her  and 2 young children.     Dining Out History Reviewed With Patient 6/30/2022   How often do you dine out? Around once a week.   Where do you dine out? (select all that apply) take out   What types of food do you order when you dine out? Chinese food       Physical Activity Reviewed With Patient 6/30/2022   How often do you exercise? 1 to 2 times per week   What is the duration of your exercise (in minutes)? 30  Minutes   What types of exercise do you do? walking, swimming, home gym   What keeps you from being more active?  I am as active as I can possbily be       NUTRITION DIAGNOSIS:  Obesity r/t long history of self-monitoring deficit and excessive energy intake aeb BMI >30 kg/m2.    INTERVENTION:  Intervention Provided/Education Provided on post-op diet guidelines, vitamins/minerals essential post-operatively, GI anatomy of bariatric surgeries, ways to help prepare for post-op diet guidelines pre-operatively and portion/calorie-control. Discussed night time eating and ways to distract from eating in the middle of the night.  Patient to ask self if hungry and delay snacking.  Provided pt with list of goals RD contact information.      Questions Reviewed With Patient 6/30/2022   How ready are you to make changes regarding your weight? Number 1 = Not ready at all to make changes up to 10 = very ready. 10   How confident are you that you can change? 1 = Not confident that you will be successful making changes up to 10 = very confident. 10       Expected patient engagement: good  Expected Compliance: good    GOALS:  Add protein at breakfast  Check how to long it takes to eat   Start 4506-6537 mg calcium with vitamin D and separate 5000 international unit(s) vitamin D supplement     Follow-Up:   Recommend 2-3 follow up visits to assist with lifestyle changes or per insurance.    Time spent with patient: 42 minutes    Karine Martel, RD, LD  [facility]  [phone] (office phone)

## 2022-06-30 NOTE — PATIENT INSTRUCTIONS
It was good meeting you today!  Please call [phone] to schedule with diet and a follow up visit end of August.    Campbell PRESLEY        Plan:  Have a healthy protein snack right before bedtime to help with nighttime eating.  - Can consider topamax at next visit as another option  Proceed with scheduled sleep study  Will schedule with dietitian and check insurance coverage  Schedule for a return visit end of August                                        Protein Link:  Protein Sources for Weight Loss  [email or web address]

## 2022-06-30 NOTE — PATIENT INSTRUCTIONS
Dain Carlos,    It was nice meeting you today.  Congratulations on your weight loss thus far!  Here are the goals we discussed:     GOALS:  Add protein at breakfast  Check how to long it takes to eat   Start 9939-4097 mg calcium with vitamin D and separate 5000 international unit(s) vitamin D supplement     Diet Guidelines after Weight-loss Surgery  [email or web address]     Your Stage 1 Diet: Clear Liquids  [email or web address]     Your Stage 2 Diet: Low-fat Full Liquids  [email or web address]     Your Stage 3 Diet: Pureed Foods  [email or web address]     Your Stage 4 Diet: Soft Foods  [email or web address]    Your Stage 5 Diet: Regular Foods  [email or web address]    Supplements after Weight Loss Surgery  [email or web address]      Please call [phone] to schedule your RD appointment for next month.    Thanks,    Karine Gale, RD, LD  [facility]patient Dietitian/[facility] Loss [facility]   [phone] (office phone)

## 2022-06-30 NOTE — PROGRESS NOTES
"Breanna is a 37 year old who is being evaluated via a billable video visit.      If the video visit is dropped, the invitation should be resent by: Text to cell phone: [phone]  Will anyone else be joining your video visit? No      Video-Visit Details    Type of service:  Video Visit    Video Start Time: 11:06    Video End Time: 11:43    50 minutes spent on the date of the encounter doing chart review, review of test results, patient visit and documentation       Originating Location (pt. Location): Home    Distant Location (provider location):  [facility] [place]     Platform used for Video Visit: [employer] Weight Management/Bariatric Surgery Consultation Note        2022    RE: Breanna Luna  MR#: [id number]  : 1985      Referring provider: No flowsheet data found.    Chief Complaint/Reason for visit: evaluation for possible weight loss surgery    Dear No Ref-Primary, Physician (General),    I had the pleasure of seeing your patient, Breanna Luna, to evaluate her obesity. As you know, Breanna Luna is 37 year old.  She has a height of 5' 6\"[pt reported[, a weight of 218 lbs 0 oz, and calculated Body mass index is 35.19 kg/m .    Last year got up to 250 lbs which was her highest.  She started her weight loss journey and has been successful. Purchased a rowing machine and has been taking phentermine 37.5 mg daily. Most recently given to her by primary Dr Mcconnell and before that Dr Lundy.  Patient has been taking it for 2 months and then takes a month off. Feels like it really helps with her appetite.     Tolerates phentermine just fine. No palpitations, constipation or dry mouth. No insomnia.  A review of her blood pressures and pulses were done.         ASSESSMENT:  Class 2 severe obesity due to excess calories with Body Mass Index (BMI) of 35  Migraines      Plan:  Have a healthy protein snack right before bedtime to help with " nighttime eating.  - Can consider topamax at next visit as another option  Proceed with scheduled sleep study  Will schedule with dietitian and check insurance coverage  Schedule for a return visit end of August      Has a sleep study scheduled for next month. Just had surgery on her sinus    Patient just received another 30 supply of phentermine 37.5 mg from her primary today. She will schedule to see dietitian and the sleep center. Wants to take a month off of the phentermine and will follow up with this clinic end of August.          BP Readings from Last 6 Encounters:   06/10/22 126/87   06/06/22 118/66   05/13/22 116/72   01/20/22 128/87   11/23/21 (!) 126/91   11/19/21 114/80         HISTORY OF PRESENT ILLNESS:  Weight Loss History Reviewed with Patient 6/30/2022   I have tried the following weight loss medications? (Check all that apply) Phentermine/Adipex-p/Suprenza   Have you ever had weight loss surgery? No       Diet Recall:  Up at 7 am  B: Cereal, oatmeal or just toast.  On the weekends  makes a bigger breakfast.  Water  L: Turkey sandwich with cheddar cheese on a bun. Water  D:  Enchiladas with coke zero  Snack: 3-4 times daily  Snacks: Watermelon, precut bell peppers, veggie tray  Last night in the middle of the night had strawberry shortcake. Does this quite a bit. Most nights of the week. Wakes up to go to the bathroom and eats.  Done this for the past couple of years.   Fluids: Well over 60 oz water, 1-2 sodas per day. Drinks 3-4 glasses of coffee daily.    Last meal is around 6 pm.  And goes to bed around 9:30      CO-MORBIDITIES OF OBESITY INCLUDE:     6/30/2022   I have the following health issues associated with obesity: Pre-Diabetes, High Cholesterol       PAST MEDICAL HISTORY:  Past Medical History:   Diagnosis Date     Cellulitis 2012    MRSA septic olecranon bursitis and facial cellulitis     Drug abuse (H) 2011    methamphetamine and alcohol, sober since treatment 2011     Mild  pre-eclampsia in third trimester      Motion sickness      MRSA cellulitis 2012    cleared with negative nasal swabs 8/16/17 and 8/28/17, reviewed     PONV (postoperative nausea and vomiting)      Pregnancy-induced hypertension in third trimester      Rh negative, antepartum        PAST SURGICAL HISTORY:  Past Surgical History:   Procedure Laterality Date     APPENDECTOMY       DAVINCI ASSISTED HERNIORRHAPHY INCISIONAL N/A 07/10/2020    Procedure: Robotic incisional hernia repair with mesh;  Surgeon: Jovi Odom MD;  Location: [facility] OR     EXCISE LESION FOOT Left 08/16/2019    Procedure: Excision of nevus second interdigital space left foot, Excision of nevus third interdigital space left foot, excision of plantar junctional nevus left foot;  Surgeon: Rinku Lopez DPM;  Location: [facility] OR     GYN SURGERY       INCISION AND DRAINAGE  04/18/2012    chin abscess I&D     LAPAROSCOPIC CHOLECYSTECTOMY N/A 02/14/2020    Procedure: Laparoscopic Cholecystectomy;  Surgeon: Renard Gerber MD;  Location: [facility] OR     LAPAROSCOPIC HERNIORRHAPHY INCISIONAL N/A 07/17/2019    Procedure: laparoscopic incisional hernia repair with mesh;  Surgeon: Jovi Odom MD;  Location: [facility]     LAPAROSCOPIC SALPINGECTOMY Bilateral 08/27/2018    Procedure: LAPAROSCOPIC SALPINGECTOMY;  Laparoscopic Bilateral Salpingectomy;  Surgeon: Chai Cui MD;  Location: [facility] OR     MAMMOPLASTY REDUCTION Bilateral 08/04/2017     SEPTOPLASTY, TURBINOPLASTY, COMBINED Bilateral 6/10/2022    Procedure: SEPTOPLASTY; BILATERAL INFERIOR TURBINATE REDUCTION;;  Surgeon: Lobo Narayanan MD;  Location: [facility] OR     TONSILLECTOMY Bilateral 6/10/2022    Procedure: BILATERAL TONSILLECTOMY;  Surgeon: Lobo Narayanan MD;  Location: [facility] OR     TUBAL LIGATION Bilateral        FAMILY HISTORY:   Family History   Problem Relation Age of Onset     Cancer Father      Pancreatic Cancer Father      Lung Cancer Father      Lupus Mother        SOCIAL HISTORY:   Social  History Questions Reviewed With Patient 6/30/2022   Which best describes your employment status (select all that apply) 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 Yes   Who do you have in your support network that can be available to help you for the first 2 weeks after surgery? I have my friends and neighbors also family   Who can you count on for support throughout your weight loss surgery journey? Everyone I know including my    Can you afford 3 meals a day?  Yes   Can you afford 50-60 dollars a month for vitamins? Yes       HABITS:     6/30/2022   How often do you drink alcohol? Never   Have you ever used any of the following nicotine products? Cigarettes   If you previously used any of these products, what year did you quit? 2018   Have you or are you currently using street drugs or prescription strength medication for which you do not have a prescription for? No   Do you have a history of chemical dependency (alcohol or drug abuse)? Yes     In recovery since 2012 for methamphetamine and alcohol.  Has not used any illicit drug or alcohol since then.  Did have inpatient treatment and goes to AA twice weekly      PSYCHOLOGICAL HISTORY:   Psychological History Reviewed With Patient 6/30/2022   Have you ever attempted suicide? Never.   Have you had thoughts of suicide in the past year? No   Have you ever been hospitalized for mental illness or a suicide attempt? Never.   Do you have a history of chronic pain? No   Have you ever been diagnosed with fibromyalgia? No   Are you currently seeing a therapist or counselor?  No   Are you currently seeing a psychiatrist? No       ROS:     6/30/2022   Skin:  Skin fold rashes (groin or other folds), Varicose veins   HEENT: Headaches, Missing teeth   If you answered yes to missing teeth, please indicate how many: 3   Musculoskeletal: Joint Pain, Back  pain   Cardiovascular: None of the above   Pulmonary: Snoring   Gastrointestinal: Heartburn   Genitourinary: None of the above   Hematological: None of the above   Neurological: Migraine headaches   Female only: Excessive menstrual bleeding     No heart disease - EKG WNL from 1/2020  No palpitations  No high blood pressure    No glaucoma   No constipation or dry mouth      EATING BEHAVIORS:     6/30/2022   Have you or anyone else thought that you had an eating disorder? No   Do you currently binge eat (eat a large amount of food in a short time)? No   Are you an emotional eater? No   Do you get up to eat after falling asleep? Yes       EXERCISE:     6/30/2022   How often do you exercise? 1 to 2 times per week   What is the duration of your exercise (in minutes)? 30 Minutes   What types of exercise do you do? walking, swimming, home gym   What keeps you from being more active?  I am as active as I can possbily be     Does 30 minutes on rowing machine and goes on walks.  Does this 6 days per week      MEDICATIONS:  Current Outpatient Medications   Medication Sig Dispense Refill     acetaminophen (TYLENOL) 500 MG tablet Take 1,000 mg by mouth every 6 hours as needed for mild pain       cetirizine (ZYRTEC) 10 MG tablet Take by mouth daily       ibuprofen (ADVIL/MOTRIN) 200 MG tablet Take 200 mg by mouth every 4 hours as needed for mild pain       tiZANidine (ZANAFLEX) 2 MG tablet Take 1 tablet (2 mg) by mouth 3 times daily as needed for muscle spasms 40 tablet 0     phentermine (ADIPEX-P) 37.5 MG capsule Take 1 capsule (37.5 mg) by mouth every morning 30 capsule 0       ALLERGIES:  Allergies   Allergen Reactions     Codeine Nausea     Other reaction(s): Nausea     Imitrex [Sumatriptan]      Worsening of migraine, paresthesias     Vancomycin Other (See Comments)     Adhesive [Liquid Adhesive] Itching     Redness and itching around incision after hernia repair surgery     Ranitidine Rash and Itching     recvd IV zantac 75  "and reaction was immediate,  was given Benadryl to counter act reaction in 2000     Sulfa Drugs Unknown and Rash       LABS/IMAGING/MEDICAL RECORDS REVIEW: [facility]    PHYSICAL EXAM:  Ht 5' 6\" (1.676 m)   Wt 218 lb (98.9 kg)   LMP 05/10/2022 (Exact Date)   BMI 35.19 kg/m    GENERAL: Healthy, alert and no distress  EYES: Eyes grossly normal to inspection.  No discharge or erythema, or obvious scleral/conjunctival abnormalities.  RESP: No audible wheeze, cough, or visible cyanosis.  No visible retractions or increased work of breathing.    SKIN: Visible skin clear. No significant rash, abnormal pigmentation or lesions.  NEURO: Cranial nerves grossly intact.  Mentation and speech appropriate for age.  PSYCH: Mentation appears normal, affect normal/bright, judgement and insight intact, normal speech and appearance well-groomed.      Sincerely,     Campbell Villalpando PA-C      "

## 2022-07-05 ENCOUNTER — E-VISIT (OUTPATIENT)
Dept: URGENT CARE | Facility: CLINIC | Age: 37
End: 2022-07-05
Payer: COMMERCIAL

## 2022-07-05 DIAGNOSIS — N39.0 ACUTE UTI (URINARY TRACT INFECTION): Primary | ICD-10-CM

## 2022-07-05 PROCEDURE — 99421 OL DIG E/M SVC 5-10 MIN: CPT

## 2022-07-05 RX ORDER — NITROFURANTOIN 25; 75 MG/1; MG/1
100 CAPSULE ORAL 2 TIMES DAILY
Qty: 10 CAPSULE | Refills: 0 | Status: SHIPPED | OUTPATIENT
Start: 2022-07-05 | End: 2022-07-10

## 2022-07-05 NOTE — PATIENT INSTRUCTIONS
Dear Breanna Luna    After reviewing your responses, It sounds likely you have a urinary tract infection, which is a common infection of the bladder with bacteria.  This is not a sexually transmitted infection, though urinating immediately after intercourse can help prevent infections.  Drinking lots of fluids is also helpful to clear your current infection and prevent the next one.      I have sent a prescription for antibiotics to your pharmacy to treat this infection.    It is important that you take all of your prescribed medication even if your symptoms are improving after a few doses.  Taking all of your medicine helps prevent the symptoms from returning.     If your symptoms worsen, you develop pain in your back or stomach, develop fevers, or are not improving in 5 days, please contact your primary care provider for an appointment or visit any of our convenient Walk-in or Urgent Care Centers to be seen, which can be found on our website here.    Thanks again for choosing us as your health care partner,    Sen Pozo MD

## 2022-08-05 ENCOUNTER — HOSPITAL ENCOUNTER (OUTPATIENT)
Dept: PHYSICAL THERAPY | Facility: CLINIC | Age: 37
Setting detail: THERAPIES SERIES
Discharge: HOME | End: 2022-08-05
Attending: STUDENT IN AN ORGANIZED HEALTH CARE EDUCATION/TRAINING PROGRAM
Payer: COMMERCIAL

## 2022-08-05 PROCEDURE — 97110 THERAPEUTIC EXERCISES: CPT | Mod: GP | Performed by: PHYSICAL THERAPIST

## 2022-08-05 PROCEDURE — 97140 MANUAL THERAPY 1/> REGIONS: CPT | Mod: GP | Performed by: PHYSICAL THERAPIST

## 2022-08-11 NOTE — PROGRESS NOTES
Does Breanna have a CPAP/Bipap?  No     [place] Sleep Scale:   BMI: 35.19 (6/30/22)  Breanna is a 37 year old who is being evaluated via a billable video visit.      How would you like to obtain your AVS? MyChart  If the video visit is dropped, the invitation should be resent by:  Will anyone else be joining your video visit? No          Vitals:  No vitals were obtained today due to virtual visit.      Video-Visit Details    Video Start Time: 0813    Type of service:  Video Visit    Video End Time:8:44 AM    Originating Location (pt. Location): Home    Distant Location (provider location):  [facility]     Platform used for Video Visit: [facility]      Outpatient Sleep Medicine Consultation:      Name: Breanna uLna MRN# [id number]   Age: 37 year old YOB: 1985     Date of Consultation: August 15, 2022  Consultation is requested by: Bradley Mcconnell MD  [address] [place] [address] Bradley Mcconnell  Primary care provider: No Ref-Primary, Physician       Reason for Sleep Consult:     Breanna Luna is sent by Bradley Mcconnell for a sleep consultation regarding daytime fatigue, snoring, frequent awakenings. .    Patient s Reason for visit  Breanna Luna main reason for visit: Sleep issues  Patient states problem(s) started: Not sure  Breannadane Luna's goals for this visit: Discuss the issue           Assessment and Plan:     Summary Sleep Diagnoses:  Suspected sleep apnea    Comorbid Diagnoses:  Obesity  Migraines      Summary Recommendations:  Home study for suspected sleep apnea.   No orders of the defined types were placed in this encounter.        Summary Counseling:    Sleep Testing Reviewed  Obstructive Sleep Apnea Reviewed  Complications of Untreated Sleep Apnea Reviewed      Medical Decision-making:   Educational materials provided in instructions    Total time spent reviewing medical records, history and physical examination, review of previous testing and  interpretation as well as documentation on this date:30 min    CC: Bradley Mcconnell          History of Present Illness:     Past Sleep Evaluations:    SLEEP-WAKE SCHEDULE:     Work/School Days: Patient goes to school/work: Yes   Usually gets into bed at 9:30 or 10:00 pm  Takes patient about Not sure to fall asleep  Has trouble falling asleep Sometimes nights per week  Wakes up in the middle of the night Every night times.  Wakes up due to Uncertain  She has trouble falling back asleep Rarely just some nights times a week.   It usually takes Unsure to get back to sleep  Patient is usually up at 7am or earlier  Uses alarm: Yes    Weekends/Non-work Days/All Other Days:  Usually gets into bed at 9:30 pm   Takes patient about Unsure to fall asleep  Patient is usually up at 7am  Uses alarm: Yes    Sleep Need  Patient gets  I think maybe 8 hours but I wake up alot sleep on average   Patient thinks she needs about 8 hours sleep    Breanna Luna prefers to sleep in this position(s): Side   Patient states they do the following activities in bed: Read, Watch TV    Naps  Patient takes a purposeful nap Sometimes times a week and naps are usually 20 minutes in duration  She feels better after a nap: Yes  She dozes off unintentionally N/A days per week  Patient has had a driving accident or near-miss due to sleepiness/drowsiness: No      SLEEP DISRUPTIONS:    Breathing/Snoring  Patient snores:Yes  Other people complain about her snoring: Yes  Patient has been told she stops breathing in her sleep: Yes  She has issues with the following: Morning headaches, Stuffy nose when you wake up, Getting up to urinate more than once    Movement:  Patient gets pain, discomfort, with an urge to move:  Yes  It happens when she is resting:  Yes  It happens more at night:  Yes  Patient has been told she kicks her legs at night:  No     Behaviors in Sleep:  Breanna JOLIE Cheryl has experienced the following behaviors while sleeping: Eating,  Sleep-talking  She has experienced sudden muscle weakness during the day: No      Is there anything else you would like your sleep provider to know:          CAFFEINE AND OTHER SUBSTANCES:    Patient consumes caffeinated beverages per day:  4  Last caffeine use is usually: 3 or 4 pm  List of any prescribed or over the counter stimulants that patient takes:    List of any prescribed or over the counter sleep medication patient takes:    List of previous sleep medications that patient has tried:    Patient drinks alcohol to help them sleep: No  Patient drinks alcohol near bedtime: No    Family History:  Patient has a family member been diagnosed with a sleep disorder: No            SCALES:    EPWORTH SLEEPINESS SCALE      [place] Sleepiness Scale ( [place]  [id number]<br>ESS - [place]/English - Final version - 21 Nov 07 - [facility] [employer].) 8/15/2022   Sitting and reading Moderate chance of dozing   Watching TV Slight chance of dozing   Sitting, inactive in a public place (e.g. a theatre or a meeting) Would never doze   As a passenger in a car for an hour without a break Would never doze   Lying down to rest in the afternoon when circumstances permit Slight chance of dozing   Sitting and talking to someone Would never doze   Sitting quietly after a lunch without alcohol Would never doze   In a car, while stopped for a few minutes in traffic Would never doze   [place] Score (MC) 4   [place] Score (Sleep) 4         INSOMNIA SEVERITY INDEX (MARILUZ)      Insomnia Severity Index (MARILUZ) 8/15/2022   Difficulty falling asleep 0   Difficulty staying asleep 2   Problems waking up too early 2   How SATISFIED/DISSATISFIED are you with your CURRENT sleep pattern? 2   How NOTICEABLE to others do you think your sleep problem is in terms of impairing the quality of your life? 0   How WORRIED/DISTRESSED are you about your current sleep problem? 2   To what extent do you consider your sleep problem to INTERFERE with your daily  "functioning (e.g. daytime fatigue, mood, ability to function at work/daily chores, concentration, memory, mood, etc.) CURRENTLY? 1   MARILUZ Total Score 9       Guidelines for Scoring/Interpretation:  Total score categories:  0-7 = No clinically significant insomnia   8-14 = Subthreshold insomnia   15-21 = Clinical insomnia (moderate severity)  22-28 = Clinical insomnia (severe)  Used via courtesy of www.[employer] [facility].va.gov with permission from Dixon Pichardo PhD., [facility]      STOP BANG     STOP BANG Questionnaire (  2008, the American Society of Anesthesiologists, Inc. [employer] 8/15/2022   1. Snoring - Do you snore loudly (louder than talking or loud enough to be heard through closed doors)? No   2. Tired - Do you often feel tired, fatigued, or sleepy during daytime? Yes   3. Observed - Has anyone observed you stop breathing during your sleep? No   4. Blood pressure - Do you have or are you being treated for high blood pressure? Yes   5. BMI - BMI more than 35 kg/m2? Yes   6. Age - Age over 50 yr old? No   7. Neck circumference - Neck circumference greater than 40 cm? No   8. Gender - Gender male? No   STOP BANG Score (MC): 4 (High risk of KATHERIN)   B/P Clinic: -   BMI Clinic: -         GAD7    No flowsheet data found.      CAGE-AID    No flowsheet data found.    CAGE-AID reprinted with permission from the [place] Medical Journal, MIKAELA Tesfaye. and ILIR Humphries, \"Conjoint screening questionnaires for alcohol and drug abuse\" [place] Medical Journal 94: 135-140, 1995.      PATIENT HEALTH QUESTIONNAIRE-9 (PHQ - 9)    No flowsheet data found.    Developed by Nithin Coleman, Charlee Everett, Toro Rodriguez and colleagues, with an educational annika from [employer] No permission required to reproduce, translate, display or distribute.        Allergies:    Allergies   Allergen Reactions     Codeine Nausea     Other reaction(s): Nausea     Imitrex [Sumatriptan]      Worsening of " migraine, paresthesias     Vancomycin Other (See Comments)     Adhesive [Liquid Adhesive] Itching     Redness and itching around incision after hernia repair surgery     Ranitidine Rash and Itching     recvd IV zantac 75 and reaction was immediate,  was given Benadryl to counter act reaction in 2000     Sulfa Drugs Unknown and Rash       Medications:    Current Outpatient Medications   Medication Sig Dispense Refill     acetaminophen (TYLENOL) 500 MG tablet Take 1,000 mg by mouth every 6 hours as needed for mild pain       cetirizine (ZYRTEC) 10 MG tablet Take by mouth daily       ibuprofen (ADVIL/MOTRIN) 200 MG tablet Take 200 mg by mouth every 4 hours as needed for mild pain       phentermine (ADIPEX-P) 37.5 MG capsule Take 1 capsule (37.5 mg) by mouth every morning 30 capsule 0     tiZANidine (ZANAFLEX) 2 MG tablet Take 1 tablet (2 mg) by mouth 3 times daily as needed for muscle spasms 40 tablet 0       Problem List:  Patient Active Problem List    Diagnosis Date Noted     Chronic tonsillitis 05/18/2022     Priority: Medium     Added automatically from request for surgery [id number]       Nasal obstruction 05/02/2022     Priority: Medium     Added automatically from request for surgery [id number]       Nasal turbinate hypertrophy 05/02/2022     Priority: Medium     Added automatically from request for surgery [id number]       Nasal septal deviation 05/02/2022     Priority: Medium     Added automatically from request for surgery [id number]       Class 2 obesity due to excess calories with body mass index (BMI) of 35.0 to 35.9 in adult, unspecified whether serious comorbidity present 02/16/2021     Priority: Medium     Biliary dyskinesia 02/10/2020     Priority: Medium     Added automatically from request for surgery [id number]       Intractable migraine without aura and without status migrainosus 07/17/2019     Priority: Medium     Alcohol abuse, in remission 08/20/2018     Priority: Medium     Methamphetamine abuse in remission  (H) 08/20/2018     Priority: Medium     Mixed incontinence 03/03/2017     Priority: Medium        Past Medical/Surgical History:  Past Medical History:   Diagnosis Date     Cellulitis 2012    MRSA septic olecranon bursitis and facial cellulitis     Drug abuse (H) 2011    methamphetamine and alcohol, sober since treatment 2011     Mild pre-eclampsia in third trimester      Motion sickness      MRSA cellulitis 2012    cleared with negative nasal swabs 8/16/17 and 8/28/17, reviewed     PONV (postoperative nausea and vomiting)      Pregnancy-induced hypertension in third trimester      Rh negative, antepartum      Past Surgical History:   Procedure Laterality Date     APPENDECTOMY       DAVINCI ASSISTED HERNIORRHAPHY INCISIONAL N/A 07/10/2020    Procedure: Robotic incisional hernia repair with mesh;  Surgeon: Jovi Odom MD;  Location: [facility] OR     EXCISE LESION FOOT Left 08/16/2019    Procedure: Excision of nevus second interdigital space left foot, Excision of nevus third interdigital space left foot, excision of plantar junctional nevus left foot;  Surgeon: Rinku Lopez DPM;  Location: [facility] OR     GYN SURGERY       INCISION AND DRAINAGE  04/18/2012    chin abscess I&D     LAPAROSCOPIC CHOLECYSTECTOMY N/A 02/14/2020    Procedure: Laparoscopic Cholecystectomy;  Surgeon: Renard Gerber MD;  Location: [facility]     LAPAROSCOPIC HERNIORRHAPHY INCISIONAL N/A 07/17/2019    Procedure: laparoscopic incisional hernia repair with mesh;  Surgeon: Jovi Odom MD;  Location: [facility]     LAPAROSCOPIC SALPINGECTOMY Bilateral 08/27/2018    Procedure: LAPAROSCOPIC SALPINGECTOMY;  Laparoscopic Bilateral Salpingectomy;  Surgeon: Chai Cui MD;  Location: [facility] OR     MAMMOPLASTY REDUCTION Bilateral 08/04/2017     SEPTOPLASTY, TURBINOPLASTY, COMBINED Bilateral 6/10/2022    Procedure: SEPTOPLASTY; BILATERAL INFERIOR TURBINATE REDUCTION;;  Surgeon: Lobo Narayanan MD;  Location: [facility] OR     TONSILLECTOMY Bilateral 6/10/2022     Procedure: BILATERAL TONSILLECTOMY;  Surgeon: Lobo Narayanan MD;  Location: [facility] OR     TUBAL LIGATION Bilateral        Social History:  Social History     Socioeconomic History     Marital status:      Spouse name: Not on file     Number of children: 4     Years of education: Not on file     Highest education level: Not on file   Occupational History     Not on file   Tobacco Use     Smoking status: Former Smoker     Packs/day: 0.00     Years: 0.00     Pack years: 0.00     Types: Cigarettes     Quit date: 3/10/2016     Years since quittin.4     Smokeless tobacco: Never Used   Vaping Use     Vaping Use: Never used   Substance and Sexual Activity     Alcohol use: No     Comment: history alcohol abuse , s/p treatment     Drug use: No     Comment: former drug user (EtOH, cocaine, marijuana, meth), last use      Sexual activity: Yes     Partners: Male     Birth control/protection: Female Surgical   Other Topics Concern     Parent/sibling w/ CABG, MI or angioplasty before 65F 55M? Yes     Comment: Mother had a bypass surgery mother  from lupus   Social History Narrative    Currently lives in [place] with fiance and 18 month old son.  Has a 13 year old dtr that lives with dtrs dad in [place], [place], and a 6  Year old that was adopted out in an open adoption, lives in [place]     Social Determinants of Health     Financial Resource Strain: Not on file   Food Insecurity: Not on file   Transportation Needs: Not on file   Physical Activity: Not on file   Stress: Not on file   Social Connections: Not on file   Intimate Partner Violence: Not on file   Housing Stability: Not on file       Family History:  Family History   Problem Relation Age of Onset     Cancer Father      Pancreatic Cancer Father      Lung Cancer Father      Lupus Mother        Review of Systems:  A complete review of systems reviewed by me is negative with the exeption of what has been mentioned in the history of present illness.  In  the last TWO WEEKS have you experienced any of the following symptoms?  Fevers: No  Night Sweats: No  Weight Gain: Yes  Pain at Night: Yes  Double Vision: No  Changes in Vision: No  Difficulty Breathing through Nose: No  Sore Throat in Morning: Yes  Dry Mouth in the Morning: Yes  Shortness of Breath Lying Flat: No  Shortness of Breath With Activity: No  Awakening with Shortness of Breath: No  Increased Cough: No  Heart Racing at Night: No  Swelling in Feet or Legs: No  Diarrhea at Night: No  Heartburn at Night: No  Urinating More than Once at Night: Yes  Losing Control of Urine at Night: No  Joint Pains at Night: No  Headaches in Morning: Yes  Weakness in Arms or Legs: No  Depressed Mood: No  Anxiety: No     Physical Examination:  Vitals: There were no vitals taken for this visit.  BMI= There is no height or weight on file to calculate BMI.           GENERAL APPEARANCE: healthy, alert and no distress  EYES: Eyes grossly normal to inspection  RESP: no obvious respiratory distress  MS: extremities normal- no gross deformities noted  PSYCH: mentation appears normal and affect normal/bright           Data: All pertinent previous laboratory data reviewed     Recent Labs   Lab Test 05/13/22  0819 02/26/20  1133 02/17/20  1128   NA  --  139 142   POTASSIUM  --  4.1 3.4   CHLORIDE  --  107 107   CO2  --  26 29   ANIONGAP  --  6 6   GLC 99 94 82   BUN  --  13 12   CR  --  0.58 0.67   ELICIA  --  9.1 8.9       Recent Labs   Lab Test 05/18/22  1446   WBC 8.6   RBC 4.43   HGB 13.6   HCT 39.4   MCV 89   MCH 30.7   MCHC 34.5   RDW 12.1          Recent Labs   Lab Test 02/26/20  1133   PROTTOTAL 8.1   ALBUMIN 4.3   BILITOTAL 0.5   ALKPHOS 50   AST 15   ALT 33       No results found for: TSH    No results found for: UAMP, UBARB, BENZODIAZEUR, UCANN, UCOC, OPIT, UPCP    No results found for: IRONSAT, [id number], AWILDA    No results found for: PH, PHARTERIAL, PO2, VG1NKHDDALG, SAT, PCO2, HCO3, BASEEXCESS, ED,  BEB    @LABRCNTIPR(phv:4,pco2v:4,po2v:4,hco3v:4,isidra:4,o2per:4)@    Echocardiology: No results found for this or any previous visit (from the past 4320 hour(s)).    Chest x-ray: XR Chest 2 Views 11/19/2021    Narrative  CHEST TWO VIEWS   11/19/2021 12:14 PM    HISTORY: Cough.    COMPARISON: None.    FINDINGS:  The lungs are clear. No pleural effusions or pneumothorax.  Heart size and pulmonary vascularity are within normal limits. No  acute fracture.    Impression  IMPRESSION: No evidence of acute cardiopulmonary disease is seen.    KETAN GONZALEZ MD      SYSTEM ID:  [id number]      Chest CT: No results found for this or any previous visit from the past 365 days.      PFT: Most Recent [facility] Pulmonary Function Testing    No results found for: 20001  No results found for: 20002  No results found for: 20003  No results found for: 20015  No results found for: 20016  No results found for: 20027  No results found for: 20028  No results found for: 20029  No results found for: 20079  No results found for: 20080  No results found for: 20081  No results found for: 20335  No results found for: 20105  No results found for: 20053  No results found for: 20054  No results found for: 20055      Garfield Bradshaw CMA 8/15/2022

## 2022-08-15 ENCOUNTER — VIRTUAL VISIT (OUTPATIENT)
Dept: SLEEP MEDICINE | Facility: CLINIC | Age: 37
End: 2022-08-15
Attending: STUDENT IN AN ORGANIZED HEALTH CARE EDUCATION/TRAINING PROGRAM
Payer: COMMERCIAL

## 2022-08-15 DIAGNOSIS — R06.83 SNORING: ICD-10-CM

## 2022-08-15 DIAGNOSIS — R29.818 SUSPECTED SLEEP APNEA: Primary | ICD-10-CM

## 2022-08-15 PROCEDURE — 99204 OFFICE O/P NEW MOD 45 MIN: CPT | Mod: 95 | Performed by: PHYSICIAN ASSISTANT

## 2022-08-15 ASSESSMENT — SLEEP AND FATIGUE QUESTIONNAIRES
HOW LIKELY ARE YOU TO NOD OFF OR FALL ASLEEP WHILE SITTING AND TALKING TO SOMEONE: WOULD NEVER DOZE
HOW LIKELY ARE YOU TO NOD OFF OR FALL ASLEEP IN A CAR, WHILE STOPPED FOR A FEW MINUTES IN TRAFFIC: WOULD NEVER DOZE
HOW LIKELY ARE YOU TO NOD OFF OR FALL ASLEEP WHILE SITTING INACTIVE IN A PUBLIC PLACE: WOULD NEVER DOZE
HOW LIKELY ARE YOU TO NOD OFF OR FALL ASLEEP WHILE LYING DOWN TO REST IN THE AFTERNOON WHEN CIRCUMSTANCES PERMIT: SLIGHT CHANCE OF DOZING
HOW LIKELY ARE YOU TO NOD OFF OR FALL ASLEEP WHILE WATCHING TV: SLIGHT CHANCE OF DOZING
HOW LIKELY ARE YOU TO NOD OFF OR FALL ASLEEP WHILE SITTING AND READING: MODERATE CHANCE OF DOZING
HOW LIKELY ARE YOU TO NOD OFF OR FALL ASLEEP WHILE SITTING QUIETLY AFTER LUNCH WITHOUT ALCOHOL: WOULD NEVER DOZE
HOW LIKELY ARE YOU TO NOD OFF OR FALL ASLEEP WHEN YOU ARE A PASSENGER IN A CAR FOR AN HOUR WITHOUT A BREAK: WOULD NEVER DOZE

## 2022-08-15 NOTE — PATIENT INSTRUCTIONS
"      MY TREATMENT INFORMATION FOR SLEEP APNEA-  Breanna Luna    DOCTOR : MIKY Magallon-OYNI    Am I having a sleep study at a sleep center?  --->Due to normal delays, you will be contacted within 2-4 weeks to schedule    Am I having a home sleep study?  --->Watch the video for the device you are using:    -/drop off device-   https://www.[employer].com/watch?v=yGGFBdELGhk    -Disposable device sent out require phone/computer application-   https://www.[employer].com/watch?v=BCce_vbiwxE      Frequently asked questions:  1. What is Obstructive Sleep Apnea (KATHERIN)? KATHERIN is the most common type of sleep apnea. Apnea means, \"without breath.\"  Apnea is most often caused by narrowing or collapse of the upper airway as muscles relax during sleep.   Almost everyone has occasional apneas. Most people with sleep apnea have had brief interruptions at night frequently for many years.  The severity of sleep apnea is related to how frequent and severe the events are.   2. What are the consequences of KATHERIN? Symptoms include: feeling sleepy during the day, snoring loudly, gasping or stopping of breathing, trouble sleeping, and occasionally morning headaches or heartburn at night.  Sleepiness can be serious and even increase the risk of falling asleep while driving. Other health consequences may include development of high blood pressure and other cardiovascular disease in persons who are susceptible. Untreated KATHERIN  can contribute to heart disease, stroke and diabetes.   3. What are the treatment options? In most situations, sleep apnea is a lifelong disease that must be managed with daily therapy. Medications are not effective for sleep apnea and surgery is generally not considered until other therapies have been tried. Your treatment is your choice . Continuous Positive Airway (CPAP) works right away and is the therapy that is effective in nearly everyone. An oral device to hold your jaw forward is usually the next most reliable " option. Other options include postioning devices (to keep you off your back), weight loss, and surgery including a tongue pacing device. There is more detail about some of these options below.  4. Are my sleep studies covered by insurance? Although we will request verification of coverage, we advise you also check in advance of the study to ensure there is coverage.    Important tips for those choosing CPAP and similar devices   Know your equipment:  CPAP is continuous positive airway pressure that prevents obstructive sleep apnea by keeping the throat from collapsing while you are sleeping. In most cases, the device is  smart  and can slowly self-adjusts if your throat collapses and keeps a record every day of how well you are treated-this information is available to you and your care team.  BPAP is bilevel positive airway pressure that keeps your throat open and also assists each breath with a pressure boost to maintain adequate breathing.  Special kinds of BPAP are used in patients who have inadequate breathing from lung or heart disease. In most cases, the device is  smart  and can slowly self-adjusts to assist breathing. Like CPAP, the device keeps a record of how well you are treated.  Your mask is your connection to the device. You get to choose what feels most comfortable and the staff will help to make sure if fits. Here: are some examples of the different masks that are available:       Key points to remember on your journey with sleep apnea:  Sleep study.  PAP devices often need to be adjusted during a sleep study to show that they are effective and adjusted right.  Good tips to remember: Try wearing just the mask during a quiet time during the day so your body adapts to wearing it. A humidifier is recommended for comfort in most cases to prevent drying of your nose and throat. Allergy medication from your provider may help you if you are having nasal congestion.  Getting settled-in. It takes more than  one night for most of us to get used to wearing a mask. Try wearing just the mask during a quiet time during the day so your body adapts to wearing it. A humidifier is recommended for comfort in most cases. Our team will work with you carefully on the first day and will be in contact within 4 days and again at 2 and 4 weeks for advice and remote device adjustments. Your therapy is evaluated by the device each day.   Use it every night. The more you are able to sleep naturally for 7-8 hours, the more likely you will have good sleep and to prevent health risks or symptoms from sleep apnea. Even if you use it 4 hours it helps. Occasionally all of us are unable to use a medical therapy, in sleep apnea, it is not dangerous to miss one night.   Communicate. Call our skilled team on the number provided on the first day if your visit for problems that make it difficult to wear the device. Over 2 out of 3 patients can learn to wear the device long-term with help from our team. Remember to call our team or your sleep providers if you are unable to wear the device as we may have other solutions for those who cannot adapt to mask CPAP therapy. It is recommended that you sleep your sleep provider within the first 3 months and yearly after that if you are not having problems.   Use it for your health. We encourage use of CPAP masks during daytime quiet periods to allow your face and brain to adapt to the sensation of CPAP so that it will be a more natural sensation to awaken to at night or during naps. This can be very useful during the first few weeks or months of adapting to CPAP though it does not help medically to wear CPAP during wakefulness and  should not be used as a strategy just to meet guidelines.  Take care of your equipment. Make sure you clean your mask and tubing using directions every day and that your filter and mask are replaced as recommended or if they are not working.     BESIDES CPAP, WHAT OTHER THERAPIES  ARE THERE?    Positioning Device  Positioning devices are generally used when sleep apnea is mild and only occurs on your back.This example shows a pillow that straps around the waist. It may be appropriate for those whose sleep study shows milder sleep apnea that occurs primarily when lying flat on one's back. Preliminary studies have shown benefit but effectiveness at home may need to be verified by a home sleep test. These devices are generally not covered by medical insurance.  Examples of devices that maintain sleeping on the back to prevent snoring and mild sleep apnea.    Belt type body positioner  http://51fan[employer]/    Electronic reminder  http://nightshifttherapy.com/  http://www.[employer]au/      Oral Appliance  What is oral appliance therapy?  An oral appliance device fits on your teeth at night like a retainer used after having braces. The device is made by a specialized dentist and requires several visits over 1-2 months before a manufactured device is made to fit your teeth and is adjusted to prevent your sleep apnea. Once an oral device is working properly, snoring should be improved. A home sleep test may be recommended at that time if to determine whether the sleep apnea is adequately treated.       Some things to remember:  -Oral devices are often, but not always, covered by your medical insurance. Be sure to check with your insurance provider.   -If you are referred for oral therapy, you will be given a list of specialized dentists to consider or you may choose to visit the Web site of the American Academy of Dental Sleep Medicine  -Oral devices are less likely to work if you have severe sleep apnea or are extremely overweight.     More detailed information  An oral appliance is a small acrylic device that fits over the upper and lower teeth  (similar to a retainer or a mouth guard). This device slightly moves jaw forward, which moves the base of the tongue forward, opens the airway, improves  breathing for effective treat snoring and obstructive sleep apnea in perhaps 7 out of 10 people .  The best working devices are custom-made by a dental device  after a mold is made of the teeth 1, 2, 3.  When is an oral appliance indicated?  Oral appliance therapy is recommended as a first-line treatment for patients with primary snoring, mild sleep apnea, and for patients with moderate sleep apnea who prefer appliance therapy to use of CPAP4, 5. Severity of sleep apnea is determined by sleep testing and is based on the number of respiratory events per hour of sleep.   How successful is oral appliance therapy?  The success rate of oral appliance therapy in patients with mild sleep apnea is 75-80% while in patients with moderate sleep apnea it is 50-70%. The chance of success in patients with severe sleep apnea is 40-50%. The research also shows that oral appliances have a beneficial effect on the cardiovascular health of KATHERIN patients at the same magnitude as CPAP therapy7.  Oral appliances should be a second-line treatment in cases of severe sleep apnea, but if not completely successful then a combination therapy utilizing CPAP plus oral appliance therapy may be effective. Oral appliances tend to be effective in a broad range of patients although studies show that the patients who have the highest success are females, younger patients, those with milder disease, and less severe obesity. 3, 6.   Finding a dentist that practices dental sleep medicine  Specific training is available through the American Academy of Dental Sleep Medicine for dentists interested in working in the field of sleep. To find a dentist who is educated in the field of sleep and the use of oral appliances, near you, visit the Web site of the American Academy of Dental Sleep Medicine.    References  1. Lance et al. Objectively measured vs self-reported compliance during oral appliance therapy for sleep-disordered breathing. Chest  2013; 144(5): 6125-6919.  2. Garcia, et al. Objective measurement of compliance during oral appliance therapy for sleep-disordered breathing. Thorax 2013; 68(1): 91-96.  3. Angel et al. Mandibular advancement devices in 620 men and women with KATHERIN and snoring: tolerability and predictors of treatment success. Chest 2004; 125: 1315-2712.  4. Drew et al. Oral appliances for snoring and KATHERIN: a review. Sleep 2006; 29: 244-262.  5. Yazan et al. Oral appliance treatment for KATHERIN: an update. J Clin Sleep Med 2014; 10(2): 215-227.  6. Venita et al. Predictors of OSAH treatment outcome. J Dent Res 2007; 86: 3226-7444.      Weight Loss:    Weight loss is a long-term strategy that may improve sleep apnea in some patients.    Weight management is a personal decision and the decision should be based on your interest and the potential benefits.  If you are interested in exploring weight loss strategies, the following discussion covers the impact on weight loss on sleep apnea and the approaches that may be successful.    Being overweight does not necessarily mean you will have health consequences.  Those who have BMI over 35 or over 27 with existing medical conditions carries greater risk.   Weight loss decreases severity of sleep apnea in most people with obesity. For those with mild obesity who have developed snoring with weight gain, even 15-30 pound weight loss can improve and occasionally eliminate sleep apnea.  Structured and life-long dietary and health habits are necessary to lose weight and keep healthier weight levels.     Though there may be significant health benefits from weight loss, long-term weight loss is very difficult to achieve- studies show success with dietary management in less than 10% of people. In addition, substantial weight loss may require years of dietary control and may be difficult if patients have severe obesity. In these cases, surgical management may be considered.  Finally,  older individuals who have tolerated obesity without health complications may be less likely to benefit from weight loss strategies.      [unfilled]    Surgery:    Surgery for obstructive sleep apnea is considered generally only when other therapies fail to work. Surgery may be discussed with you if you are having a difficult time tolerating CPAP and or when there is an abnormal structure that requires surgical correction.  Nose and throat surgeries often enlarge the airway to prevent collapse.  Most of these surgeries create pain for 1-2 weeks and up to half of the most common surgeries are not effective throughout life.  You should carefully discuss the benefits and drawbacks to surgery with your sleep provider and surgeon to determine if it is the best solution for you.   More information  Surgery for KATHERIN is directed at areas that are responsible for narrowing or complete obstruction of the airway during sleep.  There are a wide range of procedures available to enlarge and/or stabilize the airway to prevent blockage of breathing in the three major areas where it can occur: the palate, tongue, and nasal regions.  Successful surgical treatment depends on the accurate identification of the factors responsible for obstructive sleep apnea in each person.  A personalized approach is required because there is no single treatment that works well for everyone.  Because of anatomic variation, consultation with an examination by a sleep surgeon is a critical first step in determining what surgical options are best for each patient.  In some cases, examination during sedation may be recommended in order to guide the selection of procedures.  Patients will be counseled about risks and benefits as well as the typical recovery course after surgery. Surgery is typically not a cure for a person s KATHERIN.  However, surgery will often significantly improve one s KATHERIN severity (termed  success rate ).  Even in the absence of a cure,  surgery will decrease the cardiovascular risk associated with OSA7; improve overall quality of life8 (sleepiness, functionality, sleep quality, etc).      Palate Procedures:  Patients with KATHERIN often have narrowing of their airway in the region of their tonsils and uvula.  The goals of palate procedures are to widen the airway in this region as well as to help the tissues resist collapse.  Modern palate procedure techniques focus on tissue conservation and soft tissue rearrangement, rather than tissue removal.  Often the uvula is preserved in this procedure. Residual sleep apnea is common in patient after pharyngoplasty with an average reduction in sleep apnea events of 33%2.      Tongue Procedures:  ExamWhile patients are awake, the muscles that surround the throat are active and keep this region open for breathing. These muscles relax during sleep, allowing the tongue and other structures to collapse and block breathing.  There are several different tongue procedures available.  Selection of a tongue base procedure depends on characteristics seen on physical exam.  Generally, procedures are aimed at removing bulky tissues in this area or preventing the back of the tongue from falling back during sleep.  Success rates for tongue surgery range from 50-62%3.    Hypoglossal Nerve Stimulation:  Hypoglossal nerve stimulation has recently received approval from the [place] Food and Drug Administration for the treatment of obstructive sleep apnea.  This is based on research showing that the system was safe and effective in treating sleep apnea6.  Results showed that the median AHI score decreased 68%, from 29.3 to 9.0. This therapy uses an implant system that senses breathing patterns and delivers mild stimulation to airway muscles, which keeps the airway open during sleep.  The system consists of three fully implanted components: a small generator (similar in size to a pacemaker), a breathing sensor, and a  stimulation lead.  Using a small handheld remote, a patient turns the therapy on before bed and off upon awakening.    Candidates for this device must be greater than 22 years of age, have moderate to severe KATHERIN (AHI between 20-65), BMI less than 32, have tried CPAP/oral appliance without success, and have appropriate upper airway anatomy (determined by a sleep endoscopy performed by Dr. Faria).    Hypoglossal Nerve Stimulation Pathway:    The sleep surgeon s office will work with the patient through the insurance prior-authorization process (including communications and appeals).    Nasal Procedures:  Nasal obstruction can interfere with nasal breathing during the day and night.  Studies have shown that relief of nasal obstruction can improve the ability of some patients to tolerate positive airway pressure therapy for obstructive sleep apnea1.  Treatment options include medications such as nasal saline, topical corticosteroid and antihistamine sprays, and oral medications such as antihistamines or decongestants. Non-surgical treatments can include external nasal dilators for selected patients. If these are not successful by themselves, surgery can improve the nasal airway either alone or in combination with these other options.      Combination Procedures:  Combination of surgical procedures and other treatments may be recommended, particularly if patients have more than one area of narrowing or persistent positional disease.  The success rate of combination surgery ranges from 66-80%2,3.    References  Laura VALLEJO. The Role of the Nose in Snoring and Obstructive Sleep Apnoea: An Update.  Eur Arch Otorhinolaryngol. 2011; 268: 1365-73.   Trina SM; Fernando JA; Berta JR; Pallanch JF; Lucita MB; Daysi SG; Omar CLINTON. Surgical modifications of the upper airway for obstructive sleep apnea in adults: a systematic review and meta-analysis. SLEEP 2010;33(10):8485-1976. Cris MCKEE. Hypopharyngeal surgery in obstructive  sleep apnea: an evidence-based medicine review.  Arch Otolaryngol Head Neck Surg. 2006 Feb;132(2):206-13.  Naresh YH1, Kiesha Y, Sánchez DALILA. The efficacy of anatomically based multilevel surgery for obstructive sleep apnea. Otolaryngol Head Neck Surg. 2003 Oct;129(4):327-35.  Cris MCKEE, Goldberg A. Hypopharyngeal Surgery in Obstructive Sleep Apnea: An Evidence-Based Medicine Review. Arch Otolaryngol Head Neck Surg. 2006 Feb;132(2):206-13.  Klever HUNT et al. Upper-Airway Stimulation for Obstructive Sleep Apnea.  N Engl J Med. 2014 Jan 9;370(2):139-49.  Caroline Y et al. Increased Incidence of Cardiovascular Disease in Middle-aged Men with Obstructive Sleep Apnea. Am J Respir Crit Care Med; 2002 166: 159-165  Arrietaperez HOLLIDAY et al. Studying Life Effects and Effectiveness of Palatopharyngoplasty (SLEEP) study: Subjective Outcomes of Isolated Uvulopalatopharyngoplasty. Otolaryngol Head Neck Surg. 2011; 144: 623-631.        WHAT IF I ONLY HAVE SNORING?    Mandibular advancement devices, lateral sleep positioning, long-term weight loss and treatment of nasal allergies have been shown to improve snoring.  Exercising tongue muscles with a game (https://www.ncbi.nlm.nih.gov/pubmed/16310667) or stimulating the tongue during the day with a device (https://doi.org/10.3390/gho41126188) have improved snoring in some individuals.    Remember to Drive Safe... Drive Alive     Sleep health profoundly affects your health, mood, and your safety.  Thirty three percent of the population (one in three of us) is not getting enough sleep and many have a sleep disorder. Not getting enough sleep or having an untreated / undertreated sleep condition may make us sleepy without even knowing it. In fact, our driving could be dramatically impaired due to our sleep health. As your provider, here are some things I would like you to know about driving:     Here are some warning signs for impairment and dangerous drowsy driving:              -Having been awake  more than 16 hours               -Looking tired               -Eyelid drooping              -Head nodding (it could be too late at this point)              -Driving for more than 30 minutes     Some things you could do to make the driving safer if you are experiencing some drowsiness:              -Stop driving and rest              -Call for transportation              -Make sure your sleep disorder is adequately treated     Some things that have been shown NOT to work when experiencing drowsiness while driving:              -Turning on the radio              -Opening windows              -Eating any  distracting  /  entertaining  foods (e.g., sunflower seeds, candy, or any other)              -Talking on the phone      Your decision may not only impact your life, but also the life of others. Please, remember to drive safe for yourself and all of us.

## 2022-08-15 NOTE — PROGRESS NOTES
Outpatient Sleep Medicine Consultation:      Name: Breanna Luna MRN# [id number]   Age: 37 year old YOB: 1985     Date of Consultation: August 15, 2022  Consultation is requested by: Bradley Mcconnell MD  [address] [place] [address] Bradley Mcconnell  Primary care provider: No Ref-Primary, Physician       Reason for Sleep Consult:     Breanna Luna is sent by Bradley Mcconnell for a sleep consultation regarding ***.    Patient s Reason for visit  Breanna Luna main reason for visit: Sleep issues  Patient states problem(s) started: Not sure  Breanna Luna's goals for this visit: Discuss the issue           Assessment and Plan:     Summary Sleep Diagnoses:  ***    Comorbid Diagnoses:  ***      Summary Recommendations:  ***  No orders of the defined types were placed in this encounter.        Summary Counseling:    Sleep Testing Reviewed  Obstructive Sleep Apnea Reviewed  Complications of Untreated Sleep Apnea Reviewed  ***    Medical Decision-making:   Educational materials provided in instructions    Total time spent reviewing medical records, history and physical examination, review of previous testing and interpretation as well as documentation on this date:***    CC: Bradley Mcconnell          History of Present Illness:     Past Sleep Evaluations:    SLEEP-WAKE SCHEDULE:     Work/School Days: Patient goes to school/work: Yes   Usually gets into bed at 9:30 or 10:00 pm  Takes patient about Not sure to fall asleep  Has trouble falling asleep Sometimes nights per week  Wakes up in the middle of the night Every night times.  Wakes up due to Uncertain  She has trouble falling back asleep Rarely just some nights times a week.   It usually takes Unsure to get back to sleep  Patient is usually up at 7am or earlier  Uses alarm: Yes    Weekends/Non-work Days/All Other Days:  Usually gets into bed at 9:30 pm   Takes patient about Unsure to fall asleep  Patient is usually up at 7am  Uses  alarm: Yes    Sleep Need  Patient gets  I think maybe 8 hours but I wake up alot sleep on average   Patient thinks she needs about 8 hours sleep    Breanna Luna prefers to sleep in this position(s): Side   Patient states they do the following activities in bed: Read, Watch TV    Naps  Patient takes a purposeful nap Sometimes times a week and naps are usually 20 minutes in duration  She feels better after a nap: Yes  She dozes off unintentionally N/A days per week  Patient has had a driving accident or near-miss due to sleepiness/drowsiness: No      SLEEP DISRUPTIONS:    Breathing/Snoring  Patient snores:Yes  Other people complain about her snoring: Yes  Patient has been told she stops breathing in her sleep: Yes  She has issues with the following: Morning headaches, Stuffy nose when you wake up, Getting up to urinate more than once    Movement:  Patient gets pain, discomfort, with an urge to move:  Yes  It happens when she is resting:  Yes  It happens more at night:  Yes  Patient has been told she kicks her legs at night:  No     Behaviors in Sleep:  Breanna Luna has experienced the following behaviors while sleeping: Eating, Sleep-talking  She has experienced sudden muscle weakness during the day: No      Is there anything else you would like your sleep provider to know:      ***    CAFFEINE AND OTHER SUBSTANCES:    Patient consumes caffeinated beverages per day:  4  Last caffeine use is usually: 3 or 4 pm  List of any prescribed or over the counter stimulants that patient takes:    List of any prescribed or over the counter sleep medication patient takes:    List of previous sleep medications that patient has tried:    Patient drinks alcohol to help them sleep: No  Patient drinks alcohol near bedtime: No    Family History:  Patient has a family member been diagnosed with a sleep disorder: No            SCALES:    EPWORTH SLEEPINESS SCALE      [place] Sleepiness Scale [place]  [id number]<br>ESS - [place]/English  - Final version - 21 Nov 07 - [facility] [place].) 8/15/2022   Sitting and reading Moderate chance of dozing   Watching TV Slight chance of dozing   Sitting, inactive in a public place (e.g. a theatre or a meeting) Would never doze   As a passenger in a car for an hour without a break Would never doze   Lying down to rest in the afternoon when circumstances permit Slight chance of dozing   Sitting and talking to someone Would never doze   Sitting quietly after a lunch without alcohol Would never doze   In a car, while stopped for a few minutes in traffic Would never doze   [place] Score (MC) 4   [place] Score (Sleep) 4         INSOMNIA SEVERITY INDEX (MARILUZ)      Insomnia Severity Index (MARILUZ) 8/15/2022   Difficulty falling asleep 0   Difficulty staying asleep 2   Problems waking up too early 2   How SATISFIED/DISSATISFIED are you with your CURRENT sleep pattern? 2   How NOTICEABLE to others do you think your sleep problem is in terms of impairing the quality of your life? 0   How WORRIED/DISTRESSED are you about your current sleep problem? 2   To what extent do you consider your sleep problem to INTERFERE with your daily functioning (e.g. daytime fatigue, mood, ability to function at work/daily chores, concentration, memory, mood, etc.) CURRENTLY? 1   MARILUZ Total Score 9       Guidelines for Scoring/Interpretation:  Total score categories:  0-7 = No clinically significant insomnia   8-14 = Subthreshold insomnia   15-21 = Clinical insomnia (moderate severity)  22-28 = Clinical insomnia (severe)  Used via courtesy of www.medineeringth.va.gov with permission from Dixon Pichardo PhD., [facility]      STOP BANG     STOP BANG Questionnaire (  2008, the American Society of Anesthesiologists, Inc. [employer] 8/15/2022   1. Snoring - Do you snore loudly (louder than talking or loud enough to be heard through closed doors)? No   2. Tired - Do you often feel tired, fatigued, or sleepy during  "daytime? Yes   3. Observed - Has anyone observed you stop breathing during your sleep? No   4. Blood pressure - Do you have or are you being treated for high blood pressure? Yes   5. BMI - BMI more than 35 kg/m2? Yes   6. Age - Age over 50 yr old? No   7. Neck circumference - Neck circumference greater than 40 cm? No   8. Gender - Gender male? No   STOP BANG Score (MC): 4 (High risk of KATHERIN)   B/P Clinic: -   BMI Clinic: -         GAD7    No flowsheet data found.      CAGE-AID    No flowsheet data found.    CAGE-AID reprinted with permission from the [place] Medical Journal, MIKAELA Tesfaye. and ILIR Humphries, \"Conjoint screening questionnaires for alcohol and drug abuse\" [place] Medical Journal 94: 135-140, 1995.      PATIENT HEALTH QUESTIONNAIRE-9 (PHQ - 9)    No flowsheet data found.    Developed by Nithin Coleman, Charlee Everett, Toro Rodriguez and colleagues, with an educational annika from [employer] No permission required to reproduce, translate, display or distribute.        Allergies:    Allergies   Allergen Reactions     Codeine Nausea     Other reaction(s): Nausea     Imitrex [Sumatriptan]      Worsening of migraine, paresthesias     Vancomycin Other (See Comments)     Adhesive [Liquid Adhesive] Itching     Redness and itching around incision after hernia repair surgery     Ranitidine Rash and Itching     recvd IV zantac 75 and reaction was immediate,  was given Benadryl to counter act reaction in 2000     Sulfa Drugs Unknown and Rash       Medications:    Current Outpatient Medications   Medication Sig Dispense Refill     acetaminophen (TYLENOL) 500 MG tablet Take 1,000 mg by mouth every 6 hours as needed for mild pain       cetirizine (ZYRTEC) 10 MG tablet Take by mouth daily       ibuprofen (ADVIL/MOTRIN) 200 MG tablet Take 200 mg by mouth every 4 hours as needed for mild pain       phentermine (ADIPEX-P) 37.5 MG capsule Take 1 capsule (37.5 mg) by mouth every morning 30 capsule 0     tiZANidine " (ZANAFLEX) 2 MG tablet Take 1 tablet (2 mg) by mouth 3 times daily as needed for muscle spasms 40 tablet 0       Problem List:  Patient Active Problem List    Diagnosis Date Noted     Chronic tonsillitis 05/18/2022     Priority: Medium     Added automatically from request for surgery [id number]       Nasal obstruction 05/02/2022     Priority: Medium     Added automatically from request for surgery [id number]       Nasal turbinate hypertrophy 05/02/2022     Priority: Medium     Added automatically from request for surgery [id number]       Nasal septal deviation 05/02/2022     Priority: Medium     Added automatically from request for surgery [id number]       Class 2 obesity due to excess calories with body mass index (BMI) of 35.0 to 35.9 in adult, unspecified whether serious comorbidity present 02/16/2021     Priority: Medium     Biliary dyskinesia 02/10/2020     Priority: Medium     Added automatically from request for surgery [id number]       Intractable migraine without aura and without status migrainosus 07/17/2019     Priority: Medium     Alcohol abuse, in remission 08/20/2018     Priority: Medium     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) 08/20/2018     Priority: Medium     Mixed incontinence 03/03/2017     Priority: Medium        Past Medical/Surgical History:  Past Medical History:   Diagnosis Date     Cellulitis 2012    MRSA septic olecranon bursitis and facial cellulitis     Drug abuse (H) 2011    methamphetamine and alcohol, sober since treatment 2011     Mild pre-eclampsia in third trimester      Motion sickness      MRSA cellulitis 2012    cleared with negative nasal swabs 8/16/17 and 8/28/17, reviewed     PONV (postoperative nausea and vomiting)      Pregnancy-induced hypertension in third trimester      Rh negative, antepartum      Past Surgical History:   Procedure Laterality Date     APPENDECTOMY       DAVINCI ASSISTED HERNIORRHAPHY INCISIONAL N/A 07/10/2020    Procedure: Robotic incisional hernia repair with mesh;   Surgeon: Jovi Odom MD;  Location: [facility]     EXCISE LESION FOOT Left 2019    Procedure: Excision of nevus second interdigital space left foot, Excision of nevus third interdigital space left foot, excision of plantar junctional nevus left foot;  Surgeon: Rinku Lopez DPM;  Location: [facility] OR     GYN SURGERY       INCISION AND DRAINAGE  2012    chin abscess I&D     LAPAROSCOPIC CHOLECYSTECTOMY N/A 2020    Procedure: Laparoscopic Cholecystectomy;  Surgeon: Renard Gerber MD;  Location: [facility]     LAPAROSCOPIC HERNIORRHAPHY INCISIONAL N/A 2019    Procedure: laparoscopic incisional hernia repair with mesh;  Surgeon: Jovi Odom MD;  Location: [facility]     LAPAROSCOPIC SALPINGECTOMY Bilateral 2018    Procedure: LAPAROSCOPIC SALPINGECTOMY;  Laparoscopic Bilateral Salpingectomy;  Surgeon: Chai Cui MD;  Location: [facility]     MAMMOPLASTY REDUCTION Bilateral 2017     SEPTOPLASTY, TURBINOPLASTY, COMBINED Bilateral 6/10/2022    Procedure: SEPTOPLASTY; BILATERAL INFERIOR TURBINATE REDUCTION;;  Surgeon: Lobo Narayanan MD;  Location: [facility]     TONSILLECTOMY Bilateral 6/10/2022    Procedure: BILATERAL TONSILLECTOMY;  Surgeon: Lobo Narayanan MD;  Location: [facility]     TUBAL LIGATION Bilateral        Social History:  Social History     Socioeconomic History     Marital status:      Spouse name: Not on file     Number of children: 4     Years of education: Not on file     Highest education level: Not on file   Occupational History     Not on file   Tobacco Use     Smoking status: Former Smoker     Packs/day: 0.00     Years: 0.00     Pack years: 0.00     Types: Cigarettes     Quit date: 3/10/2016     Years since quittin.4     Smokeless tobacco: Never Used   Vaping Use     Vaping Use: Never used   Substance and Sexual Activity     Alcohol use: No     Comment: history alcohol abuse 2012, s/p treatment     Drug use: No     Comment: former drug user (EtOH, cocaine,  marijuana, meth), last use      Sexual activity: Yes     Partners: Male     Birth control/protection: Female Surgical   Other Topics Concern     Parent/sibling w/ CABG, MI or angioplasty before 65F 55M? Yes     Comment: Mother had a bypass surgery mother  from lupus   Social History Narrative    Currently lives in [place] with fiance and 18 month old son.  Has a 13 year old dtr that lives with dtrs dad in [place], [place], and a 6  Year old that was adopted out in an open adoption, lives in [place]     Social Determinants of Health     Financial Resource Strain: Not on file   Food Insecurity: Not on file   Transportation Needs: Not on file   Physical Activity: Not on file   Stress: Not on file   Social Connections: Not on file   Intimate Partner Violence: Not on file   Housing Stability: Not on file       Family History:  Family History   Problem Relation Age of Onset     Cancer Father      Pancreatic Cancer Father      Lung Cancer Father      Lupus Mother        Review of Systems:  A complete review of systems reviewed by me is negative with the exeption of what has been mentioned in the history of present illness.  In the last TWO WEEKS have you experienced any of the following symptoms?  Fevers: No  Night Sweats: No  Weight Gain: Yes  Pain at Night: Yes  Double Vision: No  Changes in Vision: No  Difficulty Breathing through Nose: No  Sore Throat in Morning: Yes  Dry Mouth in the Morning: Yes  Shortness of Breath Lying Flat: No  Shortness of Breath With Activity: No  Awakening with Shortness of Breath: No  Increased Cough: No  Heart Racing at Night: No  Swelling in Feet or Legs: No  Diarrhea at Night: No  Heartburn at Night: No  Urinating More than Once at Night: Yes  Losing Control of Urine at Night: No  Joint Pains at Night: No  Headaches in Morning: Yes  Weakness in Arms or Legs: No  Depressed Mood: No  Anxiety: No ***    Physical Examination:  Vitals: There were no vitals taken for this visit.  BMI= There  is no height or weight on file to calculate BMI.           {SHORT EXAM:386359}  Mallampati Class: {PHU NUMERAL I-IV:453185}.  Tonsillar Stage: {NUMBERS 1-4:891603}.         Data: All pertinent previous laboratory data reviewed     Recent Labs   Lab Test 05/13/22  0819 02/26/20  1133 02/17/20  1128   NA  --  139 142   POTASSIUM  --  4.1 3.4   CHLORIDE  --  107 107   CO2  --  26 29   ANIONGAP  --  6 6   GLC 99 94 82   BUN  --  13 12   CR  --  0.58 0.67   ELICIA  --  9.1 8.9       Recent Labs   Lab Test 05/18/22  1446   WBC 8.6   RBC 4.43   HGB 13.6   HCT 39.4   MCV 89   MCH 30.7   MCHC 34.5   RDW 12.1          Recent Labs   Lab Test 02/26/20  1133   PROTTOTAL 8.1   ALBUMIN 4.3   BILITOTAL 0.5   ALKPHOS 50   AST 15   ALT 33       No results found for: TSH    No results found for: UAMP, UBARB, BENZODIAZEUR, UCANN, UCOC, OPIT, UPCP    No results found for: IRONSAT, FN60911, AWILDA    No results found for: PH, PHARTERIAL, PO2, [id number], SAT, PCO2, HCO3, BASEEXCESS, ED, BEB    @LABRCNTIPR(phv:4,pco2v:4,po2v:4,hco3v:4,isidra:4,o2per:4)@    Echocardiology: No results found for this or any previous visit (from the past 4320 hour(s)).    Chest x-ray: XR Chest 2 Views 11/19/2021    Narrative  CHEST TWO VIEWS   11/19/2021 12:14 PM    HISTORY: Cough.    COMPARISON: None.    FINDINGS:  The lungs are clear. No pleural effusions or pneumothorax.  Heart size and pulmonary vascularity are within normal limits. No  acute fracture.    Impression  IMPRESSION: No evidence of acute cardiopulmonary disease is seen.    KETAN GONZALEZ MD      SYSTEM ID:  [id number]      Chest CT: No results found for this or any previous visit from the past 365 days.      PFT: Most Recent [facility] Pulmonary Function Testing    No results found for: 20001  No results found for: 20002  No results found for: 20003  No results found for: 20015  No results found for: 20016  No results found for: 20027  No results found for: 20028  No results found for: 20029  No  results found for: 20079  No results found for: 20080  No results found for: 20081  No results found for: 20335  No results found for: 20105  No results found for: 20053  No results found for: 20054  No results found for: 20055      Garfield Bradshaw CMA 8/15/2022

## 2022-08-19 ENCOUNTER — HOSPITAL ENCOUNTER (OUTPATIENT)
Dept: PHYSICAL THERAPY | Facility: CLINIC | Age: 37
Setting detail: THERAPIES SERIES
Discharge: HOME | End: 2022-08-19
Attending: STUDENT IN AN ORGANIZED HEALTH CARE EDUCATION/TRAINING PROGRAM
Payer: COMMERCIAL

## 2022-08-19 PROCEDURE — 97112 NEUROMUSCULAR REEDUCATION: CPT | Mod: GP | Performed by: PHYSICAL THERAPIST

## 2022-08-19 PROCEDURE — 97530 THERAPEUTIC ACTIVITIES: CPT | Mod: GP | Performed by: PHYSICAL THERAPIST

## 2022-08-19 PROCEDURE — 97140 MANUAL THERAPY 1/> REGIONS: CPT | Mod: GP | Performed by: PHYSICAL THERAPIST

## 2022-08-25 ENCOUNTER — OFFICE VISIT (OUTPATIENT)
Dept: FAMILY MEDICINE | Facility: CLINIC | Age: 37
End: 2022-08-25
Payer: COMMERCIAL

## 2022-08-25 VITALS
DIASTOLIC BLOOD PRESSURE: 82 MMHG | SYSTOLIC BLOOD PRESSURE: 120 MMHG | OXYGEN SATURATION: 97 % | BODY MASS INDEX: 35.57 KG/M2 | WEIGHT: 220.38 LBS | TEMPERATURE: 97.6 F | HEART RATE: 96 BPM | RESPIRATION RATE: 20 BRPM

## 2022-08-25 DIAGNOSIS — R41.840 INATTENTION: Primary | ICD-10-CM

## 2022-08-25 PROCEDURE — 99213 OFFICE O/P EST LOW 20 MIN: CPT | Performed by: STUDENT IN AN ORGANIZED HEALTH CARE EDUCATION/TRAINING PROGRAM

## 2022-08-25 ASSESSMENT — PAIN SCALES - GENERAL: PAINLEVEL: SEVERE PAIN (6)

## 2022-08-25 NOTE — PROGRESS NOTES
Assessment & Plan     Inattention  There are concerns for inattention we will have her follow-up for formal testing and see if she may benefit from stimulant use.  - Adult Mental Health  Referral      Bradley Mcconnell MD  [facility]    Bailey Carlos is a 37 year old presenting for the following health issues:  Recheck Medication      History of Present Illness       Reason for visit:  ADHD    She eats 2-3 servings of fruits and vegetables daily.She consumes 1 sweetened beverage(s) daily.She exercises with enough effort to increase her heart rate 20 to 29 minutes per day.  She exercises with enough effort to increase her heart rate 5 days per week.   She is taking medications regularly.     Patient previously seen for physical and mixed headaches.  She did have septoplasty and tonsillectomy earlier in June.  Has actually had improvement since her last visit.  She does have upcoming follow-up scheduled with neurology.  Main concern today is inability to focus has been an issue for her for quite some time.  Notes this to be affecting her work and day-to-day.  Is always had issues since she was a child.  Struggled with this in school but was never medicated.  Thinks she may have a evaluation at some point but unsure on this.    Review of Systems   Constitutional, HEENT, cardiovascular, pulmonary, gi and gu systems are negative, except as otherwise noted.      Objective    /82   Pulse 96   Temp 97.6  F (36.4  C) (Temporal)   Resp 20   Wt 100 kg (220 lb 6 oz)   LMP 08/10/2022   SpO2 97%   Breastfeeding No   BMI 35.57 kg/m    Body mass index is 35.57 kg/m .  Physical Exam   GENERAL: healthy, alert and no distress  EYES: Eyes grossly normal to inspection, PERRL and conjunctivae and sclerae normal  HENT: nose and mouth without ulcers or lesions  NECK: no adenopathy, no asymmetry, masses, or scars and thyroid normal to palpation  RESP: lungs clear to auscultation - no  rales, rhonchi or wheezes  CV: regular rate and rhythm, normal S1 S2, no peripheral edema and peripheral pulses strong  MS: no gross musculoskeletal defects noted, no edema  SKIN: no suspicious lesions or rashes  NEURO: Normal strength and tone, mentation intact and speech normal  PSYCH: mentation appears normal, affect normal/bright               .  ..

## 2022-08-26 ENCOUNTER — TELEPHONE (OUTPATIENT)
Dept: NEUROLOGY | Facility: CLINIC | Age: 37
End: 2022-08-26

## 2022-08-26 ENCOUNTER — OFFICE VISIT (OUTPATIENT)
Dept: NEUROLOGY | Facility: CLINIC | Age: 37
End: 2022-08-26
Attending: STUDENT IN AN ORGANIZED HEALTH CARE EDUCATION/TRAINING PROGRAM
Payer: COMMERCIAL

## 2022-08-26 VITALS
HEART RATE: 80 BPM | DIASTOLIC BLOOD PRESSURE: 85 MMHG | BODY MASS INDEX: 35.51 KG/M2 | WEIGHT: 220 LBS | OXYGEN SATURATION: 100 % | SYSTOLIC BLOOD PRESSURE: 134 MMHG

## 2022-08-26 DIAGNOSIS — G43.709 CHRONIC MIGRAINE WITHOUT AURA WITHOUT STATUS MIGRAINOSUS, NOT INTRACTABLE: Primary | ICD-10-CM

## 2022-08-26 DIAGNOSIS — G43.019 INTRACTABLE MIGRAINE WITHOUT AURA AND WITHOUT STATUS MIGRAINOSUS: ICD-10-CM

## 2022-08-26 DIAGNOSIS — R20.2 ARM PARESTHESIA, RIGHT: ICD-10-CM

## 2022-08-26 DIAGNOSIS — R20.2 FACIAL PARESTHESIA: ICD-10-CM

## 2022-08-26 PROCEDURE — 99205 OFFICE O/P NEW HI 60 MIN: CPT | Mod: 25 | Performed by: PSYCHIATRY & NEUROLOGY

## 2022-08-26 PROCEDURE — 96372 THER/PROPH/DIAG INJ SC/IM: CPT | Performed by: PSYCHIATRY & NEUROLOGY

## 2022-08-26 RX ORDER — KETOROLAC TROMETHAMINE 30 MG/ML
30 INJECTION, SOLUTION INTRAMUSCULAR; INTRAVENOUS ONCE
Status: COMPLETED | OUTPATIENT
Start: 2022-08-26 | End: 2022-08-26

## 2022-08-26 RX ORDER — PROCHLORPERAZINE MALEATE 10 MG
10 TABLET ORAL EVERY 6 HOURS PRN
Qty: 20 TABLET | Refills: 11 | Status: SHIPPED | OUTPATIENT
Start: 2022-08-26 | End: 2023-02-28

## 2022-08-26 RX ADMIN — KETOROLAC TROMETHAMINE 30 MG: 30 INJECTION, SOLUTION INTRAMUSCULAR; INTRAVENOUS at 15:20

## 2022-08-26 ASSESSMENT — HEADACHE IMPACT TEST (HIT 6)
WHEN YOU HAVE HEADACHES HOW OFTEN IS THE PAIN SEVERE: VERY OFTEN
HOW OFTEN HAVE YOU FELT FED UP OR IRRITATED BECAUSE OF YOUR HEADACHES: ALWAYS
HOW OFTEN DID HEADACHS LIMIT CONCENTRATION ON WORK OR DAILY ACTIVITY: VERY OFTEN
HIT6 TOTAL SCORE: 72
HOW OFTEN HAVE YOU FELT TOO TIRED TO WORK BECAUSE OF YOUR HEADACHES: VERY OFTEN
WHEN YOU HAVE A HEADACHE HOW OFTEN DO YOU WISH YOU COULD LIE DOWN: ALWAYS
HOW OFTEN DO HEADACHES LIMIT YOUR DAILY ACTIVITIES: ALWAYS

## 2022-08-26 NOTE — TELEPHONE ENCOUNTER
Prior Authorization Specialty Medication Request    Medication/Dose: galcanezumab-gnlm (EMGALITY) 120 MG/ML injection: 240mg loading dose x1; 120mg per month maintenance dose  ICD code (if different than what is on RX):    Previously Tried and Failed:  She has tried several preventative treatments without success.       Important Lab Values:   Rationale:     Insurance Name:   Insurance ID:   Insurance Phone Number:     Pharmacy Information (if different than what is on RX)  Name:    Phone:

## 2022-08-26 NOTE — LETTER
8/26/2022         RE: Breanna Luna  [address]  [place] [address]        Dear Colleague,    Thank you for referring your patient, Breanna Luna, to the [facility]. Please see a copy of my visit note below.    [facility]   [facility]ache Neurology Consult  August 26, 2022     Breanna Luna MRN# [id number]   YOB: 1985 Age: 37 year old     Requesting physician: Bradley Mcconnell MD         Assessment and Recommendations:     Breanna Luna is a 37 year old female who presents for further evaluation of headache.    Her headache presentation meets criteria for chronic migraine with possible sensory aura.  She has a long history of headache, with new right sided sensory changes, notably in her right hand and most recently including the right side of her face.  She has not noticed a temporal correlation between right-sided paresthesias and headache, but has not paid attention to this.    Her neurologic examination is intact today, but noted a mild subjective sensory deficit over the right side of her face when compared to the left.  She had a headache at the beginning of our visit, and after exam reported significant worsening.  I offered a ketorolac 30 mg IM injection, ice pack, dark room, emesis bag, at the conclusion of the visit.    She has not had updated imaging.  With worsening of headaches to become more frequent and more severe, and addition of new right sided sensory paresthesias, I recommend an MRI of the brain and MRA of the head and neck to evaluate for structural or vascular causes of worsening and new neurologic symptoms.  -MRI brain   -MRA head and neck    I asked her to monitor headache and sensory symptoms to determine if these are temporally correlated.  If they are not, an EMG could be considered for further evaluation.  With recent right-sided facial paresthesia as well, cervical spine etiology would explain  both her face and arm symptoms.    We also reviewed a symptomatic treatment strategy, focusing on treatments for chronic migraine with possible sensory aura.  -For acute treatment, she will continue acetaminophen and naproxen as needed, not to exceed more than 14 days/month to avoid medication overuse.  - For acute treatment of moderate to severe headache, I recommend a trial of Nurtec 75 mg oral dissolvable tablet daily as needed.  We will attempt to get preauthorization.  -For headache related nausea, or as a rescue medicine for headache, I recommend prochlorperazine 10 mg as needed, with or without diphenhydramine 25 mg.  -She has previously tried sumatriptan, rizatriptan, and naratriptan without benefit, and with significant side effects to sumatriptan.    Her headache frequency and severity warrant prevention.  She has tried tricyclic antidepressants, antiseizure medication, and antihypertensive medication without benefit.  -I recommend a trial of Emgality subcutaneous injection every 28 days, starting with a loading dose.  We will attempt to get preauthorization.  Potential side effects were discussed.  I recommend a 3 to 6-month trial prior to determining effectiveness.  -Botulinum toxin injections using a chronic migraine protocol could also be considered.  She lives far from clinic, and would not prefer this option currently, as it would require long commuting.    I will reach out to her if the results of the MRI change our plan.  I will plan to see her back in 3 to 6 months to monitor her progress.  I spent greater than 60 minutes on record review, patient care, and documentation today.    Cara Suarez MD  Neurology            Chief Complaint:     Chief Complaint   Patient presents with     Headache           History is obtained from the patient and medical record.      Breanna Luna is a 37 year old female who has been living with headaches since childhood. She recalls napping as a child to treat  headaches.     Headaches occur frontally, temporally, posteriorly, behind her eyes. It is throbbing. They rate 8-9/10. They last 3 days at a time.    She has associated photophobia, phonophobia, nausea, and vomiting.    She has blurred vision associated. She has right face tingling. She denies a positional component. She denies unilateral autonomic features. She denies fevers or other illness associated.     She has 20/30 headache days per month, with 17/30 severe headache days per month. This has been the pattern her whole adult life, but gradually worsened over years.    For acute treatment, triptans were not helpful:  Sumatriptan made her feel funny in her head.  Rizatriptan-MLT 5 mg wasn't helpful.  Naratriptan wasn't helpful.    Tylenol is helpful for mild headache. Better with coffee.  Excedrin is less helpful.  Ibuprofen was helpful, but caused rebound headaches.  Naproxen is helpful when used with Tylenol, not alone.  Fioricet wasn't helpful.  Goody's powder isn't helpful.     Previous preventative treatment:  Nortriptyline 20 mg PM was not helpful.  Gabapentin was not helpful.  Propranolol 40 mg TID was not helpful.  Topiramate was not helpful.    She is doing PT for her neck, right hand. She has lost 35 pounds in the past 6 months.    She has numbness in her right hand, 3rd-5th, intermittently and randomly, more when riding her bike.    She has previously seen [facility] Ayaz [facility] at the [facility], most recently in 2019.            Past Medical History:     Past Medical History:   Diagnosis Date     Cellulitis 2012    MRSA septic olecranon bursitis and facial cellulitis     Drug abuse (H) 2011    methamphetamine and alcohol, sober since treatment 2011     Mild pre-eclampsia in third trimester      Motion sickness      MRSA cellulitis 2012    cleared with negative nasal swabs 8/16/17 and 8/28/17, reviewed     PONV (postoperative nausea and vomiting)      Pregnancy-induced hypertension in third  trimester      Rh negative, antepartum               Past Surgical History:     Past Surgical History:   Procedure Laterality Date     APPENDECTOMY       DAVINCI ASSISTED HERNIORRHAPHY INCISIONAL N/A 07/10/2020    Procedure: Robotic incisional hernia repair with mesh;  Surgeon: Jovi Odom MD;  [facility]     EXCISE LESION FOOT Left 08/16/2019    Procedure: Excision of nevus second interdigital space left foot, Excision of nevus third interdigital space left foot, excision of plantar junctional nevus left foot;  Surgeon: Rinku Lopez DPM;  [facility]     GYN SURGERY       INCISION AND DRAINAGE  04/18/2012    chin abscess I&D     LAPAROSCOPIC CHOLECYSTECTOMY N/A 02/14/2020    Procedure: Laparoscopic Cholecystectomy;  Surgeon: Renard Gerber MD;  [facility]     LAPAROSCOPIC HERNIORRHAPHY INCISIONAL N/A 07/17/2019    Procedure: laparoscopic incisional hernia repair with mesh;  Surgeon: Jovi Odom MD;  [facility]     LAPAROSCOPIC SALPINGECTOMY Bilateral 08/27/2018    Procedure: LAPAROSCOPIC SALPINGECTOMY;  Laparoscopic Bilateral Salpingectomy;  Surgeon: Chai Cui MD;  [facility]     MAMMOPLASTY REDUCTION Bilateral 08/04/2017     SEPTOPLASTY, TURBINOPLASTY, COMBINED Bilateral 6/10/2022    Procedure: SEPTOPLASTY; BILATERAL INFERIOR TURBINATE REDUCTION;;  Surgeon: Lobo Narayanan MD;  [facility]     TONSILLECTOMY Bilateral 6/10/2022    Procedure: BILATERAL TONSILLECTOMY;  Surgeon: Lobo Narayanan MD;  [facility]     TUBAL LIGATION Bilateral              Social History:     Social History     Socioeconomic History     Marital status:      Spouse name: Not on file     Number of children: 4     Years of education: Not on file     Highest education level: Not on file   Occupational History     Not on file   Tobacco Use     Smoking status: Former Smoker     Packs/day: 0.00     Years: 0.00     Pack years: 0.00     Types: Cigarettes     Quit date:  3/10/2016     Years since quittin.4     Smokeless tobacco: Never Used   Vaping Use     Vaping Use: Never used   Substance and Sexual Activity     Alcohol use: No     Comment: history alcohol abuse , s/p treatment     Drug use: No     Comment: former drug user (EtOH, cocaine, marijuana, meth), last use      Sexual activity: Yes     Partners: Male     Birth control/protection: Female Surgical   Other Topics Concern     Parent/sibling w/ CABG, MI or angioplasty before 65F 55M? Yes     Comment: Mother had a bypass surgery mother  from lupus   Social History Narrative    Currently lives in [place] with fiance and 18 month old son.  Has a 13 year old dtr that lives with dtrs dad in [place], [place], and a 6  Year old that was adopted out in an open adoption, lives in [place]     Social Determinants of Health     Financial Resource Strain: Not on file   Food Insecurity: Not on file   Transportation Needs: Not on file   Physical Activity: Not on file   Stress: Not on file   Social Connections: Not on file   Intimate Partner Violence: Not on file   Housing Stability: Not on file             Family History:   No known history of headache.    Family History   Problem Relation Age of Onset     Cancer Father      Pancreatic Cancer Father      Lung Cancer Father      Lupus Mother              Allergies:      Allergies   Allergen Reactions     Codeine Nausea     Other reaction(s): Nausea     Imitrex [Sumatriptan]      Worsening of migraine, paresthesias     Vancomycin Other (See Comments)     Adhesive [Liquid Adhesive] Itching     Redness and itching around incision after hernia repair surgery     Ranitidine Rash and Itching     recvd IV zantac 75 and reaction was immediate,  was given Benadryl to counter act reaction in      Sulfa Drugs Unknown and Rash             Medications:     Current Outpatient Medications:      acetaminophen (TYLENOL) 500 MG tablet, Take 1,000 mg by mouth every 6 hours as needed for mild  pain, Disp: , Rfl:      cetirizine (ZYRTEC) 10 MG tablet, Take by mouth daily, Disp: , Rfl:      ibuprofen (ADVIL/MOTRIN) 200 MG tablet, Take 200 mg by mouth every 4 hours as needed for mild pain, Disp: , Rfl:      phentermine (ADIPEX-P) 37.5 MG capsule, Take 1 capsule (37.5 mg) by mouth every morning (Patient not taking: Reported on 8/25/2022), Disp: 30 capsule, Rfl: 0     tiZANidine (ZANAFLEX) 2 MG tablet, Take 1 tablet (2 mg) by mouth 3 times daily as needed for muscle spasms, Disp: 40 tablet, Rfl: 0          Physical Exam:   /85   Pulse 80   Wt 99.8 kg (220 lb)   LMP 08/10/2022   SpO2 100%   BMI 35.51 kg/m     Physical Exam:   General: Significant head pain reported, photophobic, preferring lights down and ice pack on back of neck.  Neurologic:   Mental Status Exam: Alert, awake and oriented to situation. No dysarthria. Speech of normal fluency.   Cranial Nerves: Fundoscopic exam with clear disc margins bilaterally. PERRLA, EOMs intact, no nystagmus, facial movements symmetric, facial sensation intact to light touch, hearing intact to conversation, trapezius and SCMs 5/5 bilaterally, tongue midline and fully mobile. No tongue atrophy or fasciculations.    Motor: Normal tone in all four extremities, no atrophy or fasciculations. 5/5 strength bilaterally in shoulder abduction, elbow extensors and flexors, wrist extensors and flexors, hip flexors, knee extensors and flexors, dorsi- and plantarflexion. No tremors or abnormal movements noted.   Sensory: Sensation intact to light touch on arms and legs bilaterally.    Coordination: Finger-nose-finger intact bilaterally.  Rapidly alternating movements intact bilaterally in the upper extremities.  Normal finger tapping bilaterally.  Normal Romberg.   Reflexes: 2+ and symmetric in triceps, biceps, brachioradialis, patellar, Achilles.   Gait: Normal gait.  Able to toe and heel walk.  Tandem gait normal.  Head: Normocephalic, atraumatic. No radiating pain with  palpation over the supraorbital notches, occipital nerves.  Temporal pulses intact.   Neck: Mildly limited range of motion with lateral head movement to the left, normal to the right, and neck flexion.  Eyes: No conjunctival injection, no scleral icterus.           Data:     No recent head imaging.          Again, thank you for allowing me to participate in the care of your patient.        Sincerely,        Cara Suarez MD

## 2022-08-26 NOTE — TELEPHONE ENCOUNTER
Central Prior Authorization Team   Phone: [phone]      The requested medication is being processed through [employer]'s [employer] Portal:    Maintenance Dose:      Loading Dose:

## 2022-08-26 NOTE — PROGRESS NOTES
[facility]   Headache Neurology Consult  August 26, 2022     Breanna Luna MRN# [id number]   YOB: 1985 Age: 37 year old     Requesting physician: Bradley Mcconnell MD         Assessment and Recommendations:     Breanna Luna is a 37 year old female who presents for further evaluation of headache.    Her headache presentation meets criteria for chronic migraine with possible sensory aura.  She has a long history of headache, with new right sided sensory changes, notably in her right hand and most recently including the right side of her face.  She has not noticed a temporal correlation between right-sided paresthesias and headache, but has not paid attention to this.    Her neurologic examination is intact today, but noted a mild subjective sensory deficit over the right side of her face when compared to the left.  She had a headache at the beginning of our visit, and after exam reported significant worsening.  I offered a ketorolac 30 mg IM injection, ice pack, dark room, emesis bag, at the conclusion of the visit.    She has not had updated imaging.  With worsening of headaches to become more frequent and more severe, and addition of new right sided sensory paresthesias, I recommend an MRI of the brain and MRA of the head and neck to evaluate for structural or vascular causes of worsening and new neurologic symptoms.  -MRI brain   -MRA head and neck    I asked her to monitor headache and sensory symptoms to determine if these are temporally correlated.  If they are not, an EMG could be considered for further evaluation.  With recent right-sided facial paresthesia as well, cervical spine etiology would not explain both her face and arm symptoms.    We also reviewed a symptomatic treatment strategy, focusing on treatments for chronic migraine with possible sensory aura.  -For acute treatment, she will continue acetaminophen and naproxen as needed, not to exceed more  than 14 days/month to avoid medication overuse.  - For acute treatment of moderate to severe headache, I recommend a trial of Nurtec 75 mg oral dissolvable tablet daily as needed.  We will attempt to get preauthorization.  -For headache related nausea, or as a rescue medicine for headache, I recommend prochlorperazine 10 mg as needed, with or without diphenhydramine 25 mg.  -She has previously tried sumatriptan, rizatriptan, and naratriptan without benefit, and with significant side effects to sumatriptan.    Her headache frequency and severity warrant prevention.  She has tried tricyclic antidepressants, antiseizure medication, and antihypertensive medication without benefit.  -I recommend a trial of Emgality subcutaneous injection every 28 days, starting with a loading dose.  We will attempt to get preauthorization.  Potential side effects were discussed.  I recommend a 3 to 6-month trial prior to determining effectiveness.  -Botulinum toxin injections using a chronic migraine protocol could also be considered.  She lives far from clinic, and would not prefer this option currently, as it would require long commuting.    I will reach out to her if the results of the MRI change our plan.  I will plan to see her back in 3 to 6 months to monitor her progress.  I spent greater than 60 minutes on record review, patient care, and documentation today.    Cara Suarez MD  Neurology            Chief Complaint:     Chief Complaint   Patient presents with     Headache           History is obtained from the patient and medical record.      Breanna Luna is a 37 year old female who has been living with headaches since childhood. She recalls napping as a child to treat headaches.     Headaches occur frontally, temporally, posteriorly, behind her eyes. It is throbbing. They rate 8-9/10. They last 3 days at a time.    She has associated photophobia, phonophobia, nausea, and vomiting.    She has blurred vision associated. She has  right face tingling. She denies a positional component. She denies unilateral autonomic features. She denies fevers or other illness associated.     She has 20/30 headache days per month, with 17/30 severe headache days per month. This has been the pattern her whole adult life, but gradually worsened over years.    For acute treatment, triptans were not helpful:  Sumatriptan made her feel funny in her head.  Rizatriptan-MLT 5 mg wasn't helpful.  Naratriptan wasn't helpful.    Tylenol is helpful for mild headache. Better with coffee.  Excedrin is less helpful.  Ibuprofen was helpful, but caused rebound headaches.  Naproxen is helpful when used with Tylenol, not alone.  Fioricet wasn't helpful.  Goody's powder isn't helpful.     Previous preventative treatment:  Nortriptyline 20 mg PM was not helpful.  Gabapentin was not helpful.  Propranolol 40 mg TID was not helpful.  Topiramate was not helpful.    She is doing PT for her neck, right hand. She has lost 35 pounds in the past 6 months.    She has numbness in her right hand, 3rd-5th, intermittently and randomly, more when riding her bike.    She has previously seen Dr. Ayaz Shine at the [facility], most recently in 2019.            Past Medical History:     Past Medical History:   Diagnosis Date     Cellulitis 2012    MRSA septic olecranon bursitis and facial cellulitis     Drug abuse (H) 2011    methamphetamine and alcohol, sober since treatment 2011     Mild pre-eclampsia in third trimester      Motion sickness      MRSA cellulitis 2012    cleared with negative nasal swabs 8/16/17 and 8/28/17, reviewed     PONV (postoperative nausea and vomiting)      Pregnancy-induced hypertension in third trimester      Rh negative, antepartum               Past Surgical History:     Past Surgical History:   Procedure Laterality Date     APPENDECTOMY       DAVINCI ASSISTED HERNIORRHAPHY INCISIONAL N/A 07/10/2020    Procedure: Robotic incisional hernia repair with mesh;   Surgeon: Jovi Odom MD;  [facility]     EXCISE LESION FOOT Left 2019    Procedure: Excision of nevus second interdigital space left foot, Excision of nevus third interdigital space left foot, excision of plantar junctional nevus left foot;  Surgeon: Rinku Lopez DPM;  [facility]     GYN SURGERY       INCISION AND DRAINAGE  2012    chin abscess I&D     LAPAROSCOPIC CHOLECYSTECTOMY N/A 2020    Procedure: Laparoscopic Cholecystectomy;  Surgeon: Renard Gerber MD;  [facility]     LAPAROSCOPIC HERNIORRHAPHY INCISIONAL N/A 2019    Procedure: laparoscopic incisional hernia repair with mesh;  Surgeon: Jovi Odom MD;  [facility]     LAPAROSCOPIC SALPINGECTOMY Bilateral 2018    Procedure: LAPAROSCOPIC SALPINGECTOMY;  Laparoscopic Bilateral Salpingectomy;  Surgeon: Chai Cui MD;  [facility]     MAMMOPLASTY REDUCTION Bilateral 2017     SEPTOPLASTY, TURBINOPLASTY, COMBINED Bilateral 6/10/2022    Procedure: SEPTOPLASTY; BILATERAL INFERIOR TURBINATE REDUCTION;;  Surgeon: Lobo Narayanan MD;  [facility]     TONSILLECTOMY Bilateral 6/10/2022    Procedure: BILATERAL TONSILLECTOMY;  Surgeon: Lobo Narayanan MD;  [facility]     TUBAL LIGATION Bilateral              Social History:     Social History     Socioeconomic History     Marital status:      Spouse name: Not on file     Number of children: 4     Years of education: Not on file     Highest education level: Not on file   Occupational History     Not on file   Tobacco Use     Smoking status: Former Smoker     Packs/day: 0.00     Years: 0.00     Pack years: 0.00     Types: Cigarettes     Quit date: 3/10/2016     Years since quittin.4     Smokeless tobacco: Never Used   Vaping Use     Vaping Use: Never used   Substance and Sexual Activity     Alcohol use: No     Comment: history alcohol abuse 2012, s/p treatment     Drug use: No     Comment: former drug user (EtOH,  cocaine, marijuana, meth), last use      Sexual activity: Yes     Partners: Male     Birth control/protection: Female Surgical   Other Topics Concern     Parent/sibling w/ CABG, MI or angioplasty before 65F 55M? Yes     Comment: Mother had a bypass surgery mother  from lupus   Social History Narrative    Currently lives in [place] with fiance and 18 month old son.  Has a 13 year old dtr that lives with dtrs dad in [place], [place], and a 6  Year old that was adopted out in an open adoption, lives in [place]     Social Determinants of Health     Financial Resource Strain: Not on file   Food Insecurity: Not on file   Transportation Needs: Not on file   Physical Activity: Not on file   Stress: Not on file   Social Connections: Not on file   Intimate Partner Violence: Not on file   Housing Stability: Not on file             Family History:   No known history of headache.    Family History   Problem Relation Age of Onset     Cancer Father      Pancreatic Cancer Father      Lung Cancer Father      Lupus Mother              Allergies:      Allergies   Allergen Reactions     Codeine Nausea     Other reaction(s): Nausea     Imitrex [Sumatriptan]      Worsening of migraine, paresthesias     Vancomycin Other (See Comments)     Adhesive [Liquid Adhesive] Itching     Redness and itching around incision after hernia repair surgery     Ranitidine Rash and Itching     recvd IV zantac 75 and reaction was immediate,  was given Benadryl to counter act reaction in      Sulfa Drugs Unknown and Rash             Medications:     Current Outpatient Medications:      acetaminophen (TYLENOL) 500 MG tablet, Take 1,000 mg by mouth every 6 hours as needed for mild pain, Disp: , Rfl:      cetirizine (ZYRTEC) 10 MG tablet, Take by mouth daily, Disp: , Rfl:      ibuprofen (ADVIL/MOTRIN) 200 MG tablet, Take 200 mg by mouth every 4 hours as needed for mild pain, Disp: , Rfl:      phentermine (ADIPEX-P) 37.5 MG capsule, Take 1 capsule (37.5  mg) by mouth every morning (Patient not taking: Reported on 8/25/2022), Disp: 30 capsule, Rfl: 0     tiZANidine (ZANAFLEX) 2 MG tablet, Take 1 tablet (2 mg) by mouth 3 times daily as needed for muscle spasms, Disp: 40 tablet, Rfl: 0          Physical Exam:   /85   Pulse 80   Wt 99.8 kg (220 lb)   LMP 08/10/2022   SpO2 100%   BMI 35.51 kg/m     Physical Exam:   General: Significant head pain reported, photophobic, preferring lights down and ice pack on back of neck.  Neurologic:   Mental Status Exam: Alert, awake and oriented to situation. No dysarthria. Speech of normal fluency.   Cranial Nerves: Fundoscopic exam with clear disc margins bilaterally. PERRLA, EOMs intact, no nystagmus, facial movements symmetric, facial sensation intact to light touch, hearing intact to conversation, trapezius and SCMs 5/5 bilaterally, tongue midline and fully mobile. No tongue atrophy or fasciculations.    Motor: Normal tone in all four extremities, no atrophy or fasciculations. 5/5 strength bilaterally in shoulder abduction, elbow extensors and flexors, wrist extensors and flexors, hip flexors, knee extensors and flexors, dorsi- and plantarflexion. No tremors or abnormal movements noted.   Sensory: Sensation intact to light touch on arms and legs bilaterally.    Coordination: Finger-nose-finger intact bilaterally.  Rapidly alternating movements intact bilaterally in the upper extremities.  Normal finger tapping bilaterally.  Normal Romberg.   Reflexes: 2+ and symmetric in triceps, biceps, brachioradialis, patellar, Achilles.   Gait: Normal gait.  Able to toe and heel walk.  Tandem gait normal.  Head: Normocephalic, atraumatic. No radiating pain with palpation over the supraorbital notches, occipital nerves.  Temporal pulses intact.   Neck: Mildly limited range of motion with lateral head movement to the left, normal to the right, and neck flexion.  Eyes: No conjunctival injection, no scleral icterus.           Data:      No recent head imaging.

## 2022-08-29 ENCOUNTER — TELEPHONE (OUTPATIENT)
Dept: NEUROLOGY | Facility: CLINIC | Age: 37
End: 2022-08-29

## 2022-08-29 NOTE — TELEPHONE ENCOUNTER
Patient would like a call back regarding options for botox vs. Emgality. Please call her back to discuss. Thank you~

## 2022-08-29 NOTE — TELEPHONE ENCOUNTER
Called patient back and she did get approved for Emgality, however she has thought about it more and she would like to try botox instead.     She is going to  the Emgality and do the first injection while she is waiting to get botox approved.     Will route to provider to put in order for botox.  Once ordered, can schedule patient for botox.

## 2022-08-30 ENCOUNTER — MYC MEDICAL ADVICE (OUTPATIENT)
Dept: NEUROLOGY | Facility: CLINIC | Age: 37
End: 2022-08-30

## 2022-08-30 DIAGNOSIS — G43.709 CHRONIC MIGRAINE WITHOUT AURA WITHOUT STATUS MIGRAINOSUS, NOT INTRACTABLE: Primary | ICD-10-CM

## 2022-08-30 NOTE — TELEPHONE ENCOUNTER
[facility], and will send myChart.     Provider placed Botox referral.  Will need to schedule at least 4 weeks out to allow for pre-authorization.     Kristine QUEZADA RN, BSN  [facility]

## 2022-08-31 NOTE — PROGRESS NOTES
Pt is completing a home sleep test. Pt was instructed on how to put on the Noxturnal T3 device and associated equipment before going to bed and given the opportunity to practice putting it on before leaving the sleep center. Pt was reminded to bring the home sleep test kit back to the center tomorrow, at agreed upon time for download and reporting.     Garfield DAVID CMA

## 2022-09-02 ENCOUNTER — HOSPITAL ENCOUNTER (OUTPATIENT)
Dept: PHYSICAL THERAPY | Facility: CLINIC | Age: 37
Setting detail: THERAPIES SERIES
Discharge: HOME | End: 2022-09-02
Attending: STUDENT IN AN ORGANIZED HEALTH CARE EDUCATION/TRAINING PROGRAM
Payer: COMMERCIAL

## 2022-09-02 PROCEDURE — 97140 MANUAL THERAPY 1/> REGIONS: CPT | Mod: GP | Performed by: PHYSICAL THERAPIST

## 2022-09-02 PROCEDURE — 97530 THERAPEUTIC ACTIVITIES: CPT | Mod: GP | Performed by: PHYSICAL THERAPIST

## 2022-09-07 ENCOUNTER — OFFICE VISIT (OUTPATIENT)
Dept: SLEEP MEDICINE | Facility: CLINIC | Age: 37
End: 2022-09-07
Payer: COMMERCIAL

## 2022-09-07 DIAGNOSIS — R29.818 SUSPECTED SLEEP APNEA: ICD-10-CM

## 2022-09-07 DIAGNOSIS — R06.83 SNORING: ICD-10-CM

## 2022-09-07 PROCEDURE — G0399 HOME SLEEP TEST/TYPE 3 PORTA: HCPCS | Performed by: OTOLARYNGOLOGY

## 2022-09-08 ENCOUNTER — DOCUMENTATION ONLY (OUTPATIENT)
Dept: SLEEP MEDICINE | Facility: CLINIC | Age: 37
End: 2022-09-08

## 2022-09-09 PROCEDURE — G0399 HOME SLEEP TEST/TYPE 3 PORTA: HCPCS | Performed by: INTERNAL MEDICINE

## 2022-09-12 ENCOUNTER — HOSPITAL ENCOUNTER (OUTPATIENT)
Dept: MRI IMAGING | Facility: CLINIC | Age: 37
Discharge: HOME | End: 2022-09-12
Attending: PSYCHIATRY & NEUROLOGY
Payer: COMMERCIAL

## 2022-09-12 DIAGNOSIS — R20.2 FACIAL PARESTHESIA: ICD-10-CM

## 2022-09-12 DIAGNOSIS — R20.2 ARM PARESTHESIA, RIGHT: ICD-10-CM

## 2022-09-12 DIAGNOSIS — G43.709 CHRONIC MIGRAINE WITHOUT AURA WITHOUT STATUS MIGRAINOSUS, NOT INTRACTABLE: ICD-10-CM

## 2022-09-12 PROCEDURE — 70549 MR ANGIOGRAPH NECK W/O&W/DYE: CPT

## 2022-09-12 PROCEDURE — 70553 MRI BRAIN STEM W/O & W/DYE: CPT

## 2022-09-12 PROCEDURE — A9585 GADOBUTROL INJECTION: HCPCS | Performed by: PSYCHIATRY & NEUROLOGY

## 2022-09-12 PROCEDURE — 255N000002 HC RX 255 OP 636: Performed by: PSYCHIATRY & NEUROLOGY

## 2022-09-12 PROCEDURE — 70544 MR ANGIOGRAPHY HEAD W/O DYE: CPT

## 2022-09-12 RX ORDER — GADOBUTROL 604.72 MG/ML
10 INJECTION INTRAVENOUS ONCE
Status: COMPLETED | OUTPATIENT
Start: 2022-09-12 | End: 2022-09-12

## 2022-09-12 RX ADMIN — GADOBUTROL 10 ML: 604.72 INJECTION INTRAVENOUS at 13:40

## 2022-09-13 NOTE — PROGRESS NOTES
This HSAT was performed using a Noxturnal T3 device which recorded snore, sound, movement activity, body position, nasal pressure, oronasal thermal airflow, pulse, oximetry and both chest and abdominal respiratory effort. HSAT data was restricted to the time patient states they were in bed.     HSAT was scored using 1B 4% hypopnea rule.     HST AHI (Non-PAT): 3.3  Snoring was reported as mild.  Time with SpO2 below 89% was 0 minutes.   Overall signal quality was good     Pt will follow up with sleep provider to determine appropriate therapy.

## 2022-09-16 ENCOUNTER — VIRTUAL VISIT (OUTPATIENT)
Dept: SURGERY | Facility: CLINIC | Age: 37
End: 2022-09-16
Payer: COMMERCIAL

## 2022-09-16 ENCOUNTER — VIRTUAL VISIT (OUTPATIENT)
Dept: SURGERY | Facility: CLINIC | Age: 37
End: 2022-09-16

## 2022-09-16 VITALS — HEIGHT: 66 IN | WEIGHT: 218 LBS | BODY MASS INDEX: 35.03 KG/M2

## 2022-09-16 DIAGNOSIS — E66.09 CLASS 2 OBESITY DUE TO EXCESS CALORIES WITH BODY MASS INDEX (BMI) OF 35.0 TO 35.9 IN ADULT, UNSPECIFIED WHETHER SERIOUS COMORBIDITY PRESENT: Primary | ICD-10-CM

## 2022-09-16 DIAGNOSIS — E66.812 CLASS 2 OBESITY DUE TO EXCESS CALORIES WITH BODY MASS INDEX (BMI) OF 35.0 TO 35.9 IN ADULT, UNSPECIFIED WHETHER SERIOUS COMORBIDITY PRESENT: Primary | ICD-10-CM

## 2022-09-16 PROCEDURE — 99214 OFFICE O/P EST MOD 30 MIN: CPT | Mod: 95 | Performed by: PHYSICIAN ASSISTANT

## 2022-09-16 PROCEDURE — 99207 PR NO CHARGE NURSE ONLY: CPT

## 2022-09-16 NOTE — PROGRESS NOTES
Medication already approved! Will set up a virtual pen teach.    From Breanna:   Thank you for seeing me today. Sorry the visit got cut off so quickly I accidentally hit the button to hang up before we got to say bye :(   Good news I was approved for the medication and I think it would be good if a nurse could contact me about how to administer the medication. Thanks for helping me to understand the medication and just how it works I feel that you were very thorough in explaining everything to me. I appreciate your help with my weight loss journey and I am looking forward to our next visit.     Janee Zavala RN on 9/16/2022 at 12:23 PM

## 2022-09-16 NOTE — PATIENT INSTRUCTIONS
"Nice to talk with you today. Thank you for allowing me the privilege of caring for you. We hope we provided you with the excellent service you deserve.     To ensure the quality of our services you may receive a patient satisfaction survey from an independent monitoring company.  The greatest compliment you can give is \"Likely to Recommend\"    Below is our plan we discussed.-  FERNANDEZ Hightower      Labs have been placed in the [employer] system     Please call [phone] and schedule a follow up with Campbell Villalpando PA-C in 6-7 weeks.  If you need to reach me sooner you can do so by calling [phone].    Have a great day!           MEDICATION STARTED AT THIS APPOINTMENT    We are starting a GLP-1 (Glucagon-like Peptide-1) medication called Saxenda. One of the ways it works is by slowing down the rate that food leaves your stomach. You feel villa and will eat less. It also helps regulate hormones that can help improve your blood sugars.    If you are a patient that checks blood sugars, continue to check as instructed by your doctor. Low blood sugars are rare but can happen if patients are on insulin or other oral agents. If you notice consistent low sugars or high sugars, your medication may need to be adjusted after your appointment. If this is the case, please call RN and provide her your blood sugar record from the last 3-4 days. The RN will get in touch with the doctor and call you back/Placewordhart message with recommendations. We tolerate high sugars for a bit, so if sugars are running 180-200, this is ok. As weight starts dropping the blood sugars should too. If readings are consistently over 200 for 1-2 weeks, then you should call the doctor/nurse.    Dosing for this medication:   Week 1- Inject 0.6 mg daily  Week 2- Inject 1.2 mg daily  Week 3- Inject 1.8 mg daily  Week 4- Inject 2.4 mg daily  Week 5 and thereafter- Inject 3.0 mg daily    Side effects of GLP- Medications include: The most common side effects are all " GI related and consist of: nausea, constipation, diarrhea, burping, or gassiness. Patients are advised to eat slowly and less, and nausea typically passes if people can stick it out.     The risk of pancreatitis (inflammation of the pancreas) has been associated with this type of medication, but is very rare.  If you have had pancreatitis in the past, this medication may not be for you. Please let us know about any past history of pancreas problems.    Symptoms of pancreatitis include: Pain in your upper stomach area which may travel to your back and be worse after eating. Your stomach area may be tender to the touch.  You may have vomiting or nausea and/or have a fever. If you should develop any of these symptoms, stop the medication and contact your primary care doctor. They will do a blood test to check for pancreatitis.         There is a small chance you may have some low blood sugar after taking the medication.   The signs of low blood sugar are:  Weakness  Shaky   Hungry  Sweating  Confusion      See below for ways to treat low blood sugar without adding in lots of extra calories.      Treating Low Blood Sugar    If you have symptoms of low blood sugar (sweating, shaking, dizzy, confused) eat 15 grams of carbs and wait 15 minutes:    Glucose Tabs are best for sugars under 70 -  Dex4 or BD Glucose tablets are good, you will need to take 3-4 of these to equal 15 grams.     One small box of raisins  4 oz fruit juice box or   cup fruit juice  1 small apple  1 small banana    cup canned fruit in water    English muffin or a slice of bread with jelly   1 low fat frozen waffle with sugar-free syrup    cup cottage cheese with   cup frozen or fresh blueberries  1 cup skim or low-fat milk    cup whole grain cereal  4-6 crackers such as Triscuits      This medication is usually not covered by insurance and can be quite expensive. Sometimes a prior authorization is required, which may take up to 1-2 weeks for an  "insurance company to make a decision if they will cover the medication. Please be patient, you will be notified after a decision has been made.    Contact the nurse via [employer] or call [phone] if you have any questions or concerns. (Do not stop taking it if you don't think it's working. For some people it works without them knowing it.)     In order to get refills of this or any medication we prescribe you must be seen in the medical weight mgmt clinic every 2-4 months.        If you have access to a computer, you can get the Saxenda savings coupon by following the below information:     1. Go to this [employer] website: [email or web address]    2. Click on \"get your card here\"    3. Select the button next to \"I need a new card or a replacement card\"    4.  Select the button next to \"No, I am not enrolled\" regarding the \"Are you enrolled in any government, state, or federally funded medical or prescription benefit programs? (Examples include but are not limited to Medicare, Medigap, VA, DOD, , or any similar federal or state health care program.)\" question.     5. Select \"Yes\" for the \"Do you have commercial (also known as private) insurance that covers your prescription? (Example: Insurance provided through an employer)\" question.     6. Follow the prompts and fill out your information, then press \"next\".    7. The site will construct a savings coupon card for you. You will be asked if you would like the card printed, emailed, etc and select the option you prefer. You can always take a picture of the card that it shows you.     8. Bring the savings card information to your pharmacy and they will use the savings card to reduce your copay.       "

## 2022-09-16 NOTE — PROGRESS NOTES
Saxenda pen teaching completed. Patient demonstrated 1st injection via video.    Pen instructions sent to [facility].   All questions answered.  Janee Zavala RN on 9/16/2022 at 1:29 PM

## 2022-09-16 NOTE — PROGRESS NOTES
Breanna is a 37 year old who is being evaluated via a billable video visit.      If the video visit is dropped, the invitation should be resent by: Text to cell phone: [phone]  Will anyone else be joining your video visit? No      Video-Visit Details    Type of service:  Video Visit    Video Start Time: 11:38    Video End Time: 12:04    35 minutes spent on the date of the encounter doing chart review, review of test results, patient visit and documentation       Originating Location (pt. Location): Home    Distant Location (provider location):  [facility] [place]     Platform used for Video Visit: [employer]           2022          Return Virtual Medical Weight Management Note     Breanna Luna  MRN:  [id number]  :  1985        Dear No Ref-Primary, Physician,    I had the pleasure of doing a virtual visit with your patient Breanna Luna.  She is a 37 year old female who I am continuing to see for treatment of obesity related to:       2022   I have the following health issues associated with obesity: Pre-Diabetes, High Cholesterol       INTERVAL HISTORY:  Was seen initially 2022. Was on phentermine 37.5 mg given to her by her primary at that time. Feels like she has plateaued. Fluctuates between 218-220 lbs.  Stopped her phentermine about a month ago. Weight has not changed.  Is interested in other options    Her challenge has been getting up at night to eat. Since adding protein this has helped.        CURRENT WEIGHT (PATIENT REPORTED):  218 lbs 0 oz    Wt Readings from Last 4 Encounters:   22 218 lb (98.9 kg)   22 220 lb (99.8 kg)   22 220 lb 6 oz (100 kg)   22 218 lb (98.9 kg)       BARIATRIC METRICS:  Current Weight: 218 lb (98.9 kg) (pt reported)  Body mass index is 35.19 kg/m .   Wt change since last visit (lbs): 0  Cumulative weight loss (lbs): 0      DIETARY HISTORY  Fluid Intake: 64 OZ DAILY       Exercise:  5 days  weekly for 30 minutes.  Does rowing and pilates. Additional waling      ROS: 9/16/22  General  Fatigue: No  Sleep Quality: OK  Birth Control: Tubal ligation  HEENT  Hx of glaucoma: No  Vision changes: No  Cardiovascular  Chest Pain with Exertion: No  Palpitations: No  Hx of heart disease: No  Hx of PVD No  Pulmonary  Shortness of breath at rest: No  Shortness of breath with exertion: No  Snoring: No - Had sleep study. Had nasal surgery.   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  Meth use but none since 2012.  No eating disorder history   Endocrine  Polydipsia: No  Polyuria: No  Personal or family history of thyroid cancer: No  Neurologic:  Hx of seizures: No  Hx of paresthesias:No      MEDICATIONS:   Current Outpatient Medications   Medication     acetaminophen (TYLENOL) 500 MG tablet     cetirizine (ZYRTEC) 10 MG tablet     galcanezumab-gnlm (EMGALITY) 120 MG/ML injection     ibuprofen (ADVIL/MOTRIN) 200 MG tablet     insulin pen needle (31G X 5 MM) 31G X 5 MM miscellaneous     liraglutide - Weight Management (SAXENDA) 18 MG/3ML pen     prochlorperazine (COMPAZINE) 10 MG tablet     Rimegepant Sulfate 75 MG TBDP     tiZANidine (ZANAFLEX) 2 MG tablet     phentermine (ADIPEX-P) 37.5 MG capsule     Current Facility-Administered Medications   Medication     Botulinum Toxin Type A (BOTOX) 200 units injection 155 Units       LABS:    Sodium   Date Value Ref Range Status   02/26/2020 139 133 - 144 mmol/L Final     Potassium   Date Value Ref Range Status   02/26/2020 4.1 3.4 - 5.3 mmol/L Final     Chloride   Date Value Ref Range Status   02/26/2020 107 94 - 109 mmol/L Final     Carbon Dioxide   Date Value Ref Range Status   02/26/2020 26 20 - 32 mmol/L Final     Anion Gap  "  Date Value Ref Range Status   02/26/2020 6 3 - 14 mmol/L Final     Glucose   Date Value Ref Range Status   05/13/2022 99 70 - 99 mg/dL Final   02/26/2020 94 70 - 99 mg/dL Final     Urea Nitrogen   Date Value Ref Range Status   02/26/2020 13 7 - 30 mg/dL Final     Creatinine   Date Value Ref Range Status   02/26/2020 0.58 0.52 - 1.04 mg/dL Final     GFR Estimate   Date Value Ref Range Status   02/26/2020 >90 >60 mL/min/[1.73_m2] Final     Comment:     Non  GFR Calc  Starting 12/18/2018, serum creatinine based estimated GFR (eGFR) will be   calculated using the Chronic Kidney Disease Epidemiology Collaboration   (CKD-EPI) equation.       Calcium   Date Value Ref Range Status   02/26/2020 9.1 8.5 - 10.1 mg/dL Final     Bilirubin Total   Date Value Ref Range Status   02/26/2020 0.5 0.2 - 1.3 mg/dL Final     Alkaline Phosphatase   Date Value Ref Range Status   02/26/2020 50 40 - 150 U/L Final     ALT   Date Value Ref Range Status   02/26/2020 33 0 - 50 U/L Final     AST   Date Value Ref Range Status   02/26/2020 15 0 - 45 U/L Final     Cholesterol   Date Value Ref Range Status   05/13/2022 159 <200 mg/dL Final     Direct Measure HDL   Date Value Ref Range Status   05/13/2022 57 >=50 mg/dL Final     LDL Cholesterol Calculated   Date Value Ref Range Status   05/13/2022 61 <=100 mg/dL Final     Triglycerides   Date Value Ref Range Status   05/13/2022 205 (H) <150 mg/dL Final        PE:  Ht 5' 6\" (1.676 m)   Wt 218 lb (98.9 kg)   LMP 08/10/2022   BMI 35.19 kg/m    GENERAL: Healthy, alert and no distress  EYES: Eyes grossly normal to inspection.  No discharge or erythema, or obvious scleral/conjunctival abnormalities.  RESP: No audible wheeze, cough, or visible cyanosis.  No visible retractions or increased work of breathing.    SKIN: Visible skin clear. No significant rash, abnormal pigmentation or lesions.  NEURO: Cranial nerves grossly intact.  Mentation and speech appropriate for age.  PSYCH: " "Mentation appears normal, affect normal/bright, judgement and insight intact, normal speech and appearance well-groomed.        ASSESSMENT/PLAN:   Class 2 severe obesity due to excess calories with Body Mass Index (BMI) of 35      We discussed the role of pharmacological agents in the treatment of obesity and the \"off-label\" use of medications in this practice. We discussed the risks and benefits of each. We discussed indications, contraindications, potential side effects, and estimated costs of each. Discussed that medications must always be used together with lifestyle changes such as improvements in diet choices, portion control and establishing and maintaining a regular exercise program.      Took topamax about 4 years for migraine. Does not remember if it changed her lifestyle.      Discussed Saxenda. Side effects reviewed.  Patient information provided.  If this is not covered can add topamax 50 mg.      Lab placed today       Follow up in 6-7 weeks    Campbell Villalpando MS, PAManoloC          "

## 2022-09-19 ENCOUNTER — LAB (OUTPATIENT)
Dept: LAB | Facility: CLINIC | Age: 37
End: 2022-09-19
Payer: COMMERCIAL

## 2022-09-19 DIAGNOSIS — E66.812 CLASS 2 OBESITY DUE TO EXCESS CALORIES WITH BODY MASS INDEX (BMI) OF 35.0 TO 35.9 IN ADULT, UNSPECIFIED WHETHER SERIOUS COMORBIDITY PRESENT: ICD-10-CM

## 2022-09-19 DIAGNOSIS — E66.09 CLASS 2 OBESITY DUE TO EXCESS CALORIES WITH BODY MASS INDEX (BMI) OF 35.0 TO 35.9 IN ADULT, UNSPECIFIED WHETHER SERIOUS COMORBIDITY PRESENT: ICD-10-CM

## 2022-09-19 LAB
ANION GAP SERPL CALCULATED.3IONS-SCNC: 4 MMOL/L (ref 3–14)
BUN SERPL-MCNC: 14 MG/DL (ref 7–30)
CALCIUM SERPL-MCNC: 9.1 MG/DL (ref 8.5–10.1)
CHLORIDE BLD-SCNC: 109 MMOL/L (ref 94–109)
CO2 SERPL-SCNC: 24 MMOL/L (ref 20–32)
CREAT SERPL-MCNC: 0.55 MG/DL (ref 0.52–1.04)
GFR SERPL CREATININE-BSD FRML MDRD: >90 ML/MIN/1.73M2
GLUCOSE BLD-MCNC: 96 MG/DL (ref 70–99)
HBA1C MFR BLD: 4.9 % (ref 0–5.6)
POTASSIUM BLD-SCNC: 4.2 MMOL/L (ref 3.4–5.3)
SODIUM SERPL-SCNC: 137 MMOL/L (ref 133–144)

## 2022-09-19 PROCEDURE — 80048 BASIC METABOLIC PNL TOTAL CA: CPT

## 2022-09-19 PROCEDURE — 36415 COLL VENOUS BLD VENIPUNCTURE: CPT

## 2022-09-19 PROCEDURE — 83036 HEMOGLOBIN GLYCOSYLATED A1C: CPT

## 2022-09-19 NOTE — NURSING NOTE
Clinic Administered Medication Documentation    Administrations This Visit     ketorolac (TORADOL) injection 30 mg     Admin Date  08/26/2022 Action  Given Dose  30 mg Route  Intramuscular Site   Administered By  Maci Villeda    Ordering Provider: Cara Suarez MD    Patient Supplied?: No

## 2022-09-20 ENCOUNTER — HOSPITAL ENCOUNTER (OUTPATIENT)
Dept: PHYSICAL THERAPY | Facility: CLINIC | Age: 37
Setting detail: THERAPIES SERIES
Discharge: HOME | End: 2022-09-20
Attending: STUDENT IN AN ORGANIZED HEALTH CARE EDUCATION/TRAINING PROGRAM
Payer: COMMERCIAL

## 2022-09-20 PROCEDURE — 97530 THERAPEUTIC ACTIVITIES: CPT | Mod: GP | Performed by: PHYSICAL THERAPIST

## 2022-09-20 PROCEDURE — 97140 MANUAL THERAPY 1/> REGIONS: CPT | Mod: GP | Performed by: PHYSICAL THERAPIST

## 2022-09-20 PROCEDURE — 97164 PT RE-EVAL EST PLAN CARE: CPT | Mod: GP | Performed by: PHYSICAL THERAPIST

## 2022-09-23 ENCOUNTER — ALLIED HEALTH/NURSE VISIT (OUTPATIENT)
Dept: FAMILY MEDICINE | Facility: OTHER | Age: 37
End: 2022-09-23
Payer: COMMERCIAL

## 2022-09-23 DIAGNOSIS — Z23 FLU VACCINE NEED: Primary | ICD-10-CM

## 2022-09-23 PROCEDURE — 90686 IIV4 VACC NO PRSV 0.5 ML IM: CPT

## 2022-09-23 PROCEDURE — 0124A COVID-19,PF,PFIZER BOOSTER BIVALENT: CPT

## 2022-09-23 PROCEDURE — 99207 PR NO CHARGE NURSE ONLY: CPT

## 2022-09-23 PROCEDURE — 91312 COVID-19,PF,PFIZER BOOSTER BIVALENT: CPT

## 2022-09-23 PROCEDURE — 90471 IMMUNIZATION ADMIN: CPT

## 2022-09-30 ENCOUNTER — OFFICE VISIT (OUTPATIENT)
Dept: NEUROLOGY | Facility: CLINIC | Age: 37
End: 2022-09-30
Payer: COMMERCIAL

## 2022-09-30 DIAGNOSIS — R20.2 ARM PARESTHESIA, RIGHT: ICD-10-CM

## 2022-09-30 DIAGNOSIS — G43.709 CHRONIC MIGRAINE WITHOUT AURA WITHOUT STATUS MIGRAINOSUS, NOT INTRACTABLE: Primary | ICD-10-CM

## 2022-09-30 PROCEDURE — 64615 CHEMODENERV MUSC MIGRAINE: CPT | Performed by: PSYCHIATRY & NEUROLOGY

## 2022-09-30 NOTE — LETTER
9/30/2022         RE: Breanna Luna  [address]  [place] [address]        Dear Colleague,    Thank you for referring your patient, Breanna Luna, to the [facility]. Please see a copy of my visit note below.    [facility]  Botulinum Toxin Procedure    Cara Suarez MD  Headache Neurology    September 30, 2022    Procedure:  OnabotulinumtoxinA injections for chronic migraine  Indication:  Chronic migraine    Ms. Luna suffers from severe intractable headaches.  She was referred by Dr. Suarez for onabotulinumtoxinA injections for headache.  Risks, benefits, and alternatives were discussed.  All questions were answered and consent given.  She decided to proceed with the injections.     Prior to initiation of botulinum toxin injections, Ms. Luna reported 20 headache days per month, with 17 severe headache days per month. Her headaches are quite disabling and often interfere with her ability to function normally.    She has attempted other migraine prophylactic treatments in the past, which have included:   Nortriptyline 20 mg PM was not helpful.  Gabapentin was not helpful.  Propranolol 40 mg TID was not helpful.  Topiramate was not helpful.    Nurtec is helpful.     Ms. Luna's pain was assessed prior to the procedure.  She rated her pain today as 0 out of 10.    Procedural Pause: Procedural pause was conducted to verify correct patient identity, procedure to be performed, correct side and site, correct patient position, and special requirements. Appropriate hand hygiene was utilized, and each injection site was prepped with alcohol wipes or Chloraprep swab.     Procedure Details: 200 units of onabotulinumtoxinA was diluted in 4 mL 0.9% normal saline. A total of 150 units of onabotulinumtoxinA were injected using 30 gauge 0.5 in needles into the muscles listed below. 50 units of onabotulinumtoxinA were wasted.     Injection Sites: Total = 150 units  onabotulinumtoxinA      and Procerus muscles - 5 units into the left and right corrugators and 5 units into the procerus (15 units total)    Frontalis muscles - 5 units into the left superior frontalis and 5 units into the right superior frontalis (2 injection sites per muscle) (10 units total)    Temporalis muscles - 12.5 units into the left temporalis muscle and 12.5 units into the right temporalis muscle (2 injection sites per muscle) (25 units total)    Occipitalis muscles - 12.5 units into the left occipitalis muscle and 12.5 units into the right occipitalis muscle (2 injection sites per muscle) (25 units total)    Splenius Capitis muscles - 12.5 units into the left splenius capitis muscle and 12.5 units into the right splenius capitis muscle (2 injection sites per muscle, divided into 2/3 anteriorly and 1/3 posteriorly) (25 units total)      Trapezius muscles - 25 units into the left trapezius muscle and 25 units into the right trapezius muscle (3 injection sites per muscle, divided 5 units, 10 units, 10 units, medial to lateral) (50 units total)    Ms. Luna tolerated the procedure well without immediate complications.  She will follow up in clinic for assessment of the effectiveness of treatment.  She did not report any change in her pain level after the botulinumtoxinA injection procedure.    Cara Suarez MD  Headache Neurology  [facility]        Again, thank you for allowing me to participate in the care of your patient.        Sincerely,        Cara Suarez MD

## 2022-09-30 NOTE — PROGRESS NOTES
[facility]  Botulinum Toxin Procedure    Cara Suarez MD  Headache Neurology    September 30, 2022    Procedure:  OnabotulinumtoxinA injections for chronic migraine  Indication:  Chronic migraine    Ms. Luna suffers from severe intractable headaches.  She was referred by Dr. Suarez for onabotulinumtoxinA injections for headache.  Risks, benefits, and alternatives were discussed.  All questions were answered and consent given.  She decided to proceed with the injections.     Prior to initiation of botulinum toxin injections, Ms. Luna reported 20 headache days per month, with 17 severe headache days per month. Her headaches are quite disabling and often interfere with her ability to function normally.    She has attempted other migraine prophylactic treatments in the past, which have included:   Nortriptyline 20 mg PM was not helpful.  Gabapentin was not helpful.  Propranolol 40 mg TID was not helpful.  Topiramate was not helpful.    Nurtec is helpful.     Ms. Rodriguezs pain was assessed prior to the procedure.  She rated her pain today as 0 out of 10.    Procedural Pause: Procedural pause was conducted to verify correct patient identity, procedure to be performed, correct side and site, correct patient position, and special requirements. Appropriate hand hygiene was utilized, and each injection site was prepped with alcohol wipes or Chloraprep swab.     Procedure Details: 200 units of onabotulinumtoxinA was diluted in 4 mL 0.9% normal saline. A total of 150 units of onabotulinumtoxinA were injected using 30 gauge 0.5 in needles into the muscles listed below. 50 units of onabotulinumtoxinA were wasted.     Injection Sites: Total = 150 units onabotulinumtoxinA      and Procerus muscles - 5 units into the left and right corrugators and 5 units into the procerus (15 units total)    Frontalis muscles - 5 units into the left superior frontalis and 5 units into the right superior frontalis (2 injection  sites per muscle) (10 units total)    Temporalis muscles - 12.5 units into the left temporalis muscle and 12.5 units into the right temporalis muscle (2 injection sites per muscle) (25 units total)    Occipitalis muscles - 12.5 units into the left occipitalis muscle and 12.5 units into the right occipitalis muscle (2 injection sites per muscle) (25 units total)    Splenius Capitis muscles - 12.5 units into the left splenius capitis muscle and 12.5 units into the right splenius capitis muscle (2 injection sites per muscle, divided into 2/3 anteriorly and 1/3 posteriorly) (25 units total)      Trapezius muscles - 25 units into the left trapezius muscle and 25 units into the right trapezius muscle (3 injection sites per muscle, divided 5 units, 10 units, 10 units, medial to lateral) (50 units total)    Ms. Luna tolerated the procedure well without immediate complications.  She will follow up in clinic for assessment of the effectiveness of treatment.  She did not report any change in her pain level after the botulinumtoxinA injection procedure.    Cara Suarez MD  Headache Neurology  [facility]

## 2022-10-02 NOTE — PROGRESS NOTES
"HOME SLEEP STUDY INTERPRETATION        Patient: Breanna Luna  MRN: [id number]  YOB: 1985  Study Date: 9/7/2022  PCP/Referring Provider: No Ref-Primary, Physician;   Ordering Provider: Dean Brenner PA-C         Indications for Home Study: Breanna Luna is a 37 year old female with a history of snoring, daytime fatigue(even though ESS  IS 4), MIGRAINES, OBESITYwho presents with symptoms suggestive of obstructive sleep apnea.    Estimated body mass index is 35.19 kg/m  as calculated from the following:    Height as of 9/16/22: 1.676 m (5' 6\").    Weight as of 9/16/22: 98.9 kg (218 lb).  Total score - [place]: 4 (8/15/2022  7:31 AM)  Total Score: 3 (8/15/2022  7:31 AM)        Data: A full night home sleep study was performed recording the standard physiologic parameters including body position, movement, sound, nasal pressure, thermal oral airflow, chest and abdominal movements with respiratory inductance plethysmography, and oxygen saturation by pulse oximetry. Pulse rate was estimated by oximetry recording. This study was considered adequate based on > 4 hours of quality oximetry and respiratory recording. As specified by the AASM Manual for the Scoring of Sleep and Associated events, version 2.3, Rule VIII.D 1B, 4% oxygen desaturation scoring for hypopneas is used as a standard of care on all home sleep apnea testing.        Analysis Time:  506 minutes        Respiration:   Sleep Associated Hypoxemia: could not be measured since oximeter was not functional (no data)..   Snoring: Snoring was minimal.  Respiratory events: The home study revealed a presence of 6 obstructive apneas and 5 mixed and central apneas. There were 0 hypopneas resulting in a combined apnea/hypopnea index [AHI] of 1.4 events per hour.  AHI was 1.8 per hour supine, 0 per hour prone, 0.7 per hour on left side, and 1 per hour on right side.   Pattern: Excluding events noted above, respiratory rate and pattern was " Normal.      Position: Percent of time spent: supine - 52.6%, prone - 1.6%, on left - 16.6%, on right - 24.3%.      Heart Rate: By pulse oximetry could not be measured was noted.       Assessment:     NO obstructive sleep apnea.    However valid type III study could not be conducted due to absence of Oximeter and heart rate monitor.    Recommendations:    Consider repeating HST with valid channel recording if suspicious of present KATHERIN.    Suggest optimizing sleep hygiene and avoiding sleep deprivation.    Weight management.        Diagnosis Code(s): Snoring R06.83    Chico Schultz MD,  October 2, 2022   Diplomate, American Board of Otolaryngology, Sleep Medicine

## 2022-10-06 ENCOUNTER — HOSPITAL ENCOUNTER (OUTPATIENT)
Dept: PHYSICAL THERAPY | Facility: CLINIC | Age: 37
Setting detail: THERAPIES SERIES
Discharge: HOME | End: 2022-10-06
Attending: STUDENT IN AN ORGANIZED HEALTH CARE EDUCATION/TRAINING PROGRAM
Payer: COMMERCIAL

## 2022-10-06 PROCEDURE — 97110 THERAPEUTIC EXERCISES: CPT | Mod: GP | Performed by: PHYSICAL THERAPIST

## 2022-10-06 PROCEDURE — 97140 MANUAL THERAPY 1/> REGIONS: CPT | Mod: GP | Performed by: PHYSICAL THERAPIST

## 2022-10-11 ENCOUNTER — HOSPITAL ENCOUNTER (OUTPATIENT)
Dept: PHYSICAL THERAPY | Facility: CLINIC | Age: 37
Setting detail: THERAPIES SERIES
Discharge: HOME | End: 2022-10-11
Attending: STUDENT IN AN ORGANIZED HEALTH CARE EDUCATION/TRAINING PROGRAM
Payer: COMMERCIAL

## 2022-10-11 PROCEDURE — 97140 MANUAL THERAPY 1/> REGIONS: CPT | Mod: GP | Performed by: PHYSICAL THERAPIST

## 2022-10-11 PROCEDURE — 97110 THERAPEUTIC EXERCISES: CPT | Mod: GP | Performed by: PHYSICAL THERAPIST

## 2022-10-28 NOTE — PROGRESS NOTES
09/20/22 1306   Signing Clinician's Name / Credentials   Signing clinician's name / credentials Theresa Davis PT   Session Number   Session Number 7   Additional Session Number nothing specific needed per ICT   Authorization status [employer]   Adult Goals   PT Ortho Eval Goals 1;2;3;4   Ortho Goal 1   Goal Identifier HAs   Goal Description Pt will have control of aborting or preventing HAs with techniques trained for less than daily HAs.   Goal Progress 9/20/22: progress another month as pt is getting injections for migraines on 9/30/22 and has abortive med now that is working.  Change goal date from 7/22/22 to 10/21/22   Target Date 10/21/22   Ortho Goal 2   Goal Identifier sleeping   Goal Description Sleep will be distrubed less than an hour to increase restlful recovery at night.   Goal Progress 9/20/22: pt does not think she snores as loudly and her septoplasty seems to have worked well.  She is getting up during night becuase uncomfortable (during sleep study got up 3-4 times to use bathroom) which was surprising to her.  Advance this goal because uncomfortable yet.  Advance goal from 7/22/22 to 11/21/22.   Target Date 11/21/22   Ortho Goal 3   Goal Identifier neck structure   Goal Description Techniques of PT working to control her symptoms to assure her neck sturcture is not compromised, as noted by symptom control increased by 80% better.   Goal Progress 9/20/22:  Advance from 7/22/22 to 12/2/22   Target Date 12/02/22   Ortho Goal 4   Goal Identifier ROM   Goal Description Neck ROM will be wihtin 5% of each other bilat for cerv rot and SB.   Goal Progress 9/20/22: R rot about 52 and L about 30;  SB R about 20 and L about 17 with sharp on R C1-2 area.  Advance this from 7/22/22 to 12/2/22.   Target Date 12/02/22   Objective Measures   Objective Measures Objective Measure 3   Objective Measure 1   Objective Measure symptoms   Details Pian in R outlet area of mm and posterior neck.   Objective Measure  3   Objective Measure palpation/movement   Details see above in goals (9/20/22) for ROM.  R C1-2-3 not closing on R  SB.  Sharp pursers negative.  Showing a upper cervical movement pattern at end range and has lower cerivcal pain kate with compression SB/Rot R side.   Treatment Interventions   Interventions   (Plan is for neuro re-ed, manual, act and ex p.r.n.)   Neuromuscular Re-education   Neuromuscular re-ed of mvmt, balance, coord, kinesthetic sense, posture, proprioception minutes (19585) 15   Skilled Intervention trained   Patient Response tolerates well   Treatment Detail Trained on mm tension in thoracic outlet area re-ed on posture.  Trained prop UE.   Manual Therapy   Manual Therapy: Mobilization, MFR, MLD, friction massage minutes (46882) 15   Skilled Intervention soft tissue and joint mobilization   Patient Response feels the mm worked but looser, no symptoms aggravated.   Treatment Detail Seated with arms propped fo release to neck mm kate outlet area on R.   Education   Learner Patient   Readiness Eager   Method Explanation;Demonstration   Response Verbalizes Understanding;Demonstrates Understanding   Education Comments plan, treatment   Plan   Updates to plan of care 1x/week for 8-10 weeks   Plan for next session wrok release to right outlet area, neck, SO/migraine type pains. Check stab ex.   Total Session Time   Timed Code Treatment Minutes 30   Total Treatment Time (sum of timed and untimed services) 60  (30min reassess)   AMBULATORY CLINICS ONLY-MEDICAL AND TREATMENT DIAGNOSIS   Medical Diagnosis neck pain   PT Diagnosis neck pain, mm tension

## 2022-10-31 NOTE — PROGRESS NOTES
Does Breanna have a CPAP/Bipap?  No     [place] Sleep Scale: 7    Breanna is a 37 year old who is being evaluated via a billable video visit.      How would you like to obtain your AVS? MyChart  If the video visit is dropped, the invitation should be resent by: Text to cell phone: [phone]  Will anyone else be joining your video visit? No              Subjective   Breanna is a 37 year old, presenting for the following health issues:  Study Results          Objective           Vitals:  No vitals were obtained today due to virtual visit.    Physical Exam   GENERAL: Healthy, alert and no distress  EYES: Eyes grossly normal to inspection.  No discharge or erythema, or obvious scleral/conjunctival abnormalities.  RESP: No audible wheeze, cough, or visible cyanosis.  No visible retractions or increased work of breathing.    SKIN: Visible skin clear. No significant rash, abnormal pigmentation or lesions.  NEURO: Cranial nerves grossly intact.  Mentation and speech appropriate for age.  PSYCH: Mentation appears normal, affect normal/bright, judgement and insight intact, normal speech and appearance well-groomed.                Video-Visit Details    Video Start Time: unknown    Type of service:  Video Visit    Video End Time:unknown    Originating Location (pt. Location): Home        Distant Location (provider location):  On-site    Platform used for Video Visit: [facility]    Sleep Study Follow-Up Visit:    Date on this visit: 11/2/2022    Breanna Luna comes in today for follow-up of her home sleep study done on 9/9/22 for possible sleep apnea.     AHI was 3.3, without desaturations.Supine RDI 3.7, consistent with no SUPINE KATHERIN.      These findings were reviewed with patient.     Past medical/surgical history, family history, social history, medications and allergies were reviewed.      Problem List:  Patient Active Problem List    Diagnosis Date Noted     Chronic tonsillitis 05/18/2022     Priority: Medium     Added  automatically from request for surgery [id number]       Nasal obstruction 05/02/2022     Priority: Medium     Added automatically from request for surgery [id number]       Nasal turbinate hypertrophy 05/02/2022     Priority: Medium     Added automatically from request for surgery [id number]       Nasal septal deviation 05/02/2022     Priority: Medium     Added automatically from request for surgery [id number]       Class 2 obesity due to excess calories with body mass index (BMI) of 35.0 to 35.9 in adult, unspecified whether serious comorbidity present 02/16/2021     Priority: Medium     Biliary dyskinesia 02/10/2020     Priority: Medium     Added automatically from request for surgery [id number]       Intractable migraine without aura and without status migrainosus 07/17/2019     Priority: Medium     Alcohol abuse, in remission 08/20/2018     Priority: Medium     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) 08/20/2018     Priority: Medium     Mixed incontinence 03/03/2017     Priority: Medium        Impression/Plan:    Primary snoring- no sleep apnea or hypoxemia noted.   She will follow up with me as needed    Fifteen minutes spent with patient, all of which were spent face-to-face counseling, consulting, coordinating plan of care.          CC: No Ref-Primary, Physician

## 2022-11-01 ENCOUNTER — TELEPHONE (OUTPATIENT)
Dept: NEUROLOGY | Facility: CLINIC | Age: 37
End: 2022-11-01

## 2022-11-01 ENCOUNTER — TELEPHONE (OUTPATIENT)
Dept: FAMILY MEDICINE | Facility: CLINIC | Age: 37
End: 2022-11-01

## 2022-11-01 ENCOUNTER — NURSE TRIAGE (OUTPATIENT)
Dept: FAMILY MEDICINE | Facility: CLINIC | Age: 37
End: 2022-11-01

## 2022-11-01 NOTE — TELEPHONE ENCOUNTER
"Patient of Dr. Suarez with chronic migraine, possible sensory aura, recent MRI brain, MRA C.O.W., MRA neck unremarkable.  Having an otherwise typical headache, going on 2+ days despite usually effective treatment.  Symptomatic treatment plan per Dr. Suarez's note is Tylenol, Naproxen, Nurtec, and Compazine with or without Benadryl.  Last dose of Nurtec was 3 h ago, last Tylenol 600 was 2-3 h ago.  Took Compazine about 3 hours ago, and it stopped her vomiting, but didn't break the headache.  Last dose of Naproxen was two days ago.  (Naproxen works better for her than ibuprofen, which tends to give her rebound).  Worked our way through the SNOOP criteria, and she has no red flags.  Discussed ER as last resort;  she would rather try and get through the night without that, and get further advice from Dr. Suarez in the morning.  I also was able to point out to her that she was \"eligible\" for another dose of Naproxen, which she'd overlooked, and said she'd try that.  "

## 2022-11-01 NOTE — TELEPHONE ENCOUNTER
Patient calling because she stepped on a lot of sandburs on 10/30 and feels that they are stuck in foot. Denies that she can see any specifically, but noted that the bottom of her foot is tender and red. Denies any sign of infection. Would like advise on what she can do.     RN advised her she might have to wait until they grow out. Advised her she could try soaking in epsom salt or trying home remedies for splinter removal.       Reason for Disposition    Tiny plant stickers (e.g., cactus spines, stinging jose alfredo) or fiberglass spicules that need removal    Additional Information    Negative: SEVERE pain (e.g., excruciating)    Negative: Deeply embedded FB (e.g., needle or toothpick in foot)    Negative: FB has a kip (e.g., fish hook)    Negative: Dirt in the wound and not removed with 15 minutes of scrubbing    Negative: Sounds like a serious injury to the triager    Negative: Puncture wound (and no current foreign body)    Negative: Sounds like a life-threatening emergency to the triager    Negative: Minor sliver, splinter, or thorn (removable) and patient has diabetes mellitus    Negative: Last tetanus shot > 10 years ago    Negative: Last tetanus shot > 5 years ago and DIRTY cut or scrape    Negative: Last tetanus shot > 5 years ago and DIRTY cut or scrape    Negative: No prior tetanus shots (or is not fully vaccinated) and any wound (e.g., cut or scrape)    Negative: HIV positive or severe immunodeficiency (severely weak immune system) and DIRTY cut or scrape    Negative: Patient wants to be seen    Negative: Caller can't get FB out and it's causing pain    Negative: Caller reluctant to take out FB and it's causing pain    Negative: FB is clear (glass or plastic)    Negative: FB is a BB    Negative: Wound looks infected (e.g., spreading redness, red streak, pus)    Protocols used: SKIN FOREIGN BODY-A-OH

## 2022-11-02 ENCOUNTER — VIRTUAL VISIT (OUTPATIENT)
Dept: SLEEP MEDICINE | Facility: CLINIC | Age: 37
End: 2022-11-02
Payer: COMMERCIAL

## 2022-11-02 DIAGNOSIS — R29.818 SUSPECTED SLEEP APNEA: Primary | ICD-10-CM

## 2022-11-02 PROCEDURE — 99213 OFFICE O/P EST LOW 20 MIN: CPT | Mod: VID | Performed by: PHYSICIAN ASSISTANT

## 2022-11-02 RX ORDER — ZOLPIDEM TARTRATE 5 MG/1
TABLET ORAL
Qty: 1 TABLET | Refills: 0 | Status: SHIPPED | OUTPATIENT
Start: 2022-11-02 | End: 2022-11-02 | Stop reason: ALTCHOICE

## 2022-11-02 ASSESSMENT — SLEEP AND FATIGUE QUESTIONNAIRES
HOW LIKELY ARE YOU TO NOD OFF OR FALL ASLEEP WHILE SITTING QUIETLY AFTER LUNCH WITHOUT ALCOHOL: SLIGHT CHANCE OF DOZING
HOW LIKELY ARE YOU TO NOD OFF OR FALL ASLEEP WHILE SITTING AND READING: MODERATE CHANCE OF DOZING
HOW LIKELY ARE YOU TO NOD OFF OR FALL ASLEEP IN A CAR, WHILE STOPPED FOR A FEW MINUTES IN TRAFFIC: WOULD NEVER DOZE
HOW LIKELY ARE YOU TO NOD OFF OR FALL ASLEEP WHILE LYING DOWN TO REST IN THE AFTERNOON WHEN CIRCUMSTANCES PERMIT: MODERATE CHANCE OF DOZING
HOW LIKELY ARE YOU TO NOD OFF OR FALL ASLEEP WHILE SITTING AND TALKING TO SOMEONE: WOULD NEVER DOZE
HOW LIKELY ARE YOU TO NOD OFF OR FALL ASLEEP WHILE WATCHING TV: SLIGHT CHANCE OF DOZING
HOW LIKELY ARE YOU TO NOD OFF OR FALL ASLEEP WHEN YOU ARE A PASSENGER IN A CAR FOR AN HOUR WITHOUT A BREAK: SLIGHT CHANCE OF DOZING
HOW LIKELY ARE YOU TO NOD OFF OR FALL ASLEEP WHILE SITTING INACTIVE IN A PUBLIC PLACE: WOULD NEVER DOZE

## 2022-11-02 NOTE — TELEPHONE ENCOUNTER
Called pt and informed her Dr. Suarez's note as written below:    Please call patient this morning to see if naproxen was helpful. If not, she could try naproxen 500 mg twice daily for 3-5 days, and she can also continue to use Nurtec daily, prochlorperazine with diphenhydramine every 6 hours.     I could also send in a short course of steroids for her to try, if she isn't improving with the above.     Thank you,   MD Stephanie     Pt reported she was out of naproxen but she would go to the store today and try that. She stated she took ibuprofen after talking to Dr. Connelly last night and took 1.5 benedryl pills and that helped her to sleep. Pt woke up with HA today.  Informed her to follow Dr. Suarez's treatment plan (as written above) and to call if her HA worsens or persists and she verbally understood.

## 2022-11-04 NOTE — PROCEDURES
"HOME SLEEP STUDY INTERPRETATION        Patient: Breanna Luna  MRN: [id number]  YOB: 1985  Study Date: 9/9/2022  PCP/Referring Provider: No Ref-Primary, Physician;   Ordering Provider: Dean Brenner PA-C         Indications for Home Study: Breanna Luna is a 37 year old female who presents with symptoms suggestive of obstructive sleep apnea.    Estimated body mass index is 35.19 kg/m  as calculated from the following:    Height as of 9/16/22: 1.676 m (5' 6\").    Weight as of 9/16/22: 98.9 kg (218 lb).  Total score - [place]: 7 (11/2/2022  1:50 PM)      Data: A full night home sleep study was performed recording the standard physiologic parameters including body position, movement, sound, nasal pressure, thermal oral airflow, chest and abdominal movements with respiratory inductance plethysmography, and oxygen saturation by pulse oximetry. Pulse rate was estimated by oximetry recording. This study was considered adequate based on > 4 hours of quality oximetry and respiratory recording. As specified by the AASM Manual for the Scoring of Sleep and Associated events, version 2.3, Rule VIII.D 1B, 4% oxygen desaturation scoring for hypopneas is used as a standard of care on all home sleep apnea testing.        Analysis Time:  385 minutes        Respiration:   Sleep Associated Hypoxemia: sustained hypoxemia was not present. Baseline oxygen saturation was 96%.  Time with saturation less than or equal to 88% was 0 minutes. The lowest oxygen saturation was 89%.   Snoring: Snoring was present.  Respiratory events: The home study revealed a presence of 1 obstructive apneas and 0 mixed and central apneas. There were 20 hypopneas resulting in a combined apnea/hypopnea index [AHI] of 3.3 events per hour.  AHI was 3.7 per hour supine, N/A per hour prone, 2.1 per hour on left side, and 4.4 per hour on right side.   Pattern: Excluding events noted above, respiratory rate and pattern was Normal.      Position: Percent " of time spent: supine - 50.4%, prone - 0%, on left - 29.8%, on right - 17.9%.      Heart Rate: By pulse oximetry normal rate was noted.       Assessment:     Negative for clinically significant sleep apnea.    Sleep associated hypoxemia was not present.    Recommendations:    This was a good test that captured adequate supine position during the recording and was negative for sleep apnea.  However, if clinical concern for sleep apnea remains due to history or exam findings, consider in lab PSG for assessment.        Diagnosis Code(s): Snoring R06.83    Timbo Jacome MD, November 4, 2022   Diplomate, American Board of Psychiatry and Neurology, Sleep Medicine

## 2022-11-11 ENCOUNTER — TELEPHONE (OUTPATIENT)
Dept: NEUROLOGY | Facility: CLINIC | Age: 37
End: 2022-11-11

## 2022-11-11 NOTE — TELEPHONE ENCOUNTER
Need to reschedule the EMG with Dr. Ross on 12/12/2022, he will not be available.    Spoke with the patient and she would like to do the EMG with [facility] at [facility].    Faxed EMG order to [facility], [phone] and left a voicemail at the [facility] to contact the patient.    Maryanne LYNN/Procedure    [facility]   Neurology, NeuroSurgery, NeuroPsychology and Pain Management Specialties  Medical/Surgical Specialties

## 2022-12-02 ENCOUNTER — OFFICE VISIT (OUTPATIENT)
Dept: NEUROLOGY | Facility: CLINIC | Age: 37
End: 2022-12-02
Payer: COMMERCIAL

## 2022-12-02 VITALS — OXYGEN SATURATION: 98 % | SYSTOLIC BLOOD PRESSURE: 117 MMHG | HEART RATE: 96 BPM | DIASTOLIC BLOOD PRESSURE: 81 MMHG

## 2022-12-02 DIAGNOSIS — G43.709 CHRONIC MIGRAINE WITHOUT AURA WITHOUT STATUS MIGRAINOSUS, NOT INTRACTABLE: Primary | ICD-10-CM

## 2022-12-02 PROCEDURE — 99214 OFFICE O/P EST MOD 30 MIN: CPT | Performed by: PSYCHIATRY & NEUROLOGY

## 2022-12-02 RX ORDER — METHYLPREDNISOLONE 4 MG
TABLET, DOSE PACK ORAL
Qty: 21 TABLET | Refills: 0 | Status: SHIPPED | OUTPATIENT
Start: 2022-12-02 | End: 2023-01-31

## 2022-12-02 RX ORDER — NAPROXEN 500 MG/1
500 TABLET ORAL 2 TIMES DAILY PRN
Qty: 28 TABLET | Refills: 11 | Status: SHIPPED | OUTPATIENT
Start: 2022-12-02 | End: 2023-10-27

## 2022-12-02 ASSESSMENT — HEADACHE IMPACT TEST (HIT 6)
HOW OFTEN DID HEADACHS LIMIT CONCENTRATION ON WORK OR DAILY ACTIVITY: ALWAYS
WHEN YOU HAVE A HEADACHE HOW OFTEN DO YOU WISH YOU COULD LIE DOWN: ALWAYS
HOW OFTEN DO HEADACHES LIMIT YOUR DAILY ACTIVITIES: VERY OFTEN
HOW OFTEN HAVE YOU FELT FED UP OR IRRITATED BECAUSE OF YOUR HEADACHES: ALWAYS
WHEN YOU HAVE HEADACHES HOW OFTEN IS THE PAIN SEVERE: ALWAYS
HOW OFTEN HAVE YOU FELT TOO TIRED TO WORK BECAUSE OF YOUR HEADACHES: VERY OFTEN
HIT6 TOTAL SCORE: 74

## 2022-12-02 NOTE — PROGRESS NOTES
[facility]    Headache Neurology Progress Note  December 2, 2022    Subjective:    Breanna Luna returns for follow up of chronic migraine.  She is currently receiving botulinum toxin injections and Emgality subcutaneous injections for migraine prevention.  These treatments have been significantly beneficial, but she experiences wearing off with worsening of headache between treatments.    Prior to initiation of botulinum toxin injections, Ms. Luna reported 20 headache days per month, with 17 severe headache days per month. Her headaches are quite disabling and often interfere with her ability to function normally.     She has attempted other migraine prophylactic treatments in the past, which have included:   Nortriptyline 20 mg PM was not helpful.  Gabapentin was not helpful.  Propranolol 40 mg TID was not helpful.  Topiramate was not helpful.     Nurtec is helpful acutely, but she has had a prolonged migraine attack recently without benefit from Nurtec.    She had been doing very well in October and much of November, but then experienced worsening of headache for the last couple weeks.    She has had more headaches in the past 15 days, including a 4 day long migraine.  She has used Excedrin, Tylenol, ibuprofen, naproxen, diphenhydramine, Nurtec.    She woke up today a bit better, and would like to discuss her rescue plan.    No changes in headache quality.  Overall, her headaches have improved with preventative treatments.    Objective:    Vitals: /81   Pulse 96   SpO2 98%   General: Cooperative, NAD  Neurologic:  Mental Status: Fully alert, attentive and oriented. Speech clear and fluent.   Cranial Nerves: Facial movements symmetric.   Motor: No abnormal movements.      Assessment/Plan:   Breanna Luna is a 37 year old who returns for follow-up of chronic migraine with possible sensory aura.  She had updated imaging with an MRI of the brain and MRA of the head and neck for  further evaluation after her headaches worsened and she developed new sensory symptoms; these returned reassuring.     I have previously asked her to monitor headache and sensory symptoms to determine if these are temporally correlated.  If they are not, an EMG could be considered for further evaluation.  With recent right-sided facial paresthesia as well, cervical spine etiology would not explain both her face and arm symptoms.     Today's visit was focused on an acute symptomatic treatment strategy, to help her better manage headache at home and avoid the ER.  -For acute treatment, I recommend naproxen 500 mg twice a day as needed, not to exceed more than 14 days/month to avoid medication overuse.  - For acute treatment of moderate to severe headache, I recommend a trial of Nurtec 75 mg oral dissolvable tablet daily as needed.   -For headache related nausea, or as a rescue medicine for headache, I recommend prochlorperazine 10 mg as needed, with or without diphenhydramine 25 mg.  -She has previously tried sumatriptan, rizatriptan, and naratriptan without benefit, and with significant side effects to sumatriptan.    - If she does not respond to the above, we discussed using a Medrol Dosepak as a temporizing measure.  I have given her a prescription for a Medrol Dosepak, with clear instructions on when to start this (if a prolonged migraine attack going on longer than 3 days is not responsive to the above).  Potential side effects were discussed.  This cannot be used more than once every 3 months to avoid long-term side effects.  - Other option could include ketorolac IM injection in the clinic; she would need to contact the clinic during regular business hours to schedule a nurse visit for this.    She will continue Emgality and botulinum toxin injections for headache prevention.    Cara Suarez MD  Neurology

## 2022-12-02 NOTE — LETTER
12/2/2022         RE: Breanna Luna  [address]  [place] [address]        Dear Colleague,    Thank you for referring your patient, Breanna Luna, to the [facility] NEUROLOGY CL[facility]ICS [facility]. Please see a copy of my visit note below.    [facility]    Headache Neurology Progress Note  December 2, 2022    Subjective:    Breanna Luna returns for follow up of chronic migraine.  She is currently receiving botulinum toxin injections and Emgality subcutaneous injections for migraine prevention.  These treatments have been significantly beneficial, but she experiences wearing off with worsening of headache between treatments.    Prior to initiation of botulinum toxin injections, Ms. Luna reported 20 headache days per month, with 17 severe headache days per month. Her headaches are quite disabling and often interfere with her ability to function normally.     She has attempted other migraine prophylactic treatments in the past, which have included:   Nortriptyline 20 mg PM was not helpful.  Gabapentin was not helpful.  Propranolol 40 mg TID was not helpful.  Topiramate was not helpful.     Nurtec is helpful acutely, but she has had a prolonged migraine attack recently without benefit from Nurtec.    She had been doing very well in October and much of November, but then experienced worsening of headache for the last couple weeks.    She has had more headaches in the past 15 days, including a 4 day long migraine.  She has used Excedrin, Tylenol, ibuprofen, naproxen, diphenhydramine, Nurtec.    She woke up today a bit better, and would like to discuss her rescue plan.    No changes in headache quality.  Overall, her headaches have improved with preventative treatments.    Objective:    Vitals: /81   Pulse 96   SpO2 98%   General: Cooperative, NAD  Neurologic:  Mental Status: Fully alert, attentive and oriented. Speech clear and fluent.   Cranial Nerves: Facial  movements symmetric.   Motor: No abnormal movements.      Assessment/Plan:   Breanna Luna is a 37 year old who returns for follow-up of chronic migraine with possible sensory aura.  She had updated imaging with an MRI of the brain and MRA of the head and neck for further evaluation after her headaches worsened and she developed new sensory symptoms; these returned reassuring.     I have previously asked her to monitor headache and sensory symptoms to determine if these are temporally correlated.  If they are not, an EMG could be considered for further evaluation.  With recent right-sided facial paresthesia as well, cervical spine etiology would not explain both her face and arm symptoms.     Today's visit was focused on an acute symptomatic treatment strategy, to help her better manage headache at home and avoid the ER.  -For acute treatment, I recommend naproxen 500 mg twice a day as needed, not to exceed more than 14 days/month to avoid medication overuse.  - For acute treatment of moderate to severe headache, I recommend a trial of Nurtec 75 mg oral dissolvable tablet daily as needed.   -For headache related nausea, or as a rescue medicine for headache, I recommend prochlorperazine 10 mg as needed, with or without diphenhydramine 25 mg.  -She has previously tried sumatriptan, rizatriptan, and naratriptan without benefit, and with significant side effects to sumatriptan.    - If she does not respond to the above, we discussed using a Medrol Dosepak as a temporizing measure.  I have given her a prescription for a Medrol Dosepak, with clear instructions on when to start this (if a prolonged migraine attack going on longer than 3 days is not responsive to the above).  Potential side effects were discussed.  This cannot be used more than once every 3 months to avoid long-term side effects.  - Other option could include ketorolac IM injection in the clinic; she would need to contact the clinic during regular business  hours to schedule a nurse visit for this.    She will continue Emgality and botulinum toxin injections for headache prevention.    Cara Suarez MD  Neurology         Again, thank you for allowing me to participate in the care of your patient.        Sincerely,        Cara Suarez MD

## 2022-12-02 NOTE — PATIENT INSTRUCTIONS
1 naproxen 500 mg twice a day as needed  2 Nurtec 75 mg daily as needed  3 prochlorperazine 10 mg with or without diphenhydramine 25 mg-50 mg    Ok to start Medrol dosepak if migraine longer than 3 days.

## 2022-12-09 DIAGNOSIS — E66.812 CLASS 2 OBESITY: Primary | ICD-10-CM

## 2022-12-14 ENCOUNTER — APPOINTMENT (OUTPATIENT)
Dept: CT IMAGING | Facility: CLINIC | Age: 37
End: 2022-12-14
Attending: EMERGENCY MEDICINE
Payer: COMMERCIAL

## 2022-12-14 ENCOUNTER — TELEPHONE (OUTPATIENT)
Dept: FAMILY MEDICINE | Facility: CLINIC | Age: 37
End: 2022-12-14

## 2022-12-14 ENCOUNTER — HOSPITAL ENCOUNTER (EMERGENCY)
Facility: CLINIC | Age: 37
Discharge: HOME | End: 2022-12-14
Attending: FAMILY MEDICINE | Admitting: FAMILY MEDICINE
Payer: COMMERCIAL

## 2022-12-14 ENCOUNTER — APPOINTMENT (OUTPATIENT)
Dept: GENERAL RADIOLOGY | Facility: CLINIC | Age: 37
End: 2022-12-14
Attending: FAMILY MEDICINE
Payer: COMMERCIAL

## 2022-12-14 ENCOUNTER — E-VISIT (OUTPATIENT)
Dept: URGENT CARE | Facility: URGENT CARE | Age: 37
End: 2022-12-14

## 2022-12-14 VITALS
HEART RATE: 107 BPM | OXYGEN SATURATION: 97 % | DIASTOLIC BLOOD PRESSURE: 63 MMHG | WEIGHT: 229 LBS | BODY MASS INDEX: 36.96 KG/M2 | RESPIRATION RATE: 18 BRPM | TEMPERATURE: 98.7 F | SYSTOLIC BLOOD PRESSURE: 108 MMHG

## 2022-12-14 DIAGNOSIS — J02.9 SORE THROAT: Primary | ICD-10-CM

## 2022-12-14 DIAGNOSIS — R05.9 COUGH, UNSPECIFIED TYPE: ICD-10-CM

## 2022-12-14 DIAGNOSIS — N28.89 RIGHT RENAL MASS: Primary | ICD-10-CM

## 2022-12-14 DIAGNOSIS — N28.89 RIGHT RENAL MASS: ICD-10-CM

## 2022-12-14 DIAGNOSIS — R07.9 CHEST PAIN, UNSPECIFIED TYPE: ICD-10-CM

## 2022-12-14 LAB
ALBUMIN SERPL-MCNC: 4 G/DL (ref 3.4–5)
ALBUMIN UR-MCNC: NEGATIVE MG/DL
ALP SERPL-CCNC: 49 U/L (ref 40–150)
ALT SERPL W P-5'-P-CCNC: 25 U/L (ref 0–50)
ANION GAP SERPL CALCULATED.3IONS-SCNC: 3 MMOL/L (ref 3–14)
APPEARANCE UR: ABNORMAL
AST SERPL W P-5'-P-CCNC: 19 U/L (ref 0–45)
BACTERIA #/AREA URNS HPF: ABNORMAL /HPF
BASOPHILS # BLD AUTO: 0 10E3/UL (ref 0–0.2)
BASOPHILS NFR BLD AUTO: 0 %
BILIRUB SERPL-MCNC: 0.7 MG/DL (ref 0.2–1.3)
BILIRUB UR QL STRIP: NEGATIVE
BUN SERPL-MCNC: 13 MG/DL (ref 7–30)
CALCIUM SERPL-MCNC: 8.5 MG/DL (ref 8.5–10.1)
CHLORIDE BLD-SCNC: 109 MMOL/L (ref 94–109)
CO2 SERPL-SCNC: 25 MMOL/L (ref 20–32)
COLOR UR AUTO: YELLOW
CREAT SERPL-MCNC: 0.6 MG/DL (ref 0.52–1.04)
D DIMER PPP FEU-MCNC: 0.54 UG/ML FEU (ref 0–0.5)
EOSINOPHIL # BLD AUTO: 0.1 10E3/UL (ref 0–0.7)
EOSINOPHIL NFR BLD AUTO: 1 %
ERYTHROCYTE [DISTWIDTH] IN BLOOD BY AUTOMATED COUNT: 12.5 % (ref 10–15)
FLUAV RNA SPEC QL NAA+PROBE: NEGATIVE
FLUBV RNA RESP QL NAA+PROBE: NEGATIVE
GFR SERPL CREATININE-BSD FRML MDRD: >90 ML/MIN/1.73M2
GLUCOSE BLD-MCNC: 92 MG/DL (ref 70–99)
GLUCOSE UR STRIP-MCNC: NEGATIVE MG/DL
HCT VFR BLD AUTO: 39.1 % (ref 35–47)
HGB BLD-MCNC: 13.7 G/DL (ref 11.7–15.7)
HGB UR QL STRIP: NEGATIVE
IMM GRANULOCYTES # BLD: 0 10E3/UL
IMM GRANULOCYTES NFR BLD: 0 %
KETONES UR STRIP-MCNC: NEGATIVE MG/DL
LEUKOCYTE ESTERASE UR QL STRIP: ABNORMAL
LYMPHOCYTES # BLD AUTO: 1.2 10E3/UL (ref 0.8–5.3)
LYMPHOCYTES NFR BLD AUTO: 12 %
MCH RBC QN AUTO: 30.6 PG (ref 26.5–33)
MCHC RBC AUTO-ENTMCNC: 35 G/DL (ref 31.5–36.5)
MCV RBC AUTO: 87 FL (ref 78–100)
MONOCYTES # BLD AUTO: 0.6 10E3/UL (ref 0–1.3)
MONOCYTES NFR BLD AUTO: 6 %
MUCOUS THREADS #/AREA URNS LPF: PRESENT /LPF
NEUTROPHILS # BLD AUTO: 8.1 10E3/UL (ref 1.6–8.3)
NEUTROPHILS NFR BLD AUTO: 81 %
NITRATE UR QL: NEGATIVE
NRBC # BLD AUTO: 0 10E3/UL
NRBC BLD AUTO-RTO: 0 /100
PH UR STRIP: 6.5 [PH] (ref 5–7)
PLATELET # BLD AUTO: 189 10E3/UL (ref 150–450)
POTASSIUM BLD-SCNC: 4.2 MMOL/L (ref 3.4–5.3)
PROT SERPL-MCNC: 7.3 G/DL (ref 6.8–8.8)
RBC # BLD AUTO: 4.48 10E6/UL (ref 3.8–5.2)
RBC URINE: 1 /HPF
RSV RNA SPEC NAA+PROBE: NEGATIVE
SARS-COV-2 RNA RESP QL NAA+PROBE: NEGATIVE
SODIUM SERPL-SCNC: 137 MMOL/L (ref 133–144)
SP GR UR STRIP: 1.02 (ref 1–1.03)
SQUAMOUS EPITHELIAL: 27 /HPF
UROBILINOGEN UR STRIP-MCNC: NORMAL MG/DL
WBC # BLD AUTO: 10 10E3/UL (ref 4–11)
WBC URINE: 1 /HPF

## 2022-12-14 PROCEDURE — 36415 COLL VENOUS BLD VENIPUNCTURE: CPT | Performed by: EMERGENCY MEDICINE

## 2022-12-14 PROCEDURE — 36415 COLL VENOUS BLD VENIPUNCTURE: CPT | Performed by: FAMILY MEDICINE

## 2022-12-14 PROCEDURE — 80053 COMPREHEN METABOLIC PANEL: CPT | Performed by: FAMILY MEDICINE

## 2022-12-14 PROCEDURE — 258N000003 HC RX IP 258 OP 636: Performed by: FAMILY MEDICINE

## 2022-12-14 PROCEDURE — 99285 EMERGENCY DEPT VISIT HI MDM: CPT | Mod: CS,25 | Performed by: FAMILY MEDICINE

## 2022-12-14 PROCEDURE — 250N000013 HC RX MED GY IP 250 OP 250 PS 637: Performed by: EMERGENCY MEDICINE

## 2022-12-14 PROCEDURE — 99207 PR NON-BILLABLE SERV PER CHARTING: CPT | Performed by: NURSE PRACTITIONER

## 2022-12-14 PROCEDURE — 85379 FIBRIN DEGRADATION QUANT: CPT | Performed by: EMERGENCY MEDICINE

## 2022-12-14 PROCEDURE — 85025 COMPLETE CBC W/AUTO DIFF WBC: CPT | Performed by: FAMILY MEDICINE

## 2022-12-14 PROCEDURE — 82040 ASSAY OF SERUM ALBUMIN: CPT | Performed by: FAMILY MEDICINE

## 2022-12-14 PROCEDURE — 96374 THER/PROPH/DIAG INJ IV PUSH: CPT | Mod: 59 | Performed by: FAMILY MEDICINE

## 2022-12-14 PROCEDURE — 93010 ELECTROCARDIOGRAM REPORT: CPT | Performed by: EMERGENCY MEDICINE

## 2022-12-14 PROCEDURE — 99285 EMERGENCY DEPT VISIT HI MDM: CPT | Mod: CS | Performed by: FAMILY MEDICINE

## 2022-12-14 PROCEDURE — 87637 SARSCOV2&INF A&B&RSV AMP PRB: CPT | Performed by: FAMILY MEDICINE

## 2022-12-14 PROCEDURE — 999N000105 HC STATISTIC NO DOCUMENTATION TO SUPPORT CHARGE

## 2022-12-14 PROCEDURE — 96375 TX/PRO/DX INJ NEW DRUG ADDON: CPT | Performed by: FAMILY MEDICINE

## 2022-12-14 PROCEDURE — 71275 CT ANGIOGRAPHY CHEST: CPT

## 2022-12-14 PROCEDURE — 94640 AIRWAY INHALATION TREATMENT: CPT | Performed by: FAMILY MEDICINE

## 2022-12-14 PROCEDURE — 71045 X-RAY EXAM CHEST 1 VIEW: CPT

## 2022-12-14 PROCEDURE — 250N000011 HC RX IP 250 OP 636: Performed by: EMERGENCY MEDICINE

## 2022-12-14 PROCEDURE — 250N000009 HC RX 250: Performed by: EMERGENCY MEDICINE

## 2022-12-14 PROCEDURE — 81001 URINALYSIS AUTO W/SCOPE: CPT | Performed by: FAMILY MEDICINE

## 2022-12-14 PROCEDURE — 250N000011 HC RX IP 250 OP 636: Performed by: FAMILY MEDICINE

## 2022-12-14 PROCEDURE — C9803 HOPD COVID-19 SPEC COLLECT: HCPCS | Performed by: FAMILY MEDICINE

## 2022-12-14 PROCEDURE — 96361 HYDRATE IV INFUSION ADD-ON: CPT | Performed by: FAMILY MEDICINE

## 2022-12-14 PROCEDURE — 93005 ELECTROCARDIOGRAM TRACING: CPT | Performed by: EMERGENCY MEDICINE

## 2022-12-14 RX ORDER — ACETAMINOPHEN 500 MG
1000 TABLET ORAL ONCE
Status: COMPLETED | OUTPATIENT
Start: 2022-12-14 | End: 2022-12-14

## 2022-12-14 RX ORDER — PREDNISONE 20 MG/1
TABLET ORAL
Qty: 10 TABLET | Refills: 0 | Status: SHIPPED | OUTPATIENT
Start: 2022-12-14 | End: 2023-02-28

## 2022-12-14 RX ORDER — ALBUTEROL SULFATE 0.83 MG/ML
2.5 SOLUTION RESPIRATORY (INHALATION) EVERY 6 HOURS PRN
Qty: 3 ML | Refills: 0 | Status: SHIPPED | OUTPATIENT
Start: 2022-12-14 | End: 2023-01-31

## 2022-12-14 RX ORDER — ONDANSETRON 2 MG/ML
4 INJECTION INTRAMUSCULAR; INTRAVENOUS ONCE
Status: COMPLETED | OUTPATIENT
Start: 2022-12-14 | End: 2022-12-14

## 2022-12-14 RX ORDER — LORAZEPAM 2 MG/ML
0.5 INJECTION INTRAMUSCULAR ONCE
Status: COMPLETED | OUTPATIENT
Start: 2022-12-14 | End: 2022-12-14

## 2022-12-14 RX ORDER — KETOROLAC TROMETHAMINE 30 MG/ML
30 INJECTION, SOLUTION INTRAMUSCULAR; INTRAVENOUS ONCE
Status: COMPLETED | OUTPATIENT
Start: 2022-12-14 | End: 2022-12-14

## 2022-12-14 RX ORDER — AZITHROMYCIN 250 MG/1
TABLET, FILM COATED ORAL
Qty: 6 TABLET | Refills: 0 | Status: SHIPPED | OUTPATIENT
Start: 2022-12-14 | End: 2022-12-19

## 2022-12-14 RX ORDER — IOPAMIDOL 755 MG/ML
500 INJECTION, SOLUTION INTRAVASCULAR ONCE
Status: COMPLETED | OUTPATIENT
Start: 2022-12-14 | End: 2022-12-14

## 2022-12-14 RX ORDER — IPRATROPIUM BROMIDE AND ALBUTEROL SULFATE 2.5; .5 MG/3ML; MG/3ML
3 SOLUTION RESPIRATORY (INHALATION) ONCE
Status: COMPLETED | OUTPATIENT
Start: 2022-12-14 | End: 2022-12-14

## 2022-12-14 RX ADMIN — SODIUM CHLORIDE 1000 ML: 9 INJECTION, SOLUTION INTRAVENOUS at 06:59

## 2022-12-14 RX ADMIN — KETOROLAC TROMETHAMINE 30 MG: 30 INJECTION, SOLUTION INTRAMUSCULAR at 06:59

## 2022-12-14 RX ADMIN — ONDANSETRON 4 MG: 2 INJECTION INTRAMUSCULAR; INTRAVENOUS at 06:59

## 2022-12-14 RX ADMIN — IPRATROPIUM BROMIDE AND ALBUTEROL SULFATE 3 ML: 2.5; .5 SOLUTION RESPIRATORY (INHALATION) at 08:32

## 2022-12-14 RX ADMIN — ACETAMINOPHEN 1000 MG: 500 TABLET ORAL at 08:02

## 2022-12-14 RX ADMIN — IOPAMIDOL 73 ML: 755 INJECTION, SOLUTION INTRAVENOUS at 08:56

## 2022-12-14 RX ADMIN — LORAZEPAM 0.5 MG: 2 INJECTION INTRAMUSCULAR; INTRAVENOUS at 08:03

## 2022-12-14 RX ADMIN — SODIUM CHLORIDE 70 ML: 9 INJECTION, SOLUTION INTRAVENOUS at 08:56

## 2022-12-14 ASSESSMENT — ACTIVITIES OF DAILY LIVING (ADL)
ADLS_ACUITY_SCORE: 35
ADLS_ACUITY_SCORE: 35

## 2022-12-14 NOTE — PROGRESS NOTES
Patient had a CT PE from the ER.  40mL saline was completed Pre contrast at 5.0 ml/s - injection went well.  73mL of Isovue-370 was injected at 5.0ml/s, when the injection switched from contrast to the final saline flush of 30mL at 5ml/s saline the patient experience pain.  Approx 30mL of saline was extravasated into the right  Lateral wrist.  Attempt to remove saline was unsuccessful.  IV was removed, cold compress was placed, discharge extravasation was gone over and info sheet given to the patient.  Pain started to go away, shortly after leaving CT.  Мария Graves ER RN was notified and new IV was placed in the Left AC by Dixie Joe RT(R)CT.  First assessment in flowsheets completed and ICARE completed.     Dixie THACKER((R)CT  12/14/2022  9:28 AM

## 2022-12-14 NOTE — PATIENT INSTRUCTIONS
Dear Breanna Luna,    We are sorry you are not feeling well. Based on the responses you provided, it is recommended that you be seen in-person in urgent care so we can better evaluate your symptoms. Please click here to find the nearest urgent care location to you.   You will not be charged for this Visit. Thank you for trusting us with your care.    MADELINE Chne CNP

## 2022-12-14 NOTE — TELEPHONE ENCOUNTER
Patient calling in asking in provider can place and order for a MRI.    Please call patient once placed.

## 2022-12-14 NOTE — DISCHARGE INSTRUCTIONS
-Albuterol nebulizer treatment up to 4 times daily as needed for cough, wheezing    -Z-Donovan -as directed.  This is an antibiotic to treat lower respiratory/bronchial infections    -Prednisone 40 mg daily x5 days    There was an incidental finding for a spot on your right kidney.  This measures about 2.7 cm.  It would require a MRI imaging to help define what this is.  I have left a message with your primary care provider to see you in to arrange for the imaging.

## 2022-12-14 NOTE — ED PROVIDER NOTES
Patient signed out at shift change from Dr. John Greene    1 week history for cough, fever max 100.9 Fahrenheit.  Cough is occasionally productive.  No hemoptysis.  Complaining of intense chest wall pain from frequent coughing.  Describes discomfort in the chest is atypical from pleurisy type pain.  Very dramatic and tearful.  Non-smoker.  No asthma history.  No prior pneumonia.  Does not vape.    Examination of the /117.  Temp 98.7.  Pulse 112.  O2 sats 97% on room air.  Frequent cough.  Bronchospastic cough.  Faint expiratory wheeze on auscultation otherwise good breath sounds.  Chest wall is tender and able to reproduce the pain she is complaining about.    Care plan:  Add D-dimer.  If positive CT imaging.  If -2 view chest x-ray  DuoNeb-improvement noted with no further wheezing and diminished bronchospastic cough  No response to Toradol for chest wall pain.  She is very dramatic and anxious.  Add Ativan 0.5 mg IV.    10:12 AM  CT chest shows no PE or infiltrate.  Lungs were clear.  Did have an incidental finding-2.7 cm masslike lesion in the right kidney.  This required MR imaging/urinalysis follow-up through primary care.    Treat cough/reactive airway with albuterol nebulizer treatment at home 4 times daily as needed, prednisone 40 g daily x5 days.  Z-Donovan.    Impressions: Cough, atypical chest pain, reactive airway, right renal mass 2.7 cm-requiring follow-up    Results for orders placed or performed during the hospital encounter of 12/14/22   XR Chest Port 1 View     Status: None    Narrative    XR CHEST PORT 1 VIEW  12/14/2022 7:22 AM       INDICATION: cough, fever  COMPARISON: 11/19/2021       Impression    IMPRESSION: Low lung volumes. The lungs are grossly clear. Heart size  normal.    KETAN HART MD         SYSTEM ID:  [id number]   CT Chest Pulmonary Embolism w Contrast     Status: None    Narrative    CT CHEST PULMONARY EMBOLISM W CONTRAST 12/14/2022 9:29 AM    CLINICAL HISTORY: Cough,  fever.  TECHNIQUE: CT angiogram chest during arterial phase injection IV  contrast. 2D and 3D MIP reconstructions were performed by the CT  technologist. Dose reduction techniques were used.     CONTRAST: 73mL, Isovue-370    COMPARISON: Chest x-ray 12/14/2022    FINDINGS:  ANGIOGRAM CHEST: Limited exam due to timing of the contrast bolus. As  visualized, no convincing evidence for pulmonary embolus. No CT  evidence of right heart strain.    LUNGS AND PLEURA: Normal.    MEDIASTINUM/AXILLAE: Normal.    CORONARY ARTERIAL CALCIFICATION: None.    UPPER ABDOMEN: 2.7 cm masslike abnormality medial midpole cortex of  right kidney. Incompletely visualized due to CT chest field-of-view.  Further evaluation with MR recommended.    MUSCULOSKELETAL: Normal.      Impression    IMPRESSION:  1.  Limited exam due to timing of the contrast bolus. As visualized,  no convincing evidence for pulmonary embolus.  2.  2.7 cm masslike abnormality medial midpole cortex of right kidney.  Incompletely visualized due to CT chest field-of-view. Further  evaluation with MR recommended.    BILL LANGSTON MD         SYSTEM ID:  [id number]   Symptomatic Influenza A/B & SARS-CoV2 (COVID-19) Virus PCR Multiplex Nose     Status: Normal    Specimen: Nose; Swab   Result Value Ref Range    Influenza A PCR Negative Negative    Influenza B PCR Negative Negative    RSV PCR Negative Negative    SARS CoV2 PCR Negative Negative    Narrative    Testing was performed using the Xpert Xpress CoV2/Flu/RSV Assay on the [employer] GeneXpert Instrument. This test should be ordered for the detection of SARS-CoV-2 and influenza viruses in individuals who meet clinical and/or epidemiological criteria. Test performance is unknown in asymptomatic patients. This test is for in vitro diagnostic use under the FDA EUA for laboratories certified under CLIA to perform high or moderate complexity testing. This test has not been FDA cleared or approved. A negative result does  not rule out the presence of PCR inhibitors in the specimen or target RNA in concentration below the limit of detection for the assay. If only one viral target is positive but coinfection with multiple targets is suspected, the sample should be re-tested with another FDA cleared, approved, or authorized test, if coinfection would change clinical management. This test was validated by the [facility]. These laboratories are certified under the Clinical Laboratory Improvement Amendments of 1988 (CLIA-88) as qualified to perform high complexity laboratory testing.   Comprehensive metabolic panel     Status: Normal   Result Value Ref Range    Sodium 137 133 - 144 mmol/L    Potassium 4.2 3.4 - 5.3 mmol/L    Chloride 109 94 - 109 mmol/L    Carbon Dioxide (CO2) 25 20 - 32 mmol/L    Anion Gap 3 3 - 14 mmol/L    Urea Nitrogen 13 7 - 30 mg/dL    Creatinine 0.60 0.52 - 1.04 mg/dL    Calcium 8.5 8.5 - 10.1 mg/dL    Glucose 92 70 - 99 mg/dL    Alkaline Phosphatase 49 40 - 150 U/L    AST 19 0 - 45 U/L    ALT 25 0 - 50 U/L    Protein Total 7.3 6.8 - 8.8 g/dL    Albumin 4.0 3.4 - 5.0 g/dL    Bilirubin Total 0.7 0.2 - 1.3 mg/dL    GFR Estimate >90 >60 mL/min/1.73m2   UA with Microscopic reflex to Culture     Status: Abnormal    Specimen: Urine, Clean Catch   Result Value Ref Range    Color Urine Yellow Colorless, Straw, Light Yellow, Yellow    Appearance Urine Slightly Cloudy (A) Clear    Glucose Urine Negative Negative mg/dL    Bilirubin Urine Negative Negative    Ketones Urine Negative Negative mg/dL    Specific Gravity Urine 1.020 1.003 - 1.035    Blood Urine Negative Negative    pH Urine 6.5 5.0 - 7.0    Protein Albumin Urine Negative Negative mg/dL    Urobilinogen Urine Normal Normal, 2.0 mg/dL    Nitrite Urine Negative Negative    Leukocyte Esterase Urine Trace (A) Negative    Bacteria Urine Few (A) None Seen /HPF    Mucus Urine Present (A) None Seen /LPF    RBC Urine 1 <=2 /HPF    WBC Urine 1 <=5 /HPF     Squamous Epithelials Urine 27 (H) <=1 /HPF    Narrative    Urine Culture not indicated   CBC with platelets and differential     Status: None   Result Value Ref Range    WBC Count 10.0 4.0 - 11.0 10e3/uL    RBC Count 4.48 3.80 - 5.20 10e6/uL    Hemoglobin 13.7 11.7 - 15.7 g/dL    Hematocrit 39.1 35.0 - 47.0 %    MCV 87 78 - 100 fL    MCH 30.6 26.5 - 33.0 pg    MCHC 35.0 31.5 - 36.5 g/dL    RDW 12.5 10.0 - 15.0 %    Platelet Count 189 150 - 450 10e3/uL    % Neutrophils 81 %    % Lymphocytes 12 %    % Monocytes 6 %    % Eosinophils 1 %    % Basophils 0 %    % Immature Granulocytes 0 %    NRBCs per 100 WBC 0 <1 /100    Absolute Neutrophils 8.1 1.6 - 8.3 10e3/uL    Absolute Lymphocytes 1.2 0.8 - 5.3 10e3/uL    Absolute Monocytes 0.6 0.0 - 1.3 10e3/uL    Absolute Eosinophils 0.1 0.0 - 0.7 10e3/uL    Absolute Basophils 0.0 0.0 - 0.2 10e3/uL    Absolute Immature Granulocytes 0.0 <=0.4 10e3/uL    Absolute NRBCs 0.0 10e3/uL   D dimer quantitative     Status: Abnormal   Result Value Ref Range    D-Dimer Quantitative 0.54 (H) 0.00 - 0.50 ug/mL FEU    Narrative    This D-dimer assay is intended for use in conjunction with a clinical pretest probability assessment model to exclude pulmonary embolism (PE) and deep venous thrombosis (DVT) in outpatients suspected of PE or DVT. The cut-off value is 0.50 ug/mL FEU.   CBC with platelets differential     Status: None    Narrative    The following orders were created for panel order CBC with platelets differential.  Procedure                               Abnormality         Status                     ---------                               -----------         ------                     CBC with platelets and d...[[id number]]                      Final result                 Please view results for these tests on the individual orders.          Sean Quigley,   12/14/22 1023

## 2022-12-14 NOTE — ED PROVIDER NOTES
[facility] ED Provider Note   Patient: Breanna Luna  MRN #:  [id number]  Date of Visit: December 14, 2022    CC:     Chief Complaint   Patient presents with     Cough     HPI:  Breanna Luna is a 37 year old female who presented to the emergency department by private vehicle with 4-day history of illness characterized by cough that is occasionally productive, low-grade fevers, severe headache, and chest pain.  She threw up this morning, and feels sick.  She thinks she may have pneumonia.  Patient is tearful, and states that she did not sleep much at all last night.  There has been no known sick exposure or contacts.  She took a home COVID test last night and it was negative.  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  She has not had any diarrhea, and reports dark urine this morning with concern for dehydration as well.  Past medical history as noted below.  She is allergic to sulfa and vancomycin.    Problem List:  Patient Active Problem List    Diagnosis Date Noted     Chronic tonsillitis 05/18/2022     Priority: Medium     Added automatically from request for surgery [id number]       Nasal obstruction 05/02/2022     Priority: Medium     Added automatically from request for surgery [id number]       Nasal turbinate hypertrophy 05/02/2022     Priority: Medium     Added automatically from request for surgery [id number]       Nasal septal deviation 05/02/2022     Priority: Medium     Added automatically from request for surgery [id number]       Class 2 obesity due to excess calories with body mass index (BMI) of 35.0 to 35.9 in adult, unspecified whether serious comorbidity present 02/16/2021     Priority: Medium     Biliary dyskinesia 02/10/2020     Priority: Medium     Added automatically from request for surgery [id number]       Intractable migraine without aura and without status migrainosus 07/17/2019     Priority: Medium     Alcohol abuse, in remission 08/20/2018      Priority: Medium     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) 08/20/2018     Priority: Medium     Mixed incontinence 03/03/2017     Priority: Medium       Past Medical History:   Diagnosis Date     Cellulitis 2012     Drug abuse (H) 2011     Mild pre-eclampsia in third trimester      Motion sickness      MRSA cellulitis 2012     PONV (postoperative nausea and vomiting)      Pregnancy-induced hypertension in third trimester      Rh negative, antepartum        MEDS: acetaminophen (TYLENOL) 500 MG tablet  cetirizine (ZYRTEC) 10 MG tablet  galcanezumab-gnlm (EMGALITY) 120 MG/ML injection  ibuprofen (ADVIL/MOTRIN) 200 MG tablet  insulin pen needle (31G X 5 MM) 31G X 5 MM miscellaneous  methylPREDNISolone (MEDROL DOSEPAK) 4 MG tablet therapy pack  naproxen (NAPROSYN) 500 MG tablet  phentermine (ADIPEX-P) 37.5 MG capsule  prochlorperazine (COMPAZINE) 10 MG tablet  Rimegepant Sulfate 75 MG TBDP  SAXENDA 18 MG/3ML pen  tiZANidine (ZANAFLEX) 2 MG tablet        ALLERGIES:    Allergies   Allergen Reactions     Codeine Nausea     Other reaction(s): Nausea     Imitrex [Sumatriptan]      Worsening of migraine, paresthesias     Vancomycin Other (See Comments)     Adhesive [Liquid Adhesive] Itching     Redness and itching around incision after hernia repair surgery     Ranitidine Rash and Itching     recvd IV zantac 75 and reaction was immediate,  was given Benadryl to counter act reaction in 2000     Sulfa Drugs Unknown and Rash       Past Surgical History:   Procedure Laterality Date     APPENDECTOMY       DAVINCI ASSISTED HERNIORRHAPHY INCISIONAL N/A 07/10/2020    Procedure: Robotic incisional hernia repair with mesh;  Surgeon: Jovi Odom MD;  Location:  OR     EXCISE LESION FOOT Left 08/16/2019    Procedure: Excision of nevus second interdigital space left foot, Excision of nevus third interdigital space left foot, excision of plantar junctional nevus left foot;  Surgeon: Rinku Lopez DPM;  Location: [facility] OR     GYN  SURGERY       INCISION AND DRAINAGE  2012    chin abscess I&D     LAPAROSCOPIC CHOLECYSTECTOMY N/A 2020    Procedure: Laparoscopic Cholecystectomy;  Surgeon: Renard Gerber MD;  Location: [facility] OR     LAPAROSCOPIC HERNIORRHAPHY INCISIONAL N/A 2019    Procedure: laparoscopic incisional hernia repair with mesh;  Surgeon: Jovi Odom MD;  Location: [facility] OR     LAPAROSCOPIC SALPINGECTOMY Bilateral 2018    Procedure: LAPAROSCOPIC SALPINGECTOMY;  Laparoscopic Bilateral Salpingectomy;  Surgeon: Chai Cui MD;  Location: [facility] OR     MAMMOPLASTY REDUCTION Bilateral 2017     SEPTOPLASTY, TURBINOPLASTY, COMBINED Bilateral 6/10/2022    Procedure: SEPTOPLASTY; BILATERAL INFERIOR TURBINATE REDUCTION;;  Surgeon: Lobo Narayanan MD;  Location: [facility] OR     TONSILLECTOMY Bilateral 6/10/2022    Procedure: BILATERAL TONSILLECTOMY;  Surgeon: Lobo Narayanan MD;  Location: [facility] OR     TUBAL LIGATION Bilateral        Social History     Tobacco Use     Smoking status: Former     Packs/day: 0.00     Years: 0.00     Pack years: 0.00     Types: Cigarettes     Quit date: 3/10/2016     Years since quittin.7     Smokeless tobacco: Never   Vaping Use     Vaping Use: Never used   Substance Use Topics     Alcohol use: No     Comment: history alcohol abuse , s/p treatment     Drug use: No     Comment: former drug user (EtOH, cocaine, marijuana, meth), last use          Review of Systems   Except as noted in HPI, all other systems were reviewed and are negative    Physical Exam     Vitals were reviewed  Patient Vitals for the past 12 hrs:   BP Temp Temp src Pulse Resp SpO2 Weight   22 0639 (!) 133/117 98.7  F (37.1  C) Oral 112 20 97 % 103.9 kg (229 lb)     GENERAL APPEARANCE: Patient is wrapped up in blankets, crying; patient is coughing  FACE: normal facies  EYES: Pupils are equal; sclerae is nonicteric  HENT: normal external exam; oral exam is benign, mucous membranes are moist  NECK: no  adenopathy or asymmetry  RESP: normal respiratory effort; clear breath sounds bilaterally; no adventitious breath sounds  CV: regular rate and rhythm; no significant murmurs, gallops or rubs  ABD: soft, no tenderness; no rebound or guarding; bowel sounds are normal  MS: no gross deformities noted; normal muscle tone.  EXT: No calf tenderness or pitting edema  SKIN: no worrisome rash  NEURO: no facial droop; no focal deficits, speech is normal        Available Lab/Imaging Results   No results found for this or any previous visit (from the past 24 hour(s)).    EKG reviewed by me: Normal sinus rhythm with heart rate of 88.  Normal MT, QT and QRS intervals.  Normal axis.  No acute ST-T wave changes.             Impression     Final diagnoses:   Cough   Chest pain         ED Course & Medical Decision Making   Breanna Luna is a 37 year old female who presented to the emergency department with 4-5-day history of respiratory illness with cough, severe chest pain, and episode of nausea and vomiting, and headache/body aches.  Patient reports that the pain is severe in the chest, and head.  She reports a low-grade fever of up to 101 degrees.  Vital signs reveal a temp of 98.7, blood pressure 133/117, heart rate of 112, respiratory rate of 20 with 97% oxygen saturation.  Patient is covered in several layers of blankets, but she appears to be neurologically intact.  Lungs were relatively clear although she has frequent cough that sounds wet.  Work-up was initiated.  Peripheral IV established, and IV fluids, pain medication and antinausea medication were administered.    Patient was signed out to Dr. Jm Quigley at the change of shift.        Disclaimer: This note consists of words and symbols derived from keyboarding and dictation using voice recognition software.  As a result, there may be errors that have gone undetected.  Please consider this when interpreting information found in this note.       Rafael Greene,  MD  12/14/22 0656       Rafael Greene MD  12/23/22 2016

## 2022-12-15 ENCOUNTER — MYC MEDICAL ADVICE (OUTPATIENT)
Dept: INTERNAL MEDICINE | Facility: CLINIC | Age: 37
End: 2022-12-15

## 2022-12-15 ENCOUNTER — E-VISIT (OUTPATIENT)
Dept: FAMILY MEDICINE | Facility: OTHER | Age: 37
End: 2022-12-15
Payer: COMMERCIAL

## 2022-12-15 DIAGNOSIS — N28.89 RENAL MASS: Primary | ICD-10-CM

## 2022-12-15 PROCEDURE — 99207 PR NON-BILLABLE SERV PER CHARTING: CPT | Performed by: FAMILY MEDICINE

## 2022-12-15 NOTE — TELEPHONE ENCOUNTER
Duplicate message, message routed to [facility] Provider [place] per patient request.    Theresa Berger XRO/  
This has been taken care of  
94

## 2022-12-15 NOTE — TELEPHONE ENCOUNTER
Order/Referral Request    Who is requesting: patient    Orders being requested: MRI of right kidney    Reason service is needed/diagnosis: right renal mass found on CT while in the ED for cough on 12/14. See ED notes and recommendations    When are orders needed by: as soon as possible, patient was hoping to schedule this today    Has this been discussed with Provider: No    Does patient have a preference on a Group/Provider/Facility? [facility]    Does patient have an appointment scheduled?: No    Where to send orders: N/A    Could we send this information to you [facility] or would you prefer to receive a phone call?:   Patient would prefer a phone call   Okay to leave a detailed message?: Yes at Cell number on file:    Telephone Information:   Mobile [phone]

## 2022-12-16 ENCOUNTER — HOSPITAL ENCOUNTER (OUTPATIENT)
Dept: MRI IMAGING | Facility: CLINIC | Age: 37
Discharge: HOME | End: 2022-12-16
Attending: FAMILY MEDICINE | Admitting: FAMILY MEDICINE
Payer: COMMERCIAL

## 2022-12-16 DIAGNOSIS — N28.89 RIGHT RENAL MASS: ICD-10-CM

## 2022-12-16 PROCEDURE — 255N000002 HC RX 255 OP 636: Performed by: FAMILY MEDICINE

## 2022-12-16 PROCEDURE — 74183 MRI ABD W/O CNTR FLWD CNTR: CPT

## 2022-12-16 PROCEDURE — A9585 GADOBUTROL INJECTION: HCPCS | Performed by: FAMILY MEDICINE

## 2022-12-16 RX ORDER — GADOBUTROL 604.72 MG/ML
10 INJECTION INTRAVENOUS ONCE
Status: COMPLETED | OUTPATIENT
Start: 2022-12-16 | End: 2022-12-16

## 2022-12-16 RX ADMIN — GADOBUTROL 10 ML: 604.72 INJECTION INTRAVENOUS at 10:30

## 2022-12-16 NOTE — PATIENT INSTRUCTIONS
Thank you for choosing us for your care. I did see that you already have your kidney MRI ordered and scheduled by . You will not be billed for this visit.     Please reach out if you need any further assitance    Thanks  Karen Contreras MD

## 2022-12-19 ENCOUNTER — VIRTUAL VISIT (OUTPATIENT)
Dept: FAMILY MEDICINE | Facility: OTHER | Age: 37
End: 2022-12-19
Payer: COMMERCIAL

## 2022-12-19 DIAGNOSIS — R05.8 OTHER COUGH: Primary | ICD-10-CM

## 2022-12-19 PROCEDURE — 99213 OFFICE O/P EST LOW 20 MIN: CPT | Mod: 95 | Performed by: STUDENT IN AN ORGANIZED HEALTH CARE EDUCATION/TRAINING PROGRAM

## 2022-12-19 NOTE — PROGRESS NOTES
Breanna is a 37 year old who is being evaluated via a billable video visit.      How would you like to obtain your AVS? MyChart  If the video visit is dropped, the invitation should be resent by: Text to cell phone: [phone]  Will anyone else be joining your video visit? No          Assessment & Plan     Other cough  Patient doing much better and overall very well since her ER visit, she is complete her course of antibiotics.  Still some slight lingering symptoms but overall she is pleased with her progress.  I encouraged that she will continue to do better in the coming days and eventually her symptoms will resolve.  Of note, potential incidental CT finding on the right kidney, she did have a follow-up MRI which was negative, noted likely artifact of the CT.  Overall, reassuring MRI results.      I have spent 14 or more minutes on the date of the encounter face-to-face with the patient and coordinating care such as reviewing documentation, results, or having discussions over the phone.      DARCI LEIGH MD  [facility]    Bailey Carlos is a 37 year old, presenting for the following health issues:  ER F/U      HPI     ED/UC Followup:    Facility:  [facility]  Date of visit: 12/14/22  Reason for visit: Difficulty Breathing, Chest Pain  Current Status: Doing better, not experiencing the symptoms as much        Review of Systems   Constitutional, HEENT, cardiovascular, pulmonary, gi and gu systems are negative, except as otherwise noted.      Objective    Vitals - Patient Reported  Weight (Patient Reported): 104.3 kg (230 lb)  Pain Score: No Pain (0)      Vitals:  No vitals were obtained today due to virtual visit.    Physical Exam   GENERAL: Healthy, alert and no distress  EYES: Eyes grossly normal to inspection.  No discharge or erythema, or obvious scleral/conjunctival abnormalities.  RESP: No audible wheeze, cough, or visible cyanosis.  No visible retractions or increased  work of breathing.    SKIN: Visible skin clear. No significant rash, abnormal pigmentation or lesions.  NEURO: Cranial nerves grossly intact.  Mentation and speech appropriate for age.  PSYCH: Mentation appears normal, affect normal/bright, judgement and insight intact, normal speech and appearance well-groomed.            Video-Visit Details    Video Start Time: 10:00 AM    Type of service:  Video Visit    Video End Time:10:06 AM    Originating Location (pt. Location): Home        Distant Location (provider location):  Off-site    Platform used for Video Visit: Kassi

## 2023-01-06 ENCOUNTER — OFFICE VISIT (OUTPATIENT)
Dept: NEUROLOGY | Facility: CLINIC | Age: 38
End: 2023-01-06
Payer: COMMERCIAL

## 2023-01-06 ENCOUNTER — TELEPHONE (OUTPATIENT)
Dept: NEUROLOGY | Facility: CLINIC | Age: 38
End: 2023-01-06

## 2023-01-06 DIAGNOSIS — G43.709 CHRONIC MIGRAINE WITHOUT AURA WITHOUT STATUS MIGRAINOSUS, NOT INTRACTABLE: Primary | ICD-10-CM

## 2023-01-06 PROCEDURE — 64615 CHEMODENERV MUSC MIGRAINE: CPT | Performed by: PSYCHIATRY & NEUROLOGY

## 2023-01-06 RX ORDER — FREMANEZUMAB-VFRM 225 MG/1.5ML
1.5 INJECTION SUBCUTANEOUS
Qty: 1.5 ML | Refills: 11 | Status: SHIPPED | OUTPATIENT
Start: 2023-01-06 | End: 2023-06-30

## 2023-01-06 NOTE — TELEPHONE ENCOUNTER
Prior Authorization Retail Medication Request    Medication/Dose: Fremanezumab-vfrm (AJOVY) 225 MG/1.5ML SOAJ  ICD code (if different than what is on RX):  G43.709  Previously Tried and Failed:  See Chart  Rationale:  See Chart    Insurance Name:  [employer] CHOICE  Insurance ID:  [id number]      Pharmacy Information (if different than what is on RX)  Name:    Phone:

## 2023-01-06 NOTE — PROGRESS NOTES
[facility]  Botulinum Toxin Procedure    Cara Suarez MD  Headache Neurology    January 6, 2023    Procedure:  OnabotulinumtoxinA injections for chronic migraine  Indication:  Chronic migraine    Ms. Luna suffers from severe intractable headaches.  She was referred by Dr. Suarez for onabotulinumtoxinA injections for headache.  Risks, benefits, and alternatives were discussed.  All questions were answered and consent given.  She decided to proceed with the injections.      Prior to initiation of botulinum toxin injections, Ms. Luna reported 20 headache days per month, with 17 severe headache days per month. Her headaches are quite disabling and often interfere with her ability to function normally.    Currently, she is having less than 20 headache days per month. She's noticed a wearing off effect in the last 3 weeks. She took Nurtec and Medrol dosepak and acetaminophen and naproxen and prochlorperazine during that time due to severe headaches returning.     She has attempted other migraine prophylactic treatments in the past, which have included:   Nortriptyline 20 mg PM was not helpful.  Gabapentin was not helpful.  Propranolol 40 mg TID was not helpful.  Topiramate was not helpful.     Nurtec is helpful.     Emgality is too painful. She'd like to try an alternative. I recommended Ajovy as an alternative. We will attempt to get pre-authorization.    Ms. Luna's pain was assessed prior to the procedure.  She rated her pain today as 2 out of 10.    Procedural Pause: Procedural pause was conducted to verify correct patient identity, procedure to be performed, correct side and site, correct patient position, and special requirements. Appropriate hand hygiene was utilized, and each injection site was prepped with alcohol wipes or Chloraprep swab.     Procedure Details: 200 units of onabotulinumtoxinA was diluted in 4 mL 0.9% normal saline. A total of 150 units of onabotulinumtoxinA were injected using  30 gauge 0.5 in needles into the muscles listed below. 50 units of onabotulinumtoxinA were wasted.     Injection Sites: Total = 150 units onabotulinumtoxinA      and Procerus muscles - 5 units into the left and right corrugators and 5 units into the procerus (15 units total)    Frontalis muscles - 5 units into the left superior frontalis and 5 units into the right superior frontalis (2 injection sites per muscle) (10 units total)    Temporalis muscles - 12.5 units into the left temporalis muscle and 12.5 units into the right temporalis muscle (2 injection sites per muscle) (25 units total)    Occipitalis muscles - 12.5 units into the left occipitalis muscle and 12.5 units into the right occipitalis muscle (2 injection sites per muscle) (25 units total)    Splenius Capitis muscles - 12.5 units into the left splenius capitis muscle and 12.5 units into the right splenius capitis muscle (2 injection sites per muscle, divided into 2/3 anteriorly and 1/3 posteriorly) (25 units total)      Trapezius muscles - 25 units into the left trapezius muscle and 25 units into the right trapezius muscle (3 injection sites per muscle, divided 5 units, 10 units, 10 units, medial to lateral) (50 units total)    Ms. Luna tolerated the procedure well without immediate complications.  She will follow up in clinic for assessment of the effectiveness of treatment.  She did not report any change in her pain level after the botulinumtoxinA injection procedure.    Cara Suarez MD  Headache Neurology  [facility]

## 2023-01-06 NOTE — LETTER
1/6/2023         RE: Breanna Luna  [address]  [place] [address]        Dear Colleague,    Thank you for referring your patient, Breanna Luna, to the [facility]. Please see a copy of my visit note below.    [facility]  Botulinum Toxin Procedure    Cara Suarez MD  Headache Neurology    January 6, 2023    Procedure:  OnabotulinumtoxinA injections for chronic migraine  Indication:  Chronic migraine    Ms. Luna suffers from severe intractable headaches.  She was referred by Dr. Suarez for onabotulinumtoxinA injections for headache.  Risks, benefits, and alternatives were discussed.  All questions were answered and consent given.  She decided to proceed with the injections.      Prior to initiation of botulinum toxin injections, Ms. Luna reported 20 headache days per month, with 17 severe headache days per month. Her headaches are quite disabling and often interfere with her ability to function normally.    Currently, she is having less than 20 headache days per month. She's noticed a wearing off effect in the last 3 weeks. She took Nurtec and Medrol dosepak and acetaminophen and naproxen and prochlorperazine during that time due to severe headaches returning.     She has attempted other migraine prophylactic treatments in the past, which have included:   Nortriptyline 20 mg PM was not helpful.  Gabapentin was not helpful.  Propranolol 40 mg TID was not helpful.  Topiramate was not helpful.     Nurtec is helpful.     Emgality is too painful. She'd like to try an alternative. I recommended Ajovy as an alternative. We will attempt to get pre-authorization.    Ms. Luna's pain was assessed prior to the procedure.  She rated her pain today as 2 out of 10.    Procedural Pause: Procedural pause was conducted to verify correct patient identity, procedure to be performed, correct side and site, correct patient position, and special requirements. Appropriate  hand hygiene was utilized, and each injection site was prepped with alcohol wipes or Chloraprep swab.     Procedure Details: 200 units of onabotulinumtoxinA was diluted in 4 mL 0.9% normal saline. A total of 150 units of onabotulinumtoxinA were injected using 30 gauge 0.5 in needles into the muscles listed below. 50 units of onabotulinumtoxinA were wasted.     Injection Sites: Total = 150 units onabotulinumtoxinA      and Procerus muscles - 5 units into the left and right corrugators and 5 units into the procerus (15 units total)    Frontalis muscles - 5 units into the left superior frontalis and 5 units into the right superior frontalis (2 injection sites per muscle) (10 units total)    Temporalis muscles - 12.5 units into the left temporalis muscle and 12.5 units into the right temporalis muscle (2 injection sites per muscle) (25 units total)    Occipitalis muscles - 12.5 units into the left occipitalis muscle and 12.5 units into the right occipitalis muscle (2 injection sites per muscle) (25 units total)    Splenius Capitis muscles - 12.5 units into the left splenius capitis muscle and 12.5 units into the right splenius capitis muscle (2 injection sites per muscle, divided into 2/3 anteriorly and 1/3 posteriorly) (25 units total)      Trapezius muscles - 25 units into the left trapezius muscle and 25 units into the right trapezius muscle (3 injection sites per muscle, divided 5 units, 10 units, 10 units, medial to lateral) (50 units total)    Ms. Luna tolerated the procedure well without immediate complications.  She will follow up in clinic for assessment of the effectiveness of treatment.  She did not report any change in her pain level after the botulinumtoxinA injection procedure.    Cara Suarez MD  Headache Neurology  [facility]        Again, thank you for allowing me to participate in the care of your patient.        Sincerely,        Cara Suarez MD

## 2023-01-10 NOTE — TELEPHONE ENCOUNTER
Central Prior Authorization Team   Phone: [phone]      PA Initiation    Medication: Fremanezumab-vfrm (AJOVY) 225 MG/1.5ML SOAJ  Insurance Company: [employer] - Phone [phone] Fax [phone]  Pharmacy Filling the Rx: [facility] [address]  Filling Pharmacy Phone: [phone]  Filling Pharmacy Fax:    Start Date: 1/10/2023

## 2023-01-11 NOTE — TELEPHONE ENCOUNTER
Prior Authorization Approval    Authorization Effective Date: 12/11/2022  Authorization Expiration Date: 1/10/2024  Medication: Fremanezumab-vfrm (AJOVY) 225 MG/1.5ML SOAJ-APPROVED  Approved Dose/Quantity:   Reference #:     Insurance Company: [employer] - Phone [phone] Fax [phone]  Expected CoPay:       CoPay Card Available:      Foundation Assistance Needed:    Which Pharmacy is filling the prescription (Not needed for infusion/clinic administered): [facility], [address]   Pharmacy Notified: Yes  Patient Notified: No

## 2023-01-31 ENCOUNTER — MYC MEDICAL ADVICE (OUTPATIENT)
Dept: PHARMACY | Facility: CLINIC | Age: 38
End: 2023-01-31
Payer: COMMERCIAL

## 2023-01-31 ENCOUNTER — VIRTUAL VISIT (OUTPATIENT)
Dept: PHARMACY | Facility: CLINIC | Age: 38
End: 2023-01-31
Attending: PHYSICIAN ASSISTANT
Payer: COMMERCIAL

## 2023-01-31 ENCOUNTER — TELEPHONE (OUTPATIENT)
Dept: SURGERY | Facility: CLINIC | Age: 38
End: 2023-01-31
Payer: COMMERCIAL

## 2023-01-31 DIAGNOSIS — G43.019 INTRACTABLE MIGRAINE WITHOUT AURA AND WITHOUT STATUS MIGRAINOSUS: ICD-10-CM

## 2023-01-31 DIAGNOSIS — J30.2 SEASONAL ALLERGIES: ICD-10-CM

## 2023-01-31 DIAGNOSIS — E66.09 CLASS 2 OBESITY DUE TO EXCESS CALORIES WITH BODY MASS INDEX (BMI) OF 35.0 TO 35.9 IN ADULT, UNSPECIFIED WHETHER SERIOUS COMORBIDITY PRESENT: Primary | ICD-10-CM

## 2023-01-31 DIAGNOSIS — E66.812 CLASS 2 OBESITY DUE TO EXCESS CALORIES WITH BODY MASS INDEX (BMI) OF 35.0 TO 35.9 IN ADULT, UNSPECIFIED WHETHER SERIOUS COMORBIDITY PRESENT: Primary | ICD-10-CM

## 2023-01-31 PROCEDURE — 99207 PR NO CHARGE LOS: CPT | Performed by: PHARMACIST

## 2023-01-31 RX ORDER — LIRAGLUTIDE 6 MG/ML
3 INJECTION, SOLUTION SUBCUTANEOUS DAILY
Qty: 45 ML | Refills: 0 | Status: SHIPPED | OUTPATIENT
Start: 2023-01-31 | End: 2023-02-28

## 2023-01-31 NOTE — PATIENT INSTRUCTIONS
"Recommendations from today's MTM visit:                                                    MTM (medication therapy management) is a service provided by a clinical pharmacist designed to help you get the most of out of your medicines.   Today we reviewed what your medicines are for, how to know if they are working, that your medicines are safe and how to make your medicine regimen as easy as possible.      Congratulations with your progress thus far Breanna! Keep up the great work with your exercise and nutrition!  1. Continue current regimen.   2. Will send in refills for Saxenda as requested by patient     Follow-up: Francisca Hylton PA-C follow up in 4 weeks as scheduled (as Campbell Villalpando PA-C on [facility]) and as needed with MTM pharmacist in future.     It was great speaking with you today.  I value your experience and would be very thankful for your time in providing feedback in our clinic survey. In the next few days, you may receive an email or text message from [employer] with a link to a survey related to your  clinical pharmacist.\"     To schedule another appointment with your MTM pharmacist, please call [facility] Scheduling at [phone].     My Clinical Pharmacist's contact information:                                                      Please feel free to contact me with any questions or concerns you have.      Lauren Bloch, PharmD  Medication Therapy Management Pharmacist   [facility]             "

## 2023-01-31 NOTE — TELEPHONE ENCOUNTER
Taina needs a prior auth based on the test claim. Patient's insurance is; BIN: [id number], PCN: [id number], GRP: [id number], & ID: [id number]  Please let the liaison know of the approval or denial.

## 2023-01-31 NOTE — PROGRESS NOTES
Medication Therapy Management (MTM) Encounter    ASSESSMENT:                            Medication Adherence/Access: No issues identified    Weight Management: Stable, would benefit from continuing Saxenda at 3 mg daily    Migraine: Stable. Defer to Neurology.     Allergies: Stable    PLAN:                            1. Continue current regimen.   2. Will send in refills for Saxenda as requested by patient     Follow-up: Francisca Hylton PA-C follow up in 4 weeks as scheduled (as Campbell Villalpando PA-C on [facility]) and as needed with MTM pharmacist in future.     SUBJECTIVE/OBJECTIVE:                          Breanna Luna is a 37 year old female called for an initial visit. She was referred to me from Campbell Villalpando PA-C.      Reason for visit: Saxenda start follow up.    Allergies/ADRs: Reviewed in chart  Past Medical History: Reviewed in chart  Tobacco: She reports that she quit smoking about 6 years ago. Her smoking use included cigarettes. She has never used smokeless tobacco.  Alcohol: not currently using    Medication Adherence/Access: no issues reported    Weight Management:   Saxenda 3 mg daily    Followed by Campbell Villalpando PA-C, seen 12/2022 for [facility]. Patient is experiencing the follow side effects: None. Nausea when first starting Saxenda but tolerating now. She stopped the phentermine when she started Saxenda. She has an accountability partner with friend when exercising. Getting villa more quickly at her meals. She reports she has been able to lose weight while on Saxenda but hasn't weight self recently, will have weight for when follows up with provider next month.   Diet/Eating Habits: Patient reports that she has been portion controlling. Has been on an 2211-2025 calories per day, tracking calories on [employer] sylvia. Breakfast: protein shake. AM snack: protein + veggie or fruit. Lunch: yogurt+ fruit, snack: protein shake; dinner: whatever family is having. Drinking at least 64+ oz water  "daily. No soda or juice. Does drink [place] for carbonation.   Exercise/Activity: Patient reports she has been doing rowing machine and pilates 5 days per week, 20-45 minutes.       Wt Readings from Last 4 Encounters:   12/14/22 229 lb (103.9 kg)   09/16/22 218 lb (98.9 kg)   08/26/22 220 lb (99.8 kg)   08/25/22 220 lb 6 oz (100 kg)     Estimated body mass index is 36.96 kg/m  as calculated from the following:    Height as of 9/16/22: 5' 6\" (1.676 m).    Weight as of 12/14/22: 229 lb (103.9 kg).    Migraine:   Preventative  Ajovy 225 mg once monthly   Botox every 3 months    Abortive  Nurtec 75 mg as needed   Prednisone 40 mg daily for 5 days as needed   Naproxen 500 mg twice daily as needed      Switched from Emgality to Ajovy. Finds that the Ajovy works better. In general, migraines have improved with the above preventative regimen. Works with  Has a steroid for as needed use, but hasn't had to use. She would get rebound headaches with ibuprofen and now doesn't when uses naproxen. Has prochlorperazine for as needed for nausea and vomiting, usually will vomit when she does get a migraine. Follows with Neurology, last seen 1/6/2023. Will typically use naproxen and benadryl to help with acute migraine. If that does not resolve migraine will move to Nurtec.      Allergies:   Cetirizine 10 mg daily as needed    Has seasonal allergies so not taking now, but uses come spring and effective when needed.   ----------------    I spent 25 minutes with this patient today. All changes were made via collaborative practice agreement with Campbell Villalpando PA-C. A copy of the visit note was provided to the patient's provider(s).    A summary of these recommendations was sent via "[employer].    Lauren Bloch, PharmD, BCACP   Medication Therapy Management Pharmacist   [facility]    Telemedicine Visit Details  Type of service:  Telephone visit  Start Time: 10:05 AM  End Time: 10:30 AM     Medication " Therapy Recommendations  No medication therapy recommendations to display

## 2023-02-02 ENCOUNTER — E-VISIT (OUTPATIENT)
Dept: URGENT CARE | Facility: CLINIC | Age: 38
End: 2023-02-02
Payer: COMMERCIAL

## 2023-02-02 DIAGNOSIS — N39.0 ACUTE UTI (URINARY TRACT INFECTION): Primary | ICD-10-CM

## 2023-02-02 PROCEDURE — 99421 OL DIG E/M SVC 5-10 MIN: CPT | Performed by: FAMILY MEDICINE

## 2023-02-02 RX ORDER — NITROFURANTOIN 25; 75 MG/1; MG/1
100 CAPSULE ORAL 2 TIMES DAILY
Qty: 10 CAPSULE | Refills: 0 | Status: SHIPPED | OUTPATIENT
Start: 2023-02-02 | End: 2023-02-07

## 2023-02-02 NOTE — TELEPHONE ENCOUNTER
Prior Authorization Approval    Authorization Effective Date: 1/3/2023  Authorization Expiration Date: 6/2/2023  Medication: Saxenda   Approved Dose/Quantity:   Reference #:     Insurance Company: [employer] - Phone [phone] Fax [phone]  Expected CoPay:       CoPay Card Available:      Foundation Assistance Needed:    Which Pharmacy is filling the prescription (Not needed for infusion/clinic administered): [facility] [address]   Pharmacy Notified: No  Patient Notified: No

## 2023-02-03 ENCOUNTER — NURSE TRIAGE (OUTPATIENT)
Dept: FAMILY MEDICINE | Facility: CLINIC | Age: 38
End: 2023-02-03
Payer: COMMERCIAL

## 2023-02-03 NOTE — TELEPHONE ENCOUNTER
Nurse Triage SBAR    Is this a 2nd Level Triage? NO    Situation: Patient has back pain in her lower right side for 3 days.    Background: She has no history of UTI. Recent mass in her kidney per MRI.    Assessment: Pain is 8 out of 10. Dull pain is constant. Pain is occasionally sharp.  She states she was shivering 2 days ago. This is better now.  She is having burning with urination and blood in her urine.  She states the urine is cloudy and foul smelling.  Patient has been having chills.  She states she also has nausea.    Protocol Recommended Disposition:   Go to ED Now    Recommendation: Per protocol patient advised to be seen in the ER. She states she does not feel the ER will not take care of her.     Routed to provider      Sahara Verdin RN on 2/3/2023 at 3:07 PM          Reason for Disposition    SEVERE pain (e.g., excruciating, scale 8-10) and present > 1 hour    Additional Information    Negative: Passed out (i.e., lost consciousness, collapsed and was not responding)    Negative: Shock suspected (e.g., cold/pale/clammy skin, too weak to stand, low BP, rapid pulse)    Negative: Sounds like a life-threatening emergency to the triager    Negative: Followed a major injury to the back (e.g., MVA, fall > 10 feet or 3 meters, penetrating injury, etc.)    Negative: Upper, mid or lower back pain that occurs mainly in the midline    Protocols used: FLANK PAIN-A-[place]

## 2023-02-06 NOTE — TELEPHONE ENCOUNTER
Prior Authorization was just approved today for Saxenda. Verified with Pharmacy Liaison team that RX was released from [facility]. Patient aware that now the RX was sent to [facility].     Lauren Bloch, PharmD, BCACP   Medication Therapy Management Pharmacist   [facility]

## 2023-02-07 ENCOUNTER — TELEPHONE (OUTPATIENT)
Dept: FAMILY MEDICINE | Facility: CLINIC | Age: 38
End: 2023-02-07
Payer: COMMERCIAL

## 2023-02-07 NOTE — TELEPHONE ENCOUNTER
Patient picked up prescription of Ajovy from pharmacy and wanted nursing staff at clinic to give injection for her. RN discussed with patient that the orders that are currently in do not allow us to give her the injection and a CAM order would be required. RN discussed with patient to maybe try any do injection herself like she did last month and if she still has issues then to contact neurologist and have her place orders for CAMs for clinic to administer med in the future. Patient agreed and felt comfortable with this plan.     NGUYEN GomezN, RN

## 2023-02-22 ENCOUNTER — MYC MEDICAL ADVICE (OUTPATIENT)
Dept: PHARMACY | Facility: CLINIC | Age: 38
End: 2023-02-22
Payer: COMMERCIAL

## 2023-02-22 DIAGNOSIS — E66.09 CLASS 2 OBESITY DUE TO EXCESS CALORIES WITH BODY MASS INDEX (BMI) OF 35.0 TO 35.9 IN ADULT, UNSPECIFIED WHETHER SERIOUS COMORBIDITY PRESENT: Primary | ICD-10-CM

## 2023-02-22 DIAGNOSIS — E66.812 CLASS 2 OBESITY DUE TO EXCESS CALORIES WITH BODY MASS INDEX (BMI) OF 35.0 TO 35.9 IN ADULT, UNSPECIFIED WHETHER SERIOUS COMORBIDITY PRESENT: Primary | ICD-10-CM

## 2023-02-22 NOTE — PROGRESS NOTES
"Breanna is a 38 year old who is being evaluated via a billable video visit.      The patient has been notified of following:     \"This video visit will be conducted via a call between you and your physician/provider. We have found that certain health care needs can be provided without the need for an in-person physical exam.  This service lets us provide the care you need with a video conversation.  If a prescription is necessary we can send it directly to your pharmacy.  If lab work is needed we can place an order for that and you can then stop by our lab to have the test done at a later time.    Video visits are billed at different rates depending on your insurance coverage.  Please reach out to your insurance provider with any questions.    If during the course of the call the physician/provider feels a video visit is not appropriate, you will not be charged for this service.\"    Patient has given verbal consent for Video visit? Yes    How would you like to obtain your AVS? MyChart    If the video visit is dropped, the invitation should be resent by: Text to cell phone: [phone]    Will anyone else be joining your video visit? No      I    Video-Visit Details    Type of service:  Video Visit    Video Start Time: 9:08 AM    Video End Time:9:26 AM    Originating Location (pt. Location): Home    Distant Location (provider location):  [facility] [place]     Platform used for Video Visit: [employer]        2023      Return Medical Weight Management Note     Breanna Luna  MRN:  [id number]  :  1985    Dear Bradley Mcconnell MD,    I had the pleasure of seeing your patient Breanna Luna. She is a 37 year old female who I am continuing to see for treatment of obesity related to:       2022   I have the following health issues associated with obesity: Pre-Diabetes, High Cholesterol       Assessment & Plan   Problem List Items Addressed This Visit     Class 2 obesity - " Primary     Patient was congratulated on wt loss success thus far. Healthy habits to assist with further weight loss were discussed. Breanna will restart phentermine 18.75 mg daily.  Can increase to a full tablet if needed before next appt.  Once deductible met will switch back to Saxenda. She will continue to meal prep and keep up with her current exercise.                 FOLLOW-UP:  Please call [phone] to schedule your next visit in 3 months.     28 minutes spent on the date of the encounter doing chart review, history and exam, result review, counseling, developing plan of care, documentation, and further activities as noted      INTERVAL HISTORY:  Breanna returns for medical weight management follow up.  Last seen in September by Campbell Villalpando PA-C.  She was started on Saxenda. Loved the medication because it controlled appetite and helped with portion control.  She denies side effects. She had to stop because the new year started and it was $1000/mo.  She needs to meet her deductible.   has surgery next week so following this deductible will be met.   Since off the Saxenda she has started gaining weight ad appetite is not suppressed.     Will supplement with phentermine in the mean time until she can get back to the Saxenda. Tried phentermine and Topiramate in the past. Phentermine helped with appetite suppression and she tolerated it.  Went off when weight stalled.     WEIGHT METRICS:  Body mass index is 36.8 kg/m .   Current Weight: 103.4 kg (228 lb) (Pt reported)  Last Visits Weight: 98.9 kg (218 lb)  Initial Weight (lbs): 218 lbs     Cumulative weight loss (lbs): -10  Weight Loss Percentage: -4.59%     Success:  Was 250 when started weight loss journey.  Has dropped 2 pants sizes.  Was 20 and now is size 16.  Setting goals and is knocking it out of the park.  Is rowing.  Weighs herself less than weekly.  Feels great.      Exercise:  Rows along with  Yoga or pilates 5 days a week.  Takes 2 days for  recovery.      Diet:    In between meals she is hunger if she has a meal replacement shake for her meal. Then she has a portioned protein snack ex: chicken, hummus, and a small handful of pretzels.    Set herself up for success. Has cut up veggies and fruit available.  Meal prepping has helped so much.      Wt Readings from Last 10 Encounters:   02/28/23 103.4 kg (228 lb)   12/14/22 103.9 kg (229 lb)   09/16/22 98.9 kg (218 lb)   08/26/22 99.8 kg (220 lb)   08/25/22 100 kg (220 lb 6 oz)   06/30/22 98.9 kg (218 lb)   06/16/22 98.9 kg (218 lb)   06/10/22 98.9 kg (218 lb)   06/06/22 98.9 kg (218 lb)   05/13/22 101.2 kg (223 lb)       MEDICATIONS:   Current Outpatient Medications   Medication Sig Dispense Refill     acetaminophen (TYLENOL) 500 MG tablet Take 1,000 mg by mouth every 6 hours as needed for mild pain       Fremanezumab-vfrm (AJOVY) 225 MG/1.5ML SOAJ Inject 225 mg Subcutaneous every 30 days 1.5 mL 11     insulin pen needle (31G X 5 MM) 31G X 5 MM miscellaneous Use 1 pen needles daily or as directed. 100 each 1     liraglutide - Weight Management (SAXENDA) 18 MG/3ML pen Inject 3 mg Subcutaneous daily 45 mL 0     naproxen (NAPROSYN) 500 MG tablet Take 1 tablet (500 mg) by mouth 2 times daily as needed for moderate pain (4-6) 28 tablet 11     phentermine (ADIPEX-P) 37.5 MG tablet Take 0.5 tablets (18.75 mg) by mouth every morning (before breakfast) for 14 days, THEN 1 tablet (37.5 mg) every morning (before breakfast). 30 tablet 1     Rimegepant Sulfate 75 MG TBDP Take 75 mg by mouth daily as needed (migraine) 8 tablet 11       LABS:  Hemoglobin A1C   Date Value Ref Range Status   09/19/2022 4.9 0.0 - 5.6 % Final     Comment:     Normal <5.7%   Prediabetes 5.7-6.4%    Diabetes 6.5% or higher     Note: Adopted from ADA consensus guidelines.     Sodium   Date Value Ref Range Status   12/14/2022 137 133 - 144 mmol/L Final   02/26/2020 139 133 - 144 mmol/L Final     Potassium   Date Value Ref Range Status    12/14/2022 4.2 3.4 - 5.3 mmol/L Final     Comment:     Specimen slightly hemolyzed, potassium may be falsely elevated.   02/26/2020 4.1 3.4 - 5.3 mmol/L Final     Chloride   Date Value Ref Range Status   12/14/2022 109 94 - 109 mmol/L Final   02/26/2020 107 94 - 109 mmol/L Final     Carbon Dioxide   Date Value Ref Range Status   02/26/2020 26 20 - 32 mmol/L Final     Carbon Dioxide (CO2)   Date Value Ref Range Status   12/14/2022 25 20 - 32 mmol/L Final     Anion Gap   Date Value Ref Range Status   12/14/2022 3 3 - 14 mmol/L Final   02/26/2020 6 3 - 14 mmol/L Final     Glucose   Date Value Ref Range Status   12/14/2022 92 70 - 99 mg/dL Final   02/26/2020 94 70 - 99 mg/dL Final     Urea Nitrogen   Date Value Ref Range Status   12/14/2022 13 7 - 30 mg/dL Final   02/26/2020 13 7 - 30 mg/dL Final     Creatinine   Date Value Ref Range Status   12/14/2022 0.60 0.52 - 1.04 mg/dL Final   02/26/2020 0.58 0.52 - 1.04 mg/dL Final     GFR Estimate   Date Value Ref Range Status   12/14/2022 >90 >60 mL/min/1.73m2 Final     Comment:     Effective December 21, 2021 eGFRcr in adults is calculated using the 2021 CKD-EPI creatinine equation which includes age and gender (Cesia langston al., [place], DOI: 10.1056/URUPpz4092240)   02/26/2020 >90 >60 mL/min/[1.73_m2] Final     Comment:     Non  GFR Calc  Starting 12/18/2018, serum creatinine based estimated GFR (eGFR) will be   calculated using the Chronic Kidney Disease Epidemiology Collaboration   (CKD-EPI) equation.       Calcium   Date Value Ref Range Status   12/14/2022 8.5 8.5 - 10.1 mg/dL Final   02/26/2020 9.1 8.5 - 10.1 mg/dL Final     Bilirubin Total   Date Value Ref Range Status   12/14/2022 0.7 0.2 - 1.3 mg/dL Final   02/26/2020 0.5 0.2 - 1.3 mg/dL Final     Alkaline Phosphatase   Date Value Ref Range Status   12/14/2022 49 40 - 150 U/L Final   02/26/2020 50 40 - 150 U/L Final     ALT   Date Value Ref Range Status   12/14/2022 25 0 - 50 U/L Final   02/26/2020 33 0 -  "50 U/L Final     AST   Date Value Ref Range Status   12/14/2022 19 0 - 45 U/L Final     Comment:     Specimen is hemolyzed which can falsely elevate AST. Analysis of a non-hemolyzed specimen may result in a lower value.   02/26/2020 15 0 - 45 U/L Final     Cholesterol   Date Value Ref Range Status   05/13/2022 159 <200 mg/dL Final     Direct Measure HDL   Date Value Ref Range Status   05/13/2022 57 >=50 mg/dL Final     LDL Cholesterol Calculated   Date Value Ref Range Status   05/13/2022 61 <=100 mg/dL Final     Triglycerides   Date Value Ref Range Status   05/13/2022 205 (H) <150 mg/dL Final     WBC   Date Value Ref Range Status   02/26/2020 9.3 4.0 - 11.0 10e9/L Final     WBC Count   Date Value Ref Range Status   12/14/2022 10.0 4.0 - 11.0 10e3/uL Final     Hemoglobin   Date Value Ref Range Status   12/14/2022 13.7 11.7 - 15.7 g/dL Final   02/26/2020 14.5 11.7 - 15.7 g/dL Final     Hematocrit   Date Value Ref Range Status   12/14/2022 39.1 35.0 - 47.0 % Final   02/26/2020 42.4 35.0 - 47.0 % Final     MCV   Date Value Ref Range Status   12/14/2022 87 78 - 100 fL Final   02/26/2020 88 78 - 100 fl Final     Platelet Count   Date Value Ref Range Status   12/14/2022 189 150 - 450 10e3/uL Final   02/26/2020 264 150 - 450 10e9/L Final         BP Readings from Last 6 Encounters:   12/14/22 108/63   12/02/22 117/81   08/26/22 134/85   08/25/22 120/82   06/10/22 126/87   06/06/22 118/66       Pulse Readings from Last 6 Encounters:   12/14/22 107   12/02/22 96   08/26/22 80   08/25/22 96   06/10/22 83   06/06/22 92       PE:  Ht 1.676 m (5' 6\")   Wt 103.4 kg (228 lb)   BMI 36.80 kg/m    GENERAL: Healthy, alert and no distress  EYES: Eyes grossly normal to inspection.  No discharge or erythema, or obvious scleral/conjunctival abnormalities.  RESP: No audible wheeze, cough, or visible cyanosis.  No visible retractions or increased work of breathing.    SKIN: Visible skin clear. No significant rash, abnormal pigmentation or " lesions.  NEURO: Cranial nerves grossly intact.  Mentation and speech appropriate for age.  PSYCH: Mentation appears normal, affect normal/bright, judgement and insight intact, normal speech and appearance well-groomed.      Sincerely,      Francisca Hylton PA-C

## 2023-02-23 RX ORDER — PHENTERMINE HYDROCHLORIDE 37.5 MG/1
TABLET ORAL
Qty: 30 TABLET | Refills: 1 | Status: SHIPPED | OUTPATIENT
Start: 2023-02-23 | End: 2023-04-27

## 2023-02-28 ENCOUNTER — VIRTUAL VISIT (OUTPATIENT)
Dept: SURGERY | Facility: CLINIC | Age: 38
End: 2023-02-28
Payer: COMMERCIAL

## 2023-02-28 VITALS — WEIGHT: 228 LBS | HEIGHT: 66 IN | BODY MASS INDEX: 36.64 KG/M2

## 2023-02-28 DIAGNOSIS — E66.812 CLASS 2 OBESITY: Primary | ICD-10-CM

## 2023-02-28 PROCEDURE — 99213 OFFICE O/P EST LOW 20 MIN: CPT | Mod: VID | Performed by: PHYSICIAN ASSISTANT

## 2023-02-28 NOTE — ASSESSMENT & PLAN NOTE
Patient was congratulated on wt loss success thus far. Healthy habits to assist with further weight loss were discussed. Breanna will restart phentermine 18.75 mg daily.  Can increase to a full tablet if needed before next appt.  Once deductible met will switch back to Saxenda. She will continue to meal prep and keep up with her current exercise.

## 2023-02-28 NOTE — PATIENT INSTRUCTIONS
"Nice to talk with you today. Thank you for allowing me the privilege of caring for you. We hope we provided you with the excellent service you deserve.     To ensure the quality of our services you may receive a patient satisfaction survey from an independent monitoring company.  The greatest compliment you can give is \"Likely to Recommend\"    Below is our plan we discussed.-  FERNANDEZ Espinosa      Plan:  Breanna will restart phentermine 18.75 mg daily.  Can increase to a full tablet if needed before next appt.  Once deductible met will switch back to Saxenda. She will continue to meal prep and keep up with her current exercise.      FOLLOW-UP:    Please call [phone] to schedule your next visit in 3 mo.     "

## 2023-03-09 ENCOUNTER — ALLIED HEALTH/NURSE VISIT (OUTPATIENT)
Dept: FAMILY MEDICINE | Facility: CLINIC | Age: 38
End: 2023-03-09
Payer: COMMERCIAL

## 2023-03-09 VITALS — HEART RATE: 60 BPM | DIASTOLIC BLOOD PRESSURE: 68 MMHG | SYSTOLIC BLOOD PRESSURE: 108 MMHG

## 2023-03-09 DIAGNOSIS — Z01.30 BLOOD PRESSURE CHECK: Primary | ICD-10-CM

## 2023-03-09 PROCEDURE — 99207 PR NO CHARGE NURSE ONLY: CPT | Performed by: STUDENT IN AN ORGANIZED HEALTH CARE EDUCATION/TRAINING PROGRAM

## 2023-03-09 NOTE — PROGRESS NOTES
Breanna Luna was evaluated at [facility] on March 9, 2023 at which time her blood pressure was:    BP Readings from Last 3 Encounters:   03/09/23 108/68   12/14/22 108/63   12/02/22 117/81     Pulse Readings from Last 3 Encounters:   03/09/23 60   12/14/22 107   12/02/22 96       Reviewed lifestyle modifications for blood pressure control and reduction: including making healthy food choices, managing weight, getting regular exercise, smoking cessation, reducing alcohol consumption, monitoring blood pressure regularly.     Symptoms: None    BP Goal:< 140/90 mmHg    BP Assessment:  BP at goal    Potential Reasons for BP too high: NA - Not applicable    BP Follow-Up Plan: Recheck BP in 6 months at pharmacy    Recommendation to Provider:  none    Note completed by:     Jose Garcia, PharmD on behalf of   [facility]  [phone]

## 2023-03-15 ENCOUNTER — VIRTUAL VISIT (OUTPATIENT)
Dept: PSYCHOLOGY | Facility: CLINIC | Age: 38
End: 2023-03-15
Attending: STUDENT IN AN ORGANIZED HEALTH CARE EDUCATION/TRAINING PROGRAM
Payer: COMMERCIAL

## 2023-03-15 DIAGNOSIS — F90.2 ATTENTION DEFICIT HYPERACTIVITY DISORDER, COMBINED TYPE: Primary | ICD-10-CM

## 2023-03-15 PROCEDURE — 90791 PSYCH DIAGNOSTIC EVALUATION: CPT | Mod: VID | Performed by: PSYCHOLOGIST

## 2023-03-15 ASSESSMENT — COLUMBIA-SUICIDE SEVERITY RATING SCALE - C-SSRS
2. HAVE YOU ACTUALLY HAD ANY THOUGHTS OF KILLING YOURSELF?: NO
TOTAL  NUMBER OF ABORTED OR SELF INTERRUPTED ATTEMPTS LIFETIME: NO
ATTEMPT LIFETIME: NO
1. HAVE YOU WISHED YOU WERE DEAD OR WISHED YOU COULD GO TO SLEEP AND NOT WAKE UP?: NO
6. HAVE YOU EVER DONE ANYTHING, STARTED TO DO ANYTHING, OR PREPARED TO DO ANYTHING TO END YOUR LIFE?: NO
TOTAL  NUMBER OF INTERRUPTED ATTEMPTS LIFETIME: NO

## 2023-03-15 ASSESSMENT — PATIENT HEALTH QUESTIONNAIRE - PHQ9
5. POOR APPETITE OR OVEREATING: NOT AT ALL
SUM OF ALL RESPONSES TO PHQ QUESTIONS 1-9: 5

## 2023-03-15 ASSESSMENT — ANXIETY QUESTIONNAIRES
IF YOU CHECKED OFF ANY PROBLEMS ON THIS QUESTIONNAIRE, HOW DIFFICULT HAVE THESE PROBLEMS MADE IT FOR YOU TO DO YOUR WORK, TAKE CARE OF THINGS AT HOME, OR GET ALONG WITH OTHER PEOPLE: NOT DIFFICULT AT ALL
1. FEELING NERVOUS, ANXIOUS, OR ON EDGE: NOT AT ALL
6. BECOMING EASILY ANNOYED OR IRRITABLE: SEVERAL DAYS
3. WORRYING TOO MUCH ABOUT DIFFERENT THINGS: NOT AT ALL
2. NOT BEING ABLE TO STOP OR CONTROL WORRYING: NOT AT ALL
GAD7 TOTAL SCORE: 3
GAD7 TOTAL SCORE: 3
5. BEING SO RESTLESS THAT IT IS HARD TO SIT STILL: MORE THAN HALF THE DAYS
7. FEELING AFRAID AS IF SOMETHING AWFUL MIGHT HAPPEN: NOT AT ALL

## 2023-03-15 NOTE — PROGRESS NOTES
[facility]      PATIENT'S NAME: Breanna Luna  PREFERRED NAME: Breanna  PRONOUNS:    She/Her  MRN: [id number]  : 1985  ADDRESS: [address] [place] [address] [place] [address]  [facility]  [id number]  DATE OF SERVICE: 3/15/23  START TIME: 10:00  END TIME: 10:31  PREFERRED PHONE: [phone]  May we leave a program related message: Yes  SERVICE MODALITY:  Video Visit:      Provider verified identity through the following two step process.  Patient provided:  Patient     Telemedicine Visit: The patient's condition can be safely assessed and treated via synchronous audio and visual telemedicine encounter.      Reason for Telemedicine Visit: Services only offered telehealth    Originating Site (Patient Location): Patient's home    Distant Site (Provider Location): Provider Remote Setting- Home Office    Consent:  The patient/guardian has verbally consented to: the potential risks and benefits of telemedicine (video visit) versus in person care; bill my insurance or make self-payment for services provided; and responsibility for payment of non-covered services.     Patient would like the video invitation sent by:  My Chart    Mode of Communication:  Video Conference via Quarterly    Distant Location (Provider):  Off-site    As the provider I attest to compliance with applicable laws and regulations related to telemedicine.    UNIVERSAL ADULT Mental Health DIAGNOSTIC ASSESSMENT    Identifying Information:  Patient is a 38 year old,   individual. Patient was referred for an assessment by referring provider.  Patient attended the session alone.    Chief Complaint:   The purpose of this evaluation is to: provide treatment recommendations and clarify diagnosis. Patient reported seeking services at this time for diagnostic assessment and recommendations for treatment.  Patient reported that she has completed a previous ADHD diagnostic assessment at the age of teenager.  Patient has not received  "a previous diagnosis of ADHD. She stated, \"They said I didn't have it, but I think I was misdiagnosed.\" She was 17 years old at the time \"and didn't want to be there and didn't want the stigma of the diagnosis so I said the opposite of everything I was feeling, so I know I was misdiagnosed.\" Patient reported that medication has not been prescribed medication to address these problems. Client thinks that these symptoms weren't being addressed in the 1980s and early 1990s. Her mother didn't want her to be on medications. Her mother gave her coffee as a child. Her mother always told her that she had ADHD she just didn't want her to be on prescription medication.     Client struggles with being on time. She is often late and rushing out of the door. She is not as organized as she would like to be, so she falls behind. She is disorganized. She tries to implement strategies to stay organized, but it is difficult to stick with these things. Client drinks a lot of coffee and self-medicates with caffeine. She can't complete tasks. She jumps from one thing to the next without finishing things. She will forget to go back to the original task \"and the day will get away from me.\" She misplaces and loses things often. She will go to brush her hair, but she can't find her hair brush. When she goes to find the brush, she will see laundry that needs to be done or things that need to be rearranged. She will misplace her car keys and phone often. She is fidgety and restless. She finds herself eating standing up. It is hard to sit down and relax. She is often playing with her hands or moving her legs up and down. She is restless in conversations and will interrupt people. Client can be impulsive at times. She started a tapestry project but never finished it. She got supplies to start painting (paints and canvases) but she never started that. Client can be forgetful. She has post-its all over her refrigerator to help her remember things " "that she needs to do.      Social/Family History:  Patient reported they grew up in [facility]  .  They were raised by adopted parents  .  Parents were always together.  She was the third born of two children. She has two older sisters. She was adopted when she was 7 years old. She found her adopted parents to be loving, warm, and welcoming. The transition was easy for Client. She called them \"mom and dad\" from day one. She doesn't rememebr much before age 7 (she was with her mother who was dying of Lupus, but she doesn't really remember that). The people who adopted Client were family members of her mother's (they are technically her aunt and uncle but she calls them \"mom and dad\").  Patient reported that their childhood was \"good, happy.\" Patient described their current relationships with family of origin as \"close and tight knit.\" She is close with her sisters and her parents. Everyone is extremely supportive.    The patient describes their cultural background as .  Cultural influences and impact on patient's life structure, values, norms, and healthcare: N/A.  Contextual influences on patient's health include: Contextual Factors: Family Factors adopted at age 7; close with family.  These factors will be addressed in the Preliminary Treatment plan. Patient identified their preferred language to be English. Patient reported they does not need the assistance of an  or other support involved in therapy.     Patient reported had no significant delays in developmental tasks.  Patient's highest education level was some college  .  Patient identified the following learning problems: attention and concentration. She had difficulties completing work. She was in trouble for talking a lot in class. She didn't excel or fall behind.  Modifications will not be used to assist communication in therapy. Patient reports they are  able to understand written materials.    Patient reported the following " "relationship history: one marriage. Patient's current relationship status is  for 2 years (2020) but they've been together for 8 years. They get along \"great.\" Patient identified their sexual orientation as heterosexual.  Patient reported having 2 children; a daughter 4 and a son age 6. Patient identified partner; parents; siblings as part of their support system.  Patient identified the quality of these relationships as stable and meaningful,  .      Patient's current living/housing situation involves staying in own home/apartment.  The immediate members of family and household include Reese Luna, 35, and two kids and they report that housing is stable.    Patient is not currently employed.  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 Patient reports their finances are obtained through spouse. Patient does not identify finances as a current stressor.      Patient reported that they have not been involved with the legal system.   Patient does not report being under probation/ parole/ jurisdiction.    Patient's Strengths and Limitations:  Patient identified the following strengths or resources that will help them succeed in treatment: commitment to health and well being, friends / good social support, family support, insight, motivation, sense of humor and strong social skills. Things that may interfere with the patient's success in treatment include: none identified.     Assessments:  The following assessments were completed by patient for this visit:  PHQ9:   PHQ-9 SCORE 3/15/2023   PHQ-9 Total Score 5     GAD7:   SYDNEY-7 SCORE 3/15/2023   Total Score 3     Meagher Suicide Severity Rating Scale (Lifetime/Recent)  Meagher Suicide Severity Rating (Lifetime/Recent) 1/17/2019 2/8/2019 4/19/2019 3/15/2023   Q1 Wished to be Dead (Past Month) no no no -   Q2 Suicidal Thoughts (Past Month) no no no -   Q6 Suicide Behavior (Lifetime) no no no -   1. Wish to be Dead (Lifetime) - - - 0   2. Non-Specific Active Suicidal " Thoughts (Lifetime) - - - 0   Actual Attempt (Lifetime) - - - 0   Has subject engaged in non-suicidal self-injurious behavior? (Lifetime) - - - 0   Interrupted Attempts (Lifetime) - - - 0   Aborted or Self-Interrupted Attempt (Lifetime) - - - 0   Preparatory Acts or Behavior (Lifetime) - - - 0   Calculated C-SSRS Risk Score (Lifetime/Recent) - - - No Risk Indicated         Personal and Family Medical History:  Patient does report a family history of mental health concerns.  Patient reports family history includes Cancer in her father; Lung Cancer in her father; Lupus in her mother; Pancreatic Cancer in her father..     Patient does not report Mental Health Diagnosis or Treatment.      Patient has had a physical exam to rule out medical causes for current symptoms.  Date of last physical exam was within the past year. Client was encouraged to follow up with PCP if symptoms were to develop. The patient has a [facility] Provider, who is named Bradley Mcconnell.  Patient reports no current medical concerns.  Patient denies any issues with pain..   There are not significant appetite / nutritional concerns / weight changes.   Patient does not report a history of head injury / trauma / cognitive impairment.      Patient reports current meds as:   Outpatient Medications Marked as Taking for the 3/15/23 encounter (Virtual Visit) with Remedios Lunsford, PhD   Medication Sig     acetaminophen (TYLENOL) 500 MG tablet Take 1,000 mg by mouth every 6 hours as needed for mild pain     Fremanezumab-vfrm (AJOVY) 225 MG/1.5ML SOAJ Inject 225 mg Subcutaneous every 30 days     naproxen (NAPROSYN) 500 MG tablet Take 1 tablet (500 mg) by mouth 2 times daily as needed for moderate pain (4-6)     phentermine (ADIPEX-P) 37.5 MG tablet Take 0.5 tablets (18.75 mg) by mouth every morning (before breakfast) for 14 days, THEN 1 tablet (37.5 mg) every morning (before breakfast).     Rimegepant Sulfate 75 MG TBDP Take 75 mg by  mouth daily as needed (migraine)     Current Facility-Administered Medications for the 3/15/23 encounter (Virtual Visit) with Remedios Lunsford, PhD   Medication     Botulinum Toxin Type A (BOTOX) 200 units injection 150 Units     Botulinum Toxin Type A (BOTOX) 200 units injection 150 Units     Botulinum Toxin Type A (BOTOX) 200 units injection 155 Units       Medication Adherence:  Patient reports taking.  taking prescribed medications as prescribed.    Patient Allergies:    Allergies   Allergen Reactions     Codeine Nausea     Other reaction(s): Nausea     Imitrex [Sumatriptan]      Worsening of migraine, paresthesias     Vancomycin Other (See Comments)     Adhesive [Liquid Adhesive] Itching     Redness and itching around incision after hernia repair surgery     Ranitidine Rash and Itching     recvd IV zantac 75 and reaction was immediate,  was given Benadryl to counter act reaction in 2000     Sulfa Drugs Unknown and Rash       Medical History:    Past Medical History:   Diagnosis Date     Cellulitis 2012    MRSA septic olecranon bursitis and facial cellulitis     Drug abuse (H) 2011    methamphetamine and alcohol, sober since treatment 2011     Mild pre-eclampsia in third trimester      Motion sickness      MRSA cellulitis 2012    cleared with negative nasal swabs 8/16/17 and 8/28/17, reviewed     PONV (postoperative nausea and vomiting)      Pregnancy-induced hypertension in third trimester      Rh negative, antepartum          Current Mental Status Exam:   Appearance:  Appropriate    Eye Contact:  Good   Psychomotor:  Hyperactive       Gait / station:  no problem  Attitude / Demeanor: Cooperative   Speech      Rate / Production: Normal/ Responsive      Volume:  Normal  volume      Language:  intact, no problems and good  Mood:   Normal  Affect:   Appropriate    Thought Content: Clear   Thought Process: Goal Directed  Logical       Associations: No loosening of associations  Insight:   Good  "  Judgment:  Intact   Orientation:  All  Attention/concentration: Good      Substance Use:  Patient did not report a family history of substance use concerns; see medical history section for details.  Patient has received chemical dependency treatment in the past at NA meetings. Patient has not ever been to detox.  Client has a history of methamphetamine abuse. Client last used meth in 2012. She had used for a 1-2 years. She was using \"recreationally, but then it started to become a daily thing and I quit in 2012.\" She started using at age [age] and stopped using at age 27. She stated, \"It didn't last long.\"     Patient is currently receiving the following services: participate(s) in AA / NA  -she finds a lot of support in recovery and her sponsor           Substance History of use Age of first use Date of last use     Pattern and duration of use (include amounts and frequency)   Alcohol used in the past   Unsure 01/15/12 REPORTS SUBSTANCE USE: N/A   Cannabis   used in the past Unsure 01/15/12 REPORTS SUBSTANCE USE: N/A     Amphetamines   used in the past   01/15/12 REPORTS SUBSTANCE USE: N/A   Cocaine/crack    never used       REPORTS SUBSTANCE USE: N/A   Hallucinogens never used         REPORTS SUBSTANCE USE: N/A   Inhalants never used         REPORTS SUBSTANCE USE: N/A   Heroin never used         REPORTS SUBSTANCE USE: N/A   Other Opiates never used     REPORTS SUBSTANCE USE: N/A   Benzodiazepine   never used     REPORTS SUBSTANCE USE: N/A   Barbiturates never used     REPORTS SUBSTANCE USE: N/A   Over the counter meds never used     REPORTS SUBSTANCE USE: N/A   Caffeine Current use  childhood   REPORTS SUBSTANCE USE: reports using substance 5 times per day and has 1 coffee at a time.   Patient reports heaviest use is current use.   Nicotine  used in the past Unsure 03/15/15 REPORTS SUBSTANCE USE: N/A   Other substances not listed above:  Identify:  never used     REPORTS SUBSTANCE USE: N/A     Patient reported " the following problems as a result of their substance use: no problems, not applicable.    Substance Use: No symptoms    Based on the negative CAGE score and clinical interview there  are not indications of drug or alcohol abuse.      Significant Losses / Trauma / Abuse / Neglect Issues:   Patient did not  serve in the .  There are indications or report of significant loss, trauma, abuse or neglect issues related to: are no indications and client denies any losses, trauma, abuse, or neglect concerns.  Concerns for possible neglect are not present.     Safety Assessment:   Patient denies current homicidal ideation and behaviors.  Patient denies current self-injurious ideation and behaviors.    Patient denied risk behaviors associated with substance use.  Patient denies any high risk behaviors associated with mental health symptoms.  Patient reports the following current concerns for their personal safety: None.  Patient reports there are not firearms in the house.         History of Safety Concerns:  Patient denied a history of homicidal ideation.     Patient denied a history of personal safety concerns.    Patient denied a history of assaultive behaviors.    Patient denied a history of sexual assault behaviors.     Patient denied a history of risk behaviors associated with substance use.  Patient denies any history of high risk behaviors associated with mental health symptoms.  Patient reports the following protective factors: forward or future oriented thinking; dedication to family or friends; safe and stable environment; regular sleep; effectively controls impulses; regular physical activity; sense of belonging; purpose; secure attachment; help seeking behaviors when distressed; abstinence from substances; adherence with prescribed medication; daily obligations; structured day; effective problem solving skills; commitment to well being; sense of meaning; positive social skills; healthy fear of risky  "behaviors or pain; financial stability; strong sense of self worth or esteem    Risk Plan:  See Recommendations for Safety and Risk Management Plan    Review of Symptoms per patient report:   Depression: Difficulties concentrating and Psychomotor slowing or agitation  Gill:  No Symptoms  Psychosis: No Symptoms  Anxiety: Psychomotor agitation and Poor concentration  Panic:  No symptoms  Post Traumatic Stress Disorder:  No Symptoms   Eating Disorder: No Symptoms  ADD / ADHD:  Inattentive, Difficulties listening, Poor task completion, Poor organizational skills, Distractibility, Forgetful and Restlessness/fidgety  Conduct Disorder: No symptoms  Autism Spectrum Disorder: No symptoms  Obsessive Compulsive Disorder: No Symptoms    Patient reports the following compulsive behaviors and treatment history: none reported.      Diagnostic Criteria:   Attention Deficit Hyperactivity Disorder  A) A persistent pattern of inattention and/or hyperactivity-impulsivity that interferes with functioning or development, as characterized by (1) Inattention and/or (2) Hyperactivity and Impulsivity  - Often has difficulty sustaining attention in tasks or play activities  - Often does not seem to listen when spoken to directly  - Often does not follow through on instructions and fails to finish schoolwork, chores, or duties in the workplace  - Often has difficulty organizing tasks and activities  - Often avoids, dislikes, or is reluctant to engage in tasks that require sustained mental effort  - Often loses things necessary for tasks or activities  - Is often easily distractedby extraneous stimuli  - Often fidgets with or taps hands or feet or squirms in seat  - Often leaves seat in situations when remaining seated is expected  - Often runs about or climbs in situationswhere it is inappropriate  - Often unable to play or engage in leisure activities quietly  - Is often \"on the go,\" acting as if \"driven by a motor\"  - Often talks " excessively  - Often blurts out an answer before a question has been completed    Functional Status:  Patient reports the following functional impairments:  childcare / parenting, home life with family and management of the household and or completion of tasks.         Clinical Summary:  1. Reason for assessment: ADHD evaluation.  2. 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  5. Provisional Diagnosis:  Attention-Deficit/Hyperactivity Disorder  314.01 (F90.2) Combined presentation as evidenced by report of current/childhood symptoms - will continue to assess .  6. Prognosis: Expect Improvement and Maintain Current Status / Prevent Deterioration.  7. Likely consequences of symptoms if not treated: issues at home and with childrearing.  8. Client strengths include:  goal-focused, good listener, insightful, intelligent, motivated, open to learning, open to suggestions / feedback, responsible parent, support of family, friends and providers, supportive, wants to learn and willing to ask questions .     Recommendations:     1. Plan for Safety and Risk Management:   Safety and Risk: Recommended that patient call 911 or go to the local ED should there be a change in any of these risk factors..          Report to child / adult protection services was NA.      4. Resources/Service Plan:    services are not indicated.   Modifications to assist communication are not indicated.   Additional disability accommodations are not indicated.      5. Collaboration:   Collaboration / coordination of treatment will be initiated with the following  support professionals: primary care physician.      6.  Referrals:   The following referral(s) will be initiated: NA. Next Scheduled Appointment: NA.      A Release of Information has been obtained for the following: NA.     Emergency Contact  was not obtained.     7. RAJ:    RAJ:  Discussed the general effects of drugs and alcohol on health and well-being.  Provider gave patient printed information about the  effects of chemical use on their health and well being. Recommendations:  Maintain sobriety .     8. Records:   These were reviewed at time of assessment.   Information in this assessment was obtained from the medical record and  provided by patient who is a good historian.    Patient will have open access to their mental health medical record.    9.   Interactive Complexity: No      Provider Name/ Credentials:  Remedios Lunsford, PhD, LP  March 15, 2023

## 2023-03-16 ENCOUNTER — E-VISIT (OUTPATIENT)
Dept: URGENT CARE | Facility: CLINIC | Age: 38
End: 2023-03-16
Payer: COMMERCIAL

## 2023-03-16 DIAGNOSIS — B96.89 ACUTE BACTERIAL SINUSITIS: Primary | ICD-10-CM

## 2023-03-16 DIAGNOSIS — J01.90 ACUTE BACTERIAL SINUSITIS: Primary | ICD-10-CM

## 2023-03-16 PROCEDURE — 99421 OL DIG E/M SVC 5-10 MIN: CPT | Performed by: FAMILY MEDICINE

## 2023-03-16 NOTE — PATIENT INSTRUCTIONS
Dear Breanna Luna    After reviewing your responses, I've been able to diagnose you with Acute bacterial sinusitis.      Based on your responses and diagnosis, I have prescribed   Orders Placed This Encounter     amoxicillin-clavulanate (AUGMENTIN) 875-125 MG tablet    to treat your symptoms. I have sent this to your pharmacy.?     It is also important to stay well hydrated, get lots of rest and take over-the-counter decongestants,?tylenol?or ibuprofen if you?are able to?take those medications per your primary care provider to help relieve discomfort.?     It is important that you take?all of?your prescribed medication even if your symptoms are improving after a few doses.? Taking?all of?your medicine helps prevent the symptoms from returning.?     If your symptoms worsen, you develop severe headache, vomiting, high fever (>102), or are not improving in 7 days, please contact your primary care provider for an appointment or visit any of our convenient Walk-in Care or Urgent Care Centers to be seen which can be found on our website?here.?     Thanks again for choosing?us?as your health care partner,?   ?  Mindy Galicia MD?

## 2023-03-21 ENCOUNTER — TELEPHONE (OUTPATIENT)
Dept: FAMILY MEDICINE | Facility: CLINIC | Age: 38
End: 2023-03-21

## 2023-03-21 ENCOUNTER — TELEPHONE (OUTPATIENT)
Dept: NEUROLOGY | Facility: CLINIC | Age: 38
End: 2023-03-21
Payer: COMMERCIAL

## 2023-03-21 NOTE — TELEPHONE ENCOUNTER
[facility]    Phone Message    May a detailed message be left on voicemail: yes     Reason for Call: Other: Pt is calling because she is wanting to rescheduled her botox. Pt is looking for 3/21-3/30 since she will be out of town 3/31. Pt is wondering if there is another provider that can see her for the botox injections since Dr. Suarez is booked until June 30th. Please call Pt to discuss    Action Taken: Message routed to:  Other: [facility]    Travel Screening: Not Applicable

## 2023-03-21 NOTE — TELEPHONE ENCOUNTER
Spoke with patient about changing appointment. Patients 12 week robert for Botox injections is exactly on March 31. If pt gets injections before then they may not be covered by insurance. Advised pt it would be a good idea to either cancel or reschedule. Pt is okay with keeping appointment and will call back to reschedule if needed.     Paty Frias MA

## 2023-03-21 NOTE — TELEPHONE ENCOUNTER
Routing to Provider       Patient calling, she was seen virtually on 3/15 with Remedios Lunsford, PhD  PSYCHOLOGIST CLINICAL    She was advised that she would have some paperwork to complete prior to her next visit on 3/22 but she has not received anything via [employer] to fill out.  Are there forms that she should be completing?    She is requesting a [employer] response as she initially tried to send one but could not find Dr Lunsford's name to send to her.        Sulma, RN    Triage Nurse  [facility]  Appointment line: [phone]  [facility], 24 hour nurse line, available by calling clinic at [phone] and following prompts.

## 2023-03-22 ENCOUNTER — VIRTUAL VISIT (OUTPATIENT)
Dept: PSYCHOLOGY | Facility: CLINIC | Age: 38
End: 2023-03-22
Payer: COMMERCIAL

## 2023-03-22 ENCOUNTER — DOCUMENTATION ONLY (OUTPATIENT)
Dept: PSYCHOLOGY | Facility: CLINIC | Age: 38
End: 2023-03-22
Payer: COMMERCIAL

## 2023-03-22 DIAGNOSIS — F90.2 ATTENTION DEFICIT HYPERACTIVITY DISORDER, COMBINED TYPE: Primary | ICD-10-CM

## 2023-03-22 PROCEDURE — 90832 PSYTX W PT 30 MINUTES: CPT | Mod: VID | Performed by: PSYCHOLOGIST

## 2023-03-22 NOTE — PROGRESS NOTES
"Progress Note     Client Name:  Breanna Luna Date: 3/22/2023     Service Type: Individual  Video Visit: Yes, the patient's condition can be safely assessed and treated via synchronous audio and visual telemedicine encounter.      Reason for Video Visit: Services only offered telehealth    Location of Originating Site (Patient): Patient's home        Distant Location (Provider):  Off-site     Session Start Time: 2:00  Session End Time: 2:20     Session Length: 20 minutes    Session #: 2     Attendees: Client attended alone     Intervention: reviewed strategies for managing symptoms of inattention; motivational interviewing: explored potential barriers for making healthy changes    Identifying Information:  Client is a 38 year old, ,  female. Client was referred for a diagnostic assessment by PCP.  The purpose of this evaluation is to: provide treatment recommendations and clarify diagnosis.  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 Client attended the session alone.       Client's Statement of Presenting Concern:  Client reported seeking services at this time for diagnostic assessment and recommendations for treatment.  Client's presenting concerns include:  Client struggles with being on time. She is often late and rushing out of the door. She is not as organized as she would like to be, so she falls behind. She is disorganized. She tries to implement strategies to stay organized, but it is difficult to stick with these things. Client drinks a lot of coffee and self-medicates with caffeine. She can't complete tasks. She jumps from one thing to the next without finishing things. She will forget to go back to the original task \"and the day will get away from me.\" She misplaces and loses things often. She will go to brush her hair, but she can't find her hair brush. When she goes to find the brush, she will see laundry that needs to be done or things that need to be rearranged. She will misplace her " "car keys and phone often. She is fidgety and restless. She finds herself eating standing up. It is hard to sit down and relax. She is often playing with her hands or moving her legs up and down. She is restless in conversations and will interrupt people. Client can be impulsive at times. She started a tapestry project but never finished it. She got supplies to start painting (paints and canvases) but she never started that. Client can be forgetful. She has post-its all over her refrigerator to help her remember things that she needs to do. She moves around all day trying to get things done, but she doesn't get anything done. She doesn't feel she accomplishes much.  Client stated that symptoms have resulted in the following functional impairments: childcare / parenting, home life with family and management of the household and or completion of tasks.          History of Presenting Concern:  Patient reported that she has completed a previous ADHD diagnostic assessment at the age of teenager.  Patient has not received a previous diagnosis of ADHD. She stated, \"They said I didn't have it, but I think I was misdiagnosed.\" She was 17 years old at the time \"and didn't want to be there and didn't want the stigma of the diagnosis so I said the opposite of everything I was feeling, so I know I was misdiagnosed.\" Patient reported that medication has not been prescribed medication to address these problems. Client thinks that these symptoms weren't being addressed in the 1980s and early 1990s. Her mother didn't want her to be on medications. Her mother gave her coffee as a child. Her mother always told her that she had ADHD she just didn't want her to be on prescription medication.  Client reported that these problem(s) began in childhood. Client has not attempted to resolve these concerns in the past. Client reported that other professional(s) are involved in providing support / services.       Social History:  As a child, " client reported that she had problems getting ready for school in the morning, had problems with organization and keeping track of items, misplaced or lost things, forgot school work or other items between home and school and needed frequent reminders by parents to be motivated or to complete work. Client was paid an allowance for doing chores around the home. Client would have to search for items often. Client reported no difficulty with childhood peer relationships. As a child, client reported having regular and consistent sleep patterns.  Client reported currently experiencing regular and consistent sleep patterns.  Client reported sleeping approximately 6-8 hours per night. Client reported that she has completed a sleep study. She had a sleep study for snoring (not diagnosed with KATHERIN). Client reported having a well balanced diet. There are not significant nutritional concerns. Client reported sporadic exercise patterns.      Client's highest education level was high school graduate and some college. Client graduated high school in 2004. She estimated she obtained mostly Bs and Cs. During the elementary, middle, and high school years, patient recalls academic strengths in the area of science and English. Client reported experiencing academic problems in math. Client did identify the following learning problems: attention and concentration. She had difficulties completing work. She was in trouble for talking a lot in class. She didn't excel or fall behind. Client did not receive tutoring services during the school years. Client did not receive special education services. Client reported significant behavior and discipline problems including: disruptive classroom behavior and frequent tardiness or absences and failure to finish or complete homework. Client couldn't focus in class. It was difficult to pay attention. Client daydreamed often. On every report card, she was told that she talked too much and that she was  "\"too social.\" She was disruptive and talkative. She couldn't pay attention and wouldn't get her homework done unless her mother sat with her at the table to make sure she did it. Client procrastinated and did homework the night before it was due. Client was often late to class and skipped class. Client did attend post-secondary school. Client went to [employer] [facility] [employer] for 6 weeks ([employer]). She said it went \"okay\" but she felt it was boring. She was in her 20s and felt she had \"better things to do.\" She didn't take it seriously. She ended up working full-time.    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 Client reported that she been frequently late for work, frequently made mistakes with poor attention to detail, often felt bored, often been late in completing projects, disorganized behavior and distractible behavior . At the  job, she made a lot of mistake and she was placed on cleaning duty.  It was hard for her to stay organized; they had a system to follow and she couldn't maintain the order. 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 The longest period of employment has been 2 years. Client has been terminated from a place of employment. When she was 17 she was fired from a job (\"our boss was trying to teach us a lesson- at [employer], we didn't take work very seriously and goofed around a lot\").       Risk Taking Behaviors:  Client reported no history of risk taking behaviors      Motor Vehicle Operation:  Client has received a 's license.  Client has not received any moving violations.  Client reported the following driving habits: attentive and cautious.  According to client, other people are comfortable riding as a passenger when she is driving.        Mental Status Assessment:  Appearance:   Appropriate   Eye Contact:   Good   Psychomotor Behavior: Normal   Attitude:   Cooperative   Orientation:   All  Speech   Rate / " Production: Normal    Volume:  Normal   Mood:    Normal  Affect:    Appropriate   Thought Content:  Clear   Thought Form:  Coherent  Logical   Insight:    Good       Review of Symptoms:  Depression:     Difficulties concentrating and Psychomotor slowing or agitation  Gill:             No Symptoms  Psychosis:       No Symptoms  Anxiety:           Psychomotor agitation and Poor concentration  Panic:              No symptoms  Post Traumatic Stress Disorder:  No Symptoms   Eating Disorder:          No Symptoms  ADD / ADHD:              Inattentive, Difficulties listening, Poor task completion, Poor organizational skills, Distractibility, Forgetful and Restlessness/fidgety  Conduct Disorder:       No symptoms  Autism Spectrum Disorder:     No symptoms  Obsessive Compulsive Disorder:       No Symptoms  Reckless Behavior: No symptoms        Safety Issues and Plan for Safety and Risk Management:  Client denies a history of suicidal ideation, suicide attempts, self-injurious behavior, homicidal ideation, homicidal behavior and and other safety concerns    Client denies current fears or concerns for personal safety.  Client denies current or recent suicidal ideation or behaviors.  Client denies current or recent homicidal ideation or behaviors.  Client denies current or recent self injurious behavior or ideation.  Client denies other safety concerns.  Client reports there are no firearms in the house.  Recommended that patient call 911 or go to the local ED should there be a change in any of these risk factors.        Diagnostic Criteria:    Attention Deficit Hyperactivity Disorder  A) A persistent pattern of inattention and/or hyperactivity-impulsivity that interferes with functioning or development, as characterized by (1) Inattention and/or (2) Hyperactivity and Impulsivity  - Often has difficulty sustaining attention in tasks or play activities  - Often does not seem to listen when spoken to directly  - Often does not  "follow through on instructions and fails to finish schoolwork, chores, or duties in the workplace  - Often has difficulty organizing tasks and activities  - Often avoids, dislikes, or is reluctant to engage in tasks that require sustained mental effort  - Often loses things necessary for tasks or activities  - Is often easily distractedby extraneous stimuli  - Often fidgets with or taps hands or feet or squirms in seat  - Often leaves seat in situations when remaining seated is expected  - Often runs about or climbs in situationswhere it is inappropriate  - Often unable to play or engage in leisure activities quietly  - Is often \"on the go,\" acting as if \"driven by a motor\"  - Often talks excessively  - Often blurts out an answer before a question has been completed      Functional Status:  Client's symptoms have caused reduced functional status in the following areas: childcare / parenting, home life with family and management of the household and or completion of tasks.         DSM-5Diagnoses: (Sustained by DSM5 Criteria Listed Above)    Provisional Diagnosis:  Attention-Deficit/Hyperactivity Disorder  314.01 (F90.2) Combined presentation as evidenced by report of current/childhood symptoms - will continue to assess     Attendance Agreement:  Client has signed Attendance Agreement:No: unable to sign via telehealth      Preliminary Plan:  The client reports no currently identified Latter day, ethnic or cultural issues relevant to therapy.     services are not indicated.    Modifications to assist communication are not indicated.    Collaboration / coordination of treatment will be initiated with the following support professionals: primary care physician.    Referral to another professional/service is not indicated at this time..    A Release of Information is not needed at this time.    Client was given self and collaborative rating scales to be completed prior to the next appointment.  Client consented " to sending/receiving these measures via email.  Depression and anxiety rating scales were completed.  A third appointment was not scheduled at this time.     Report to child / adult protection services was NA.    Patient will have open access to their mental health medical record.    Remedios Lunsford, PhD, LP  March 22, 2023

## 2023-03-22 NOTE — PROGRESS NOTES
"Name: Breanna Luna  MRN: [id number]  : 1985       Connor Adult ADHD Rating Scale-IV: Self and Other Reports (BAARS-IV)  The BAARS-IV assesses for symptoms of ADHD that are experienced in one's daily life. This assessment measure includes self and collateral rating scales designed to provide information regarding current and childhood symptoms of ADHD including inattention, hyperactivity, and impulsivity. Self-report scores are reported as percentiles. Scores at the 76th-83rd percentile are considered marginal, scores at the 84th-92nd percentile are considered borderline, scores at the 93rd-95th percentile are considered mild, scores at the 96th-98th percentile are considered moderate, and those at the 99th percentile are considered severe. Collateral or \"other\" rating scales are reported as number of symptoms observed in comparison to those reported by the client. Norms and percentile scores are not available for collateral reports.      Current Symptoms Scale--Self Report:   Client completed the self-report inventory of current symptoms. The results indicate that the client's Total ADHD Score was 53 which places them in the 98th percentile for overall ADHD symptoms. In addition, the client endorsed the following occur \"often\" or \"very often\": 6/9 (97th percentile) Inattention symptoms, 7/9 (98th percentile) Hyperactivity/Impulsivity symptoms, and 1/9 (78th percentile) Sluggish Cognitive Tempo symptoms. Overall, the results suggest the client is reporting moderate symptoms of inattention and moderate symptoms of hyperactivity/impulsivity at this time.      Current Symptoms Scale--Other Report:  Client's spouse completed the collateral report inventory of current symptoms. Based on the collateral contact's observation of symptoms, the client demonstrates the following \"often\" or \"very often\": 8/9 Inattention symptoms, 2/5 Hyperactivity symptoms, 3/4 Impulsivity symptoms, and 1/9 Sluggish Cognitive Tempo " "symptoms. The client's Total ADHD Score was 53. The collateral- and self-report scores are similar and suggest Client experiences symptoms of inattention and hyperactivity/impulsivity at this time.       Childhood Symptoms Scale--Self-Report:  Client completed the self-report inventory of childhood symptoms. The results indicate that the client's Total ADHD Score was 54 which places them in the 97th percentile for overall ADHD symptoms in childhood. In addition, the client endorsed having experienced the following \"often\" or \"very often\": 9/9 (99th percentile) Inattention symptoms and 7/9 (97th percentile) Hyperactivity-Impulsivity symptoms. Overall, the results suggest the client experienced severe symptoms of inattention and moderate symptoms of hyperactivity/impulsivity in childhood.     Childhood Symptoms Scale--Other Report:  Client s mother completed the collateral report inventory of childhood symptoms. Based on the collateral contact's recollection of client's childhood symptoms, the client demonstrated the following \"often\" or \"very often\": 7/9 Inattention symptoms and 5/9 Hyperactivity-Impulsivity symptoms. The client's Total ADHD Score was 56. The collateral- and self-report scores are similar and suggest Client experienced symptoms of inattention and hyperactivity/impulsivity in childhood.       Connor Functional Impairment Scale: Self and Other Reports (BFIS)  The BFIS is used to assess an individuals' psychosocial impairment in major life/daily activities that may be due to a mental health disorder. This assessment measure includes self and collateral rating scales. Self-report scores are reported as percentiles. Scores at the 76th-83rd percentile are considered marginal, scores at the 84th-92nd percentile are considered borderline, scores at the 93rd-95th percentile are considered mild, scores at the 96th-98th percentile are considered moderate, and those at the 99th percentile are considered severe. " "Collateral or \"other\" rating scales are reported as number of symptoms observed in comparison to those reported by the client. Norms and percentile scores are not available for collateral reports.      Results indicate the client identified impairment (scores at or greater than 93rd percentile) in the following areas: home-chores, work, social-strangers, marriage, money management, and daily responsibilities. The client's Mean Impairment Score was 4 (75-84th percentile) indicating the client is not reporting impairment in functioning across domains. Client's spouse completed the collateral rating scale, which indicated similar results (e.g., Mean Impairment Score of 5.92). They noted impairment in the areas of: home-family, home-chores, work, money management, daily responsibilities, and health maintenance.       Connor Deficits in Executive Functioning Scale (BDEFS)  The BDEFS is a measure used for evaluating dimensions of adult executive functioning in daily life. This assessment measure includes self and collateral rating scales. Self-report scores are reported as percentiles. Scores at the 76th-83rd percentile are considered marginal, scores at the 84th-92nd percentile are considered borderline, scores at the 93rd-95th percentile are considered mild, scores at the 96th-98th percentile are considered moderate, and those at the 99th percentile are considered severe. Collateral or \"other\" rating scales are reported as number of symptoms observed in comparison to those reported by the client. Norms and percentile scores are not available for collateral reports.      Results indicate the client's Total Executive Functioning Score was 146 (79th percentile). The ADHD-Executive Functioning Index score was 21 (88th percentile). These scores suggest the client is not reporting deficits in executive functioning. These deficits may be due to ADHD or other mental health conditions. Specifically, they noted deficits in the " following area: self-management to time (moderate). Client s spouse completed the collateral report which indicated discrepant results. His scores were generally higher. He noted deficits in the areas of: self-management to time; self-organization/problem-solving; self-restraint, and self-motivation.      Generalized Anxiety Disorder Questionnaire (SYDNEY-7)  This questionnaire is designed to screen for anxiety in adults. Based on the client's score of 0, they are not reporting symptoms of anxiety at this time.      Patient Health Questionnaire- 9 (PHQ-9)   This questionnaire is designed to screen for depression in adults. Based on the client's score of 1, they are not reporting symptoms of depression at this time.

## 2023-03-24 ENCOUNTER — VIRTUAL VISIT (OUTPATIENT)
Dept: FAMILY MEDICINE | Facility: CLINIC | Age: 38
End: 2023-03-24
Payer: COMMERCIAL

## 2023-03-24 ENCOUNTER — DOCUMENTATION ONLY (OUTPATIENT)
Dept: PSYCHOLOGY | Facility: CLINIC | Age: 38
End: 2023-03-24
Payer: COMMERCIAL

## 2023-03-24 ENCOUNTER — VIRTUAL VISIT (OUTPATIENT)
Dept: PSYCHOLOGY | Facility: CLINIC | Age: 38
End: 2023-03-24
Payer: COMMERCIAL

## 2023-03-24 DIAGNOSIS — F90.2 ATTENTION DEFICIT HYPERACTIVITY DISORDER, COMBINED TYPE: Primary | ICD-10-CM

## 2023-03-24 DIAGNOSIS — F90.2 ATTENTION DEFICIT HYPERACTIVITY DISORDER (ADHD), COMBINED TYPE: Primary | ICD-10-CM

## 2023-03-24 PROCEDURE — 96130 PSYCL TST EVAL PHYS/QHP 1ST: CPT | Mod: TEL | Performed by: PSYCHOLOGIST

## 2023-03-24 PROCEDURE — 99214 OFFICE O/P EST MOD 30 MIN: CPT | Mod: VID | Performed by: FAMILY MEDICINE

## 2023-03-24 PROCEDURE — 96131 PSYCL TST EVAL PHYS/QHP EA: CPT | Mod: TEL | Performed by: PSYCHOLOGIST

## 2023-03-24 RX ORDER — ATOMOXETINE 80 MG/1
80 CAPSULE ORAL DAILY
Qty: 30 CAPSULE | Refills: 3 | Status: SHIPPED | OUTPATIENT
Start: 2023-03-24 | End: 2023-04-27

## 2023-03-24 RX ORDER — ATOMOXETINE 40 MG/1
40 CAPSULE ORAL DAILY
Qty: 7 CAPSULE | Refills: 0 | Status: SHIPPED | OUTPATIENT
Start: 2023-03-24 | End: 2023-04-27

## 2023-03-24 NOTE — PROGRESS NOTES
[facility]  ADHD Evaluation     Patient: Breanna Luna     YOB: 1985  MRN: [id number]    Date(s) of assessment: Diagnostic Assessment (3/15/23; 3/22/23), Connor self-report and collateral measures scored and interpreted (3/22/23), MMPI -2 (administered on 3/23/23, interpreted on 3/24/23)      Information about appointment:  Client attended two sessions to aid in determining client's mental health diagnosis or diagnoses and treatment recommendations that best address client concerns. Available medical records were reviewed. There were no previous psychological evaluations for review. A diagnostic assessment was conducted at the initial appointment. Client completed several rating scales to assist in assessing attention-related and other mental health symptoms that may be causing impairments in functioning. Rating scales were also completed by a collateral contact. Client also completed personality testing to aid in diagnostic clarification.     Assessment tools:   Connor Adult ADHD Rating Scale-IV: Self and Other Reports (BAARS-IV), Connor Functional Impairment Scale: Self and Other Reports (BFIS), Connor Deficits in Executive Functioning Scale: Self and Other Reports (BDEFS), Patient Health Questionnaire-9 (PHQ-9), Generalized Anxiety Disorder-7 (SYDNEY-7) and [place] Multiphasic Personality Inventory (MMPI-2) *Testing administered remotely      Assessment Results:     Behavioral Observations:  Client arrived on time to each session. She was pleasant and cooperative at all times. She did not demonstrate difficulties with inattention or hyperactivity/impulsivity during sessions. The following results are likely to be an accurate reflection of Client's current functioning.     Connor Adult ADHD Rating Scale-IV: Self and Other Reports (BAARS-IV)  The BAARS-IV assesses for symptoms of ADHD that are experienced in one's daily life. This assessment measure includes self and collateral  "rating scales designed to provide information regarding current and childhood symptoms of ADHD including inattention, hyperactivity, and impulsivity. Self-report scores are reported as percentiles. Scores at the 76th-83rd percentile are considered marginal, scores at the 84th-92nd percentile are considered borderline, scores at the 93rd-95th percentile are considered mild, scores at the 96th-98th percentile are considered moderate, and those at the 99th percentile are considered severe. Collateral or \"other\" rating scales are reported as number of symptoms observed in comparison to those reported by the client. Norms and percentile scores are not available for collateral reports.      Current Symptoms Scale--Self Report:   Client completed the self-report inventory of current symptoms. The results indicate that the client's Total ADHD Score was 53 which places them in the 98th percentile for overall ADHD symptoms. In addition, the client endorsed the following occur \"often\" or \"very often\": 6/9 (97th percentile) Inattention symptoms, 7/9 (98th percentile) Hyperactivity/Impulsivity symptoms, and 1/9 (78th percentile) Sluggish Cognitive Tempo symptoms. Overall, the results suggest the client is reporting moderate symptoms of inattention and moderate symptoms of hyperactivity/impulsivity at this time.      Current Symptoms Scale--Other Report:  Client's spouse completed the collateral report inventory of current symptoms. Based on the collateral contact's observation of symptoms, the client demonstrates the following \"often\" or \"very often\": 8/9 Inattention symptoms, 2/5 Hyperactivity symptoms, 3/4 Impulsivity symptoms, and 1/9 Sluggish Cognitive Tempo symptoms. The client's Total ADHD Score was 53. The collateral- and self-report scores are similar and suggest Client experiences symptoms of inattention and hyperactivity/impulsivity at this time.       Childhood Symptoms Scale--Self-Report:  Client completed the " "self-report inventory of childhood symptoms. The results indicate that the client's Total ADHD Score was 54 which places them in the 97th percentile for overall ADHD symptoms in childhood. In addition, the client endorsed having experienced the following \"often\" or \"very often\": 9/9 (99th percentile) Inattention symptoms and 7/9 (97th percentile) Hyperactivity-Impulsivity symptoms. Overall, the results suggest the client experienced severe symptoms of inattention and moderate symptoms of hyperactivity/impulsivity in childhood.     Childhood Symptoms Scale--Other Report:  Client s mother completed the collateral report inventory of childhood symptoms. Based on the collateral contact's recollection of client's childhood symptoms, the client demonstrated the following \"often\" or \"very often\": 7/9 Inattention symptoms and 5/9 Hyperactivity-Impulsivity symptoms. The client's Total ADHD Score was 56. The collateral- and self-report scores are similar and suggest Client experienced symptoms of inattention and hyperactivity/impulsivity in childhood.       Connor Functional Impairment Scale: Self and Other Reports (BFIS)  The BFIS is used to assess an individuals' psychosocial impairment in major life/daily activities that may be due to a mental health disorder. This assessment measure includes self and collateral rating scales. Self-report scores are reported as percentiles. Scores at the 76th-83rd percentile are considered marginal, scores at the 84th-92nd percentile are considered borderline, scores at the 93rd-95th percentile are considered mild, scores at the 96th-98th percentile are considered moderate, and those at the 99th percentile are considered severe. Collateral or \"other\" rating scales are reported as number of symptoms observed in comparison to those reported by the client. Norms and percentile scores are not available for collateral reports.      Results indicate the client identified impairment (scores at " "or greater than 93rd percentile) in the following areas: home-chores, work, social-strangers, marriage, money management, and daily responsibilities. The client's Mean Impairment Score was 4 (75-84th percentile) indicating the client is not reporting impairment in functioning across domains. Client's spouse completed the collateral rating scale, which indicated similar results (e.g., Mean Impairment Score of 5.92). They noted impairment in the areas of: home-family, home-chores, work, money management, daily responsibilities, and health maintenance.       Connor Deficits in Executive Functioning Scale (BDEFS)  The BDEFS is a measure used for evaluating dimensions of adult executive functioning in daily life. This assessment measure includes self and collateral rating scales. Self-report scores are reported as percentiles. Scores at the 76th-83rd percentile are considered marginal, scores at the 84th-92nd percentile are considered borderline, scores at the 93rd-95th percentile are considered mild, scores at the 96th-98th percentile are considered moderate, and those at the 99th percentile are considered severe. Collateral or \"other\" rating scales are reported as number of symptoms observed in comparison to those reported by the client. Norms and percentile scores are not available for collateral reports.      Results indicate the client's Total Executive Functioning Score was 146 (79th percentile). The ADHD-Executive Functioning Index score was 21 (88th percentile). These scores suggest the client is not reporting deficits in executive functioning. These deficits may be due to ADHD or other mental health conditions. Specifically, they noted deficits in the following area: self-management to time (moderate). Client s spouse completed the collateral report which indicated discrepant results. His scores were generally higher. He noted deficits in the areas of: self-management to time; self-organization/problem-solving; " "self-restraint, and self-motivation.      Summary of [place] Multiphasic Personality Inventory--Second Edition   Client completed the [place] Multiphasic Personality Inventory-2 (MMPI-2), a self-report personality inventory, as part of her evaluation. Validity scales indicate that the client responded in a manner that suggests an effort to appear virtuous for the purposes of impression management. There may be a level of underreporting sufficient to distort the profile to the point of compromised reliability for personality description and clinical prediction. This may reflect a need to deny problems and weaknesses.    Generalized Anxiety Disorder Questionnaire (SYDNEY-7)  This questionnaire is designed to screen for anxiety in adults. Based on the client's score of 0, they are not reporting symptoms of anxiety at this time.      Patient Health Questionnaire- 9 (PHQ-9)   This questionnaire is designed to screen for depression in adults. Based on the client's score of 1, they are not reporting symptoms of depression at this time.      Summary (based on clinical interview, review of records, test results):  Client is a 38-year-old, ,  female. Client was referred for a diagnostic assessment by PCP.  The purpose of this evaluation is to: provide treatment recommendations and clarify diagnosis. Client's presenting concerns include:  Client struggles with being on time. She is often late and rushing out of the door. She is not as organized as she would like to be, so she falls behind. She is disorganized. She tries to implement strategies to stay organized, but it is difficult to stick with these things. Client drinks a lot of coffee and self-medicates with caffeine. She can't complete tasks. She jumps from one thing to the next without finishing things. She will forget to go back to the original task \"and the day will get away from me.\" She misplaces and loses things often. She will go to brush her hair, " "but she can't find her hairbrush. When she goes to find the brush, she will see laundry that needs to be done or things that need to be rearranged. She will misplace her car keys and phone often. She is fidgety and restless. She finds herself eating standing up. It is hard to sit down and relax. She is often playing with her hands or moving her legs up and down. She is restless in conversations and will interrupt people. Client can be impulsive at times. She started a tapestry project but never finished it. She got supplies to start painting (paints and canvases) but she never started that. Client can be forgetful. She has post-its all over her refrigerator to help her remember things that she needs to do. She moves around all day trying to get things done, but she doesn't get anything done. She doesn't feel she accomplishes much.  Client stated that symptoms have resulted in the following functional impairments: childcare / parenting, home life with family and management of the household and or completion of tasks. Client reported that she has completed a previous ADHD diagnostic assessment at the age of teenager. Client has not received a previous diagnosis of ADHD. She stated, \"They said I didn't have it, but I think I was misdiagnosed.\" She was 17 years old at the time \"and didn't want to be there and didn't want the stigma of the diagnosis so I said the opposite of everything I was feeling, so I know I was misdiagnosed.\" Client reported that medication has not been prescribed medication to address these problems. Client thinks that these symptoms weren't being addressed in the 1980s and early 1990s. Her mother didn't want her to be on medications. Her mother gave her coffee as a child. Her mother always told her that she had ADHD she just didn't want her to be on prescription medication.  Client reported that these problem(s) began in childhood. Client has not attempted to resolve these concerns in the past. " "Client reported that other professional(s) are involved in providing support / services. Client did report a personal history of chemical dependence. She has a history of methamphetamine abuse. Client last used meth in 2012. She had used for about 1-2 years. She was using \"recreationally, but then it started to become a daily thing and I quit in 2012.\" She started using at age [age] and stopped using at age 27. She stated, \"It didn't last long.\" Client is currently receiving the following services: participate(s) in AA/NA. She finds a lot of support in recovery and her sponsor.    Client reported she grew up in [place], [place]. She was raised by her adopted parents. Her parents were always together.  She was the third born of two children. She has two older sisters. She was adopted when she was 7 years old. She found her adopted parents to be loving, warm, and welcoming. The transition was easy for Client. She called them \"mom and dad\" from day one. She doesn't remember much before age 7 (she was with her mother who was dying of Lupus, but she doesn't really remember that). The people who adopted Client were family members of her mother's (they are technically her aunt and uncle, but she calls them \"mom and dad\"). Client reported that their childhood was \"good, happy.\" Client described their current relationships with family of origin as \"close and tight knit.\" She is close with her sisters and her parents. Everyone is extremely supportive. Client reported the following relationship history: one marriage. Client's current relationship status is  for 2 years (2020) but they've been together for 8 years. They get along \"great.\" Client identified their sexual orientation as heterosexual. Client reported having two children: a daughter 4 and a son age 6. Client identified partner; parents; siblings as part of their support system. Client identified the quality of these relationships as stable and " "meaningful.    As a child, client reported that she had problems getting ready for school in the morning, had problems with organization and keeping track of items, misplaced or lost things, forgot school work or other items between home and school and needed frequent reminders by parents to be motivated or to complete work. Client was paid an allowance for doing chores around the home. Client would have to search for items often. Client reported no difficulty with childhood peer relationships. As a child, client reported having regular and consistent sleep patterns. Client reported currently experiencing regular and consistent sleep patterns.  Client reported sleeping approximately 6-8 hours per night. Client reported that she has completed a sleep study. She had a sleep study for snoring (not diagnosed with KATHERIN).      Client's highest education level was high school graduate and some college. Client graduated high school in 2004. She estimated she obtained mostly Bs and Cs. During the elementary, middle, and high school years, patient recalls academic strengths in the area of science and English. Client reported experiencing academic problems in math. Client did identify the following learning problems: attention and concentration. She had difficulties completing work. She was in trouble for talking a lot in class. She didn't excel or fall behind. Client did not receive tutoring services during the school years. Client did not receive special education services. Client reported significant behavior and discipline problems including: disruptive classroom behavior and frequent tardiness or absences and failure to finish or complete homework. Client couldn't focus in class. It was difficult to pay attention. Client daydreamed often. On every report card, she was told that she talked too much and that she was \"too social.\" She was disruptive and talkative. She couldn't pay attention and wouldn't get her homework done " "unless her mother sat with her at the table to make sure she did it. Client procrastinated and did homework the night before it was due. Client was often late to class and skipped class. Client did attend post-secondary school. Client went to [facility] [employer] for 6 weeks ([facility]). She said it went \"okay\" but she felt it was boring. She was in her 20s and felt she had \"better things to do.\" She didn't take it seriously. She ended up working full-time.     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 Client reported that she been frequently late for work, frequently made mistakes with poor attention to detail, often felt bored, often been late in completing projects, disorganized behavior and distractible behavior. At the  job, she made a lot of mistake and she was placed on cleaning duty.  It was hard for her to stay organized; they had a system to follow and she couldn't maintain the order. 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 The longest period of employment has been 2 years. Client has been terminated from a place of employment. When she was 17 years old, she was fired from a job (\"our boss was trying to teach us a lesson- at [employer], we didn't take work very seriously and goofed around a lot\").     Results of testing were indicative of ADHD. Rating scales suggest the Client is reporting symptoms of inattention and hyperactivity/impulsivity that have been present since childhood. The collateral reports (spouse and mother) were commensurate with Client s self-report and were suggestive of current and childhood symptoms of ADHD. Rating scales also suggested the client is likely experiencing significant deficits in executive functioning that are likely due to ADHD. Impairment is reported in more than one setting (e.g., school, home, work). Client s responses on self-report measures suggest she is not experiencing significant " anxiety or depression at this time. Based on the results of clinical interview and psychological testing, the client currently meets criteria for diagnoses of Attention-Deficit/Hyperactivity Disorder, Combined Presentation. Client will be provided with the results of testing, diagnosis, and recommendations in her last appointment.    DSM5 Diagnoses: (Sustained by DSM5 Criteria Listed Above)    Attention-Deficit/Hyperactivity Disorder, Combined Presentation (F90.2)    Stimulant Use Disorder, in sustained remission (F15.21)    Psychosocial & Contextual Factors: young children; stay at home mother     Recommendations:       1. Schedule a medication evaluation with your physician. Medications are often beneficial in treating symptoms of ADHD. Given history of stimulant use disorder, discuss precautions with your provider. This will inform which medications will be safest for you to utilize without compromising sobriety.     2. Access resources through websites, books, and articles such as those provided in the Adult ADHD Symptom Management handout.       3. Consider working with an ADHD  or individual therapist to learn skills to assist with symptom management, as well as ways to improve relationships, etc. that may have been impacted by your symptoms.     4. Continue participating in AA/NA and your sponsor to maintain sobriety.    5. You are welcome to schedule a follow-up appointment with me in about 6 weeks to review symptoms, treatment involvement, and struggles and/or successes.     Remedios Lunsford, PhD, LP  Licensed Psychologist

## 2023-03-24 NOTE — PROGRESS NOTES
"Client Name: Breanna Luna Date: 3/24/23     Service Type: Individual (ADHD Evaluation feedback session)     Session Start Time: 8:00 Session End Time: 8:20      Session Length: 20 minutes      Session #: (feedback)     Attendees: Client attended alone    The patient has been notified of the following:      \"We have found that certain health care needs can be provided without the need for a face to face visit.  This service lets us provide the care you need with a phone conversation.       I will have full access to your [facility] medical record during this entire phone call.   I will be taking notes for your medical record.      Since this is like an office visit, we will bill your insurance company for this service.       There are potential benefits and risks of telephone visits (e.g. limits to patient confidentiality) that differ from in-person visits.?  Confidentiality still applies for telephone services, and nobody will record the visit.  It is important to be in a quiet, private space that is free of distractions (including cell phone or other devices) during the visit.??      If during the course of the call I believe a telephone visit is not appropriate, you will not be charged for this service\"     Consent has been obtained for this service by care team member: Yes           DATA        Treatment Objective(s) Addressed in This Session:   Provided feedback on ADHD evaluation. Reviewed test results in depth and answered client's questions. Client diagnosed with ADHD Combined Presentation. Reviewed ADHD Coping Skills Handout. This provider also completed full written report of evaluation, including integration of testing data, summary, and recommendations. Please see Documentation Only dated 3/24/23.     Progress on / Status of Treatment Objective(s) / Homework:   Completed      Intervention:  ADHD Evaluation feedback; Reviewed report (can be found in Documentation Only encounter dated 3/24/23); Client was " appreciative of the feedback and expressed understanding of the diagnosis. She plans to discuss medication management with PCP who is aware of her background with methamphetamine abuse.        ASSESSMENT: Current Emotional / Mental Status (status of significant symptoms):  Risk status (Self / Other harm or suicidal ideation)  Client denies current fears or concerns for personal safety.  Client denies current or recent suicidal ideation or behaviors.  Client denies current or recent homicidal ideation or behaviors.  Client denies current or recent self-injurious behavior or ideation.  Client denies other safety concerns.  A safety and risk management plan has not been developed at this time, however client was given the after-hours number / 911 should there be a change in any of these risk factors.     Appearance: Unable to assess on phone   Eye Contact: Unable to assess on phone  Psychomotor Behavior: Unable to assess on phone  Attitude: Cooperative   Orientation: All  Speech  Rate / Production: Normal   Volume: Normal   Mood: Normal  Affect: Appropriate   Thought Content: Clear   Thought Form: Coherent Logical   Insight: Good      Medication Review:  Client is not currently prescribed psychiatric medications    Medication Compliance:  NA     Changes in Health Issues:  None reported     Chemical Use Review:  Substance Use: Chemical use reviewed, no active concerns      Tobacco Use: No current tobacco use.      Collateral Reports Completed:  Routed note to Care Team Member(s)     PLAN: (Homework, other)     Recommendations:    1. Schedule a medication evaluation with your physician. Medications are often beneficial in treating symptoms of ADHD. Given history of stimulant use disorder, discuss precautions with your provider. This will inform which medications will be safest for you to utilize without compromising sobriety.     2. Access resources through websites, books, and articles such as those provided in the  Adult ADHD Symptom Management handout.       3. Consider working with an ADHD  or individual therapist to learn skills to assist with symptom management, as well as ways to improve relationships, etc. that may have been impacted by your symptoms.     4. Continue participating in AA/NA and your sponsor to maintain sobriety.    5. You are welcome to schedule a follow-up appointment with me in about 6 weeks to review symptoms, treatment involvement, and struggles and/or successes.     Remedios Lunsford, PhD,   Licensed Psychologist    Psychological Testing   Billing/Services Summary       Testing Evaluation Services Base: 17270  (1st 60 mins) Add-on: 42864  (each addtl 60 mins)   Record Review and Clarify Referral Question   (9:40/10:00), (3/15/23) 20 minutes   Integration/Report Generation   (11:00/12:00), (3/22/23) - Connor Scales  (3:00/4:00), (3/23/23) - MMPI-2  (7:00/8:00), (3/24/23) - Report 60 minutes  60 minutes  60 minutes   (8:00/8:20), (3/24/23) - Interactive Feedback session  20 minutes   Total Time: 220 minutes (3 hours, 40 minutes)    Total Units: 1 3           Diagnosis(es): (ICD-10)  Attention-Deficit/Hyperactivity Disorder (ADHD) - Combined Presentation (F90.2)  Stimulant Use Disorder in sustained remission

## 2023-03-24 NOTE — PROGRESS NOTES
Name: Breanna Luna  MRN: [id number]  : 1985    Client completed the [place] Multiphasic Personality Inventory-2 (MMPI-2), a self-report personality inventory, as part of her evaluation. Validity scales indicate that the client responded in a manner that suggests an effort to appear virtuous for the purposes of impression management. There may be a level of underreporting sufficient to distort the profile to the point of compromised reliability for personality description and clinical prediction. This may reflect a need to deny problems and weaknesses.

## 2023-03-24 NOTE — PROGRESS NOTES
Breanna is a 38 year old who is being evaluated via a billable video visit.      How would you like to obtain your AVS? MyChart  If the video visit is dropped, the invitation should be resent by: Text to cell phone: [phone]  Will anyone else be joining your video visit? No    Subjective   Breanna is a 38 year old, presenting for the following health issues:  A.D.H.D  No flowsheet data found.  History of Present Illness       Reason for visit:  ADHD F/U    She eats 2-3 servings of fruits and vegetables daily.She consumes 1 sweetened beverage(s) daily.She exercises with enough effort to increase her heart rate 30 to 60 minutes per day.  She exercises with enough effort to increase her heart rate 5 days per week.   She is taking medications regularly.         Review of Systems   Constitutional, HEENT, cardiovascular, pulmonary, GI, , musculoskeletal, neuro, skin, endocrine and psych systems are negative, except as otherwise noted.      Objective           Vitals:  No vitals were obtained today due to virtual visit.    Physical Exam   GENERAL: Healthy, alert and no distress  EYES: Eyes grossly normal to inspection.  No discharge or erythema, or obvious scleral/conjunctival abnormalities.  RESP: No audible wheeze, cough, or visible cyanosis.  No visible retractions or increased work of breathing.    SKIN: Visible skin clear. No significant rash, abnormal pigmentation or lesions.  NEURO: Cranial nerves grossly intact.  Mentation and speech appropriate for age.  PSYCH: Mentation appears normal, affect normal/bright, judgement and insight intact, normal speech and appearance well-groomed.    A/P:  (F90.2) Attention deficit hyperactivity disorder (ADHD), combined type  (primary encounter diagnosis)  Comment:   Plan: atomoxetine (STRATTERA) 40 MG capsule,         atomoxetine (STRATTERA) 80 MG capsule        After discussing with patient about her past history of methamphetamine and alcohol abuse decided to trial a  non-stimulant medication with Strattera to see if this would be helpful. Patient will f/u with pcp. Patient stated she no longer uses methamphetamine. Patient stated she used for a couple of years in her 20's. If Strattera not helping, patient stated she did discussed with pcp about the stimulants. Would be reasonable to try these medication but would need careful monitoring due to her history. Discussed that further management should be through pcp rather than virtually.    Derek Gan MD          Video-Visit Details    Type of service:  Video Visit     Originating Location (pt. Location): Home  Distant Location (provider location):  On-site  Platform used for Video Visit: [employer]

## 2023-03-27 ENCOUNTER — TELEPHONE (OUTPATIENT)
Dept: FAMILY MEDICINE | Facility: CLINIC | Age: 38
End: 2023-03-27
Payer: COMMERCIAL

## 2023-03-27 NOTE — TELEPHONE ENCOUNTER
Prior Authorization Retail Medication Request    Medication/Dose: Wegovy 0.25, 0.5, 1  ICD code (if different than what is on RX):    Previously Tried and Failed:    Rationale:      Insurance Name:  [employer]  Insurance ID:  [id number]      Pharmacy Information (if different than what is on RX)  Name:  [facility]  Phone:  [phone]    Thank you,  Sahara Miller, Pharmacy Tech  [facility]

## 2023-03-29 NOTE — TELEPHONE ENCOUNTER
Prior Authorization Approval    Authorization Effective Date: 2/27/2023  Authorization Expiration Date: 10/25/2023  Medication: Semaglutide-Weight Management (WEGOVY) 0.25 MG/0.5ML pen - Approved  Approved Dose/Quantity:   Reference #: [id number]   Insurance Company: [employer] - Phone [phone] Fax [phone]  Which Pharmacy is filling the prescription (Not needed for infusion/clinic administered): [facility] [address]   Pharmacy Notified: Yes  Patient Notified: Yes

## 2023-03-30 ENCOUNTER — MYC MEDICAL ADVICE (OUTPATIENT)
Dept: FAMILY MEDICINE | Facility: CLINIC | Age: 38
End: 2023-03-30
Payer: COMMERCIAL

## 2023-03-30 DIAGNOSIS — F90.2 ATTENTION DEFICIT HYPERACTIVITY DISORDER (ADHD), COMBINED TYPE: Primary | ICD-10-CM

## 2023-03-31 ENCOUNTER — OFFICE VISIT (OUTPATIENT)
Dept: NEUROLOGY | Facility: CLINIC | Age: 38
End: 2023-03-31
Payer: COMMERCIAL

## 2023-03-31 DIAGNOSIS — G43.709 CHRONIC MIGRAINE WITHOUT AURA WITHOUT STATUS MIGRAINOSUS, NOT INTRACTABLE: Primary | ICD-10-CM

## 2023-03-31 PROCEDURE — 64615 CHEMODENERV MUSC MIGRAINE: CPT | Performed by: PSYCHIATRY & NEUROLOGY

## 2023-03-31 NOTE — LETTER
3/31/2023         RE: Breanna Luna  [address]  [place] [address]        Dear Colleague,    Thank you for referring your patient, Breanna Luna, to the [facility]. Please see a copy of my visit note below.    [facility]  Botulinum Toxin Procedure    Cara Suarez MD  Headache Neurology    March 31, 2023    Procedure:  OnabotulinumtoxinA injections for chronic migraine  Indication:  Chronic migraine    Ms. Luna suffers from severe intractable headaches.  She was referred by Dr. Suarez for onabotulinumtoxinA injections for headache.  Risks, benefits, and alternatives were discussed.  All questions were answered and consent given.  She decided to proceed with the injections.      Prior to initiation of botulinum toxin injections, Ms. Luna reported 20 headache days per month, with 17 severe headache days per month. Her headaches are quite disabling and often interfere with her ability to function normally.     Last injections:  January 6, 2023    Currently, she is having 3 headache days per month, with 3 severe headache days per month. less than 20 headache days per month. She's noticed a wearing off effect in the last 2 weeks. She took Nurtec and Medrol dosepak and acetaminophen and naproxen and prochlorperazine during that time due to severe headaches returning. Nurtec works very well for her.     She has attempted other migraine prophylactic treatments in the past, which have included:   Nortriptyline 20 mg PM was not helpful.  Gabapentin was not helpful.  Propranolol 40 mg TID was not helpful.  Topiramate was not helpful.  Emgality was painful.   Ajovy is helpful.    Ms. Luna's pain was assessed prior to the procedure.  She rated her pain today as 2 out of 10.    Procedural Pause: Procedural pause was conducted to verify correct patient identity, procedure to be performed, correct side and site, correct patient position, and special requirements.  Appropriate hand hygiene was utilized, and each injection site was prepped with alcohol wipes or Chloraprep swab.     Procedure Details: 200 units of onabotulinumtoxinA was diluted in 4 mL 0.9% normal saline. A total of 150 units of onabotulinumtoxinA were injected using 30 gauge 0.5 in needles into the muscles listed below. 50 units of onabotulinumtoxinA were wasted.     Injection Sites: Total = 150 units onabotulinumtoxinA      and Procerus muscles - 5 units into the left and right corrugators and 5 units into the procerus (15 units total)    Frontalis muscles - 5 units into the left superior frontalis and 5 units into the right superior frontalis (2 injection sites per muscle) (10 units total)    Temporalis muscles - 12.5 units into the left temporalis muscle and 12.5 units into the right temporalis muscle (2 injection sites per muscle) (25 units total)    Occipitalis muscles - 12.5 units into the left occipitalis muscle and 12.5 units into the right occipitalis muscle (2 injection sites per muscle) (25 units total)    Splenius Capitis muscles - 12.5 units into the left splenius capitis muscle and 12.5 units into the right splenius capitis muscle (2 injection sites per muscle, divided into 2/3 anteriorly and 1/3 posteriorly) (25 units total)      Trapezius muscles - 25 units into the left trapezius muscle and 25 units into the right trapezius muscle (3 injection sites per muscle, divided 5 units, 10 units, 10 units, medial to lateral) (50 units total)    Ms. Luna tolerated the procedure well without immediate complications.  She will follow up in clinic for assessment of the effectiveness of treatment.  She did not report any change in her pain level after the botulinumtoxinA injection procedure.    Cara Suarez MD  Headache Neurology  [facility]        Again, thank you for allowing me to participate in the care of your patient.        Sincerely,        Cara Suarez MD

## 2023-03-31 NOTE — PROGRESS NOTES
[facility]  Botulinum Toxin Procedure    Cara Suarez MD  Headache Neurology    March 31, 2023    Procedure:  OnabotulinumtoxinA injections for chronic migraine  Indication:  Chronic migraine    Ms. Luna suffers from severe intractable headaches.  She was referred by Dr. Suarez for onabotulinumtoxinA injections for headache.  Risks, benefits, and alternatives were discussed.  All questions were answered and consent given.  She decided to proceed with the injections.      Prior to initiation of botulinum toxin injections, Ms. Luna reported 20 headache days per month, with 17 severe headache days per month. Her headaches are quite disabling and often interfere with her ability to function normally.     Last injections:  January 6, 2023    Currently, she is having 3 headache days per month, with 3 severe headache days per month. less than 20 headache days per month. She's noticed a wearing off effect in the last 2 weeks. She took Nurtec and Medrol dosepak and acetaminophen and naproxen and prochlorperazine during that time due to severe headaches returning. Nurtec works very well for her.     She has attempted other migraine prophylactic treatments in the past, which have included:   Nortriptyline 20 mg PM was not helpful.  Gabapentin was not helpful.  Propranolol 40 mg TID was not helpful.  Topiramate was not helpful.  Emgality was painful.   Ajovy is helpful.    Ms. Luna's pain was assessed prior to the procedure.  She rated her pain today as 2 out of 10.    Procedural Pause: Procedural pause was conducted to verify correct patient identity, procedure to be performed, correct side and site, correct patient position, and special requirements. Appropriate hand hygiene was utilized, and each injection site was prepped with alcohol wipes or Chloraprep swab.     Procedure Details: 200 units of onabotulinumtoxinA was diluted in 4 mL 0.9% normal saline. A total of 150 units of onabotulinumtoxinA were injected  using 30 gauge 0.5 in needles into the muscles listed below. 50 units of onabotulinumtoxinA were wasted.     Injection Sites: Total = 150 units onabotulinumtoxinA      and Procerus muscles - 5 units into the left and right corrugators and 5 units into the procerus (15 units total)    Frontalis muscles - 5 units into the left superior frontalis and 5 units into the right superior frontalis (2 injection sites per muscle) (10 units total)    Temporalis muscles - 12.5 units into the left temporalis muscle and 12.5 units into the right temporalis muscle (2 injection sites per muscle) (25 units total)    Occipitalis muscles - 12.5 units into the left occipitalis muscle and 12.5 units into the right occipitalis muscle (2 injection sites per muscle) (25 units total)    Splenius Capitis muscles - 12.5 units into the left splenius capitis muscle and 12.5 units into the right splenius capitis muscle (2 injection sites per muscle, divided into 2/3 anteriorly and 1/3 posteriorly) (25 units total)      Trapezius muscles - 25 units into the left trapezius muscle and 25 units into the right trapezius muscle (3 injection sites per muscle, divided 5 units, 10 units, 10 units, medial to lateral) (50 units total)    Ms. Luna tolerated the procedure well without immediate complications.  She will follow up in clinic for assessment of the effectiveness of treatment.  She did not report any change in her pain level after the botulinumtoxinA injection procedure.    Cara Suarez MD  Headache Neurology  [facility]

## 2023-04-09 RX ORDER — ATOMOXETINE 40 MG/1
40 CAPSULE ORAL DAILY
Qty: 30 CAPSULE | Refills: 0 | Status: SHIPPED | OUTPATIENT
Start: 2023-04-09 | End: 2023-04-27

## 2023-04-10 ENCOUNTER — E-VISIT (OUTPATIENT)
Dept: URGENT CARE | Facility: URGENT CARE | Age: 38
End: 2023-04-10
Payer: COMMERCIAL

## 2023-04-10 DIAGNOSIS — J01.90 ACUTE BACTERIAL SINUSITIS: Primary | ICD-10-CM

## 2023-04-10 DIAGNOSIS — B96.89 ACUTE BACTERIAL SINUSITIS: Primary | ICD-10-CM

## 2023-04-10 PROCEDURE — 99421 OL DIG E/M SVC 5-10 MIN: CPT | Performed by: PHYSICIAN ASSISTANT

## 2023-04-10 NOTE — PATIENT INSTRUCTIONS
Sinusitis (Antibiotic Treatment)    The sinuses are air-filled spaces within the bones of the face. They connect to the inside of the nose. Sinusitis is an inflammation of the tissue that lines the sinuses. Sinusitis can occur during a cold. It can also happen due to allergies to pollens and other particles in the air. Sinusitis can cause symptoms of sinus congestion and a feeling of fullness. A sinus infection causes fever, headache, and facial pain. There is often green or yellow fluid draining from the nose or into the back of the throat (post-nasal drip). You have been given antibiotics to treat this condition.   Home care    Take the full course of antibiotics as instructed. Don't stop taking them, even when you feel better.    Drink plenty of water, hot tea, and other liquids as directed by the healthcare provider. This may help thin nasal mucus. It also may help your sinuses drain fluids.    Heat may help soothe painful areas of your face. Use a towel soaked in hot water. Or,  the shower and direct the warm spray onto your face. Using a vaporizer along with a menthol rub at night may also help soothe symptoms.     An expectorant with guaifenesin may help thin nasal mucus and help your sinuses drain fluids. Talk with your provider or pharmacists before taking an over-the-counter (OTC) medicine if you have any questions about it or its side effects..    You can use an OTC decongestant, unless a similar medicine was prescribed to you. Nasal sprays work the fastest. Use one that contains phenylephrine or oxymetazoline. First blow your nose gently. Then use the spray. Don't use these medicines more often than directed on the label. If you do, your symptoms may get worse. You may also take pills that contain pseudoephedrine. Don t use products that combine multiple medicines. This is because side effects may be increased. Read labels. You can also ask the pharmacist for help. (People with high blood  pressure should not use decongestants. They can raise blood pressure.) Talk with your provider or pharmacist if you have any questions about the medicine..    OTC antihistamines may help if allergies contributed to your sinusitis. Talk with your provider or pharmacist if you have any questions about the medicine.    Nasal rinses or irrigation may help symptoms. It's very important to use these products only as directed. Use sterile water or sterile saline solution and not tap water. Tap water may contain germs that can cause infection in the brain. Don't rinse with excess pressure. this may spread the infection to other areas in your sinuses or head. Ask your healthcare provider or pharmacist if you have questions about using these products.    Use acetaminophen, naproxen, or ibuprofen to control pain, unless another pain medicine was prescribed to you. Talk with your healthcare provider before using these medicines if you have chronic liver or kidney disease or ever had a stomach ulcer. Never give aspirin to anyone under age 18 without first talking to their healthcare provider. It may cause severe liver damage.    Don't smoke. This can make symptoms worse.    Follow-up care  Follow up with your healthcare provider as advised.   When to seek medical advice  Call your healthcare provider if any of these occur:     Facial pain or headache that gets worse    Symptoms don't go away in 10 days    Fever of 100.4 F (38 C) or higher, or as directed by your healthcare provider  Call 911  Call 911 if any of these occur:     Seizure    Trouble breathing    Feeling dizzy or faint    Fingernails, skin, or lips look blue, purple, or gray    Severe headache that doesn't go away    Stiff neck    Unusual drowsiness or confusion    Vision problems such as blurred or double vision    Swelling of your forehead or eyelids  Prevention  Here are steps you can take to help prevent an infection:     Keep good hand washing habits.    Don t  have close contact with people who have sore throats, colds, or other upper respiratory infections.    Don t smoke, and stay away from secondhand smoke.    Stay up to date with all of your vaccines.  [employer] last reviewed this educational content on 1/1/2022 2000-2022 [employer]. All rights reserved. This information is not intended as a substitute for professional medical care. Always follow your healthcare professional's instructions.        Dear Breanna Luna    After reviewing your responses, I've been able to diagnose you with Acute bacterial sinusitis.      Based on your responses and diagnosis, I have prescribed   Orders Placed This Encounter     amoxicillin-clavulanate (AUGMENTIN) 875-125 MG tablet    to treat your symptoms. I have sent this to your pharmacy.?     It is also important to stay well hydrated, get lots of rest and take over-the-counter decongestants,?tylenol?or ibuprofen if you?are able to?take those medications per your primary care provider to help relieve discomfort.?     It is important that you take?all of?your prescribed medication even if your symptoms are improving after a few doses.? Taking?all of?your medicine helps prevent the symptoms from returning.?     If your symptoms worsen, you develop severe headache, vomiting, high fever (>102), or are not improving in 7 days, please contact your primary care provider for an appointment or visit any of our convenient Walk-in Care or Urgent Care Centers to be seen which can be found on our website?here.?     Thanks again for choosing?us?as your health care partner,?   ?  Shelly Kay PA-C?

## 2023-04-20 ENCOUNTER — PATIENT OUTREACH (OUTPATIENT)
Dept: CARE COORDINATION | Facility: CLINIC | Age: 38
End: 2023-04-20
Payer: COMMERCIAL

## 2023-04-27 ENCOUNTER — VIRTUAL VISIT (OUTPATIENT)
Dept: FAMILY MEDICINE | Facility: CLINIC | Age: 38
End: 2023-04-27
Payer: COMMERCIAL

## 2023-04-27 DIAGNOSIS — E66.812 CLASS 2 OBESITY DUE TO EXCESS CALORIES WITH BODY MASS INDEX (BMI) OF 35.0 TO 35.9 IN ADULT, UNSPECIFIED WHETHER SERIOUS COMORBIDITY PRESENT: ICD-10-CM

## 2023-04-27 DIAGNOSIS — E66.09 CLASS 2 OBESITY DUE TO EXCESS CALORIES WITH BODY MASS INDEX (BMI) OF 35.0 TO 35.9 IN ADULT, UNSPECIFIED WHETHER SERIOUS COMORBIDITY PRESENT: ICD-10-CM

## 2023-04-27 DIAGNOSIS — F15.11 METHAMPHETAMINE ABUSE IN REMISSION (H): ICD-10-CM

## 2023-04-27 DIAGNOSIS — F90.2 ATTENTION DEFICIT HYPERACTIVITY DISORDER (ADHD), COMBINED TYPE: Primary | ICD-10-CM

## 2023-04-27 DIAGNOSIS — F10.11 ALCOHOL ABUSE, IN REMISSION: ICD-10-CM

## 2023-04-27 PROCEDURE — 99213 OFFICE O/P EST LOW 20 MIN: CPT | Mod: VID | Performed by: STUDENT IN AN ORGANIZED HEALTH CARE EDUCATION/TRAINING PROGRAM

## 2023-04-27 RX ORDER — DEXTROAMPHETAMINE SACCHARATE, AMPHETAMINE ASPARTATE MONOHYDRATE, DEXTROAMPHETAMINE SULFATE AND AMPHETAMINE SULFATE 2.5; 2.5; 2.5; 2.5 MG/1; MG/1; MG/1; MG/1
10 CAPSULE, EXTENDED RELEASE ORAL DAILY
Qty: 30 CAPSULE | Refills: 0 | Status: SHIPPED | OUTPATIENT
Start: 2023-04-27 | End: 2023-06-20

## 2023-04-27 NOTE — PROGRESS NOTES
"Breanna is a 38 year old who is being evaluated via a billable video visit.      How would you like to obtain your AVS? MyChart  If the video visit is dropped, the invitation should be resent by: Text to cell phone: [phone]  Will anyone else be joining your video visit? No          Assessment & Plan     Attention deficit hyperactivity disorder (ADHD), combined type  Given lack of improvement with nonstimulant I do think it is reasonable to do trial of stimulant at this time.  She feels like she is very stable in her addiction and has not used anything for over 10 years.  Continues to work with her sponsor and does not feel like this to be a problem for her.  Discussed possible side effects of medication and addiction potential.  She is understanding interested in moving forward with stimulant treatment.  We will start Adderall 10 mg and follow-up closely in 1 month.  Sooner if new or worsening issues arise.  - amphetamine-dextroamphetamine (ADDERALL XR) 10 MG 24 hr capsule  Dispense: 30 capsule; Refill: 0    Class 2 obesity due to excess calories with body mass index (BMI) of 35.0 to 35.9 in adult, unspecified whether serious comorbidity present  Working with weight loss clinic.  Wegovy has been very helpful.  Continue to work on diet lifestyle changes.    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)  Continue to follow with sponsor and feels like she is very stable at this time.    Alcohol abuse, in remission             BMI:   Estimated body mass index is 36.8 kg/m  as calculated from the following:    Height as of 2/28/23: 1.676 m (5' 6\").    Weight as of 2/28/23: 103.4 kg (228 lb).   Weight management plan: Discussed healthy diet and exercise guidelines      Bradley Mcconnell MD  [facility]   Breanna is a 38 year old, presenting for the following health issues:  A.D.H.D        4/27/2023    11:46 AM   Additional Questions   Roomed by Celso SMITH   Accompanied by N/A         " 4/27/2023    11:46 AM   Patient Reported Additional Medications   Patient reports taking the following new medications None     A.D.H.D    History of Present Illness       Reason for visit:  ADHD Follow up    She eats 2-3 servings of fruits and vegetables daily.She consumes 2 sweetened beverage(s) daily.She exercises with enough effort to increase her heart rate 20 to 29 minutes per day.  She exercises with enough effort to increase her heart rate 5 days per week.   She is taking medications regularly.     Patient following up weight loss clinic and doing well.  Currently on Wegovy and was on phentermine in the past and tolerated well.  Down roughly 20 pounds.  Did recently have evaluation and diagnosed with ADHD hyperactive type.  Did see another provider and started on Strattera given her substance use history but this has not done well.  She did not note improvement with symptoms and actually was very tired with it.  Made her little more torres and did not like how she felt.  Patient was substance use history of methamphetamine back in 2012.  She does have a sponsor that she speaks with regularly and feels like she is very comfortable where she is now.  Has good support at home.      Review of Systems   Constitutional, HEENT, cardiovascular, pulmonary, gi and gu systems are negative, except as otherwise noted.      Objective           Vitals:  No vitals were obtained today due to virtual visit.    Physical Exam   GENERAL: Healthy, alert and no distress  EYES: Eyes grossly normal to inspection.  No discharge or erythema, or obvious scleral/conjunctival abnormalities.  RESP: No audible wheeze, cough, or visible cyanosis.  No visible retractions or increased work of breathing.    SKIN: Visible skin clear. No significant rash, abnormal pigmentation or lesions.  NEURO: Cranial nerves grossly intact.  Mentation and speech appropriate for age.  PSYCH: Mentation appears normal, affect normal/bright, judgement and insight  intact, normal speech and appearance well-groomed.        Video-Visit Details    Type of service:  Video Visit     Originating Location (pt. Location): Home    Distant Location (provider location):  On-site  Platform used for Video Visit: K[employer]

## 2023-05-02 ENCOUNTER — MYC MEDICAL ADVICE (OUTPATIENT)
Dept: FAMILY MEDICINE | Facility: CLINIC | Age: 38
End: 2023-05-02
Payer: COMMERCIAL

## 2023-05-04 ENCOUNTER — NURSE TRIAGE (OUTPATIENT)
Dept: NURSING | Facility: CLINIC | Age: 38
End: 2023-05-04
Payer: COMMERCIAL

## 2023-05-04 ENCOUNTER — PATIENT OUTREACH (OUTPATIENT)
Dept: CARE COORDINATION | Facility: CLINIC | Age: 38
End: 2023-05-04
Payer: COMMERCIAL

## 2023-05-06 ENCOUNTER — E-VISIT (OUTPATIENT)
Dept: URGENT CARE | Facility: CLINIC | Age: 38
End: 2023-05-06
Payer: COMMERCIAL

## 2023-05-06 DIAGNOSIS — J01.90 ACUTE BACTERIAL SINUSITIS: Primary | ICD-10-CM

## 2023-05-06 DIAGNOSIS — B96.89 ACUTE BACTERIAL SINUSITIS: Primary | ICD-10-CM

## 2023-05-06 PROCEDURE — 99421 OL DIG E/M SVC 5-10 MIN: CPT | Performed by: PHYSICIAN ASSISTANT

## 2023-05-06 NOTE — PATIENT INSTRUCTIONS
Sinusitis (Antibiotic Treatment)    The sinuses are air-filled spaces within the bones of the face. They connect to the inside of the nose. Sinusitis is an inflammation of the tissue that lines the sinuses. Sinusitis can occur during a cold. It can also happen due to allergies to pollens and other particles in the air. Sinusitis can cause symptoms of sinus congestion and a feeling of fullness. A sinus infection causes fever, headache, and facial pain. There is often green or yellow fluid draining from the nose or into the back of the throat (post-nasal drip). You have been given antibiotics to treat this condition.   Home care    Take the full course of antibiotics as instructed. Don't stop taking them, even when you feel better.    Drink plenty of water, hot tea, and other liquids as directed by the healthcare provider. This may help thin nasal mucus. It also may help your sinuses drain fluids.    Heat may help soothe painful areas of your face. Use a towel soaked in hot water. Or,  the shower and direct the warm spray onto your face. Using a vaporizer along with a menthol rub at night may also help soothe symptoms.     An expectorant with guaifenesin may help thin nasal mucus and help your sinuses drain fluids. Talk with your provider or pharmacists before taking an over-the-counter (OTC) medicine if you have any questions about it or its side effects..    You can use an OTC decongestant, unless a similar medicine was prescribed to you. Nasal sprays work the fastest. Use one that contains phenylephrine or oxymetazoline. First blow your nose gently. Then use the spray. Don't use these medicines more often than directed on the label. If you do, your symptoms may get worse. You may also take pills that contain pseudoephedrine. Don t use products that combine multiple medicines. This is because side effects may be increased. Read labels. You can also ask the pharmacist for help. (People with high blood  pressure should not use decongestants. They can raise blood pressure.) Talk with your provider or pharmacist if you have any questions about the medicine..    OTC antihistamines may help if allergies contributed to your sinusitis. Talk with your provider or pharmacist if you have any questions about the medicine.    Nasal rinses or irrigation may help symptoms. It's very important to use these products only as directed. Use sterile water or sterile saline solution and not tap water. Tap water may contain germs that can cause infection in the brain. Don't rinse with excess pressure. this may spread the infection to other areas in your sinuses or head. Ask your healthcare provider or pharmacist if you have questions about using these products.    Use acetaminophen, naproxen, or ibuprofen to control pain, unless another pain medicine was prescribed to you. Talk with your healthcare provider before using these medicines if you have chronic liver or kidney disease or ever had a stomach ulcer. Never give aspirin to anyone under age 18 without first talking to their healthcare provider. It may cause severe liver damage.    Don't smoke. This can make symptoms worse.    Follow-up care  Follow up with your healthcare provider as advised.   When to seek medical advice  Call your healthcare provider if any of these occur:     Facial pain or headache that gets worse    Symptoms don't go away in 10 days    Fever of 100.4 F (38 C) or higher, or as directed by your healthcare provider  Call 911  Call 911 if any of these occur:     Seizure    Trouble breathing    Feeling dizzy or faint    Fingernails, skin, or lips look blue, purple, or gray    Severe headache that doesn't go away    Stiff neck    Unusual drowsiness or confusion    Vision problems such as blurred or double vision    Swelling of your forehead or eyelids  Prevention  Here are steps you can take to help prevent an infection:     Keep good hand washing habits.    Don t  have close contact with people who have sore throats, colds, or other upper respiratory infections.    Don t smoke, and stay away from secondhand smoke.    Stay up to date with all of your vaccines.  [employer] last reviewed this educational content on 1/1/2022 2000-2022 [employer]. All rights reserved. This information is not intended as a substitute for professional medical care. Always follow your healthcare professional's instructions.        Dear Breanna Luna    After reviewing your responses, I've been able to diagnose you with Acute bacterial sinusitis.      Based on your responses and diagnosis, I have prescribed   Orders Placed This Encounter     amoxicillin-clavulanate (AUGMENTIN) 875-125 MG tablet    to treat your symptoms. I have sent this to your pharmacy.?     It is also important to stay well hydrated, get lots of rest and take over-the-counter decongestants,?tylenol?or ibuprofen if you?are able to?take those medications per your primary care provider to help relieve discomfort.?     It is important that you take?all of?your prescribed medication even if your symptoms are improving after a few doses.? Taking?all of?your medicine helps prevent the symptoms from returning.?     If your symptoms worsen, you develop severe headache, vomiting, high fever (>102), or are not improving in 7 days, please contact your primary care provider for an appointment or visit any of our convenient Walk-in Care or Urgent Care Centers to be seen which can be found on our website?here.?     Thanks again for choosing?us?as your health care partner,?   ?  Jerzy Cruz, OMAYRA, PA-C?

## 2023-05-15 ENCOUNTER — E-VISIT (OUTPATIENT)
Dept: URGENT CARE | Facility: CLINIC | Age: 38
End: 2023-05-15
Payer: COMMERCIAL

## 2023-05-15 DIAGNOSIS — N39.0 ACUTE UTI (URINARY TRACT INFECTION): Primary | ICD-10-CM

## 2023-05-15 PROCEDURE — 99421 OL DIG E/M SVC 5-10 MIN: CPT | Performed by: NURSE PRACTITIONER

## 2023-05-15 RX ORDER — NITROFURANTOIN 25; 75 MG/1; MG/1
100 CAPSULE ORAL 2 TIMES DAILY
Qty: 10 CAPSULE | Refills: 0 | Status: SHIPPED | OUTPATIENT
Start: 2023-05-15 | End: 2023-05-20

## 2023-05-15 NOTE — PATIENT INSTRUCTIONS
Dear Breanna Luna    After reviewing your responses, I've been able to diagnose you with a urinary tract infection, which is a common infection of the bladder with bacteria.  This is not a sexually transmitted infection, though urinating immediately after intercourse can help prevent infections.  Drinking lots of fluids is also helpful to clear your current infection and prevent the next one.      I have sent a prescription for antibiotics to your pharmacy to treat this infection.    It is important that you take all of your prescribed medication even if your symptoms are improving after a few doses.  Taking all of your medicine helps prevent the symptoms from returning.     If your symptoms worsen, you develop pain in your back or stomach, develop fevers, or are not improving in 5 days, please contact your primary care provider for an appointment or visit any of our convenient Walk-in or Urgent Care Centers to be seen, which can be found on our website here.    Thanks again for choosing us as your health care partner,    MADELINE Chen CNP    Urinary Tract Infections in Women  Urinary tract infections (UTIs) are most often caused by bacteria. These bacteria enter the urinary tract. The bacteria may come from inside the body. Or they may travel from the skin outside the rectum or vagina into the urethra. Female anatomy makes it easy for bacteria from the bowel to enter a woman s urinary tract, which is the most common source of UTI. This means women develop UTIs more often than men. Pain in or around the urinary tract is a common UTI symptom. But the only way to know for sure if you have a UTI for the healthcare provider to test your urine. The two tests that may be done are the urinalysis and urine culture.    Types of UTIs    Cystitis. A bladder infection (cystitis) is the most common UTI in women. You may have urgent or frequent need to pee. You may also have pain, burning when you pee, and bloody  urine.    Urethritis. This is an inflamed urethra, which is the tube that carries urine from the bladder to outside the body. You may have lower stomach or back pain. You may also have urgent or frequent need to pee.    Pyelonephritis. This is a kidney infection. If not treated, it can be serious and damage your kidneys. In severe cases, you may need to stay in the hospital. You may have a fever and lower back pain.    Medicines to treat a UTI  Most UTIs are treated with antibiotics. These kill the bacteria. The length of time you need to take them depends on the type of infection. It may be as short as 3 days. If you have repeated UTIs, you may need a low-dose antibiotic for several months. Take antibiotics exactly as directed. Don t stop taking them until all of the medicine is gone, even if you feel better. If you stop taking the antibiotic too soon, the infection may not go away. You may also develop a resistance to the antibiotic. This can make it much harder to treat.  Lifestyle changes to treat and prevent UTIs  The lifestyle changes below will help get rid of your UTI. They may also help prevent future UTIs.    Drink plenty of fluids. This includes water, juice, or other caffeine-free drinks. Fluids help flush bacteria out of your body.    Empty your bladder. Always empty your bladder when you feel the urge to pee. And always pee before going to sleep. Urine that stays in your bladder can lead to infection. Try to pee before and after sex as well.    Practice good personal hygiene. Wipe yourself from front to back after using the toilet. This helps keep bacteria from getting into the urethra.    Wear cotton underwear. Don't wear synthetic or tight-fitting underwear that can trap moisture. Change out of wet bathing suits and workout clothing quickly.    Take showers. Showers are better than baths for preventing UTIs.    Use condoms during sex. These help prevent UTIs caused by sexually transmitted bacteria.  Also don't use spermicides during sex. These can increase the risk for UTIs. Choose other forms of birth control instead. For women who tend to get UTIs after sex, a low-dose of a preventive antibiotic may be used. Be sure to discuss this option with your healthcare provider.    Follow up with your healthcare provider as directed. They may test to make sure the infection has cleared. If needed, more treatment may be started.  [employer] last reviewed this educational content on 9/1/2021 2000-2022 [employer]. All rights reserved. This information is not intended as a substitute for professional medical care. Always follow your healthcare professional's instructions.

## 2023-05-19 ENCOUNTER — VIRTUAL VISIT (OUTPATIENT)
Dept: FAMILY MEDICINE | Facility: CLINIC | Age: 38
End: 2023-05-19
Payer: COMMERCIAL

## 2023-05-19 DIAGNOSIS — R12 HEARTBURN: ICD-10-CM

## 2023-05-19 DIAGNOSIS — F90.2 ATTENTION DEFICIT HYPERACTIVITY DISORDER (ADHD), COMBINED TYPE: Primary | ICD-10-CM

## 2023-05-19 PROCEDURE — 99213 OFFICE O/P EST LOW 20 MIN: CPT | Mod: VID | Performed by: STUDENT IN AN ORGANIZED HEALTH CARE EDUCATION/TRAINING PROGRAM

## 2023-05-19 RX ORDER — DEXTROAMPHETAMINE SACCHARATE, AMPHETAMINE ASPARTATE MONOHYDRATE, DEXTROAMPHETAMINE SULFATE AND AMPHETAMINE SULFATE 5; 5; 5; 5 MG/1; MG/1; MG/1; MG/1
20 CAPSULE, EXTENDED RELEASE ORAL DAILY
Qty: 30 CAPSULE | Refills: 0 | Status: SHIPPED | OUTPATIENT
Start: 2023-05-19 | End: 2023-06-18

## 2023-05-19 RX ORDER — FAMOTIDINE 20 MG/1
20 TABLET, FILM COATED ORAL 2 TIMES DAILY PRN
Qty: 90 TABLET | Refills: 1 | Status: SHIPPED | OUTPATIENT
Start: 2023-05-19 | End: 2023-10-20

## 2023-05-19 RX ORDER — DEXTROAMPHETAMINE SACCHARATE, AMPHETAMINE ASPARTATE MONOHYDRATE, DEXTROAMPHETAMINE SULFATE AND AMPHETAMINE SULFATE 5; 5; 5; 5 MG/1; MG/1; MG/1; MG/1
20 CAPSULE, EXTENDED RELEASE ORAL DAILY
Qty: 30 CAPSULE | Refills: 0 | Status: SHIPPED | OUTPATIENT
Start: 2023-07-20 | End: 2023-08-19

## 2023-05-19 RX ORDER — DEXTROAMPHETAMINE SACCHARATE, AMPHETAMINE ASPARTATE MONOHYDRATE, DEXTROAMPHETAMINE SULFATE AND AMPHETAMINE SULFATE 5; 5; 5; 5 MG/1; MG/1; MG/1; MG/1
20 CAPSULE, EXTENDED RELEASE ORAL DAILY
Qty: 30 CAPSULE | Refills: 0 | Status: SHIPPED | OUTPATIENT
Start: 2023-06-19 | End: 2023-07-19

## 2023-05-19 NOTE — PROGRESS NOTES
Breanna is a 38 year old who is being evaluated via a billable video visit.      How would you like to obtain your AVS? MyChart  Will anyone else be joining your video visit? No           Assessment & Plan     Attention deficit hyperactivity disorder (ADHD), combined type  Doing very well with Adderall use but notes hitting a wall around noon and things seem to wear off for her.  Otherwise symptoms much improved and benefiting.  She has no concerns with her history of substance abuse.  Right now we will plan to increase dose to 20 mg daily.  If things continue to taper off quickly in the afternoon would plan to add low-dose of short acting in the future.  Follow-up with me in 3 months.  She will let me know before next refill regarding question of future afternoon dose.  - amphetamine-dextroamphetamine (ADDERALL XR) 20 MG 24 hr capsule  Dispense: 30 capsule; Refill: 0  - amphetamine-dextroamphetamine (ADDERALL XR) 20 MG 24 hr capsule  Dispense: 30 capsule; Refill: 0  - amphetamine-dextroamphetamine (ADDERALL XR) 20 MG 24 hr capsule  Dispense: 30 capsule; Refill: 0    Heartburn  - famotidine (PEPCID) 20 MG tablet  Dispense: 90 tablet; Refill: 1           Brdaley Mcconnell MD  [facility]    Subjective   Breanna is a 38 year old, presenting for the following health issues:  Recheck Medication        4/27/2023    11:46 AM   Additional Questions   Roomed by Celso SMITH   Accompanied by N/A     KADIE       SUBJECTIVE:  Patient is here today to recheck ADHD/ADD.    Updates since last visit: None   Routine for taking medicine, including time: 7 am  Time medicine wears off: Around 10-11 am  Issues at school/Work: no  Issues at home: no  Control of symptoms: Thinks the dose could be raised    Side effects:  Headaches: No  Stomach aches: No  Irritability/mood swings: No  Difficulties with sleep: No  Social withdrawal: No  Unusual movements/tics: No  Decreased appetite: No    Other concerns: NA         Review  of Systems   Constitutional, HEENT, cardiovascular, pulmonary, gi and gu systems are negative, except as otherwise noted.      Objective           Vitals:  No vitals were obtained today due to virtual visit.    Physical Exam   GENERAL: Healthy, alert and no distress  EYES: Eyes grossly normal to inspection.  No discharge or erythema, or obvious scleral/conjunctival abnormalities.  RESP: No audible wheeze, cough, or visible cyanosis.  No visible retractions or increased work of breathing.    SKIN: Visible skin clear. No significant rash, abnormal pigmentation or lesions.  NEURO: Cranial nerves grossly intact.  Mentation and speech appropriate for age.  PSYCH: Mentation appears normal, affect normal/bright, judgement and insight intact, normal speech and appearance well-groomed.          Video-Visit Details    Type of service:  Video Visit     Originating Location (pt. Location): Home    Distant Location (provider location):  On-site  Platform used for Video Visit: [employer]

## 2023-06-15 ENCOUNTER — MYC MEDICAL ADVICE (OUTPATIENT)
Dept: FAMILY MEDICINE | Facility: CLINIC | Age: 38
End: 2023-06-15
Payer: COMMERCIAL

## 2023-06-15 DIAGNOSIS — F90.2 ATTENTION DEFICIT HYPERACTIVITY DISORDER (ADHD), COMBINED TYPE: Primary | ICD-10-CM

## 2023-06-16 NOTE — TELEPHONE ENCOUNTER
FYI - Status Update    Who is Calling: patient    Update: Patient is messaging and also called with update on the Adderall XR. See message below. Patient stating the 20 mg in the morning is working well, she feels she needs an immediate release dosing around noon as well. Patient is hoping provider can send new prescription to the [place] pharmacy for a noon dosing of Adderall?    Does caller want a call/response back: Yes     Could we send this information to you in [employer] or would you prefer to receive a phone call?:   Patient would like to be contacted via [employer]

## 2023-06-20 RX ORDER — DEXTROAMPHETAMINE SACCHARATE, AMPHETAMINE ASPARTATE, DEXTROAMPHETAMINE SULFATE AND AMPHETAMINE SULFATE 2.5; 2.5; 2.5; 2.5 MG/1; MG/1; MG/1; MG/1
10 TABLET ORAL DAILY
Qty: 30 TABLET | Refills: 0 | Status: SHIPPED | OUTPATIENT
Start: 2023-08-21 | End: 2023-07-20

## 2023-06-20 RX ORDER — DEXTROAMPHETAMINE SACCHARATE, AMPHETAMINE ASPARTATE, DEXTROAMPHETAMINE SULFATE AND AMPHETAMINE SULFATE 2.5; 2.5; 2.5; 2.5 MG/1; MG/1; MG/1; MG/1
10 TABLET ORAL DAILY
Qty: 30 TABLET | Refills: 0 | Status: SHIPPED | OUTPATIENT
Start: 2023-07-21 | End: 2023-07-20

## 2023-06-20 RX ORDER — DEXTROAMPHETAMINE SACCHARATE, AMPHETAMINE ASPARTATE, DEXTROAMPHETAMINE SULFATE AND AMPHETAMINE SULFATE 2.5; 2.5; 2.5; 2.5 MG/1; MG/1; MG/1; MG/1
10 TABLET ORAL DAILY
Qty: 30 TABLET | Refills: 0 | Status: SHIPPED | OUTPATIENT
Start: 2023-06-20 | End: 2023-07-20

## 2023-06-29 ENCOUNTER — TELEPHONE (OUTPATIENT)
Dept: NEUROLOGY | Facility: CLINIC | Age: 38
End: 2023-06-29
Payer: COMMERCIAL

## 2023-06-30 ENCOUNTER — OFFICE VISIT (OUTPATIENT)
Dept: NEUROLOGY | Facility: CLINIC | Age: 38
End: 2023-06-30
Payer: COMMERCIAL

## 2023-06-30 DIAGNOSIS — G43.709 CHRONIC MIGRAINE WITHOUT AURA WITHOUT STATUS MIGRAINOSUS, NOT INTRACTABLE: Primary | ICD-10-CM

## 2023-06-30 PROCEDURE — 64615 CHEMODENERV MUSC MIGRAINE: CPT | Mod: 59

## 2023-06-30 RX ORDER — KETOROLAC TROMETHAMINE 30 MG/ML
30 INJECTION, SOLUTION INTRAMUSCULAR; INTRAVENOUS ONCE
Status: COMPLETED | OUTPATIENT
Start: 2023-06-30 | End: 2023-06-30

## 2023-06-30 RX ORDER — ATOGEPANT 60 MG/1
60 TABLET ORAL DAILY
Qty: 30 TABLET | Refills: 11 | Status: SHIPPED | OUTPATIENT
Start: 2023-06-30 | End: 2023-10-27

## 2023-06-30 RX ADMIN — KETOROLAC TROMETHAMINE 30 MG: 30 INJECTION, SOLUTION INTRAMUSCULAR; INTRAVENOUS at 14:40

## 2023-06-30 NOTE — PROGRESS NOTES
[facility]  Botulinum Toxin Procedure    Virgen Villagran PA-C  Headache Neurology    June 30, 2023    Procedure: OnabotulinumtoxinA injections for chronic migraine  Indication: Chronic migraine    Breanna Luna suffers from severe intractable headaches. She was referred by Dr. Suarez for onabotulinumtoxinA injections for headache.      Prior to initiation of botulinum toxin injections, Breanna reported 20 headache days per month, with 17 severe headache days per month. Her headaches are quite disabling and often interfere with her ability to function normally.    Their last round of botulinum toxin injections was on 3/31/23.    Breanna reports 9/30 headache days per month currently, with 2-3/30 severe headache days per month. She has noticed a wearing off phenomenon prior to this round of botulinum toxin injections, lasting 3 weeks. She did miss her last month's dose of Ajovy.     She has attempted other migraine prophylactic treatments in the past, which have included:  Nortriptyline 20 mg PM was not helpful.  Gabapentin was not helpful.  Propranolol 40 mg TID was not helpful.  Topiramate was not helpful.  Emgality was painful.   Ajovy is helpful.    Patient's pain was assessed prior to the procedure. She rated her pain today as 7 out of 10. She has had bad headache for the past 3 days not helped with her typical rescue treatments.       The procedure was explained to the patient. Benefits of the treatment were discussed including headache and migraine reduction. Risks of the procedure were reviewed including but not limited to pain, bruising, bleeding, infection, and weakness of muscles injected or those distal to injection sites. Alternatives were discussed. The patient voiced understanding of the risks and benefits. All questions answered and patient consented to proceed.    Procedural Pause: Procedural pause was conducted to verify correct patient identity, procedure to be performed, correct side and site,  correct patient position, and special requirements. Appropriate hand hygiene was utilized, and each injection site was prepped with alcohol wipes or Chloraprep swab.     Procedure Details:   200 units of onabotulinumtoxinA was diluted in 4 mL 0.9% normal saline.   A total of 150 units of onabotulinumtoxinA were injected using 30 gauge 0.5 inch needles into the muscles listed below. 50 units of onabotulinumtoxinA were wasted.     Injection Sites: Total = 150 units onabotulinumtoxinA     Procerus muscles - 5 units into the procerus muscle (5 units total)     muscles - 5 units into the left  muscle and 5 units into the right  muscle (1 injection site per muscle) (10 units total)    Frontalis muscles - 5 units into the left superior frontalis muscle and 5 units into the right superior frontalis muscle (2 injection sites per muscle) (10 units total)    Temporalis muscles - 12.5 units into the left temporalis muscle and 12.5 units into the right temporalis muscle (2 injection sites per muscle) (25 units total)    Occipitalis muscles - 12.5 units into the left occipitalis muscle and 12.5 units into the right occipitalis muscle (2 injection sites per muscle) (25 units total)    Splenius Capitis muscles - 12.5 units into the left splenius capitis muscle and 12.5 units into the right splenius capitis muscle (2 injection sites per muscle, divided into 2/3 anteriorly and 1/3 posteriorly) (25 units total)      Trapezius muscles - 25 units into the left trapezius muscle and 25 units into the right trapezius muscle (3 injection sites per muscle, divided into 5 units, 10 units, 10 units, medial to lateral) (50 units total)      Patient tolerated the procedure well without immediate complications. She will follow up in clinic for assessment of the effectiveness of treatment. She did not report any change in her pain level after the botulinumtoxinA injection procedure.    Ketorolac 30 mg IM was given  today due to prolonged headache not responsive to her typical acute treatments.     Reviewed with Dr. Suarez, will see if Qulipta 60 mg tablet taken once daily would be covered instead of Ajovy due to patient having difficulty with self-injections. Will work to get authorization for this medication.      Virgen Villagran PA-C  Headache Neurology  [facility]

## 2023-06-30 NOTE — LETTER
6/30/2023         RE: Breanna Luna  [address]  [place] [address]        Dear Colleague,    Thank you for referring your patient, Breanna Luna, to the [facility]. Please see a copy of my visit note below.    [facility]  Botulinum Toxin Procedure    Virgen Villagran PA-C  Headache Neurology    June 30, 2023    Procedure: OnabotulinumtoxinA injections for chronic migraine  Indication: Chronic migraine    Breanna Luna suffers from severe intractable headaches. She was referred by Dr. Suarez for onabotulinumtoxinA injections for headache.      Prior to initiation of botulinum toxin injections, Breanna reported 20 headache days per month, with 17 severe headache days per month. Her headaches are quite disabling and often interfere with her ability to function normally.    Their last round of botulinum toxin injections was on 3/31/23.    Breanna reports 9/30 headache days per month currently, with 2-3/30 severe headache days per month. She has noticed a wearing off phenomenon prior to this round of botulinum toxin injections, lasting 3 weeks. She did miss her last month's dose of Ajovy.     She has attempted other migraine prophylactic treatments in the past, which have included:  Nortriptyline 20 mg PM was not helpful.  Gabapentin was not helpful.  Propranolol 40 mg TID was not helpful.  Topiramate was not helpful.  Emgality was painful.   Ajovy is helpful.    Patient's pain was assessed prior to the procedure. She rated her pain today as 7 out of 10. She has had bad headache for the past 3 days not helped with her typical rescue treatments.       The procedure was explained to the patient. Benefits of the treatment were discussed including headache and migraine reduction. Risks of the procedure were reviewed including but not limited to pain, bruising, bleeding, infection, and weakness of muscles injected or those distal to injection sites. Alternatives were discussed.  The patient voiced understanding of the risks and benefits. All questions answered and patient consented to proceed.    Procedural Pause: Procedural pause was conducted to verify correct patient identity, procedure to be performed, correct side and site, correct patient position, and special requirements. Appropriate hand hygiene was utilized, and each injection site was prepped with alcohol wipes or Chloraprep swab.     Procedure Details:   200 units of onabotulinumtoxinA was diluted in 4 mL 0.9% normal saline.   A total of 150 units of onabotulinumtoxinA were injected using 30 gauge 0.5 inch needles into the muscles listed below. 50 units of onabotulinumtoxinA were wasted.     Injection Sites: Total = 150 units onabotulinumtoxinA     Procerus muscles - 5 units into the procerus muscle (5 units total)     muscles - 5 units into the left  muscle and 5 units into the right  muscle (1 injection site per muscle) (10 units total)    Frontalis muscles - 5 units into the left superior frontalis muscle and 5 units into the right superior frontalis muscle (2 injection sites per muscle) (10 units total)    Temporalis muscles - 12.5 units into the left temporalis muscle and 12.5 units into the right temporalis muscle (2 injection sites per muscle) (25 units total)    Occipitalis muscles - 12.5 units into the left occipitalis muscle and 12.5 units into the right occipitalis muscle (2 injection sites per muscle) (25 units total)    Splenius Capitis muscles - 12.5 units into the left splenius capitis muscle and 12.5 units into the right splenius capitis muscle (2 injection sites per muscle, divided into 2/3 anteriorly and 1/3 posteriorly) (25 units total)      Trapezius muscles - 25 units into the left trapezius muscle and 25 units into the right trapezius muscle (3 injection sites per muscle, divided into 5 units, 10 units, 10 units, medial to lateral) (50 units total)      Patient tolerated the  procedure well without immediate complications. She will follow up in clinic for assessment of the effectiveness of treatment. She did not report any change in her pain level after the botulinumtoxinA injection procedure.    Ketorolac 30 mg IM was given today due to prolonged headache not responsive to her typical acute treatments.     Reviewed with Dr. Suarez, will see if Qulipta 60 mg tablet taken once daily would be covered instead of Ajovy due to patient having difficulty with self-injections. Will work to get authorization for this medication.      Adin Villagran PA-C  Headache Neurology  [facility]        Again, thank you for allowing me to participate in the care of your patient.        Sincerely,        ADIN VILLAGRAN PA-C

## 2023-06-30 NOTE — NURSING NOTE
Clinic Administered Medication Documentation        Patient was given Ketorolac Tromethamine(Toradol). Prior to medication administration, verified patient's identity using patient s name and date of birth. Please see MAR and medication order for additional information. Patient instructed to remain in clinic for 15 minutes and report any adverse reaction to staff immediately.    Vial/Syringe: Single dose vial. Was entire vial of medication used? Yes         Paty Frias MA

## 2023-07-09 ENCOUNTER — HEALTH MAINTENANCE LETTER (OUTPATIENT)
Age: 38
End: 2023-07-09

## 2023-07-20 ENCOUNTER — VIRTUAL VISIT (OUTPATIENT)
Dept: FAMILY MEDICINE | Facility: CLINIC | Age: 38
End: 2023-07-20
Payer: COMMERCIAL

## 2023-07-20 ENCOUNTER — NURSE TRIAGE (OUTPATIENT)
Dept: FAMILY MEDICINE | Facility: CLINIC | Age: 38
End: 2023-07-20

## 2023-07-20 DIAGNOSIS — F90.2 ATTENTION DEFICIT HYPERACTIVITY DISORDER (ADHD), COMBINED TYPE: Primary | ICD-10-CM

## 2023-07-20 PROCEDURE — 99213 OFFICE O/P EST LOW 20 MIN: CPT | Mod: VID | Performed by: STUDENT IN AN ORGANIZED HEALTH CARE EDUCATION/TRAINING PROGRAM

## 2023-07-20 RX ORDER — DEXTROAMPHETAMINE SACCHARATE, AMPHETAMINE ASPARTATE MONOHYDRATE, DEXTROAMPHETAMINE SULFATE AND AMPHETAMINE SULFATE 5; 5; 5; 5 MG/1; MG/1; MG/1; MG/1
20 CAPSULE, EXTENDED RELEASE ORAL DAILY
Qty: 30 CAPSULE | Refills: 0 | Status: SHIPPED | OUTPATIENT
Start: 2023-07-20 | End: 2023-08-19

## 2023-07-20 RX ORDER — DEXTROAMPHETAMINE SACCHARATE, AMPHETAMINE ASPARTATE, DEXTROAMPHETAMINE SULFATE AND AMPHETAMINE SULFATE 5; 5; 5; 5 MG/1; MG/1; MG/1; MG/1
20 TABLET ORAL DAILY
Qty: 30 TABLET | Refills: 0 | Status: SHIPPED | OUTPATIENT
Start: 2023-07-20 | End: 2023-08-19

## 2023-07-20 RX ORDER — DEXTROAMPHETAMINE SACCHARATE, AMPHETAMINE ASPARTATE, DEXTROAMPHETAMINE SULFATE AND AMPHETAMINE SULFATE 5; 5; 5; 5 MG/1; MG/1; MG/1; MG/1
20 TABLET ORAL DAILY
Qty: 30 TABLET | Refills: 0 | Status: SHIPPED | OUTPATIENT
Start: 2023-09-20 | End: 2023-10-19

## 2023-07-20 RX ORDER — DEXTROAMPHETAMINE SACCHARATE, AMPHETAMINE ASPARTATE, DEXTROAMPHETAMINE SULFATE AND AMPHETAMINE SULFATE 5; 5; 5; 5 MG/1; MG/1; MG/1; MG/1
20 TABLET ORAL DAILY
Qty: 30 TABLET | Refills: 0 | Status: SHIPPED | OUTPATIENT
Start: 2023-08-20 | End: 2023-09-19

## 2023-07-20 RX ORDER — DEXTROAMPHETAMINE SACCHARATE, AMPHETAMINE ASPARTATE MONOHYDRATE, DEXTROAMPHETAMINE SULFATE AND AMPHETAMINE SULFATE 5; 5; 5; 5 MG/1; MG/1; MG/1; MG/1
20 CAPSULE, EXTENDED RELEASE ORAL DAILY
Qty: 30 CAPSULE | Refills: 0 | Status: SHIPPED | OUTPATIENT
Start: 2023-08-20 | End: 2023-08-29

## 2023-07-20 RX ORDER — DEXTROAMPHETAMINE SACCHARATE, AMPHETAMINE ASPARTATE MONOHYDRATE, DEXTROAMPHETAMINE SULFATE AND AMPHETAMINE SULFATE 5; 5; 5; 5 MG/1; MG/1; MG/1; MG/1
20 CAPSULE, EXTENDED RELEASE ORAL DAILY
Qty: 30 CAPSULE | Refills: 0 | Status: SHIPPED | OUTPATIENT
Start: 2023-09-20 | End: 2023-10-19

## 2023-07-20 NOTE — TELEPHONE ENCOUNTER
Patient was getting out of a swimming pool yesterday when she slipped on the ladder and hit her right shin on the metal step.  She states that her shin instantly started bruising and she was unable to put pressure or bear weight on that leg.    The rest of the day she iced, took Tylenol and ibuprofen, and rested.  As the day went on the bruise continued to get bigger and was beyond where she hit her shin.  She is in a lot of pain - 8-9/10, has a dent in her shin and a lot of swelling, area is red, warm to touch, and pain is traveling up and around her leg to her calf.    She is unable to point her toes up or down which is really concerning her.  She is using bearing more weight on her left leg than her right since her right is hurting so bad.  She is starting to get pain in her left from doing this.    Patient stated she is going to go in to either UC or ED now to get things looked at.  She states that something isn't right and she would like to make sure she doesn't have a fracture or something else going on.    RN reviewed red flag symptoms with patient and when to seek emergency care.   Patient agreed and verbalized understanding.    She has a medication check today at 3pm with Dr. Mcconnell and plans to keep this appointment.    Reason for Disposition    SEVERE pain (e.g., excruciating pain, unable to do any normal activities)    Large swelling or bruise and size > palm of person's hand    Additional Information    Negative: Major bleeding (actively dripping or spurting) that can't be stopped    Negative: Bullet, stabbed by knife, or other serious penetrating wound    Negative: Looks like a dislocated joint (crooked or deformed)    Negative: Can't stand (bear weight) or walk    Negative: Serious injury with multiple fractures (broken bones)    Negative: Sounds like a life-threatening emergency to the triager    Negative: Wound looks infected    Negative: Leg pain from overuse (e.g., sports, running, physical  work)    Negative: Leg pain not from an injury    Negative: Hip injury is main concern    Negative: Knee injury is main concern    Negative: Foot or ankle injury is main concern    Negative: Looks infected (e.g., spreading redness, red streak, pus)    Negative: No prior tetanus shots (or is not fully vaccinated) and any wound (e.g., cut or scrape)    Negative: HIV positive or severe immunodeficiency (severely weak immune system) and DIRTY cut or scrape    Negative: Patient wants to be seen    Protocols used: LEG INJURY-A-[place]

## 2023-07-20 NOTE — PROGRESS NOTES
Breanna is a 38 year old who is being evaluated via a billable video visit.      How would you like to obtain your AVS? MyChart  If the video visit is dropped, the invitation should be resent by: Text to cell phone: [phone]  Will anyone else be joining your video visit? No          Assessment & Plan      Attention deficit hyperactivity disorder (ADHD), combined type  Patient doing well in the morning but not much benefit with the afternoon dose.  No side effects with medication and significantly improving her worklife balance and ability to focus and concentrate as well as communicate and work with others.  Family noticing good improvement.  We will plan to continue long-acting at 20 mg and increase short acting in the afternoon to 20 mg now.  I do think this will be a sufficient regimen for her.  Again potential side effects and addictive nature in the setting of her history discussed.  Feels like this has been a life changer for her.  Taking as prescribed.  Plan to follow-up in 3 months.  Sooner if new or worsening issues arise.  - amphetamine-dextroamphetamine (ADDERALL XR) 20 MG 24 hr capsule  Dispense: 30 capsule; Refill: 0  - amphetamine-dextroamphetamine (ADDERALL XR) 20 MG 24 hr capsule  Dispense: 30 capsule; Refill: 0  - amphetamine-dextroamphetamine (ADDERALL XR) 20 MG 24 hr capsule  Dispense: 30 capsule; Refill: 0  - amphetamine-dextroamphetamine (ADDERALL) 20 MG tablet  Dispense: 30 tablet; Refill: 0  - amphetamine-dextroamphetamine (ADDERALL) 20 MG tablet  Dispense: 30 tablet; Refill: 0  - amphetamine-dextroamphetamine (ADDERALL) 20 MG tablet  Dispense: 30 tablet; Refill: 0        Bradley Mcconnell MD  [facility]   Breanna is a 38 year old, presenting for the following health issues:  Recheck Medication        7/20/2023     2:14 PM   Additional Questions   Roomed by Brooke PRAKASH   Accompanied by self     History of Present Illness       Reason for visit:  ADD med  candido          Breanna is here today to recheck ADHD/ADD.    Updates since last visit: pt says she is able to manage fine and would like a change is dosage    Routine for taking medicine, including time: 7 am extended release and at noon instant release  Time medicine wears off: pt says she doesn't think its working  Issues at work: yes pt says she cant focus   Issues at home: yes pt says she gets things done in the morning and then hits a wall  Control of symptoms: pt says she thinks the extended release isn't working    Side effects:  Headaches: No  Stomach aches: No  Irritability/mood swings: No  Difficulties with sleep: No  Social withdrawal: No  Unusual movements/tics: No  Decreased appetite: No    Other concerns: none       Review of Systems   Constitutional, HEENT, cardiovascular, pulmonary, gi and gu systems are negative, except as otherwise noted.      Objective    Vitals - Patient Reported  Pain Score: Severe Pain (6)  Pain Loc: Lower Leg        Physical Exam   GENERAL: Healthy, alert and no distress  EYES: Eyes grossly normal to inspection.  No discharge or erythema, or obvious scleral/conjunctival abnormalities.  RESP: No audible wheeze, cough, or visible cyanosis.  No visible retractions or increased work of breathing.    SKIN: Visible skin clear. No significant rash, abnormal pigmentation or lesions.  NEURO: Cranial nerves grossly intact.  Mentation and speech appropriate for age.  PSYCH: Mentation appears normal, affect normal/bright, judgement and insight intact, normal speech and appearance well-groomed.        Video-Visit Details    Type of service:  Video Visit     Originating Location (pt. Location): Home    Distant Location (provider location):  On-site  Platform used for Video Visit: [email or web address]

## 2023-07-25 NOTE — TELEPHONE ENCOUNTER
Reason for Call:  Other appointment    Detailed comments: Patient had surgery on 02/14 and is wondering if she can get a refill on her pain medication. Uses [employer] in [place]    Phone Number Patient can be reached at: Home number on file [phone] (home)    Best Time: any    Can we leave a detailed message on this number? YES    Call taken on 2/19/2020 at 3:29 PM by Gail Perez       stable

## 2023-07-27 ENCOUNTER — NURSE TRIAGE (OUTPATIENT)
Dept: NURSING | Facility: CLINIC | Age: 38
End: 2023-07-27
Payer: COMMERCIAL

## 2023-07-27 NOTE — TELEPHONE ENCOUNTER
"Nurse Triage SBAR    Is this a 2nd Level Triage? NO    Situation/background: Patient was stung by a wasp today and wants to know how to treat it from home.     Assessment: Patient was stung about 1-2 hours ago in her arm. She thinks she got 2 stings. It is very painful and \"stingy\". Slight swelling at the sting site. Not itching. Denies difficulty breathing or swallowing.     Protocol Recommended Disposition:   Home Care    Recommendation: Care advice provided and discussed when to call us back. Patient is agreeable with plan and verbalizes understanding.     Andrés Caputo RN on 7/27/2023 at 5:39 PM    Reason for Disposition   Normal local reaction to bee, wasp, or yellow jacket sting    Additional Information   Negative: [1] Life-threatening reaction (anaphylaxis) in the past to sting AND [2] < 2 hours since sting   Negative: Attacked by swarm of bees now   Negative: Passed out (i.e., lost consciousness, collapsed and was not responding)   Negative: Wheezing, stridor, or difficulty breathing   Negative: [1] Hoarseness or cough AND [2] sudden onset following sting   Negative: [1] Tightness in the throat or chest AND [2] sudden onset following sting   Negative: [1] Swollen tongue or difficulty swallowing AND [2] sudden onset following sting   Negative: Sounds like a life-threatening emergency to the triager   Negative: [1] Hives, itching, or swelling elsewhere on body (i.e., not a site of sting) AND [2] started within 2 hours of sting (Exception: only at site of sting)   Negative: [1] Vomiting or abdominal cramps AND [2] started within 2 hours of sting   Negative: [1] Gave epinephrine shot AND [2] no symptoms now   Negative: Sting inside the mouth   Negative: Sting on eyeball (e.g., cornea)   Negative: Patient sounds very sick or weak to the triager   Negative: More than 50 stings   Negative: [1] Fever AND [2] red area   Negative: [1] Fever AND [2] area is very tender to touch   Negative: [1] Red streak or red line " AND [2] length > 2 inches (5 cm)   Negative: [1] Red or very tender (to touch) area AND [2] started over 24 hours after the sting   Negative: [1] Red or very tender (to touch) area AND [2] getting larger over 48 hours after the sting   Negative: Swelling is huge (e.g., more than 4 inches or 10 cm, spreads beyond wrist or ankle)   Negative: [1] Hives, itching, or swelling elsewhere on body (i.e., not a site of stings) AND [2] started over 2 hours after sting  (Exception: only at site of sting)   Negative: [1] Scab is present AND [2] it drains pus or increases in size AND [3] not improved after applying  antibiotic ointment for 2 days    Protocols used: Bee or Yellow Jacket Sting-A-

## 2023-07-29 ENCOUNTER — E-VISIT (OUTPATIENT)
Dept: URGENT CARE | Facility: CLINIC | Age: 38
End: 2023-07-29
Payer: COMMERCIAL

## 2023-07-29 ENCOUNTER — HOSPITAL ENCOUNTER (EMERGENCY)
Facility: CLINIC | Age: 38
Discharge: HOME | End: 2023-07-29
Attending: NURSE PRACTITIONER | Admitting: NURSE PRACTITIONER
Payer: COMMERCIAL

## 2023-07-29 VITALS
SYSTOLIC BLOOD PRESSURE: 114 MMHG | OXYGEN SATURATION: 96 % | RESPIRATION RATE: 20 BRPM | BODY MASS INDEX: 34.22 KG/M2 | HEIGHT: 66 IN | WEIGHT: 212.9 LBS | HEART RATE: 82 BPM | TEMPERATURE: 98.2 F | DIASTOLIC BLOOD PRESSURE: 99 MMHG

## 2023-07-29 DIAGNOSIS — R21 RASH AND NONSPECIFIC SKIN ERUPTION: Primary | ICD-10-CM

## 2023-07-29 DIAGNOSIS — T63.441A BEE STING REACTION, ACCIDENTAL OR UNINTENTIONAL, INITIAL ENCOUNTER: ICD-10-CM

## 2023-07-29 PROCEDURE — 99284 EMERGENCY DEPT VISIT MOD MDM: CPT | Performed by: NURSE PRACTITIONER

## 2023-07-29 PROCEDURE — 99207 PR NON-BILLABLE SERV PER CHARTING: CPT | Performed by: PHYSICIAN ASSISTANT

## 2023-07-29 PROCEDURE — 99283 EMERGENCY DEPT VISIT LOW MDM: CPT | Performed by: NURSE PRACTITIONER

## 2023-07-29 RX ORDER — PREDNISONE 20 MG/1
40 TABLET ORAL DAILY
Qty: 6 TABLET | Refills: 0 | Status: SHIPPED | OUTPATIENT
Start: 2023-07-29 | End: 2023-08-01

## 2023-07-29 RX ORDER — OXYCODONE AND ACETAMINOPHEN 5; 325 MG/1; MG/1
1 TABLET ORAL EVERY 6 HOURS PRN
Qty: 6 TABLET | Refills: 0 | Status: SHIPPED | OUTPATIENT
Start: 2023-07-29 | End: 2023-08-01

## 2023-07-29 RX ORDER — ONDANSETRON 4 MG/1
4 TABLET, ORALLY DISINTEGRATING ORAL EVERY 8 HOURS PRN
Qty: 10 TABLET | Refills: 0 | Status: SHIPPED | OUTPATIENT
Start: 2023-07-29 | End: 2023-08-01

## 2023-07-29 NOTE — DISCHARGE INSTRUCTIONS
Prednisone 40 mg daily for 3 days.  See handout.  Zofran 4 mg every 8 hours as needed for nausea.  Tylenol 650 mg every 4-6 hours as needed for pain.  Ibuprofen 400-600 mg every 6-8 hours as needed for pain  (take with food, stop if causing stomach pains.)  Percocet 1 tablet every 6 hours as needed for severe pain.    Recheck for worsening symptoms.

## 2023-07-29 NOTE — ED TRIAGE NOTES
Comes in with a wasp sting to her upper left arm, that is red, swollen and painful. States got stung yesterday afternoon     Triage Assessment       Row Name 07/29/23 1297       Triage Assessment (Adult)    Airway WDL WDL       Respiratory WDL    Respiratory WDL WDL       Skin Circulation/Temperature WDL    Skin Circulation/Temperature WDL WDL       Cardiac WDL    Cardiac WDL WDL       Peripheral/Neurovascular WDL    Peripheral Neurovascular WDL WDL       Cognitive/Neuro/Behavioral WDL    Cognitive/Neuro/Behavioral WDL WDL

## 2023-07-29 NOTE — PATIENT INSTRUCTIONS
Dear Breanna Luna,    We are sorry you are not feeling well. Based on the responses you provided, it is recommended that you be seen in-person in urgent care so we can better evaluate your symptoms. Please click here to find the nearest urgent care location to you.   You will not be charged for this Visit. Thank you for trusting us with your care.    Asia Ravi PA-C

## 2023-07-30 NOTE — ED PROVIDER NOTES
History     Chief Complaint   Patient presents with    Insect Bite     HPI  Breanna Luna is a 38 year old female who was stung by a wasp yesterday evening to the left upper arm. She reports severe pain, unable to sleep all night. Increased redness/swelling today around the area of where she was stung.     Allergies:  Allergies   Allergen Reactions    Codeine Nausea     Other reaction(s): Nausea    Imitrex [Sumatriptan]      Worsening of migraine, paresthesias    Vancomycin Other (See Comments)    Adhesive [Liquid Adhesive] Itching     Redness and itching around incision after hernia repair surgery    Ranitidine Rash and Itching     recvd IV zantac 75 and reaction was immediate,  was given Benadryl to counter act reaction in 2000    Sulfa Antibiotics Unknown and Rash       Problem List:    Patient Active Problem List    Diagnosis Date Noted    Class 2 obesity 02/28/2023     Priority: Medium    Chronic tonsillitis 05/18/2022     Priority: Medium     Added automatically from request for surgery [id number]      Nasal obstruction 05/02/2022     Priority: Medium     Added automatically from request for surgery [id number]      Nasal turbinate hypertrophy 05/02/2022     Priority: Medium     Added automatically from request for surgery [id number]      Nasal septal deviation 05/02/2022     Priority: Medium     Added automatically from request for surgery [id number]      Class 2 obesity due to excess calories with body mass index (BMI) of 35.0 to 35.9 in adult, unspecified whether serious comorbidity present 02/16/2021     Priority: Medium    Biliary dyskinesia 02/10/2020     Priority: Medium     Added automatically from request for surgery [id number]      Intractable migraine without aura and without status migrainosus 07/17/2019     Priority: Medium    Alcohol abuse, in remission 08/20/2018     Priority: Medium    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) 08/20/2018     Priority: Medium    Mixed incontinence 03/03/2017     Priority: Medium         Past Medical History:    Past Medical History:   Diagnosis Date    Cellulitis 2012    Drug abuse (H) 2011    Mild pre-eclampsia in third trimester     Motion sickness     MRSA cellulitis 2012    PONV (postoperative nausea and vomiting)     Pregnancy-induced hypertension in third trimester     Rh negative, antepartum        Past Surgical History:    Past Surgical History:   Procedure Laterality Date    APPENDECTOMY      DAVINCI ASSISTED HERNIORRHAPHY INCISIONAL N/A 07/10/2020    Procedure: Robotic incisional hernia repair with mesh;  Surgeon: Jovi Odom MD;  Location: [facility]    EXCISE LESION FOOT Left 08/16/2019    Procedure: Excision of nevus second interdigital space left foot, Excision of nevus third interdigital space left foot, excision of plantar junctional nevus left foot;  Surgeon: Rinku Lopez DPM;  Location: [facility] OR    GYN SURGERY      INCISION AND DRAINAGE  04/18/2012    chin abscess I&D    LAPAROSCOPIC CHOLECYSTECTOMY N/A 02/14/2020    Procedure: Laparoscopic Cholecystectomy;  Surgeon: Renard Gerber MD;  Location: [facility]    LAPAROSCOPIC HERNIORRHAPHY INCISIONAL N/A 07/17/2019    Procedure: laparoscopic incisional hernia repair with mesh;  Surgeon: Jovi Odom MD;  Location: [facility]    LAPAROSCOPIC SALPINGECTOMY Bilateral 08/27/2018    Procedure: LAPAROSCOPIC SALPINGECTOMY;  Laparoscopic Bilateral Salpingectomy;  Surgeon: Chai Cui MD;  Location: [facility]    MAMMOPLASTY REDUCTION Bilateral 08/04/2017    SEPTOPLASTY, TURBINOPLASTY, COMBINED Bilateral 6/10/2022    Procedure: SEPTOPLASTY; BILATERAL INFERIOR TURBINATE REDUCTION;;  Surgeon: Lobo Narayanan MD;  Location: [facility]    TONSILLECTOMY Bilateral 6/10/2022    Procedure: BILATERAL TONSILLECTOMY;  Surgeon: Lobo Narayanan MD;  Location: [facility]    TUBAL LIGATION Bilateral        Family History:    Family History   Problem Relation Age of Onset    Cancer Father     Pancreatic Cancer Father     Lung Cancer Father     Lupus  "Mother        Social History:  Marital Status:   [2]  Social History     Tobacco Use    Smoking status: Former     Packs/day: 0.00     Years: 0.00     Pack years: 0.00     Types: Cigarettes     Quit date: 3/10/2016     Years since quittin.3    Smokeless tobacco: Never   Vaping Use    Vaping Use: Never used   Substance Use Topics    Alcohol use: No     Comment: history alcohol abuse , s/p treatment    Drug use: No     Comment: former drug user (EtOH, cocaine, marijuana, meth), last use         Medications:    ondansetron (ZOFRAN ODT) 4 MG ODT tab  oxyCODONE-acetaminophen (PERCOCET) 5-325 MG tablet  predniSONE (DELTASONE) 20 MG tablet  acetaminophen (TYLENOL) 500 MG tablet  amphetamine-dextroamphetamine (ADDERALL XR) 20 MG 24 hr capsule  [START ON 2023] amphetamine-dextroamphetamine (ADDERALL XR) 20 MG 24 hr capsule  [START ON 2023] amphetamine-dextroamphetamine (ADDERALL XR) 20 MG 24 hr capsule  amphetamine-dextroamphetamine (ADDERALL XR) 20 MG 24 hr capsule  amphetamine-dextroamphetamine (ADDERALL) 20 MG tablet  [START ON 2023] amphetamine-dextroamphetamine (ADDERALL) 20 MG tablet  [START ON 2023] amphetamine-dextroamphetamine (ADDERALL) 20 MG tablet  Atogepant (QULIPTA) 60 MG TABS  famotidine (PEPCID) 20 MG tablet  naproxen (NAPROSYN) 500 MG tablet  Rimegepant Sulfate 75 MG TBDP  Semaglutide-Weight Management (WEGOVY) 1.7 MG/0.75ML pen          Review of Systems  As mentioned above in the history present illness. All other systems were reviewed and are negative.    Physical Exam   BP: 130/89  Pulse: 90  Temp: 98  F (36.7  C)  Resp: 20  Height: 167.6 cm (5' 6\")  Weight: 96.6 kg (212 lb 14.4 oz)  SpO2: 99 %      Physical Exam  Constitutional:       General: She is in acute distress (tearful).      Appearance: She is well-developed. She is not ill-appearing.   HENT:      Head: Normocephalic and atraumatic.      Right Ear: External ear normal.      Left Ear: External ear normal. "      Nose: Nose normal.      Mouth/Throat:      Mouth: Mucous membranes are moist.   Eyes:      Conjunctiva/sclera: Conjunctivae normal.   Cardiovascular:      Rate and Rhythm: Normal rate and regular rhythm.      Heart sounds: Normal heart sounds. No murmur heard.  Pulmonary:      Effort: Pulmonary effort is normal. No respiratory distress.      Breath sounds: Normal breath sounds.   Musculoskeletal:         General: Normal range of motion.   Skin:     General: Skin is warm and dry.      Findings: Erythema (left posterior upper arm, central pinpoint blister area (insect sting)) present. No rash.          Neurological:      General: No focal deficit present.      Mental Status: She is alert and oriented to person, place, and time.         ED Course                 Procedures  Left arm insect sting--- cleansed the site with betadine.Using magnification, I unroofed the central blister on her left arm to see if there was an embedded stinger.  There was no stinger visualized.     Assessments & Plan (with Medical Decision Making)     Cool compress was placed to her left arm which she found to be helpful. She is very tearful and not tolerating the pain.  I suspect this is local reaction to the wasp sting. I have low suspicion for infection given the timing of the sting happening less than 24 hours ago.     Plan:  Prednisone 40 mg daily for 3 days.  See handout.  Zofran 4 mg every 8 hours as needed for nausea.  Tylenol 650 mg every 4-6 hours as needed for pain.  Ibuprofen 400-600 mg every 6-8 hours as needed for pain  (take with food, stop if causing stomach pains.)  Percocet 1 tablet every 6 hours as needed for severe pain.    Recheck for worsening symptoms.    Discharge Medication List as of 7/29/2023  4:13 PM        START taking these medications    Details   ondansetron (ZOFRAN ODT) 4 MG ODT tab Take 1 tablet (4 mg) by mouth every 8 hours as needed for nausea, Disp-10 tablet, R-0, E-Prescribe       oxyCODONE-acetaminophen (PERCOCET) 5-325 MG tablet Take 1 tablet by mouth every 6 hours as needed for pain, Disp-6 tablet, R-0, E-Prescribe      predniSONE (DELTASONE) 20 MG tablet Take 2 tablets (40 mg) by mouth daily for 3 days, Disp-6 tablet, R-0, E-Prescribe             Final diagnoses:   Bee sting reaction, accidental or unintentional, initial encounter       7/29/2023   [facility] EMERGENCY DEPT       Sejal, MADELINE Arias CNP  07/29/23 1934

## 2023-08-29 DIAGNOSIS — F90.2 ATTENTION DEFICIT HYPERACTIVITY DISORDER (ADHD), COMBINED TYPE: ICD-10-CM

## 2023-08-29 RX ORDER — DEXTROAMPHETAMINE SACCHARATE, AMPHETAMINE ASPARTATE MONOHYDRATE, DEXTROAMPHETAMINE SULFATE AND AMPHETAMINE SULFATE 5; 5; 5; 5 MG/1; MG/1; MG/1; MG/1
20 CAPSULE, EXTENDED RELEASE ORAL DAILY
Qty: 30 CAPSULE | Refills: 0 | OUTPATIENT
Start: 2023-08-29

## 2023-08-29 NOTE — TELEPHONE ENCOUNTER
Change in pharmacy - patient needs NEW Rx sent to [facility] pharmacy as original pharmacy does not have medication available.    Theresa Berger XRO/

## 2023-08-30 RX ORDER — DEXTROAMPHETAMINE SACCHARATE, AMPHETAMINE ASPARTATE MONOHYDRATE, DEXTROAMPHETAMINE SULFATE AND AMPHETAMINE SULFATE 5; 5; 5; 5 MG/1; MG/1; MG/1; MG/1
20 CAPSULE, EXTENDED RELEASE ORAL DAILY
Qty: 30 CAPSULE | Refills: 0 | Status: SHIPPED | OUTPATIENT
Start: 2023-08-30 | End: 2023-10-19

## 2023-09-07 DIAGNOSIS — H10.9 CONJUNCTIVITIS, UNSPECIFIED CONJUNCTIVITIS TYPE, UNSPECIFIED LATERALITY: Primary | ICD-10-CM

## 2023-09-07 RX ORDER — OFLOXACIN 3 MG/ML
1-2 SOLUTION/ DROPS OPHTHALMIC 4 TIMES DAILY
Qty: 2 ML | Refills: 0 | Status: SHIPPED | OUTPATIENT
Start: 2023-09-07 | End: 2023-09-12

## 2023-09-11 ENCOUNTER — E-VISIT (OUTPATIENT)
Dept: URGENT CARE | Facility: CLINIC | Age: 38
End: 2023-09-11
Payer: COMMERCIAL

## 2023-09-11 DIAGNOSIS — B37.9 CANDIDA INFECTION: Primary | ICD-10-CM

## 2023-09-11 PROCEDURE — 99421 OL DIG E/M SVC 5-10 MIN: CPT | Performed by: FAMILY MEDICINE

## 2023-09-11 RX ORDER — NYSTATIN 100000 U/G
CREAM TOPICAL 2 TIMES DAILY
Qty: 30 G | Refills: 0 | Status: SHIPPED | OUTPATIENT
Start: 2023-09-11 | End: 2023-10-06

## 2023-09-11 NOTE — PATIENT INSTRUCTIONS
Larisa Luna    Overview  Candidiasis, commonly known as a yeast infection, is an overgrowth of the common Candida albicans fungus, which is naturally found in the environment and on the body. Sometimes this yeast lives in the mouth, digestive tract, and the vagina without causing any issues. However, under certain conditions (such as having a weak immune system, using antibiotics, taking cancer drugs or corticosteroids, or being obese or diabetic), the yeast will multiply and cause an infection.    There are different forms of yeast infection, depending upon the area affected. Most commonly, the mouth, vulva and vagina, and damper skin areas, such as the skin folds, are affected, as the yeast likes to grow in moist environments.    Most yeast infections are easily treated; however, serious life-threatening yeast infection can develop throughout the body (systemically) in people with very weak immune systems.  Who s At Risk  Various species of Candida yeast grow in over half of healthy adults.  Men and women can both get yeast infections.  People with weakened immune systems; those taking antibiotics, cancer drugs, or corticosteroids; those with diabetes; and those who are obese are most likely to develop a yeast infection.  Older people are more likely to get thrush (oral yeast infection).  Yeast infection is a common cause of inflammation of the vagina (vaginitis).  Signs & Symptoms  The appearance and symptoms of yeast infection depend upon the area affected. Usually, the affected area is itchy.    The most common types of infection are:  Thrush - The mouth lining, tongue, and/or corners of the mouth are red, cracked, or have white patches. There may be soreness or there may be no symptoms.  Skin (cutaneous) - Small-to-large patches of red, moist, raw skin usually develop in body creases, such as under the breasts, belly, or groin area. The skin may itch or be painful. Tiny pustules (pus-filled bumps) may  appear around the edges of the red areas. In darker skin colors, the area may be dark red, purple, dark brown, or grayish.  Vaginitis - Vaginal itch, pain, and burning are frequent and may be accompanied by a cottage-cheese-like discharge. There is usually pain with sexual intercourse.  Esophagitis - Swallowing may be painful, and there may be pain behind the breastbone.  Self-Care Guidelines  Most skin yeast infections can be prevented by keeping body folds clean and dry.  Avoid wearing tight-fitting clothing.  Treat skin yeast infection with an over-the-counter antifungal cream (such as clotrimazole or miconazole) twice daily for 10-14 days.  Those with vaginitis can use an over-the-counter vaginal suppository or cream antifungal (miconazole or clotrimazole). Avoid sexual intercourse until the yeast infection heals. If you are pregnant and have a vaginal yeast infection, consult your medical professional for treatment guidance.  Individuals with diabetes should keep their blood sugar under good control.  For those who are overweight, losing weight can help prevent yeast infection from recurring.  When to Seek Medical Care  See your medical professional if your symptoms do not go away with self-care.    Note that vaginitis can be caused by other infections or inflammatory conditions. If you are sexually active and are having symptoms such as those above, you may have a sexually transmitted infection. See your medical professional to confirm the diagnosis.  Treatments  Your medical professional may prescribe a topical or oral antifungal medication in addition to the self-care measures noted above.     Thanks for choosing us as your health care partner,    MADELINE JOINER CNP

## 2023-09-12 ENCOUNTER — TELEPHONE (OUTPATIENT)
Dept: FAMILY MEDICINE | Facility: CLINIC | Age: 38
End: 2023-09-12
Payer: COMMERCIAL

## 2023-09-14 DIAGNOSIS — E66.812 CLASS 2 OBESITY DUE TO EXCESS CALORIES WITH BODY MASS INDEX (BMI) OF 35.0 TO 35.9 IN ADULT, UNSPECIFIED WHETHER SERIOUS COMORBIDITY PRESENT: ICD-10-CM

## 2023-09-14 DIAGNOSIS — E66.09 CLASS 2 OBESITY DUE TO EXCESS CALORIES WITH BODY MASS INDEX (BMI) OF 35.0 TO 35.9 IN ADULT, UNSPECIFIED WHETHER SERIOUS COMORBIDITY PRESENT: ICD-10-CM

## 2023-09-14 NOTE — TELEPHONE ENCOUNTER
Pt last seen 2/28  Next appt 10/20  Per [facility]- ok to refill Wegovy 2.4 mg as pt requested.  Will send one month worth to last until next appt.    Crystal KING RN

## 2023-09-28 ENCOUNTER — TELEPHONE (OUTPATIENT)
Dept: NEUROLOGY | Facility: CLINIC | Age: 38
End: 2023-09-28
Payer: COMMERCIAL

## 2023-09-29 ENCOUNTER — OFFICE VISIT (OUTPATIENT)
Dept: NEUROLOGY | Facility: CLINIC | Age: 38
End: 2023-09-29
Payer: COMMERCIAL

## 2023-09-29 DIAGNOSIS — G43.709 CHRONIC MIGRAINE WITHOUT AURA WITHOUT STATUS MIGRAINOSUS, NOT INTRACTABLE: Primary | ICD-10-CM

## 2023-09-29 PROCEDURE — 64615 CHEMODENERV MUSC MIGRAINE: CPT | Performed by: PSYCHIATRY & NEUROLOGY

## 2023-09-29 NOTE — PROGRESS NOTES
[facility]  Botulinum Toxin Procedure    Cara Suarez MD  Headache Neurology    September 29, 2023    Procedure:  OnabotulinumtoxinA injections for chronic migraine  Indication:  Chronic migraine    Breanna Luna suffers from severe intractable headaches. She was referred by Dr. Suarez for onabotulinumtoxinA injections for headache.       Prior to initiation of botulinum toxin injections, Breanna reported 20 headache days per month, with 17 severe headache days per month. Her headaches are quite disabling and often interfere with her ability to function normally.     Their last round of botulinum toxin injections was on 6/30/23.     Breanna reports 9/30 headache days per month currently, with 0/30 severe headache days per month. She has noticed a wearing off phenomenon prior to this round of botulinum toxin injections, lasting 3 weeks.     She has attempted other migraine prophylactic treatments in the past, which have included:  Nortriptyline 20 mg PM was not helpful.  Gabapentin was not helpful.  Propranolol 40 mg TID was not helpful.  Topiramate was not helpful.  Emgality was painful.   Ajovy is helpful.  Quipta is not helping yet.    Procedural Pause: Procedural pause was conducted to verify correct patient identity, procedure to be performed, correct side and site, correct patient position, and special requirements. Appropriate hand hygiene was utilized, and each injection site was prepped with alcohol wipe or Chloraprep swab.     Procedure Details: 200 units of onabotulinumtoxinA was diluted in 4 mL 0.9% normal saline. A total of 150 units of onabotulinumtoxinA were injected using 30 gauge 0.5 in needles into the muscles listed below. 50 units of onabotulinumtoxinA were wasted.     Injection Sites: Total = 150 units onabotulinumtoxinA      and Procerus muscles - 5 units into the left and right corrugators and 5 units into the procerus (15 units total)    Frontalis muscles - 5 units into the left  superior frontalis and 5 units into the right superior frontalis (2 injection sites per muscle) (10 units total)    Temporalis muscles - 12.5 units into the left temporalis muscle and 12.5 units into the right temporalis muscle (2 injection sites per muscle) (25 units total)    Occipitalis muscles - 12.5 units into the left occipitalis muscle and 12.5 units into the right occipitalis muscle (2 injection sites per muscle) (25 units total)    Splenius Capitis muscles - 12.5 units into the left splenius capitis muscle and 12.5 units into the right splenius capitis muscle (2 injection sites per muscle, divided into 2/3 anteriorly and 1/3 posteriorly) (25 units total)      Trapezius muscles - 25 units into the left trapezius muscle and 25 units into the right trapezius muscle (3 injection sites per muscle, divided 5 units, 10 units, 10 units, medial to lateral) (50 units total)    Ms. Luna tolerated the procedure well without immediate complications.  She will follow up in clinic for assessment of the effectiveness of treatment.  She did not report any change in her pain level after the botulinumtoxinA injection procedure.    Cara Suarez MD  Headache Neurology  [facility]

## 2023-09-29 NOTE — LETTER
9/29/2023         RE: Breanna Luna  [address]  [place] [address]        Dear Colleague,    Thank you for referring your patient, Breanna Luna, to the [facility]. Please see a copy of my visit note below.    [facility]  Botulinum Toxin Procedure    Cara Suarez MD  Headache Neurology    September 29, 2023    Procedure:  OnabotulinumtoxinA injections for chronic migraine  Indication:  Chronic migraine    Breanna Luna suffers from severe intractable headaches. She was referred by Dr. Suarez for onabotulinumtoxinA injections for headache.       Prior to initiation of botulinum toxin injections, Breanna reported 20 headache days per month, with 17 severe headache days per month. Her headaches are quite disabling and often interfere with her ability to function normally.     Their last round of botulinum toxin injections was on 6/30/23.     Breanna reports 9/30 headache days per month currently, with 0/30 severe headache days per month. She has noticed a wearing off phenomenon prior to this round of botulinum toxin injections, lasting 3 weeks.     She has attempted other migraine prophylactic treatments in the past, which have included:  Nortriptyline 20 mg PM was not helpful.  Gabapentin was not helpful.  Propranolol 40 mg TID was not helpful.  Topiramate was not helpful.  Emgality was painful.   Ajovy is helpful.  Quipta is not helping yet.    Procedural Pause: Procedural pause was conducted to verify correct patient identity, procedure to be performed, correct side and site, correct patient position, and special requirements. Appropriate hand hygiene was utilized, and each injection site was prepped with alcohol wipe or Chloraprep swab.     Procedure Details: 200 units of onabotulinumtoxinA was diluted in 4 mL 0.9% normal saline. A total of 150 units of onabotulinumtoxinA were injected using 30 gauge 0.5 in needles into the muscles listed below. 50 units of  onabotulinumtoxinA were wasted.     Injection Sites: Total = 150 units onabotulinumtoxinA      and Procerus muscles - 5 units into the left and right corrugators and 5 units into the procerus (15 units total)    Frontalis muscles - 5 units into the left superior frontalis and 5 units into the right superior frontalis (2 injection sites per muscle) (10 units total)    Temporalis muscles - 12.5 units into the left temporalis muscle and 12.5 units into the right temporalis muscle (2 injection sites per muscle) (25 units total)    Occipitalis muscles - 12.5 units into the left occipitalis muscle and 12.5 units into the right occipitalis muscle (2 injection sites per muscle) (25 units total)    Splenius Capitis muscles - 12.5 units into the left splenius capitis muscle and 12.5 units into the right splenius capitis muscle (2 injection sites per muscle, divided into 2/3 anteriorly and 1/3 posteriorly) (25 units total)      Trapezius muscles - 25 units into the left trapezius muscle and 25 units into the right trapezius muscle (3 injection sites per muscle, divided 5 units, 10 units, 10 units, medial to lateral) (50 units total)    Ms. Luna tolerated the procedure well without immediate complications.  She will follow up in clinic for assessment of the effectiveness of treatment.  She did not report any change in her pain level after the botulinumtoxinA injection procedure.    Cara Suarez MD  Headache Neurology  [facility]      Again, thank you for allowing me to participate in the care of your patient.        Sincerely,        Cara Suarez MD

## 2023-10-01 ENCOUNTER — MYC REFILL (OUTPATIENT)
Dept: FAMILY MEDICINE | Facility: CLINIC | Age: 38
End: 2023-10-01
Payer: COMMERCIAL

## 2023-10-01 DIAGNOSIS — F90.2 ATTENTION DEFICIT HYPERACTIVITY DISORDER (ADHD), COMBINED TYPE: ICD-10-CM

## 2023-10-02 ENCOUNTER — TELEPHONE (OUTPATIENT)
Dept: FAMILY MEDICINE | Facility: CLINIC | Age: 38
End: 2023-10-02
Payer: COMMERCIAL

## 2023-10-02 RX ORDER — DEXTROAMPHETAMINE SACCHARATE, AMPHETAMINE ASPARTATE MONOHYDRATE, DEXTROAMPHETAMINE SULFATE AND AMPHETAMINE SULFATE 5; 5; 5; 5 MG/1; MG/1; MG/1; MG/1
20 CAPSULE, EXTENDED RELEASE ORAL DAILY
Qty: 30 CAPSULE | Refills: 0 | OUTPATIENT
Start: 2023-10-02

## 2023-10-02 NOTE — TELEPHONE ENCOUNTER
Patient calling to see if her Adderall can be filled here in [place]. Writer called pharmacy and med was already filled. Patient will get today.    Keyonna Vargas,NGUYENN, RN

## 2023-10-06 DIAGNOSIS — B37.9 CANDIDA INFECTION: ICD-10-CM

## 2023-10-10 RX ORDER — NYSTATIN 100000 U/G
CREAM TOPICAL 2 TIMES DAILY
Qty: 30 G | Refills: 0 | Status: SHIPPED | OUTPATIENT
Start: 2023-10-10 | End: 2024-04-01

## 2023-10-12 NOTE — PROGRESS NOTES
"Breanna is a 38 year old who is being evaluated via a billable video visit.      The patient has been notified of following:     \"This video visit will be conducted via a call between you and your physician/provider. We have found that certain health care needs can be provided without the need for an in-person physical exam.  This service lets us provide the care you need with a video conversation.  If a prescription is necessary we can send it directly to your pharmacy.  If lab work is needed we can place an order for that and you can then stop by our lab to have the test done at a later time.    Video visits are billed at different rates depending on your insurance coverage.  Please reach out to your insurance provider with any questions.    If during the course of the call the physician/provider feels a video visit is not appropriate, you will not be charged for this service.\"    Patient has given verbal consent for Video visit? Yes    How would you like to obtain your AVS? MyChart    If the video visit is dropped, the invitation should be resent by: Text to cell phone: [phone]    Will anyone else be joining your video visit? No    I    Video-Visit Details    Type of service:  Video Visit    Video Start Time: 9:35 AM    Video End Time:9:57 AM    Originating Location (pt. Location): Home    Distant Location (provider location):  Remote-home    Platform used for Video Visit: [employer]    10/20/2023      Return Medical Weight Management Note     Breanna Luna  MRN:  [id number]  :  1985    Dear Bradley Mcconnell MD,    I had the pleasure of seeing your patient Breanna Luna. She is a 38 year old female who I am continuing to see for treatment of obesity related to:        2022    10:45 AM   --   I have the following health issues associated with obesity Pre-Diabetes    High Cholesterol       Assessment & Plan   Problem List Items Addressed This Visit       Class 1 obesity due to excess calories " "without serious comorbidity with body mass index (BMI) of 31.0 to 31.9 in adult - Primary     Patient was congratulated on wt loss success thus far. She is down 35 lbs since her last visit.  Healthy habits to assist with further weight loss were discussed. Breanna will continue Wegovy. Goals noted in pt instructions.     WEIGHT METRICS:  Body mass index is 31.28 kg/m .  Current Weight: 193 lb 12.8 oz (87.9 kg) (patient reported)  Last Visits Weight: 228 lb (103.4 kg)  Initial Weight (lbs): 218 lbs  Initial BMI 35  Cumulative weight loss (lbs): 24.2  Weight Loss Percentage: 11.1%                 Relevant Medications    Semaglutide-Weight Management (WEGOVY) 2.4 MG/0.75ML pen     Other Visit Diagnoses       Heartburn        Relevant Medications    famotidine (PEPCID) 20 MG tablet    Class 2 obesity due to excess calories with body mass index (BMI) of 35.0 to 35.9 in adult, unspecified whether serious comorbidity present        Relevant Medications    Semaglutide-Weight Management (WEGOVY) 2.4 MG/0.75ML pen             PATIENT INSTRUCTIONS:  Continue Wegovy 2.4 mg weekly.    Goals: Continue to meal prep and keep up with current daily exercise      FOLLOW-UP:  Please call [phone] to schedule your next visit in 3 mo.    35 minutes spent on the date of the encounter doing chart review, history and exam, result review, counseling, developing plan of care, documentation, and further activities as noted      INTERVAL HISTORY:  Breanna returns for medical weight management follow up.  Last seen on 7/29/2023. She is on Wegovy 2.4 mg weekly.  Is working well. Has more energy.   It is helping her feel villa quicker.  When she eats a meal she feels full.  Now she eats food for fuel.  Denies SE. Has inspired friends and PCP.      Activity: Has a hydro rowing machine and is doing the $60 monthly program. Loves it.  \"Changed my life.\"  Since March she has been working hard. Exercising most days of the week.   New reach goal is 150 " "lbs.  Averages 500 meters a day. Is also doing Piliates. With rowing she is gaining muscle.     Diet Recall: Eating 3 protein rich meals a day with no snacks.  Tracks food intake to make sure protein intake/calories appropriate for work out    Goals: She will continue to meal prep and keep up with her current exercise. - met     Has a bunch of hanging skin. Interested in possible panniculectomy once at \"reach\" goal weight of 150 lb and stable.  Her initial goal weight is 180 lb.    WEIGHT METRICS:  Body mass index is 31.28 kg/m .  Current Weight: 193 lb 12.8 oz (87.9 kg) (patient reported)  Last Visits Weight: 228 lb (103.4 kg)  Initial Weight (lbs): 218 lbs  Cumulative weight loss (lbs): 24.2  Weight Loss Percentage: 11.1%    Wt Readings from Last 10 Encounters:   10/20/23 193 lb 12.8 oz (87.9 kg)   07/29/23 212 lb 14.4 oz (96.6 kg)   02/28/23 228 lb (103.4 kg)   12/14/22 229 lb (103.9 kg)   09/16/22 218 lb (98.9 kg)   08/26/22 220 lb (99.8 kg)   08/25/22 220 lb 6 oz (100 kg)   06/30/22 218 lb (98.9 kg)   06/16/22 218 lb (98.9 kg)   06/10/22 218 lb (98.9 kg)            MEDICATIONS:   Current Outpatient Medications   Medication Sig Dispense Refill    acetaminophen (TYLENOL) 500 MG tablet Take 1,000 mg by mouth every 6 hours as needed for mild pain      amphetamine-dextroamphetamine (ADDERALL XR) 20 MG 24 hr capsule Take 1 capsule (20 mg) by mouth daily for 30 days 30 capsule 0    [START ON 11/19/2023] amphetamine-dextroamphetamine (ADDERALL XR) 20 MG 24 hr capsule Take 1 capsule (20 mg) by mouth daily for 30 days 30 capsule 0    [START ON 12/20/2023] amphetamine-dextroamphetamine (ADDERALL XR) 20 MG 24 hr capsule Take 1 capsule (20 mg) by mouth daily for 30 days 30 capsule 0    amphetamine-dextroamphetamine (ADDERALL) 20 MG tablet Take 1 tablet (20 mg) by mouth daily for 30 days 30 tablet 0    [START ON 11/19/2023] amphetamine-dextroamphetamine (ADDERALL) 20 MG tablet Take 1 tablet (20 mg) by mouth daily for 30 days " 30 tablet 0    [START ON 12/20/2023] amphetamine-dextroamphetamine (ADDERALL) 20 MG tablet Take 1 tablet (20 mg) by mouth daily for 30 days 30 tablet 0    Atogepant (QULIPTA) 60 MG TABS Take 60 mg by mouth daily 30 tablet 11    famotidine (PEPCID) 20 MG tablet Take 1 tablet (20 mg) by mouth 2 times daily as needed (heartburn) 180 tablet 0    naproxen (NAPROSYN) 500 MG tablet Take 1 tablet (500 mg) by mouth 2 times daily as needed for moderate pain (4-6) 28 tablet 11    nystatin (MYCOSTATIN) 936199 UNIT/GM external cream Apply topically 2 times daily 30 g 0    Rimegepant Sulfate 75 MG TBDP Take 75 mg by mouth daily as needed (migraine) 8 tablet 11    Semaglutide-Weight Management (WEGOVY) 2.4 MG/0.75ML pen Inject 2.4 mg Subcutaneous once a week 9 mL 1       LABS:  Hemoglobin A1C   Date Value Ref Range Status   09/19/2022 4.9 0.0 - 5.6 % Final     Comment:     Normal <5.7%   Prediabetes 5.7-6.4%    Diabetes 6.5% or higher     Note: Adopted from ADA consensus guidelines.     Sodium   Date Value Ref Range Status   12/14/2022 137 133 - 144 mmol/L Final   02/26/2020 139 133 - 144 mmol/L Final     Potassium   Date Value Ref Range Status   12/14/2022 4.2 3.4 - 5.3 mmol/L Final     Comment:     Specimen slightly hemolyzed, potassium may be falsely elevated.   02/26/2020 4.1 3.4 - 5.3 mmol/L Final     Chloride   Date Value Ref Range Status   12/14/2022 109 94 - 109 mmol/L Final   02/26/2020 107 94 - 109 mmol/L Final     Carbon Dioxide   Date Value Ref Range Status   02/26/2020 26 20 - 32 mmol/L Final     Carbon Dioxide (CO2)   Date Value Ref Range Status   12/14/2022 25 20 - 32 mmol/L Final     Anion Gap   Date Value Ref Range Status   12/14/2022 3 3 - 14 mmol/L Final   02/26/2020 6 3 - 14 mmol/L Final     Glucose   Date Value Ref Range Status   12/14/2022 92 70 - 99 mg/dL Final   02/26/2020 94 70 - 99 mg/dL Final     Urea Nitrogen   Date Value Ref Range Status   12/14/2022 13 7 - 30 mg/dL Final   02/26/2020 13 7 - 30 mg/dL  Final     Creatinine   Date Value Ref Range Status   12/14/2022 0.60 0.52 - 1.04 mg/dL Final   02/26/2020 0.58 0.52 - 1.04 mg/dL Final     GFR Estimate   Date Value Ref Range Status   12/14/2022 >90 >60 mL/min/1.73m2 Final     Comment:     Effective December 21, 2021 eGFRcr in adults is calculated using the 2021 CKD-EPI creatinine equation which includes age and gender (Cesia et al., [place], DOI: 10.1056/NGSRrc7088180)   02/26/2020 >90 >60 mL/min/[1.73_m2] Final     Comment:     Non  GFR Calc  Starting 12/18/2018, serum creatinine based estimated GFR (eGFR) will be   calculated using the Chronic Kidney Disease Epidemiology Collaboration   (CKD-EPI) equation.       Calcium   Date Value Ref Range Status   12/14/2022 8.5 8.5 - 10.1 mg/dL Final   02/26/2020 9.1 8.5 - 10.1 mg/dL Final     Bilirubin Total   Date Value Ref Range Status   12/14/2022 0.7 0.2 - 1.3 mg/dL Final   02/26/2020 0.5 0.2 - 1.3 mg/dL Final     Alkaline Phosphatase   Date Value Ref Range Status   12/14/2022 49 40 - 150 U/L Final   02/26/2020 50 40 - 150 U/L Final     ALT   Date Value Ref Range Status   12/14/2022 25 0 - 50 U/L Final   02/26/2020 33 0 - 50 U/L Final     AST   Date Value Ref Range Status   12/14/2022 19 0 - 45 U/L Final     Comment:     Specimen is hemolyzed which can falsely elevate AST. Analysis of a non-hemolyzed specimen may result in a lower value.   02/26/2020 15 0 - 45 U/L Final     Cholesterol   Date Value Ref Range Status   05/13/2022 159 <200 mg/dL Final     Direct Measure HDL   Date Value Ref Range Status   05/13/2022 57 >=50 mg/dL Final     LDL Cholesterol Calculated   Date Value Ref Range Status   05/13/2022 61 <=100 mg/dL Final     Triglycerides   Date Value Ref Range Status   05/13/2022 205 (H) <150 mg/dL Final     WBC   Date Value Ref Range Status   02/26/2020 9.3 4.0 - 11.0 10e9/L Final     WBC Count   Date Value Ref Range Status   12/14/2022 10.0 4.0 - 11.0 10e3/uL Final     Hemoglobin   Date Value Ref  "Range Status   12/14/2022 13.7 11.7 - 15.7 g/dL Final   02/26/2020 14.5 11.7 - 15.7 g/dL Final     Hematocrit   Date Value Ref Range Status   12/14/2022 39.1 35.0 - 47.0 % Final   02/26/2020 42.4 35.0 - 47.0 % Final     MCV   Date Value Ref Range Status   12/14/2022 87 78 - 100 fL Final   02/26/2020 88 78 - 100 fl Final     Platelet Count   Date Value Ref Range Status   12/14/2022 189 150 - 450 10e3/uL Final   02/26/2020 264 150 - 450 10e9/L Final         BP Readings from Last 6 Encounters:   07/29/23 (!) 114/99   03/09/23 108/68   12/14/22 108/63   12/02/22 117/81   08/26/22 134/85   08/25/22 120/82       Pulse Readings from Last 6 Encounters:   07/29/23 82   03/09/23 60   12/14/22 107   12/02/22 96   08/26/22 80   08/25/22 96       PE:  Ht 5' 6\" (1.676 m)   Wt 193 lb 12.8 oz (87.9 kg)   LMP 10/03/2023 (Approximate)   BMI 31.28 kg/m    GENERAL: Healthy, alert and no distress  EYES: Eyes grossly normal to inspection.  No discharge or erythema, or obvious scleral/conjunctival abnormalities.  RESP: No audible wheeze, cough, or visible cyanosis.  No visible retractions or increased work of breathing.    SKIN: Visible skin clear. No significant rash, abnormal pigmentation or lesions.  NEURO: Cranial nerves grossly intact.  Mentation and speech appropriate for age.  PSYCH: Mentation appears normal, affect normal/bright, judgement and insight intact, normal speech and appearance well-groomed.      Sincerely,      Francisca Hylton PA-C          "

## 2023-10-13 ENCOUNTER — IMMUNIZATION (OUTPATIENT)
Dept: FAMILY MEDICINE | Facility: OTHER | Age: 38
End: 2023-10-13
Payer: COMMERCIAL

## 2023-10-13 DIAGNOSIS — Z23 NEED FOR VACCINATION: Primary | ICD-10-CM

## 2023-10-13 PROCEDURE — 90480 ADMN SARSCOV2 VAC 1/ONLY CMP: CPT

## 2023-10-13 PROCEDURE — 90471 IMMUNIZATION ADMIN: CPT

## 2023-10-13 PROCEDURE — 90686 IIV4 VACC NO PRSV 0.5 ML IM: CPT

## 2023-10-13 PROCEDURE — 99207 PR NO CHARGE NURSE ONLY: CPT

## 2023-10-13 PROCEDURE — 91320 SARSCV2 VAC 30MCG TRS-SUC IM: CPT

## 2023-10-19 ENCOUNTER — VIRTUAL VISIT (OUTPATIENT)
Dept: FAMILY MEDICINE | Facility: CLINIC | Age: 38
End: 2023-10-19
Payer: COMMERCIAL

## 2023-10-19 DIAGNOSIS — F90.2 ATTENTION DEFICIT HYPERACTIVITY DISORDER (ADHD), COMBINED TYPE: Primary | ICD-10-CM

## 2023-10-19 DIAGNOSIS — E66.01 MORBID OBESITY (H): ICD-10-CM

## 2023-10-19 PROCEDURE — 99213 OFFICE O/P EST LOW 20 MIN: CPT | Mod: VID | Performed by: STUDENT IN AN ORGANIZED HEALTH CARE EDUCATION/TRAINING PROGRAM

## 2023-10-19 RX ORDER — DEXTROAMPHETAMINE SACCHARATE, AMPHETAMINE ASPARTATE MONOHYDRATE, DEXTROAMPHETAMINE SULFATE AND AMPHETAMINE SULFATE 5; 5; 5; 5 MG/1; MG/1; MG/1; MG/1
20 CAPSULE, EXTENDED RELEASE ORAL DAILY
Qty: 30 CAPSULE | Refills: 0 | Status: SHIPPED | OUTPATIENT
Start: 2023-12-20 | End: 2024-01-19

## 2023-10-19 RX ORDER — DEXTROAMPHETAMINE SACCHARATE, AMPHETAMINE ASPARTATE, DEXTROAMPHETAMINE SULFATE AND AMPHETAMINE SULFATE 5; 5; 5; 5 MG/1; MG/1; MG/1; MG/1
20 TABLET ORAL DAILY
Qty: 30 TABLET | Refills: 0 | Status: SHIPPED | OUTPATIENT
Start: 2023-11-19 | End: 2023-12-19

## 2023-10-19 RX ORDER — DEXTROAMPHETAMINE SACCHARATE, AMPHETAMINE ASPARTATE, DEXTROAMPHETAMINE SULFATE AND AMPHETAMINE SULFATE 5; 5; 5; 5 MG/1; MG/1; MG/1; MG/1
20 TABLET ORAL DAILY
Qty: 30 TABLET | Refills: 0 | Status: SHIPPED | OUTPATIENT
Start: 2023-12-20 | End: 2024-01-24

## 2023-10-19 RX ORDER — DEXTROAMPHETAMINE SACCHARATE, AMPHETAMINE ASPARTATE, DEXTROAMPHETAMINE SULFATE AND AMPHETAMINE SULFATE 5; 5; 5; 5 MG/1; MG/1; MG/1; MG/1
20 TABLET ORAL DAILY
Qty: 30 TABLET | Refills: 0 | Status: CANCELLED | OUTPATIENT
Start: 2023-10-19

## 2023-10-19 RX ORDER — DEXTROAMPHETAMINE SACCHARATE, AMPHETAMINE ASPARTATE, DEXTROAMPHETAMINE SULFATE AND AMPHETAMINE SULFATE 5; 5; 5; 5 MG/1; MG/1; MG/1; MG/1
20 TABLET ORAL DAILY
Qty: 30 TABLET | Refills: 0 | Status: SHIPPED | OUTPATIENT
Start: 2023-10-19 | End: 2023-11-18

## 2023-10-19 RX ORDER — DEXTROAMPHETAMINE SACCHARATE, AMPHETAMINE ASPARTATE MONOHYDRATE, DEXTROAMPHETAMINE SULFATE AND AMPHETAMINE SULFATE 5; 5; 5; 5 MG/1; MG/1; MG/1; MG/1
20 CAPSULE, EXTENDED RELEASE ORAL DAILY
Qty: 30 CAPSULE | Refills: 0 | Status: SHIPPED | OUTPATIENT
Start: 2023-11-19 | End: 2023-12-19

## 2023-10-19 RX ORDER — DEXTROAMPHETAMINE SACCHARATE, AMPHETAMINE ASPARTATE MONOHYDRATE, DEXTROAMPHETAMINE SULFATE AND AMPHETAMINE SULFATE 5; 5; 5; 5 MG/1; MG/1; MG/1; MG/1
20 CAPSULE, EXTENDED RELEASE ORAL DAILY
Qty: 30 CAPSULE | Refills: 0 | Status: SHIPPED | OUTPATIENT
Start: 2023-10-19 | End: 2023-11-18

## 2023-10-19 NOTE — PROGRESS NOTES
"Breanna is a 38 year old who is being evaluated via a billable video visit.      How would you like to obtain your AVS? MyChart  If the video visit is dropped, the invitation should be resent by: Text to cell phone: [phone]  Will anyone else be joining your video visit? No      Assessment & Plan     Attention deficit hyperactivity disorder (ADHD), combined type  Patient tolerating medication without side effects.  Dosing appears well tailored to her now.  Feels like this significantly improves her quality of life and ability to stay on task in her day-to-day.  Continue to monitor home blood pressures.  They have been stable.  Continue current dosing follow-up in 6 months  - amphetamine-dextroamphetamine (ADDERALL) 20 MG tablet  Dispense: 30 tablet; Refill: 0  - amphetamine-dextroamphetamine (ADDERALL) 20 MG tablet  Dispense: 30 tablet; Refill: 0  - amphetamine-dextroamphetamine (ADDERALL) 20 MG tablet  Dispense: 30 tablet; Refill: 0  - amphetamine-dextroamphetamine (ADDERALL XR) 20 MG 24 hr capsule  Dispense: 30 capsule; Refill: 0  - amphetamine-dextroamphetamine (ADDERALL XR) 20 MG 24 hr capsule  Dispense: 30 capsule; Refill: 0  - amphetamine-dextroamphetamine (ADDERALL XR) 20 MG 24 hr capsule  Dispense: 30 capsule; Refill: 0    Morbid obesity (H)  Patient following with weight loss clinic and has done very well with diet exercise and Wegovy.  Congratulated her on improvement and encouraged continued lifestyle changes        BMI:   Estimated body mass index is 34.36 kg/m  as calculated from the following:    Height as of 7/29/23: 1.676 m (5' 6\").    Weight as of 7/29/23: 96.6 kg (212 lb 14.4 oz).       Bradley Mcconnell MD  [facility]    Subjective   Breanna is a 38 year old, presenting for the following health issues:  Recheck Medication        10/19/2023    12:21 PM   Additional Questions   Roomed by Celso SMITH   Accompanied by Self         10/19/2023    12:21 PM   Patient Reported " Additional Medications   Patient reports taking the following new medications None       History of Present Illness       Reason for visit:  Medication Check    She eats 2-3 servings of fruits and vegetables daily.She consumes 1 sweetened beverage(s) daily.She exercises with enough effort to increase her heart rate 30 to 60 minutes per day.  She exercises with enough effort to increase her heart rate 7 days per week.   She is taking medications regularly.         Medication Followup of Adderall    Taking Medication as prescribed: yes -In the morning 20 MG ER and then at 1PM she takes a 20 MG instant release  Side Effects:  None  Medication Helping Symptoms:  yes        Review of Systems   Constitutional, HEENT, cardiovascular, pulmonary, gi and gu systems are negative, except as otherwise noted.      Objective           Vitals:  No vitals were obtained today due to virtual visit.    Physical Exam   GENERAL: Healthy, alert and no distress  EYES: Eyes grossly normal to inspection.  No discharge or erythema, or obvious scleral/conjunctival abnormalities.  RESP: No audible wheeze, cough, or visible cyanosis.  No visible retractions or increased work of breathing.    SKIN: Visible skin clear. No significant rash, abnormal pigmentation or lesions.  NEURO: Cranial nerves grossly intact.  Mentation and speech appropriate for age.  PSYCH: Mentation appears normal, affect normal/bright, judgement and insight intact, normal speech and appearance well-groomed.            Video-Visit Details    Type of service:  Video Visit     Originating Location (pt. Location): Home    Distant Location (provider location):  On-site  Platform used for Video Visit: [employer]

## 2023-10-20 ENCOUNTER — VIRTUAL VISIT (OUTPATIENT)
Dept: SURGERY | Facility: CLINIC | Age: 38
End: 2023-10-20
Payer: COMMERCIAL

## 2023-10-20 VITALS — HEIGHT: 66 IN | WEIGHT: 193.8 LBS | BODY MASS INDEX: 31.15 KG/M2

## 2023-10-20 DIAGNOSIS — R12 HEARTBURN: ICD-10-CM

## 2023-10-20 DIAGNOSIS — E66.812 CLASS 2 OBESITY DUE TO EXCESS CALORIES WITH BODY MASS INDEX (BMI) OF 35.0 TO 35.9 IN ADULT, UNSPECIFIED WHETHER SERIOUS COMORBIDITY PRESENT: ICD-10-CM

## 2023-10-20 DIAGNOSIS — E66.09 CLASS 1 OBESITY DUE TO EXCESS CALORIES WITHOUT SERIOUS COMORBIDITY WITH BODY MASS INDEX (BMI) OF 31.0 TO 31.9 IN ADULT: Primary | ICD-10-CM

## 2023-10-20 DIAGNOSIS — E66.811 CLASS 1 OBESITY DUE TO EXCESS CALORIES WITHOUT SERIOUS COMORBIDITY WITH BODY MASS INDEX (BMI) OF 31.0 TO 31.9 IN ADULT: Primary | ICD-10-CM

## 2023-10-20 DIAGNOSIS — E66.09 CLASS 2 OBESITY DUE TO EXCESS CALORIES WITH BODY MASS INDEX (BMI) OF 35.0 TO 35.9 IN ADULT, UNSPECIFIED WHETHER SERIOUS COMORBIDITY PRESENT: ICD-10-CM

## 2023-10-20 PROCEDURE — 99214 OFFICE O/P EST MOD 30 MIN: CPT | Mod: VID | Performed by: PHYSICIAN ASSISTANT

## 2023-10-20 RX ORDER — FAMOTIDINE 20 MG/1
20 TABLET, FILM COATED ORAL 2 TIMES DAILY PRN
Qty: 180 TABLET | Refills: 0 | Status: SHIPPED | OUTPATIENT
Start: 2023-10-20 | End: 2024-04-01

## 2023-10-20 NOTE — ASSESSMENT & PLAN NOTE
Patient was congratulated on wt loss success thus far. She is down 35 lbs since her last visit.  Healthy habits to assist with further weight loss were discussed. Breanna will continue Wegovy. Goals noted in pt instructions.     WEIGHT METRICS:  Body mass index is 31.28 kg/m .  Current Weight: 193 lb 12.8 oz (87.9 kg) (patient reported)  Last Visits Weight: 228 lb (103.4 kg)  Initial Weight (lbs): 218 lbs  Initial BMI 35  Cumulative weight loss (lbs): 24.2  Weight Loss Percentage: 11.1%

## 2023-10-20 NOTE — PATIENT INSTRUCTIONS
"To ensure quality you may receive a patient satisfaction survey. The greatest compliment you can give is \"Likely to Recommend.\"    Nice to talk with you today.  Thank you for your trust. Below is the plan discussed.-  FERNANDEZ Espinosa      Continue Wegovy 2.4 mg weekly.    Goals: Continue to meal prep and keep up with current daily exercise      FOLLOW-UP:  Please call [phone] to schedule your next visit in 3 mo.      "

## 2023-10-27 ENCOUNTER — VIRTUAL VISIT (OUTPATIENT)
Dept: NEUROLOGY | Facility: CLINIC | Age: 38
End: 2023-10-27
Payer: COMMERCIAL

## 2023-10-27 VITALS — WEIGHT: 212 LBS | BODY MASS INDEX: 34.07 KG/M2 | HEIGHT: 66 IN

## 2023-10-27 DIAGNOSIS — G43.709 CHRONIC MIGRAINE WITHOUT AURA WITHOUT STATUS MIGRAINOSUS, NOT INTRACTABLE: Primary | ICD-10-CM

## 2023-10-27 PROCEDURE — 99214 OFFICE O/P EST MOD 30 MIN: CPT | Mod: VID | Performed by: PSYCHIATRY & NEUROLOGY

## 2023-10-27 RX ORDER — PROCHLORPERAZINE MALEATE 10 MG
10 TABLET ORAL EVERY 6 HOURS PRN
Qty: 15 TABLET | Refills: 11 | Status: SHIPPED | OUTPATIENT
Start: 2023-10-27

## 2023-10-27 RX ORDER — NAPROXEN 500 MG/1
500 TABLET ORAL 2 TIMES DAILY PRN
Qty: 28 TABLET | Refills: 11 | Status: SHIPPED | OUTPATIENT
Start: 2023-10-27

## 2023-10-27 ASSESSMENT — HEADACHE IMPACT TEST (HIT 6)
HOW OFTEN HAVE YOU FELT TOO TIRED TO WORK BECAUSE OF YOUR HEADACHES: ALWAYS
HOW OFTEN DO HEADACHES LIMIT YOUR DAILY ACTIVITIES: ALWAYS
HIT6 TOTAL SCORE: 73
HOW OFTEN HAVE YOU FELT FED UP OR IRRITATED BECAUSE OF YOUR HEADACHES: SOMETIMES
WHEN YOU HAVE HEADACHES HOW OFTEN IS THE PAIN SEVERE: VERY OFTEN
WHEN YOU HAVE A HEADACHE HOW OFTEN DO YOU WISH YOU COULD LIE DOWN: ALWAYS
HOW OFTEN DID HEADACHS LIMIT CONCENTRATION ON WORK OR DAILY ACTIVITY: ALWAYS

## 2023-10-27 ASSESSMENT — PAIN SCALES - GENERAL: PAINLEVEL: NO PAIN (0)

## 2023-10-27 NOTE — PROGRESS NOTES
"Virtual Visit Details    Type of service:  Video Visit     Originating Location (pt. Location): Home    Distant Location (provider location):  On-site  Platform used for Video Visit: [facility]    Headache Neurology Progress Note  October 27, 2023    Subjective:    Breanna Luna returns for follow up of chronic migraine. No change in headache quality.    Prior to initiation of botulinum toxin injections, Breanna reported 20 headache days per month, with 17 severe headache days per month. She frequently required ER care for severe migraine attacks.     Her last round of botulinum toxin injections was on 9/29/23. She has noticed a wearing off phenomenon with botulinum toxin injections, wearing off at 9 weeks. CGRP inhibitors have been helpful to cover this gap.    She takes Qulipta daily currently. She doesn't think this is working. Ajovy and Emgality are painful, and she has difficultly self-injecting, but they were much more helpful.     Breanna reports 9/30 headache days per month currently, with 0/30 severe headache days per month, when the Botox is working. When Botox wears off, it increases to 17 severe headache days per month.    For acute treatment, she takes Nurtec daily as needed. She adds on naproxen, prochlorperazine, and Benadryl as needed. For attacks lasting longer than 3 days, a Medrol dosepak has been helpful previously.    She has not had any ER visits since starting Botox.    She has attempted other migraine prophylactic treatments in the past, which have included:  Nortriptyline 20 mg PM was not helpful.  Gabapentin was not helpful.  Propranolol 40 mg TID was not helpful.  Topiramate was not helpful.  Emgality was painful.   Ajovy was helpful.  Quipta is not helping yet.    She is taking Wegovy for weight loss.    Objective:    Vitals: Ht 1.676 m (5' 5.98\")   Wt 96.2 kg (212 lb)   LMP 10/03/2023 (Approximate)   BMI 34.23 kg/m    General: Cooperative, " NAD  Neurologic:  Mental Status: Fully alert, attentive and oriented. Speech clear and fluent.   Cranial Nerves: Facial movements symmetric.   Motor: No abnormal movements.      Pertinent Investigations:    Most recent MRI brain was in 2022.    Assessment/Plan:   Breanna Luna is a 38 year old woman who returns for follow-up of chronic migraine with possible sensory aura. Headaches are well controlled for 9 weeks with botulinum toxin injections, and she recently trialed Qulipta to try to optimize treatment further.  Unfortunately, this was not effective.  She continued to have 17 severe headache days a month after botulinum toxin injections wore off.  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    We reviewed her symptomatic treatment strategy:    -For acute treatment, I recommend naproxen 500 mg twice a day as needed, not to exceed more than 14 days/month to avoid medication overuse.  - For acute treatment of moderate to severe headache, I recommend Nurtec 75 mg oral dissolvable tablet daily as needed.   -For headache related nausea, or as a rescue medicine for headache, I recommend prochlorperazine 10 mg as needed, with or without diphenhydramine 25 mg.  -She has previously tried sumatriptan, rizatriptan, and naratriptan without benefit, and with significant side effects to sumatriptan.    - If she does not respond to the above, we discussed using a Medrol Dosepak as a temporizing measure. This cannot be used more than once every 3 months to avoid long-term side effects.  - Other option could include ketorolac IM injection in the clinic; she would need to contact the clinic during regular business hours to schedule a nurse visit for this.     She will continue botulinum toxin injections for headache prevention.   -I recommend she restart Emgality subcutaneous injection every 28 days, starting with a loading dose.  We will attempt to get preauthorization.    I will plan to see her  back for botulinum toxin injections, and yearly for follow-up visit.    Cara Suraez MD  Neurology

## 2023-10-27 NOTE — NURSING NOTE
Is the patient currently in the state of [place]? YES    Visit mode:VIDEO    If the visit is dropped, the patient can be reconnected by: VIDEO VISIT: Text to cell phone:   Telephone Information:   Mobile [phone]       Will anyone else be joining the visit? NO  (If patient encounters technical issues they should call [phone])    How would you like to obtain your AVS? MyChart    Are changes needed to the allergy or medication list? No    Reason for visit: Follow Up    Alisa ANDERSEN

## 2023-10-27 NOTE — LETTER
10/27/2023         RE: Breanna Luna  [address]  [place] [address]        Dear Colleague,    Thank you for referring your patient, Breanna Luna, to the [facility] NEUROLOGY [facility]. Please see a copy of my visit note below.    Virtual Visit Details    Type of service:  Video Visit     Originating Location (pt. Location): Home    Distant Location (provider location):  On-site  Platform used for Video Visit: [facility]    Headache Neurology Progress Note  October 27, 2023    Subjective:    Breanna Luna returns for follow up of chronic migraine. No change in headache quality.    Prior to initiation of botulinum toxin injections, Breanna reported 20 headache days per month, with 17 severe headache days per month. She frequently required ER care for severe migraine attacks.     Her last round of botulinum toxin injections was on 9/29/23. She has noticed a wearing off phenomenon with botulinum toxin injections, wearing off at 9 weeks. CGRP inhibitors have been helpful to cover this gap.    She takes Qulipta daily currently. She doesn't think this is working. Ajovy and Emgality are painful, and she has difficultly self-injecting, but they were much more helpful.     Breanna reports 9/30 headache days per month currently, with 0/30 severe headache days per month, when the Botox is working. When Botox wears off, it increases to 17 severe headache days per month.    For acute treatment, she takes Nurtec daily as needed. She adds on naproxen, prochlorperazine, and Benadryl as needed. For attacks lasting longer than 3 days, a Medrol dosepak has been helpful previously.    She has not had any ER visits since starting Botox.    She has attempted other migraine prophylactic treatments in the past, which have included:  Nortriptyline 20 mg PM was not helpful.  Gabapentin was not helpful.  Propranolol 40 mg TID was not helpful.  Topiramate was not helpful.  Emgality  "was painful.   Ajovy was helpful.  Quipta is not helping yet.    She is taking Wegovy for weight loss.    Objective:    Vitals: Ht 1.676 m (5' 5.98\")   Wt 96.2 kg (212 lb)   LMP 10/03/2023 (Approximate)   BMI 34.23 kg/m    General: Cooperative, NAD  Neurologic:  Mental Status: Fully alert, attentive and oriented. Speech clear and fluent.   Cranial Nerves: Facial movements symmetric.   Motor: No abnormal movements.      Pertinent Investigations:    Most recent MRI brain was in 2022.    Assessment/Plan:   Breanna Luna is a 38 year old woman who returns for follow-up of chronic migraine with possible sensory aura. Headaches are well controlled for 9 weeks with botulinum toxin injections, and she recently trialed Qulipta to try to optimize treatment further.  Unfortunately, this was not effective.  She continued to have 17 severe headache days a month after botulinum toxin injections wore off.  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    We reviewed her symptomatic treatment strategy:    -For acute treatment, I recommend naproxen 500 mg twice a day as needed, not to exceed more than 14 days/month to avoid medication overuse.  - For acute treatment of moderate to severe headache, I recommend Nurtec 75 mg oral dissolvable tablet daily as needed.   -For headache related nausea, or as a rescue medicine for headache, I recommend prochlorperazine 10 mg as needed, with or without diphenhydramine 25 mg.  -She has previously tried sumatriptan, rizatriptan, and naratriptan without benefit, and with significant side effects to sumatriptan.    - If she does not respond to the above, we discussed using a Medrol Dosepak as a temporizing measure. This cannot be used more than once every 3 months to avoid long-term side effects.  - Other option could include ketorolac IM injection in the clinic; she would need to contact the clinic during regular business hours to schedule a nurse visit for " this.     She will continue botulinum toxin injections for headache prevention.   -I recommend she restart Emgality subcutaneous injection every 28 days, starting with a loading dose.  We will attempt to get preauthorization.    I will plan to see her back for botulinum toxin injections, and yearly for follow-up visit.    Cara Suarez MD  Neurology       Again, thank you for allowing me to participate in the care of your patient.        Sincerely,        Cara Suarez MD

## 2023-11-13 ENCOUNTER — E-VISIT (OUTPATIENT)
Dept: URGENT CARE | Facility: URGENT CARE | Age: 38
End: 2023-11-13
Payer: COMMERCIAL

## 2023-11-13 DIAGNOSIS — N30.90 BLADDER INFECTION: Primary | ICD-10-CM

## 2023-11-13 PROCEDURE — 99421 OL DIG E/M SVC 5-10 MIN: CPT | Performed by: NURSE PRACTITIONER

## 2023-11-13 RX ORDER — NITROFURANTOIN 25; 75 MG/1; MG/1
100 CAPSULE ORAL 2 TIMES DAILY
Qty: 10 CAPSULE | Refills: 0 | Status: SHIPPED | OUTPATIENT
Start: 2023-11-13 | End: 2023-11-18

## 2023-11-13 NOTE — PATIENT INSTRUCTIONS
Dear Breanna Luna    After reviewing your responses, I've been able to diagnose you with a urinary tract infection, which is a common infection of the bladder with bacteria.  This is not a sexually transmitted infection, though urinating immediately after intercourse can help prevent infections.  Drinking lots of fluids is also helpful to clear your current infection and prevent the next one.      I have sent a prescription for antibiotics to your pharmacy to treat this infection.    It is important that you take all of your prescribed medication even if your symptoms are improving after a few doses.  Taking all of your medicine helps prevent the symptoms from returning.     If your symptoms worsen, you develop pain in your back or stomach, develop fevers, or are not improving in 5 days, please contact your primary care provider for an appointment or visit any of our convenient Walk-in or Urgent Care Centers to be seen, which can be found on our website here.    Thanks again for choosing us as your health care partner,    Nanette Son NP

## 2023-11-22 ENCOUNTER — E-VISIT (OUTPATIENT)
Dept: URGENT CARE | Facility: CLINIC | Age: 38
End: 2023-11-22
Payer: COMMERCIAL

## 2023-11-22 DIAGNOSIS — R30.0 DYSURIA: Primary | ICD-10-CM

## 2023-11-22 PROCEDURE — 99421 OL DIG E/M SVC 5-10 MIN: CPT | Performed by: FAMILY MEDICINE

## 2023-11-22 NOTE — PATIENT INSTRUCTIONS
Dear Breanna Luna,     After reviewing your responses, I would like you to come in for a urine test to make sure we treat you correctly. This urine test is to evaluate you for a possible urinary tract infection, and should be scheduled for today or tomorrow. Schedule a Lab Only appointment here.     Lab appointments are not available at most locations on the weekends. If no Lab Only appointment is available, you should be seen in any of our convenient Walk-in or Urgent Care Centers, which can be found on our website here.     You will receive instructions with your results in [facility] once they are available.     If your symptoms worsen, you develop pain in your back or stomach, develop fevers, or are not improving in 5 days, please contact your primary care provider for an appointment or visit a Walk-in or Urgent Care Center to be seen.     Thanks again for choosing us as your health care partner,     MADELINE JOINER CNP

## 2023-11-23 ENCOUNTER — LAB (OUTPATIENT)
Dept: LAB | Facility: CLINIC | Age: 38
End: 2023-11-23
Attending: FAMILY MEDICINE
Payer: COMMERCIAL

## 2023-11-23 DIAGNOSIS — R30.0 DYSURIA: ICD-10-CM

## 2023-11-23 DIAGNOSIS — N30.00 ACUTE CYSTITIS WITHOUT HEMATURIA: Primary | ICD-10-CM

## 2023-11-23 LAB
ALBUMIN UR-MCNC: NEGATIVE MG/DL
APPEARANCE UR: ABNORMAL
BACTERIA #/AREA URNS HPF: ABNORMAL /HPF
BILIRUB UR QL STRIP: NEGATIVE
COLOR UR AUTO: YELLOW
GLUCOSE UR STRIP-MCNC: NEGATIVE MG/DL
HGB UR QL STRIP: NEGATIVE
KETONES UR STRIP-MCNC: NEGATIVE MG/DL
LEUKOCYTE ESTERASE UR QL STRIP: ABNORMAL
MUCOUS THREADS #/AREA URNS LPF: PRESENT /LPF
NITRATE UR QL: POSITIVE
PH UR STRIP: 5 [PH] (ref 5–7)
RBC URINE: 4 /HPF
SP GR UR STRIP: 1.02 (ref 1–1.03)
SQUAMOUS EPITHELIAL: 3 /HPF
UROBILINOGEN UR STRIP-MCNC: NORMAL MG/DL
WBC URINE: 108 /HPF

## 2023-11-23 PROCEDURE — 87086 URINE CULTURE/COLONY COUNT: CPT

## 2023-11-23 PROCEDURE — 87088 URINE BACTERIA CULTURE: CPT

## 2023-11-23 PROCEDURE — 81001 URINALYSIS AUTO W/SCOPE: CPT

## 2023-11-23 PROCEDURE — 87186 SC STD MICRODIL/AGAR DIL: CPT

## 2023-11-23 RX ORDER — NITROFURANTOIN 25; 75 MG/1; MG/1
100 CAPSULE ORAL 2 TIMES DAILY
Qty: 10 CAPSULE | Refills: 0 | Status: SHIPPED | OUTPATIENT
Start: 2023-11-23 | End: 2023-11-28

## 2023-11-24 DIAGNOSIS — N30.00 ACUTE CYSTITIS WITHOUT HEMATURIA: Primary | ICD-10-CM

## 2023-11-24 LAB — BACTERIA UR CULT: ABNORMAL

## 2023-11-24 RX ORDER — CEPHALEXIN 500 MG/1
500 CAPSULE ORAL 2 TIMES DAILY
Qty: 14 CAPSULE | Refills: 0 | Status: SHIPPED | OUTPATIENT
Start: 2023-11-24 | End: 2023-12-01

## 2023-11-27 ENCOUNTER — TELEPHONE (OUTPATIENT)
Dept: FAMILY MEDICINE | Facility: CLINIC | Age: 38
End: 2023-11-27
Payer: COMMERCIAL

## 2023-11-27 NOTE — TELEPHONE ENCOUNTER
Patient calling regarding her UTI results.  She states has Klebsiella pneumoniae growing.  She is wondering if this could be related to her umbilical hernia surgery done a few years ago.  She states had mesh placed.  I told her I didn't know the answer to that and would need to ask her provider.  She then states that she has had pain and swelling (bigger than size of golf ball) above her umbilicus.  Onset approx 1.5 weeks ago.  She states pain comes and goes; describes as sharp and intense.  Occurred this a.m. and lasted approx 3 hours.  Applied ice and took naproxen and pain subsided.  She feels may have intermittent fever but hasn't checked.  States does have chills.  I did advise her she needs to be seen in person for assessment of her symptoms.  Advised she be seen in urgent care today;  gave her [facility] options/hrs for [place] and [place] locations.  She is wanting the urgent care provider that did her EVISIT to advise.  I did inform her that EVISIT was for the uti and not hernia or abdominal concerns.  She states  will mychart the urgent care provider.  I did again instruct her to be seen urgent care.  Roz BALBUENA RN

## 2023-11-28 ENCOUNTER — OFFICE VISIT (OUTPATIENT)
Dept: PEDIATRICS | Facility: CLINIC | Age: 38
End: 2023-11-28
Payer: COMMERCIAL

## 2023-11-28 ENCOUNTER — ANCILLARY PROCEDURE (OUTPATIENT)
Dept: CT IMAGING | Facility: CLINIC | Age: 38
End: 2023-11-28
Attending: NURSE PRACTITIONER
Payer: COMMERCIAL

## 2023-11-28 ENCOUNTER — VIRTUAL VISIT (OUTPATIENT)
Dept: FAMILY MEDICINE | Facility: CLINIC | Age: 38
End: 2023-11-28
Payer: COMMERCIAL

## 2023-11-28 ENCOUNTER — TELEPHONE (OUTPATIENT)
Dept: PEDIATRICS | Facility: CLINIC | Age: 38
End: 2023-11-28

## 2023-11-28 VITALS
DIASTOLIC BLOOD PRESSURE: 82 MMHG | RESPIRATION RATE: 12 BRPM | HEART RATE: 88 BPM | WEIGHT: 200 LBS | BODY MASS INDEX: 32.14 KG/M2 | OXYGEN SATURATION: 100 % | HEIGHT: 66 IN | SYSTOLIC BLOOD PRESSURE: 106 MMHG | TEMPERATURE: 97.3 F

## 2023-11-28 DIAGNOSIS — R10.9 BILATERAL FLANK PAIN: ICD-10-CM

## 2023-11-28 DIAGNOSIS — N30.01 ACUTE CYSTITIS WITH HEMATURIA: Primary | ICD-10-CM

## 2023-11-28 DIAGNOSIS — R10.12 LUQ ABDOMINAL PAIN: ICD-10-CM

## 2023-11-28 DIAGNOSIS — R10.84 ABDOMINAL PAIN, GENERALIZED: ICD-10-CM

## 2023-11-28 DIAGNOSIS — Z98.890 HISTORY OF INCISIONAL HERNIA REPAIR: ICD-10-CM

## 2023-11-28 DIAGNOSIS — R10.11 RUQ ABDOMINAL PAIN: ICD-10-CM

## 2023-11-28 DIAGNOSIS — R10.9 FLANK PAIN: Primary | ICD-10-CM

## 2023-11-28 DIAGNOSIS — R19.05 PERIUMBILICAL MASS: ICD-10-CM

## 2023-11-28 DIAGNOSIS — M62.08 DIASTASIS RECTI: ICD-10-CM

## 2023-11-28 DIAGNOSIS — Z87.19 HISTORY OF INCISIONAL HERNIA REPAIR: ICD-10-CM

## 2023-11-28 DIAGNOSIS — R11.0 NAUSEA: ICD-10-CM

## 2023-11-28 PROBLEM — N39.46 MIXED INCONTINENCE: Status: RESOLVED | Noted: 2017-03-03 | Resolved: 2023-11-28

## 2023-11-28 PROBLEM — J35.01 CHRONIC TONSILLITIS: Status: RESOLVED | Noted: 2022-05-18 | Resolved: 2023-11-28

## 2023-11-28 PROBLEM — J34.2 NASAL SEPTAL DEVIATION: Status: RESOLVED | Noted: 2022-05-02 | Resolved: 2023-11-28

## 2023-11-28 LAB
ALBUMIN SERPL BCG-MCNC: 4.6 G/DL (ref 3.5–5.2)
ALP SERPL-CCNC: 48 U/L (ref 40–150)
ALT SERPL W P-5'-P-CCNC: 12 U/L (ref 0–50)
ANION GAP SERPL CALCULATED.3IONS-SCNC: 9 MMOL/L (ref 7–15)
AST SERPL W P-5'-P-CCNC: 19 U/L (ref 0–45)
BASOPHILS # BLD AUTO: 0 10E3/UL (ref 0–0.2)
BASOPHILS NFR BLD AUTO: 0 %
BILIRUB SERPL-MCNC: 0.7 MG/DL
BUN SERPL-MCNC: 11.3 MG/DL (ref 6–20)
CALCIUM SERPL-MCNC: 9.5 MG/DL (ref 8.6–10)
CHLORIDE SERPL-SCNC: 104 MMOL/L (ref 98–107)
CREAT SERPL-MCNC: 0.66 MG/DL (ref 0.51–0.95)
DEPRECATED HCO3 PLAS-SCNC: 26 MMOL/L (ref 22–29)
EGFRCR SERPLBLD CKD-EPI 2021: >90 ML/MIN/1.73M2
EOSINOPHIL # BLD AUTO: 0.1 10E3/UL (ref 0–0.7)
EOSINOPHIL NFR BLD AUTO: 1 %
ERYTHROCYTE [DISTWIDTH] IN BLOOD BY AUTOMATED COUNT: 12.9 % (ref 10–15)
ERYTHROCYTE [SEDIMENTATION RATE] IN BLOOD BY WESTERGREN METHOD: 1 MM/HR (ref 0–20)
GLUCOSE SERPL-MCNC: 98 MG/DL (ref 70–99)
HCG UR QL: NEGATIVE
HCT VFR BLD AUTO: 40.8 % (ref 35–47)
HGB BLD-MCNC: 14.4 G/DL (ref 11.7–15.7)
IMM GRANULOCYTES # BLD: 0 10E3/UL
IMM GRANULOCYTES NFR BLD: 1 %
LYMPHOCYTES # BLD AUTO: 2 10E3/UL (ref 0.8–5.3)
LYMPHOCYTES NFR BLD AUTO: 27 %
MCH RBC QN AUTO: 30.7 PG (ref 26.5–33)
MCHC RBC AUTO-ENTMCNC: 35.3 G/DL (ref 31.5–36.5)
MCV RBC AUTO: 87 FL (ref 78–100)
MONOCYTES # BLD AUTO: 0.3 10E3/UL (ref 0–1.3)
MONOCYTES NFR BLD AUTO: 4 %
NEUTROPHILS # BLD AUTO: 4.8 10E3/UL (ref 1.6–8.3)
NEUTROPHILS NFR BLD AUTO: 67 %
NRBC # BLD AUTO: 0 10E3/UL
NRBC BLD AUTO-RTO: 0 /100
PLATELET # BLD AUTO: 262 10E3/UL (ref 150–450)
POTASSIUM SERPL-SCNC: 4.1 MMOL/L (ref 3.4–5.3)
PROT SERPL-MCNC: 7.2 G/DL (ref 6.4–8.3)
RBC # BLD AUTO: 4.69 10E6/UL (ref 3.8–5.2)
SODIUM SERPL-SCNC: 139 MMOL/L (ref 135–145)
WBC # BLD AUTO: 7.2 10E3/UL (ref 4–11)

## 2023-11-28 PROCEDURE — 74177 CT ABD & PELVIS W/CONTRAST: CPT | Mod: GC | Performed by: RADIOLOGY

## 2023-11-28 PROCEDURE — 36415 COLL VENOUS BLD VENIPUNCTURE: CPT | Performed by: NURSE PRACTITIONER

## 2023-11-28 PROCEDURE — 99207 REFERRAL TO ACUTE AND DIAGNOSTIC SERVICES: CPT | Mod: VID | Performed by: FAMILY MEDICINE

## 2023-11-28 PROCEDURE — 81025 URINE PREGNANCY TEST: CPT | Performed by: NURSE PRACTITIONER

## 2023-11-28 PROCEDURE — 80053 COMPREHEN METABOLIC PANEL: CPT | Performed by: NURSE PRACTITIONER

## 2023-11-28 PROCEDURE — 85652 RBC SED RATE AUTOMATED: CPT | Performed by: NURSE PRACTITIONER

## 2023-11-28 PROCEDURE — 96372 THER/PROPH/DIAG INJ SC/IM: CPT | Performed by: NURSE PRACTITIONER

## 2023-11-28 PROCEDURE — 99215 OFFICE O/P EST HI 40 MIN: CPT | Mod: 25 | Performed by: NURSE PRACTITIONER

## 2023-11-28 PROCEDURE — 85025 COMPLETE CBC W/AUTO DIFF WBC: CPT | Performed by: NURSE PRACTITIONER

## 2023-11-28 RX ORDER — IOPAMIDOL 755 MG/ML
111 INJECTION, SOLUTION INTRAVASCULAR ONCE
Status: COMPLETED | OUTPATIENT
Start: 2023-11-28 | End: 2023-11-28

## 2023-11-28 RX ORDER — CEFTRIAXONE SODIUM 1 G
1 VIAL (EA) INJECTION ONCE
Status: COMPLETED | OUTPATIENT
Start: 2023-11-28 | End: 2023-11-28

## 2023-11-28 RX ORDER — COVID-19 MOLECULAR TEST ASSAY
KIT MISCELLANEOUS
COMMUNITY
Start: 2023-02-18 | End: 2024-04-01

## 2023-11-28 RX ORDER — ONDANSETRON 4 MG/1
4 TABLET, ORALLY DISINTEGRATING ORAL
Status: ACTIVE | OUTPATIENT
Start: 2023-11-28 | End: 2023-11-29

## 2023-11-28 RX ADMIN — IOPAMIDOL 111 ML: 755 INJECTION, SOLUTION INTRAVASCULAR at 14:36

## 2023-11-28 RX ADMIN — Medication 1 G: at 14:50

## 2023-11-28 RX ADMIN — ONDANSETRON 4 MG: 4 TABLET, ORALLY DISINTEGRATING ORAL at 14:49

## 2023-11-28 ASSESSMENT — PAIN SCALES - GENERAL: PAINLEVEL: EXTREME PAIN (8)

## 2023-11-28 NOTE — NURSING NOTE
Clinic Administered Medication Documentation    Patient was given Zofran ODT 4mg . Prior to medication administration, verified patient's identity using patient's name and date of birth.    Alem Mireles RN    Clinic Administered Medication Documentation        Patient was given Ceftriaxone in NS 1 g IM. Prior to medication administration, verified patient's identity using patient s name and date of birth. Please see MAR and medication order for additional information. Patient instructed to remain in clinic for 15 minutes and report any adverse reaction to staff immediately.    Vial/Syringe: Single dose vial. Was entire vial of medication used? Yes  [id number]}

## 2023-11-28 NOTE — NURSING NOTE
Unsuccessful IV attempts x2 on right antecubital area and unsuccessful x1  on left antecubital vein for blood work by Alem Mireles RN.     Unsuccessful IV attempts x1 on left upper forearm and right hand by [place] paramedic.     Patient okay with continuing IV attempts for CT order. Imaging team notified for further IV insertion.     SHILOH Ferrara  [facility]

## 2023-11-28 NOTE — PROGRESS NOTES
Assessment & Plan     Acute cystitis with hematuria  Patient treated with rocephin 1 gm IM in clinic will continue Keflex at home. Labs reassuring. Patient a very difficult IV and lab draw.    - CBC with platelets differential  - Comprehensive metabolic panel  - Erythrocyte sedimentation rate auto  - CT Abdomen Pelvis w/o & w Contrast  - HCG qualitative urine  - IV access  - sodium chloride (PF) 0.9% PF flush 3 mL  - CT Abdomen Pelvis w Contrast  - cefTRIAXone (ROCEPHIN) in NS for IM administration 1 g    Diastasis recti  Discussed symptoms related to muscle separation in recti area no hernia seen on scan per radiology read. Recommend she continue to wear abdominal binder with exertive exercise. PT referral placed she will follow up with her PCP as well.   - Physical Therapy Referral; Future    Periumbilical mass  See above  - CBC with platelets differential  - Comprehensive metabolic panel  - Erythrocyte sedimentation rate auto  - CT Abdomen Pelvis w/o & w Contrast  - HCG qualitative urine  - IV access  - sodium chloride (PF) 0.9% PF flush 3 mL  - CT Abdomen Pelvis w Contrast    Nausea  Improved with zofran.   - ondansetron (ZOFRAN ODT) ODT tab 4 mg    Abdominal pain, generalized    - CBC with platelets differential  - Comprehensive metabolic panel  - Erythrocyte sedimentation rate auto  - CT Abdomen Pelvis w/o & w Contrast  - HCG qualitative urine  - IV access  - sodium chloride (PF) 0.9% PF flush 3 mL  - CT Abdomen Pelvis w Contrast    RUQ abdominal pain    - CBC with platelets differential  - Comprehensive metabolic panel  - Erythrocyte sedimentation rate auto  - CT Abdomen Pelvis w/o & w Contrast  - HCG qualitative urine  - IV access  - sodium chloride (PF) 0.9% PF flush 3 mL  - CT Abdomen Pelvis w Contrast    LUQ abdominal pain    - CBC with platelets differential  - Comprehensive metabolic panel  - Erythrocyte sedimentation rate auto  - CT Abdomen Pelvis w/o & w Contrast  - HCG qualitative urine  - IV  access  - sodium chloride (PF) 0.9% PF flush 3 mL  - CT Abdomen Pelvis w Contrast    Bilateral flank pain    - CBC with platelets differential  - Comprehensive metabolic panel  - Erythrocyte sedimentation rate auto  - CT Abdomen Pelvis w/o & w Contrast  - HCG qualitative urine  - IV access  - sodium chloride (PF) 0.9% PF flush 3 mL  - CT Abdomen Pelvis w Contrast    History of incisional hernia repair    - CBC with platelets differential  - Comprehensive metabolic panel  - Erythrocyte sedimentation rate auto  - CT Abdomen Pelvis w/o & w Contrast  - HCG qualitative urine  - IV access  - sodium chloride (PF) 0.9% PF flush 3 mL  - CT Abdomen Pelvis w Contrast    Labs and imaging reassuring will continue keflex and follow up with PT as well as her primary provider for pain related to diasisis recti we discussed red flag symptoms that would warrant emergent evaluation  Patient verbalized understanding stable and pain improved upon discharge.     50 minutes spent on the date of the encounter doing chart review, review of outside records, review of test results, interpretation of tests, patient visit, and documentation              MADELINE Santacruz [facility]    Bailey Carlos is a 38 year old, presenting for the following health issues:  Abdominal Pain (Middle of abdomen, low back pain started 5 to 7 days ago )      HPI     Genitourinary - Female  Onset/Duration: 5 - 7 days ago   Description:   Painful urination (Dysuria): YES           Frequency: YES  Blood in urine (Hematuria): YES  Delay in urine (Hesitency): YES  Intensity: moderate -  Progression of Symptoms:  worsening  Accompanying Signs & Symptoms:  Fever/chills: YES -   Flank pain: YES- Both sides, but worse on Right Side   Nausea and vomiting: YES  Vaginal symptoms: odor  Abdominal/Pelvic Pain: YES- Middle of abdomen   History:   History of frequent UTI s: YES  History of kidney stones: No  Sexually Active:  "YES  Possibility of pregnancy: No  Precipitating or alleviating factors: - has been trying to flush out system   Therapies tried and outcome: Increase fluid intake and OTC advil or tylenol, now taking Keflex (on day 2 of series)    Patient with recent history of UTI was treated with Macrobid with resistance per urine culture antibiotic changes subsequently to Keflex patient started this last evening. States she has been having chills and malaise. Also complaining of abdominal pain generalized with bilateral flank pain. Has not checked temp at home is afebrile in clinic however has been taking tylenol and ibuprofen.  She also notes recent weight loss and has been working out more to facilitate this she states she had an incisional hernia repair about 3 years ago feels she may have symptoms of this again as she is having pain in the periumbilical area and has noticed a lump.     Denies changes to BM's, is having dysuria has been increasing fluids. Mild nausea denies vomiting    Review of Systems   Constitutional, HEENT, cardiovascular, pulmonary, gi and gu systems are negative, except as otherwise noted.      Objective    /82 (BP Location: Right arm, Patient Position: Sitting, Cuff Size: Adult Large)   Pulse 88   Temp 97.3  F (36.3  C) (Oral)   Resp 12   Ht 1.676 m (5' 6\")   Wt 90.7 kg (200 lb)   LMP 10/03/2023 (Approximate)   SpO2 100%   BMI 32.28 kg/m    Body mass index is 32.28 kg/m .  Physical Exam   GENERAL: healthy, alert, moderate distress, and fatigued  EYES: Eyes grossly normal to inspection, PERRL and conjunctivae and sclerae normal  HENT: ear canals and TM's normal, nose and mouth without ulcers or lesions  NECK: no adenopathy, no asymmetry, masses, or scars and thyroid normal to palpation  RESP: lungs clear to auscultation - no rales, rhonchi or wheezes  CV: regular rate and rhythm, normal S1 S2, no S3 or S4, no murmur, click or rub, no peripheral edema and peripheral pulses strong  ABDOMEN: " tenderness RUQ, LUQ, umbilical, upper midline, and lower midline, liver span normal to percussion, bowel sounds normal, mass proximal to umbilicus, no bruits heard, and no palpable renal abnormalities   MS: low back pain with flexion and extension negative CVA tenderness with palpation  SKIN: no suspicious lesions or rashes  NEURO: Normal strength and tone, mentation intact and speech normal  LYMPH: no cervical, supraclavicular, axillary, or inguinal adenopathy    Results for orders placed or performed in visit on 11/28/23   CT Abdomen Pelvis w Contrast     Status: None    Narrative    EXAMINATION: CT ABDOMEN PELVIS W CONTRAST, 11/28/2023 2:41 PM    INDICATION: Abdominal pain, generalized; RUQ abdominal pain; LUQ  abdominal pain; Periumbilical mass; Acute cystitis with hematuria;  Bilateral flank pain; History of incisional hernia repair; History of  incisional hernia repair    COMPARISON STUDY: Renal MR 12/16/2022. CT abdomen and pelvis  2/16/2021.    TECHNIQUE: CT scan of the abdomen and pelvis was performed on  multidetector CT scanner using volumetric acquisition technique and  images were reconstructed in multiple planes with variable thickness  and reviewed on dedicated workstations.     CONTRAST: 111 ML ISOVIEW 370 injected IV without oral contrast    CT scan radiation dose is optimized to minimum requisite dose using  automated dose modulation techniques.    FINDINGS:    Lower thorax: Normal.    Liver: No mass. No intrahepatic biliary ductal dilation.    Biliary System: Cholecystectomy.. No extrahepatic biliary ductal  dilation.    Pancreas: No mass or pancreatic ductal dilation.    Adrenal glands: No mass or nodules    Spleen: Splenomegaly measuring 13.0 cm.    Kidneys: No suspicious mass, obstructing calculus or hydronephrosis.   2 mm nonobstructing left lower pole stone.    Gastrointestinal tract :Cecum is located in the right upper quadrant.  Moderate stool present. Appendix is not identified, however  no  inflammatory change in the right lower quadrant to suggest  appendicitis. Normal caliber small bowel.    Mesentery/peritoneum/retroperitoneum: No mass. No free fluid or air.   Diastases recti predominantly superior to the umbilicus measuring up  to 6.2 cm.    Lymph nodes: No significant lymphadenopathy.    Vasculature: Patent major abdominal vasculature.    Pelvis: Urinary bladder is normal.  Heterogenous appearance of the  uterus.    Osseous structures: No aggressive or acute osseous lesion.      Soft tissues: Within normal limits.      Impression    IMPRESSION:   1. No acute intra-abdominal pathology.  2. Moderate rectus diastasis with anterior eventration.  3. Moderate stool burden.  4. Splenomegaly, unchanged  5. Tiny nonobstructing 2 mm left renal stone.  6. Heterogeneous uterine enhancement likely related to phase of  menstrual cycle.    I have personally reviewed the examination and initial interpretation  and I agree with the findings.    ADDIE HO MD         SYSTEM ID:  [id number]   Comprehensive metabolic panel     Status: Normal   Result Value Ref Range    Sodium 139 135 - 145 mmol/L    Potassium 4.1 3.4 - 5.3 mmol/L    Carbon Dioxide (CO2) 26 22 - 29 mmol/L    Anion Gap 9 7 - 15 mmol/L    Urea Nitrogen 11.3 6.0 - 20.0 mg/dL    Creatinine 0.66 0.51 - 0.95 mg/dL    GFR Estimate >90 >60 mL/min/1.73m2    Calcium 9.5 8.6 - 10.0 mg/dL    Chloride 104 98 - 107 mmol/L    Glucose 98 70 - 99 mg/dL    Alkaline Phosphatase 48 40 - 150 U/L    AST 19 0 - 45 U/L    ALT 12 0 - 50 U/L    Protein Total 7.2 6.4 - 8.3 g/dL    Albumin 4.6 3.5 - 5.2 g/dL    Bilirubin Total 0.7 <=1.2 mg/dL   Erythrocyte sedimentation rate auto     Status: Normal   Result Value Ref Range    Erythrocyte Sedimentation Rate 1 0 - 20 mm/hr   HCG qualitative urine     Status: Normal   Result Value Ref Range    hCG Urine Qualitative Negative Negative   CBC with platelets and differential     Status: None   Result Value Ref Range    WBC  Count 7.2 4.0 - 11.0 10e3/uL    RBC Count 4.69 3.80 - 5.20 10e6/uL    Hemoglobin 14.4 11.7 - 15.7 g/dL    Hematocrit 40.8 35.0 - 47.0 %    MCV 87 78 - 100 fL    MCH 30.7 26.5 - 33.0 pg    MCHC 35.3 31.5 - 36.5 g/dL    RDW 12.9 10.0 - 15.0 %    Platelet Count 262 150 - 450 10e3/uL    % Neutrophils 67 %    % Lymphocytes 27 %    % Monocytes 4 %    % Eosinophils 1 %    % Basophils 0 %    % Immature Granulocytes 1 %    NRBCs per 100 WBC 0 <1 /100    Absolute Neutrophils 4.8 1.6 - 8.3 10e3/uL    Absolute Lymphocytes 2.0 0.8 - 5.3 10e3/uL    Absolute Monocytes 0.3 0.0 - 1.3 10e3/uL    Absolute Eosinophils 0.1 0.0 - 0.7 10e3/uL    Absolute Basophils 0.0 0.0 - 0.2 10e3/uL    Absolute Immature Granulocytes 0.0 <=0.4 10e3/uL    Absolute NRBCs 0.0 10e3/uL   CBC with platelets differential     Status: None    Narrative    The following orders were created for panel order CBC with platelets differential.  Procedure                               Abnormality         Status                     ---------                               -----------         ------                     CBC with platelets and d...[[id number]]                      Final result                 Please view results for these tests on the individual orders.

## 2023-11-28 NOTE — PROGRESS NOTES
"Breanna is a 38 year old who is being evaluated via a billable video visit.      How would you like to obtain your AVS? MyChart  If the video visit is dropped, the invitation should be resent by: Text to cell phone: [phone]  Will anyone else be joining your video visit? No          Assessment & Plan     Flank pain  Suspect emerging pyelo needs labs and may need imaging   May need iv abx     Reviewed plan with the patient and she will await call from [facility] for instruction     - Referral to Acute and Diagnostic Services (Day of diagnostic / First order acute); Future             BMI:   Estimated body mass index is 34.23 kg/m  as calculated from the following:    Height as of 10/27/23: 1.676 m (5' 5.98\").    Weight as of 10/27/23: 96.2 kg (212 lb).           Allie Marquez MD  [facility]    Subjective   Breanna is a 38 year old, presenting for the following health issues:  Abdominal Pain        11/28/2023    10:25 AM   Additional Questions   Roomed by Dory FRENCH CMA       History of Present Illness       Reason for visit:  Abdominal pain  Symptom onset:  3-4 weeks ago  Symptoms include:  Abdominal pain, swelling, chills-having the swelling where her hernia was repaired a few years ago.  Currently being treated for a UTI, so not sure if the abdomen pain is related to that.  Symptom intensity:  Moderate  Symptom progression:  Worsening    She eats 2-3 servings of fruits and vegetables daily.She consumes 0 sweetened beverage(s) daily.She exercises with enough effort to increase her heart rate 30 to 60 minutes per day.  She exercises with enough effort to increase her heart rate 5 days per week.   She is taking medications regularly.       Pt with UTI diagnosed started on Macrobid but bug was resistant  Cultrue and sens back on 11/24 and abx switched but pt did not start new abx until last night  Over the last couple days has had back and flank pain   Chills and fever and nausea  Not able to " eat or drink much   Also having abd pain and bloating     Not really much for urinary sxs   Feels miserable today       Reviewed evisit and chart notes           Review of Systems   Constitutional, HEENT, cardiovascular, pulmonary, gi and gu systems are negative, except as otherwise noted.      Objective           Vitals:  No vitals were obtained today due to virtual visit.    Physical Exam   GENERAL: no distress, pale, and fatigued  EYES: Eyes grossly normal to inspection.  No discharge or erythema, or obvious scleral/conjunctival abnormalities.  RESP: No audible wheeze, cough, or visible cyanosis.  No visible retractions or increased work of breathing.    SKIN: Visible skin clear. No significant rash, abnormal pigmentation or lesions.  NEURO: Cranial nerves grossly intact.  Mentation and speech appropriate for age.  PSYCH: Mentation appears normal, affect normal/bright, judgement and insight intact, normal speech and appearance well-groomed.                Video-Visit Details    Type of service:  Video Visit     Originating Location (pt. Location): Home    Distant Location (provider location):  On-site  Platform used for Video Visit: [facility]    Referral to Acute and Diagnostic Services    [phone] ([place]) [place] [address] [address] [place] [id number]    Transition to Acute & Diagnostic Services Clinic has been discussed with patient, and she agrees with next level of care.   Patient understands that evaluation/treatment at [facility] typically takes significantly longer than in clinic/urgent care (>2 hours).  The [facility] Acute and Diagnostics Services Clinic has been contacted by provider/staff to confirm patient acceptance.         Special issues:      None                     The following provider has assessed this patient for intervention at [facility], and directed the patient for referral: Allie Marquez MD and discussed pt with provider on site at [place]

## 2023-11-28 NOTE — TELEPHONE ENCOUNTER
Patient called back and states can get to [facility] clinic within 30-45 minutes. Provided [facility] address to pt, advise patient to enter through east entrance and check in at desk C. Patient verbalized understanding.       SHILOH Ferrara  [facility]

## 2023-11-28 NOTE — TELEPHONE ENCOUNTER
Called patient to schedule ADS appointment for abdominal pain referral. 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  Per provider, advise patient the latest time to be seen today is 3 pm and if patient has fever/not feeling well or unable to come to ADS by that time for patient to go ER for further evaluation.  Patient states will call clinic back ASAP to confirm a time after calling sitter, provided patient with ADS number [phone].     SHILOH Ferrara  [facility]

## 2023-11-29 ENCOUNTER — APPOINTMENT (OUTPATIENT)
Dept: ULTRASOUND IMAGING | Facility: CLINIC | Age: 38
End: 2023-11-29
Attending: STUDENT IN AN ORGANIZED HEALTH CARE EDUCATION/TRAINING PROGRAM
Payer: COMMERCIAL

## 2023-11-29 ENCOUNTER — HOSPITAL ENCOUNTER (EMERGENCY)
Facility: CLINIC | Age: 38
Discharge: HOME | End: 2023-11-29
Attending: STUDENT IN AN ORGANIZED HEALTH CARE EDUCATION/TRAINING PROGRAM | Admitting: STUDENT IN AN ORGANIZED HEALTH CARE EDUCATION/TRAINING PROGRAM
Payer: COMMERCIAL

## 2023-11-29 ENCOUNTER — APPOINTMENT (OUTPATIENT)
Dept: CT IMAGING | Facility: CLINIC | Age: 38
End: 2023-11-29
Attending: STUDENT IN AN ORGANIZED HEALTH CARE EDUCATION/TRAINING PROGRAM
Payer: COMMERCIAL

## 2023-11-29 VITALS
TEMPERATURE: 98.1 F | SYSTOLIC BLOOD PRESSURE: 162 MMHG | RESPIRATION RATE: 16 BRPM | HEART RATE: 98 BPM | OXYGEN SATURATION: 99 % | DIASTOLIC BLOOD PRESSURE: 107 MMHG

## 2023-11-29 DIAGNOSIS — R11.0 NAUSEA: ICD-10-CM

## 2023-11-29 DIAGNOSIS — R10.9 ABDOMINAL DISCOMFORT: ICD-10-CM

## 2023-11-29 LAB
ALBUMIN SERPL BCG-MCNC: 4.4 G/DL (ref 3.5–5.2)
ALP SERPL-CCNC: 50 U/L (ref 40–150)
ALT SERPL W P-5'-P-CCNC: 19 U/L (ref 0–50)
ANION GAP SERPL CALCULATED.3IONS-SCNC: 12 MMOL/L (ref 7–15)
AST SERPL W P-5'-P-CCNC: 20 U/L (ref 0–45)
BASOPHILS # BLD AUTO: 0 10E3/UL (ref 0–0.2)
BASOPHILS NFR BLD AUTO: 0 %
BILIRUB SERPL-MCNC: 0.5 MG/DL
BUN SERPL-MCNC: 10.6 MG/DL (ref 6–20)
CALCIUM SERPL-MCNC: 9.8 MG/DL (ref 8.6–10)
CHLORIDE SERPL-SCNC: 105 MMOL/L (ref 98–107)
CREAT SERPL-MCNC: 0.69 MG/DL (ref 0.51–0.95)
CRP SERPL-MCNC: <3 MG/L
DEPRECATED HCO3 PLAS-SCNC: 22 MMOL/L (ref 22–29)
EGFRCR SERPLBLD CKD-EPI 2021: >90 ML/MIN/1.73M2
EOSINOPHIL # BLD AUTO: 0 10E3/UL (ref 0–0.7)
EOSINOPHIL NFR BLD AUTO: 0 %
ERYTHROCYTE [DISTWIDTH] IN BLOOD BY AUTOMATED COUNT: 12.8 % (ref 10–15)
GLUCOSE SERPL-MCNC: 89 MG/DL (ref 70–99)
HCT VFR BLD AUTO: 38.9 % (ref 35–47)
HGB BLD-MCNC: 13.8 G/DL (ref 11.7–15.7)
IMM GRANULOCYTES # BLD: 0 10E3/UL
IMM GRANULOCYTES NFR BLD: 0 %
LACTATE SERPL-SCNC: 0.9 MMOL/L (ref 0.7–2)
LIPASE SERPL-CCNC: 41 U/L (ref 13–60)
LYMPHOCYTES # BLD AUTO: 1.1 10E3/UL (ref 0.8–5.3)
LYMPHOCYTES NFR BLD AUTO: 10 %
MCH RBC QN AUTO: 30.5 PG (ref 26.5–33)
MCHC RBC AUTO-ENTMCNC: 35.5 G/DL (ref 31.5–36.5)
MCV RBC AUTO: 86 FL (ref 78–100)
MONOCYTES # BLD AUTO: 0.4 10E3/UL (ref 0–1.3)
MONOCYTES NFR BLD AUTO: 4 %
NEUTROPHILS # BLD AUTO: 8.8 10E3/UL (ref 1.6–8.3)
NEUTROPHILS NFR BLD AUTO: 86 %
NRBC # BLD AUTO: 0 10E3/UL
NRBC BLD AUTO-RTO: 0 /100
PLATELET # BLD AUTO: 263 10E3/UL (ref 150–450)
POTASSIUM SERPL-SCNC: 4.1 MMOL/L (ref 3.4–5.3)
PROT SERPL-MCNC: 7 G/DL (ref 6.4–8.3)
RBC # BLD AUTO: 4.53 10E6/UL (ref 3.8–5.2)
SODIUM SERPL-SCNC: 139 MMOL/L (ref 135–145)
WBC # BLD AUTO: 10.4 10E3/UL (ref 4–11)

## 2023-11-29 PROCEDURE — 93010 ELECTROCARDIOGRAM REPORT: CPT | Performed by: STUDENT IN AN ORGANIZED HEALTH CARE EDUCATION/TRAINING PROGRAM

## 2023-11-29 PROCEDURE — 96372 THER/PROPH/DIAG INJ SC/IM: CPT | Mod: 59 | Performed by: STUDENT IN AN ORGANIZED HEALTH CARE EDUCATION/TRAINING PROGRAM

## 2023-11-29 PROCEDURE — 93005 ELECTROCARDIOGRAM TRACING: CPT

## 2023-11-29 PROCEDURE — 99284 EMERGENCY DEPT VISIT MOD MDM: CPT | Mod: 25 | Performed by: STUDENT IN AN ORGANIZED HEALTH CARE EDUCATION/TRAINING PROGRAM

## 2023-11-29 PROCEDURE — 99285 EMERGENCY DEPT VISIT HI MDM: CPT | Mod: 25

## 2023-11-29 PROCEDURE — 96375 TX/PRO/DX INJ NEW DRUG ADDON: CPT

## 2023-11-29 PROCEDURE — 36415 COLL VENOUS BLD VENIPUNCTURE: CPT | Performed by: STUDENT IN AN ORGANIZED HEALTH CARE EDUCATION/TRAINING PROGRAM

## 2023-11-29 PROCEDURE — 96361 HYDRATE IV INFUSION ADD-ON: CPT

## 2023-11-29 PROCEDURE — 80053 COMPREHEN METABOLIC PANEL: CPT | Performed by: STUDENT IN AN ORGANIZED HEALTH CARE EDUCATION/TRAINING PROGRAM

## 2023-11-29 PROCEDURE — 250N000013 HC RX MED GY IP 250 OP 250 PS 637: Performed by: STUDENT IN AN ORGANIZED HEALTH CARE EDUCATION/TRAINING PROGRAM

## 2023-11-29 PROCEDURE — 85025 COMPLETE CBC W/AUTO DIFF WBC: CPT | Performed by: STUDENT IN AN ORGANIZED HEALTH CARE EDUCATION/TRAINING PROGRAM

## 2023-11-29 PROCEDURE — 76830 TRANSVAGINAL US NON-OB: CPT

## 2023-11-29 PROCEDURE — 250N000011 HC RX IP 250 OP 636: Performed by: STUDENT IN AN ORGANIZED HEALTH CARE EDUCATION/TRAINING PROGRAM

## 2023-11-29 PROCEDURE — 74176 CT ABD & PELVIS W/O CONTRAST: CPT

## 2023-11-29 PROCEDURE — 83690 ASSAY OF LIPASE: CPT | Performed by: STUDENT IN AN ORGANIZED HEALTH CARE EDUCATION/TRAINING PROGRAM

## 2023-11-29 PROCEDURE — 83605 ASSAY OF LACTIC ACID: CPT | Performed by: STUDENT IN AN ORGANIZED HEALTH CARE EDUCATION/TRAINING PROGRAM

## 2023-11-29 PROCEDURE — 86140 C-REACTIVE PROTEIN: CPT | Performed by: STUDENT IN AN ORGANIZED HEALTH CARE EDUCATION/TRAINING PROGRAM

## 2023-11-29 PROCEDURE — 258N000003 HC RX IP 258 OP 636: Performed by: STUDENT IN AN ORGANIZED HEALTH CARE EDUCATION/TRAINING PROGRAM

## 2023-11-29 PROCEDURE — 96374 THER/PROPH/DIAG INJ IV PUSH: CPT

## 2023-11-29 RX ORDER — ONDANSETRON 4 MG/1
4 TABLET, ORALLY DISINTEGRATING ORAL ONCE
Status: COMPLETED | OUTPATIENT
Start: 2023-11-29 | End: 2023-11-29

## 2023-11-29 RX ORDER — HYDROCODONE BITARTRATE AND ACETAMINOPHEN 5; 325 MG/1; MG/1
1 TABLET ORAL EVERY 6 HOURS PRN
Qty: 6 TABLET | Refills: 0 | Status: SHIPPED | OUTPATIENT
Start: 2023-11-29 | End: 2024-04-01

## 2023-11-29 RX ORDER — ONDANSETRON 4 MG/1
4 TABLET, ORALLY DISINTEGRATING ORAL EVERY 8 HOURS PRN
Qty: 10 TABLET | Refills: 0 | Status: SHIPPED | OUTPATIENT
Start: 2023-11-29 | End: 2023-12-02

## 2023-11-29 RX ORDER — PHENAZOPYRIDINE HYDROCHLORIDE 100 MG/1
200 TABLET, FILM COATED ORAL ONCE
Status: COMPLETED | OUTPATIENT
Start: 2023-11-29 | End: 2023-11-29

## 2023-11-29 RX ORDER — PHENAZOPYRIDINE HYDROCHLORIDE 200 MG/1
200 TABLET, FILM COATED ORAL 3 TIMES DAILY
Qty: 9 TABLET | Refills: 0 | Status: SHIPPED | OUTPATIENT
Start: 2023-11-29 | End: 2023-12-02

## 2023-11-29 RX ORDER — ORPHENADRINE CITRATE 30 MG/ML
60 INJECTION INTRAMUSCULAR; INTRAVENOUS EVERY 12 HOURS
Status: DISCONTINUED | OUTPATIENT
Start: 2023-11-29 | End: 2023-11-29 | Stop reason: HOSPADM

## 2023-11-29 RX ORDER — HYDROCODONE BITARTRATE AND ACETAMINOPHEN 5; 325 MG/1; MG/1
2 TABLET ORAL ONCE
Status: COMPLETED | OUTPATIENT
Start: 2023-11-29 | End: 2023-11-29

## 2023-11-29 RX ORDER — HYDROMORPHONE HYDROCHLORIDE 1 MG/ML
0.5 INJECTION, SOLUTION INTRAMUSCULAR; INTRAVENOUS; SUBCUTANEOUS ONCE
Status: COMPLETED | OUTPATIENT
Start: 2023-11-29 | End: 2023-11-29

## 2023-11-29 RX ORDER — OMEPRAZOLE 40 MG/1
40 CAPSULE, DELAYED RELEASE ORAL DAILY
Qty: 30 CAPSULE | Refills: 0 | Status: SHIPPED | OUTPATIENT
Start: 2023-11-29 | End: 2024-04-01

## 2023-11-29 RX ORDER — MAGNESIUM HYDROXIDE/ALUMINUM HYDROXICE/SIMETHICONE 120; 1200; 1200 MG/30ML; MG/30ML; MG/30ML
15 SUSPENSION ORAL ONCE
Status: COMPLETED | OUTPATIENT
Start: 2023-11-29 | End: 2023-11-29

## 2023-11-29 RX ORDER — KETOROLAC TROMETHAMINE 30 MG/ML
30 INJECTION, SOLUTION INTRAMUSCULAR; INTRAVENOUS ONCE
Status: COMPLETED | OUTPATIENT
Start: 2023-11-29 | End: 2023-11-29

## 2023-11-29 RX ADMIN — ONDANSETRON 4 MG: 4 TABLET, ORALLY DISINTEGRATING ORAL at 16:08

## 2023-11-29 RX ADMIN — HYDROMORPHONE HYDROCHLORIDE 0.5 MG: 1 INJECTION, SOLUTION INTRAMUSCULAR; INTRAVENOUS; SUBCUTANEOUS at 16:11

## 2023-11-29 RX ADMIN — KETOROLAC TROMETHAMINE 30 MG: 30 INJECTION, SOLUTION INTRAMUSCULAR; INTRAVENOUS at 16:10

## 2023-11-29 RX ADMIN — ORPHENADRINE CITRATE 60 MG: 60 INJECTION INTRAMUSCULAR; INTRAVENOUS at 18:07

## 2023-11-29 RX ADMIN — PHENAZOPYRIDINE 200 MG: 100 TABLET ORAL at 16:12

## 2023-11-29 RX ADMIN — SODIUM CHLORIDE 1000 ML: 9 INJECTION, SOLUTION INTRAVENOUS at 16:05

## 2023-11-29 RX ADMIN — ALUMINUM HYDROXIDE, MAGNESIUM HYDROXIDE, AND SIMETHICONE 15 ML: 200; 200; 20 SUSPENSION ORAL at 18:13

## 2023-11-29 RX ADMIN — HYDROCODONE BITARTRATE AND ACETAMINOPHEN 2 TABLET: 5; 325 TABLET ORAL at 18:07

## 2023-11-29 ASSESSMENT — ACTIVITIES OF DAILY LIVING (ADL)
ADLS_ACUITY_SCORE: 35
ADLS_ACUITY_SCORE: 35

## 2023-11-29 ASSESSMENT — ENCOUNTER SYMPTOMS: ABDOMINAL PAIN: 1

## 2023-11-29 NOTE — ED TRIAGE NOTES
Pt presents with abdominal pain and low back pain. Pt has been being treated for UTI. Pt had CT scan yesterday. Pt on antibiotics. Pt tearful and crying.      Triage Assessment (Adult)       Row Name 11/29/23 7119          Triage Assessment    Airway WDL WDL        Respiratory WDL    Respiratory WDL WDL        Skin Circulation/Temperature WDL    Skin Circulation/Temperature WDL WDL        Cardiac WDL    Cardiac WDL WDL        Peripheral/Neurovascular WDL    Peripheral Neurovascular WDL WDL        Cognitive/Neuro/Behavioral WDL    Cognitive/Neuro/Behavioral WDL WDL

## 2023-11-29 NOTE — Clinical Note
Breanna Luna was seen and treated in our emergency department on 11/29/2023.  She may return to work on 11/30/2023.       If you have any questions or concerns, please don't hesitate to call.      Henrry Lepe MD

## 2023-11-29 NOTE — ED PROVIDER NOTES
History     Chief Complaint   Patient presents with    Abdominal Pain     HPI  Breanna Luna is a 38 year old female presents with abdominal pain.  Patient seen by her primary care physician yesterday that completed some lab work showing a acute cystitis with hematuria.  Patient was provided with antibiotics IM Rocephin and discharged home.  Noted that she was diagnosed with kidney stones and notes that these she believes this is the cause of her pain.  She notes that her pain was sudden onset this morning and notes that a 10 out of 10 in severity in her pelvic area as well as her mid back and upper abdomen.  Otherwise CT imaging did not show any acute signs of fat stranding for any acute signs of acute pancreatitis or appendicitis with no signs of bowel extrusion resulting in herniation with incarceration or strangulation.    Patient notes that her back pain is a 10 out of 10 in severity as well as her abdomen lower pelvic but often starts in the mid upper abdomen and radiates down to her pelvis as well as her back.  She has not had any noted fevers.  On arrival patient is not tachycardic hypotensive or showing signs of hypoxemia.  She is no cough shortness of breath or chest pains.  She denies any abdominal trauma or any changes in her bowel habits.      Allergies:  Allergies   Allergen Reactions    Codeine Nausea     Other reaction(s): Nausea    Imitrex [Sumatriptan]      Worsening of migraine, paresthesias    Vancomycin Other (See Comments)    Adhesive [Liquid Adhesive] Itching     Redness and itching around incision after hernia repair surgery    Ranitidine Rash and Itching     recvd IV zantac 75 and reaction was immediate,  was given Benadryl to counter act reaction in 2000    Sulfa Antibiotics Unknown and Rash       Problem List:    Patient Active Problem List    Diagnosis Date Noted    Morbid obesity (H) 10/19/2023     Priority: Medium    Class 2 obesity 02/28/2023     Priority: Medium    Nasal obstruction  05/02/2022     Priority: Medium     Added automatically from request for surgery [id number]      Nasal turbinate hypertrophy 05/02/2022     Priority: Medium     Added automatically from request for surgery [id number]      Class 1 obesity due to excess calories without serious comorbidity with body mass index (BMI) of 31.0 to 31.9 in adult 02/16/2021     Priority: Medium     MWL Initial 6/30/2022  Initial Weight (lbs): 218 lbs  Initial BMI 35      Biliary dyskinesia 02/10/2020     Priority: Medium     Added automatically from request for surgery [id number]      Intractable migraine without aura and without status migrainosus 07/17/2019     Priority: Medium    Alcohol abuse, in remission 08/20/2018     Priority: Medium    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) 08/20/2018     Priority: Medium        Past Medical History:    Past Medical History:   Diagnosis Date    Cellulitis 2012    Drug abuse (H) 2011    Mild pre-eclampsia in third trimester     Motion sickness     MRSA cellulitis 2012    PONV (postoperative nausea and vomiting)     Pregnancy-induced hypertension in third trimester     Rh negative, antepartum        Past Surgical History:    Past Surgical History:   Procedure Laterality Date    APPENDECTOMY      DAVINCI ASSISTED HERNIORRHAPHY INCISIONAL N/A 07/10/2020    Procedure: Robotic incisional hernia repair with mesh;  Surgeon: Jovi Odom MD;  Location: [facility] OR    EXCISE LESION FOOT Left 08/16/2019    Procedure: Excision of nevus second interdigital space left foot, Excision of nevus third interdigital space left foot, excision of plantar junctional nevus left foot;  Surgeon: Rinku Lopez DPM;  Location: [facility] OR    GYN SURGERY      INCISION AND DRAINAGE  04/18/2012    chin abscess I&D    LAPAROSCOPIC CHOLECYSTECTOMY N/A 02/14/2020    Procedure: Laparoscopic Cholecystectomy;  Surgeon: Renard Gerber MD;  Location: [facility] OR    LAPAROSCOPIC HERNIORRHAPHY INCISIONAL N/A 07/17/2019    Procedure: laparoscopic  incisional hernia repair with mesh;  Surgeon: Jovi Odom MD;  Location: [facility]    LAPAROSCOPIC SALPINGECTOMY Bilateral 2018    Procedure: LAPAROSCOPIC SALPINGECTOMY;  Laparoscopic Bilateral Salpingectomy;  Surgeon: Chai Cui MD;  Location: [facility] OR    MAMMOPLASTY REDUCTION Bilateral 2017    SEPTOPLASTY, TURBINOPLASTY, COMBINED Bilateral 6/10/2022    Procedure: SEPTOPLASTY; BILATERAL INFERIOR TURBINATE REDUCTION;;  Surgeon: Lobo Narayanan MD;  Location: [facility] OR    TONSILLECTOMY Bilateral 6/10/2022    Procedure: BILATERAL TONSILLECTOMY;  Surgeon: Lobo Narayanan MD;  Location: [facility] OR    TUBAL LIGATION Bilateral        Family History:    Family History   Problem Relation Age of Onset    Cancer Father     Pancreatic Cancer Father     Lung Cancer Father     Lupus Mother        Social History:  Marital Status:   [2]  Social History     Tobacco Use    Smoking status: Former     Packs/day: 0.00     Years: 0.00     Additional pack years: 0.00     Total pack years: 0.00     Types: Cigarettes     Quit date: 3/10/2016     Years since quittin.7    Smokeless tobacco: Never   Vaping Use    Vaping Use: Never used   Substance Use Topics    Alcohol use: No     Comment: history alcohol abuse , s/p treatment    Drug use: No     Comment: former drug user (EtOH, cocaine, marijuana, meth), last use         Medications:    HYDROcodone-acetaminophen (NORCO) 5-325 MG tablet  omeprazole (PRILOSEC) 40 MG DR capsule  ondansetron (ZOFRAN ODT) 4 MG ODT tab  phenazopyridine (PYRIDIUM) 200 MG tablet  acetaminophen (TYLENOL) 500 MG tablet  amphetamine-dextroamphetamine (ADDERALL XR) 20 MG 24 hr capsule  [START ON 2023] amphetamine-dextroamphetamine (ADDERALL XR) 20 MG 24 hr capsule  amphetamine-dextroamphetamine (ADDERALL) 20 MG tablet  [START ON 2023] amphetamine-dextroamphetamine (ADDERALL) 20 MG tablet  BINAXNOW COVID-19 AG HOME TEST KIT  cephALEXin (KEFLEX) 500 MG capsule  famotidine  (PEPCID) 20 MG tablet  galcanezumab-gnlm (EMGALITY) 120 MG/ML injection  naproxen (NAPROSYN) 500 MG tablet  nystatin (MYCOSTATIN) 584814 UNIT/GM external cream  prochlorperazine (COMPAZINE) 10 MG tablet  rimegepant (NURTEC) 75 MG ODT tablet  Semaglutide-Weight Management (WEGOVY) 2.4 MG/0.75ML pen          Review of Systems   Gastrointestinal:  Positive for abdominal pain.   All other systems reviewed and are negative.      Physical Exam   BP: (!) 162/107  Pulse: 98  Temp: 98.1  F (36.7  C)  Resp: 16  SpO2: 99 %      Physical Exam  Vitals and nursing note reviewed.   Constitutional:       General: She is in acute distress.      Appearance: She is well-developed. She is obese. She is not ill-appearing or toxic-appearing.   HENT:      Head: Normocephalic and atraumatic.   Cardiovascular:      Rate and Rhythm: Normal rate.      Heart sounds: Normal heart sounds.   Pulmonary:      Effort: Pulmonary effort is normal.      Breath sounds: Normal breath sounds. No wheezing.   Abdominal:      General: Abdomen is flat. Bowel sounds are normal. There is no distension.      Palpations: Abdomen is soft. There is no hepatomegaly, mass or pulsatile mass.      Tenderness: There is no abdominal tenderness. There is no right CVA tenderness, left CVA tenderness, guarding or rebound. Negative signs include Mcdaniels's sign, Rovsing's sign, McBurney's sign, psoas sign and obturator sign.      Hernia: No hernia is present.   Skin:     General: Skin is warm.      Capillary Refill: Capillary refill takes less than 2 seconds.   Neurological:      General: No focal deficit present.      Mental Status: She is alert.   Psychiatric:         Mood and Affect: Mood is anxious.         ED Course                 Procedures              EKG Interpretation:      Interpreted by Henrry Lepe MD  Time reviewed: 1754  Symptoms at time of EKG: No pain  Rhythm: normal sinus   Rate: normal  Axis: normal  Ectopy: none  Conduction: normal  ST Segments/ T  Waves: No ST-T wave changes      Clinical Impression: normal EKG               Results for orders placed or performed during the hospital encounter of 11/29/23 (from the past 24 hour(s))   [facility] Draw *Canceled*    Narrative    The following orders were created for panel order [facility] Draw.  Procedure                               Abnormality         Status                     ---------                               -----------         ------                       Please view results for these tests on the individual orders.   CBC with Platelets & Differential    Narrative    The following orders were created for panel order CBC with Platelets & Differential.  Procedure                               Abnormality         Status                     ---------                               -----------         ------                     CBC with platelets and d...[[id number]]  Abnormal            Final result                 Please view results for these tests on the individual orders.   Comprehensive metabolic panel   Result Value Ref Range    Sodium 139 135 - 145 mmol/L    Potassium 4.1 3.4 - 5.3 mmol/L    Carbon Dioxide (CO2) 22 22 - 29 mmol/L    Anion Gap 12 7 - 15 mmol/L    Urea Nitrogen 10.6 6.0 - 20.0 mg/dL    Creatinine 0.69 0.51 - 0.95 mg/dL    GFR Estimate >90 >60 mL/min/1.73m2    Calcium 9.8 8.6 - 10.0 mg/dL    Chloride 105 98 - 107 mmol/L    Glucose 89 70 - 99 mg/dL    Alkaline Phosphatase 50 40 - 150 U/L    AST 20 0 - 45 U/L    ALT 19 0 - 50 U/L    Protein Total 7.0 6.4 - 8.3 g/dL    Albumin 4.4 3.5 - 5.2 g/dL    Bilirubin Total 0.5 <=1.2 mg/dL   CBC with platelets and differential   Result Value Ref Range    WBC Count 10.4 4.0 - 11.0 10e3/uL    RBC Count 4.53 3.80 - 5.20 10e6/uL    Hemoglobin 13.8 11.7 - 15.7 g/dL    Hematocrit 38.9 35.0 - 47.0 %    MCV 86 78 - 100 fL    MCH 30.5 26.5 - 33.0 pg    MCHC 35.5 31.5 - 36.5 g/dL    RDW 12.8 10.0 - 15.0 %    Platelet Count 263 150 - 450 10e3/uL    % Neutrophils 86  %    % Lymphocytes 10 %    % Monocytes 4 %    % Eosinophils 0 %    % Basophils 0 %    % Immature Granulocytes 0 %    NRBCs per 100 WBC 0 <1 /100    Absolute Neutrophils 8.8 (H) 1.6 - 8.3 10e3/uL    Absolute Lymphocytes 1.1 0.8 - 5.3 10e3/uL    Absolute Monocytes 0.4 0.0 - 1.3 10e3/uL    Absolute Eosinophils 0.0 0.0 - 0.7 10e3/uL    Absolute Basophils 0.0 0.0 - 0.2 10e3/uL    Absolute Immature Granulocytes 0.0 <=0.4 10e3/uL    Absolute NRBCs 0.0 10e3/uL   Lipase   Result Value Ref Range    Lipase 41 13 - 60 U/L   CRP inflammation   Result Value Ref Range    CRP Inflammation <3.00 <5.00 mg/L   Lactic acid whole blood   Result Value Ref Range    Lactic Acid 0.9 0.7 - 2.0 mmol/L   US Pelvis Cmplt w Transvag & Doppler LmtPel Duplex Limited    Narrative    EXAM: US PELVIS COMPLETE W TRANSVAGINAL AND DOPPLER LIMITED  LOCATION: [facility]  DATE: 11/29/2023    INDICATION: Severe pelvic pain.  COMPARISON: CT abdomen pelvis 11/29/2023.  TECHNIQUE: Transabdominal scans were performed. Endovaginal ultrasound was performed to better visualize the adnexa. Color flow with spectral Doppler and waveform analysis performed.    FINDINGS:    UTERUS: 9.5 x 5.3 x 5.7 cm. Normal anteverted position. Mildly heterogeneous myometrium. No discrete fibroids.    ENDOMETRIUM: 10 mm. Normal smooth endometrium.    RIGHT OVARY: 2.4 x 1.8 x 2.2 cm. Normal with arterial and venous duplex flow identified.    LEFT OVARY: 1.2 x 1.2 x 1.9 cm. Normal with arterial and venous duplex flow identified.    No significant free fluid.      Impression    IMPRESSION:      1.  No ovarian torsion or other acute abnormality.     Abd/pelvis CT - no contrast - Stone Protocol    Narrative    EXAM: CT ABDOMEN PELVIS W/O CONTRAST  LOCATION: [facility]  DATE: 11/29/2023    INDICATION: Recent left renal stone. Sudden pelvic pain.  COMPARISON: CT abdomen pelvis 11/28/2023.  TECHNIQUE: CT scan of the abdomen and  pelvis was performed without IV contrast. Multiplanar reformats were obtained. Dose reduction techniques were used.  CONTRAST: None.    FINDINGS:     LOWER CHEST: Normal.    HEPATOBILIARY: Status post cholecystectomy. No biliary ductal dilation.    PANCREAS: Normal.    SPLEEN: Mild splenomegaly measuring 14.3 cm.    ADRENAL GLANDS: Normal.    KIDNEYS/BLADDER: Nonobstructing 2 mm left lower pole renal calculus. No other urinary calculi. No hydronephrosis. Normal bladder. Few small pelvic phleboliths.    BOWEL: No bowel obstruction or inflammation. Appendix is absent.    LYMPH NODES: No enlarged lymph node.    VASCULATURE: Unremarkable.    PELVIC ORGANS: Normal. Trace free pelvic fluid.    MUSCULOSKELETAL: Diastases recti with a broad periumbilical bulge, similar to prior. No acute bony abnormality.      Impression    IMPRESSION:     1.  No acute abnormality, within the limitations of the noncontrast technique.    2.  Nonobstructing 2 mm left lower pole renal calculus. No hydronephrosis.    3.  Mild splenomegaly.       Medications   orphenadrine (NORFLEX) injection 60 mg (60 mg Intramuscular $Given 11/29/23 1807)   phenazopyridine (PYRIDIUM) tablet 200 mg (200 mg Oral $Given 11/29/23 1612)   ondansetron (ZOFRAN ODT) ODT tab 4 mg (4 mg Oral $Given 11/29/23 1608)   HYDROmorphone (PF) (DILAUDID) injection 0.5 mg (0.5 mg Intravenous $Given 11/29/23 1611)   sodium chloride 0.9% BOLUS 1,000 mL (0 mLs Intravenous Stopped 11/29/23 1800)   ketorolac (TORADOL) injection 30 mg (30 mg Intravenous $Given 11/29/23 1610)   HYDROcodone-acetaminophen (NORCO) 5-325 MG per tablet 2 tablet (2 tablets Oral $Given 11/29/23 1807)   alum & mag hydroxide-simethicone (MAALOX) suspension 15 mL (15 mLs Oral $Given 11/29/23 1813)       Assessments & Plan (with Medical Decision Making)     I have reviewed the nursing notes.    I have reviewed the findings, diagnosis, plan and need for follow up with the patient.          Medical Decision  Making  38-year-old female presenting with abdominal pain 10 of 10 severity acute onset today.  She was seen yesterday from her primary care which completed CBC CMP CT imaging of her abdomen as well as UA which was otherwise showing positive acute cystitis with hematuria.  CT imaging of abdomen did not show any acute pathology requiring surgical medical further evaluation.  On examination patient has a soft abdomen without any acute signs of distress with palpation of the right lower quadrant or CVA areas.  Do not believe patient is likely suffering from any acute signs of peritonitis or signs of herniation resulting in strangulation or incarceration.  Patient's vitals are otherwise stable with no acute signs of fever hypotension or tachycardia.  Reviewed patient's lab work yesterday as well as imaging those completed yesterday.  Discussed that differential at this time with imaging is complete yesterday continues to remain as possible further exacerbation of her UTI versus ovarian pathology including ovarian torsion ovarian cyst ovarian hemorrhage, renal stone/hemorrhagic pathology results imaging and acute pain versus appendicitis versus pancreatitis.  Will reevaluate with CT without contrast patient had contrast yesterday and provide patient with Dilaudid and Zofran for pain control.  Included lab work for evaluation of pancreas as well as CRP and lactic acid for possible bowel obstruction without acute signs of white cell elevation.    Evaluated patient of which no acute changes in her vitals patient remained stable with no febrile illness.  She is still mildly hypertensive.  Discussed gastritis versus muscle spasms I will provide patient with GI cocktail as well as 1 dose of Norco and Norflex.  Discussed outpatient omeprazole, Norco and Pyridium for bladder spasm and discomfort as well as Norco for severe pains.  Discussed outpatient follow-up with her primary care physician and discussed return precautions.   At this time unclear what is resulting in her significant abdominal discomfort and lab work as well as imaging is otherwise been benign for the past 2 days.  At this time do not find any specific reason to admit or complete any further imaging patient's images done yesterday and today were otherwise negative as well as lab works otherwise been benign aside from a positive UA.  Patient is already taking Keflex doubt that this is associated with reaction to the medication therefore recommended continuation of this medication.  Discussed return precautions and patient was discharged home    New Prescriptions    HYDROCODONE-ACETAMINOPHEN (NORCO) 5-325 MG TABLET    Take 1 tablet by mouth every 6 hours as needed for severe pain    OMEPRAZOLE (PRILOSEC) 40 MG DR CAPSULE    Take 1 capsule (40 mg) by mouth daily    ONDANSETRON (ZOFRAN ODT) 4 MG ODT TAB    Take 1 tablet (4 mg) by mouth every 8 hours as needed for nausea    PHENAZOPYRIDINE (PYRIDIUM) 200 MG TABLET    Take 1 tablet (200 mg) by mouth 3 times daily for 3 days       Final diagnoses:   Abdominal discomfort   Nausea       11/29/2023   [facility] EMERGENCY DEPT       Henrry Lepe MD  11/29/23 1812       Henrry Lepe MD  11/29/23 1815

## 2023-12-14 ENCOUNTER — E-VISIT (OUTPATIENT)
Dept: URGENT CARE | Facility: CLINIC | Age: 38
End: 2023-12-14
Payer: COMMERCIAL

## 2023-12-14 ENCOUNTER — ALLIED HEALTH/NURSE VISIT (OUTPATIENT)
Dept: FAMILY MEDICINE | Facility: CLINIC | Age: 38
End: 2023-12-14
Payer: COMMERCIAL

## 2023-12-14 DIAGNOSIS — J02.9 SORE THROAT: Primary | ICD-10-CM

## 2023-12-14 DIAGNOSIS — J02.9 SORE THROAT: ICD-10-CM

## 2023-12-14 LAB
DEPRECATED S PYO AG THROAT QL EIA: NEGATIVE
GROUP A STREP BY PCR: NOT DETECTED

## 2023-12-14 PROCEDURE — 87651 STREP A DNA AMP PROBE: CPT

## 2023-12-14 PROCEDURE — 99207 PR NO CHARGE NURSE ONLY: CPT

## 2023-12-14 PROCEDURE — 99421 OL DIG E/M SVC 5-10 MIN: CPT | Performed by: PREVENTIVE MEDICINE

## 2023-12-14 NOTE — PATIENT INSTRUCTIONS
Dear Breanna,    After reviewing your responses, I would like you to come in for a swab to make sure we treat you correctly. This swab is to evaluate you for possible Strep Throat, and should be scheduled for today or tomorrow. Please use the Schedule Now button in [employer] to schedule your swab. Otherwise, click this link to schedule a lab only appointment.    Lab appointments are not available at most locations on the weekends. If no Lab Only appointment is available, you should be seen in any of our convenient Urgent Care Centers for an in person visit, which can be found on our website here.    You will receive instructions with your results in [employer] once they are available.     If your symptoms worsen, you develop difficulty breathing, difficulty with drinking enough to stay hydrated, difficulty swallowing your saliva or have fevers for more than 5 days, please contact your primary care provider for an appointment or visit an Urgent Care Center to be seen.      Thanks again for choosing us as your health care partner.   Santhosh Quigley MD, MD  Sore Throat: Care Instructions  Overview     Infection by bacteria or a virus causes most sore throats. Cigarette smoke, dry air, air pollution, allergies, and yelling can also cause a sore throat. Sore throats can be painful and annoying. Fortunately, most sore throats go away on their own. If you have a bacterial infection, your doctor may prescribe antibiotics.  Follow-up care is a key part of your treatment and safety. Be sure to make and go to all appointments, and call your doctor if you are having problems. It's also a good idea to know your test results and keep a list of the medicines you take.  How can you care for yourself at home?  If your doctor prescribed antibiotics, take them as directed. Do not stop taking them just because you feel better. You need to take the full course of antibiotics.  Gargle with warm salt water several times a day to  "help reduce swelling and relieve pain. Mix 1/2 teaspoon of salt in 1 cup of warm water.  Take an over-the-counter pain medicine, such as acetaminophen (Tylenol), ibuprofen (Advil, Motrin), or naproxen (Aleve). Read and follow all instructions on the label.  Be careful when taking over-the-counter cold or flu medicines and Tylenol at the same time. Many of these medicines have acetaminophen, which is Tylenol. Read the labels to make sure that you are not taking more than the recommended dose. Too much acetaminophen (Tylenol) can be harmful.  Drink plenty of fluids. Fluids may help soothe an irritated throat. Hot fluids, such as tea or soup, may help decrease throat pain.  Use over-the-counter throat lozenges to soothe pain. Regular cough drops or hard candy may also help. These should not be given to young children because of the risk of choking.  Do not smoke or allow others to smoke around you. If you need help quitting, talk to your doctor about stop-smoking programs and medicines. These can increase your chances of quitting for good.  Use a vaporizer or humidifier to add moisture to your bedroom. Follow the directions for cleaning the machine.  When should you call for help?   Call your doctor now or seek immediate medical care if:    You have trouble breathing.     Your sore throat gets much worse on one side.     You have new or worse trouble swallowing.     You have a new or higher fever.   Watch closely for changes in your health, and be sure to contact your doctor if you do not get better as expected.  Where can you learn more?  Go to https://www.[employer].net/patiented  Enter U420 in the search box to learn more about \"Sore Throat: Care Instructions.\"  Current as of: February 28, 2023               Content Version: 13.8    3152-8843 [employer].   Care instructions adapted under license by your healthcare professional. If you have questions about a medical condition or this instruction, always " ask your healthcare professional. [employer] disclaims any warranty or liability for your use of this information.

## 2023-12-19 ENCOUNTER — E-VISIT (OUTPATIENT)
Dept: URGENT CARE | Facility: CLINIC | Age: 38
End: 2023-12-19
Payer: COMMERCIAL

## 2023-12-19 ENCOUNTER — MYC REFILL (OUTPATIENT)
Dept: SURGERY | Facility: CLINIC | Age: 38
End: 2023-12-19

## 2023-12-19 ENCOUNTER — MYC REFILL (OUTPATIENT)
Dept: NEUROLOGY | Facility: CLINIC | Age: 38
End: 2023-12-19

## 2023-12-19 DIAGNOSIS — E66.09 CLASS 2 OBESITY DUE TO EXCESS CALORIES WITH BODY MASS INDEX (BMI) OF 35.0 TO 35.9 IN ADULT, UNSPECIFIED WHETHER SERIOUS COMORBIDITY PRESENT: ICD-10-CM

## 2023-12-19 DIAGNOSIS — G43.709 CHRONIC MIGRAINE WITHOUT AURA WITHOUT STATUS MIGRAINOSUS, NOT INTRACTABLE: ICD-10-CM

## 2023-12-19 DIAGNOSIS — E66.812 CLASS 2 OBESITY DUE TO EXCESS CALORIES WITH BODY MASS INDEX (BMI) OF 35.0 TO 35.9 IN ADULT, UNSPECIFIED WHETHER SERIOUS COMORBIDITY PRESENT: ICD-10-CM

## 2023-12-19 DIAGNOSIS — L30.9 ECZEMA, UNSPECIFIED TYPE: Primary | ICD-10-CM

## 2023-12-19 PROCEDURE — 99421 OL DIG E/M SVC 5-10 MIN: CPT | Performed by: PHYSICIAN ASSISTANT

## 2023-12-19 RX ORDER — DESONIDE 0.5 MG/G
CREAM TOPICAL 2 TIMES DAILY
Qty: 60 G | Refills: 1 | Status: SHIPPED | OUTPATIENT
Start: 2023-12-19

## 2023-12-19 RX ORDER — PROCHLORPERAZINE MALEATE 10 MG
10 TABLET ORAL EVERY 6 HOURS PRN
Qty: 15 TABLET | Refills: 11 | OUTPATIENT
Start: 2023-12-19

## 2023-12-19 NOTE — TELEPHONE ENCOUNTER
1 year supply sent of requested medications 10/27/23.      Message sent to pharmacy to fill for patient.     Kristine QUEZADA RN, BSN  [facility]

## 2023-12-19 NOTE — PATIENT INSTRUCTIONS
Dear Breanna Luna    After reviewing your responses, I've been able to diagnose you with eczema, which is a common skin condition that causes small fluid-filled blisters or bumps to appear on your skin. The exact cause is unknown but your risk may increase if you have allergies, smoke, or have other skin conditions. Some foods such as mushrooms, chocolate and coffee also are known potential triggers.     Based on your responses, I have prescribed desonide to treat this. Please follow the instructions on the medication. If you experience irritation of your skin, new rash, or any other new symptoms, you should stop using this medication and contact your primary care provider.     If this treatment does not work for you or you will run out of refills, please plan to follow- up with your primary care provider to set refills for a longer period of time or to try other options.     Things you can do to help prevent this:     Do not scratch your rash.Bacteria from your fingernails may enter your open sores during scratching and cause an infection.     Use moisturizes or emollients, such as petroleum jelly.These help relieve itching and help prevent bacteria from getting in your sores. If you have a doctor's order for medicated cream, apply that first. Then apply the moisturizer or emollient on top.    Thanks for choosing us as your health care partner,    Bree Shah PA-C, [facility]

## 2023-12-28 ENCOUNTER — E-VISIT (OUTPATIENT)
Dept: URGENT CARE | Facility: CLINIC | Age: 38
End: 2023-12-28
Payer: COMMERCIAL

## 2023-12-28 DIAGNOSIS — K62.89 RECTAL PAIN: Primary | ICD-10-CM

## 2023-12-28 PROCEDURE — 99207 PR NON-BILLABLE SERV PER CHARTING: CPT | Performed by: PHYSICIAN ASSISTANT

## 2023-12-28 NOTE — PATIENT INSTRUCTIONS
Dear Breanna Luna,    We are sorry you are not feeling well. Based on the responses you provided, it is recommended that you be seen in-person in urgent care so we can better evaluate your symptoms. Please click here to find the nearest urgent care location to you.   You will not be charged for this Visit. Thank you for trusting us with your care.    Phillip Erwin PA-C

## 2023-12-28 NOTE — TELEPHONE ENCOUNTER
Pt has refills on file.  Will check with insurance re: coverage in new year.  Alem Hess MS, RD, RN

## 2023-12-29 ENCOUNTER — OFFICE VISIT (OUTPATIENT)
Dept: NEUROLOGY | Facility: CLINIC | Age: 38
End: 2023-12-29
Payer: COMMERCIAL

## 2023-12-29 DIAGNOSIS — G43.709 CHRONIC MIGRAINE WITHOUT AURA WITHOUT STATUS MIGRAINOSUS, NOT INTRACTABLE: Primary | ICD-10-CM

## 2023-12-29 PROCEDURE — 64615 CHEMODENERV MUSC MIGRAINE: CPT | Performed by: PSYCHIATRY & NEUROLOGY

## 2023-12-29 NOTE — PROGRESS NOTES
[facility]  Botulinum Toxin Procedure    Cara Suarez MD  Headache Neurology    December 29, 2023    Procedure:  OnabotulinumtoxinA injections for chronic migraine  Indication:  Chronic migraine    Ms. Luna suffers from severe intractable headaches.  She was referred by Dr. Suarez for onabotulinumtoxinA injections for headache.  Risks, benefits, and alternatives were discussed.  All questions were answered and consent given.  She decided to proceed with the injections.     Prior to initiation of botulinum toxin injections, Ms. Luna reported 20 headache days per month, with 17 severe headache days per month. Her headaches are quite disabling and often interfere with her ability to function normally.    Date of last injections: 9/29/2023    Ms. Luna reports 9 headache days per month currently, with 0 severe headache days per month.  She has noticed a wearing off phenomenon prior to this round of botulinum toxin injections, lasting 3 weeks.    Botulinum toxin injections have improved their functioning. No ER visits in last 12 weeks for headache.    She has attempted other migraine prophylactic treatments in the past, which have included:  Nortriptyline 20 mg PM was not helpful.  Gabapentin was not helpful.  Propranolol 40 mg TID was not helpful.  Topiramate was not helpful.  Emgality was painful.   Ajovy was helpful.  Quipta is not helping yet.    Ms. Luna's pain was assessed prior to the procedure.  She rated her pain today as 7-8 out of 10.    Procedural Pause: Procedural pause was conducted to verify correct patient identity, procedure to be performed, correct side and site, correct patient position, and special requirements. Appropriate hand hygiene was utilized, and each injection site was prepped with alcohol wipe or Chloraprep swab.     Procedure Details: 200 units of onabotulinumtoxinA was diluted in 4 mL 0.9% normal saline. A total of 150 units of onabotulinumtoxinA were injected using 30 gauge  0.5 in needles into the muscles listed below. 50 units of onabotulinumtoxinA were wasted.     Injection Sites: Total = 150 units onabotulinumtoxinA      and Procerus muscles - 5 units into the left and right corrugators and 5 units into the procerus (15 units total)    Frontalis muscles - 5 units into the left superior frontalis and 5 units into the right superior frontalis (2 injection sites per muscle) (10 units total)    Temporalis muscles - 12.5 units into the left temporalis muscle and 12.5 units into the right temporalis muscle (2 injection sites per muscle) (25 units total)    Occipitalis muscles - 12.5 units into the left occipitalis muscle and 12.5 units into the right occipitalis muscle (2 injection sites per muscle) (25 units total)    Splenius Capitis muscles - 12.5 units into the left splenius capitis muscle and 12.5 units into the right splenius capitis muscle (2 injection sites per muscle, divided into 2/3 anteriorly and 1/3 posteriorly) (25 units total)      Trapezius muscles - 25 units into the left trapezius muscle and 25 units into the right trapezius muscle (3 injection sites per muscle, divided 5 units, 10 units, 10 units, medial to lateral) (50 units total)    Ms. uLna tolerated the procedure well without immediate complications.  She will follow up in clinic for assessment of the effectiveness of treatment.  She did not report any change in her pain level after the botulinumtoxinA injection procedure.    Cara Suarez MD  Headache Neurology  [facility]

## 2023-12-29 NOTE — LETTER
12/29/2023         RE: Breanna Luna  [address]  [place] [address]        Dear Colleague,    Thank you for referring your patient, Breanna Luna, to the [facility]. Please see a copy of my visit note below.    [facility]  Botulinum Toxin Procedure    Cara Suarez MD  Headache Neurology    December 29, 2023    Procedure:  OnabotulinumtoxinA injections for chronic migraine  Indication:  Chronic migraine    Ms. Luna suffers from severe intractable headaches.  She was referred by Dr. Suarez for onabotulinumtoxinA injections for headache.  Risks, benefits, and alternatives were discussed.  All questions were answered and consent given.  She decided to proceed with the injections.     Prior to initiation of botulinum toxin injections, Ms. Luna reported 20 headache days per month, with 17 severe headache days per month. Her headaches are quite disabling and often interfere with her ability to function normally.    Date of last injections: 9/29/2023    Ms. Luna reports 9 headache days per month currently, with 0 severe headache days per month.  She has noticed a wearing off phenomenon prior to this round of botulinum toxin injections, lasting 3 weeks.    Botulinum toxin injections have improved their functioning. No ER visits in last 12 weeks for headache.    She has attempted other migraine prophylactic treatments in the past, which have included:  Nortriptyline 20 mg PM was not helpful.  Gabapentin was not helpful.  Propranolol 40 mg TID was not helpful.  Topiramate was not helpful.  Emgality was painful.   Ajovy was helpful.  Quipta is not helping yet.    Ms. Luna's pain was assessed prior to the procedure.  She rated her pain today as 7-8 out of 10.    Procedural Pause: Procedural pause was conducted to verify correct patient identity, procedure to be performed, correct side and site, correct patient position, and special requirements. Appropriate hand  hygiene was utilized, and each injection site was prepped with alcohol wipe or Chloraprep swab.     Procedure Details: 200 units of onabotulinumtoxinA was diluted in 4 mL 0.9% normal saline. A total of 150 units of onabotulinumtoxinA were injected using 30 gauge 0.5 in needles into the muscles listed below. 50 units of onabotulinumtoxinA were wasted.     Injection Sites: Total = 150 units onabotulinumtoxinA      and Procerus muscles - 5 units into the left and right corrugators and 5 units into the procerus (15 units total)    Frontalis muscles - 5 units into the left superior frontalis and 5 units into the right superior frontalis (2 injection sites per muscle) (10 units total)    Temporalis muscles - 12.5 units into the left temporalis muscle and 12.5 units into the right temporalis muscle (2 injection sites per muscle) (25 units total)    Occipitalis muscles - 12.5 units into the left occipitalis muscle and 12.5 units into the right occipitalis muscle (2 injection sites per muscle) (25 units total)    Splenius Capitis muscles - 12.5 units into the left splenius capitis muscle and 12.5 units into the right splenius capitis muscle (2 injection sites per muscle, divided into 2/3 anteriorly and 1/3 posteriorly) (25 units total)      Trapezius muscles - 25 units into the left trapezius muscle and 25 units into the right trapezius muscle (3 injection sites per muscle, divided 5 units, 10 units, 10 units, medial to lateral) (50 units total)    Ms. Luna tolerated the procedure well without immediate complications.  She will follow up in clinic for assessment of the effectiveness of treatment.  She did not report any change in her pain level after the botulinumtoxinA injection procedure.    Cara Suarez MD  Headache Neurology  [facility]      Again, thank you for allowing me to participate in the care of your patient.        Sincerely,        Cara Suarez MD

## 2024-01-21 ENCOUNTER — MYC MEDICAL ADVICE (OUTPATIENT)
Dept: FAMILY MEDICINE | Facility: CLINIC | Age: 39
End: 2024-01-21
Payer: COMMERCIAL

## 2024-01-21 DIAGNOSIS — F90.2 ATTENTION DEFICIT HYPERACTIVITY DISORDER (ADHD), COMBINED TYPE: ICD-10-CM

## 2024-01-22 RX ORDER — DEXTROAMPHETAMINE SACCHARATE, AMPHETAMINE ASPARTATE MONOHYDRATE, DEXTROAMPHETAMINE SULFATE AND AMPHETAMINE SULFATE 5; 5; 5; 5 MG/1; MG/1; MG/1; MG/1
20 CAPSULE, EXTENDED RELEASE ORAL DAILY
Qty: 30 CAPSULE | Refills: 0 | Status: CANCELLED | OUTPATIENT
Start: 2024-01-22

## 2024-01-24 RX ORDER — DEXTROAMPHETAMINE SACCHARATE, AMPHETAMINE ASPARTATE, DEXTROAMPHETAMINE SULFATE AND AMPHETAMINE SULFATE 5; 5; 5; 5 MG/1; MG/1; MG/1; MG/1
20 TABLET ORAL DAILY
Qty: 30 TABLET | Refills: 0 | Status: SHIPPED | OUTPATIENT
Start: 2024-01-24 | End: 2024-02-22

## 2024-01-24 RX ORDER — DEXTROAMPHETAMINE SACCHARATE, AMPHETAMINE ASPARTATE MONOHYDRATE, DEXTROAMPHETAMINE SULFATE AND AMPHETAMINE SULFATE 5; 5; 5; 5 MG/1; MG/1; MG/1; MG/1
20 CAPSULE, EXTENDED RELEASE ORAL DAILY
Qty: 30 CAPSULE | Refills: 0 | Status: SHIPPED | OUTPATIENT
Start: 2024-01-24 | End: 2024-02-22

## 2024-02-02 ENCOUNTER — E-VISIT (OUTPATIENT)
Dept: URGENT CARE | Facility: CLINIC | Age: 39
End: 2024-02-02
Payer: COMMERCIAL

## 2024-02-02 DIAGNOSIS — R39.9 URINARY SYMPTOM OR SIGN: ICD-10-CM

## 2024-02-02 DIAGNOSIS — J02.9 SORE THROAT: Primary | ICD-10-CM

## 2024-02-02 PROCEDURE — 99421 OL DIG E/M SVC 5-10 MIN: CPT | Performed by: NURSE PRACTITIONER

## 2024-02-03 ENCOUNTER — LAB (OUTPATIENT)
Dept: LAB | Facility: CLINIC | Age: 39
End: 2024-02-03
Payer: COMMERCIAL

## 2024-02-03 DIAGNOSIS — J02.9 SORE THROAT: ICD-10-CM

## 2024-02-03 DIAGNOSIS — N30.01 ACUTE CYSTITIS WITH HEMATURIA: Primary | ICD-10-CM

## 2024-02-03 DIAGNOSIS — R39.9 URINARY SYMPTOM OR SIGN: ICD-10-CM

## 2024-02-03 LAB
ALBUMIN UR-MCNC: NEGATIVE MG/DL
APPEARANCE UR: ABNORMAL
BACTERIA #/AREA URNS HPF: ABNORMAL /HPF
BILIRUB UR QL STRIP: NEGATIVE
COLOR UR AUTO: ABNORMAL
DEPRECATED S PYO AG THROAT QL EIA: NEGATIVE
GLUCOSE UR STRIP-MCNC: NEGATIVE MG/DL
GROUP A STREP BY PCR: NOT DETECTED
HGB UR QL STRIP: ABNORMAL
KETONES UR STRIP-MCNC: NEGATIVE MG/DL
LEUKOCYTE ESTERASE UR QL STRIP: ABNORMAL
MUCOUS THREADS #/AREA URNS LPF: PRESENT /LPF
NITRATE UR QL: NEGATIVE
PH UR STRIP: 5 [PH] (ref 5–7)
RBC URINE: 3 /HPF
SP GR UR STRIP: 1.03 (ref 1–1.03)
SQUAMOUS EPITHELIAL: 7 /HPF
UROBILINOGEN UR STRIP-MCNC: 2 MG/DL
WBC URINE: 163 /HPF

## 2024-02-03 PROCEDURE — 87651 STREP A DNA AMP PROBE: CPT

## 2024-02-03 PROCEDURE — 81001 URINALYSIS AUTO W/SCOPE: CPT

## 2024-02-03 PROCEDURE — 87186 SC STD MICRODIL/AGAR DIL: CPT

## 2024-02-03 PROCEDURE — 87088 URINE BACTERIA CULTURE: CPT

## 2024-02-03 PROCEDURE — 87086 URINE CULTURE/COLONY COUNT: CPT

## 2024-02-03 RX ORDER — CEFDINIR 300 MG/1
300 CAPSULE ORAL 2 TIMES DAILY
Qty: 14 CAPSULE | Refills: 0 | Status: SHIPPED | OUTPATIENT
Start: 2024-02-03 | End: 2024-02-03

## 2024-02-03 RX ORDER — CEFDINIR 300 MG/1
300 CAPSULE ORAL 2 TIMES DAILY
Qty: 14 CAPSULE | Refills: 0 | Status: SHIPPED | OUTPATIENT
Start: 2024-02-03 | End: 2024-04-01

## 2024-02-03 NOTE — PATIENT INSTRUCTIONS
Dear Breanna,    After reviewing your responses, I would like you to come in for a swab to make sure we treat you correctly. This swab is to evaluate you for possible Strep Throat, and should be scheduled for today or tomorrow. Please use the Schedule Now button in [employer] to schedule your swab. Otherwise, click this link to schedule a lab only appointment.    Lab appointments are not available at most locations on the weekends. If no Lab Only appointment is available, you should be seen in any of our convenient Urgent Care Centers for an in person visit, which can be found on our website here.    You will receive instructions with your results in [employer] once they are available.     If your symptoms worsen, you develop difficulty breathing, difficulty with drinking enough to stay hydrated, difficulty swallowing your saliva or have fevers for more than 5 days, please contact your primary care provider for an appointment or visit an Urgent Care Center to be seen.      Thanks again for choosing us as your health care partner.   Casey Peterson, KENISHA  Sore Throat: Care Instructions  Overview     Infection by bacteria or a virus causes most sore throats. Cigarette smoke, dry air, air pollution, allergies, and yelling can also cause a sore throat. Sore throats can be painful and annoying. Fortunately, most sore throats go away on their own. If you have a bacterial infection, your doctor may prescribe antibiotics.  Follow-up care is a key part of your treatment and safety. Be sure to make and go to all appointments, and call your doctor if you are having problems. It's also a good idea to know your test results and keep a list of the medicines you take.  How can you care for yourself at home?  If your doctor prescribed antibiotics, take them as directed. Do not stop taking them just because you feel better. You need to take the full course of antibiotics.  Gargle with warm salt water several times a day to help reduce  "swelling and relieve pain. Mix 1/2 teaspoon of salt in 1 cup of warm water.  Take an over-the-counter pain medicine, such as acetaminophen (Tylenol), ibuprofen (Advil, Motrin), or naproxen (Aleve). Read and follow all instructions on the label.  Be careful when taking over-the-counter cold or flu medicines and Tylenol at the same time. Many of these medicines have acetaminophen, which is Tylenol. Read the labels to make sure that you are not taking more than the recommended dose. Too much acetaminophen (Tylenol) can be harmful.  Drink plenty of fluids. Fluids may help soothe an irritated throat. Hot fluids, such as tea or soup, may help decrease throat pain.  Use over-the-counter throat lozenges to soothe pain. Regular cough drops or hard candy may also help. These should not be given to young children because of the risk of choking.  Do not smoke or allow others to smoke around you. If you need help quitting, talk to your doctor about stop-smoking programs and medicines. These can increase your chances of quitting for good.  Use a vaporizer or humidifier to add moisture to your bedroom. Follow the directions for cleaning the machine.  When should you call for help?   Call your doctor now or seek immediate medical care if:    You have trouble breathing.     Your sore throat gets much worse on one side.     You have new or worse trouble swallowing.     You have a new or higher fever.   Watch closely for changes in your health, and be sure to contact your doctor if you do not get better as expected.  Where can you learn more?  Go to https://www.[employer].net/patiented  Enter U420 in the search box to learn more about \"Sore Throat: Care Instructions.\"  Current as of: February 28, 2023               Content Version: 13.8    6220-1851 [employer].   Care instructions adapted under license by your healthcare professional. If you have questions about a medical condition or this instruction, always ask your " healthcare professional. [employer] disclaims any warranty or liability for your use of this information.

## 2024-02-06 LAB — BACTERIA UR CULT: ABNORMAL

## 2024-02-22 ENCOUNTER — MYC REFILL (OUTPATIENT)
Dept: FAMILY MEDICINE | Facility: CLINIC | Age: 39
End: 2024-02-22
Payer: COMMERCIAL

## 2024-02-22 DIAGNOSIS — F90.2 ATTENTION DEFICIT HYPERACTIVITY DISORDER (ADHD), COMBINED TYPE: ICD-10-CM

## 2024-02-23 RX ORDER — DEXTROAMPHETAMINE SACCHARATE, AMPHETAMINE ASPARTATE MONOHYDRATE, DEXTROAMPHETAMINE SULFATE AND AMPHETAMINE SULFATE 5; 5; 5; 5 MG/1; MG/1; MG/1; MG/1
20 CAPSULE, EXTENDED RELEASE ORAL DAILY
Qty: 30 CAPSULE | Refills: 0 | Status: SHIPPED | OUTPATIENT
Start: 2024-02-23 | End: 2024-03-29

## 2024-02-23 RX ORDER — DEXTROAMPHETAMINE SACCHARATE, AMPHETAMINE ASPARTATE, DEXTROAMPHETAMINE SULFATE AND AMPHETAMINE SULFATE 5; 5; 5; 5 MG/1; MG/1; MG/1; MG/1
20 TABLET ORAL DAILY
Qty: 30 TABLET | Refills: 0 | Status: SHIPPED | OUTPATIENT
Start: 2024-02-23 | End: 2024-03-29

## 2024-03-17 ENCOUNTER — E-VISIT (OUTPATIENT)
Dept: URGENT CARE | Facility: CLINIC | Age: 39
End: 2024-03-17
Payer: COMMERCIAL

## 2024-03-17 DIAGNOSIS — J06.9 UPPER RESPIRATORY TRACT INFECTION, UNSPECIFIED TYPE: Primary | ICD-10-CM

## 2024-03-17 PROCEDURE — 99207 PR NON-BILLABLE SERV PER CHARTING: CPT | Performed by: PHYSICIAN ASSISTANT

## 2024-03-27 ENCOUNTER — MYC REFILL (OUTPATIENT)
Dept: FAMILY MEDICINE | Facility: CLINIC | Age: 39
End: 2024-03-27
Payer: COMMERCIAL

## 2024-03-27 DIAGNOSIS — F90.2 ATTENTION DEFICIT HYPERACTIVITY DISORDER (ADHD), COMBINED TYPE: ICD-10-CM

## 2024-03-27 RX ORDER — DEXTROAMPHETAMINE SACCHARATE, AMPHETAMINE ASPARTATE, DEXTROAMPHETAMINE SULFATE AND AMPHETAMINE SULFATE 5; 5; 5; 5 MG/1; MG/1; MG/1; MG/1
20 TABLET ORAL DAILY
Qty: 30 TABLET | Refills: 0 | OUTPATIENT
Start: 2024-03-27

## 2024-03-27 RX ORDER — DEXTROAMPHETAMINE SACCHARATE, AMPHETAMINE ASPARTATE MONOHYDRATE, DEXTROAMPHETAMINE SULFATE AND AMPHETAMINE SULFATE 5; 5; 5; 5 MG/1; MG/1; MG/1; MG/1
20 CAPSULE, EXTENDED RELEASE ORAL DAILY
Qty: 30 CAPSULE | Refills: 0 | OUTPATIENT
Start: 2024-03-27

## 2024-03-29 RX ORDER — DEXTROAMPHETAMINE SACCHARATE, AMPHETAMINE ASPARTATE MONOHYDRATE, DEXTROAMPHETAMINE SULFATE AND AMPHETAMINE SULFATE 5; 5; 5; 5 MG/1; MG/1; MG/1; MG/1
20 CAPSULE, EXTENDED RELEASE ORAL DAILY
Qty: 30 CAPSULE | Refills: 0 | Status: SHIPPED | OUTPATIENT
Start: 2024-03-29 | End: 2024-04-26

## 2024-03-29 RX ORDER — DEXTROAMPHETAMINE SACCHARATE, AMPHETAMINE ASPARTATE, DEXTROAMPHETAMINE SULFATE AND AMPHETAMINE SULFATE 5; 5; 5; 5 MG/1; MG/1; MG/1; MG/1
20 TABLET ORAL DAILY
Qty: 30 TABLET | Refills: 0 | Status: SHIPPED | OUTPATIENT
Start: 2024-03-29 | End: 2024-04-26

## 2024-04-01 ENCOUNTER — OFFICE VISIT (OUTPATIENT)
Dept: FAMILY MEDICINE | Facility: CLINIC | Age: 39
End: 2024-04-01
Payer: COMMERCIAL

## 2024-04-01 VITALS
SYSTOLIC BLOOD PRESSURE: 126 MMHG | RESPIRATION RATE: 18 BRPM | DIASTOLIC BLOOD PRESSURE: 82 MMHG | HEIGHT: 66 IN | HEART RATE: 107 BPM | TEMPERATURE: 97.7 F | WEIGHT: 178 LBS | BODY MASS INDEX: 28.61 KG/M2 | OXYGEN SATURATION: 100 %

## 2024-04-01 DIAGNOSIS — R30.0 DYSURIA: ICD-10-CM

## 2024-04-01 DIAGNOSIS — J10.1 INFLUENZA A: ICD-10-CM

## 2024-04-01 DIAGNOSIS — J98.01 ACUTE BRONCHOSPASM: ICD-10-CM

## 2024-04-01 DIAGNOSIS — J06.9 UPPER RESPIRATORY TRACT INFECTION, UNSPECIFIED TYPE: Primary | ICD-10-CM

## 2024-04-01 PROBLEM — J34.89 NASAL OBSTRUCTION: Status: RESOLVED | Noted: 2022-05-02 | Resolved: 2024-04-01

## 2024-04-01 LAB
ALBUMIN UR-MCNC: NEGATIVE MG/DL
APPEARANCE UR: CLEAR
BILIRUB UR QL STRIP: NEGATIVE
COLOR UR AUTO: ABNORMAL
DEPRECATED S PYO AG THROAT QL EIA: NEGATIVE
FLUAV AG SPEC QL IA: POSITIVE
FLUBV AG SPEC QL IA: NEGATIVE
GLUCOSE UR STRIP-MCNC: NEGATIVE MG/DL
GROUP A STREP BY PCR: NOT DETECTED
HGB UR QL STRIP: ABNORMAL
KETONES UR STRIP-MCNC: NEGATIVE MG/DL
LEUKOCYTE ESTERASE UR QL STRIP: NEGATIVE
NITRATE UR QL: NEGATIVE
PH UR STRIP: 7 [PH] (ref 5–7)
RBC URINE: <1 /HPF
RSV AG SPEC QL: NEGATIVE
SP GR UR STRIP: 1 (ref 1–1.03)
SQUAMOUS EPITHELIAL: <1 /HPF
UROBILINOGEN UR STRIP-MCNC: NORMAL MG/DL
WBC URINE: 1 /HPF

## 2024-04-01 PROCEDURE — 87651 STREP A DNA AMP PROBE: CPT | Performed by: FAMILY MEDICINE

## 2024-04-01 PROCEDURE — 81001 URINALYSIS AUTO W/SCOPE: CPT | Performed by: FAMILY MEDICINE

## 2024-04-01 PROCEDURE — 87635 SARS-COV-2 COVID-19 AMP PRB: CPT | Performed by: FAMILY MEDICINE

## 2024-04-01 PROCEDURE — 87807 RSV ASSAY W/OPTIC: CPT | Performed by: FAMILY MEDICINE

## 2024-04-01 PROCEDURE — 99214 OFFICE O/P EST MOD 30 MIN: CPT | Performed by: FAMILY MEDICINE

## 2024-04-01 PROCEDURE — 87804 INFLUENZA ASSAY W/OPTIC: CPT | Performed by: FAMILY MEDICINE

## 2024-04-01 RX ORDER — CODEINE PHOSPHATE AND GUAIFENESIN 10; 100 MG/5ML; MG/5ML
1-2 SOLUTION ORAL EVERY 4 HOURS PRN
Qty: 120 ML | Refills: 0 | Status: SHIPPED | OUTPATIENT
Start: 2024-04-01

## 2024-04-01 RX ORDER — GUAIFENESIN 600 MG/1
600 TABLET, EXTENDED RELEASE ORAL 2 TIMES DAILY
Qty: 20 TABLET | Refills: 0 | Status: SHIPPED | OUTPATIENT
Start: 2024-04-01 | End: 2024-04-11

## 2024-04-01 RX ORDER — ALBUTEROL SULFATE 90 UG/1
2 AEROSOL, METERED RESPIRATORY (INHALATION) EVERY 4 HOURS PRN
Qty: 9 G | Refills: 1 | Status: SHIPPED | OUTPATIENT
Start: 2024-04-01

## 2024-04-01 RX ORDER — ALBUTEROL SULFATE 0.83 MG/ML
2.5 SOLUTION RESPIRATORY (INHALATION) EVERY 4 HOURS PRN
Qty: 90 ML | Refills: 0 | Status: SHIPPED | OUTPATIENT
Start: 2024-04-01

## 2024-04-01 RX ORDER — PREDNISONE 20 MG/1
40 TABLET ORAL DAILY
Qty: 10 TABLET | Refills: 0 | Status: SHIPPED | OUTPATIENT
Start: 2024-04-01 | End: 2024-04-06

## 2024-04-01 RX ORDER — BENZONATATE 200 MG/1
200 CAPSULE ORAL 3 TIMES DAILY PRN
Qty: 30 CAPSULE | Refills: 0 | Status: SHIPPED | OUTPATIENT
Start: 2024-04-01

## 2024-04-01 RX ORDER — ALBUTEROL SULFATE 0.83 MG/ML
2.5 SOLUTION RESPIRATORY (INHALATION) ONCE
Status: ACTIVE | OUTPATIENT
Start: 2024-04-01

## 2024-04-01 ASSESSMENT — PAIN SCALES - GENERAL: PAINLEVEL: MODERATE PAIN (5)

## 2024-04-01 NOTE — PROGRESS NOTES
Assessment & Plan       ICD-10-CM    1. Upper respiratory tract infection, unspecified type  J06.9 RSV rapid antigen     Influenza A & B Antigen - Clinic Collect     Streptococcus A Rapid Screen w/Reflex to PCR - Clinic Collect     Symptomatic COVID-19 Virus (Coronavirus) by PCR Nose     albuterol (PROVENTIL) neb solution 2.5 mg     RSV rapid antigen     Group A Streptococcus PCR Throat Swab     albuterol (PROAIR HFA/PROVENTIL HFA/VENTOLIN HFA) 108 (90 Base) MCG/ACT inhaler     albuterol (PROVENTIL) (2.5 MG/3ML) 0.083% neb solution     benzonatate (TESSALON) 200 MG capsule     guaiFENesin-codeine (ROBITUSSIN AC) 100-10 MG/5ML solution     guaiFENesin (MUCINEX) 600 MG 12 hr tablet     predniSONE (DELTASONE) 20 MG tablet      2. Influenza A  J10.1 predniSONE (DELTASONE) 20 MG tablet      3. Dysuria  R30.0 UA Macroscopic with reflex to Microscopic and Culture - Clinic Collect      4. Acute bronchospasm  J98.01 albuterol (PROAIR HFA/PROVENTIL HFA/VENTOLIN HFA) 108 (90 Base) MCG/ACT inhaler     albuterol (PROVENTIL) (2.5 MG/3ML) 0.083% neb solution     benzonatate (TESSALON) 200 MG capsule     guaiFENesin-codeine (ROBITUSSIN AC) 100-10 MG/5ML solution     guaiFENesin (MUCINEX) 600 MG 12 hr tablet     predniSONE (DELTASONE) 20 MG tablet         Breanna has been sick for 3 to 4 days with URI symptoms with coughing, wheezing, fever, body ache with sinus pressure.  She looked quite uncomfortable today, she was coughing constantly.  No history of asthma or COPD.  Quit smoking about 9 years ago.  I believe that her coughing fits are due to bronchospasm.  She was given an albuterol neb treatment in the office and felt significant better.  She has a neb machine at home.    Labs indicated influenza A.  No COVID, RSV or rapid strep.  O2 sat was normal.  She was not in respiratory distress.    She has been having the symptoms for more than 48 hours, Tamiflu is not appropriate, likely ineffective.  Emphasized on symptomatic  "treatment- Tylenol Motrin for pain and fever,; Zyrtec or Claritin as needed for runny nose or nasal congestion.  Encouraged to drink a lot of water and take it easy.  Tessalon Perle and Robitussin AC as needed for coughing.  Potential side effect discussed.  Also will have her try albuterol inhaler and neb treatment as needed for coughing, wheezing or chest tightness sensation.  Discussed about trying a steroid boost and she is interested, prednisone 40 mg daily for 5-day was prescribed.    Symptoms that need to be seen or call in discussed.  She was instructed to go to the emergency room if develop breathing concern or difficulty.      BMI  Estimated body mass index is 28.73 kg/m  as calculated from the following:    Height as of this encounter: 1.676 m (5' 6\").    Weight as of this encounter: 80.7 kg (178 lb).   Weight management plan: Patient was referred to their PCP to discuss a diet and exercise plan.      Regular exercise    Subjective   Breanna is a 39 year old, presenting for the following health issues:  Sinus Problem (Has a cold/cough and otc meds are not working /Uti symptoms left a sample )        4/1/2024     1:41 PM   Additional Questions   Roomed by Alena     History of Present Illness       Reason for visit:  Cough/illness plus UTI symptoms  Symptom onset:  1-3 days ago  Symptoms include:  Fever, chills, cough, runny nose plus UTI symptoms kidney pain and cloudy urine  Symptom intensity:  Severe  Symptom progression:  Worsening  Had these symptoms before:  Yes  Has tried/received treatment for these symptoms:  Yes  Previous treatment was successful:  No  What makes it worse:  Movement  What makes it better:  Rest    She eats 4 or more servings of fruits and vegetables daily.She consumes 1 sweetened beverage(s) daily.She exercises with enough effort to increase her heart rate 30 to 60 minutes per day.  She exercises with enough effort to increase her heart rate 4 days per week.   She is taking " "medications regularly.     Breanna is here today for 3 to 4-day history of URI symptoms with coughing, wheezing, fever, body ache with sinus pressure.  Very uncomfortable and OTC med has not helped.  Coughing keeping her up at night.  No history of asthma or COPD.  Quit smoking about 9 years ago.  Also having the runny nose.  Cough is nonproductive.  Was in the airplane for 5 days ago. UTD for COVID and flu vaccination.  Eating and drinking well and has been drinking a lot of water.  A lot of pain from chest pain from coughing.  No leg swelling, orthopnea or dyspnea.    She is also concerned for bladder infection.  Been having some discomfort with urination and urinary frequency.  No nausea or vomiting.  No abdominal pain.  No obvious hematuria.    Review of Systems  Constitutional, HEENT, cardiovascular, pulmonary, gi and gu systems are negative, except as otherwise noted.      Objective    /82 (Cuff Size: Adult Regular)   Pulse 107   Temp 97.7  F (36.5  C) (Temporal)   Resp 18   Ht 1.676 m (5' 6\")   Wt 80.7 kg (178 lb)   SpO2 100%   BMI 28.73 kg/m    Body mass index is 28.73 kg/m .  Physical Exam   GENERAL: alert and no distress, speaking in full sentences.  She was coughing constantly while in the office.  EYES: Eyes grossly normal to inspection, PERRL and conjunctivae and sclerae normal.  No conjunctival injection.  HENT: ear canals and TM's normal.  Nares are congested with clear drainage.  Oropharynx pink and moist.  No tonsil hypertrophy, exudate or erythema.  No tender will place into the sinuses.  NECK: Supple, no lymphadenopathy or thyromegaly.  RESP: lungs clear to auscultation - no rales, rhonchi or wheezes.  Good respiratory effort throughout.  Chest wall with tender to palpate throughout  CV: regular rate and rhythm, normal S1 S2, no S3 or S4, no murmur, click or rub  ABDOMEN: soft, nontender, no hepatosplenomegaly, no masses and bowel sounds normal    Results for orders placed or performed " in visit on 04/01/24   Symptomatic COVID-19 Virus (Coronavirus) by PCR Nose     Status: Normal    Specimen: Nose; Swab   Result Value Ref Range    SARS CoV2 PCR Negative Negative    Narrative    Testing was performed using the eladia SARS-CoV-2 assay on the el[employer]ia  [email or web address] System. This test should be ordered for the detection of  SARS-CoV-2 in individuals who meet SARS-CoV-2 clinical and/or  epidemiological criteria. Test performance is unknown in asymptomatic  patients. This test is for in vitro diagnostic use under the FDA EUA  for laboratories certified under CLIA to perform high and/or moderate  complexity testing. This test has not been FDA cleared or approved. A  negative result does not rule out the presence of PCR inhibitors in  the specimen or target RNA in concentration below the limit of  detection for the assay. The possibility of a false negative should  be considered if the patient's recent exposure or clinical  presentation suggests COVID-19. This test was validated by the [facility]. This  laboratory is certified under the Clinical Laboratory Improvement  Amendments of 1988 (CLIA-88) as qualified to perform high and/or  moderate complexity laboratory testing.   UA Macroscopic with reflex to Microscopic and Culture - Clinic Collect     Status: Abnormal    Specimen: Urine, Midstream   Result Value Ref Range    Color Urine Straw Colorless, Straw, Light Yellow, Yellow    Appearance Urine Clear Clear    Glucose Urine Negative Negative mg/dL    Bilirubin Urine Negative Negative    Ketones Urine Negative Negative mg/dL    Specific Gravity Urine 1.004 1.003 - 1.035    Blood Urine Small (A) Negative    pH Urine 7.0 5.0 - 7.0    Protein Albumin Urine Negative Negative mg/dL    Urobilinogen Urine Normal Normal, 2.0 mg/dL    Nitrite Urine Negative Negative    Leukocyte Esterase Urine Negative Negative    RBC Urine <1 <=2 /HPF    WBC Urine 1 <=5 /HPF    Squamous  Epithelials Urine <1 <=1 /HPF    Narrative    Urine Culture not indicated   Influenza A & B Antigen - Clinic Collect     Status: Abnormal    Specimen: Nasopharyngeal; Swab   Result Value Ref Range    Influenza A antigen Positive (A) Negative    Influenza B antigen Negative Negative    Narrative    Test results must be correlated with clinical data. If necessary, results should be confirmed by a molecular assay or viral culture.   Streptococcus A Rapid Screen w/Reflex to PCR - Clinic Collect     Status: Normal    Specimen: Throat; Swab   Result Value Ref Range    Group A Strep antigen Negative Negative   RSV rapid antigen     Status: Normal    Specimen: Nasopharyngeal; Swab   Result Value Ref Range    Respiratory Syncytial Virus antigen Negative Negative    Narrative    Test results must be correlated with clinical data. If necessary, results should be confirmed by a molecular assay or viral culture.   Group A Streptococcus PCR Throat Swab     Status: Normal    Specimen: Throat; Swab   Result Value Ref Range    Group A strep by PCR Not Detected Not Detected    Narrative    The Xpert Xpress Strep A test, performed on the [employer], is a rapid, qualitative in vitro diagnostic test for the detection of Streptococcus pyogenes (Group A ß-hemolytic Streptococcus, Strep A) in throat swab specimens from patients with signs and symptoms of pharyngitis. The Xpert Xpress Strep A test can be used as an aid in the diagnosis of Group A Streptococcal pharyngitis. The assay is not intended to monitor treatment for Group A Streptococcus infections. The Xpert Xpress Strep A test utilizes an automated real-time polymerase chain reaction (PCR) to detect Streptococcus pyogenes DNA.           Signed Electronically by: Edita Duffy Mai, MD

## 2024-04-02 LAB — SARS-COV-2 RNA RESP QL NAA+PROBE: NEGATIVE

## 2024-04-05 ENCOUNTER — TELEPHONE (OUTPATIENT)
Dept: NEUROLOGY | Facility: CLINIC | Age: 39
End: 2024-04-05

## 2024-04-05 DIAGNOSIS — G43.709 CHRONIC MIGRAINE WITHOUT AURA WITHOUT STATUS MIGRAINOSUS, NOT INTRACTABLE: Primary | ICD-10-CM

## 2024-04-05 NOTE — TELEPHONE ENCOUNTER
New referral for botox needed.  Pt is now scheduled with Dr. Ramos 5/30.     Dr. Ramos please place updated order for botox.     Kristine QUEZADA RN, BSN  [facility]

## 2024-04-05 NOTE — TELEPHONE ENCOUNTER
Attempted to contact patient regarding current infection of Influenza.    At this time per provider recommendation, needs to reschedule appointment for Botox to when patient is not carrying the infection. Per chart planned expiration of infection 04/08/24.    LM for callback.    Olivia SCHUSTER [facility]      Action(s):  - patient needs to reschedule appointment today to a time when she is feeling well and is free from the infection.   - please route call or message to  Neurology for scheduling if nothing is available sooner.    Olivia SCHUSTER [facility]

## 2024-04-25 DIAGNOSIS — F90.2 ATTENTION DEFICIT HYPERACTIVITY DISORDER (ADHD), COMBINED TYPE: ICD-10-CM

## 2024-04-26 RX ORDER — DEXTROAMPHETAMINE SACCHARATE, AMPHETAMINE ASPARTATE, DEXTROAMPHETAMINE SULFATE AND AMPHETAMINE SULFATE 5; 5; 5; 5 MG/1; MG/1; MG/1; MG/1
20 TABLET ORAL DAILY
Qty: 30 TABLET | Refills: 0 | Status: SHIPPED | OUTPATIENT
Start: 2024-04-26 | End: 2024-05-28

## 2024-04-26 RX ORDER — DEXTROAMPHETAMINE SACCHARATE, AMPHETAMINE ASPARTATE MONOHYDRATE, DEXTROAMPHETAMINE SULFATE AND AMPHETAMINE SULFATE 5; 5; 5; 5 MG/1; MG/1; MG/1; MG/1
20 CAPSULE, EXTENDED RELEASE ORAL DAILY
Qty: 30 CAPSULE | Refills: 0 | Status: SHIPPED | OUTPATIENT
Start: 2024-04-26 | End: 2024-05-28

## 2024-04-27 NOTE — LETTER
7/9/2019         RE: Breanna Vidal  [address]  [place] [address]        Dear Colleague,    Thank you for referring your patient, Breanna Vidal, to the [facility]. Please see a copy of my visit note below.    General Surgery Consultation    Breanna Vidal MRN# [id number]   Age: 34 year old YOB: 1985     Reason for consult: Incisional hernia                        Assessment and Plan:   I was asked to see this patient at the request of Cyn Campos CNP for evaluation of incisional hernia .  Breanna Vidal is a 34 year old female who presented with history, exam, laboratory and imaging most consistent with:        ICD-10-CM    1. Incisional hernia, without obstruction or gangrene K43.2 Mimi-Operative Worksheet   2. Obesity (BMI 30.0-34.9) E66.9      The patient was thoroughly counseled regarding Incisional hernia, without obstruction or gangrene [K43.2].     The patient was informed that the proposed procedure or medical intervention involves reduction and repair with or without mesh and does offer a very good likelihood of symptom relief. We also discussed the options of repairing the hernia laparoscopically, robotic,  versus open. Patient opted for laparoscopic repair.      The patient was made aware of the risks of the procedure, including but not limited to:  nerve entrapment or injury, persistence of pain (10%), injury to the bowel/bladder,hematoma, mesh migration, mesh infection, cardiac or pulmonary complication and anesthesia related complications also that difficulties may be encountered during recovery to include: wound infection, recurrence (5-10%), seroma, hematoma and chronic pain.     In the course of the evaluation we did discuss other therapeutic options with the patient, including continued watchful waiting. The risks and benefits of these options were also discussed which include but are not limited to: incarceration and/or strangulation..     Also discussed were  possible problems or difficulties the patient may encounter if treatment was not pursued at this time.     The patient was informed [facility] will be primarily responsible for the procedure. Assistance during the procedure and during hospitalization may also be provided by other physicians, nurses and technicians.     The patient was also informed that if exposure to the patient s blood or body fluids occurs during the procedure, HIV testing of the patient will occur unless they refuse at this time. Risk of blood transfusion is minimal.     The patient will be provided additional education resources by the support staff. If there are ever any questions regarding their diagnosis or the procedure, the patient is encouraged to ask.     All of the patient s or their legal representative s questions have been answered to their satisfaction and they have indicated a clear understanding of this discussion.   Breanna expressed understanding of risks, benefits and alternatives and wished to proceed.     All findings, test results, and diagnosis were discussed with the patient. Breanna  participated in the decision making process and agreed with the plan of care. Questions were sought and answered.       I thank Karen Rivera CNP for the opportunity to participate in the patient's care.           Chief Complaint:   Incisional hernia     History is obtained from the patient         History of Present Illness:   This patient is a 34 year old  female without a significant past medical history who presents with incisional hernia.  She stated this hernia was first noted about a year ago.  Patient did not know at the time that it was a hernia however she noted a bulge around her umbilicus.  Ever since this bulge has been getting bigger in size.  It is firm upon standing or sitting up.  It is painful when she tries to push the bulge back in.  Denies any abdominal pain after eating.  Denies any abdominal  distention.  Having normal bowel movements.  Melena no hematochezia.    Patient stated she has had 4 children; she had a lap appendectomy in the early 2000's.  Had a laparoscopic bilateral salpingectomy back in 2018.  Patient also have had bilateral mammoplasty reduction.  Denies any history of MI.  Denies history of CVA.  Patient stated she gained a lot of weight and have had much lifestyle changes and have a loss a lot of the weight.  She did not quit smoking a year ago.  Patient currently not using any drugs.          Past Medical History:    has a past medical history of Cellulitis (2012), Drug abuse (H) (2011), MRSA cellulitis (2012), and Rh negative, antepartum.          Past Surgical History:     Past Surgical History:   Procedure Laterality Date     INCISION AND DRAINAGE  04/18/2012    chin abscess I&D     LAPAROSCOPIC SALPINGECTOMY Bilateral 8/27/2018    Procedure: LAPAROSCOPIC SALPINGECTOMY;  Laparoscopic Bilateral Salpingectomy;  Surgeon: Chai Cui MD;  Location: [facility]     MAMMOPLASTY REDUCTION Bilateral 08/04/2017           Medications:     Current Outpatient Medications on File Prior to Visit:  EPINEPHrine (EPIPEN/ADRENACLICK/OR ANY BX GENERIC EQUIV) 0.3 MG/0.3ML injection 2-pack INJECT  0.3 ML IM 1 TIME PRF ANAPHYLAXIS   fluticasone (FLONASE) 50 MCG/ACT nasal spray Spray 1 spray into both nostrils daily   gabapentin (NEURONTIN) 300 MG capsule Take 1 capsule (300 mg) by mouth At Bedtime   isometheptene-dichloralphenazone-acetaminophen (MIDRIN) -325 MG capsule Take 1 capsule by mouth See Admin Instructions 1-2 po at onset of headache.  May repeat one po q hour. No more than 5 per day, 10 per week.   multivitamin w/minerals (MULTI-VITAMIN) tablet Take 1 tablet by mouth daily   naproxen (NAPROSYN) 500 MG tablet    naratriptan (AMERGE) 2.5 MG tablet Take 1 tablet (2.5 mg) by mouth at onset of headache for migraine     No current facility-administered medications on file prior to visit.        Allergies:      Allergies   Allergen Reactions     Codeine Nausea     Other reaction(s): Nausea     Imitrex [Sumatriptan]      Worsening of migraine, paresthesias     Ranitidine Rash and Itching     recvd IV zantac 75 and reaction was immediate,  was given Benadryl to counter act reaction in 2000     Sulfa Drugs Unknown and Rash            Social History:   Breanna Vidal  reports that she has quit smoking. She quit smokeless tobacco use about 3 years ago. She reports that she does not drink alcohol or use drugs.          Family History:   The patient has no family history of any bleeding, clotting or anesthesia problems.          Review of Systems:     Constitutional: Denies fever or chills   Eyes: Denies change in visual acuity   HENT: Denies nasal congestion or sore throat   Respiratory: Denies cough or shortness of breath   Cardiovascular: Denies chest pain or edema   GI: Denies abdominal pain, nausea, vomiting, bloody stools or diarrhea   : Denies dysuria   Musculoskeletal: Denies back pain or joint pain   Integument: Denies rash   Neurologic: Denies headache, focal weakness or sensory changes   Endocrine: Denies polyuria or polydipsia   Lymphatic: Denies swollen glands   Psychiatric: Denies depression or anxiety          Physical Exam:     Constitutional: Awake, alert, no acute distress.  Eyes:  No scleral icterus.  Conjunctiva are without injection.  ENMT: Mucous membranes moist, dentition and gums are intact.   Neck: Soft, supple, trachea midline.    Endocrine: The thyroid is without masses and mobile with swallow.   Lymphatic: There is no cervical, submandibular, or inguinal adenopathy.  Respiratory: Lungs are clear to auscultation and percussion bilaterally.   Cardiovascular: Regular rate and rhythm. No murmurs, rubs, or gallops.    Abdomen: Non-distended, non-tender, normoactive bowel sounds present, No masses, incisonal hernia with multiple defect around the umbilicus.  No hepatosplenomegaly.  "  Musculoskeletal: No spinal or CVA tenderness. Full range of motion in the upper and lower extremities.    Skin: No skin rashes or lesions to inspection.  No petechia.    Neurologic: Cranial nerves II through XII are grossly intact and symmetric.  Psychiatric: The patient is alert and oriented times 3.  The patient's affect is not blunted and mood is appropriate.          Data:   Vital Signs:  /68   Temp 97.2  F (36.2  C) (Temporal)   Ht 1.676 m (5' 5.98\")   Wt 88.3 kg (194 lb 9.6 oz)   BMI 31.43 kg/m        WBC -   WBC   Date Value Ref Range Status   04/10/2019 8.0 4.0 - 11.0 10e9/L Final   ], HgB -   Hemoglobin   Date Value Ref Range Status   04/10/2019 13.8 11.7 - 15.7 g/dL Final   ]   Liver Function Studies -   Recent Labs   Lab Test 10/14/18  2102   PROTTOTAL 7.6   ALBUMIN 3.5   BILITOTAL 0.4   ALKPHOS 146   AST 29   ALT 64*     No results found for this or any previous visit (from the past 744 hour(s)).     Jovi Odom DO 7/9/2019 3:00 PM           Again, thank you for allowing me to participate in the care of your patient.        Sincerely,        Jovi Odom MD    " No

## 2024-05-24 ENCOUNTER — MYC MEDICAL ADVICE (OUTPATIENT)
Dept: FAMILY MEDICINE | Facility: CLINIC | Age: 39
End: 2024-05-24
Payer: COMMERCIAL

## 2024-05-24 DIAGNOSIS — F90.2 ATTENTION DEFICIT HYPERACTIVITY DISORDER (ADHD), COMBINED TYPE: ICD-10-CM

## 2024-05-28 RX ORDER — DEXTROAMPHETAMINE SACCHARATE, AMPHETAMINE ASPARTATE, DEXTROAMPHETAMINE SULFATE AND AMPHETAMINE SULFATE 5; 5; 5; 5 MG/1; MG/1; MG/1; MG/1
20 TABLET ORAL DAILY
Qty: 30 TABLET | Refills: 0 | Status: SHIPPED | OUTPATIENT
Start: 2024-05-28 | End: 2024-06-18

## 2024-05-28 RX ORDER — DEXTROAMPHETAMINE SACCHARATE, AMPHETAMINE ASPARTATE MONOHYDRATE, DEXTROAMPHETAMINE SULFATE AND AMPHETAMINE SULFATE 5; 5; 5; 5 MG/1; MG/1; MG/1; MG/1
20 CAPSULE, EXTENDED RELEASE ORAL DAILY
Qty: 30 CAPSULE | Refills: 0 | Status: SHIPPED | OUTPATIENT
Start: 2024-05-28 | End: 2024-06-18

## 2024-06-18 ENCOUNTER — MYC REFILL (OUTPATIENT)
Dept: FAMILY MEDICINE | Facility: CLINIC | Age: 39
End: 2024-06-18
Payer: COMMERCIAL

## 2024-06-18 DIAGNOSIS — F90.2 ATTENTION DEFICIT HYPERACTIVITY DISORDER (ADHD), COMBINED TYPE: ICD-10-CM

## 2024-06-19 RX ORDER — DEXTROAMPHETAMINE SACCHARATE, AMPHETAMINE ASPARTATE, DEXTROAMPHETAMINE SULFATE AND AMPHETAMINE SULFATE 5; 5; 5; 5 MG/1; MG/1; MG/1; MG/1
20 TABLET ORAL DAILY
Qty: 30 TABLET | Refills: 0 | Status: SHIPPED | OUTPATIENT
Start: 2024-06-19 | End: 2024-07-21

## 2024-06-19 RX ORDER — DEXTROAMPHETAMINE SACCHARATE, AMPHETAMINE ASPARTATE MONOHYDRATE, DEXTROAMPHETAMINE SULFATE AND AMPHETAMINE SULFATE 5; 5; 5; 5 MG/1; MG/1; MG/1; MG/1
20 CAPSULE, EXTENDED RELEASE ORAL DAILY
Qty: 30 CAPSULE | Refills: 0 | Status: SHIPPED | OUTPATIENT
Start: 2024-06-19 | End: 2024-07-21

## 2024-07-07 ENCOUNTER — E-VISIT (OUTPATIENT)
Dept: URGENT CARE | Facility: CLINIC | Age: 39
End: 2024-07-07
Payer: COMMERCIAL

## 2024-07-07 DIAGNOSIS — N39.0 ACUTE UTI (URINARY TRACT INFECTION): Primary | ICD-10-CM

## 2024-07-07 PROCEDURE — 99421 OL DIG E/M SVC 5-10 MIN: CPT | Performed by: FAMILY MEDICINE

## 2024-07-07 RX ORDER — NITROFURANTOIN 25; 75 MG/1; MG/1
100 CAPSULE ORAL 2 TIMES DAILY
Qty: 10 CAPSULE | Refills: 0 | Status: SHIPPED | OUTPATIENT
Start: 2024-07-07 | End: 2024-07-12

## 2024-07-07 NOTE — PATIENT INSTRUCTIONS
Dear rBeanna Luna    After reviewing your responses, I've been able to diagnose you with a urinary tract infection, which is a common infection of the bladder with bacteria.  This is not a sexually transmitted infection, though urinating immediately after intercourse can help prevent infections.  Drinking lots of fluids is also helpful to clear your current infection and prevent the next one.      I have sent a prescription for antibiotics to your pharmacy to treat this infection.  You have listed a sulfa allergy- trimethoprim along is not used for UTIs-it is combined with sulfa and cannot be prescribed for you per your chart record.    It is important that you take all of your prescribed medication even if your symptoms are improving after a few doses.  Taking all of your medicine helps prevent the symptoms from returning.     If your symptoms worsen, you develop pain in your back or stomach, develop fevers, or are not improving in 5 days, please contact your primary care provider for an appointment or visit any of our convenient Walk-in or Urgent Care Centers to be seen, which can be found on our website here.    Thanks again for choosing us as your health care partner,    Mindy Galicia MD

## 2024-07-21 ENCOUNTER — MYC REFILL (OUTPATIENT)
Dept: FAMILY MEDICINE | Facility: CLINIC | Age: 39
End: 2024-07-21
Payer: COMMERCIAL

## 2024-07-21 DIAGNOSIS — F90.2 ATTENTION DEFICIT HYPERACTIVITY DISORDER (ADHD), COMBINED TYPE: ICD-10-CM

## 2024-07-23 RX ORDER — DEXTROAMPHETAMINE SACCHARATE, AMPHETAMINE ASPARTATE, DEXTROAMPHETAMINE SULFATE AND AMPHETAMINE SULFATE 5; 5; 5; 5 MG/1; MG/1; MG/1; MG/1
20 TABLET ORAL DAILY
Qty: 30 TABLET | Refills: 0 | Status: SHIPPED | OUTPATIENT
Start: 2024-07-23 | End: 2024-08-20

## 2024-07-23 RX ORDER — DEXTROAMPHETAMINE SACCHARATE, AMPHETAMINE ASPARTATE MONOHYDRATE, DEXTROAMPHETAMINE SULFATE AND AMPHETAMINE SULFATE 5; 5; 5; 5 MG/1; MG/1; MG/1; MG/1
20 CAPSULE, EXTENDED RELEASE ORAL DAILY
Qty: 30 CAPSULE | Refills: 0 | Status: SHIPPED | OUTPATIENT
Start: 2024-07-23 | End: 2024-08-20

## 2024-08-20 ENCOUNTER — MYC REFILL (OUTPATIENT)
Dept: FAMILY MEDICINE | Facility: CLINIC | Age: 39
End: 2024-08-20
Payer: COMMERCIAL

## 2024-08-20 DIAGNOSIS — F90.2 ATTENTION DEFICIT HYPERACTIVITY DISORDER (ADHD), COMBINED TYPE: ICD-10-CM

## 2024-08-23 RX ORDER — DEXTROAMPHETAMINE SACCHARATE, AMPHETAMINE ASPARTATE, DEXTROAMPHETAMINE SULFATE AND AMPHETAMINE SULFATE 5; 5; 5; 5 MG/1; MG/1; MG/1; MG/1
20 TABLET ORAL DAILY
Qty: 30 TABLET | Refills: 0 | Status: SHIPPED | OUTPATIENT
Start: 2024-08-23 | End: 2024-09-18

## 2024-08-23 RX ORDER — DEXTROAMPHETAMINE SACCHARATE, AMPHETAMINE ASPARTATE MONOHYDRATE, DEXTROAMPHETAMINE SULFATE AND AMPHETAMINE SULFATE 5; 5; 5; 5 MG/1; MG/1; MG/1; MG/1
20 CAPSULE, EXTENDED RELEASE ORAL DAILY
Qty: 30 CAPSULE | Refills: 0 | Status: SHIPPED | OUTPATIENT
Start: 2024-08-23 | End: 2024-09-18

## 2024-08-23 NOTE — TELEPHONE ENCOUNTER
Attempted contacting patient, no answer, left a message asking for a return call. Patient needs to be scheduled for a medication recheck appointment.     Yara Ornelas, VF

## 2024-08-31 ENCOUNTER — HEALTH MAINTENANCE LETTER (OUTPATIENT)
Age: 39
End: 2024-08-31

## 2024-09-18 ENCOUNTER — MYC REFILL (OUTPATIENT)
Dept: FAMILY MEDICINE | Facility: CLINIC | Age: 39
End: 2024-09-18
Payer: COMMERCIAL

## 2024-09-18 DIAGNOSIS — F90.2 ATTENTION DEFICIT HYPERACTIVITY DISORDER (ADHD), COMBINED TYPE: ICD-10-CM

## 2024-09-18 RX ORDER — DEXTROAMPHETAMINE SACCHARATE, AMPHETAMINE ASPARTATE MONOHYDRATE, DEXTROAMPHETAMINE SULFATE AND AMPHETAMINE SULFATE 5; 5; 5; 5 MG/1; MG/1; MG/1; MG/1
20 CAPSULE, EXTENDED RELEASE ORAL DAILY
Qty: 30 CAPSULE | Refills: 0 | Status: SHIPPED | OUTPATIENT
Start: 2024-09-18

## 2024-09-18 RX ORDER — DEXTROAMPHETAMINE SACCHARATE, AMPHETAMINE ASPARTATE, DEXTROAMPHETAMINE SULFATE AND AMPHETAMINE SULFATE 5; 5; 5; 5 MG/1; MG/1; MG/1; MG/1
20 TABLET ORAL DAILY
Qty: 30 TABLET | Refills: 0 | Status: SHIPPED | OUTPATIENT
Start: 2024-09-18

## 2024-10-03 SDOH — HEALTH STABILITY: PHYSICAL HEALTH: ON AVERAGE, HOW MANY DAYS PER WEEK DO YOU ENGAGE IN MODERATE TO STRENUOUS EXERCISE (LIKE A BRISK WALK)?: 7 DAYS

## 2024-10-03 SDOH — HEALTH STABILITY: PHYSICAL HEALTH: ON AVERAGE, HOW MANY MINUTES DO YOU ENGAGE IN EXERCISE AT THIS LEVEL?: 90 MIN

## 2024-10-03 ASSESSMENT — SOCIAL DETERMINANTS OF HEALTH (SDOH): HOW OFTEN DO YOU GET TOGETHER WITH FRIENDS OR RELATIVES?: TWICE A WEEK

## 2024-10-08 ENCOUNTER — OFFICE VISIT (OUTPATIENT)
Dept: FAMILY MEDICINE | Facility: CLINIC | Age: 39
End: 2024-10-08
Payer: COMMERCIAL

## 2024-10-08 VITALS
BODY MASS INDEX: 27.18 KG/M2 | RESPIRATION RATE: 20 BRPM | WEIGHT: 169.1 LBS | OXYGEN SATURATION: 98 % | HEART RATE: 82 BPM | HEIGHT: 66 IN | TEMPERATURE: 98.8 F | SYSTOLIC BLOOD PRESSURE: 113 MMHG | DIASTOLIC BLOOD PRESSURE: 75 MMHG

## 2024-10-08 DIAGNOSIS — N92.0 MENORRHAGIA WITH REGULAR CYCLE: ICD-10-CM

## 2024-10-08 DIAGNOSIS — N89.8 VAGINAL DISCHARGE: ICD-10-CM

## 2024-10-08 DIAGNOSIS — Z00.00 ROUTINE GENERAL MEDICAL EXAMINATION AT A HEALTH CARE FACILITY: Primary | ICD-10-CM

## 2024-10-08 DIAGNOSIS — Z87.440 PERSONAL HISTORY OF URINARY TRACT INFECTION: ICD-10-CM

## 2024-10-08 DIAGNOSIS — F10.11 ALCOHOL ABUSE, IN REMISSION: ICD-10-CM

## 2024-10-08 DIAGNOSIS — F90.2 ATTENTION DEFICIT HYPERACTIVITY DISORDER (ADHD), COMBINED TYPE: ICD-10-CM

## 2024-10-08 DIAGNOSIS — F15.11 METHAMPHETAMINE ABUSE IN REMISSION (H): ICD-10-CM

## 2024-10-08 DIAGNOSIS — G43.019 INTRACTABLE MIGRAINE WITHOUT AURA AND WITHOUT STATUS MIGRAINOSUS: ICD-10-CM

## 2024-10-08 PROBLEM — E66.01 MORBID OBESITY (H): Status: RESOLVED | Noted: 2023-10-19 | Resolved: 2024-10-08

## 2024-10-08 LAB
ALBUMIN SERPL BCG-MCNC: 4.4 G/DL (ref 3.5–5.2)
ALP SERPL-CCNC: 45 U/L (ref 40–150)
ALT SERPL W P-5'-P-CCNC: 33 U/L (ref 0–50)
AMPHETAMINES UR QL SCN: ABNORMAL
ANION GAP SERPL CALCULATED.3IONS-SCNC: 7 MMOL/L (ref 7–15)
AST SERPL W P-5'-P-CCNC: 23 U/L (ref 0–45)
BARBITURATES UR QL SCN: ABNORMAL
BASOPHILS # BLD AUTO: 0 10E3/UL (ref 0–0.2)
BASOPHILS NFR BLD AUTO: 1 %
BENZODIAZ UR QL SCN: ABNORMAL
BILIRUB SERPL-MCNC: 0.3 MG/DL
BUN SERPL-MCNC: 10.4 MG/DL (ref 6–20)
BZE UR QL SCN: ABNORMAL
CALCIUM SERPL-MCNC: 9.6 MG/DL (ref 8.8–10.4)
CANNABINOIDS UR QL SCN: ABNORMAL
CHLORIDE SERPL-SCNC: 104 MMOL/L (ref 98–107)
CLUE CELLS: NORMAL
CREAT SERPL-MCNC: 0.67 MG/DL (ref 0.51–0.95)
EGFRCR SERPLBLD CKD-EPI 2021: >90 ML/MIN/1.73M2
EOSINOPHIL # BLD AUTO: 0.1 10E3/UL (ref 0–0.7)
EOSINOPHIL NFR BLD AUTO: 1 %
ERYTHROCYTE [DISTWIDTH] IN BLOOD BY AUTOMATED COUNT: 12.9 % (ref 10–15)
FENTANYL UR QL: ABNORMAL
GLUCOSE SERPL-MCNC: 80 MG/DL (ref 70–99)
HCO3 SERPL-SCNC: 27 MMOL/L (ref 22–29)
HCT VFR BLD AUTO: 36.4 % (ref 35–47)
HGB BLD-MCNC: 12.7 G/DL (ref 11.7–15.7)
IMM GRANULOCYTES # BLD: 0 10E3/UL
IMM GRANULOCYTES NFR BLD: 0 %
LYMPHOCYTES # BLD AUTO: 1.8 10E3/UL (ref 0.8–5.3)
LYMPHOCYTES NFR BLD AUTO: 28 %
MCH RBC QN AUTO: 30.5 PG (ref 26.5–33)
MCHC RBC AUTO-ENTMCNC: 34.9 G/DL (ref 31.5–36.5)
MCV RBC AUTO: 88 FL (ref 78–100)
MONOCYTES # BLD AUTO: 0.4 10E3/UL (ref 0–1.3)
MONOCYTES NFR BLD AUTO: 6 %
NEUTROPHILS # BLD AUTO: 4.3 10E3/UL (ref 1.6–8.3)
NEUTROPHILS NFR BLD AUTO: 65 %
NRBC # BLD AUTO: 0 10E3/UL
NRBC BLD AUTO-RTO: 0 /100
OPIATES UR QL SCN: ABNORMAL
PCP QUAL URINE (ROCHE): ABNORMAL
PLATELET # BLD AUTO: 225 10E3/UL (ref 150–450)
POTASSIUM SERPL-SCNC: 3.9 MMOL/L (ref 3.4–5.3)
PROT SERPL-MCNC: 6.8 G/DL (ref 6.4–8.3)
RBC # BLD AUTO: 4.16 10E6/UL (ref 3.8–5.2)
SODIUM SERPL-SCNC: 138 MMOL/L (ref 135–145)
TRICHOMONAS, WET PREP: NORMAL
TSH SERPL DL<=0.005 MIU/L-ACNC: 0.63 UIU/ML (ref 0.3–4.2)
WBC # BLD AUTO: 6.6 10E3/UL (ref 4–11)
WBC'S/HIGH POWER FIELD, WET PREP: NORMAL
YEAST, WET PREP: NORMAL

## 2024-10-08 PROCEDURE — 99395 PREV VISIT EST AGE 18-39: CPT | Mod: 25 | Performed by: STUDENT IN AN ORGANIZED HEALTH CARE EDUCATION/TRAINING PROGRAM

## 2024-10-08 PROCEDURE — 90656 IIV3 VACC NO PRSV 0.5 ML IM: CPT | Performed by: STUDENT IN AN ORGANIZED HEALTH CARE EDUCATION/TRAINING PROGRAM

## 2024-10-08 PROCEDURE — 80307 DRUG TEST PRSMV CHEM ANLYZR: CPT | Performed by: STUDENT IN AN ORGANIZED HEALTH CARE EDUCATION/TRAINING PROGRAM

## 2024-10-08 PROCEDURE — 90471 IMMUNIZATION ADMIN: CPT | Performed by: STUDENT IN AN ORGANIZED HEALTH CARE EDUCATION/TRAINING PROGRAM

## 2024-10-08 PROCEDURE — 84443 ASSAY THYROID STIM HORMONE: CPT | Performed by: STUDENT IN AN ORGANIZED HEALTH CARE EDUCATION/TRAINING PROGRAM

## 2024-10-08 PROCEDURE — 87210 SMEAR WET MOUNT SALINE/INK: CPT | Performed by: STUDENT IN AN ORGANIZED HEALTH CARE EDUCATION/TRAINING PROGRAM

## 2024-10-08 PROCEDURE — 99214 OFFICE O/P EST MOD 30 MIN: CPT | Mod: 25 | Performed by: STUDENT IN AN ORGANIZED HEALTH CARE EDUCATION/TRAINING PROGRAM

## 2024-10-08 PROCEDURE — 85025 COMPLETE CBC W/AUTO DIFF WBC: CPT | Performed by: STUDENT IN AN ORGANIZED HEALTH CARE EDUCATION/TRAINING PROGRAM

## 2024-10-08 PROCEDURE — 80053 COMPREHEN METABOLIC PANEL: CPT | Performed by: STUDENT IN AN ORGANIZED HEALTH CARE EDUCATION/TRAINING PROGRAM

## 2024-10-08 PROCEDURE — 36415 COLL VENOUS BLD VENIPUNCTURE: CPT | Performed by: STUDENT IN AN ORGANIZED HEALTH CARE EDUCATION/TRAINING PROGRAM

## 2024-10-08 RX ORDER — DEXTROAMPHETAMINE SACCHARATE, AMPHETAMINE ASPARTATE, DEXTROAMPHETAMINE SULFATE AND AMPHETAMINE SULFATE 5; 5; 5; 5 MG/1; MG/1; MG/1; MG/1
20 TABLET ORAL DAILY
Qty: 30 TABLET | Refills: 0 | Status: SHIPPED | OUTPATIENT
Start: 2024-10-08 | End: 2024-11-07

## 2024-10-08 RX ORDER — DEXTROAMPHETAMINE SACCHARATE, AMPHETAMINE ASPARTATE, DEXTROAMPHETAMINE SULFATE AND AMPHETAMINE SULFATE 5; 5; 5; 5 MG/1; MG/1; MG/1; MG/1
20 TABLET ORAL DAILY
Qty: 30 TABLET | Refills: 0 | Status: SHIPPED | OUTPATIENT
Start: 2024-11-07 | End: 2024-12-07

## 2024-10-08 RX ORDER — DEXTROAMPHETAMINE SACCHARATE, AMPHETAMINE ASPARTATE MONOHYDRATE, DEXTROAMPHETAMINE SULFATE AND AMPHETAMINE SULFATE 5; 5; 5; 5 MG/1; MG/1; MG/1; MG/1
20 CAPSULE, EXTENDED RELEASE ORAL DAILY
Qty: 30 CAPSULE | Refills: 0 | Status: SHIPPED | OUTPATIENT
Start: 2024-12-07 | End: 2025-01-06

## 2024-10-08 RX ORDER — DEXTROAMPHETAMINE SACCHARATE, AMPHETAMINE ASPARTATE MONOHYDRATE, DEXTROAMPHETAMINE SULFATE AND AMPHETAMINE SULFATE 5; 5; 5; 5 MG/1; MG/1; MG/1; MG/1
20 CAPSULE, EXTENDED RELEASE ORAL DAILY
Qty: 30 CAPSULE | Refills: 0 | Status: SHIPPED | OUTPATIENT
Start: 2024-10-08 | End: 2024-11-07

## 2024-10-08 RX ORDER — DEXTROAMPHETAMINE SACCHARATE, AMPHETAMINE ASPARTATE MONOHYDRATE, DEXTROAMPHETAMINE SULFATE AND AMPHETAMINE SULFATE 5; 5; 5; 5 MG/1; MG/1; MG/1; MG/1
20 CAPSULE, EXTENDED RELEASE ORAL DAILY
Qty: 30 CAPSULE | Refills: 0 | Status: SHIPPED | OUTPATIENT
Start: 2024-11-07 | End: 2024-12-07

## 2024-10-08 RX ORDER — DEXTROAMPHETAMINE SACCHARATE, AMPHETAMINE ASPARTATE, DEXTROAMPHETAMINE SULFATE AND AMPHETAMINE SULFATE 5; 5; 5; 5 MG/1; MG/1; MG/1; MG/1
20 TABLET ORAL DAILY
Qty: 30 TABLET | Refills: 0 | Status: SHIPPED | OUTPATIENT
Start: 2024-12-07 | End: 2025-01-06

## 2024-10-08 ASSESSMENT — PAIN SCALES - GENERAL: PAINLEVEL: NO PAIN (0)

## 2024-10-08 NOTE — PROGRESS NOTES
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 Bili, TP) (Completed)    TSH with free T4 reflex (Completed)    CBC with platelets and differential (Completed)    Ob/Gyn  Referral    Personal history of urinary tract infection        Vaginal discharge        Relevant Orders    Wet prep - Clinic Collect (Completed)    Attention deficit hyperactivity disorder (ADHD), combined type        Relevant Medications    amphetamine-dextroamphetamine (ADDERALL XR) 20 MG 24 hr capsule    amphetamine-dextroamphetamine (ADDERALL XR) 20 MG 24 hr capsule (Start on 11/7/2024)    amphetamine-dextroamphetamine (ADDERALL XR) 20 MG 24 hr capsule (Start on 12/7/2024)    amphetamine-dextroamphetamine (ADDERALL) 20 MG tablet    amphetamine-dextroamphetamine (ADDERALL) 20 MG tablet (Start on 11/7/2024)    amphetamine-dextroamphetamine (ADDERALL) 20 MG tablet (Start on 12/7/2024)    Other Relevant Orders    Urine Drug Screen (Completed)             Age-appropriate screening and immunization reviewed.  Given menorrhagia with regular cycle we did add on lab work today which all returned normal and no signs of anemia.  We also discussed pelvic ultrasound but this has been done since periods irregular and was normal.  We did discuss contraception options to help control her periods but she would like to follow-up with OB.  Will await pelvic ultrasound until that meeting in case they think  "it is needed.  Urine drug screen today as expected and tolerating medication well without side effects.  Clearly benefiting in her day-to-day from use.  Continue medication without change and follow-up in 6 months.  Follow-up with OB.    Patient has been advised of split billing requirements and indicates understanding: Yes       BMI  Estimated body mass index is 27.29 kg/m  as calculated from the following:    Height as of this encounter: 1.676 m (5' 6\").    Weight as of this encounter: 76.7 kg (169 lb 1.6 oz).   Weight management plan: Discussed healthy diet and exercise guidelines    Counseling  Appropriate preventive services were addressed with this patient via screening, questionnaire, or discussion as appropriate for fall prevention, nutrition, physical activity, Tobacco-use cessation, social engagement, weight loss and cognition.  Checklist reviewing preventive services available has been given to the patient.  Reviewed patient's diet, addressing concerns and/or questions.         Bailey Carlos is a 39 year old, presenting for the following:  Physical and Abnormal Bleeding Problem        10/8/2024    11:14 AM   Additional Questions   Roomed by Cassandra DUFFY        Health Care Directive  Patient does not have a Health Care Directive or Living Will: Discussed advance care planning with patient; information given to patient to review.    Healthy Habits:     Getting at least 3 servings of Calcium per day:  Yes    Bi-annual eye exam:  NO    Dental care twice a year:  Yes    Sleep apnea or symptoms of sleep apnea:  None    Diet:  Regular (no restrictions)    Frequency of exercise:  4-5 days/week    Duration of exercise:  30-45 minutes    Taking medications regularly:  No    Barriers to taking medications:  None    Medication side effects:  None    Additional concerns today:  Yes    Patient notes 1 year of heavier periods but have been more substantial the last several months.  They are still regular.  Has had clots " with more recent ones.  Does feel slightly more fatigued recently but wondering if heavy periods contributing to this.  No other hair or skin changes.  Sleep has been good with current medications and no evidence of hypertension.  No dry mouth.  Mood very stable.  Weight down with Wegovy and she is tolerating this well.  She would like to continue.  Some discharge that comes and goes without significant burning or itching.  No urinary symptoms.  She did have pelvic ultrasound within the last year which was normal.  She is many years sober from previous drug use. Pap smears up to date. Migraines ok despite being off of emgality currently.       10/3/2024   General Health   How would you rate your overall physical health? Excellent   Feel stress (tense, anxious, or unable to sleep) Not at all            10/3/2024   Nutrition   Three or more servings of calcium each day? Yes   Diet: Regular (no restrictions)   How many servings of fruit and vegetables per day? (!) 2-3   How many sweetened beverages each day? 0-1            10/3/2024   Exercise   Days per week of moderate/strenous exercise 7 days   Average minutes spent exercising at this level 90 min            10/3/2024   Social Factors   Frequency of gathering with friends or relatives Twice a week   Worry food won't last until get money to buy more No   Food not last or not have enough money for food? No   Do you have housing? (Housing is defined as stable permanent housing and does not include staying ouside in a car, in a tent, in an abandoned building, in an overnight shelter, or couch-surfing.) Yes   Are you worried about losing your housing? No   Lack of transportation? No   Unable to get utilities (heat,electricity)? No            10/3/2024   Dental   Dentist two times every year? Yes            10/3/2024   TB Screening   Were you born outside of the [place]? No              Today's PHQ-2 Score:       4/1/2024     1:28 PM   PHQ-2 ( 1999 [employer])   Q1: Little  interest or pleasure in doing things 0   Q2: Feeling down, depressed or hopeless 0   PHQ-2 Score 0   Q1: Little interest or pleasure in doing things Not at all   Q2: Feeling down, depressed or hopeless Not at all   PHQ-2 Score 0         10/3/2024   Substance Use   Alcohol more than 3/day or more than 7/wk No   Do you use any other substances recreationally? No        Social History     Tobacco Use    Smoking status: Former     Current packs/day: 0.00     Types: Cigarettes     Quit date: 3/10/2016     Years since quittin.5    Smokeless tobacco: Never   Vaping Use    Vaping status: Never Used   Substance Use Topics    Alcohol use: No     Comment: history alcohol abuse , s/p treatment    Drug use: No     Comment: former drug user (EtOH, cocaine, marijuana, meth), last use 2022   LAST FHS-7 RESULTS   1st degree relative breast or ovarian cancer Unknown   Any relative bilateral breast cancer Unknown   Any male have breast cancer Unknown   Any ONE woman have BOTH breast AND ovarian cancer Unknown   Any woman with breast cancer before 50yrs Unknown   2 or more relatives with breast AND/OR ovarian cancer Unknown   2 or more relatives with breast AND/OR bowel cancer Unknown           Mammogram Screening - Patient under 40 years of age: Routine Mammogram Screening not recommended.           10/3/2024   One time HIV Screening   Previous HIV test? Yes          10/3/2024   STI Screening   New sexual partner(s) since last STI/HIV test? No        History of abnormal Pap smear: No - age 30-64 HPV with reflex Pap every 5 years recommended        Latest Ref Rng & Units 2022     7:36 AM 3/29/2017    12:00 AM   PAP / HPV   PAP  Negative for Intraepithelial Lesion or Malignancy (NILM)     HPV 16 DNA Negative Negative     HPV 18 DNA Negative Negative     Other HR HPV Negative Negative     PAP-ABSTRACT   See Scanned Document           This result is from an external source.          Reviewed and updated as  needed this visit by Provider                    Past Medical History:   Diagnosis Date    Cellulitis 2012    MRSA septic olecranon bursitis and facial cellulitis    Drug abuse (H) 2011    methamphetamine and alcohol, sober since treatment 2011    Mild pre-eclampsia in third trimester     Motion sickness     MRSA cellulitis 2012    cleared with negative nasal swabs 8/16/17 and 8/28/17, reviewed    PONV (postoperative nausea and vomiting)     Pregnancy-induced hypertension in third trimester     Rh negative, antepartum      Past Surgical History:   Procedure Laterality Date    APPENDECTOMY      DAVINCI ASSISTED HERNIORRHAPHY INCISIONAL N/A 07/10/2020    Procedure: Robotic incisional hernia repair with mesh;  Surgeon: Jovi Odom MD;  Location: [facility]    EXCISE LESION FOOT Left 08/16/2019    Procedure: Excision of nevus second interdigital space left foot, Excision of nevus third interdigital space left foot, excision of plantar junctional nevus left foot;  Surgeon: Rinku Lopez DPM;  Location: [facility] OR    GYN SURGERY      INCISION AND DRAINAGE  04/18/2012    chin abscess I&D    LAPAROSCOPIC CHOLECYSTECTOMY N/A 02/14/2020    Procedure: Laparoscopic Cholecystectomy;  Surgeon: Renard Gerber MD;  Location: [facility]    LAPAROSCOPIC HERNIORRHAPHY INCISIONAL N/A 07/17/2019    Procedure: laparoscopic incisional hernia repair with mesh;  Surgeon: Jovi Odom MD;  [facility]    LAPAROSCOPIC SALPINGECTOMY Bilateral 08/27/2018    Procedure: LAPAROSCOPIC SALPINGECTOMY;  Laparoscopic Bilateral Salpingectomy;  Surgeon: Chai Cui MD;  Location[facility]    MAMMOPLASTY REDUCTION Bilateral 08/04/2017    SEPTOPLASTY, TURBINOPLASTY, COMBINED Bilateral 6/10/2022    Procedure: SEPTOPLASTY; BILATERAL INFERIOR TURBINATE REDUCTION;;  Surgeon: Lobo Narayanan MD;  Location: [facility] OR    TONSILLECTOMY Bilateral 6/10/2022    Procedure: BILATERAL TONSILLECTOMY;  Surgeon: Lobo Narayanan MD;  Location: [facility] OR    TUBAL  "LIGATION Bilateral      OB History    Para Term  AB Living   4 4 4 0 0 4   SAB IAB Ectopic Multiple Live Births   0 0 0 0 4      # Outcome Date GA Lbr Jian/2nd Weight Sex Type Anes PTL Lv   4 Term 18 37w0d 08:00 / 00:04 3.487 kg (7 lb 11 oz) F  EPI  WOODY      Complications: Mild pre-eclampsia      Name: Elaine      Apgar1: 8  Apgar5: 9   3 Term 16    M  EPI  WOODY      Complications: Postpartum hemorrhage   2 Term 10/29/11    F Vag-Spont EPI  WOODY      Birth Comments: Adopted out   1 Term 04    F Vag-Vacuum EPI  WOODY      Complications: Postpartum hemorrhage         Review of Systems  Constitutional, HEENT, cardiovascular, pulmonary, GI, , musculoskeletal, neuro, skin, endocrine and psych systems are negative, except as otherwise noted.     Objective    Exam  /75   Pulse 82   Temp 98.8  F (37.1  C) (Temporal)   Resp 20   Ht 1.676 m (5' 6\")   Wt 76.7 kg (169 lb 1.6 oz)   LMP 2024 (Approximate)   SpO2 98%   BMI 27.29 kg/m     Estimated body mass index is 27.29 kg/m  as calculated from the following:    Height as of this encounter: 1.676 m (5' 6\").    Weight as of this encounter: 76.7 kg (169 lb 1.6 oz).    Physical Exam  GENERAL: alert and no distress  EYES: Eyes grossly normal to inspection, PERRL and conjunctivae and sclerae normal  HENT: ear canals and TM's normal, nose and mouth without ulcers or lesions  NECK: no adenopathy, no asymmetry, masses, or scars  RESP: lungs clear to auscultation - no rales, rhonchi or wheezes  CV: regular rate and rhythm, normal S1 S2, no S3 or S4, no murmur, click or rub, no peripheral edema  ABDOMEN: soft, nontender, no hepatosplenomegaly, no masses and bowel sounds normal  MS: no gross musculoskeletal defects noted, no edema  SKIN: no suspicious lesions or rashes  NEURO: Normal strength and tone, mentation intact and speech normal  PSYCH: mentation appears normal, affect normal/bright        Signed Electronically by: Bradley SEALS" MD Jayesh

## 2024-10-08 NOTE — PATIENT INSTRUCTIONS
Patient Education   Preventive Care Advice   This is general advice given by our system to help you stay healthy. However, your care team may have specific advice just for you. Please talk to your care team about your preventive care needs.  Nutrition  Eat 5 or more servings of fruits and vegetables each day.  Try wheat bread, brown rice and whole grain pasta (instead of white bread, rice, and pasta).  Get enough calcium and vitamin D. Check the label on foods and aim for 100% of the RDA (recommended daily allowance).  Lifestyle  Exercise at least 150 minutes each week  (30 minutes a day, 5 days a week).  Do muscle strengthening activities 2 days a week. These help control your weight and prevent disease.  No smoking.  Wear sunscreen to prevent skin cancer.  Have a dental exam and cleaning every 6 months.  Yearly exams  See your health care team every year to talk about:  Any changes in your health.  Any medicines your care team has prescribed.  Preventive care, family planning, and ways to prevent chronic diseases.  Shots (vaccines)   HPV shots (up to age 26), if you've never had them before.  Hepatitis B shots (up to age 59), if you've never had them before.  COVID-19 shot: Get this shot when it's due.  Flu shot: Get a flu shot every year.  Tetanus shot: Get a tetanus shot every 10 years.  Pneumococcal, hepatitis A, and RSV shots: Ask your care team if you need these based on your risk.  Shingles shot (for age 50 and up)  General health tests  Diabetes screening:  Starting at age 35, Get screened for diabetes at least every 3 years.  If you are younger than age 35, ask your care team if you should be screened for diabetes.  Cholesterol test: At age 39, start having a cholesterol test every 5 years, or more often if advised.  Bone density scan (DEXA): At age 50, ask your care team if you should have this scan for osteoporosis (brittle bones).  Hepatitis C: Get tested at least once in your life.  STIs (sexually  transmitted infections)  Before age 24: Ask your care team if you should be screened for STIs.  After age 24: Get screened for STIs if you're at risk. You are at risk for STIs (including HIV) if:  You are sexually active with more than one person.  You don't use condoms every time.  You or a partner was diagnosed with a sexually transmitted infection.  If you are at risk for HIV, ask about PrEP medicine to prevent HIV.  Get tested for HIV at least once in your life, whether you are at risk for HIV or not.  Cancer screening tests  Cervical cancer screening: If you have a cervix, begin getting regular cervical cancer screening tests starting at age 21.  Breast cancer scan (mammogram): If you've ever had breasts, begin having regular mammograms starting at age 40. This is a scan to check for breast cancer.  Colon cancer screening: It is important to start screening for colon cancer at age 45.  Have a colonoscopy test every 10 years (or more often if you're at risk) Or, ask your provider about stool tests like a FIT test every year or Cologuard test every 3 years.  To learn more about your testing options, visit:   .  For help making a decision, visit:   https://bit.ly/cw54992.  Prostate cancer screening test: If you have a prostate, ask your care team if a prostate cancer screening test (PSA) at age 55 is right for you.  Lung cancer screening: If you are a current or former smoker ages 50 to 80, ask your care team if ongoing lung cancer screenings are right for you.  For informational purposes only. Not to replace the advice of your health care provider. Copyright   2023 [employer]. All rights reserved. Clinically reviewed by the [facility]. [employer] 800672 - REV 01/24.

## 2024-12-02 ENCOUNTER — E-VISIT (OUTPATIENT)
Dept: URGENT CARE | Facility: CLINIC | Age: 39
End: 2024-12-02
Payer: COMMERCIAL

## 2024-12-02 DIAGNOSIS — J06.9 VIRAL URI: Primary | ICD-10-CM

## 2024-12-02 RX ORDER — BENZONATATE 100 MG/1
100 CAPSULE ORAL 3 TIMES DAILY PRN
Qty: 30 CAPSULE | Refills: 0 | Status: SHIPPED | OUTPATIENT
Start: 2024-12-02

## 2024-12-02 RX ORDER — OMEGA-3 FATTY ACIDS/FISH OIL 300-1000MG
200 CAPSULE ORAL EVERY 4 HOURS PRN
Qty: 30 CAPSULE | Refills: 0 | Status: SHIPPED | OUTPATIENT
Start: 2024-12-02

## 2024-12-02 RX ORDER — IPRATROPIUM BROMIDE 42 UG/1
2 SPRAY, METERED NASAL 4 TIMES DAILY
Qty: 15 ML | Refills: 0 | Status: SHIPPED | OUTPATIENT
Start: 2024-12-02

## 2024-12-02 RX ORDER — GUAIFENESIN 1200 MG/1
1200 TABLET, EXTENDED RELEASE ORAL 2 TIMES DAILY
Qty: 60 TABLET | Refills: 0 | Status: SHIPPED | OUTPATIENT
Start: 2024-12-02

## 2024-12-02 NOTE — PATIENT INSTRUCTIONS
Acute Sinusitis: Care Instructions  Overview     Acute sinusitis is an inflammation of the mucous membranes inside the nose and sinuses. Sinuses are the hollow spaces in your skull around the eyes and nose. Acute sinusitis often follows a cold. Acute sinusitis causes thick, discolored mucus that drains from the nose or down the back of the throat. It also can cause pain and pressure in your head and face along with a stuffy or blocked nose.  In most cases, sinusitis gets better on its own in 1 to 2 weeks. But some mild symptoms may last for several weeks. Sometimes antibiotics are needed if there is a bacterial infection.  Follow-up care is a key part of your treatment and safety. Be sure to make and go to all appointments, and call your doctor if you are having problems. It's also a good idea to know your test results and keep a list of the medicines you take.  How can you care for yourself at home?  Use saline (saltwater) nasal washes. This can help keep your nasal passages open and wash out mucus and allergens.  You can buy saline nose washes at a grocery store or drugstore. Follow the instructions on the package.  You can make your own at home. Add 1 teaspoon of non-iodized salt and 1 teaspoon of baking soda to 2 cups of distilled or boiled and cooled water. Fill a squeeze bottle or a nasal cleansing pot (such as a neti pot) with the nasal wash. Then put the tip into your nostril, and lean over the sink. With your mouth open, gently squirt the liquid. Repeat on the other side.  Try a decongestant nasal spray like oxymetazoline (Afrin). Do not use it for more than 3 days in a row. Using it for more than 3 days can make your congestion worse.  If needed, take an over-the-counter pain medicine, such as acetaminophen (Tylenol), ibuprofen (Advil, Motrin), or naproxen (Aleve). Read and follow all instructions on the label.  If the doctor prescribed antibiotics, take them as directed. Do not stop taking them just  "because you feel better. You need to take the full course of antibiotics.  Be careful when taking over-the-counter cold or flu medicines and Tylenol at the same time. Many of these medicines have acetaminophen, which is Tylenol. Read the labels to make sure that you are not taking more than the recommended dose. Too much acetaminophen (Tylenol) can be harmful.  Try a steroid nasal spray. It may help with your symptoms.  Breathe warm, moist air. You can use a steamy shower, a hot bath, or a sink filled with hot water. Avoid cold, dry air. Using a humidifier in your home may help. Follow the directions for cleaning the machine.  When should you call for help?   Call your doctor now or seek immediate medical care if:    You have new or worse swelling, redness, or pain in your face or around one or both of your eyes.     You have double vision or a change in your vision.     You have a high fever.     You have a severe headache and a stiff neck.     You have mental changes, such as feeling confused or much less alert.   Watch closely for changes in your health, and be sure to contact your doctor if:    You are not getting better as expected.   Where can you learn more?  Go to https://www.[employer].net/patiented  Enter I933 in the search box to learn more about \"Acute Sinusitis: Care Instructions.\"  Current as of: September 27, 2023  Content Version: 14.2 2024 [facility]   Care instructions adapted under license by your healthcare professional. If you have questions about a medical condition or this instruction, always ask your healthcare professional. [employer] disclaims any warranty or liability for your use of this information.     The symptoms you describe suggest a viral cause, which is much more common than a bacterial cause. Antibiotics will treat bacterial infections, but have no effect on viral infections. If possible, especially if improving, start with symptom care for the first " 7-10 days, then consider seeking further treatment or taking an antibiotic. Bacterial infections generally are more severe, including symptoms such as pus, fever over 101degrees F, or rapidly worsening.  Hi Breanna, sinus infections are typically viral until about day 10. I have sent in some prescriptions to help with the symptoms. If things do not improve in another approximately 7 days please submit another e visit for antibiotics at that time or if symptoms worsen significantly please be seen in person.     Thank you   Isela Montana [facility]   Thank you for choosing us for your care. I have placed an order for a prescription so that you can start treatment. View your full visit summary for details by clicking on the link below. Your pharmacist will able to address any questions you may have about the medication.     If you're not feeling better within 5-7 days, please schedule an appointment.  You can schedule an appointment right here in [facility], or call [phone]  If the visit is for the same symptoms as your eVisit, we'll refund the cost of your eVisit if seen within seven days.

## 2024-12-02 NOTE — TELEPHONE ENCOUNTER
Patient is calling back and states she has a history of sinus infections.    She had a sinus surgery for sinus infections in the past.    She was hoping for an antibiotic for her sinuses.    Please advise.    Sahara Verdin RN on 12/2/2024 at 4:09 PM

## 2024-12-03 ENCOUNTER — VIRTUAL VISIT (OUTPATIENT)
Dept: FAMILY MEDICINE | Facility: CLINIC | Age: 39
End: 2024-12-03
Payer: COMMERCIAL

## 2024-12-03 DIAGNOSIS — B96.89 ACUTE BACTERIAL SINUSITIS: Primary | ICD-10-CM

## 2024-12-03 DIAGNOSIS — J01.90 ACUTE BACTERIAL SINUSITIS: Primary | ICD-10-CM

## 2024-12-03 PROCEDURE — 99213 OFFICE O/P EST LOW 20 MIN: CPT | Mod: 95 | Performed by: PHYSICIAN ASSISTANT

## 2024-12-03 NOTE — PROGRESS NOTES
Breanna is a 39 year old who is being evaluated via a billable video visit.    How would you like to obtain your AVS? MyChart  If the video visit is dropped, the invitation should be resent by: Text to cell phone: [phone]  Will anyone else be joining your video visit? No      Assessment & Plan     Acute bacterial sinusitis  Patient seen today via video visit for sinusitis, getting worse and now febrile and complaining of severe facial pain.  Recommend treatment with Mucinex, Tessalon Perles, and Augmentin BID x 7 days. Recommend rest and fluids and if worsening symptoms: fever persistent, worsening facial pain, worsening cough or shortness of breath, should be seen in person for evaluation.  - amoxicillin-clavulanate (AUGMENTIN) 875-125 MG tablet  Dispense: 14 tablet; Refill: 0              Risks, benefits and alternatives were discussed with patient. Agreeable to the plan of care.      Subjective   Breanna is a 39 year old, presenting for the following health issues:  Sinus Problem (Possible sinus infection.  Facial pressure, blood tinged discharge from nose, headache, nausea.  Difficult to sleep due to pain in face.  Started 5-7 days ago.    Using ibuprofen)      12/3/2024    11:28 AM   Additional Questions   Roomed by MARIA ESTHER Jo CMA([facility])     Sinus Problem     History of Present Illness       Reason for visit:  Sinus    She eats 2-3 servings of fruits and vegetables daily.She consumes 1 sweetened beverage(s) daily.She exercises with enough effort to increase her heart rate 20 to 29 minutes per day.  She exercises with enough effort to increase her heart rate 5 days per week.   She is taking medications regularly.     Patient is here today for concerns for worsening sinus pain  Ongoing for 1 week  Notes sinus pressure, nasal congestion and discharge, tooth pain and head pressure.   Taking some Ibuprofen and Mucinex without relief  Did not take her Albuterol or Tessalon Perles for cough, feels like chest congestion  is worse since yesterday      Review of Systems  Constitutional, HEENT, cardiovascular, pulmonary, gi and gu systems are negative, except as otherwise noted.      Objective           Vitals:  No vitals were obtained today due to virtual visit.    Physical Exam   GENERAL: mildly ill appearing  EYES: Eyes grossly normal to inspection.  No discharge or erythema, or obvious scleral/conjunctival abnormalities.  RESP: No audible wheeze, cough, or visible cyanosis.    SKIN: Visible skin clear. No significant rash, abnormal pigmentation or lesions.  NEURO: Cranial nerves grossly intact.  Mentation and speech appropriate for age.  PSYCH: Appropriate affect, tone, and pace of words          Video-Visit Details    Type of service:  Video Visit   Originating Location (pt. Location): Home    Distant Location (provider location):  On-site  Platform used for Video Visit: Kassi  Signed Electronically by: Zoë Lomax PA-C

## 2024-12-03 NOTE — TELEPHONE ENCOUNTER
"Mitch Shoemaker, I wrote in her note \"Hi Breanna, sinus infections are typically viral until about day 10. I have sent in some prescriptions to help with the symptoms. If things do not improve in another approximately 7 days please submit another e visit for antibiotics at that time, or if symptoms worsen significantly please be seen in person\".    She should be seen in person if she feels her symptoms are that severe, or reach out to her ENT if she has one for her sinus issues if they are chronic.    Thanks!  Isela"

## 2025-01-15 DIAGNOSIS — F90.2 ATTENTION DEFICIT HYPERACTIVITY DISORDER (ADHD), COMBINED TYPE: ICD-10-CM

## 2025-01-16 RX ORDER — DEXTROAMPHETAMINE SACCHARATE, AMPHETAMINE ASPARTATE MONOHYDRATE, DEXTROAMPHETAMINE SULFATE AND AMPHETAMINE SULFATE 5; 5; 5; 5 MG/1; MG/1; MG/1; MG/1
20 CAPSULE, EXTENDED RELEASE ORAL DAILY
Qty: 30 CAPSULE | Refills: 0 | Status: SHIPPED | OUTPATIENT
Start: 2025-01-16

## 2025-01-16 RX ORDER — DEXTROAMPHETAMINE SACCHARATE, AMPHETAMINE ASPARTATE, DEXTROAMPHETAMINE SULFATE AND AMPHETAMINE SULFATE 5; 5; 5; 5 MG/1; MG/1; MG/1; MG/1
20 TABLET ORAL DAILY
Qty: 30 TABLET | Refills: 0 | Status: SHIPPED | OUTPATIENT
Start: 2025-01-16

## 2025-03-02 ENCOUNTER — HEALTH MAINTENANCE LETTER (OUTPATIENT)
Age: 40
End: 2025-03-02

## 2025-03-10 ENCOUNTER — E-VISIT (OUTPATIENT)
Dept: URGENT CARE | Facility: CLINIC | Age: 40
End: 2025-03-10
Payer: COMMERCIAL

## 2025-03-10 ENCOUNTER — MYC REFILL (OUTPATIENT)
Dept: FAMILY MEDICINE | Facility: CLINIC | Age: 40
End: 2025-03-10

## 2025-03-10 ENCOUNTER — LAB (OUTPATIENT)
Dept: LAB | Facility: CLINIC | Age: 40
End: 2025-03-10
Attending: FAMILY MEDICINE
Payer: COMMERCIAL

## 2025-03-10 DIAGNOSIS — N30.00 ACUTE CYSTITIS WITHOUT HEMATURIA: Primary | ICD-10-CM

## 2025-03-10 DIAGNOSIS — F90.2 ATTENTION DEFICIT HYPERACTIVITY DISORDER (ADHD), COMBINED TYPE: ICD-10-CM

## 2025-03-10 DIAGNOSIS — R82.90 ABNORMAL URINE ODOR: Primary | ICD-10-CM

## 2025-03-10 DIAGNOSIS — R82.90 ABNORMAL URINE ODOR: ICD-10-CM

## 2025-03-10 LAB
ALBUMIN UR-MCNC: NEGATIVE MG/DL
APPEARANCE UR: CLEAR
BACTERIA #/AREA URNS HPF: ABNORMAL /HPF
BILIRUB UR QL STRIP: NEGATIVE
COLOR UR AUTO: ABNORMAL
GLUCOSE UR STRIP-MCNC: NEGATIVE MG/DL
HGB UR QL STRIP: NEGATIVE
KETONES UR STRIP-MCNC: NEGATIVE MG/DL
LEUKOCYTE ESTERASE UR QL STRIP: ABNORMAL
NITRATE UR QL: POSITIVE
PH UR STRIP: 6 [PH] (ref 5–7)
RBC URINE: 1 /HPF
SP GR UR STRIP: 1.01 (ref 1–1.03)
SQUAMOUS EPITHELIAL: <1 /HPF
UROBILINOGEN UR STRIP-MCNC: NORMAL MG/DL
WBC URINE: 23 /HPF

## 2025-03-10 PROCEDURE — 87186 SC STD MICRODIL/AGAR DIL: CPT

## 2025-03-10 PROCEDURE — 87086 URINE CULTURE/COLONY COUNT: CPT

## 2025-03-10 PROCEDURE — 81001 URINALYSIS AUTO W/SCOPE: CPT

## 2025-03-10 RX ORDER — NITROFURANTOIN 25; 75 MG/1; MG/1
100 CAPSULE ORAL 2 TIMES DAILY
Qty: 10 CAPSULE | Refills: 0 | Status: SHIPPED | OUTPATIENT
Start: 2025-03-10 | End: 2025-03-15

## 2025-03-10 NOTE — PATIENT INSTRUCTIONS
Dear Breanna Luna,     After reviewing your responses, I would like you to come in for a urine test to make sure we treat you correctly. This urine test is to evaluate you for a possible urinary tract infection, and should be scheduled for today or tomorrow. Schedule a Lab Only appointment here.     Lab appointments are not available at most locations on the weekends. If no Lab Only appointment is available, you should be seen in any of our convenient Walk-in or Urgent Care Centers, which can be found on our website here.     You will receive instructions with your results in [employer] once they are available.     If your symptoms worsen, you develop pain in your back or stomach, develop fevers, or are not improving in 5 days, please contact your primary care provider for an appointment or visit a Walk-in or Urgent Care Center to be seen.     Thanks again for choosing us as your health care partner,     MADELINE Padilla CNP

## 2025-03-11 LAB — BACTERIA UR CULT: ABNORMAL

## 2025-03-11 RX ORDER — DEXTROAMPHETAMINE SACCHARATE, AMPHETAMINE ASPARTATE MONOHYDRATE, DEXTROAMPHETAMINE SULFATE AND AMPHETAMINE SULFATE 5; 5; 5; 5 MG/1; MG/1; MG/1; MG/1
20 CAPSULE, EXTENDED RELEASE ORAL DAILY
Qty: 30 CAPSULE | Refills: 0 | OUTPATIENT
Start: 2025-03-11

## 2025-03-11 RX ORDER — DEXTROAMPHETAMINE SACCHARATE, AMPHETAMINE ASPARTATE, DEXTROAMPHETAMINE SULFATE AND AMPHETAMINE SULFATE 5; 5; 5; 5 MG/1; MG/1; MG/1; MG/1
20 TABLET ORAL DAILY
Qty: 30 TABLET | Refills: 0 | OUTPATIENT
Start: 2025-03-11

## 2025-04-02 ENCOUNTER — TELEPHONE (OUTPATIENT)
Dept: FAMILY MEDICINE | Facility: CLINIC | Age: 40
End: 2025-04-02

## 2025-04-02 ENCOUNTER — VIRTUAL VISIT (OUTPATIENT)
Dept: URGENT CARE | Facility: CLINIC | Age: 40
End: 2025-04-02
Payer: COMMERCIAL

## 2025-04-02 ENCOUNTER — OFFICE VISIT (OUTPATIENT)
Dept: FAMILY MEDICINE | Facility: CLINIC | Age: 40
End: 2025-04-02
Payer: COMMERCIAL

## 2025-04-02 ENCOUNTER — HOSPITAL ENCOUNTER (EMERGENCY)
Facility: CLINIC | Age: 40
Discharge: HOME | End: 2025-04-02
Attending: PHYSICIAN ASSISTANT | Admitting: PHYSICIAN ASSISTANT
Payer: COMMERCIAL

## 2025-04-02 VITALS
BODY MASS INDEX: 28.78 KG/M2 | DIASTOLIC BLOOD PRESSURE: 91 MMHG | HEART RATE: 79 BPM | TEMPERATURE: 98.1 F | HEIGHT: 66 IN | OXYGEN SATURATION: 99 % | RESPIRATION RATE: 20 BRPM | SYSTOLIC BLOOD PRESSURE: 118 MMHG | WEIGHT: 179.1 LBS

## 2025-04-02 VITALS
DIASTOLIC BLOOD PRESSURE: 80 MMHG | TEMPERATURE: 98.2 F | OXYGEN SATURATION: 96 % | HEART RATE: 94 BPM | SYSTOLIC BLOOD PRESSURE: 120 MMHG | WEIGHT: 178 LBS | HEIGHT: 66 IN | BODY MASS INDEX: 28.61 KG/M2 | RESPIRATION RATE: 12 BRPM

## 2025-04-02 DIAGNOSIS — R10.9 FLANK PAIN: ICD-10-CM

## 2025-04-02 DIAGNOSIS — Z86.19 HISTORY OF ESBL E. COLI INFECTION: Primary | ICD-10-CM

## 2025-04-02 DIAGNOSIS — R30.0 DYSURIA: ICD-10-CM

## 2025-04-02 DIAGNOSIS — R39.9 UTI SYMPTOMS: ICD-10-CM

## 2025-04-02 DIAGNOSIS — R68.83 CHILLS: Primary | ICD-10-CM

## 2025-04-02 DIAGNOSIS — N20.0 KIDNEY STONE: ICD-10-CM

## 2025-04-02 DIAGNOSIS — N39.0 UTI (URINARY TRACT INFECTION): ICD-10-CM

## 2025-04-02 DIAGNOSIS — R10.9 LEFT FLANK PAIN: ICD-10-CM

## 2025-04-02 LAB
ALBUMIN SERPL BCG-MCNC: 4.7 G/DL (ref 3.5–5.2)
ALBUMIN UR-MCNC: NEGATIVE MG/DL
ALBUMIN UR-MCNC: NEGATIVE MG/DL
ALP SERPL-CCNC: 49 U/L (ref 40–150)
ALT SERPL W P-5'-P-CCNC: 30 U/L (ref 0–50)
ANION GAP SERPL CALCULATED.3IONS-SCNC: 10 MMOL/L (ref 7–15)
APPEARANCE UR: CLEAR
APPEARANCE UR: CLEAR
AST SERPL W P-5'-P-CCNC: 16 U/L (ref 0–45)
BACTERIA #/AREA URNS HPF: ABNORMAL /HPF
BASOPHILS # BLD AUTO: 0 10E3/UL (ref 0–0.2)
BASOPHILS NFR BLD AUTO: 0 %
BILIRUB SERPL-MCNC: 0.5 MG/DL
BILIRUB UR QL STRIP: NEGATIVE
BILIRUB UR QL STRIP: NEGATIVE
BUN SERPL-MCNC: 12.6 MG/DL (ref 6–20)
CALCIUM SERPL-MCNC: 10 MG/DL (ref 8.8–10.4)
CHLORIDE SERPL-SCNC: 103 MMOL/L (ref 98–107)
COLOR UR AUTO: ABNORMAL
COLOR UR AUTO: ABNORMAL
CREAT SERPL-MCNC: 0.68 MG/DL (ref 0.51–0.95)
CRP SERPL-MCNC: <3 MG/L
EGFRCR SERPLBLD CKD-EPI 2021: >90 ML/MIN/1.73M2
EOSINOPHIL # BLD AUTO: 0.1 10E3/UL (ref 0–0.7)
EOSINOPHIL NFR BLD AUTO: 1 %
ERYTHROCYTE [DISTWIDTH] IN BLOOD BY AUTOMATED COUNT: 13.1 % (ref 10–15)
GLUCOSE SERPL-MCNC: 89 MG/DL (ref 70–99)
GLUCOSE UR STRIP-MCNC: NEGATIVE MG/DL
GLUCOSE UR STRIP-MCNC: NEGATIVE MG/DL
HCO3 SERPL-SCNC: 27 MMOL/L (ref 22–29)
HCT VFR BLD AUTO: 37.5 % (ref 35–47)
HGB BLD-MCNC: 13.3 G/DL (ref 11.7–15.7)
HGB UR QL STRIP: NEGATIVE
HGB UR QL STRIP: NEGATIVE
IMM GRANULOCYTES # BLD: 0 10E3/UL
IMM GRANULOCYTES NFR BLD: 0 %
KETONES UR STRIP-MCNC: NEGATIVE MG/DL
KETONES UR STRIP-MCNC: NEGATIVE MG/DL
LACTATE SERPL-SCNC: 1.2 MMOL/L (ref 0.7–2)
LEUKOCYTE ESTERASE UR QL STRIP: ABNORMAL
LEUKOCYTE ESTERASE UR QL STRIP: ABNORMAL
LYMPHOCYTES # BLD AUTO: 1.6 10E3/UL (ref 0.8–5.3)
LYMPHOCYTES NFR BLD AUTO: 24 %
MCH RBC QN AUTO: 30.6 PG (ref 26.5–33)
MCHC RBC AUTO-ENTMCNC: 35.5 G/DL (ref 31.5–36.5)
MCV RBC AUTO: 86 FL (ref 78–100)
MONOCYTES # BLD AUTO: 0.4 10E3/UL (ref 0–1.3)
MONOCYTES NFR BLD AUTO: 5 %
MUCOUS THREADS #/AREA URNS LPF: PRESENT /LPF
NEUTROPHILS # BLD AUTO: 4.8 10E3/UL (ref 1.6–8.3)
NEUTROPHILS NFR BLD AUTO: 70 %
NITRATE UR QL: NEGATIVE
NITRATE UR QL: POSITIVE
NRBC # BLD AUTO: 0 10E3/UL
NRBC BLD AUTO-RTO: 0 /100
PH UR STRIP: 6.5 [PH] (ref 5–7)
PH UR STRIP: 6.5 [PH] (ref 5–7)
PLATELET # BLD AUTO: 255 10E3/UL (ref 150–450)
POTASSIUM SERPL-SCNC: 3.9 MMOL/L (ref 3.4–5.3)
PROT SERPL-MCNC: 7.1 G/DL (ref 6.4–8.3)
RBC # BLD AUTO: 4.34 10E6/UL (ref 3.8–5.2)
RBC URINE: 2 /HPF
RBC URINE: 2 /HPF
SODIUM SERPL-SCNC: 140 MMOL/L (ref 135–145)
SP GR UR STRIP: 1.01 (ref 1–1.03)
SP GR UR STRIP: 1.01 (ref 1–1.03)
SQUAMOUS EPITHELIAL: 1 /HPF
SQUAMOUS EPITHELIAL: 1 /HPF
UROBILINOGEN UR STRIP-MCNC: NORMAL MG/DL
UROBILINOGEN UR STRIP-MCNC: NORMAL MG/DL
WBC # BLD AUTO: 6.9 10E3/UL (ref 4–11)
WBC URINE: 13 /HPF
WBC URINE: 13 /HPF

## 2025-04-02 PROCEDURE — 96375 TX/PRO/DX INJ NEW DRUG ADDON: CPT | Performed by: PHYSICIAN ASSISTANT

## 2025-04-02 PROCEDURE — 81001 URINALYSIS AUTO W/SCOPE: CPT | Performed by: NURSE PRACTITIONER

## 2025-04-02 PROCEDURE — 250N000011 HC RX IP 250 OP 636: Mod: JZ | Performed by: PHYSICIAN ASSISTANT

## 2025-04-02 PROCEDURE — 84155 ASSAY OF PROTEIN SERUM: CPT | Performed by: PHYSICIAN ASSISTANT

## 2025-04-02 PROCEDURE — 96376 TX/PRO/DX INJ SAME DRUG ADON: CPT | Performed by: PHYSICIAN ASSISTANT

## 2025-04-02 PROCEDURE — 258N000003 HC RX IP 258 OP 636: Performed by: PHYSICIAN ASSISTANT

## 2025-04-02 PROCEDURE — 84132 ASSAY OF SERUM POTASSIUM: CPT | Performed by: PHYSICIAN ASSISTANT

## 2025-04-02 PROCEDURE — 96361 HYDRATE IV INFUSION ADD-ON: CPT | Performed by: PHYSICIAN ASSISTANT

## 2025-04-02 PROCEDURE — 81003 URINALYSIS AUTO W/O SCOPE: CPT | Performed by: PHYSICIAN ASSISTANT

## 2025-04-02 PROCEDURE — 86140 C-REACTIVE PROTEIN: CPT | Performed by: PHYSICIAN ASSISTANT

## 2025-04-02 PROCEDURE — 83605 ASSAY OF LACTIC ACID: CPT | Performed by: PHYSICIAN ASSISTANT

## 2025-04-02 PROCEDURE — 99285 EMERGENCY DEPT VISIT HI MDM: CPT | Mod: 25 | Performed by: PHYSICIAN ASSISTANT

## 2025-04-02 PROCEDURE — 96365 THER/PROPH/DIAG IV INF INIT: CPT | Performed by: PHYSICIAN ASSISTANT

## 2025-04-02 PROCEDURE — 84075 ASSAY ALKALINE PHOSPHATASE: CPT | Performed by: PHYSICIAN ASSISTANT

## 2025-04-02 PROCEDURE — 99284 EMERGENCY DEPT VISIT MOD MDM: CPT | Performed by: PHYSICIAN ASSISTANT

## 2025-04-02 PROCEDURE — 85025 COMPLETE CBC W/AUTO DIFF WBC: CPT | Performed by: PHYSICIAN ASSISTANT

## 2025-04-02 PROCEDURE — 99207 PR NO CHARGE LOS: CPT

## 2025-04-02 PROCEDURE — 36415 COLL VENOUS BLD VENIPUNCTURE: CPT | Performed by: PHYSICIAN ASSISTANT

## 2025-04-02 RX ORDER — ONDANSETRON 4 MG/1
4 TABLET, ORALLY DISINTEGRATING ORAL EVERY 8 HOURS PRN
Qty: 10 TABLET | Refills: 0 | Status: SHIPPED | OUTPATIENT
Start: 2025-04-02

## 2025-04-02 RX ORDER — HYDROMORPHONE HYDROCHLORIDE 1 MG/ML
0.5 INJECTION, SOLUTION INTRAMUSCULAR; INTRAVENOUS; SUBCUTANEOUS EVERY 30 MIN PRN
Status: DISCONTINUED | OUTPATIENT
Start: 2025-04-02 | End: 2025-04-02 | Stop reason: HOSPADM

## 2025-04-02 RX ORDER — CEFDINIR 300 MG/1
300 CAPSULE ORAL 2 TIMES DAILY
Qty: 20 CAPSULE | Refills: 0 | Status: SHIPPED | OUTPATIENT
Start: 2025-04-02

## 2025-04-02 RX ORDER — CEFTRIAXONE 2 G/1
2 INJECTION, POWDER, FOR SOLUTION INTRAMUSCULAR; INTRAVENOUS ONCE
Status: COMPLETED | OUTPATIENT
Start: 2025-04-02 | End: 2025-04-02

## 2025-04-02 RX ORDER — KETOROLAC TROMETHAMINE 15 MG/ML
15 INJECTION, SOLUTION INTRAMUSCULAR; INTRAVENOUS ONCE
Status: COMPLETED | OUTPATIENT
Start: 2025-04-02 | End: 2025-04-02

## 2025-04-02 RX ORDER — ONDANSETRON 2 MG/ML
4 INJECTION INTRAMUSCULAR; INTRAVENOUS EVERY 30 MIN PRN
Status: DISCONTINUED | OUTPATIENT
Start: 2025-04-02 | End: 2025-04-02 | Stop reason: HOSPADM

## 2025-04-02 RX ORDER — OXYCODONE HYDROCHLORIDE 5 MG/1
5 TABLET ORAL EVERY 6 HOURS PRN
Qty: 6 TABLET | Refills: 0 | Status: SHIPPED | OUTPATIENT
Start: 2025-04-02

## 2025-04-02 RX ADMIN — HYDROMORPHONE HYDROCHLORIDE 0.5 MG: 0.5 INJECTION, SOLUTION INTRAMUSCULAR; INTRAVENOUS; SUBCUTANEOUS at 18:15

## 2025-04-02 RX ADMIN — ONDANSETRON 4 MG: 2 INJECTION, SOLUTION INTRAMUSCULAR; INTRAVENOUS at 18:15

## 2025-04-02 RX ADMIN — SODIUM CHLORIDE 1000 ML: 0.9 INJECTION, SOLUTION INTRAVENOUS at 17:44

## 2025-04-02 RX ADMIN — HYDROMORPHONE HYDROCHLORIDE 0.5 MG: 0.5 INJECTION, SOLUTION INTRAMUSCULAR; INTRAVENOUS; SUBCUTANEOUS at 19:17

## 2025-04-02 RX ADMIN — KETOROLAC TROMETHAMINE 15 MG: 15 INJECTION, SOLUTION INTRAMUSCULAR; INTRAVENOUS at 17:44

## 2025-04-02 RX ADMIN — SODIUM CHLORIDE 500 ML: 0.9 INJECTION, SOLUTION INTRAVENOUS at 19:58

## 2025-04-02 RX ADMIN — CEFTRIAXONE SODIUM 2 G: 2 INJECTION, POWDER, FOR SOLUTION INTRAMUSCULAR; INTRAVENOUS at 19:57

## 2025-04-02 ASSESSMENT — COLUMBIA-SUICIDE SEVERITY RATING SCALE - C-SSRS
6. HAVE YOU EVER DONE ANYTHING, STARTED TO DO ANYTHING, OR PREPARED TO DO ANYTHING TO END YOUR LIFE?: NO
1. IN THE PAST MONTH, HAVE YOU WISHED YOU WERE DEAD OR WISHED YOU COULD GO TO SLEEP AND NOT WAKE UP?: NO
2. HAVE YOU ACTUALLY HAD ANY THOUGHTS OF KILLING YOURSELF IN THE PAST MONTH?: NO

## 2025-04-02 ASSESSMENT — ACTIVITIES OF DAILY LIVING (ADL)
ADLS_ACUITY_SCORE: 52
ADLS_ACUITY_SCORE: 46
ADLS_ACUITY_SCORE: 52

## 2025-04-02 NOTE — ED PROVIDER NOTES
History     Chief Complaint   Patient presents with    UTI     HPI  Breanna Luna is a 40 year old female who presents for evaluation of symptoms starting 2 days ago.  Initially she just started with cloudy/dark urine and increased smell to the urine.  Now she has dysuria, chills, feeling warm but no documented fever, nausea without vomiting, and low back pain which was more in the left side rated 7-8 on a scale of 10.  She also has some pain in the pelvis.  She was seen in the clinic for virtual visit and then sent to the ED for more advanced evaluation.  Her last dose of Tylenol ibuprofen was over 6 hours ago which really did not help her discomfort at all.  She reports having a kidney stone in the past, but was told that it should pass on its own.  She was treated for UTI with nitrofurantoin on 3/10/2025 and stated that her symptoms did completely resolve up until 2 days ago.  Denies a prior history of pyelonephritis.        Virtual visit from earlier today:  Progress Notes  Isela Montana APRN CNP (Nurse Practitioner)  Family Medicine  Expand All Collapse All     Assessment & Plan      ICD-10-CM     1. Chills  R68.83         2. Flank pain  R10.9         3. Dysuria  R30.0                             Assessment & Plan      Urinary Tract Infection (UTI) and Possible Kidney Infection:  - Symptoms suggest more than a typical UTI, with concerns for a possible kidney infection.   - Recommend in-person evaluation at the nearest primary care, urgent care or ER within the next 24 hours for further assessment and stronger antibiotics if necessary. No charge for this visit as she is being referred to in person evaluation.                    Component      Latest Ref Rng 4/2/2025  3:31 PM   Color Urine      Colorless, Straw, Light Yellow, Yellow  Straw    Appearance Urine      Clear  Clear    Glucose Urine      Negative mg/dL Negative    Bilirubin Urine      Negative  Negative    Ketones Urine      Negative mg/dL  Negative    Specific Gravity Urine      1.003 - 1.035  1.009    Blood Urine      Negative  Negative    pH Urine      5.0 - 7.0  6.5    Protein Albumin Urine      Negative mg/dL Negative    Urobilinogen mg/dL      Normal mg/dL Normal    Nitrite Urine      Negative  Negative    Leukocyte Esterase Urine      Negative  Trace !    RBC Urine      <=2 /HPF 2    WBC Urine      <=5 /HPF 13 (H)    Squamous Epithelial /HPF Urine      <=1 /HPF 1       Legend:  ! Abnormal  (H) High              Allergies:  Allergies   Allergen Reactions    Codeine Nausea     Other reaction(s): Nausea    Imitrex [Sumatriptan]      Worsening of migraine, paresthesias    Vancomycin Other (See Comments)    Adhesive [Liquid Adhesive] Itching     Redness and itching around incision after hernia repair surgery    Ranitidine Rash and Itching     recvd IV zantac 75 and reaction was immediate,  was given Benadryl to counter act reaction in 2000    Sulfa Antibiotics Unknown and Rash       Problem List:    Patient Active Problem List    Diagnosis Date Noted    Class 2 obesity 02/28/2023     Priority: Medium    Nasal turbinate hypertrophy 05/02/2022     Priority: Medium     Added automatically from request for surgery [id number]      Class 1 obesity due to excess calories without serious comorbidity with body mass index (BMI) of 31.0 to 31.9 in adult 02/16/2021     Priority: Medium     MWL Initial 6/30/2022  Initial Weight (lbs): 218 lbs  Initial BMI 35      Biliary dyskinesia 02/10/2020     Priority: Medium     Added automatically from request for surgery [id number]      Intractable migraine without aura and without status migrainosus 07/17/2019     Priority: Medium    Alcohol abuse, in remission 08/20/2018     Priority: Medium    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) 08/20/2018     Priority: Medium        Past Medical History:    Past Medical History:   Diagnosis Date    Cellulitis 2012    Drug abuse (H) 2011    Mild pre-eclampsia in third trimester     Motion  sickness     MRSA cellulitis 2012    PONV (postoperative nausea and vomiting)     Pregnancy-induced hypertension in third trimester     Rh negative, antepartum        Past Surgical History:    Past Surgical History:   Procedure Laterality Date    APPENDECTOMY      DAVINCI ASSISTED HERNIORRHAPHY INCISIONAL N/A 07/10/2020    Procedure: Robotic incisional hernia repair with mesh;  Surgeon: Jovi Odom MD;  Location: [facility]    EXCISE LESION FOOT Left 08/16/2019    Procedure: Excision of nevus second interdigital space left foot, Excision of nevus third interdigital space left foot, excision of plantar junctional nevus left foot;  Surgeon: Rinku Lopez DPM;  Location: [facility] OR    GYN SURGERY      INCISION AND DRAINAGE  04/18/2012    chin abscess I&D    LAPAROSCOPIC CHOLECYSTECTOMY N/A 02/14/2020    Procedure: Laparoscopic Cholecystectomy;  Surgeon: Renard Gerber MD;  Location: [facility]    LAPAROSCOPIC HERNIORRHAPHY INCISIONAL N/A 07/17/2019    Procedure: laparoscopic incisional hernia repair with mesh;  Surgeon: Jovi Odom MD;  Location: [facility]    LAPAROSCOPIC SALPINGECTOMY Bilateral 08/27/2018    Procedure: LAPAROSCOPIC SALPINGECTOMY;  Laparoscopic Bilateral Salpingectomy;  Surgeon: Chai Cui MD;  Location: [facility]    MAMMOPLASTY REDUCTION Bilateral 08/04/2017    SEPTOPLASTY, TURBINOPLASTY, COMBINED Bilateral 6/10/2022    Procedure: SEPTOPLASTY; BILATERAL INFERIOR TURBINATE REDUCTION;;  Surgeon: Lobo Narayanan MD;  Location: [facility] OR    TONSILLECTOMY Bilateral 6/10/2022    Procedure: BILATERAL TONSILLECTOMY;  Surgeon: Lobo Narayanan MD;  Location: [facility] OR    TUBAL LIGATION Bilateral        Family History:    Family History   Problem Relation Age of Onset    Cancer Father     Pancreatic Cancer Father     Lung Cancer Father     Lupus Mother        Social History:  Marital Status:   [2]  Social History     Tobacco Use    Smoking status: Former     Current packs/day: 0.00     Types: Cigarettes  "    Quit date: 3/10/2016     Years since quittin.0    Smokeless tobacco: Never   Vaping Use    Vaping status: Never Used   Substance Use Topics    Alcohol use: No     Comment: history alcohol abuse 2012, s/p treatment    Drug use: No     Comment: former drug user (EtOH, cocaine, marijuana, meth), last use         Medications:    albuterol (PROAIR HFA/PROVENTIL HFA/VENTOLIN HFA) 108 (90 Base) MCG/ACT inhaler  albuterol (PROVENTIL) (2.5 MG/3ML) 0.083% neb solution  amphetamine-dextroamphetamine (ADDERALL XR) 20 MG 24 hr capsule  amphetamine-dextroamphetamine (ADDERALL XR) 20 MG 24 hr capsule  [START ON 2025] amphetamine-dextroamphetamine (ADDERALL XR) 20 MG 24 hr capsule  amphetamine-dextroamphetamine (ADDERALL) 20 MG tablet  amphetamine-dextroamphetamine (ADDERALL) 20 MG tablet  [START ON 2025] amphetamine-dextroamphetamine (ADDERALL) 20 MG tablet  benzonatate (TESSALON) 100 MG capsule  benzonatate (TESSALON) 200 MG capsule  cefdinir (OMNICEF) 300 MG capsule  desonide (DESOWEN) 0.05 % external cream  galcanezumab-gnlm (EMGALITY) 120 MG/ML injection  guaiFENesin 1200 MG TB12  ibuprofen (ADVIL/MOTRIN) 200 MG capsule  ipratropium (ATROVENT) 0.06 % nasal spray  naproxen (NAPROSYN) 500 MG tablet  ondansetron (ZOFRAN ODT) 4 MG ODT tab  oxyCODONE (ROXICODONE) 5 MG tablet  Semaglutide-Weight Management (WEGOVY) 2.4 MG/0.75ML pen          Review of Systems   All other systems reviewed and are negative.      Physical Exam   BP: 129/80  Pulse: 73  Temp: 98.1  F (36.7  C)  Resp: 20  Height: 167.6 cm (5' 6\")  Weight: 81.2 kg (179 lb 1.6 oz)  SpO2: 100 %      Physical Exam  Vitals and nursing note reviewed.   Constitutional:       General: She is not in acute distress.     Appearance: She is not diaphoretic.      Comments: Wrapped up in blankets.  She feels chilled.   HENT:      Head: Normocephalic and atraumatic.      Right Ear: External ear normal.      Left Ear: External ear normal.      Nose: Nose normal.     "  Mouth/Throat:      Pharynx: No oropharyngeal exudate.   Eyes:      General: No scleral icterus.        Right eye: No discharge.         Left eye: No discharge.      Conjunctiva/sclera: Conjunctivae normal.      Pupils: Pupils are equal, round, and reactive to light.   Neck:      Thyroid: No thyromegaly.   Cardiovascular:      Rate and Rhythm: Normal rate and regular rhythm.      Heart sounds: Normal heart sounds. No murmur heard.  Pulmonary:      Effort: Pulmonary effort is normal. No respiratory distress.      Breath sounds: Normal breath sounds. No wheezing or rales.   Chest:      Chest wall: No tenderness.   Abdominal:      General: Bowel sounds are normal. There is no distension.      Palpations: Abdomen is soft. There is no mass.      Tenderness: There is abdominal tenderness (Left lower quadrant more in the pelvic area without rebound or guarding.  No masses.). There is left CVA tenderness. There is no right CVA tenderness, guarding or rebound.   Musculoskeletal:         General: No tenderness or deformity. Normal range of motion.      Cervical back: Normal range of motion and neck supple.      Right lower leg: No edema.      Left lower leg: No edema.   Lymphadenopathy:      Cervical: No cervical adenopathy.   Skin:     General: Skin is warm and dry.      Capillary Refill: Capillary refill takes less than 2 seconds.      Findings: No erythema or rash.   Neurological:      Mental Status: She is alert and oriented to person, place, and time.      Cranial Nerves: No cranial nerve deficit.   Psychiatric:         Behavior: Behavior normal.         Thought Content: Thought content normal.         ED Course        Procedures              Critical Care time:  none     None         Results for orders placed or performed during the hospital encounter of 04/02/25 (from the past 24 hours)   CBC with platelets differential    Narrative    The following orders were created for panel order CBC with platelets  differential.  Procedure                               Abnormality         Status                     ---------                               -----------         ------                     CBC with platelets and ...[[id number]]                      Final result                 Please view results for these tests on the individual orders.   Comprehensive metabolic panel   Result Value Ref Range    Sodium 140 135 - 145 mmol/L    Potassium 3.9 3.4 - 5.3 mmol/L    Carbon Dioxide (CO2) 27 22 - 29 mmol/L    Anion Gap 10 7 - 15 mmol/L    Urea Nitrogen 12.6 6.0 - 20.0 mg/dL    Creatinine 0.68 0.51 - 0.95 mg/dL    GFR Estimate >90 >60 mL/min/1.73m2    Calcium 10.0 8.8 - 10.4 mg/dL    Chloride 103 98 - 107 mmol/L    Glucose 89 70 - 99 mg/dL    Alkaline Phosphatase 49 40 - 150 U/L    AST 16 0 - 45 U/L    ALT 30 0 - 50 U/L    Protein Total 7.1 6.4 - 8.3 g/dL    Albumin 4.7 3.5 - 5.2 g/dL    Bilirubin Total 0.5 <=1.2 mg/dL   Lactic acid whole blood with 1x repeat in 2 hr when >2   Result Value Ref Range    Lactic Acid, Initial 1.2 0.7 - 2.0 mmol/L   CRP inflammation   Result Value Ref Range    CRP Inflammation <3.00 <5.00 mg/L   CBC with platelets and differential   Result Value Ref Range    WBC Count 6.9 4.0 - 11.0 10e3/uL    RBC Count 4.34 3.80 - 5.20 10e6/uL    Hemoglobin 13.3 11.7 - 15.7 g/dL    Hematocrit 37.5 35.0 - 47.0 %    MCV 86 78 - 100 fL    MCH 30.6 26.5 - 33.0 pg    MCHC 35.5 31.5 - 36.5 g/dL    RDW 13.1 10.0 - 15.0 %    Platelet Count 255 150 - 450 10e3/uL    % Neutrophils 70 %    % Lymphocytes 24 %    % Monocytes 5 %    % Eosinophils 1 %    % Basophils 0 %    % Immature Granulocytes 0 %    NRBCs per 100 WBC 0 <1 /100    Absolute Neutrophils 4.8 1.6 - 8.3 10e3/uL    Absolute Lymphocytes 1.6 0.8 - 5.3 10e3/uL    Absolute Monocytes 0.4 0.0 - 1.3 10e3/uL    Absolute Eosinophils 0.1 0.0 - 0.7 10e3/uL    Absolute Basophils 0.0 0.0 - 0.2 10e3/uL    Absolute Immature Granulocytes 0.0 <=0.4 10e3/uL    Absolute NRBCs 0.0  10e3/uL   CT Abdomen Pelvis w/o Contrast    Narrative    EXAM: CT ABDOMEN PELVIS W/O CONTRAST  LOCATION: [facility] [place]  DATE: 4/2/2025    INDICATION: Hematuria, dysuria, left pelvic pain.  COMPARISON: CT abdomen pelvis 11/29/2023.  TECHNIQUE: CT scan of the abdomen and pelvis was performed without IV contrast. Multiplanar reformats were obtained. Dose reduction techniques were used.  CONTRAST: None.    FINDINGS:     LOWER CHEST: Normal.    HEPATOBILIARY: Cholecystectomy. No biliary ductal dilation.    PANCREAS: Normal.    SPLEEN: Normal.    ADRENAL GLANDS: Normal.    KIDNEYS/BLADDER: Nonobstructing 2 mm left lower pole renal calculus. No additional urinary calculi. No hydronephrosis. Normal bladder.    BOWEL: No bowel obstruction or inflammation. Appendix is absent.    LYMPH NODES: No enlarged lymph nodes.    VASCULATURE: No abdominal aortic aneurysm.    PELVIC ORGANS: Normal. Small amount of physiologic free pelvic fluid. Scattered punctate pelvic phleboliths.    MUSCULOSKELETAL: Multilevel degenerative changes of the spine. No acute bony abnormality. Diastasis of the ventral abdominal wall with a broad periumbilical bulge, similar to prior.      Impression    IMPRESSION:     1.  No acute abnormality, within the limitations of the noncontrast technique.    2.  Nonobstructing 2 mm left lower pole renal calculus. No hydronephrosis.     UA with Microscopic reflex to Culture    Specimen: Urine, Clean Catch   Result Value Ref Range    Color Urine Straw Colorless, Straw, Light Yellow, Yellow    Appearance Urine Clear Clear    Glucose Urine Negative Negative mg/dL    Bilirubin Urine Negative Negative    Ketones Urine Negative Negative mg/dL    Specific Gravity Urine 1.014 1.003 - 1.035    Blood Urine Negative Negative    pH Urine 6.5 5.0 - 7.0    Protein Albumin Urine Negative Negative mg/dL    Urobilinogen Urine Normal Normal mg/dL    Nitrite Urine Positive (A) Negative    Leukocyte Esterase  "Urine Small (A) Negative    Bacteria Urine Moderate (A) None Seen /HPF    Mucus Urine Present (A) None Seen /LPF    RBC Urine 2 <=2 /HPF    WBC Urine 13 (H) <=5 /HPF    Squamous Epithelials Urine 1 <=1 /HPF    Narrative    Urine Culture ordered based on laboratory criteria       Medications   ondansetron (ZOFRAN) injection 4 mg (4 mg Intravenous $Given 4/2/25 1815)   HYDROmorphone (PF) (DILAUDID) injection 0.5 mg (0.5 mg Intravenous $Given 4/2/25 1917)   cefTRIAXone (ROCEPHIN) 2 g vial to attach to  ml bag for ADULTS or NS 50 ml bag for PEDS (2 g Intravenous $New Bag 4/2/25 1957)   sodium chloride 0.9% BOLUS 500 mL (500 mLs Intravenous $New Bag 4/2/25 1958)   sodium chloride 0.9% BOLUS 1,000 mL (0 mLs Intravenous Stopped 4/2/25 1845)   ketorolac (TORADOL) injection 15 mg (15 mg Intravenous $Given 4/2/25 1744)       Assessments & Plan (with Medical Decision Making)     UTI (urinary tract infection)  Kidney stone  Left flank pain     40 year old female presents for evaluation of UTI symptoms starting 2 days ago initially with cloudy/dark urine and increased smell.  Now she has dysuria and left more than right low back pain rated 7-8 on a scale of 10 with radiation into the pelvis area associated with chills, feeling warm, and nausea without vomiting.  Not improving with Tylenol ibuprofen.  Seen in clinic and told that she had a UTI with need for more advanced testing and potential IV antibiotic therapy.  Chart was reviewed in detail including urinalysis from earlier and virtual visit evaluation in the chart.  /73   Pulse 79   Temp 98.1  F (36.7  C) (Oral)   Resp 20   Ht 1.676 m (5' 6\")   Wt 81.2 kg (179 lb 1.6 oz)   SpO2 97%   BMI 28.91 kg/m     Patient is slow with movement.  Wrapped in blankets.  Skin without rash.  Cardiopulmonary exam normal.  Abdomen with good bowel sounds.  She is tender to palpation in the left lower quadrant more in the pelvic area without rebound or guarding.  No masses.  " No hernia.  She also has left flank percussive tenderness.  No lower extremity edema.  Remainder of the exam is otherwise negative.  Given her pain pattern and the fact that she has had kidney stones in the past, I am concerned that this could be a UTI with stone.  Will perform CT.  IV will be established with IV normal saline, IV Zofran, and IV Toradol initially for symptom management.    Laboratory levels displayed normal CBC with a white count of 6900, undetectable CRP, lactic acid level of 1.2, and normal comprehensive metabolic panel.  I circled back with the patient, and she was writhing in pain.  She was complaining of pain more in the deep pelvis and actually right at her urethra.  I am more concerned that she has a kidney stone given this presentation.  She still has not had her CT.  I added Dilaudid to her regimen.  She felt much improved with Dilaudid.  Her pain was greatly decreased and she was smiling and interactive after her CT scan.  With discussing the results she has an intrarenal stone on the left, but no active stone in the ureter.  Based on her symptoms being much improved, I am concerned that she actually passed a stone.  Her urinalysis came back and it was positive for nitrate, small leukocyte esterase, moderate bacteria, and 13 white cells.  This is concerning for coinciding UTI as well.  Therefore, we gave her a dose of IV Rocephin.  She is very concerned about having any repeat pain at home.  Therefore, I have instructed her on proper dosing for ibuprofen and Tylenol but I did give her a small prescription for oxycodone if she has any breakthrough pain.  Will treat any breakthrough nausea with Zofran.  Prescription for Omnicef given her allergy profile.  Urine culture pending.  ED return instructions reviewed with her in detail.  Her  was present to drive her home.  Patient in agreement.     I have reviewed the nursing notes.    I have reviewed the findings, diagnosis, plan and need  for follow up with the patient.           Medical Decision Making  The patient's presentation was of moderate complexity (an acute illness with systemic symptoms).    The patient's evaluation involved:  review of external note(s) from 1 sources (see separate area of note for details)  review of 1 test result(s) ordered prior to this encounter (see separate area of note for details)  ordering and/or review of 3+ test(s) in this encounter (see separate area of note for details)    The patient's management necessitated moderate risk (prescription drug management including medications given in the ED) and high risk (a parenteral controlled substance).        New Prescriptions    CEFDINIR (OMNICEF) 300 MG CAPSULE    Take 1 capsule (300 mg) by mouth 2 times daily.    ONDANSETRON (ZOFRAN ODT) 4 MG ODT TAB    Take 1 tablet (4 mg) by mouth every 8 hours as needed for nausea.    OXYCODONE (ROXICODONE) 5 MG TABLET    Take 1 tablet (5 mg) by mouth every 6 hours as needed for severe pain.       Final diagnoses:   UTI (urinary tract infection)   Kidney stone   Left flank pain     Disclaimer: This note consists of symbols derived from keyboarding, dictation and/or voice recognition software. As a result, there may be errors in the script that have gone undetected. Please consider this when interpreting information found in this chart.      4/2/2025   [facility] EMERGENCY DEPT       Lionel Fung PA-C  04/02/25 2022

## 2025-04-02 NOTE — TELEPHONE ENCOUNTER
Patient had virtual visit today for UTI symptoms, provider recommends in person appointment TODAY. Writer has checked, no opening in [place], [place], or [place]. Wondering if [place] has opening today. If not please route back to [facility] to advise patient to go to ED.     Andrés Llanos RN on 4/2/2025 at 12:11 PM

## 2025-04-02 NOTE — PROGRESS NOTES
Assessment & Plan     History of ESBL E. coli infection    UTI symptoms  - UA Macroscopic with reflex to Microscopic and Culture - Lab Collect; Future  - UA Macroscopic with reflex to Microscopic and Culture - Lab Collect  - Urine Culture    Breanna presents to clinic today with complaints of urinary tract infection symptoms including dysuria, urinary odor and change in urine color, however additionally having back pain, flank pain, pelvic pain, chills, and decreased appetite. She is clearly uncomfortable, tearful and ill appearing in clinic today. Urine culture from previous UTI was reviewed, positive for ESBL e-coli infection. Only oral option for susceptible medication was Macrobid which she was previously treated with. Given her symptoms and history of antibiotic resistance with recent infection, I do have concern of developing pyelonephritis. We discussed presentation to the ED given this and consideration of IV antibiotics. Breanna voices concern of cost with the ED, again reviewed concern with clinic monitoring and risk of worsening infection. She was agreeable to ED evaluation and was escorted by staff to ED waiting.     The longitudinal plan of care for the diagnosis(es)/condition(s) as documented were addressed during this visit. Due to the added complexity in care, I will continue to support Breanna in the subsequent management and with ongoing continuity of care.      Subjective   Breanna is a 40 year old, presenting for the following health issues:  UTI (Symptoms started 2 days ago)      4/2/2025     3:33 PM   Additional Questions   Roomed by Reanna     Via the Health Maintenance questionnaire, the patient has reported the following services have been completed -Mammogram: [facility] 2024-01-01, this information has not been sent to the abstraction team.  History of Present Illness       Reason for visit:  Uti    She eats 4 or more servings of fruits and vegetables daily.She consumes 2 sweetened  "beverage(s) daily.She exercises with enough effort to increase her heart rate 30 to 60 minutes per day.  She exercises with enough effort to increase her heart rate 5 days per week.   She is taking medications regularly.      SUBJECTIVE:   Breanna Luna is a 40 year old female who presents with the following concerns:  1. UTI Symptoms - Patient presents with 2 days of dysuria, low back pain and pelvic pain. Her urine has been green-yellow in color. She rates her low back is severe, even the chair touching her low back worsens her pain. She has been drinking fluids, her appetite has been decreased. She awoke during the night last night with chills/sweats.  She did not check her temperature for a measured fever. She states she feels \"miserable\", worse than she has with any other UTI infection she has had previously. She did have a UTI treated via e-visit on 3/10/25. At that time her culture did return positive for ESBL e-coli. Sensitivities showed Macrobid as susceptible treatment, and she completed one week course of treatment. She notes having some mild improvement with symptoms after completion however symptoms have since worsened.     Patient Active Problem List   Diagnosis    Alcohol abuse, in remission    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)    Intractable migraine without aura and without status migrainosus    Biliary dyskinesia    Class 1 obesity due to excess calories without serious comorbidity with body mass index (BMI) of 31.0 to 31.9 in adult    Nasal turbinate hypertrophy    Class 2 obesity     Current Facility-Administered Medications   Medication Dose Route Frequency Provider Last Rate Last Admin    albuterol (PROVENTIL) neb solution 2.5 mg  2.5 mg Nebulization Once Edita Perez MD        Botulinum Toxin Type A (BOTOX) 200 units injection 150 Units  150 Units Intramuscular Q12 weeks Blanca Ramos MD        Botulinum Toxin Type A (BOTOX) 200 units injection 150 Units  150 Units Intramuscular " See Admin Instructions Cara Suarez MD   150 Units at 12/29/23 1137    Botulinum Toxin Type A (BOTOX) 200 units injection 150 Units  150 Units Intramuscular See Admin Instructions Cara Suarez MD   150 Units at 09/29/23 1100    Botulinum Toxin Type A (BOTOX) 200 units injection 150 Units  150 Units Intramuscular See Admin Instructions Virgen Villagran PA-C   150 Units at 06/30/23 1440    Botulinum Toxin Type A (BOTOX) 200 units injection 150 Units  150 Units Intramuscular See Admin Instructions Cara Suarez MD   150 Units at 03/31/23 0829    Botulinum Toxin Type A (BOTOX) 200 units injection 150 Units  150 Units Intramuscular See Admin Instructions Cara Suarez MD   150 Units at 01/06/23 1207    sodium chloride (PF) 0.9% PF flush 3 mL  3 mL Intravenous q1 min prn Nida Mckeon, MADELINE CNP         Current Outpatient Medications   Medication Sig Dispense Refill    albuterol (PROAIR HFA/PROVENTIL HFA/VENTOLIN HFA) 108 (90 Base) MCG/ACT inhaler Inhale 2 puffs into the lungs every 4 hours as needed for shortness of breath, wheezing or cough 9 g 1    albuterol (PROVENTIL) (2.5 MG/3ML) 0.083% neb solution Take 1 vial (2.5 mg) by nebulization every 4 hours as needed for shortness of breath, wheezing or cough 90 mL 0    amphetamine-dextroamphetamine (ADDERALL XR) 20 MG 24 hr capsule TAKE ONE CAPSULE BY MOUTH ONCE DAILY 30 capsule 0    amphetamine-dextroamphetamine (ADDERALL XR) 20 MG 24 hr capsule Take 1 capsule (20 mg) by mouth daily. 30 capsule 0    [START ON 4/14/2025] amphetamine-dextroamphetamine (ADDERALL XR) 20 MG 24 hr capsule Take 1 capsule (20 mg) by mouth daily. 30 capsule 0    amphetamine-dextroamphetamine (ADDERALL) 20 MG tablet TAKE ONE TABLET BY MOUTH ONCE DAILY 30 tablet 0    amphetamine-dextroamphetamine (ADDERALL) 20 MG tablet Take 1 tablet (20 mg) by mouth daily. 30 tablet 0    [START ON 4/14/2025] amphetamine-dextroamphetamine (ADDERALL) 20 MG tablet Take 1 tablet  "(20 mg) by mouth daily. 30 tablet 0    benzonatate (TESSALON) 100 MG capsule Take 1 capsule (100 mg) by mouth 3 times daily as needed for cough. 30 capsule 0    benzonatate (TESSALON) 200 MG capsule Take 1 capsule (200 mg) by mouth 3 times daily as needed for cough 30 capsule 0    desonide (DESOWEN) 0.05 % external cream Apply topically 2 times daily 60 g 1    galcanezumab-gnlm (EMGALITY) 120 MG/ML injection Inject 1 mL (120 mg) Subcutaneous every 28 days 1 mL 11    guaiFENesin 1200 MG TB12 Take 1 tablet (1,200 mg) by mouth 2 times daily. 60 tablet 0    ibuprofen (ADVIL/MOTRIN) 200 MG capsule Take 1 capsule (200 mg) by mouth every 4 hours as needed for fever. 30 capsule 0    ipratropium (ATROVENT) 0.06 % nasal spray Spray 2 sprays into both nostrils 4 times daily. 15 mL 0    naproxen (NAPROSYN) 500 MG tablet Take 1 tablet (500 mg) by mouth 2 times daily as needed for moderate pain 28 tablet 11    Semaglutide-Weight Management (WEGOVY) 2.4 MG/0.75ML pen Inject 2.4 mg Subcutaneous once a week 9 mL 1     Facility-Administered Medications Ordered in Other Visits   Medication Dose Route Frequency Provider Last Rate Last Admin    ketorolac (TORADOL) injection 15 mg  15 mg Intravenous Once Lionel Fung PA-C        ondansetron (ZOFRAN) injection 4 mg  4 mg Intravenous Q30 Min PRN Lionel Fung PA-C        sodium chloride 0.9% BOLUS 1,000 mL  1,000 mL Intravenous Once Lionel Fung PA-C             Review of Systems  Constitutional, HEENT, cardiovascular, pulmonary, gi and gu systems are negative, except as otherwise noted.      Objective    /80   Pulse 94   Temp 98.2  F (36.8  C) (Temporal)   Resp 12   Ht 1.676 m (5' 6\")   Wt 80.7 kg (178 lb)   SpO2 96%   BMI 28.73 kg/m    Body mass index is 28.73 kg/m .  Physical Exam  Vitals reviewed.   Constitutional:       Appearance: Normal appearance. She is ill-appearing. She is not toxic-appearing.   Eyes:      Extraocular Movements: Extraocular " movements intact.      Conjunctiva/sclera: Conjunctivae normal.   Pulmonary:      Effort: Pulmonary effort is normal.   Skin:     General: Skin is warm and dry.   Neurological:      Mental Status: She is alert and oriented to person, place, and time. Mental status is at baseline.   Psychiatric:         Mood and Affect: Mood normal.         Behavior: Behavior normal.            Results for orders placed or performed in visit on 04/02/25 (from the past 24 hours)   UA Macroscopic with reflex to Microscopic and Culture - Lab Collect    Specimen: Urine, NOS   Result Value Ref Range    Color Urine Straw Colorless, Straw, Light Yellow, Yellow    Appearance Urine Clear Clear    Glucose Urine Negative Negative mg/dL    Bilirubin Urine Negative Negative    Ketones Urine Negative Negative mg/dL    Specific Gravity Urine 1.009 1.003 - 1.035    Blood Urine Negative Negative    pH Urine 6.5 5.0 - 7.0    Protein Albumin Urine Negative Negative mg/dL    Urobilinogen Urine Normal Normal mg/dL    Nitrite Urine Negative Negative    Leukocyte Esterase Urine Trace (A) Negative    RBC Urine 2 <=2 /HPF    WBC Urine 13 (H) <=5 /HPF    Squamous Epithelials Urine 1 <=1 /HPF    Narrative    Urine Culture ordered based on laboratory criteria           Signed Electronically by: Joceline Caldwell NP

## 2025-04-02 NOTE — TELEPHONE ENCOUNTER
Reason for Call:  Appointment Request    Patient requesting this type of appt:  UTI 3 weeks    Patient did a virual appt and suggested to be seen today.    PH preferred.  ER; RG; BK.    Any location.  Any provider today    Please contact patient.  Thank you.    Requested provider: Bradley Mcconnell    Reason patient unable to be scheduled: Needs to be scheduled by clinic    When does patient want to be seen/preferred time: Same day    Comments: provider concerned more like a kidney infection.    Could we send this information to you in "[employer] or would you prefer to receive a phone call?:   Patient would prefer a phone call   Okay to leave a detailed message?: Yes at Cell number on file:    Telephone Information:   Mobile [phone]       Call taken on 4/2/2025 at 10:54 AM by Jessy Reich

## 2025-04-02 NOTE — ED TRIAGE NOTES
Has a UTI, her doctor sent her here for IV antibiotics.  Urine resulted her today.      Triage Assessment (Adult)       Row Name 04/02/25 1613          Triage Assessment    Airway WDL WDL        Respiratory WDL    Respiratory WDL WDL        Skin Circulation/Temperature WDL    Skin Circulation/Temperature WDL WDL        Cardiac WDL    Cardiac WDL WDL        Cognitive/Neuro/Behavioral WDL    Cognitive/Neuro/Behavioral WDL WDL

## 2025-04-02 NOTE — PROGRESS NOTES
Assessment & Plan       ICD-10-CM    1. Chills  R68.83       2. Flank pain  R10.9       3. Dysuria  R30.0            Assessment & Plan     Urinary Tract Infection (UTI) and Possible Kidney Infection:  - Symptoms suggest more than a typical UTI, with concerns for a possible kidney infection.   - Recommend in-person evaluation at the nearest primary care, urgent care or ER within the next 24 hours for further assessment and stronger antibiotics if necessary. No charge for this visit as she is being referred to in person evaluation.              No follow-ups on file.    At the end of the encounter, I discussed results, diagnosis, medications. Discussed red flags for immediate return to clinic/ER, as well as indications for follow up if no improvement. Patient understood and agreed to plan. Patient was stable for discharge.    Subjective     Breanna is a 40 year old female who presents to clinic today the following health issues:    History of Present Illness-  - Breanna Luna, 40-year-old female.  - Recently diagnosed with a UTI and completed a course of antibiotics (Macrobid).  - Symptoms initially improved but worsened starting April 1, 2025.  - Current symptoms include greenish-yellow urine, decreased urine output, painful urination, lower back aches, and possible fever with night sweats.  - Reports lower abdominal pain near the bladder. 8/10 pain.       ROS: Pertinent ROS neg other than the symptoms noted above in the HPI.     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)  2018: Pregnancy-induced hypertension in third trimester  2018: Mild pre-eclampsia in third trimester  2018: Normal pregnancy in third trimester  2018: Encounter for triage in pregnant patient  2017-04: Anxiety  2017-: History of alcohol dependence (H)  2017: Mixed incontinence  2012: Septic olecranon bursitis  2011-10: Vaginal delivery  2011: Amphetamine and other psychostimulant dependence, continuous  2011: Rh negative status during pregnancy  2011: Suicidal ideation  2011: Tobacco use in pregnancy  2011: Maternal drug dependence, antepartum (H)  2009-10: 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,   abuse (H)      Past Medical History:   Diagnosis Date    Cellulitis     MRSA septic olecranon bursitis and facial cellulitis    Drug abuse (H)     methamphetamine and alcohol, sober since treatment     Mild pre-eclampsia in third trimester     Motion sickness     MRSA cellulitis 2012    cleared with negative nasal swabs 17 and 17, reviewed    PONV (postoperative nausea and vomiting)     Pregnancy-induced hypertension in third trimester     Rh negative, antepartum        Social History     Tobacco Use    Smoking status: Former     Current packs/day: 0.00     Types: Cigarettes     Quit date: 3/10/2016     Years since quittin.0    Smokeless tobacco: Never   Substance Use Topics    Alcohol use: No     Comment: history alcohol abuse , s/p treatment               OBJECTIVE:  Vitals not done due to this being a virtual visit    GENERAL: healthy, alert and no distress. Appears ill.  EYES: Eyes grossly normal to inspection,conjunctivae and sclerae normal  RESP: Able to speak in complete sentences, no audible wheeze or cough  SKIN: no suspicious lesions or rashes  NEURO: mentation intact and speech normal  PSYCH: mentation appears normal, affect  normal/bright    Video-Visit Details    Type of service:  Video Visit  Video Start Time: 1040  Video End Time: 1050    Originating Location: [place]    Distant Location:  [facility]     Platform used for Video Visit: MADELINE Carlos CNP

## 2025-04-02 NOTE — TELEPHONE ENCOUNTER
RN called patient to see if ADS was an option. While talking to patient, opening with Joceline Caldwell for Today appeared. RN scheduled patient in opening today.     Andrés Llanos RN on 4/2/2025 at 1:02 PM

## 2025-04-03 LAB — BACTERIA UR CULT: NORMAL

## 2025-04-03 NOTE — DISCHARGE INSTRUCTIONS
"It was a pleasure working with you today!  I am thankful that you are feeling much better this evening.  As we discussed, I am concerned that you passed a kidney stone.  You still have a small kidney stone in the left kidney, but the current location of that stone does not cause any pain.  Hopefully it stays there for a very long time :-) Please make sure that you continue to be a good water drinker by drinking 8-10 glasses of water daily.  Consider looking into a \"low oxalate diet \"to try and prevent future kidney stones.  He might have some spasming discomfort moving forward in the next 12-18 hours.  Use ibuprofen 600 mg every 6 hours as needed.  You can also use Tylenol 1000 mg every 6 hours as needed on top of the ibuprofen.  Reserve the oxycodone only for severe breakthrough pain.  Do not drive or operate machinery for 8 hours after taking oxycodone, as this medication can impair your judgment.  Take your first dose of pill antibiotic tomorrow morning as you were given the first dose through your IV here in the emergency department.  If you get any breakthrough nausea, you can take the Zofran that was prescribed.  Return to the emergency department if you develop increasing pain, fever above 100.5, and/or vomiting.      "

## 2025-04-06 LAB
BACTERIA UR CULT: ABNORMAL
BACTERIA UR CULT: ABNORMAL

## 2025-04-21 ENCOUNTER — MYC MEDICAL ADVICE (OUTPATIENT)
Dept: FAMILY MEDICINE | Facility: CLINIC | Age: 40
End: 2025-04-21
Payer: COMMERCIAL

## 2025-04-22 ENCOUNTER — LAB (OUTPATIENT)
Dept: LAB | Facility: CLINIC | Age: 40
End: 2025-04-22
Payer: COMMERCIAL

## 2025-04-22 DIAGNOSIS — Z86.19 HISTORY OF ESBL E. COLI INFECTION: ICD-10-CM

## 2025-04-22 LAB
ALBUMIN UR-MCNC: NEGATIVE MG/DL
APPEARANCE UR: CLEAR
BACTERIA #/AREA URNS HPF: ABNORMAL /HPF
BILIRUB UR QL STRIP: NEGATIVE
COLOR UR AUTO: YELLOW
GLUCOSE UR STRIP-MCNC: NEGATIVE MG/DL
HGB UR QL STRIP: NEGATIVE
KETONES UR STRIP-MCNC: NEGATIVE MG/DL
LEUKOCYTE ESTERASE UR QL STRIP: ABNORMAL
MUCOUS THREADS #/AREA URNS LPF: PRESENT /LPF
NITRATE UR QL: POSITIVE
PH UR STRIP: 6.5 [PH] (ref 5–7)
RBC URINE: 2 /HPF
SP GR UR STRIP: 1.02 (ref 1–1.03)
SQUAMOUS EPITHELIAL: 1 /HPF
UROBILINOGEN UR STRIP-MCNC: NORMAL MG/DL
WBC URINE: 60 /HPF

## 2025-04-22 PROCEDURE — 87186 SC STD MICRODIL/AGAR DIL: CPT

## 2025-04-22 PROCEDURE — 81001 URINALYSIS AUTO W/SCOPE: CPT

## 2025-04-22 PROCEDURE — 87086 URINE CULTURE/COLONY COUNT: CPT

## 2025-04-23 ENCOUNTER — TELEPHONE (OUTPATIENT)
Dept: FAMILY MEDICINE | Facility: CLINIC | Age: 40
End: 2025-04-23
Payer: COMMERCIAL

## 2025-04-23 DIAGNOSIS — Z86.19 HISTORY OF ESBL E. COLI INFECTION: ICD-10-CM

## 2025-04-23 DIAGNOSIS — R39.9 UTI SYMPTOMS: Primary | ICD-10-CM

## 2025-04-23 LAB — BACTERIA UR CULT: ABNORMAL

## 2025-04-23 RX ORDER — CIPROFLOXACIN 500 MG/1
500 TABLET, FILM COATED ORAL 2 TIMES DAILY
Qty: 6 TABLET | Refills: 0 | Status: SHIPPED | OUTPATIENT
Start: 2025-04-23

## 2025-04-23 NOTE — TELEPHONE ENCOUNTER
Pt wondering about recent lab results and if any medication is needed. Please advise.    Marcelle Theodore MA      Component      Latest Ref Rng 4/22/2025  10:55 AM   Color Urine      Colorless, Straw, Light Yellow, Yellow  Yellow    Appearance Urine      Clear  Clear    Glucose Urine      Negative mg/dL Negative    Bilirubin Urine      Negative  Negative    Ketones Urine      Negative mg/dL Negative    Specific Gravity Urine      1.003 - 1.035  1.020    Blood Urine      Negative  Negative    pH Urine      5.0 - 7.0  6.5    Protein Albumin Urine      Negative mg/dL Negative    Urobilinogen mg/dL      Normal mg/dL Normal    Nitrite Urine      Negative  Positive !    Leukocyte Esterase Urine      Negative  Moderate !    Bacteria Urine      None Seen /HPF Few !    Mucus Urine      None Seen /LPF Present !    RBC Urine      <=2 /HPF 2    WBC Urine      <=5 /HPF 60 (H)    Squamous Epithelial /HPF Urine      <=1 /HPF 1       Legend:  ! Abnormal  (H) High

## 2025-04-23 NOTE — TELEPHONE ENCOUNTER
Patient had OV with Joceline Caldwell 4/2 for UTI. Patient had another urine test/lab yesterday 4/22.

## 2025-04-23 NOTE — TELEPHONE ENCOUNTER
FYI - Status Update    Who is Calling: patient    Update: Patient requested for lab results, she is calling back to see what the update is. She would like someone to call her with her results. If positive for UTI, she would like antibiotics asap.     Does caller want a call/response back: Yes     Could we send this information to you in [employer] or would you prefer to receive a phone call?:   Patient would prefer a phone call   Okay to leave a detailed message?: Yes at Cell number on file:    Telephone Information:   Mobile [phone]

## 2025-04-23 NOTE — TELEPHONE ENCOUNTER
Spoke with patient via phone. Waiting on sensitivities. She has had ESBL recently as well as klebsiella, treated with Macrobid and Cefdinir. She is symptomatic at this point so I did start her on Cipro for 3 days with the understanding that this may need to be changed depending on the sensitivities.

## 2025-04-24 DIAGNOSIS — Z86.19 HISTORY OF ESBL E. COLI INFECTION: ICD-10-CM

## 2025-04-24 DIAGNOSIS — R39.9 UTI SYMPTOMS: Primary | ICD-10-CM

## 2025-04-24 DIAGNOSIS — N39.0 RECURRENT UTI: ICD-10-CM

## 2025-04-24 RX ORDER — NITROFURANTOIN 25; 75 MG/1; MG/1
100 CAPSULE ORAL 2 TIMES DAILY
Qty: 28 CAPSULE | Refills: 0 | Status: SHIPPED | OUTPATIENT
Start: 2025-04-24

## 2025-04-24 NOTE — RESULT ENCOUNTER NOTE
Called and spoke to Patient. Poly drug resistance on urine culture. Amenabel to addition of macrobid to cipro but for extended course. Consider fosfomycin if still with more than 100K CFU and symptomatic after treatment. Amenable to urology ref as well. Had hx prior of renal calculus and may be a nidus for infection.

## 2025-04-28 ENCOUNTER — PATIENT OUTREACH (OUTPATIENT)
Dept: CARE COORDINATION | Facility: CLINIC | Age: 40
End: 2025-04-28
Payer: COMMERCIAL

## 2025-05-15 ENCOUNTER — OFFICE VISIT (OUTPATIENT)
Dept: FAMILY MEDICINE | Facility: CLINIC | Age: 40
End: 2025-05-15
Payer: COMMERCIAL

## 2025-05-15 VITALS
HEIGHT: 66 IN | BODY MASS INDEX: 28.73 KG/M2 | RESPIRATION RATE: 12 BRPM | OXYGEN SATURATION: 98 % | DIASTOLIC BLOOD PRESSURE: 70 MMHG | WEIGHT: 178.8 LBS | TEMPERATURE: 98.7 F | HEART RATE: 88 BPM | SYSTOLIC BLOOD PRESSURE: 124 MMHG

## 2025-05-15 DIAGNOSIS — F10.11 ALCOHOL ABUSE, IN REMISSION: ICD-10-CM

## 2025-05-15 DIAGNOSIS — M54.50 ACUTE MIDLINE LOW BACK PAIN WITHOUT SCIATICA: Primary | ICD-10-CM

## 2025-05-15 DIAGNOSIS — F15.11 METHAMPHETAMINE ABUSE IN REMISSION (H): ICD-10-CM

## 2025-05-15 DIAGNOSIS — Z87.440 PERSONAL HISTORY OF URINARY TRACT INFECTION: ICD-10-CM

## 2025-05-15 RX ORDER — METHOCARBAMOL 500 MG/1
500 TABLET, FILM COATED ORAL
Qty: 30 TABLET | Refills: 0 | Status: SHIPPED | OUTPATIENT
Start: 2025-05-15

## 2025-05-15 ASSESSMENT — ENCOUNTER SYMPTOMS: BACK PAIN: 1

## 2025-05-15 ASSESSMENT — PAIN SCALES - GENERAL: PAINLEVEL_OUTOF10: SEVERE PAIN (8)

## 2025-05-15 NOTE — PROGRESS NOTES
"  Assessment & Plan   Problem List Items Addressed This Visit    None  Visit Diagnoses         Acute midline low back pain without sciatica    -  Primary    Relevant Medications    methocarbamol (ROBAXIN) 500 MG tablet           Acute back pain with Low concern for herniated discs as she is not having any neurological symptoms such as numbness, tingling or weakness in the lower extremities. Also denies pain shooting down the leg. Also low concern for a compression fracture as she did not have any falling trauma and she is young for bone degeneration. Discussed risks and benefits of x-ray for the lumbar spine, recommended against it as the pain is acute in nature and no red flag symptoms present. Breanna agreed. Plan is to utilize ice, heat and ibuprofen or naproxen as needed. Advised to not use ibuprofen and naproxen at the same time. Also prescribed Robaxin for relaxation of the muscles at bedtime to assist in sleep and pain management. Discussed risks and benefits of prednisone use with NSAIDs, advised to quit prednisone at this time. If new or worsen symptoms occur advised to be seen in clinic. If pain is not getting better over the next week, advised to follow-up on MyChart and we would plan for physical therapy which I think would be beneficial long term. Congratulated her on weight loss with diet and exercise changes. She has follow up with urology. No changing urinary symptoms.        BMI  Estimated body mass index is 28.77 kg/m  as calculated from the following:    Height as of this encounter: 1.679 m (5' 6.1\").    Weight as of this encounter: 81.1 kg (178 lb 12.8 oz).         Bailey Carlos is a 40 year old, presenting for the following health issues:  Back Pain (Started yesterday)      5/15/2025     1:19 PM   Additional Questions   Roomed by Riya PAGAN         5/15/2025     1:19 PM   Patient Reported Additional Medications   Patient reports taking the following new medications prednisone this am     Via " "the Health Maintenance questionnaire, the patient has reported the following services have been completed -Mammogram: [facility] 2024-01-[facility], this information has not been sent to the abstraction team.  Back Pain     History of Present Illness       Reason for visit:  Back pain   She is taking medications regularly.      Breanna present sfor acute backpain that started yesterday. 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 At that time her back was not painful. Yesterday she was vaccumming and felt a jolt of pain in her low back by her spine. She denies any shooting pains down her legs or into the groin. Also not having any numbness or tingling in the lower extremities. She rates her current pain as an 8/10. She has tried tylenol, a left over prednisone from her previous prescription, Voltaren gel, Biofreeze, ice and a warm bath without much improvement in her pain. Pain gets worse with movement such as reaching, bending or pulling. No other concerns today.     Review of Systems  Constitutional, HEENT, cardiovascular, pulmonary, GI, , musculoskeletal, neuro, skin, endocrine and psych systems are negative, except as otherwise noted.      Objective    /70   Pulse 88   Temp 98.7  F (37.1  C) (Temporal)   Resp 12   Ht 1.679 m (5' 6.1\")   Wt 81.1 kg (178 lb 12.8 oz)   LMP 04/23/2025   SpO2 98%   BMI 28.77 kg/m    Body mass index is 28.77 kg/m .  Physical Exam   GENERAL: alert and no distress  RESP: lungs clear to auscultation - no rales, rhonchi or wheezes  CV: regular rate and rhythm, normal S1 S2, no S3 or S4, no murmur, click or rub, no peripheral edema  MS: no gross musculoskeletal defects noted, no edema. Tenderness to the lumbar/sacral region over the midline area to palpitation. Non tender in the thoracic or gluteal region. Increased tenderness when doing strength testing of lower extremities.  NEURO: Strength 5/5 in the " upper and lower extremities. Sensation in tact in lower extremities.   SKIN: no suspicious lesions or rashes. No bruises present over the tender area.         Ashu Turner, MS3  Medical Student    I was present with the medical student who participated in the service and in the documentation of the note. I have verified the history and personally performed the physical exam and medical decision making. I reviewed the note in detail and edited it appropriately. I agree with the assessment and plan of care as documented in the note.    Signed Electronically by: Bradley Mcconnell MD

## 2025-05-19 NOTE — TELEPHONE ENCOUNTER
Please let her know I sent her a My Chart message this morning and already put the order in for the HIDA scan.  JONATHAN FlorianC     [facility]

## 2025-05-19 NOTE — PROGRESS NOTES
"Breanna is a 40 year old who is being evaluated via a billable video visit.      The patient has been notified of following:     \"This video visit will be conducted via a call between you and your physician/provider. We have found that certain health care needs can be provided without the need for an in-person physical exam.  This service lets us provide the care you need with a video conversation.  If a prescription is necessary we can send it directly to your pharmacy.  If lab work is needed we can place an order for that and you can then stop by our lab to have the test done at a later time.    Video visits are billed at different rates depending on your insurance coverage.  Please reach out to your insurance provider with any questions.    If during the course of the call the physician/provider feels a video visit is not appropriate, you will not be charged for this service.\"    Patient has given verbal consent for Video visit? Yes    How would you like to obtain your AVS? MyChart    If the video visit is dropped, the invitation should be resent by: Send to e-mail at: [email or web address]    Will anyone else be joining your video visit? No    I    Video-Visit Details    Type of service:  Video Visit    Originating Location (pt. Location): Home    Distant Location (provider location):   [facility]    Platform used for Video Visit: [employer]    Video Start Time: 1:06 PM    Video End Time:1:25 PM        Return Medical Weight Management Note         Breanna Luna  MRN:  [id number]  :  1985  MARISA:  2025        Dear Ag Quick MD,    I had the pleasure of seeing your patient Breanna Luna. She is a 40 year old female who I am continuing to see for treatment of obesity related to:        2022    10:45 AM   --   I have the following health issues associated with obesity Pre-Diabetes    High Cholesterol         Assessment   Problem List Items Addressed " "This Visit       Intractable migraine without aura and without status migrainosus    Relevant Medications    tirzepatide-Weight Management (ZEPBOUND) 2.5 MG/0.5ML prefilled pen    Overweight with body mass index (BMI) of 29 to 29.9 in adult - Primary    Relevant Medications    tirzepatide-Weight Management (ZEPBOUND) 2.5 MG/0.5ML prefilled pen     Other Visit Diagnoses         History of obesity        Relevant Medications    tirzepatide-Weight Management (ZEPBOUND) 2.5 MG/0.5ML prefilled pen               PLAN/DISCUSSION:  We discussed the role of pharmacological agents in the treatment of obesity and the \"off-label\" use of medications in this practice. We discussed the risks and benefits of each. We discussed indications, contraindications, potential side effects, and estimated costs of each. Discussed that medications must always be used together with lifestyle changes such as improvements in diet choices, portion control and establishing and maintaining a regular exercise program.     Discussed cash options for GLP-1 agonists as well as SL semaglutide through [employer]     Rx sent over to pharmacy for zepbound 2.5 mg. If not covered will send over 2.5 mg to [facility]    FOLLOW-UP:  3-4 months        SUBJECTIVE/OBJECTIVE:  Patient last seen 10/2023 by Francisca Hylton PA-C. At that time was on wegovy. Got down to around 150 lbs. Had to stop due to insurance change. Been able to maintain very well.  Has noticed weight slowly increasing lately so wanted to see what options are available. Would like to stick with injectable. Prefers option to wegovy as it caused some nausea.     Has been part of a rowing community. Was also on [employer] for while.       Antiobesity medication history:  Phentermine (Lomaira, Adipex) Has taken and tolerated. Contraindicated currently on stimulant   Phentermine/Topiramate (Qsymia)    Bupropion/Naltrexone (Contrave)    Liraglutide (Saxenda)    Semaglutide (Wegovy) Has " taken in the past   Tirzepatide (Zepbound)    Orlistat (Xenical/Naresh)    Off-Label Medications for Obesity  Semaglutide (Ozempic)    Tirzepatide (Mounjaro)    Bupropion (Wellbutrin)    Metformin(Glucophage)    Topiramate (Topamax) Took for 4 years for migraine   Naltexone        Recent diet changes: drinks only water - not sure of total amount  3 meals most days  Does not snack    Recent exercise/activity changes: works out 5 days weekly cardio and muscle resistance      CURRENT WEIGHT:   180 lbs 0 oz    Initial Weight (lbs): 218 lbs  Last Visits Weight: 193 lb (87.5 kg)  Cumulative weight loss (lbs): 38  Weight Loss Percentage: 17.43%      MEDICATIONS:   Current Outpatient Medications   Medication Sig Dispense Refill    albuterol (PROAIR HFA/PROVENTIL HFA/VENTOLIN HFA) 108 (90 Base) MCG/ACT inhaler Inhale 2 puffs into the lungs every 4 hours as needed for shortness of breath, wheezing or cough 9 g 1    albuterol (PROVENTIL) (2.5 MG/3ML) 0.083% neb solution Take 1 vial (2.5 mg) by nebulization every 4 hours as needed for shortness of breath, wheezing or cough 90 mL 0    amphetamine-dextroamphetamine (ADDERALL XR) 20 MG 24 hr capsule Take 1 capsule (20 mg) by mouth daily. 30 capsule 0    amphetamine-dextroamphetamine (ADDERALL XR) 20 MG 24 hr capsule Take 1 capsule (20 mg) by mouth daily. 30 capsule 0    amphetamine-dextroamphetamine (ADDERALL) 20 MG tablet Take 1 tablet (20 mg) by mouth daily. 30 tablet 0    amphetamine-dextroamphetamine (ADDERALL) 20 MG tablet Take 1 tablet (20 mg) by mouth daily. 30 tablet 0    amphetamine-dextroamphetamine (ADDERALL) 20 MG tablet Take 1 tablet (20 mg) by mouth daily. 30 tablet 0    benzonatate (TESSALON) 100 MG capsule Take 1 capsule (100 mg) by mouth 3 times daily as needed for cough. 30 capsule 0    tirzepatide-Weight Management (ZEPBOUND) 2.5 MG/0.5ML prefilled pen Inject 0.5 mLs (2.5 mg) subcutaneously every 7 days. 2 mL 0    amphetamine-dextroamphetamine (ADDERALL XR) 20 MG  24 hr capsule Take 1 capsule (20 mg) by mouth daily. 30 capsule 0    benzonatate (TESSALON) 200 MG capsule Take 1 capsule (200 mg) by mouth 3 times daily as needed for cough (Patient not taking: Reported on 5/15/2025) 30 capsule 0    cefdinir (OMNICEF) 300 MG capsule Take 1 capsule (300 mg) by mouth 2 times daily. (Patient not taking: Reported on 5/15/2025) 20 capsule 0    ciprofloxacin (CIPRO) 500 MG tablet Take 1 tablet (500 mg) by mouth 2 times daily. (Patient not taking: Reported on 5/15/2025) 6 tablet 0    desonide (DESOWEN) 0.05 % external cream Apply topically 2 times daily (Patient not taking: Reported on 5/15/2025) 60 g 1    galcanezumab-gnlm (EMGALITY) 120 MG/ML injection Inject 1 mL (120 mg) Subcutaneous every 28 days (Patient not taking: Reported on 5/15/2025) 1 mL 11    guaiFENesin 1200 MG TB12 Take 1 tablet (1,200 mg) by mouth 2 times daily. (Patient not taking: Reported on 5/15/2025) 60 tablet 0    ibuprofen (ADVIL/MOTRIN) 200 MG capsule Take 1 capsule (200 mg) by mouth every 4 hours as needed for fever. 30 capsule 0    ipratropium (ATROVENT) 0.06 % nasal spray Spray 2 sprays into both nostrils 4 times daily. (Patient not taking: Reported on 5/15/2025) 15 mL 0    methocarbamol (ROBAXIN) 500 MG tablet Take 1 tablet (500 mg) by mouth nightly as needed for muscle spasms. 30 tablet 0    naproxen (NAPROSYN) 500 MG tablet Take 1 tablet (500 mg) by mouth 2 times daily as needed for moderate pain (Patient not taking: Reported on 5/15/2025) 28 tablet 11    nitroFURantoin macrocrystal-monohydrate (MACROBID) 100 MG capsule Take 1 capsule (100 mg) by mouth 2 times daily. (Patient not taking: Reported on 5/15/2025) 28 capsule 0    ondansetron (ZOFRAN ODT) 4 MG ODT tab Take 1 tablet (4 mg) by mouth every 8 hours as needed for nausea. (Patient not taking: Reported on 5/15/2025) 10 tablet 0    oxyCODONE (ROXICODONE) 5 MG tablet Take 1 tablet (5 mg) by mouth every 6 hours as needed for severe pain. (Patient not  "taking: Reported on 5/15/2025) 6 tablet 0    Semaglutide-Weight Management (WEGOVY) 2.4 MG/0.75ML pen Inject 2.4 mg Subcutaneous once a week (Patient not taking: Reported on 5/20/2025) 9 mL 1       ROS: 5/20/2025  General  Fatigue: No  Sleep Quality: OK  Birth Control: Tubal ligation  HEENT  Hx of glaucoma: No  Vision changes: No  Cardiovascular  Chest Pain with Exertion: No  Palpitations: No  Hx of heart disease: No  Hx of PVD No  Pulmonary  Shortness of breath at rest: No  Shortness of breath with exertion: No  Snoring: No - Had sleep study. Had nasal surgery.   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  Meth use but none since 2012.  No eating disorder history   Endocrine  Polydipsia: No  Polyuria: No  Personal or family history of thyroid cancer: No  Neurologic:  Hx of seizures: No  Hx of paresthesias:No      PHYSICAL EXAM:  Objective    Ht 5' 6\" (1.676 m)   Wt 180 lb (81.6 kg)   LMP 04/23/2025   BMI 29.05 kg/m    GENERAL: Healthy, alert and no distress  EYES: Eyes grossly normal to inspection.  No discharge or erythema, or obvious scleral/conjunctival abnormalities.  RESP: No audible wheeze, cough, or visible cyanosis.  No visible retractions or increased work of breathing.    SKIN: Visible skin clear. No significant rash, abnormal pigmentation or lesions.  NEURO: Cranial nerves grossly intact.  Mentation and speech appropriate for age.  PSYCH: Mentation appears normal, affect normal/bright, judgement and insight intact, normal speech and appearance well-groomed.        Sincerely,    Campbell Villalpando PA-C    20 minutes spent on the date of the encounter doing chart review, review of test results, patient visit and documentation       "

## 2025-05-20 ENCOUNTER — VIRTUAL VISIT (OUTPATIENT)
Dept: SURGERY | Facility: CLINIC | Age: 40
End: 2025-05-20
Payer: COMMERCIAL

## 2025-05-20 VITALS — BODY MASS INDEX: 28.93 KG/M2 | WEIGHT: 180 LBS | HEIGHT: 66 IN

## 2025-05-20 DIAGNOSIS — E66.3 OVERWEIGHT WITH BODY MASS INDEX (BMI) OF 29 TO 29.9 IN ADULT: Primary | ICD-10-CM

## 2025-05-20 DIAGNOSIS — Z86.39 HISTORY OF OBESITY: ICD-10-CM

## 2025-05-20 DIAGNOSIS — G43.019 INTRACTABLE MIGRAINE WITHOUT AURA AND WITHOUT STATUS MIGRAINOSUS: ICD-10-CM

## 2025-05-20 PROCEDURE — 98005 SYNCH AUDIO-VIDEO EST LOW 20: CPT | Performed by: PHYSICIAN ASSISTANT

## 2025-05-20 NOTE — PATIENT INSTRUCTIONS
"Nice to talk with you today! Thank you for allowing me the privilege of caring for you.   We hope we provided you with the excellent service you deserve.     To ensure the quality of our services you may receive a patient satisfaction survey from an independent monitoring company.  The greatest compliment you can give is \"Likely to Recommend\"      Below is our plan we discussed.-  FERNANDEZ Hightower      Start Zepbound    Please call [phone] and schedule a follow up with Campbell Villalpando PA-C in 3 months.  If you need to reach me sooner you can do so by calling [phone].    Have a great day!         Zepbound (Tirzepatide)    Zepbound (Tirzepatide): is an injectable prescription medicine recently FDA approved for adults with obesity or overweight with an additional condition affected by their weight.      It is given as a shot once a week. It activates the body's receptors for GIP (glucose-dependent insulinotropic polypeptide) and GLP-1 (glucagon-like peptide-1) receptor, two naturally occurring hormones that help tell the brain that you are full. It also works is by slowing down how quickly food leaves your stomach.     You will start to feel villa more quickly, notice portion changes and eat less often between meals.  Patients are advised to eat slowly and purposefully. Give yourself less portions. You may find after starting this medication you have a new point of fullness. Maintain consistent eating schedule with meals +/- snacks and get in good hydration.    Dosing:  Initial dose: 2.5 mg injected subcutaneously once weekly for 4 weeks  Further dose changes can include: increase to 5 mg once weekly for 4 weeks, then 7.5 mg once weekly for 4 weeks, then 10 mg once weekly for 4 weeks then 12.5 mg once weekly for 4 weeks, then 15 mg (maximum dose) once weekly.    Dose changes are based on how you are tolerating the current dose, what benefits you are seeing at the current dose and if improvement in effectiveness of " the drug is needed. The goal is to find the dose that works best for you with the least amount of side effects. You may find you reach this at a lower dose than the maximum dose.     Side effects: Common side effects include nausea, Heartburn,diarrhea, constipation. This is worse when starting the medication and with dose dose escalation.  It does improve with time. Stay adequately hydrated and eat small portions to decrease the risk of side effects.     The risk of pancreatitis (inflammation of the pancreas) has been associated with this type of medication, but is very rare.  If you have had pancreatitis in the past, this medication may not be for you. If you experience persistent severe abdominal pain (sometimes radiating to the back potentially accompanied by vomiting and have a fever), stop the medication and contact your provider immediately for assessment. They will do a blood test to check for pancreatitis.       Caution:   Tirzepatide (Zepbound, Mounjaro) May decrease effectiveness of your oral birth control while starting and with dose adjustments.  Use backup forms of birth control if necessary.      For any questions or concerns please send a [employer] message to our team or call our weight management call center at [phone] during regular business hours.      GLP-1 TIPS TO HELP WITH SIDE EFFECTS     GLP medications work by slowing down gastric emptying and making you feel villa longer. As a result, your may experience nausea, heartburn, and constipation.  These improve over time as your body gets used to the medication.     For Nausea/Heartburn:  Eat small, protein focused meals throughout the day  Stay hydrated.-The medication can decrease your drive for thirst  Eat slowly. STOP at the first sign of satisfaction  Eat at regular intervals, letting hours pass without eating- can cause nausea.  AVOID foods that are high in fat and sugar .      For constipation:  Stay hydrated and make sure you are  moving.    Miralax 1 scoop/packet up daily    Ducosate Sodium 1 pill twice daily (stool softener)   4 oz Prune juice or Plum juice daily   Milk of Magnesia as needed 1x weekly. Do not use daily!   Doculax 1 pill as needed 1-2x weekly   Smooth Move Tea as needed   Magnesium 400-500 mg daily    To decrease nausea/heartburn when starting a GLP-1:  Take 20-30 minutes for your meals. STOP at the first sign of satisfaction  Eat 3 small meals daily. (Use a salad size plate for meals)  AVOID foods that are high in fat and sugar

## 2025-05-27 ENCOUNTER — MYC MEDICAL ADVICE (OUTPATIENT)
Dept: SURGERY | Facility: CLINIC | Age: 40
End: 2025-05-27

## 2025-05-27 ENCOUNTER — OFFICE VISIT (OUTPATIENT)
Dept: UROLOGY | Facility: CLINIC | Age: 40
End: 2025-05-27
Attending: FAMILY MEDICINE
Payer: COMMERCIAL

## 2025-05-27 VITALS
HEIGHT: 66 IN | HEART RATE: 94 BPM | BODY MASS INDEX: 28.61 KG/M2 | DIASTOLIC BLOOD PRESSURE: 70 MMHG | SYSTOLIC BLOOD PRESSURE: 110 MMHG | TEMPERATURE: 97.8 F | OXYGEN SATURATION: 95 % | RESPIRATION RATE: 17 BRPM | WEIGHT: 178 LBS

## 2025-05-27 DIAGNOSIS — J01.90 ACUTE NON-RECURRENT SINUSITIS, UNSPECIFIED LOCATION: Primary | ICD-10-CM

## 2025-05-27 DIAGNOSIS — R39.9 UTI SYMPTOMS: ICD-10-CM

## 2025-05-27 DIAGNOSIS — N32.81 OAB (OVERACTIVE BLADDER): Primary | ICD-10-CM

## 2025-05-27 DIAGNOSIS — E66.3 OVERWEIGHT WITH BODY MASS INDEX (BMI) OF 29 TO 29.9 IN ADULT: Primary | ICD-10-CM

## 2025-05-27 DIAGNOSIS — N39.0 RECURRENT UTI: ICD-10-CM

## 2025-05-27 DIAGNOSIS — Z86.19 HISTORY OF ESBL E. COLI INFECTION: ICD-10-CM

## 2025-05-27 LAB
ALBUMIN UR-MCNC: NEGATIVE MG/DL
APPEARANCE UR: CLEAR
BILIRUB UR QL STRIP: NEGATIVE
COLOR UR AUTO: YELLOW
GLUCOSE UR STRIP-MCNC: NEGATIVE MG/DL
HGB UR QL STRIP: NEGATIVE
KETONES UR STRIP-MCNC: NEGATIVE MG/DL
LEUKOCYTE ESTERASE UR QL STRIP: NEGATIVE
NITRATE UR QL: NEGATIVE
PH UR STRIP: 7 [PH] (ref 5–7)
SP GR UR STRIP: 1.02 (ref 1–1.03)
UROBILINOGEN UR STRIP-MCNC: NORMAL MG/DL

## 2025-05-27 PROCEDURE — 52000 CYSTOURETHROSCOPY: CPT | Performed by: UROLOGY

## 2025-05-27 PROCEDURE — 99204 OFFICE O/P NEW MOD 45 MIN: CPT | Mod: 25 | Performed by: UROLOGY

## 2025-05-27 PROCEDURE — 3078F DIAST BP <80 MM HG: CPT | Performed by: UROLOGY

## 2025-05-27 PROCEDURE — 81003 URINALYSIS AUTO W/O SCOPE: CPT | Performed by: UROLOGY

## 2025-05-27 PROCEDURE — 3074F SYST BP LT 130 MM HG: CPT | Performed by: UROLOGY

## 2025-05-27 PROCEDURE — 1126F AMNT PAIN NOTED NONE PRSNT: CPT | Performed by: UROLOGY

## 2025-05-27 ASSESSMENT — PAIN SCALES - GENERAL: PAINLEVEL_OUTOF10: NO PAIN (0)

## 2025-05-27 NOTE — PROGRESS NOTES
"Breanna Luna is a 40 year old female seen in consultation for UTI's. Consult from Ag Quick  (Pt is 30 minutes late)      10/8/24 Urine drug screen: positive for amphetamines    3/10/25 UC: E coli (ESBL)  RX: nitrofurantoin    4/2/25  ED: dark smelly urine, dysuria    UA: neg nitrite, 1.009    UC: E coli (ESBL), few counts Kleb    CT abd/pelvis non-contrast: 2 mm left lower pole stone, DJD    4/22/25 UA: positive  UC: E coli (ESBL)    5/15/25 PMD: midline low back pain      Now concerned about \"all of my infections and all of the antibiotics I have been taking and what it is doing to my healthy bobby.\"      Denies dysuria, gross hematuria; voids about q 90 minutes during the day, noc x 1-2.    Denies urinary incontinence.     Seen by  years ago for post partum incontinence, resolved with Kegel's.    Hx 4 vag deliveries, tubal, no hyster, heavy menses now.    BM's alternate between loose and constipation; eats sugar cereal for breakfast.     Pt drinks 4-6 cups of caf coffee, 6-10 cups of [place] per day.      Reviewed PMH in detail including MRSA cellulitis, hx meth and EtOH (sober since 2011), migranes, obesity      Past Medical History:   Diagnosis Date    Cellulitis 2012    MRSA septic olecranon bursitis and facial cellulitis    Drug abuse (H) 2011    methamphetamine and alcohol, sober since treatment 2011    Mild pre-eclampsia in third trimester     Motion sickness     MRSA cellulitis 2012    cleared with negative nasal swabs 8/16/17 and 8/28/17, reviewed    PONV (postoperative nausea and vomiting)     Pregnancy-induced hypertension in third trimester     Rh negative, antepartum        Past Surgical History:   Procedure Laterality Date    APPENDECTOMY      DAVINCI ASSISTED HERNIORRHAPHY INCISIONAL N/A 07/10/2020    Procedure: Robotic incisional hernia repair with mesh;  Surgeon: Jovi Odom MD;  Location: [facility]    EXCISE LESION FOOT Left 08/16/2019    Procedure: Excision of nevus second interdigital " space left foot, Excision of nevus third interdigital space left foot, excision of plantar junctional nevus left foot;  Surgeon: Rinku Lopez DPM;  Location: [facility] OR    GYN SURGERY      INCISION AND DRAINAGE  2012    chin abscess I&D    LAPAROSCOPIC CHOLECYSTECTOMY N/A 2020    Procedure: Laparoscopic Cholecystectomy;  Surgeon: Renard Gerber MD;  Location: [facility]    LAPAROSCOPIC HERNIORRHAPHY INCISIONAL N/A 2019    Procedure: laparoscopic incisional hernia repair with mesh;  Surgeon: Jovi Odom MD;  Location: [facility]    LAPAROSCOPIC SALPINGECTOMY Bilateral 2018    Procedure: LAPAROSCOPIC SALPINGECTOMY;  Laparoscopic Bilateral Salpingectomy;  Surgeon: Chai Cui MD;  Location: [facility]    MAMMOPLASTY REDUCTION Bilateral 2017    SEPTOPLASTY, TURBINOPLASTY, COMBINED Bilateral 6/10/2022    Procedure: SEPTOPLASTY; BILATERAL INFERIOR TURBINATE REDUCTION;;  Surgeon: Lobo Narayanan MD;  Location: [facility]    TONSILLECTOMY Bilateral 6/10/2022    Procedure: BILATERAL TONSILLECTOMY;  Surgeon: Lobo Narayanan MD;  Location: [facility]    TUBAL LIGATION Bilateral        Social History     Socioeconomic History    Marital status:      Spouse name: Not on file    Number of children: 4    Years of education: Not on file    Highest education level: Not on file   Occupational History    Not on file   Tobacco Use    Smoking status: Former     Current packs/day: 0.00     Types: Cigarettes     Quit date: 3/10/2016     Years since quittin.2    Smokeless tobacco: Never   Vaping Use    Vaping status: Never Used   Substance and Sexual Activity    Alcohol use: No     Comment: history alcohol abuse 2012, s/p treatment    Drug use: No     Comment: former drug user (EtOH, cocaine, marijuana, meth), last use     Sexual activity: Yes     Partners: Male     Birth control/protection: Female Surgical   Other Topics Concern    Parent/sibling w/ CABG, MI or angioplasty before 65F 55M? Yes      Comment: Mother had a bypass surgery mother  from lupus   Social History Narrative    Currently lives in [place] with fiance and 18 month old son.  Has a 13 year old dtr that lives with dtrs dad in [place], [place], and a 6  Year old that was adopted out in an open adoption, lives in [place]     Social Drivers of Health     Financial Resource Strain: Low Risk  (10/3/2024)    Financial Resource Strain     Within the past 12 months, have you or your family members you live with been unable to get utilities (heat, electricity) when it was really needed?: No   Food Insecurity: Low Risk  (10/3/2024)    Food Insecurity     Within the past 12 months, did you worry that your food would run out before you got money to buy more?: No     Within the past 12 months, did the food you bought just not last and you didn t have money to get more?: No   Transportation Needs: Low Risk  (10/3/2024)    Transportation Needs     Within the past 12 months, has lack of transportation kept you from medical appointments, getting your medicines, non-medical meetings or appointments, work, or from getting things that you need?: No   Physical Activity: Sufficiently Active (10/3/2024)    Exercise Vital Sign     Days of Exercise per Week: 7 days     Minutes of Exercise per Session: 90 min   Stress: No Stress Concern Present (10/3/2024)    [employer] - Occupational Stress Questionnaire     Feeling of Stress : Not at all   Social Connections: Unknown (10/3/2024)    Social Connection and Isolation Panel [NHANES]     Frequency of Communication with Friends and Family: Not on file     Frequency of Social Gatherings with Friends and Family: Twice a week     Attends Congregation Services: Not on file     Active Member of Clubs or Organizations: Not on file     Attends Club or Organization Meetings: Not on file     Marital Status: Not on file   Interpersonal Safety: Low Risk  (5/15/2025)    Interpersonal Safety     Do you feel  physically and emotionally safe where you currently live?: Yes     Within the past 12 months, have you been hit, slapped, kicked or otherwise physically hurt by someone?: No     Within the past 12 months, have you been humiliated or emotionally abused in other ways by your partner or ex-partner?: No   Housing Stability: Low Risk  (10/3/2024)    Housing Stability     Do you have housing? : Yes     Are you worried about losing your housing?: No       Current Outpatient Medications   Medication Sig Dispense Refill    albuterol (PROAIR HFA/PROVENTIL HFA/VENTOLIN HFA) 108 (90 Base) MCG/ACT inhaler Inhale 2 puffs into the lungs every 4 hours as needed for shortness of breath, wheezing or cough 9 g 1    albuterol (PROVENTIL) (2.5 MG/3ML) 0.083% neb solution Take 1 vial (2.5 mg) by nebulization every 4 hours as needed for shortness of breath, wheezing or cough 90 mL 0    amoxicillin-clavulanate (AUGMENTIN) 875-125 MG tablet Take 1 tablet by mouth 2 times daily for 7 days. 14 tablet 0    amphetamine-dextroamphetamine (ADDERALL XR) 20 MG 24 hr capsule Take 1 capsule (20 mg) by mouth daily. 30 capsule 0    amphetamine-dextroamphetamine (ADDERALL XR) 20 MG 24 hr capsule Take 1 capsule (20 mg) by mouth daily. 30 capsule 0    amphetamine-dextroamphetamine (ADDERALL XR) 20 MG 24 hr capsule Take 1 capsule (20 mg) by mouth daily. 30 capsule 0    amphetamine-dextroamphetamine (ADDERALL) 20 MG tablet Take 1 tablet (20 mg) by mouth daily. 30 tablet 0    amphetamine-dextroamphetamine (ADDERALL) 20 MG tablet Take 1 tablet (20 mg) by mouth daily. 30 tablet 0    amphetamine-dextroamphetamine (ADDERALL) 20 MG tablet Take 1 tablet (20 mg) by mouth daily. 30 tablet 0    benzonatate (TESSALON) 100 MG capsule Take 1 capsule (100 mg) by mouth 3 times daily as needed for cough. 30 capsule 0    benzonatate (TESSALON) 200 MG capsule Take 1 capsule (200 mg) by mouth 3 times daily as needed for cough (Patient not taking: Reported on 5/15/2025) 30  capsule 0    cefdinir (OMNICEF) 300 MG capsule Take 1 capsule (300 mg) by mouth 2 times daily. (Patient not taking: Reported on 5/15/2025) 20 capsule 0    ciprofloxacin (CIPRO) 500 MG tablet Take 1 tablet (500 mg) by mouth 2 times daily. (Patient not taking: Reported on 5/15/2025) 6 tablet 0    desonide (DESOWEN) 0.05 % external cream Apply topically 2 times daily (Patient not taking: Reported on 5/15/2025) 60 g 1    galcanezumab-gnlm (EMGALITY) 120 MG/ML injection Inject 1 mL (120 mg) Subcutaneous every 28 days (Patient not taking: Reported on 5/15/2025) 1 mL 11    guaiFENesin 1200 MG TB12 Take 1 tablet (1,200 mg) by mouth 2 times daily. (Patient not taking: Reported on 5/15/2025) 60 tablet 0    ibuprofen (ADVIL/MOTRIN) 200 MG capsule Take 1 capsule (200 mg) by mouth every 4 hours as needed for fever. 30 capsule 0    ipratropium (ATROVENT) 0.06 % nasal spray Spray 2 sprays into both nostrils 4 times daily. (Patient not taking: Reported on 5/15/2025) 15 mL 0    methocarbamol (ROBAXIN) 500 MG tablet Take 1 tablet (500 mg) by mouth nightly as needed for muscle spasms. 30 tablet 0    naproxen (NAPROSYN) 500 MG tablet Take 1 tablet (500 mg) by mouth 2 times daily as needed for moderate pain (Patient not taking: Reported on 5/15/2025) 28 tablet 11    nitroFURantoin macrocrystal-monohydrate (MACROBID) 100 MG capsule Take 1 capsule (100 mg) by mouth 2 times daily. (Patient not taking: Reported on 5/15/2025) 28 capsule 0    ondansetron (ZOFRAN ODT) 4 MG ODT tab Take 1 tablet (4 mg) by mouth every 8 hours as needed for nausea. (Patient not taking: Reported on 5/15/2025) 10 tablet 0    oxyCODONE (ROXICODONE) 5 MG tablet Take 1 tablet (5 mg) by mouth every 6 hours as needed for severe pain. (Patient not taking: Reported on 5/15/2025) 6 tablet 0    Semaglutide-Weight Management (WEGOVY) 2.4 MG/0.75ML pen Inject 2.4 mg Subcutaneous once a week (Patient not taking: Reported on 5/20/2025) 9 mL 1    tirzepatide-Weight Management  (ZEPBOUND) 2.5 MG/0.5ML prefilled pen Inject 0.5 mLs (2.5 mg) subcutaneously every 7 days. 2 mL 0       Physical Exam:    GENL: NAD.    ABD: Soft, non-tender, no masses.    EG: Well-estrogenized, no masses.    VAGINA: Well-estrogenized, no masses.    BN HYPERMOBILITY: None.    CYSTOCELE: None.    APICAL PROLAPSE: None.    RECTOCELE: None.    BIMANUAL: No mass or tenderness.    Cysto:    (Informed consent obtained. Pause for cause performed)   Sterile prep.    17 Fr scope inserted through urethra. Systematic examination w 70 degree lens.   PVR: 100 ml   MUCOSA: Normal without lesion   ORIFICES: Normal location and morphology   CAPACITY: 550 cc; no pain with filling   Scope withdrawn without untoward effect.    (Pt tolerated procedure without difficulty).                Today:    Results for orders placed or performed in visit on 05/27/25   Urine Macroscopic with reflex to Microscopic     Status: Normal   Result Value Ref Range    Color Urine Yellow Colorless, Straw, Light Yellow, Yellow    Appearance Urine Clear Clear    Glucose Urine Negative Negative mg/dL    Bilirubin Urine Negative Negative    Ketones Urine Negative Negative mg/dL    Specific Gravity Urine 1.019 1.003 - 1.035    Blood Urine Negative Negative    pH Urine 7.0 5.0 - 7.0    Protein Albumin Urine Negative Negative mg/dL    Urobilinogen Urine Normal Normal mg/dL    Nitrite Urine Negative Negative    Leukocyte Esterase Urine Negative Negative    Narrative    Microscopic not indicated         IMP:  1. Recurrent UTI's, clear urine today  2. Suboptimal perineal hygiene  3. Irregular GI habits  4. Massive fluids  5. Tiny assymptomatic renal stone      PLAN:  Discussed situation with patient in detail.  Consider proper wiping, limit tub use, void after every intimacy  Bran for breakfast  Moderate fluids  RTC 2 mos to review progress  Set realistic expectations  Total time spent in reviewing patient records, discussing history, performing exam, discussing  diagnosis, outlining treatment plan and documentation: 60 minutes

## 2025-06-05 DIAGNOSIS — F90.2 ATTENTION DEFICIT HYPERACTIVITY DISORDER (ADHD), COMBINED TYPE: ICD-10-CM

## 2025-06-05 RX ORDER — DEXTROAMPHETAMINE SACCHARATE, AMPHETAMINE ASPARTATE, DEXTROAMPHETAMINE SULFATE AND AMPHETAMINE SULFATE 5; 5; 5; 5 MG/1; MG/1; MG/1; MG/1
20 TABLET ORAL DAILY
Qty: 30 TABLET | Refills: 0 | OUTPATIENT
Start: 2025-06-05

## 2025-06-05 RX ORDER — DEXTROAMPHETAMINE SACCHARATE, AMPHETAMINE ASPARTATE MONOHYDRATE, DEXTROAMPHETAMINE SULFATE AND AMPHETAMINE SULFATE 5; 5; 5; 5 MG/1; MG/1; MG/1; MG/1
20 CAPSULE, EXTENDED RELEASE ORAL DAILY
Qty: 30 CAPSULE | Refills: 0 | OUTPATIENT
Start: 2025-06-05

## 2025-06-07 DIAGNOSIS — F90.2 ATTENTION DEFICIT HYPERACTIVITY DISORDER (ADHD), COMBINED TYPE: ICD-10-CM

## 2025-06-08 RX ORDER — DEXTROAMPHETAMINE SACCHARATE, AMPHETAMINE ASPARTATE, DEXTROAMPHETAMINE SULFATE AND AMPHETAMINE SULFATE 5; 5; 5; 5 MG/1; MG/1; MG/1; MG/1
20 TABLET ORAL DAILY
Qty: 30 TABLET | Refills: 0 | OUTPATIENT
Start: 2025-06-08

## 2025-06-08 RX ORDER — DEXTROAMPHETAMINE SACCHARATE, AMPHETAMINE ASPARTATE MONOHYDRATE, DEXTROAMPHETAMINE SULFATE AND AMPHETAMINE SULFATE 5; 5; 5; 5 MG/1; MG/1; MG/1; MG/1
20 CAPSULE, EXTENDED RELEASE ORAL DAILY
Qty: 30 CAPSULE | Refills: 0 | OUTPATIENT
Start: 2025-06-08

## 2025-06-09 NOTE — TELEPHONE ENCOUNTER
Patient called back and stated the refill request was sent to the wrong provider and Dr. Mcconnell is her PCP for this medication.

## 2025-06-10 RX ORDER — DEXTROAMPHETAMINE SACCHARATE, AMPHETAMINE ASPARTATE MONOHYDRATE, DEXTROAMPHETAMINE SULFATE AND AMPHETAMINE SULFATE 5; 5; 5; 5 MG/1; MG/1; MG/1; MG/1
20 CAPSULE, EXTENDED RELEASE ORAL DAILY
Qty: 30 CAPSULE | Refills: 0 | Status: SHIPPED | OUTPATIENT
Start: 2025-06-10

## 2025-06-10 RX ORDER — DEXTROAMPHETAMINE SACCHARATE, AMPHETAMINE ASPARTATE, DEXTROAMPHETAMINE SULFATE AND AMPHETAMINE SULFATE 5; 5; 5; 5 MG/1; MG/1; MG/1; MG/1
20 TABLET ORAL DAILY
Qty: 30 TABLET | Refills: 0 | Status: SHIPPED | OUTPATIENT
Start: 2025-06-10

## 2025-07-07 DIAGNOSIS — F90.2 ATTENTION DEFICIT HYPERACTIVITY DISORDER (ADHD), COMBINED TYPE: ICD-10-CM

## 2025-07-08 RX ORDER — DEXTROAMPHETAMINE SACCHARATE, AMPHETAMINE ASPARTATE MONOHYDRATE, DEXTROAMPHETAMINE SULFATE AND AMPHETAMINE SULFATE 5; 5; 5; 5 MG/1; MG/1; MG/1; MG/1
20 CAPSULE, EXTENDED RELEASE ORAL DAILY
Qty: 30 CAPSULE | Refills: 0 | Status: SHIPPED | OUTPATIENT
Start: 2025-07-08

## 2025-07-08 RX ORDER — DEXTROAMPHETAMINE SACCHARATE, AMPHETAMINE ASPARTATE, DEXTROAMPHETAMINE SULFATE AND AMPHETAMINE SULFATE 5; 5; 5; 5 MG/1; MG/1; MG/1; MG/1
20 TABLET ORAL DAILY
Qty: 30 TABLET | Refills: 0 | Status: SHIPPED | OUTPATIENT
Start: 2025-07-08

## 2025-07-12 ENCOUNTER — VIRTUAL VISIT (OUTPATIENT)
Dept: URGENT CARE | Facility: CLINIC | Age: 40
End: 2025-07-12
Payer: COMMERCIAL

## 2025-07-12 DIAGNOSIS — S60.511A CAT SCRATCH OF RIGHT HAND, INITIAL ENCOUNTER: Primary | ICD-10-CM
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
## 2025-07-12 DIAGNOSIS — L03.011 PARONYCHIA OF RIGHT LITTLE FINGER: ICD-10-CM

## 2025-07-12 PROCEDURE — 98005 SYNCH AUDIO-VIDEO EST LOW 20: CPT

## 2025-07-12 NOTE — PROGRESS NOTES
Breanna is a 40 year old female who presents for a billable video visit.    ASSESSMENT/PLAN:  Diagnoses and all orders for this visit:    Cat scratch of right hand, initial encounter  -     amoxicillin-clavulanate (AUGMENTIN) 875-125 MG tablet; Take 1 tablet by mouth 2 times daily for 7 days.    Paronychia of right little finger  -     amoxicillin-clavulanate (AUGMENTIN) 875-125 MG tablet; Take 1 tablet by mouth 2 times daily for 7 days.    MDM  Right fifth finger paronychia with purulent drainage, likely bacterial  History of cat scratch - possible risk for Pasteurella or other zoonotic organisms  No systemic symptoms reported    Plan:    RX Augmentin to treat soft tissue infection with coverage for common skin bobby and Pasteurella  Recommend warm soaks of the affected finger 3-4 times daily  If abscess or fluid collection persists, in-person evaluation for possible drainage may be needed  Educated on signs of worsening infection: fever, spreading redness, increasing pain, red streaks, lymphadenopathy    Follow up with primary care provider with any problems, questions or concerns or if symptoms worsen or fail to improve. Patient agreed to plan and verbalized understanding.     SUBJECTIVE:  Patient reports being scratched by her cat on the right little finger a few days ago. She has since developed swelling and tenderness at the nail bed, with purulent discharge noted starting one day ago. She is concerned about a worsening infection.    ROS: Pertinent ROS neg other than the symptoms noted above in the HPI.     OBJECTIVE:  Vitals not done due to this being a virtual visit    GENERAL: healthy, alert and no distress  EYES: Eyes grossly normal to inspection,conjunctivae and sclerae normal  RESP: Able to speak in complete sentences, no audible wheeze or cough  SKIN: swelling, erythema on the left little finger lateral and proximal nailbed  NEURO: mentation intact and speech normal  PSYCH: mentation appears normal,  affect normal/bright    Video-Visit Details    Type of service:  Video Visit  Video Start Time: 4:34 pm  Video End Time: 4;40 pm    Originating Location: Home    Distant Location:  [facility]     Platform used for Video Visit: Kassi Ravi PA-C

## 2025-08-04 DIAGNOSIS — F90.2 ATTENTION DEFICIT HYPERACTIVITY DISORDER (ADHD), COMBINED TYPE: ICD-10-CM

## 2025-08-05 DIAGNOSIS — F90.2 ATTENTION DEFICIT HYPERACTIVITY DISORDER (ADHD), COMBINED TYPE: ICD-10-CM

## 2025-08-06 ENCOUNTER — TELEPHONE (OUTPATIENT)
Dept: FAMILY MEDICINE | Facility: CLINIC | Age: 40
End: 2025-08-06
Payer: COMMERCIAL

## 2025-08-06 RX ORDER — DEXTROAMPHETAMINE SACCHARATE, AMPHETAMINE ASPARTATE MONOHYDRATE, DEXTROAMPHETAMINE SULFATE AND AMPHETAMINE SULFATE 5; 5; 5; 5 MG/1; MG/1; MG/1; MG/1
20 CAPSULE, EXTENDED RELEASE ORAL DAILY
Qty: 30 CAPSULE | Refills: 0 | Status: SHIPPED | OUTPATIENT
Start: 2025-08-06

## 2025-08-06 RX ORDER — DEXTROAMPHETAMINE SACCHARATE, AMPHETAMINE ASPARTATE, DEXTROAMPHETAMINE SULFATE AND AMPHETAMINE SULFATE 5; 5; 5; 5 MG/1; MG/1; MG/1; MG/1
20 TABLET ORAL DAILY
Qty: 30 TABLET | Refills: 0 | Status: SHIPPED | OUTPATIENT
Start: 2025-08-06

## 2025-08-07 RX ORDER — DEXTROAMPHETAMINE SACCHARATE, AMPHETAMINE ASPARTATE MONOHYDRATE, DEXTROAMPHETAMINE SULFATE AND AMPHETAMINE SULFATE 5; 5; 5; 5 MG/1; MG/1; MG/1; MG/1
20 CAPSULE, EXTENDED RELEASE ORAL DAILY
Qty: 30 CAPSULE | Refills: 0 | Status: SHIPPED | OUTPATIENT
Start: 2025-08-07

## 2025-08-07 RX ORDER — DEXTROAMPHETAMINE SACCHARATE, AMPHETAMINE ASPARTATE, DEXTROAMPHETAMINE SULFATE AND AMPHETAMINE SULFATE 5; 5; 5; 5 MG/1; MG/1; MG/1; MG/1
20 TABLET ORAL DAILY
Qty: 30 TABLET | Refills: 0 | Status: SHIPPED | OUTPATIENT
Start: 2025-08-07

## 2025-08-20 ENCOUNTER — E-VISIT (OUTPATIENT)
Dept: URGENT CARE | Facility: CLINIC | Age: 40
End: 2025-08-20
Payer: COMMERCIAL

## 2025-08-20 DIAGNOSIS — N30.00 ACUTE CYSTITIS WITHOUT HEMATURIA: Primary | ICD-10-CM

## 2025-08-20 DIAGNOSIS — N39.0 ACUTE UTI (URINARY TRACT INFECTION): ICD-10-CM

## 2025-08-20 PROCEDURE — 99207 PR NON-BILLABLE SERV PER CHARTING: CPT | Performed by: EMERGENCY MEDICINE

## 2025-08-20 RX ORDER — CEPHALEXIN 500 MG/1
500 CAPSULE ORAL 2 TIMES DAILY
Qty: 10 CAPSULE | Refills: 0 | Status: SHIPPED | OUTPATIENT
Start: 2025-08-20 | End: 2025-08-25

## (undated) DEVICE — SU VICRYL 3-0 PS-2 27" UND J427H

## (undated) DEVICE — PREP CHLORAPREP 26ML TINTED ORANGE  260815

## (undated) DEVICE — SYR EAR BULB 3OZ 0035830

## (undated) DEVICE — BLADE INFERIOR TURBINATE 2.9MMX11CM 1882940HR

## (undated) DEVICE — ANTIFOG SOLUTION W/FOAM PAD CF-1001

## (undated) DEVICE — GLOVE ESTEEM POWDER FREE 5.5  2D72PL55

## (undated) DEVICE — SU DERMABOND PROPEN .5ML DPP6

## (undated) DEVICE — SU VICRYL 4-0 PS-2 27" UND J426H

## (undated) DEVICE — GLOVE BIOGEL PI SZ 7.5 40875

## (undated) DEVICE — GOWN XLG DISP 9545

## (undated) DEVICE — ESU SUCTION CAUTERY 10FR FOOT CONTROL E2505-10FR

## (undated) DEVICE — GLOVE PROTEXIS W/NEU-THERA 8.0  2D73TE80

## (undated) DEVICE — ESU GROUND PAD UNIVERSAL W/O CORD

## (undated) DEVICE — ENDO POUCH UNIV RETRIEVAL SYSTEM INZII 10MM CD001

## (undated) DEVICE — DRSG BANDAID 2X3 1/2" FABRIC

## (undated) DEVICE — SPONGE COTTONOID 1/2X3" 80-1407

## (undated) DEVICE — ENDO TROCAR FIRST ENTRY KII FIOS Z-THRD 05X100MM CTF03

## (undated) DEVICE — ESU SUCTION/IRRIGATION SYSTEM PISTOL GRIP

## (undated) DEVICE — ENDO TROCAR SHIELDED BLADED KII Z-THRD 12X100MM CTB73

## (undated) DEVICE — LABEL MEDICATION SYSTEM  3304

## (undated) DEVICE — ESU CORD MONOPOLAR HIGH FREQUENCY 26006M-D/10

## (undated) DEVICE — ESU PENCIL SMOKE EVAC W/ROCKER SWITCH 0703-047-000

## (undated) DEVICE — SU PDS II 1 CT-1 MONOFIL Z347H

## (undated) DEVICE — GLOVE ESTEEM BLUE W/NEU-THERA 6.0  2D73PB60

## (undated) DEVICE — SUCTION CANISTER MEDIVAC LINER 1500ML W/LID 65651-515

## (undated) DEVICE — SYR 10ML LL W/O NDL

## (undated) DEVICE — BNDG KLING 4" 2236

## (undated) DEVICE — SU MONOCRYL 3-0 PS-2 18" UND Y497G

## (undated) DEVICE — SYSTEM CLEARIFY VISUALIZATION 21-345

## (undated) DEVICE — EVAC SYSTEM CLEAR FLOW SC082500

## (undated) DEVICE — SYR BULB IRRIG DOVER 60 ML LATEX FREE 67000

## (undated) DEVICE — CATH TRAY FOLEY 16FR DRAINAGE BAG STATLOCK 899916

## (undated) DEVICE — GLOVE PROTEXIS W/NEU-THERA 7.0  2D73TE70

## (undated) DEVICE — DRAPE SLEEVE 599

## (undated) DEVICE — SU VICRYL 0 UR-6 27" J603H

## (undated) DEVICE — SOL NACL 0.9% INJ 1000ML BAG 07983-09

## (undated) DEVICE — SUCTION STRYKERFLOW II 250-070-500

## (undated) DEVICE — NDL INSUFFLATION 14GA STEP S100000

## (undated) DEVICE — SU PROLENE 4-0 PS-2 18" 8682G

## (undated) DEVICE — ESU ELEC NDL 1" COATED/INSULATED E1465

## (undated) DEVICE — BNDG ABDOMINAL BINDER 09X55-72"

## (undated) DEVICE — SU VICRYL 2-0 SH 27" UND J417H

## (undated) DEVICE — SU CHROMIC 4-0 P-3 18" 1654G

## (undated) DEVICE — PACK EXTREMITY SOP15EXFSD

## (undated) DEVICE — ENDO TROCAR BLADED 05X100MM D5LT

## (undated) DEVICE — DEVICE FIXATION SECURESTRAP TACKER 5MM ABSORB STRAP25

## (undated) DEVICE — ADH SKIN CLOSURE PREMIERPRO EXOFIN 1.0ML 3470

## (undated) DEVICE — SOL WATER IRRIG 1000ML BOTTLE 07139-09

## (undated) DEVICE — ENDO TROCAR SLEEVE KII Z-THREADED 05X100MM CTS02

## (undated) DEVICE — SYR 10ML FINGER CONTROL W/O NDL 309695

## (undated) DEVICE — ESU LIGASURE ATLAS 10MM  LS1037

## (undated) DEVICE — SUCTION TIP YANKAUER W/O VENT K86

## (undated) DEVICE — DRAPE SHEET HALF 40X60" 9358

## (undated) DEVICE — DRAPE SPLIT EENT 76X124" 3X28" 9447

## (undated) DEVICE — PACK GENERAL LAPAOSCOPY

## (undated) DEVICE — DRSG BANDAID 1X3" FABRIC

## (undated) DEVICE — DAVINCI SI DRAPE ACCESSORY KIT 3-ARM 420290

## (undated) DEVICE — SU ETHILON 3-0 FS-1 18" 669H

## (undated) DEVICE — SPONGE RAY-TEC 4X4" 7317

## (undated) DEVICE — DEVICE SUTURE GRASPER TROCAR CLOSURE 14GA PMITCSG

## (undated) DEVICE — ANTIFOG SOLUTION W/FOAM PAD 31142527

## (undated) DEVICE — CATH INTERMITTENT CLEAN-CATH 8FR 16" VINYL LF 421708

## (undated) DEVICE — BLADE KNIFE SURG 11 371111

## (undated) DEVICE — DAVINCI HOT SHEARS TIP COVER  400180

## (undated) DEVICE — TUBING SUCTION 10'X3/16" N510

## (undated) DEVICE — DRSG STERI STRIP 1/2X4" R1547

## (undated) DEVICE — PACK MINOR SBA15MIFSE

## (undated) DEVICE — SOL NACL 0.9% IRRIG 1000ML BOTTLE 07138-09

## (undated) DEVICE — ENDO TROCAR 05MM STEP S101005

## (undated) DEVICE — ENDO TROCAR FIRST ENTRY KII FIOS Z-THRD 11X100MM CTF33

## (undated) DEVICE — SU VICRYL 3-0 SH 27" J316H

## (undated) DEVICE — GLOVE PROTEXIS BLUE W/NEU-THERA 6.5  2D73EB65

## (undated) DEVICE — SPECIMEN CONTAINER 4OZ

## (undated) DEVICE — GLOVE PROTEXIS W/NEU-THERA 7.5  2D73TE75

## (undated) DEVICE — GLOVE PROTEXIS W/NEU-THERA 5.5  2D73TE55

## (undated) DEVICE — SU STRATAFIX PDS PLUS 2-0 SPIRAL SH 30CM SXPP1B416

## (undated) DEVICE — ESU HOOK TIP 5MM CONMED

## (undated) DEVICE — SU MONOCRYL 4-0 PS-2 18" UND Y496G

## (undated) DEVICE — PACK SET-UP STD 9102

## (undated) DEVICE — TUBING C02 INSUFFLATION W/FILTER

## (undated) DEVICE — GLOVE PROTEXIS BLUE W/NEU-THERA 7.5  2D73EB75

## (undated) DEVICE — KIT PATIENT POSITIONING PIGAZZI LATEX FREE 40580

## (undated) DEVICE — Device

## (undated) DEVICE — NDL SPINAL 25GA 3.5" QUINCKE 405180

## (undated) DEVICE — BASIN SET MINOR DISP

## (undated) DEVICE — SYR 10ML PREFILLED 0.9% NACL INJ NOT STERILE 306500

## (undated) DEVICE — ENDO CLIP CARTIRDGE K2 MED/LG 10 1112

## (undated) DEVICE — DRSG GAUZE 4X4" 3033

## (undated) DEVICE — NDL ECLIPSE 25GA 1.5"

## (undated) DEVICE — ENDO TROCAR DILATING TIP 10/11X100MM D11LT

## (undated) DEVICE — PACK AB HYST/LITHOTOMY CUSTOM NORTHLAND

## (undated) DEVICE — ESU LIGASURE BLUNT TIP 5MM LF1537

## (undated) DEVICE — DEVICE SUTURE PASSER 14GA WECK EFX EFXSP2

## (undated) DEVICE — LUBRICATING JELLY 4.25OZ

## (undated) DEVICE — SU WND CLOSURE VLOC 180 ABS 2-0 8" VLOCA208L

## (undated) DEVICE — TUBING SMOKE EVAC PLUME-AWAY 6' 620-030-606

## (undated) DEVICE — PREP POVIDONE IODINE SWABS X3

## (undated) DEVICE — DAVINCI OBTURATOR 8MM BLADELESS 420023

## (undated) DEVICE — SU VICRYL 3-0 SH 27" UND J416H

## (undated) DEVICE — ENDO POUCH 10MM POUCH

## (undated) DEVICE — SU CHROMIC 5-0 P-3 18" 687G

## (undated) DEVICE — ENDO TROCAR FIRST ENTRY KII FIOS Z-THRD 12X100MM CTF73

## (undated) RX ORDER — ONDANSETRON 2 MG/ML
INJECTION INTRAMUSCULAR; INTRAVENOUS
Status: DISPENSED
Start: 2018-08-27

## (undated) RX ORDER — FENTANYL CITRATE 50 UG/ML
INJECTION, SOLUTION INTRAMUSCULAR; INTRAVENOUS
Status: DISPENSED
Start: 2022-06-10

## (undated) RX ORDER — LIDOCAINE HYDROCHLORIDE 20 MG/ML
INJECTION, SOLUTION EPIDURAL; INFILTRATION; INTRACAUDAL; PERINEURAL
Status: DISPENSED
Start: 2018-08-27

## (undated) RX ORDER — METOCLOPRAMIDE HYDROCHLORIDE 5 MG/ML
INJECTION INTRAMUSCULAR; INTRAVENOUS
Status: DISPENSED
Start: 2020-02-14

## (undated) RX ORDER — METOCLOPRAMIDE HYDROCHLORIDE 5 MG/ML
INJECTION INTRAMUSCULAR; INTRAVENOUS
Status: DISPENSED
Start: 2019-07-17

## (undated) RX ORDER — PROPOFOL 10 MG/ML
INJECTION, EMULSION INTRAVENOUS
Status: DISPENSED
Start: 2018-08-27

## (undated) RX ORDER — HYDROMORPHONE HYDROCHLORIDE 1 MG/ML
INJECTION, SOLUTION INTRAMUSCULAR; INTRAVENOUS; SUBCUTANEOUS
Status: DISPENSED
Start: 2019-07-17

## (undated) RX ORDER — DEXAMETHASONE SODIUM PHOSPHATE 10 MG/ML
INJECTION, SOLUTION INTRAMUSCULAR; INTRAVENOUS
Status: DISPENSED
Start: 2020-02-14

## (undated) RX ORDER — FENTANYL CITRATE 50 UG/ML
INJECTION, SOLUTION INTRAMUSCULAR; INTRAVENOUS
Status: DISPENSED
Start: 2019-07-17

## (undated) RX ORDER — PROPOFOL 10 MG/ML
INJECTION, EMULSION INTRAVENOUS
Status: DISPENSED
Start: 2020-07-10

## (undated) RX ORDER — FENTANYL CITRATE 50 UG/ML
INJECTION, SOLUTION INTRAMUSCULAR; INTRAVENOUS
Status: DISPENSED
Start: 2018-08-27

## (undated) RX ORDER — PROPOFOL 10 MG/ML
INJECTION, EMULSION INTRAVENOUS
Status: DISPENSED
Start: 2019-07-17

## (undated) RX ORDER — DEXAMETHASONE SODIUM PHOSPHATE 4 MG/ML
INJECTION, SOLUTION INTRA-ARTICULAR; INTRALESIONAL; INTRAMUSCULAR; INTRAVENOUS; SOFT TISSUE
Status: DISPENSED
Start: 2022-06-10

## (undated) RX ORDER — KETOROLAC TROMETHAMINE 30 MG/ML
INJECTION, SOLUTION INTRAMUSCULAR; INTRAVENOUS
Status: DISPENSED
Start: 2018-08-27

## (undated) RX ORDER — COCAINE HYDROCHLORIDE 40 MG/ML
SOLUTION NASAL
Status: DISPENSED
Start: 2022-06-10

## (undated) RX ORDER — DIMENHYDRINATE 50 MG/ML
INJECTION, SOLUTION INTRAMUSCULAR; INTRAVENOUS
Status: DISPENSED
Start: 2019-07-17

## (undated) RX ORDER — LIDOCAINE HYDROCHLORIDE 10 MG/ML
INJECTION, SOLUTION EPIDURAL; INFILTRATION; INTRACAUDAL; PERINEURAL
Status: DISPENSED
Start: 2020-07-10

## (undated) RX ORDER — ACETAMINOPHEN 325 MG/1
TABLET ORAL
Status: DISPENSED
Start: 2022-06-10

## (undated) RX ORDER — ROCURONIUM BROMIDE 50 MG/5 ML
SYRINGE (ML) INTRAVENOUS
Status: DISPENSED
Start: 2018-08-27

## (undated) RX ORDER — KETAMINE HYDROCHLORIDE 10 MG/ML
INJECTION, SOLUTION INTRAMUSCULAR; INTRAVENOUS
Status: DISPENSED
Start: 2020-07-10

## (undated) RX ORDER — ONDANSETRON 2 MG/ML
INJECTION INTRAMUSCULAR; INTRAVENOUS
Status: DISPENSED
Start: 2020-02-14

## (undated) RX ORDER — CEFAZOLIN SODIUM 1 G/3ML
INJECTION, POWDER, FOR SOLUTION INTRAMUSCULAR; INTRAVENOUS
Status: DISPENSED
Start: 2022-06-10

## (undated) RX ORDER — LIDOCAINE HYDROCHLORIDE 20 MG/ML
INJECTION, SOLUTION EPIDURAL; INFILTRATION; INTRACAUDAL; PERINEURAL
Status: DISPENSED
Start: 2019-07-17

## (undated) RX ORDER — LIDOCAINE HYDROCHLORIDE 10 MG/ML
INJECTION, SOLUTION EPIDURAL; INFILTRATION; INTRACAUDAL; PERINEURAL
Status: DISPENSED
Start: 2022-06-10

## (undated) RX ORDER — OXYCODONE HYDROCHLORIDE 5 MG/1
TABLET ORAL
Status: DISPENSED
Start: 2022-06-10

## (undated) RX ORDER — ONDANSETRON 2 MG/ML
INJECTION INTRAMUSCULAR; INTRAVENOUS
Status: DISPENSED
Start: 2019-07-17

## (undated) RX ORDER — LIDOCAINE HYDROCHLORIDE 20 MG/ML
INJECTION, SOLUTION EPIDURAL; INFILTRATION; INTRACAUDAL; PERINEURAL
Status: DISPENSED
Start: 2020-07-10

## (undated) RX ORDER — LIDOCAINE HYDROCHLORIDE AND EPINEPHRINE 10; 10 MG/ML; UG/ML
INJECTION, SOLUTION INFILTRATION; PERINEURAL
Status: DISPENSED
Start: 2019-08-16

## (undated) RX ORDER — HYDROMORPHONE HYDROCHLORIDE 1 MG/ML
INJECTION, SOLUTION INTRAMUSCULAR; INTRAVENOUS; SUBCUTANEOUS
Status: DISPENSED
Start: 2020-02-14

## (undated) RX ORDER — BUPIVACAINE HYDROCHLORIDE AND EPINEPHRINE 2.5; 5 MG/ML; UG/ML
INJECTION, SOLUTION EPIDURAL; INFILTRATION; INTRACAUDAL; PERINEURAL
Status: DISPENSED
Start: 2020-07-10

## (undated) RX ORDER — HYDROMORPHONE HYDROCHLORIDE 1 MG/ML
INJECTION, SOLUTION INTRAMUSCULAR; INTRAVENOUS; SUBCUTANEOUS
Status: DISPENSED
Start: 2018-08-27

## (undated) RX ORDER — CEFAZOLIN SODIUM 1 G/3ML
INJECTION, POWDER, FOR SOLUTION INTRAMUSCULAR; INTRAVENOUS
Status: DISPENSED
Start: 2020-07-10

## (undated) RX ORDER — PROPOFOL 10 MG/ML
INJECTION, EMULSION INTRAVENOUS
Status: DISPENSED
Start: 2020-02-14

## (undated) RX ORDER — ESMOLOL HYDROCHLORIDE 10 MG/ML
INJECTION INTRAVENOUS
Status: DISPENSED
Start: 2018-08-27

## (undated) RX ORDER — ONDANSETRON 2 MG/ML
INJECTION INTRAMUSCULAR; INTRAVENOUS
Status: DISPENSED
Start: 2020-07-10

## (undated) RX ORDER — FENTANYL CITRATE 50 UG/ML
INJECTION, SOLUTION INTRAMUSCULAR; INTRAVENOUS
Status: DISPENSED
Start: 2020-07-10

## (undated) RX ORDER — ONDANSETRON 2 MG/ML
INJECTION INTRAMUSCULAR; INTRAVENOUS
Status: DISPENSED
Start: 2022-06-10

## (undated) RX ORDER — FENTANYL CITRATE 50 UG/ML
INJECTION, SOLUTION INTRAMUSCULAR; INTRAVENOUS
Status: DISPENSED
Start: 2019-08-16

## (undated) RX ORDER — SCOLOPAMINE TRANSDERMAL SYSTEM 1 MG/1
PATCH, EXTENDED RELEASE TRANSDERMAL
Status: DISPENSED
Start: 2022-06-10

## (undated) RX ORDER — FENTANYL CITRATE 50 UG/ML
INJECTION, SOLUTION INTRAMUSCULAR; INTRAVENOUS
Status: DISPENSED
Start: 2020-02-14

## (undated) RX ORDER — BUPIVACAINE HYDROCHLORIDE 5 MG/ML
INJECTION, SOLUTION EPIDURAL; INTRACAUDAL
Status: DISPENSED
Start: 2018-08-27

## (undated) RX ORDER — PROPOFOL 10 MG/ML
INJECTION, EMULSION INTRAVENOUS
Status: DISPENSED
Start: 2022-06-10

## (undated) RX ORDER — DEXAMETHASONE SODIUM PHOSPHATE 10 MG/ML
INJECTION INTRAMUSCULAR; INTRAVENOUS
Status: DISPENSED
Start: 2018-08-27

## (undated) RX ORDER — DEXAMETHASONE SODIUM PHOSPHATE 10 MG/ML
INJECTION, SOLUTION INTRAMUSCULAR; INTRAVENOUS
Status: DISPENSED
Start: 2019-07-17

## (undated) RX ORDER — KETOROLAC TROMETHAMINE 30 MG/ML
INJECTION, SOLUTION INTRAMUSCULAR; INTRAVENOUS
Status: DISPENSED
Start: 2020-02-14

## (undated) RX ORDER — KETOROLAC TROMETHAMINE 30 MG/ML
INJECTION, SOLUTION INTRAMUSCULAR; INTRAVENOUS
Status: DISPENSED
Start: 2019-07-17

## (undated) RX ORDER — DEXAMETHASONE SODIUM PHOSPHATE 10 MG/ML
INJECTION, SOLUTION INTRAMUSCULAR; INTRAVENOUS
Status: DISPENSED
Start: 2020-07-10